# Patient Record
Sex: FEMALE | Race: BLACK OR AFRICAN AMERICAN | Employment: OTHER | URBAN - METROPOLITAN AREA
[De-identification: names, ages, dates, MRNs, and addresses within clinical notes are randomized per-mention and may not be internally consistent; named-entity substitution may affect disease eponyms.]

---

## 2017-03-01 ENCOUNTER — HOSPITAL ENCOUNTER (OUTPATIENT)
Dept: PHYSICAL THERAPY | Age: 62
Discharge: HOME | End: 2017-03-01
Payer: MEDICARE

## 2017-03-01 PROCEDURE — 97162 PT EVAL MOD COMPLEX 30 MIN: CPT

## 2017-03-01 PROCEDURE — 97110 THERAPEUTIC EXERCISES: CPT

## 2017-03-01 PROCEDURE — G8981 BODY POS CURRENT STATUS: HCPCS

## 2017-03-01 PROCEDURE — G8982 BODY POS GOAL STATUS: HCPCS
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
## 2017-05-22 NOTE — PROGRESS NOTES
[facility] - [facility] COMPANY OF [address]  [phone] fax    Physical Therapy Discharge Summary    Patient name: Chevy Sutton Start of Care:  3/1/17   Referral source: Adry Don MD : 1955   Medical/Treatment Diagnosis: Neck pain [M54.2]  Diabetic neuropathy ([address] [E11.40] Onset Date:      Prior Hospitalization: see medical history Provider#: [id number]   Medications: Verified on Patient Summary List    Comorbidities: Diabetes, neuropathy, HTN, arthritis    Prior Level of Function:1    Visits from Start of Care: 1   Missed Visits:  0    Reporting Period : 3/1/17 to 3/1/17    Summary of Care:  Goal: Patient will improve FOTO score by 18 points in order to demonstrate a significant improvement in function. Status at last note/certification: 18  Status at discharge: not met    Goal: Patient Will improve cervical rotation to 45 degrees bilaterally in order to increase ease of ADLs  Status at last note/certification:  R: 26 L: 38 degrees  Status at discharge: not met    Goal: Patient will improve L shoulder AROM elevation to 120 degrees in order to improve quality of life. Status at last note/certification: 99 degrees  Status at discharge: not met    Goal: Patient will report pain at 2/10 or less during ADLs in order to improve quality of life. Status at last note/certification:   Status at discharge: not met    Pt. Was seen for initial evaluation and then did not return to PT secondary to high co-pay. She was educated on a HEP at time of evaluation.        ASSESSMENT/RECOMMENDATIONS:  [x]Discontinue therapy: []Patient has reached or is progressing toward set goals      [x]Patient is non-compliant or has abdicated      []Due to lack of appreciable progress towards set goals    Ugo Grey, PT 2017 12:34 PM

## 2017-09-22 ENCOUNTER — APPOINTMENT (OUTPATIENT)
Dept: GENERAL RADIOLOGY | Age: 62
DRG: 304 | End: 2017-09-22
Attending: PHYSICIAN ASSISTANT
Payer: MEDICARE

## 2017-09-22 ENCOUNTER — HOSPITAL ENCOUNTER (INPATIENT)
Age: 62
LOS: 5 days | Discharge: HOME | DRG: 304 | End: 2017-09-27
Attending: EMERGENCY MEDICINE | Admitting: INTERNAL MEDICINE
Payer: MEDICARE

## 2017-09-22 ENCOUNTER — APPOINTMENT (OUTPATIENT)
Dept: CT IMAGING | Age: 62
DRG: 304 | End: 2017-09-22
Attending: EMERGENCY MEDICINE
Payer: MEDICARE

## 2017-09-22 DIAGNOSIS — I16.0 HYPERTENSIVE URGENCY: ICD-10-CM

## 2017-09-22 DIAGNOSIS — R07.9 CHEST PAIN, UNSPECIFIED TYPE: Primary | ICD-10-CM

## 2017-09-22 LAB
ANION GAP SERPL CALC-SCNC: 4 MMOL/L (ref 3–18)
BASOPHILS # BLD: 0 K/UL (ref 0–0.06)
BASOPHILS NFR BLD: 0 % (ref 0–2)
BNP SERPL-MCNC: ABNORMAL PG/ML (ref 0–900)
BUN SERPL-MCNC: 25 MG/DL (ref 7–18)
BUN/CREAT SERPL: 22 (ref 12–20)
CALCIUM SERPL-MCNC: 8.7 MG/DL (ref 8.5–10.1)
CHLORIDE SERPL-SCNC: 104 MMOL/L (ref 100–108)
CO2 SERPL-SCNC: 33 MMOL/L (ref 21–32)
CREAT SERPL-MCNC: 1.16 MG/DL (ref 0.6–1.3)
DIFFERENTIAL METHOD BLD: ABNORMAL
EOSINOPHIL # BLD: 0.1 K/UL (ref 0–0.4)
EOSINOPHIL NFR BLD: 2 % (ref 0–5)
ERYTHROCYTE [DISTWIDTH] IN BLOOD BY AUTOMATED COUNT: 14.5 % (ref 11.6–14.5)
GLUCOSE SERPL-MCNC: 172 MG/DL (ref 74–99)
HCT VFR BLD AUTO: 34.2 % (ref 35–45)
HGB BLD-MCNC: 10.8 G/DL (ref 12–16)
LYMPHOCYTES # BLD: 0.9 K/UL (ref 0.9–3.6)
LYMPHOCYTES NFR BLD: 13 % (ref 21–52)
MAGNESIUM SERPL-MCNC: 2 MG/DL (ref 1.6–2.6)
MCH RBC QN AUTO: 27 PG (ref 24–34)
MCHC RBC AUTO-ENTMCNC: 31.6 G/DL (ref 31–37)
MCV RBC AUTO: 85.5 FL (ref 74–97)
MONOCYTES # BLD: 0.3 K/UL (ref 0.05–1.2)
MONOCYTES NFR BLD: 4 % (ref 3–10)
NEUTS SEG # BLD: 5.5 K/UL (ref 1.8–8)
NEUTS SEG NFR BLD: 81 % (ref 40–73)
PLATELET # BLD AUTO: 139 K/UL (ref 135–420)
PMV BLD AUTO: 11.5 FL (ref 9.2–11.8)
POTASSIUM SERPL-SCNC: 2.9 MMOL/L (ref 3.5–5.5)
RBC # BLD AUTO: 4 M/UL (ref 4.2–5.3)
SODIUM SERPL-SCNC: 141 MMOL/L (ref 136–145)
TROPONIN I SERPL-MCNC: 0.06 NG/ML (ref 0–0.06)
TROPONIN I SERPL-MCNC: 0.06 NG/ML (ref 0–0.06)
WBC # BLD AUTO: 6.8 K/UL (ref 4.6–13.2)

## 2017-09-22 PROCEDURE — 93005 ELECTROCARDIOGRAM TRACING: CPT

## 2017-09-22 PROCEDURE — 83880 ASSAY OF NATRIURETIC PEPTIDE: CPT | Performed by: EMERGENCY MEDICINE

## 2017-09-22 PROCEDURE — 71275 CT ANGIOGRAPHY CHEST: CPT

## 2017-09-22 PROCEDURE — 74011250636 HC RX REV CODE- 250/636: Performed by: EMERGENCY MEDICINE

## 2017-09-22 PROCEDURE — 74011250637 HC RX REV CODE- 250/637: Performed by: EMERGENCY MEDICINE

## 2017-09-22 PROCEDURE — 84484 ASSAY OF TROPONIN QUANT: CPT | Performed by: PHYSICIAN ASSISTANT

## 2017-09-22 PROCEDURE — 96361 HYDRATE IV INFUSION ADD-ON: CPT

## 2017-09-22 PROCEDURE — 83735 ASSAY OF MAGNESIUM: CPT | Performed by: PHYSICIAN ASSISTANT

## 2017-09-22 PROCEDURE — 65660000000 HC RM CCU STEPDOWN

## 2017-09-22 PROCEDURE — 71020 XR CHEST PA LAT: CPT

## 2017-09-22 PROCEDURE — 96374 THER/PROPH/DIAG INJ IV PUSH: CPT

## 2017-09-22 PROCEDURE — 85025 COMPLETE CBC W/AUTO DIFF WBC: CPT | Performed by: PHYSICIAN ASSISTANT

## 2017-09-22 PROCEDURE — 80048 BASIC METABOLIC PNL TOTAL CA: CPT | Performed by: PHYSICIAN ASSISTANT

## 2017-09-22 PROCEDURE — 74011636320 HC RX REV CODE- 636/320: Performed by: EMERGENCY MEDICINE

## 2017-09-22 PROCEDURE — 99285 EMERGENCY DEPT VISIT HI MDM: CPT

## 2017-09-22 RX ORDER — ASPIRIN 81 MG/1
81 TABLET ORAL DAILY
COMMUNITY
End: 2020-01-01

## 2017-09-22 RX ORDER — NITROGLYCERIN 0.4 MG/1
0.4 TABLET SUBLINGUAL AS NEEDED
Status: DISCONTINUED | OUTPATIENT
Start: 2017-09-22 | End: 2017-09-27 | Stop reason: HOSPADM

## 2017-09-22 RX ORDER — CARVEDILOL 25 MG/1
25 TABLET ORAL 2 TIMES DAILY WITH MEALS
COMMUNITY
End: 2017-11-03 | Stop reason: SDUPTHER

## 2017-09-22 RX ORDER — CLONIDINE HYDROCHLORIDE 0.2 MG/1
0.2 TABLET ORAL 2 TIMES DAILY
COMMUNITY
End: 2017-09-27

## 2017-09-22 RX ORDER — LABETALOL HCL 20 MG/4 ML
20 SYRINGE (ML) INTRAVENOUS
Status: COMPLETED | OUTPATIENT
Start: 2017-09-22 | End: 2017-09-22

## 2017-09-22 RX ORDER — PRAVASTATIN SODIUM 20 MG/1
20 TABLET ORAL
COMMUNITY
End: 2020-01-01

## 2017-09-22 RX ORDER — OFLOXACIN 3 MG/ML
3 SOLUTION/ DROPS OPHTHALMIC DAILY
COMMUNITY
End: 2017-09-27

## 2017-09-22 RX ORDER — VALSARTAN AND HYDROCHLOROTHIAZIDE 160; 25 MG/1; MG/1
1 TABLET ORAL DAILY
COMMUNITY
End: 2017-09-27

## 2017-09-22 RX ORDER — CARVEDILOL 6.25 MG/1
25 TABLET ORAL
Status: COMPLETED | OUTPATIENT
Start: 2017-09-22 | End: 2017-09-22

## 2017-09-22 RX ORDER — SPIRONOLACTONE 25 MG/1
12.5 TABLET ORAL DAILY
COMMUNITY
End: 2018-01-26 | Stop reason: ALTCHOICE

## 2017-09-22 RX ORDER — GUAIFENESIN 100 MG/5ML
162 LIQUID (ML) ORAL
Status: COMPLETED | OUTPATIENT
Start: 2017-09-22 | End: 2017-09-22

## 2017-09-22 RX ORDER — GABAPENTIN 300 MG/1
300 CAPSULE ORAL 4 TIMES DAILY
Status: ON HOLD | COMMUNITY
End: 2017-09-27

## 2017-09-22 RX ORDER — POTASSIUM CHLORIDE 20 MEQ/1
40 TABLET, EXTENDED RELEASE ORAL
Status: COMPLETED | OUTPATIENT
Start: 2017-09-22 | End: 2017-09-22

## 2017-09-22 RX ORDER — CLONIDINE HYDROCHLORIDE 0.1 MG/1
0.2 TABLET ORAL
Status: COMPLETED | OUTPATIENT
Start: 2017-09-22 | End: 2017-09-22

## 2017-09-22 RX ADMIN — ASPIRIN 81 MG 162 MG: 81 TABLET ORAL at 14:53

## 2017-09-22 RX ADMIN — SODIUM CHLORIDE 1000 ML: 900 INJECTION, SOLUTION INTRAVENOUS at 15:29

## 2017-09-22 RX ADMIN — IOPAMIDOL 100 ML: 755 INJECTION, SOLUTION INTRAVENOUS at 15:52

## 2017-09-22 RX ADMIN — CARVEDILOL 25 MG: 6.25 TABLET, FILM COATED ORAL at 14:53

## 2017-09-22 RX ADMIN — CLONIDINE HYDROCHLORIDE 0.2 MG: 0.1 TABLET ORAL at 14:53

## 2017-09-22 RX ADMIN — LABETALOL HYDROCHLORIDE 20 MG: 5 INJECTION, SOLUTION INTRAVENOUS at 15:46

## 2017-09-22 RX ADMIN — POTASSIUM CHLORIDE 40 MEQ: 20 TABLET, EXTENDED RELEASE ORAL at 15:17

## 2017-09-22 NOTE — ED NOTES
I performed a brief evaluation, including history and physical, of the patient here in triage and I have determined that pt will need further treatment and evaluation from the main side ER physician. I have placed initial orders to help in expediting patients care. September 22, 2017 at 2:06 PM - ROSEANNE Obrien      Pt having chest pain and SOB x 4 days with cough productive of yellow sputum. Hx of DM, HTN, Hypercholesterolemia, and CHF.      Visit Vitals    BP (!) 198/127 (BP 1 Location: Left arm, BP Patient Position: At rest)    Pulse 89    Temp 98.3 °F (36.8 °C)    Resp 20    Ht 5' 9\" (1.753 m)    Wt 83 kg (183 lb)    SpO2 94%    BMI 27.02 kg/m2

## 2017-09-22 NOTE — ED NOTES
Hourly rounding:  Siderails up, call bell within reach. Daughter at bedside. Patient states she is not currently having any chest pain.

## 2017-09-22 NOTE — IP AVS SNAPSHOT
[address] Patient: Tatum Johnson MRN: [id number] CVK:[birth year] You are allergic to the following Allergen Reactions Codeine Nausea Only Sulfa (Sulfonamide Antibiotics) Rash Immunizations Administered for This Admission Name Date Influenza Vaccine (Quad) PF  Deferred () Recent Documentation Height Weight BMI OB Status Smoking Status 1.753 m 95.1 kg 30.95 kg/m2 Postmenopausal Never Smoker Emergency Contacts Name Discharge Info Relation Home Work Mobile Katina Maurer  Mother [14] [phone] Sarah Buckely  Child [2] [phone] About your hospitalization You were admitted on:  September 22, 2017 You last received care in the: [facility] [address] You were discharged on:  September 27, 2017 Unit phone number:  [phone] Why you were hospitalized Your primary diagnosis was:  Not on File Your diagnoses also included:  Chest Pain Providers Seen During Your Hospitalizations Provider Role Specialty Primary office phone Krysta Russell MD Attending Provider Emergency Medicine [phone] Vaibhav Davis MD Attending Provider Internal Medicine [phone] Keith Foss MD Attending Provider Hospitalist [phone] Your Primary Care Physician (PCP) Primary Care Physician Office Phone Office Fax Justice Hunt, [address] [place] [address] [phone] Follow-up Information Follow up With Details Comments Contact Info Joon Smith MD On 11/3/2017 @ 11:00 am [address] 200 [facility] 
[phone] [facility] On 10/3/2017 @ 2:15 (Pt will see Dr. Estella Goode at this location). [address] [place] [id number] [phone] Ramirez Coleman MD On 10/20/2017 @ 10:30 am [address] 200 [facility] 
[phone] Your Appointments Friday November 03, 2017 11:00 AM EDT  
[facility] with Dale Upton MD  
[facility] ([facility] [place] [address] [address] [address] [phone] Current Discharge Medication List  
  
START taking these medications Dose & Instructions Dispensing Information Comments Morning Noon Evening Bedtime  
 fluticasone 50 mcg/actuation nasal spray Commonly known as:  Soheila Fetch Your last dose was: Your next dose is:    
   
   
 Dose:  2 Spray 2 Sprays by Both Nostrils route daily. Quantity:  1 Bottle Refills:  1  
     
   
   
   
  
 hydrALAZINE 25 mg tablet Commonly known as:  APRESOLINE Your last dose was: Your next dose is:    
   
   
 Dose:  25 mg Take 1 Tab by mouth three (3) times daily. Quantity:  90 Tab Refills:  2  
     
   
   
   
  
 isosorbide dinitrate 10 mg tablet Commonly known as:  ISORDIL Your last dose was: Your next dose is:    
   
   
 Dose:  10 mg Take 1 Tab by mouth three (3) times daily. Quantity:  90 Tab Refills:  2  
     
   
   
   
  
 sodium chloride 0.65 % nasal spray Commonly known as:  OCEAN Your last dose was: Your next dose is:    
   
   
 Dose:  2 Spray 2 Sprays by Both Nostrils route every two (2) hours as needed. Quantity:  30 mL Refills:  0  
     
   
   
   
  
 valsartan 160 mg tablet Commonly known as:  DIOVAN Start taking on:  9/28/2017 Your last dose was: Your next dose is:    
   
   
 Dose:  160 mg Take 1 Tab by mouth daily. Quantity:  30 Tab Refills:  2 CONTINUE these medications which have CHANGED Dose & Instructions Dispensing Information Comments Morning Noon Evening Bedtime  
 gabapentin 100 mg capsule Commonly known as:  NEURONTIN What changed: - medication strength 
- how much to take 
- how to take this - when to take this 
- additional instructions Your last dose was: Your next dose is:    
   
   
 1 cap po twice daily with breakfast and dinner, and 3 tabs po qHS Quantity:  150 Cap Refills:  2 CONTINUE these medications which have NOT CHANGED Dose & Instructions Dispensing Information Comments Morning Noon Evening Bedtime  
 aspirin delayed-release 81 mg tablet Your last dose was: Your next dose is:    
   
   
 Dose:  81 mg Take 81 mg by mouth daily. Refills:  0  
     
   
   
   
  
 carvedilol 25 mg tablet Commonly known as:  Srini Been Your last dose was: Your next dose is:    
   
   
 Dose:  25 mg Take 25 mg by mouth two (2) times daily (with meals). Refills:  0 DULERA 200-5 mcg/actuation HFA inhaler Generic drug:  mometasone-formoterol Your last dose was: Your next dose is:    
   
   
 Dose:  2 Puff Take 2 Puffs by inhalation two (2) times a day. Refills:  0  
     
   
   
   
  
 LANTUS 100 unit/mL injection Generic drug:  insulin glargine Your last dose was: Your next dose is:    
   
   
 Dose:  45 Units 45 Units by SubCUTAneous route daily. 15 units in the evening. Refills:  0  
     
   
   
   
  
 pravastatin 20 mg tablet Commonly known as:  PRAVACHOL Your last dose was: Your next dose is:    
   
   
 Dose:  20 mg Take 20 mg by mouth nightly. Refills:  0  
     
   
   
   
  
 spironolactone 25 mg tablet Commonly known as:  ALDACTONE Your last dose was: Your next dose is:    
   
   
 Dose:  12.5 mg Take 12.5 mg by mouth daily. Refills:  0 STOP taking these medications   
 cloNIDine HCl 0.2 mg tablet Commonly known as:  CATAPRES  
   
  
 ofloxacin 0.3 % ophthalmic solution Commonly known as:  FLOXIN  
   
  
 valsartan-hydroCHLOROthiazide 160-25 mg per tablet Commonly known as:  DIOVAN-HCT Where to Get Your Medications Information on where to get these meds will be given to you by the nurse or doctor. ! Ask your nurse or doctor about these medications  
  fluticasone 50 mcg/actuation nasal spray  
 gabapentin 100 mg capsule  
 hydrALAZINE 25 mg tablet  
 isosorbide dinitrate 10 mg tablet  
 sodium chloride 0.65 % nasal spray  
 valsartan 160 mg tablet Discharge Instructions Chest Pain: Care Instructions Your Care Instructions There are many things that can cause chest pain. Some are not serious and will get better on their own in a few days. But some kinds of chest pain need more testing and treatment. Your doctor may have recommended a follow-up visit in the next 8 to 12 hours. If you are not getting better, you may need more tests or treatment. Even though your doctor has released you, you still need to watch for any problems. The doctor carefully checked you, but sometimes problems can develop later. If you have new symptoms or if your symptoms do not get better, get medical care right away. If you have worse or different chest pain or pressure that lasts more than 5 minutes or you passed out (lost consciousness), call 911 or seek other emergency help right away. A medical visit is only one step in your treatment. Even if you feel better, you still need to do what your doctor recommends, such as going to all suggested follow-up appointments and taking medicines exactly as directed. This will help you recover and help prevent future problems. How can you care for yourself at home? · Rest until you feel better. · Take your medicine exactly as prescribed. Call your doctor if you think you are having a problem with your medicine. · Do not drive after taking a prescription pain medicine. When should you call for help? Call 911 if: · You passed out (lost consciousness). · You have severe difficulty breathing. · You have symptoms of a heart attack. These may include: ¨ Chest pain or pressure, or a strange feeling in your chest. 
¨ Sweating. ¨ Shortness of breath. ¨ Nausea or vomiting. ¨ Pain, pressure, or a strange feeling in your back, neck, jaw, or upper belly or in one or both shoulders or arms. ¨ Lightheadedness or sudden weakness. ¨ A fast or irregular heartbeat. After you call 911, the  may tell you to chew 1 adult-strength or 2 to 4 low-dose aspirin. Wait for an ambulance. Do not try to drive yourself. Call your doctor today if: 
· You have any trouble breathing. · Your chest pain gets worse. · You are dizzy or lightheaded, or you feel like you may faint. · You are not getting better as expected. · You are having new or different chest pain. Where can you learn more? Go to http://chanel-connor.info/. Enter A120 in the search box to learn more about \"Chest Pain: Care Instructions. \" Current as of: March 20, 2017 Content Version: 11.3 © 1813-2760 [employer]. Care instructions adapted under license by [employer] (which disclaims liability or warranty for this information). If you have questions about a medical condition or this instruction, always ask your healthcare professional. [place] [address] any warranty or liability for your use of this information. Acute High Blood Pressure: Care Instructions Your Care Instructions Acute high blood pressure is very high blood pressure. It's a serious problem. Very high blood pressure can damage your brain, heart, eyes, and kidneys. You may have been given medicines to lower your blood pressure. You may have gotten them as pills or through a needle in one of your veins. This is called an IV. And maybe you were given other medicines too.  These can be needed when high blood pressure causes other problems. To keep your blood pressure at a lower level, you may need to make healthy lifestyle changes. And you will probably need to take medicines. Be sure to follow up with your doctor about your blood pressure and what you can do about it. Follow-up care is a key part of your treatment and safety. Be sure to make and go to all appointments, and call your doctor if you are having problems. It's also a good idea to know your test results and keep a list of the medicines you take. How can you care for yourself at home? · See your doctor as often as he or she recommends. This is to make sure your blood pressure is under control. You will probably go at least 2 times a year. But it may be more often at first. 
· Take your blood pressure medicine exactly as prescribed. You may take one or more types. They include diuretics, beta-blockers, ACE inhibitors, calcium channel blockers, and angiotensin II receptor blockers. Call your doctor if you think you are having a problem with your medicine. · If you take blood pressure medicine, talk to your doctor before you take decongestants or anti-inflammatory medicine, such as ibuprofen. These can raise blood pressure. · Learn how to check your blood pressure at home. Check it often. · Ask your doctor if it's okay to drink alcohol. · Talk to your doctor about lifestyle changes that can help blood pressure. These include being active and not smoking. When should you call for help? Call 911 anytime you think you may need emergency care. This may mean having symptoms that suggest that your blood pressure is causing a serious heart or blood vessel problem. Your blood pressure may be over 180/110. For example, call 911 if: 
· You have symptoms of a heart attack. These may include: ¨ Chest pain or pressure, or a strange feeling in the chest. 
¨ Sweating. ¨ Shortness of breath. ¨ Nausea or vomiting. ¨ Pain, pressure, or a strange feeling in the back, neck, jaw, or upper belly or in one or both shoulders or arms. ¨ Lightheadedness or sudden weakness. ¨ A fast or irregular heartbeat. · You have symptoms of a stroke. These may include: 
¨ Sudden numbness, tingling, weakness, or loss of movement in your face, arm, or leg, especially on only one side of your body. ¨ Sudden vision changes. ¨ Sudden trouble speaking. ¨ Sudden confusion or trouble understanding simple statements. ¨ Sudden problems with walking or balance. ¨ A sudden, severe headache that is different from past headaches. · You have severe back or belly pain. Do not wait until your blood pressure comes down on its own. Get help right away. Call your doctor now or seek immediate care if: 
· Your blood pressure is much higher than normal (such as 180/110 or higher), but you don't have symptoms. · You think high blood pressure is causing symptoms, such as: ¨ Severe headache. ¨ Blurry vision. Watch closely for changes in your health, and be sure to contact your doctor if: 
· Your blood pressure measures 140/90 or higher at least 2 times. That means the top number is 140 or higher or the bottom number is 90 or higher, or both. · You think you may be having side effects from your blood pressure medicine. · Your blood pressure is usually normal, but it goes above normal at least 2 times. Where can you learn more? Go to http://chanel-connor.info/. Enter H397 in the search box to learn more about \"Acute High Blood Pressure: Care Instructions. \" Current as of: August 8, 2016 Content Version: 11.3 © 6515-7538 [employer]. Care instructions adapted under license by [employer] (which disclaims liability or warranty for this information).  If you have questions about a medical condition or this instruction, always ask your healthcare professional. [employer] disclaims any warranty or liability for your use of this information. Learning About Pleural Effusion What is pleural effusion? Pleural effusion (say \"PLER-flora hw-ELKK-lpeo\") is the buildup of fluid in the space between tissues lining the lungs and chest wall. Because of the fluid buildup, the lungs may not be able to expand completely, which can make it hard to breathe. The lung, or part of it, may collapse. Pleural effusion has many causes, such as pneumonia, cancer, inflammation of the tissues around the lungs, and heart failure. Pleural effusion is usually diagnosed with an X-ray and a physical exam. The doctor listens to the air flow in your lungs. What are the symptoms? Symptoms of pleural effusion may include: · Trouble breathing. · Shortness of breath. · Chest pain. · Fever. · A cough. Minor pleural effusion may not cause any symptoms. How is pleural effusion treated? Doctors may need to treat the condition that is causing pleural effusion. For example, you may get medicines to treat pneumonia or congestive heart failure. Minor pleural effusion often goes away on its own without treatment. Removing fluid Pleural effusion can be treated by removing fluid from the space between the tissues around the lungs. This is done with a needle that's put into the chest (thoracentesis). A small amount of the fluid may be sent to a lab to find out what is causing the buildup of fluid. Removing the fluid may help to relieve symptoms, such as shortness of breath and chest pain. It can help the lungs to expand more fully. In some cases, if pleural effusion doesn't get better, a catheter may be placed in the chest. This is a flexible tube that allows fluid to drain from the lungs. The catheter stays in the chest until the doctor removes it. Some people may get a treatment that removes the fluid and then puts a medicine into the chest cavity.  This helps to prevent too much fluid from building up again. Follow-up care is a key part of your treatment and safety. Be sure to make and go to all appointments, and call your doctor if you are having problems. It's also a good idea to know your test results and keep a list of the medicines you take. Where can you learn more? Go to http://chanel-connor.info/. Enter A920 in the search box to learn more about \"Learning About Pleural Effusion. \" Current as of: March 25, 2017 Content Version: 11.3 © 4269-8647 [employer]. Care instructions adapted under license by [employer] (which disclaims liability or warranty for this information). If you have questions about a medical condition or this instruction, always ask your healthcare professional. [address] any warranty or liability for your use of this information. Shortness of Breath: Care Instructions Your Care Instructions Shortness of breath has many causes. Sometimes conditions such as anxiety can lead to shortness of breath. Some people get mild shortness of breath when they exercise. Trouble breathing also can be a symptom of a serious problem, such as asthma, lung disease, emphysema, heart problems, and pneumonia. If your shortness of breath continues, you may need tests and treatment. Watch for any changes in your breathing and other symptoms. Follow-up care is a key part of your treatment and safety. Be sure to make and go to all appointments, and call your doctor if you are having problems. Its also a good idea to know your test results and keep a list of the medicines you take. How can you care for yourself at home? · Do not smoke or allow others to smoke around you. If you need help quitting, talk to your doctor about stop-smoking programs and medicines. These can increase your chances of quitting for good. · Get plenty of rest and sleep. · Take your medicines exactly as prescribed.  Call your doctor if you think you are having a problem with your medicine. · Find healthy ways to deal with stress. ¨ Exercise daily. ¨ Get plenty of sleep. ¨ Eat regularly and well. When should you call for help? Call 911 anytime you think you may need emergency care. For example, call if: 
· You have severe shortness of breath. · You have symptoms of a heart attack. These may include: ¨ Chest pain or pressure, or a strange feeling in the chest. 
¨ Sweating. ¨ Shortness of breath. ¨ Nausea or vomiting. ¨ Pain, pressure, or a strange feeling in the back, neck, jaw, or upper belly or in one or both shoulders or arms. ¨ Lightheadedness or sudden weakness. ¨ A fast or irregular heartbeat. After you call 911, the  may tell you to chew 1 adult-strength or 2 to 4 low-dose aspirin. Wait for an ambulance. Do not try to drive yourself. Call your doctor now or seek immediate medical care if: 
· Your shortness of breath gets worse or you start to wheeze. Wheezing is a high-pitched sound when you breathe. · You wake up at night out of breath or have to prop your head up on several pillows to breathe. · You are short of breath after only light activity or while at rest. 
Watch closely for changes in your health, and be sure to contact your doctor if: 
· You do not get better over the next 1 to 2 days. Where can you learn more? Go to http://chanel-connor.info/. Enter S780 in the search box to learn more about \"Shortness of Breath: Care Instructions. \" Current as of: March 25, 2017 Content Version: 11.3 © 4462-4259 [employer]. Care instructions adapted under license by [employer] (which disclaims liability or warranty for this information). If you have questions about a medical condition or this instruction, always ask your healthcare professional. [place] [address] any warranty or liability for your use of this information. Patient armband removed and shredded Papirus Activation Thank you for requesting access to Papirus. Please follow the instructions below to securely access and download your online medical record. Papirus allows you to send messages to your doctor, view your test results, renew your prescriptions, schedule appointments, and more. How Do I Sign Up? 1. In your internet browser, go to [email or web address].[email or web address] 
2. Click on the First Time User? Click Here link in the Sign In box. You will be redirect to the New Member Sign Up page. 3. Enter your Papirus Access Code exactly as it appears below. You will not need to use this code after youve completed the sign-up process. If you do not sign up before the expiration date, you must request a new code. Papirus Access Code: [email or web address] Expires: 2017  1:49 PM (This is the date your Papirus access code will ) 4. Enter the last four digits of your Social Security Number (xxxx) and Date of Birth (mm/dd/yyyy) as indicated and click Submit. You will be taken to the next sign-up page. 5. Create a Papirus ID. This will be your Papirus login ID and cannot be changed, so think of one that is secure and easy to remember. 6. Create a Papirus password. You can change your password at any time. 7. Enter your Password Reset Question and Answer. This can be used at a later time if you forget your password. 8. Enter your e-mail address. You will receive e-mail notification when new information is available in [address]. 9. Click Sign Up. You can now view and download portions of your medical record. 10. Click the Download Summary menu link to download a portable copy of your medical information. Additional Information If you have questions, please visit the Frequently Asked Questions section of the Papirus website at [email or web address] [employer]. PreciouStatus/[email or web address] [email or web address] Remember, Papirus is NOT to be used for urgent needs. For medical emergencies, dial 911. DISCHARGE SUMMARY from Nurse The following personal items are in your possession at time of discharge: 
 
Dental Appliances: None Visual Aid: None Home Medications: None Jewelry: None Clothing: Pants, Shirt, With patient PATIENT INSTRUCTIONS: 
 
 
F-face looks uneven A-arms unable to move or move unevenly S-speech slurred or non-existent T-time-call 911 as soon as signs and symptoms begin-DO NOT go Back to bed or wait to see if you get better-TIME IS BRAIN. Warning Signs of HEART ATTACK Call 911 if you have these symptoms: 
? Chest discomfort. Most heart attacks involve discomfort in the center of the chest that lasts more than a few minutes, or that goes away and comes back. It can feel like uncomfortable pressure, squeezing, fullness, or pain. ? Discomfort in other areas of the upper body. Symptoms can include pain or discomfort in one or both arms, the back, neck, jaw, or stomach. ? Shortness of breath with or without chest discomfort. ? Other signs may include breaking out in a cold sweat, nausea, or lightheadedness. Don't wait more than five minutes to call [address]! Fast action can save your life. Calling 911 is almost always the fastest way to get lifesaving treatment. Emergency Medical Services staff can begin treatment when they arrive  up to an hour sooner than if someone gets to the hospital by car. The discharge information has been reviewed with the patient. The patient verbalized understanding. Discharge medications reviewed with the patient and appropriate educational materials and side effects teaching were provided. Discharge Instructions Attachments/References FLUTICASONE (INTO THE NOSE) (ENGLISH) HYDRALAZINE (BY MOUTH) (ENGLISH) ISOSORBIDE DINITRATE (BY MOUTH) (ENGLISH) VALSARTAN (BY MOUTH) (ENGLISH) SODIUM CHLORIDE (INTO THE NOSE) (ENGLISH) Discharge Orders None [employer] Announcement We are excited to announce that we are making your provider's discharge notes available to you in Alianza. You will see these notes when they are completed and signed by the physician that discharged you from your recent hospital stay. If you have any questions or concerns about any information you see in Alianza, please call the Health Information Department where you were seen or reach out to your Primary Care Provider for more information about your plan of care. Introducing [facility]! Lenin Rosales introduces Alianza patient portal. Now you can access parts of your medical record, email your doctor's office, and request medication refills online. 1. In your internet browser, go to [email or web address] Moore 2. Click on the First Time User? Click Here link in the Sign In box. You will see the New Member Sign Up page. 3. Enter your Alianza Access Code exactly as it appears below. You will not need to use this code after youve completed the sign-up process. If you do not sign up before the expiration date, you must request a new code. · Alianza Access Code: [email or web address] Expires: 12/26/2017  1:49 PM 
 
4. Enter the last four digits of your Social Security Number (xxxx) and Date of Birth (mm/dd/yyyy) as indicated and click Submit. You will be taken to the next sign-up page. 5. Create a Alianza ID. This will be your Alianza login ID and cannot be changed, so think of one that is secure and easy to remember. 6. Create a Alianza password. You can change your password at any time. 7. Enter your Password Reset Question and Answer.  This can be used at a later time if you forget your password. 8. Enter your e-mail address. You will receive e-mail notification when new information is available in [address] 9. Click Sign Up. You can now view and download portions of your medical record. 10. Click the Download Summary menu link to download a portable copy of your medical information. If you have questions, please visit the Frequently Asked Questions section of the [employer] website. Remember, [employer] is NOT to be used for urgent needs. For medical emergencies, dial 911. Now available from your iPhone and Android! General Information Please provide this summary of care documentation to your next provider. Patient Signature:  ____________________________________________________________ Date:  ____________________________________________________________  
  
Chele Sport Provider Signature:  ____________________________________________________________ Date:  ____________________________________________________________ More Information Fluticasone (Into the nose) Fluticasone (pslv-LSV-h-sone) Treats allergy symptoms, such as runny or stuffy nose. This medicine is a corticosteroid. Brand Name(s): Children's Flonase, DermacinRx Captricity, DermacinRx [place], [facility], Flonase Sensimist, Fluticasone Propionate Novaplus, Ticaspray, Veramyst  
There may be other brand names for this medicine. When This Medicine Should Not Be Used: This medicine is not right for everyone. Do not use if you had an allergic reaction to fluticasone. How to Use This Medicine:  
Spray · Your doctor will tell you how much medicine to use. Do not use more than directed. · This medicine is for use only in the nose. Do not get any of it in your eyes or on your skin. If it does get on these areas, rinse it off right away.  
· Prime the spray: Release 6 test sprays into the air away from the face, or pump the bottle until some of the medicine sprays out. Now it is ready to use. Prime the spray if it has not been used for more than 7 days (or 30 days for Veramyst®) or if the cap has been left off the bottle for 5 days or longer. · Shake the medicine well just before each use. · Before using the medicine, gently blow your nose to clear the nostrils. · After using the nasal spray, wipe the tip of the bottle with a clean tissue and put the cap back on. · You may need to use this medicine for a few days before you start to feel better. · Read and follow the patient instructions that come with this medicine. Talk to your doctor or pharmacist if you have any questions. · Follow the instructions on the medicine label if you are using this medicine without a prescription. · Missed dose: Take a dose as soon as you remember. If it is almost time for your next dose, wait until then and take a regular dose. Do not take extra medicine to make up for a missed dose. · Keep the bottle tightly closed when not using it. Store at room temperature, away from heat and direct light. Do not freeze or refrigerate. Throw this medicine away after you use 120 sprays. Drugs and Foods to Avoid: Ask your doctor or pharmacist before using any other medicine, including over-the-counter medicines, vitamins, and herbal products. · Do not use this medicine together with ritonavir. · Some foods and medicines can affect how fluticasone works. Tell your doctor if you are using ketoconazole. Warnings While Using This Medicine: · Tell your doctor if you are pregnant or breastfeeding, or if you have liver disease, asthma, an infection, or a history of cataracts or glaucoma. Make sure your doctor knows if you have had nose surgery, a nose injury, or a recent infection in your nose. · This medicine may cause the following problems: 
¨ Holes or ulcers inside the nose ¨ Slow wound healing ¨ Cataracts or glaucoma ¨ Problems with the adrenal glands ¨ Slow growth in children · Avoid people who are sick or have infections. Tell your doctor right away if you think you have been exposed to measles or chickenpox. · Your doctor will check your progress and the effects of this medicine at regular visits. Keep all appointments. · Call your doctor if your symptoms do not improve or if they get worse. · Keep all medicine out of the reach of children. Never share your medicine with anyone. Possible Side Effects While Using This Medicine:  
Call your doctor right away if you notice any of these side effects: · Allergic reaction: Itching or hives, swelling in your face or hands, swelling or tingling in your mouth or throat, chest tightness, trouble breathing · Burning, redness, swelling, or irritation around or inside your nose · Eye pain or vision changes · Fever, chills, cough, sore throat, and body aches · Heavy nosebleeds · Sores or white patches inside the nose or mouth · Tiredness, weakness, dizziness If you notice other side effects that you think are caused by this medicine, tell your doctor. Call your doctor for medical advice about side effects. You may report side effects to FDA at 3-818-FDA-6434 © 2017 [address] Information is for End User's use only and may not be sold, redistributed or otherwise used for commercial purposes. The above information is an  only. It is not intended as medical advice for individual conditions or treatments. Talk to your doctor, nurse or pharmacist before following any medical regimen to see if it is safe and effective for you. Hydralazine (By mouth) Hydralazine Hydrochloride (wid-NNBJ-a-sandy antoinette-droe-KLOR-brielle) Treats high blood pressure. Brand Name(s):  
There may be other brand names for this medicine. When This Medicine Should Not Be Used: This medicine is not right for everyone.  Do not use it if you had an allergic reaction to hydralazine, or if you have coronary artery disease or rheumatic heart disease. How to Use This Medicine:  
Tablet · Take your medicine as directed. Your dose may need to be changed several times to find what works best for you. · Missed dose: Take a dose as soon as you remember. If it is almost time for your next dose, wait until then and take a regular dose. Do not take extra medicine to make up for a missed dose. · Store the medicine in a closed container at room temperature, away from heat, moisture, and direct light. Drugs and Foods to Avoid: Ask your doctor or pharmacist before using any other medicine, including over-the-counter medicines, vitamins, and herbal products. · Some foods and medicines can affect how hydralazine works. Tell your doctor if you are using diazoxide or an MAO inhibitor. Warnings While Using This Medicine: · Tell your doctor if you are pregnant or breastfeeding, or if you have kidney disease, heart or blood vessel disease, heart rhythm problems, lupus, or if you had a heart attack or stroke. · This medicine may cause the following problems: 
¨ Lupus-like syndrome ¨ Changes in heart rhythm ¨ Nerve problems · This medicine may lower your blood pressure too much and cause you to feel dizzy. Do not drive or do anything else that could be dangerous until you know how this medicine affects you. · Your doctor will do lab tests at regular visits to check on the effects of this medicine. Keep all appointments. · Keep all medicine out of the reach of children. Never share your medicine with anyone. Possible Side Effects While Using This Medicine:  
Call your doctor right away if you notice any of these side effects: · Allergic reaction: Itching or hives, swelling in your face or hands, swelling or tingling in your mouth or throat, chest tightness, trouble breathing · Change in how much or how often you urinate · Chest pain that may spread to your arms, jaw, back, or neck, trouble breathing, unusual sweating, faintness · Fast, pounding, or uneven heartbeat · Fever, chills, cough, sore throat, and body aches · Lightheadedness, dizziness, or fainting · Numbness, tingling, or burning pain in your hands, arms, legs, or feet · Unusual bleeding, bruising, or weakness If you notice these less serious side effects, talk with your doctor: · Diarrhea, nausea, vomiting, loss of appetite · Headache · Stuffy nose or watery eyes If you notice other side effects that you think are caused by this medicine, tell your doctor. Call your doctor for medical advice about side effects. You may report side effects to FDA at 9-219-FDA-2580 © 2017 [address]  Information is for End User's use only and may not be sold, redistributed or otherwise used for commercial purposes. The above information is an  only. It is not intended as medical advice for individual conditions or treatments. Talk to your doctor, nurse or pharmacist before following any medical regimen to see if it is safe and effective for you. Isosorbide Dinitrate (By mouth) Isosorbide Dinitrate (eye-jeanne-SOR-bide dye-QAMAR-trate) Treats and prevents angina. This medicine is a nitrate. Brand Name(s): Dilatrate-SR, Isordil Titradose There may be other brand names for this medicine. When This Medicine Should Not Be Used: This medicine is not right for everyone. Do not use it if you had an allergic reaction to isosorbide. How to Use This Medicine:  
Long Acting Capsule, Tablet, Chewable Tablet, Long Acting Tablet · Your doctor will tell you how much medicine to use. Do not use more than directed. · Extended-release tablet or capsule: Swallow the tablet or capsule whole. Do not break, crush, or chew it. · Sublingual tablet: Sit down while you take this medicine.  Wet the tablet with water. Place it under your tongue and let it dissolve. Do not chew, crush, or swallow the tablet whole. Do not eat, drink, or smoke while the tablet is dissolving. If your chest pain lasts longer than 5 minutes, put a second tablet under your tongue. If the pain lasts another 5 minutes, use a third tablet. If your pain does not go away after the third dose, call your doctor and have someone take you to a hospital. 
· Missed dose: If you miss a dose or forget to use your medicine, use it as soon as you can. If your next regular dose is less than 2 hours away, wait until then to use the medicine and skip the missed dose. Do not use extra medicine to make up for a missed dose. · Store the medicine in a closed container at room temperature, away from heat, moisture, and direct light. Drugs and Foods to Avoid: Ask your doctor or pharmacist before using any other medicine, including over-the-counter medicines, vitamins, and herbal products. · Do not use this medicine together with riociguat or with medicine for erectile dysfunction, such as avanafil, sildenafil, tadalafil, or vardenafil. · Some foods and medicines can affect how isosorbide dinitrate works. Tell your doctor if you take blood pressure medicine or if you drink alcohol. Warnings While Using This Medicine: · Tell your doctor if you are pregnant or breastfeeding, or if you had a heart attack or you have heart failure, an enlarged heart, low blood pressure, or other heart problems. · Medicines that treat angina sometimes cause headaches. These headaches are a sign that the medicine is working. Do not stop taking the medicine or change the time you take it to avoid the headaches. Ask your doctor if you can take aspirin or acetaminophen to treat the headache. · This medicine could cause low blood pressure, which may make you feel dizzy or lightheaded.  Do not drive or do anything that could be dangerous until you know how this medicine affects you. You may feel lightheaded when standing, so stand up slowly. Drinking alcohol may make these symptoms worse. · Do not stop using this medicine suddenly. Your doctor will need to slowly decrease your dose before you stop it completely. · Keep all medicine out of the reach of children. Never share your medicine with anyone. Possible Side Effects While Using This Medicine:  
Call your doctor right away if you notice any of these side effects: · Allergic reaction: Itching or hives, swelling in your face or hands, swelling or tingling in your mouth or throat, chest tightness, trouble breathing · Blue lips or fingernails, trouble breathing · Increased chest pain, slow heartbeat · Severe or ongoing dizziness, lightheadedness, or fainting · Throbbing or severe headache, confusion, fever, trouble seeing If you notice these less serious side effects, talk with your doctor: · Mild headache If you notice other side effects that you think are caused by this medicine, tell your doctor. Call your doctor for medical advice about side effects. You may report side effects to FDA at 5-107-FDA-7993 © 2017 [address]  Information is for End User's use only and may not be sold, redistributed or otherwise used for commercial purposes. The above information is an  only. It is not intended as medical advice for individual conditions or treatments. Talk to your doctor, nurse or pharmacist before following any medical regimen to see if it is safe and effective for you. Valsartan (By mouth) Valsartan (michael-CANDIDA-tan) Treats high blood pressure and heart failure. May lower the risk of death after a heart attack. This medicine is an angiotensin receptor blocker (ARB). Brand Name(s): Diovan There may be other brand names for this medicine. When This Medicine Should Not Be Used: This medicine is not right for everyone. Do not use it if you had an allergic reaction to valsartan, or if you are pregnant. How to Use This Medicine:  
Capsule, Tablet · Take your medicine as directed. Your dose may need to be changed several times to find what works best for you. · Oral liquid: Shake the bottle well for at least 10 seconds before you measure the dose. Measure the oral liquid medicine with a marked measuring spoon, oral syringe, or medicine cup. · Read and follow the patient instructions that come with this medicine. Talk to your doctor or pharmacist if you have any questions. · Missed dose: Take a dose as soon as you remember. If it is almost time for your next dose, wait until then and take a regular dose. Do not take extra medicine to make up for a missed dose. · Store the medicine in a closed container at room temperature, away from heat, moisture, and direct light. Store the oral liquid at room temperature for up to 30 days or in the refrigerator for up to 75 days. Drugs and Foods to Avoid: Ask your doctor or pharmacist before using any other medicine, including over-the-counter medicines, vitamins, and herbal products. · Do not use this medicine together with aliskiren, especially if you have diabetes or kidney disease. · Some medicines can affect how valsartan works. Tell your doctor if you also use any of the following: ¨ Cyclosporine, lithium, rifampin, ritonavir ¨ ACE inhibitor blood pressure medicine ¨ Diuretic (water pill) ¨ Heparin ¨ NSAID pain or arthritis medicine, including aspirin, diclofenac, ibuprofen, naproxen · Ask your doctor before you use any medicine, supplement, or salt substitute that contains potassium. Warnings While Using This Medicine: · It is not safe to take this medicine during pregnancy. It could harm an unborn baby. Tell your doctor right away if you become pregnant.  
· Tell your doctor if you are breastfeeding, or if you have kidney problems, liver disease, or heart or blood vessel problems. · This medicine could lower your blood pressure too much, especially when you first use it or if you are dehydrated. Stand or sit up slowly if you feel lightheaded or dizzy. · Your doctor will do lab tests at regular visits to check on the effects of this medicine. Keep all appointments. · Keep all medicine out of the reach of children. Never share your medicine with anyone. Possible Side Effects While Using This Medicine:  
Call your doctor right away if you notice any of these side effects: · Allergic reaction: Itching or hives, swelling in your face or hands, swelling or tingling in your mouth or throat, chest tightness, trouble breathing · Change in how much or how often you urinate, bloody or cloudy urine · Confusion, weakness, uneven heartbeat, trouble breathing, numbness in your hands, feet, or lips · Lightheadedness, dizziness, fainting · Rapid weight gain, swelling in your hands, ankles, or feet If you notice other side effects that you think are caused by this medicine, tell your doctor. Call your doctor for medical advice about side effects. You may report side effects to FDA at 0-251-FDA-3194 © 2017 [address] Information is for End User's use only and may not be sold, redistributed or otherwise used for commercial purposes. The above information is an  only. It is not intended as medical advice for individual conditions or treatments. Talk to your doctor, nurse or pharmacist before following any medical regimen to see if it is safe and effective for you. Sodium Chloride (Into the nose) Sodium Chloride (CANDI-karena-um KLOR-brielle) Treats dryness and congestion in the nose.   
Brand Name(s): 4-Way Saline Moisturizing Mist, Altamist, Zearing Allergy and Sinus, Zearing Baby Saline, [place] Saline, [employer], [facility], [place], [place] Single Use, [facility] Nasal Moisturizing [place], [facility] Saline Nasal [place], Good Sense Nasal Moisturizing, Leader Saline Nasal Spray, Mycinaire, Na-Zone There may be other brand names for this medicine. When This Medicine Should Not Be Used: This medicine is generally considered safe for most people. Talk to your doctor if you have concerns. How to Use This Medicine:  
Spray · Use this medicine as directed. · Disposable single units: Open 1 unit, use as directed, and throw away the unit when finished. Do not keep the unit to use again later. · Hold the spray bottle upright and spray 1 or 2 times into each nostril. It might help if you press your finger on the other side of your nose to keep the other nostril closed while spraying. · Some brands of this medicine can also be used as a nose drop. To do this, lie down on your back and tilt your head up a little. Hold the bottle upside down and squeeze 1 or 2 drops into each nostril. · This medicine is for use only in the nose. Do not get any of it in your eyes or on your skin. If it does get on these areas, rinse it off right away. · Do not let the tip of the spray bottle touch any surface. · After using the nasal spray, wipe the tip of the bottle with a clean tissue and put the cap back on. · Keep the bottle tightly closed when not using it. Store at room temperature, away from heat and direct light. Drugs and Foods to Avoid: Ask your doctor or pharmacist before using any other medicine, including over-the-counter medicines, vitamins, and herbal products. Warnings While Using This Medicine: · Do not share this medicine with other people, because you might spread an infection. · Keep all medicine out of the reach of children. Never share your medicine with anyone. Possible Side Effects While Using This Medicine:  
Call your doctor right away if you notice any of these side effects: · Allergic reaction: Itching or hives, swelling in your face or hands, swelling or tingling in your mouth or throat, chest tightness, trouble breathing If you notice other side effects that you think are caused by this medicine, tell your doctor. Call your doctor for medical advice about side effects. You may report side effects to FDA at 0-587-FDA-7471 © 2017 [address] Information is for End User's use only and may not be sold, redistributed or otherwise used for commercial purposes. The above information is an  only. It is not intended as medical advice for individual conditions or treatments. Talk to your doctor, nurse or pharmacist before following any medical regimen to see if it is safe and effective for you.

## 2017-09-22 NOTE — IP AVS SNAPSHOT
Jeff Milan 
 
 
 [address] [address] [address] [place] Patient: Walker Kong MRN: [id number] Current Discharge Medication List  
  
START taking these medications Dose & Instructions Dispensing Information Comments Morning Noon Evening Bedtime  
 fluticasone 50 mcg/actuation nasal spray Commonly known as:  Alejandrina Artis Your last dose was: Your next dose is:    
   
   
 Dose:  2 Spray 2 Sprays by Both Nostrils route daily. Quantity:  1 Bottle Refills:  1  
     
   
   
   
  
 hydrALAZINE 25 mg tablet Commonly known as:  APRESOLINE Your last dose was: Your next dose is:    
   
   
 Dose:  25 mg Take 1 Tab by mouth three (3) times daily. Quantity:  90 Tab Refills:  2  
     
   
   
   
  
 isosorbide dinitrate 10 mg tablet Commonly known as:  ISORDIL Your last dose was: Your next dose is:    
   
   
 Dose:  10 mg Take 1 Tab by mouth three (3) times daily. Quantity:  90 Tab Refills:  2  
     
   
   
   
  
 sodium chloride 0.65 % nasal spray Commonly known as:  OCEAN Your last dose was: Your next dose is:    
   
   
 Dose:  2 Spray 2 Sprays by Both Nostrils route every two (2) hours as needed. Quantity:  30 mL Refills:  0  
     
   
   
   
  
 valsartan 160 mg tablet Commonly known as:  DIOVAN Start taking on:  9/28/2017 Your last dose was: Your next dose is:    
   
   
 Dose:  160 mg Take 1 Tab by mouth daily. Quantity:  30 Tab Refills:  2 CONTINUE these medications which have CHANGED Dose & Instructions Dispensing Information Comments Morning Noon Evening Bedtime  
 gabapentin 100 mg capsule Commonly known as:  NEURONTIN What changed:   
- medication strength 
- how much to take 
- how to take this - when to take this 
- additional instructions Your last dose was: Your next dose is:    
   
   
 1 cap po twice daily with breakfast and dinner, and 3 tabs po qHS Quantity:  150 Cap Refills:  2 CONTINUE these medications which have NOT CHANGED Dose & Instructions Dispensing Information Comments Morning Noon Evening Bedtime  
 aspirin delayed-release 81 mg tablet Your last dose was: Your next dose is:    
   
   
 Dose:  81 mg Take 81 mg by mouth daily. Refills:  0  
     
   
   
   
  
 carvedilol 25 mg tablet Commonly known as:  Kadi Chew Your last dose was: Your next dose is:    
   
   
 Dose:  25 mg Take 25 mg by mouth two (2) times daily (with meals). Refills:  0 DULERA 200-5 mcg/actuation HFA inhaler Generic drug:  mometasone-formoterol Your last dose was: Your next dose is:    
   
   
 Dose:  2 Puff Take 2 Puffs by inhalation two (2) times a day. Refills:  0  
     
   
   
   
  
 LANTUS 100 unit/mL injection Generic drug:  insulin glargine Your last dose was: Your next dose is:    
   
   
 Dose:  45 Units 45 Units by SubCUTAneous route daily. 15 units in the evening. Refills:  0  
     
   
   
   
  
 pravastatin 20 mg tablet Commonly known as:  PRAVACHOL Your last dose was: Your next dose is:    
   
   
 Dose:  20 mg Take 20 mg by mouth nightly. Refills:  0  
     
   
   
   
  
 spironolactone 25 mg tablet Commonly known as:  ALDACTONE Your last dose was: Your next dose is:    
   
   
 Dose:  12.5 mg Take 12.5 mg by mouth daily. Refills:  0 STOP taking these medications   
 cloNIDine HCl 0.2 mg tablet Commonly known as:  CATAPRES  
   
  
 ofloxacin 0.3 % ophthalmic solution Commonly known as:  FLOXIN  
   
  
 valsartan-hydroCHLOROthiazide 160-25 mg per tablet Commonly known as:  DIOVAN-HCT Where to Get Your Medications Information on where to get these meds will be given to you by the nurse or doctor. ! Ask your nurse or doctor about these medications  
  fluticasone 50 mcg/actuation nasal spray  
 gabapentin 100 mg capsule  
 hydrALAZINE 25 mg tablet  
 isosorbide dinitrate 10 mg tablet  
 sodium chloride 0.65 % nasal spray  
 valsartan 160 mg tablet

## 2017-09-23 LAB
ALBUMIN SERPL-MCNC: 2.4 G/DL (ref 3.4–5)
ALBUMIN/GLOB SERPL: 0.6 {RATIO} (ref 0.8–1.7)
ALP SERPL-CCNC: 119 U/L (ref 45–117)
ALT SERPL-CCNC: 17 U/L (ref 13–56)
ANION GAP SERPL CALC-SCNC: 7 MMOL/L (ref 3–18)
AST SERPL-CCNC: 8 U/L (ref 15–37)
ATRIAL RATE: 70 BPM
ATRIAL RATE: 90 BPM
BILIRUB SERPL-MCNC: 0.7 MG/DL (ref 0.2–1)
BUN SERPL-MCNC: 28 MG/DL (ref 7–18)
BUN/CREAT SERPL: 27 (ref 12–20)
CALCIUM SERPL-MCNC: 8.1 MG/DL (ref 8.5–10.1)
CALCULATED P AXIS, ECG09: 29 DEGREES
CALCULATED P AXIS, ECG09: 40 DEGREES
CALCULATED R AXIS, ECG10: -30 DEGREES
CALCULATED R AXIS, ECG10: -36 DEGREES
CALCULATED T AXIS, ECG11: 114 DEGREES
CALCULATED T AXIS, ECG11: 119 DEGREES
CHLORIDE SERPL-SCNC: 107 MMOL/L (ref 100–108)
CO2 SERPL-SCNC: 27 MMOL/L (ref 21–32)
CREAT SERPL-MCNC: 1.04 MG/DL (ref 0.6–1.3)
DIAGNOSIS, 93000: NORMAL
DIAGNOSIS, 93000: NORMAL
ERYTHROCYTE [DISTWIDTH] IN BLOOD BY AUTOMATED COUNT: 14.8 % (ref 11.6–14.5)
GLOBULIN SER CALC-MCNC: 4.2 G/DL (ref 2–4)
GLUCOSE BLD STRIP.AUTO-MCNC: 108 MG/DL (ref 70–110)
GLUCOSE BLD STRIP.AUTO-MCNC: 111 MG/DL (ref 70–110)
GLUCOSE BLD STRIP.AUTO-MCNC: 196 MG/DL (ref 70–110)
GLUCOSE SERPL-MCNC: 115 MG/DL (ref 74–99)
HCT VFR BLD AUTO: 30.8 % (ref 35–45)
HGB BLD-MCNC: 9.9 G/DL (ref 12–16)
MCH RBC QN AUTO: 27.4 PG (ref 24–34)
MCHC RBC AUTO-ENTMCNC: 32.1 G/DL (ref 31–37)
MCV RBC AUTO: 85.3 FL (ref 74–97)
P-R INTERVAL, ECG05: 164 MS
P-R INTERVAL, ECG05: 180 MS
PLATELET # BLD AUTO: 128 K/UL (ref 135–420)
PMV BLD AUTO: 12.5 FL (ref 9.2–11.8)
POTASSIUM SERPL-SCNC: 3.3 MMOL/L (ref 3.5–5.5)
PROT SERPL-MCNC: 6.6 G/DL (ref 6.4–8.2)
Q-T INTERVAL, ECG07: 386 MS
Q-T INTERVAL, ECG07: 434 MS
QRS DURATION, ECG06: 104 MS
QRS DURATION, ECG06: 96 MS
QTC CALCULATION (BEZET), ECG08: 468 MS
QTC CALCULATION (BEZET), ECG08: 472 MS
RBC # BLD AUTO: 3.61 M/UL (ref 4.2–5.3)
SODIUM SERPL-SCNC: 141 MMOL/L (ref 136–145)
VENTRICULAR RATE, ECG03: 70 BPM
VENTRICULAR RATE, ECG03: 90 BPM
WBC # BLD AUTO: 6.9 K/UL (ref 4.6–13.2)

## 2017-09-23 PROCEDURE — 65660000000 HC RM CCU STEPDOWN

## 2017-09-23 PROCEDURE — 74011636637 HC RX REV CODE- 636/637: Performed by: INTERNAL MEDICINE

## 2017-09-23 PROCEDURE — 74011250636 HC RX REV CODE- 250/636: Performed by: INTERNAL MEDICINE

## 2017-09-23 PROCEDURE — 77010033678 HC OXYGEN DAILY

## 2017-09-23 PROCEDURE — 74011250637 HC RX REV CODE- 250/637: Performed by: INTERNAL MEDICINE

## 2017-09-23 PROCEDURE — 36415 COLL VENOUS BLD VENIPUNCTURE: CPT | Performed by: INTERNAL MEDICINE

## 2017-09-23 PROCEDURE — 82962 GLUCOSE BLOOD TEST: CPT

## 2017-09-23 PROCEDURE — 85027 COMPLETE CBC AUTOMATED: CPT | Performed by: INTERNAL MEDICINE

## 2017-09-23 PROCEDURE — 80053 COMPREHEN METABOLIC PANEL: CPT | Performed by: INTERNAL MEDICINE

## 2017-09-23 RX ORDER — SODIUM CHLORIDE 0.9 % (FLUSH) 0.9 %
5-10 SYRINGE (ML) INJECTION EVERY 8 HOURS
Status: DISCONTINUED | OUTPATIENT
Start: 2017-09-23 | End: 2017-09-27 | Stop reason: HOSPADM

## 2017-09-23 RX ORDER — HYDRALAZINE HYDROCHLORIDE 20 MG/ML
INJECTION INTRAMUSCULAR; INTRAVENOUS
Status: DISPENSED
Start: 2017-09-23 | End: 2017-09-23

## 2017-09-23 RX ORDER — INSULIN GLARGINE 100 [IU]/ML
45 INJECTION, SOLUTION SUBCUTANEOUS DAILY
Status: DISCONTINUED | OUTPATIENT
Start: 2017-09-23 | End: 2017-09-27 | Stop reason: HOSPADM

## 2017-09-23 RX ORDER — GABAPENTIN 300 MG/1
300 CAPSULE ORAL 4 TIMES DAILY
Status: DISCONTINUED | OUTPATIENT
Start: 2017-09-23 | End: 2017-09-26

## 2017-09-23 RX ORDER — HYDROCHLOROTHIAZIDE 25 MG/1
25 TABLET ORAL DAILY
Status: DISCONTINUED | OUTPATIENT
Start: 2017-09-23 | End: 2017-09-24

## 2017-09-23 RX ORDER — ONDANSETRON 2 MG/ML
4 INJECTION INTRAMUSCULAR; INTRAVENOUS
Status: DISCONTINUED | OUTPATIENT
Start: 2017-09-23 | End: 2017-09-27 | Stop reason: HOSPADM

## 2017-09-23 RX ORDER — INSULIN LISPRO 100 [IU]/ML
INJECTION, SOLUTION INTRAVENOUS; SUBCUTANEOUS
Status: DISCONTINUED | OUTPATIENT
Start: 2017-09-23 | End: 2017-09-27 | Stop reason: HOSPADM

## 2017-09-23 RX ORDER — ENOXAPARIN SODIUM 100 MG/ML
40 INJECTION SUBCUTANEOUS EVERY 24 HOURS
Status: DISCONTINUED | OUTPATIENT
Start: 2017-09-23 | End: 2017-09-27 | Stop reason: HOSPADM

## 2017-09-23 RX ORDER — CLONIDINE HYDROCHLORIDE 0.1 MG/1
0.2 TABLET ORAL 2 TIMES DAILY
Status: DISCONTINUED | OUTPATIENT
Start: 2017-09-23 | End: 2017-09-25

## 2017-09-23 RX ORDER — INSULIN LISPRO 100 [IU]/ML
5 INJECTION, SOLUTION INTRAVENOUS; SUBCUTANEOUS
Status: DISCONTINUED | OUTPATIENT
Start: 2017-09-23 | End: 2017-09-27

## 2017-09-23 RX ORDER — VALSARTAN 160 MG/1
160 TABLET ORAL DAILY
Status: DISCONTINUED | OUTPATIENT
Start: 2017-09-23 | End: 2017-09-24

## 2017-09-23 RX ORDER — POTASSIUM CHLORIDE 20 MEQ/1
40 TABLET, EXTENDED RELEASE ORAL
Status: COMPLETED | OUTPATIENT
Start: 2017-09-23 | End: 2017-09-23

## 2017-09-23 RX ORDER — ASPIRIN 81 MG/1
81 TABLET ORAL DAILY
Status: DISCONTINUED | OUTPATIENT
Start: 2017-09-23 | End: 2017-09-27 | Stop reason: HOSPADM

## 2017-09-23 RX ORDER — PRAVASTATIN SODIUM 20 MG/1
20 TABLET ORAL
Status: DISCONTINUED | OUTPATIENT
Start: 2017-09-23 | End: 2017-09-27 | Stop reason: HOSPADM

## 2017-09-23 RX ORDER — FUROSEMIDE 10 MG/ML
40 INJECTION INTRAMUSCULAR; INTRAVENOUS 2 TIMES DAILY
Status: DISCONTINUED | OUTPATIENT
Start: 2017-09-23 | End: 2017-09-26

## 2017-09-23 RX ORDER — SPIRONOLACTONE 25 MG/1
12.5 TABLET ORAL DAILY
Status: DISCONTINUED | OUTPATIENT
Start: 2017-09-23 | End: 2017-09-27 | Stop reason: HOSPADM

## 2017-09-23 RX ORDER — INSULIN GLARGINE 100 [IU]/ML
20 INJECTION, SOLUTION SUBCUTANEOUS
Status: DISCONTINUED | OUTPATIENT
Start: 2017-09-23 | End: 2017-09-27 | Stop reason: HOSPADM

## 2017-09-23 RX ORDER — CARVEDILOL 25 MG/1
25 TABLET ORAL 2 TIMES DAILY WITH MEALS
Status: DISCONTINUED | OUTPATIENT
Start: 2017-09-23 | End: 2017-09-27 | Stop reason: HOSPADM

## 2017-09-23 RX ORDER — DEXTROSE MONOHYDRATE 25 G/50ML
25-50 INJECTION, SOLUTION INTRAVENOUS AS NEEDED
Status: DISCONTINUED | OUTPATIENT
Start: 2017-09-23 | End: 2017-09-27 | Stop reason: HOSPADM

## 2017-09-23 RX ORDER — SODIUM CHLORIDE 0.9 % (FLUSH) 0.9 %
5-10 SYRINGE (ML) INJECTION AS NEEDED
Status: DISCONTINUED | OUTPATIENT
Start: 2017-09-23 | End: 2017-09-27 | Stop reason: HOSPADM

## 2017-09-23 RX ORDER — HYDRALAZINE HYDROCHLORIDE 20 MG/ML
10 INJECTION INTRAMUSCULAR; INTRAVENOUS
Status: DISCONTINUED | OUTPATIENT
Start: 2017-09-23 | End: 2017-09-25

## 2017-09-23 RX ORDER — MAGNESIUM SULFATE 100 %
4 CRYSTALS MISCELLANEOUS AS NEEDED
Status: DISCONTINUED | OUTPATIENT
Start: 2017-09-23 | End: 2017-09-27 | Stop reason: HOSPADM

## 2017-09-23 RX ADMIN — VALSARTAN 160 MG: 160 TABLET, FILM COATED ORAL at 08:31

## 2017-09-23 RX ADMIN — FUROSEMIDE 40 MG: 10 INJECTION, SOLUTION INTRAMUSCULAR; INTRAVENOUS at 18:08

## 2017-09-23 RX ADMIN — SPIRONOLACTONE 12.5 MG: 25 TABLET, FILM COATED ORAL at 08:31

## 2017-09-23 RX ADMIN — FUROSEMIDE 40 MG: 10 INJECTION, SOLUTION INTRAMUSCULAR; INTRAVENOUS at 08:31

## 2017-09-23 RX ADMIN — CLONIDINE HYDROCHLORIDE 0.2 MG: 0.1 TABLET ORAL at 08:31

## 2017-09-23 RX ADMIN — HYDRALAZINE HYDROCHLORIDE 10 MG: 20 INJECTION INTRAMUSCULAR; INTRAVENOUS at 01:42

## 2017-09-23 RX ADMIN — HYDROCHLOROTHIAZIDE 25 MG: 25 TABLET ORAL at 08:30

## 2017-09-23 RX ADMIN — Medication 10 ML: at 12:07

## 2017-09-23 RX ADMIN — GABAPENTIN 300 MG: 300 CAPSULE ORAL at 17:02

## 2017-09-23 RX ADMIN — ASPIRIN 81 MG: 81 TABLET, COATED ORAL at 08:31

## 2017-09-23 RX ADMIN — ENOXAPARIN SODIUM 40 MG: 40 INJECTION SUBCUTANEOUS at 08:36

## 2017-09-23 RX ADMIN — Medication 10 ML: at 01:51

## 2017-09-23 RX ADMIN — CARVEDILOL 25 MG: 25 TABLET, FILM COATED ORAL at 16:53

## 2017-09-23 RX ADMIN — POTASSIUM CHLORIDE 40 MEQ: 20 TABLET, EXTENDED RELEASE ORAL at 12:00

## 2017-09-23 RX ADMIN — GABAPENTIN 300 MG: 300 CAPSULE ORAL at 08:31

## 2017-09-23 RX ADMIN — INSULIN LISPRO 5 UNITS: 100 INJECTION, SOLUTION INTRAVENOUS; SUBCUTANEOUS at 11:54

## 2017-09-23 RX ADMIN — PRAVASTATIN SODIUM 20 MG: 20 TABLET ORAL at 01:50

## 2017-09-23 RX ADMIN — CARVEDILOL 25 MG: 25 TABLET, FILM COATED ORAL at 08:31

## 2017-09-23 RX ADMIN — CLONIDINE HYDROCHLORIDE 0.2 MG: 0.1 TABLET ORAL at 17:01

## 2017-09-23 RX ADMIN — INSULIN LISPRO 5 UNITS: 100 INJECTION, SOLUTION INTRAVENOUS; SUBCUTANEOUS at 16:53

## 2017-09-23 RX ADMIN — INSULIN GLARGINE 20 UNITS: 100 INJECTION, SOLUTION SUBCUTANEOUS at 01:55

## 2017-09-23 RX ADMIN — GABAPENTIN 300 MG: 300 CAPSULE ORAL at 12:00

## 2017-09-23 RX ADMIN — INSULIN GLARGINE 45 UNITS: 100 INJECTION, SOLUTION SUBCUTANEOUS at 09:18

## 2017-09-23 NOTE — ROUTINE PROCESS
Kay Zelaya TRANSFER - OUT REPORT:    Verbal report given to Dallin Leiva(name) on Jesse Key  being transferred to [address] [place] [address] (unit) for ordered procedure       Report consisted of patients Situation, Background, Assessment and   Recommendations(SBAR). Information from the following report(s) ED Summary was reviewed with the receiving nurse. Lines:   Peripheral IV 11/20/13 Left Forearm (Active)       Peripheral IV 09/22/17 Right Antecubital (Active)   Site Assessment Clean, dry, & intact 9/22/2017  2:40 PM   Phlebitis Assessment 0 9/22/2017  2:40 PM   Infiltration Assessment 0 9/22/2017  2:40 PM   Dressing Status Clean, dry, & intact 9/22/2017  2:40 PM   Dressing Type Transparent 9/22/2017  2:40 PM   Hub Color/Line Status Patent; Flushed 9/22/2017  2:40 PM        Opportunity for questions and clarification was provided.       Patient transported with:   Monitor

## 2017-09-23 NOTE — H&P
Hospitalist Admission History and Physical    NAME:  Bart Ngo   :   1955   MRN:   [id number]     PCP:  Jeremias Morrow MD  Date/Time:  2017 5:37 AM    Subjective:       CHIEF COMPLAINT:  Chest pain     HISTORY OF PRESENT ILLNESS:       Ms. Mercedes Pedro is a 57 y.o.  female  with PMH significant for HTN, DM, HLP ,  CHF  and pleural effusion   who presented to [facility] ER c/o central chest pain associated with  Cough productive of yellowish sputum , SOB and nausea for the last 4 days . She admits that she didn't take her BP pills on the day of  presentation and her BP was found to be elevated at 200/117  and she was given labetalol and NG with improved BP and improved chest pain . When  I saw her in her room she was chest pain free. Her troponin was mildly elevated and cardiology was called from [facility] ER and she was admitted to telemetry floor. CXR showed bilateral pleural effusion and  CTA was -ve for PE. Past Medical History:   Diagnosis Date    Arthritis     Asthma     Cataract     Diabetes mellitus ([facility] [address]     History of echocardiogram 2009    EF 50%. Mild-mod conc LVH. Mod DDfx. LAE. Mild MR.      Hypercholesterolemia     Hypertension     Hypertensive heart disease     Normal nuclear stress test 2000    No ischemia or prior infarction. Neg EKG on submax EST. Ex time 15:02.         Past Surgical History:   Procedure Laterality Date    HX CHOLECYSTECTOMY      HX TUBAL LIGATION  1975       Social History   Substance Use Topics    Smoking status: Never Smoker    Smokeless tobacco: Never Used    Alcohol use No        Family History   Problem Relation Age of Onset    Hypertension Mother     Ovarian Cancer Mother     Heart Attack Father     Breast Cancer Maternal Aunt         Allergies   Allergen Reactions    Codeine Nausea Only    Sulfa (Sulfonamide Antibiotics) Rash        Prior to Admission Medications   Prescriptions Last Dose Informant Patient Reported? Taking?   aspirin delayed-release 81 mg tablet   Yes Yes   Sig: Take 81 mg by mouth daily. carvedilol (COREG) 25 mg tablet 2017 at Unknown time  Yes Yes   Sig: Take 25 mg by mouth two (2) times daily (with meals). cloNIDine HCl (CATAPRES) 0.2 mg tablet 2017 at Unknown time  Yes Yes   Sig: Take 0.2 mg by mouth two (2) times a day.   gabapentin (NEURONTIN) 300 mg capsule   Yes Yes   Sig: Take 300 mg by mouth four (4) times daily. insulin glargine (LANTUS) 100 unit/mL injection 2017 at Unknown time  Yes Yes   Si Units by SubCUTAneous route daily. 15 units in the evening. mometasone-formoterol (DULERA) 200-5 mcg/actuation HFA inhaler   Yes Yes   Sig: Take 2 Puffs by inhalation two (2) times a day. ofloxacin (FLOXIN) 0.3 % ophthalmic solution   Yes Yes   Sig: Administer 3 Drops to left eye daily. pravastatin (PRAVACHOL) 20 mg tablet 2017 at Unknown time  Yes Yes   Sig: Take 20 mg by mouth nightly. spironolactone (ALDACTONE) 25 mg tablet   Yes Yes   Sig: Take 12.5 mg by mouth daily. valsartan-hydroCHLOROthiazide (DIOVAN-HCT) 160-25 mg per tablet   Yes Yes   Sig: Take 1 Tab by mouth daily.       Facility-Administered Medications: None       REVIEW OF SYSTEMS:     [] Unable to obtain  ROS due to  []mental status change  []sedated   []intubated   [x]Total of 12 systems reviewed as follows:    Constitutional:  negative fever, negative chills, negative weight loss  Eyes:   negative visual changes, positive for eye lids swelling   ENT:   negative sore throat, tongue or lip swelling  Respiratory:  Positive for  Cough and SOB  CVS:   Positive  for chest pain and lower extremity edema  GI:   negative for nausea, vomiting, diarrhea, and abdominal pain  Genitourinary: negative for frequency, dysuria  Integument:  negative for rash and pruritus  Hematologic:  negative for easy bruising and gum/nose bleeding  Musculoskel: negative for myalgias,  back pain and muscle weakness  Neurological:  negative for headaches, dizziness, vertigo  Behavl/Psych:  negative for  Mood changes , feelings of anxiety, depression       Objective:     VITALS:    Visit Vitals    /83 (BP 1 Location: Left arm, BP Patient Position: At rest)    Pulse 74    Temp 98.1 °F (36.7 °C)    Resp 18    Ht 5' 9\" (1.753 m)    Wt 89.6 kg (197 lb 8 oz)    SpO2 92%    BMI 29.17 kg/m2     Temp (24hrs), Av.3 °F (36.8 °C), Min:98.1 °F (36.7 °C), Max:98.6 °F (37 °C)      PHYSICAL EXAM:     General:    Alert, cooperative, not in  distress, appears stated age. Head:   Normocephalic, without obvious abnormality, atraumatic. Eyes:   Bilateral eye lid puffiness   Nose:  Nares normal. No drainage or sinus tenderness. Throat:    Lips, mucosa, and tongue normal.  No Thrush  Neck:  Supple, symmetrical,  no adenopathy, thyroid: non tender    no carotid bruit and no JVD. Back:    Symmetric,  No CVA tenderness. Lungs:   Absent air entry bilaterally at the lung bases posteriorly   Chest wall:  No tenderness or deformity. No Accessory muscle use. Heart:   Regular rate and rhythm,  no murmur, rub or gallop. Abdomen:   Soft, non-tender. Not distended. No masses,  Bowel sounds normal.   Extremities: Extremities normal, atraumatic, No cyanosis,  No edema. No clubbing  Skin:     Texture, turgor normal. No rashes or skin lesions. Not Jaundiced  Lymph nodes: Cervical, supraclavicular normal.  Psych:  Appropriate affect Not anxious or agitated. Neurologic: EOMs intact. No facial asymmetry. No aphasia or slurred speech. Normal strength, Alert and oriented X 3.        LAB DATA REVIEWED:    No components found for: [place]  Recent Labs      17   1435   NA  141   K  2.9*   CL  104   CO2  33*   BUN  25*   CREA  1.16   GLU  172*   CA  8.7   WBC  6.8   HGB  10.8*   HCT  34.2*   PLT  139         IMAGING RESULTS:    EKG- NSR in the 70s      []  I have personally reviewed the actual   [x]CXR  [x]CT scan    Procedures/imaging: see electronic medical records for all procedures/Xrays and details which were not copied into this note but were reviewed prior to creation of Plan      Assessment:      Active Problems:      Chest pain (9/22/2017)        CHF        Mildly levated troponin likely from  Increased demand       Hypertensive urgency          ___________________________________________________    Plan:    Admit to Telemetry   Trend troponin  ECHO   BP control   IV lasix   Repeat CXR in am to see if she has developing pneumonia   Cardiology consulted  Glycemic control with basal bolus       Risk of deterioration:  []Low    [x]Moderate  []High      Prophylaxis:  [x]Lovenox  []Coumadin  []Hep SQ  []SCDs  []H2B/PPI    Disposition:  [x]Home w/ Family   [] PT,OT,RN   []SNF/LTC   []SAH/Rehab    Discussed Code Status:    [x]Full Code      []DNR     ___________________________________________________    Care Plan discussed with:    [x]Patient   [x]Family    []ED Care Manager  []ED Doc   []Specialist :    Total Time Coordinating Admission:   50   minutes    []Total Critical Care Time:     ___________________________________________________  Admitting Physician: Stiven Hyman MD   09/23/17

## 2017-09-23 NOTE — CONSULTS
CARDIOLOGY CONSULTATION    REQUESTING PHYSICIAN:Ani    CONSULTING PHYSICIAN:Josr    Reason for consultation: Chest pain. Congestive heart failure    Jaymie Valverde     1955      DATE 2017    HISTORY: Hypertensive diabetic with a history of hyperlipidemia. COPD. Chronic cough. The cough markedly increased in intensity about 4-5 days prior to admission and she developed vomiting to the point where she felt she was unable to take her antihypertensives. She had been experiencing chest discomfort over the 4-5 day period prior to admission. This is a substernal discomfort that was worse on coughing. No relation to exertion. It was not pleuritic. The cough was nonproductive. She presented to the emergency room where she was found to be markedly hypertensive. A chest x-ray showed bilateral pleural effusions and cardiomegaly. There were ground glass densities consistent with possible pneumonia. .  Her troponin I was normal at 0.06. EKG showed a possible anteroseptal myocardial infarction age-indeterminate. This has been questioned on EKGs as far back as . .  The patient denies a recent history of chest discomfort suggestive of myocardial ischemia.:    Patient had an echocardiogram done in . Ejection fraction was low normal at 50%. There was moderate concentric left ventricular hypertrophy and moderate diastolic dysfunction. There were no segmental wall motion abnormalities. The patient had coronary arteriography in . This revealed normal coronary arteries. PMH:  Past Medical History:   Diagnosis Date    Arthritis     Asthma     Cataract     Diabetes mellitus ([address]     History of echocardiogram 2009    EF 50%. Mild-mod conc LVH. Mod DDfx. LAE. Mild MR.      Hypercholesterolemia     Hypertension     Hypertensive heart disease     Normal nuclear stress test 2000    No ischemia or prior infarction. Neg EKG on submax EST. Ex time 15:02. PAST SURGICAL HX[de-identified]  Past Surgical History:   Procedure Laterality Date    HX CHOLECYSTECTOMY  2001    HX TUBAL LIGATION  1975       ALLERGIES:  Allergies   Allergen Reactions    Codeine Nausea Only    Sulfa (Sulfonamide Antibiotics) Rash       HOME MEDICATION:  Prior to Admission medications    Medication Sig Start Date End Date Taking? Authorizing Provider   carvedilol (COREG) 25 mg tablet Take 25 mg by mouth two (2) times daily (with meals). Yes Santos Centeno MD   cloNIDine HCl (CATAPRES) 0.2 mg tablet Take 0.2 mg by mouth two (2) times a day. Yes Santos Centeno MD   aspirin delayed-release 81 mg tablet Take 81 mg by mouth daily. Yes Santos Centeno MD   pravastatin (PRAVACHOL) 20 mg tablet Take 20 mg by mouth nightly. Yes Santos Centeno MD   ofloxacin (FLOXIN) 0.3 % ophthalmic solution Administer 3 Drops to left eye daily. Yes Santos Centeno MD   valsartan-hydroCHLOROthiazide (DIOVAN-HCT) 160-25 mg per tablet Take 1 Tab by mouth daily. Yes Santos Centeno MD   spironolactone (ALDACTONE) 25 mg tablet Take 12.5 mg by mouth daily. Yes Santos Centeno MD   gabapentin (NEURONTIN) 300 mg capsule Take 300 mg by mouth four (4) times daily. Yes Santos Centeno MD   mometasone-formoterol (DULERA) 200-5 mcg/actuation HFA inhaler Take 2 Puffs by inhalation two (2) times a day. Yes Santos Centeno MD   insulin glargine (LANTUS) 100 unit/mL injection 45 Units by SubCUTAneous route daily. 15 units in the evening.    Yes Historical Provider       PRESENT MEDICATION:  Current Facility-Administered Medications   Medication Dose Route Frequency Provider Last Rate Last Dose    aspirin delayed-release tablet 81 mg  81 mg Oral DAILY Bita Pandey MD   81 mg at 09/23/17 0831    carvedilol (COREG) tablet 25 mg  25 mg Oral BID WITH MEALS Bita Pandey MD   25 mg at 09/23/17 0831    cloNIDine HCl (CATAPRES) tablet 0.2 mg  0.2 mg Oral BID Bita Pandey MD   0.2 mg at 09/23/17 0831    gabapentin (NEURONTIN) capsule 300 mg  300 mg Oral QID India Vera MD   300 mg at 09/23/17 0831    insulin glargine (LANTUS) injection 45 Units  45 Units SubCUTAneous DAILY India Vera MD   45 Units at 09/23/17 0918    pravastatin (PRAVACHOL) tablet 20 mg  20 mg Oral QHS India Vera MD   20 mg at 09/23/17 0150    spironolactone (ALDACTONE) tablet 12.5 mg  12.5 mg Oral DAILY India Vera MD   12.5 mg at 09/23/17 0831    ondansetron (ZOFRAN) injection 4 mg  4 mg IntraVENous Q6H PRN India Vera MD        furosemide (LASIX) injection 40 mg  40 mg IntraVENous BID India Vera MD   40 mg at 09/23/17 0831    sodium chloride (NS) flush 5-10 mL  5-10 mL IntraVENous Q8H India Vera MD   10 mL at 09/23/17 0151    sodium chloride (NS) flush 5-10 mL  5-10 mL IntraVENous PRN India Vera MD        enoxaparin (LOVENOX) injection 40 mg  40 mg SubCUTAneous Q24H India Vera MD   40 mg at 09/23/17 0836    insulin glargine (LANTUS) injection 20 Units  20 Units SubCUTAneous QHS India Vera MD   20 Units at 09/23/17 0155    insulin lispro (HUMALOG) injection 5 Units  5 Units SubCUTAneous Nickie Carreon MD   Stopped at 09/23/17 0730    hydrALAZINE (APRESOLINE) 20 mg/mL injection 10 mg  10 mg IntraVENous Q6H PRN India Vera MD   10 mg at 09/23/17 0142    valsartan (DIOVAN) tablet 160 mg  160 mg Oral DAILY India Vera MD   160 mg at 09/23/17 0831    And    hydroCHLOROthiazide (HYDRODIURIL) tablet 25 mg  25 mg Oral DAILY India Vera MD   25 mg at 09/23/17 0830    hydrALAZINE (APRESOLINE) 20 mg/mL injection             potassium chloride (K-DUR, KLOR-CON) SR tablet 40 mEq  40 mEq Oral NOW India Vera MD        nitroglycerin (NITROSTAT) tablet 0.4 mg  0.4 mg SubLINGual PRN Manohar Butler MD           A complete 10 point review of systems was completed. The following represents the pertinent positives and negatives.   History obtained from chart review and the patient  General ROS: negative for - fever, weight gain or weight loss  Hematological and Lymphatic ROS: negative for - bleeding problems  Respiratory ROS: See history of present illness  Cardiovascular ROS: See history of present illness    EXAMINATION:Vital signs:  Visit Vitals    BP (!) 174/95 (BP 1 Location: Left arm, BP Patient Position: At rest)    Pulse 67    Temp 98.2 °F (36.8 °C)    Resp 22    Ht 5' 9\" (1.753 m)    Wt 89.6 kg (197 lb 8 oz)    SpO2 98%    BMI 29.17 kg/m2   : General: Moderately obese -American female in no apparent distress. Neck: Unable to evaluate neck veins. Carotid upstrokes 2+. No bruits audible. Chest: Decreased breath sounds at both bases. Occasional wheezes. No rales audible. Heart: Regular rhythm. No murmur or gallop audible. Extremities: No edema. Skin: Stasis changes lower extremities bilaterally. Neurologic: Alert, oriented. No facial asymmetry. Speech within normal limits. Motor deferred. IMPRESSION:  Chest pain, probably noncardiac  Hypertensive crisis. Hypertension gradually improving  Congestive heart failure as evidenced by a markedly elevated BNP. The patient has a history of hypertensive cardiovascular disease disease with left ventricular hypertrophy and diastolic dysfunction. Congestive heart failure may be on the basis of the uncontrolled blood pressure and diastolic dysfunction. Her EKG,is consistent with a possible prior anteroseptal myocardial infarction. This has been questioned in the past on EKGs done prior to coronary arteriography demonstrating normal coronary arteries. Chronic obstructive pulmonary disease    RECOMMENDATION:  Patient's blood pressure is being aggressively managed by the hospitalist and seems to be improving. I have ordered an echocardiogram to evaluate left ventricular wall motion in the setting of markedly elevated BNP and EKG changes consistent with a possible prior anteroseptal myocardial infarction.   I will review this and make recommendations accordingly.

## 2017-09-23 NOTE — ROUTINE PROCESS
Bedside and Verbal shift change report given to Koko Shen (oncoming nurse) by Marianne Hurd RN (offgoing nurse). Report included the following information SBAR, Kardex, MAR and Recent Results.

## 2017-09-24 LAB
GLUCOSE BLD STRIP.AUTO-MCNC: 115 MG/DL (ref 70–110)
GLUCOSE BLD STRIP.AUTO-MCNC: 142 MG/DL (ref 70–110)
GLUCOSE BLD STRIP.AUTO-MCNC: 148 MG/DL (ref 70–110)
GLUCOSE BLD STRIP.AUTO-MCNC: 164 MG/DL (ref 70–110)
GLUCOSE BLD STRIP.AUTO-MCNC: 189 MG/DL (ref 70–110)

## 2017-09-24 PROCEDURE — 74011636637 HC RX REV CODE- 636/637: Performed by: EMERGENCY MEDICINE

## 2017-09-24 PROCEDURE — 93306 TTE W/DOPPLER COMPLETE: CPT

## 2017-09-24 PROCEDURE — 74011250636 HC RX REV CODE- 250/636: Performed by: INTERNAL MEDICINE

## 2017-09-24 PROCEDURE — 74011250637 HC RX REV CODE- 250/637: Performed by: INTERNAL MEDICINE

## 2017-09-24 PROCEDURE — 65660000000 HC RM CCU STEPDOWN

## 2017-09-24 PROCEDURE — 82962 GLUCOSE BLOOD TEST: CPT

## 2017-09-24 PROCEDURE — 74011636637 HC RX REV CODE- 636/637: Performed by: INTERNAL MEDICINE

## 2017-09-24 RX ORDER — HYDROCHLOROTHIAZIDE 25 MG/1
25 TABLET ORAL DAILY
Status: DISCONTINUED | OUTPATIENT
Start: 2017-09-25 | End: 2017-09-27

## 2017-09-24 RX ORDER — VALSARTAN 160 MG/1
320 TABLET ORAL DAILY
Status: DISCONTINUED | OUTPATIENT
Start: 2017-09-25 | End: 2017-09-27

## 2017-09-24 RX ADMIN — CLONIDINE HYDROCHLORIDE 0.2 MG: 0.1 TABLET ORAL at 17:00

## 2017-09-24 RX ADMIN — GABAPENTIN 300 MG: 300 CAPSULE ORAL at 17:01

## 2017-09-24 RX ADMIN — INSULIN LISPRO 2 UNITS: 100 INJECTION, SOLUTION INTRAVENOUS; SUBCUTANEOUS at 12:11

## 2017-09-24 RX ADMIN — INSULIN GLARGINE 45 UNITS: 100 INJECTION, SOLUTION SUBCUTANEOUS at 08:09

## 2017-09-24 RX ADMIN — INSULIN LISPRO 2 UNITS: 100 INJECTION, SOLUTION INTRAVENOUS; SUBCUTANEOUS at 22:35

## 2017-09-24 RX ADMIN — ASPIRIN 81 MG: 81 TABLET, COATED ORAL at 08:04

## 2017-09-24 RX ADMIN — INSULIN LISPRO 5 UNITS: 100 INJECTION, SOLUTION INTRAVENOUS; SUBCUTANEOUS at 17:04

## 2017-09-24 RX ADMIN — PRAVASTATIN SODIUM 20 MG: 20 TABLET ORAL at 22:35

## 2017-09-24 RX ADMIN — HYDRALAZINE HYDROCHLORIDE 10 MG: 20 INJECTION INTRAMUSCULAR; INTRAVENOUS at 12:08

## 2017-09-24 RX ADMIN — FUROSEMIDE 40 MG: 10 INJECTION, SOLUTION INTRAMUSCULAR; INTRAVENOUS at 17:01

## 2017-09-24 RX ADMIN — Medication 10 ML: at 06:27

## 2017-09-24 RX ADMIN — SPIRONOLACTONE 12.5 MG: 25 TABLET, FILM COATED ORAL at 08:04

## 2017-09-24 RX ADMIN — HYDROCHLOROTHIAZIDE 25 MG: 25 TABLET ORAL at 08:04

## 2017-09-24 RX ADMIN — INSULIN LISPRO 5 UNITS: 100 INJECTION, SOLUTION INTRAVENOUS; SUBCUTANEOUS at 08:13

## 2017-09-24 RX ADMIN — GABAPENTIN 300 MG: 300 CAPSULE ORAL at 08:04

## 2017-09-24 RX ADMIN — Medication 10 ML: at 17:05

## 2017-09-24 RX ADMIN — CLONIDINE HYDROCHLORIDE 0.2 MG: 0.1 TABLET ORAL at 08:04

## 2017-09-24 RX ADMIN — VALSARTAN 160 MG: 160 TABLET, FILM COATED ORAL at 08:04

## 2017-09-24 RX ADMIN — Medication 10 ML: at 02:23

## 2017-09-24 RX ADMIN — GABAPENTIN 300 MG: 300 CAPSULE ORAL at 22:31

## 2017-09-24 RX ADMIN — Medication 10 ML: at 22:39

## 2017-09-24 RX ADMIN — FUROSEMIDE 40 MG: 10 INJECTION, SOLUTION INTRAMUSCULAR; INTRAVENOUS at 08:03

## 2017-09-24 RX ADMIN — CARVEDILOL 25 MG: 25 TABLET, FILM COATED ORAL at 17:00

## 2017-09-24 RX ADMIN — INSULIN LISPRO 5 UNITS: 100 INJECTION, SOLUTION INTRAVENOUS; SUBCUTANEOUS at 12:11

## 2017-09-24 RX ADMIN — CARVEDILOL 25 MG: 25 TABLET, FILM COATED ORAL at 08:03

## 2017-09-24 RX ADMIN — INSULIN GLARGINE 20 UNITS: 100 INJECTION, SOLUTION SUBCUTANEOUS at 22:31

## 2017-09-24 NOTE — PROGRESS NOTES
conducted an initial consultation and Spiritual Assessment for Tatum Johnson, who is a 57 y.o.,female. Patients Primary Language is: [place]. According to the patients EMR Pent[employer]ism Affiliation is: [facility].     The reason the Patient came to the hospital is:   Patient Active Problem List    Diagnosis Date Noted    Chest pain 09/22/2017    Pleural effusion 04/30/2014    Cervical myelopathy ([facility] 02/03/2014    Eczema 02/03/2014    Asthma 02/03/2014    CHF (congestive heart failure) ([facility] 02/03/2014    Bladder prolapse, female, acquired 02/03/2014    IBS (irritable bowel syndrome) 02/03/2014    Osteoporosis 02/03/2014    Migraine 02/03/2014    Anxiety and depression 02/03/2014    Essential hypertension, benign 01/27/2012    Diabetes mellitus ([facility] 01/27/2012    Other and unspecified hyperlipidemia 01/27/2012        The  provided the following Interventions:  Initiated a relationship of care and support. Explored issues of tamika, belief, spirituality and Mormonism/ritual needs while hospitalized. Listened empathically. Provided chaplaincy education. Provided information about Spiritual Care Services. Offered prayer and assurance of continued prayers on patient's behalf. Chart reviewed. The following outcomes were achieved:  Patient shared limited information about both their medical narrative and spiritual journey/beliefs. Patient processed feeling about current hospitalization. Patient expressed gratitude for the 's visit. Assessment:  Patient does not have any Mormonism/cultural needs that will affect patients preferences in health care. Plan:  Chaplains will continue to follow and will provide pastoral care on an as needed/requested basis.  recommends bedside caregivers page  on duty if patient shows signs of acute spiritual or emotional distress.     Chaplain Palomo De Souza

## 2017-09-24 NOTE — PROGRESS NOTES
Hospital Progress Note     Sebastián Bruner        9/24/2017    Remains short of breath on minimal exertion. Nonproductive cough. Denies chest pain. Echocardiogram showed drop in left ventricular ejection fraction to 40-45%. There was diffuse left ventricular hypokinesis. There were no segmental wall motion abnormalities to support the diagnosis of prior anteroseptal myocardial infarction. The septal Q waves were seen prior to a coronary arteriography that demonstrated normal coronary arteries. Renal function has been acceptable. Blood pressure remains unacceptably high    EXAM:  Visit Vitals    BP (!) 176/91 (BP 1 Location: Left arm, BP Patient Position: At rest)    Pulse 69    Temp 97.5 °F (36.4 °C)    Resp 20    Ht 5' 9\" (1.753 m)    Wt 91.1 kg (200 lb 12.8 oz)    SpO2 97%    BMI 29.65 kg/m2   : Neck: Unable to evaluate neck veins. Chest: Few scattered wheezes. Decreased breath sounds both bases. Heart: Regular rhythm. No gallop audible    IMPRESSION:  Hypertensive cardiovascular disease, concentric left ventricular hypertrophy  Mild depression left ventricular ejection fraction. Global left ventricular hypokinesis with ejection fraction 40-45%. Congestive heart failure systolic and diastolic. Abnormal EKG consistent with possible prior anteroseptal myocardial infarction. No wall motion abnormalities to support this diagnosis. PLAN:  Increase valsartan and hydrochlorothiazide to 320 mg/25 mg daily.   She will probably require an ischemic workup at some point in the near future

## 2017-09-24 NOTE — ROUTINE PROCESS
Bedside and Verbal shift change report given to Evelia carranza (oncoming nurse) by Jimena Sears (offgoing nurse). Report included the following information Kardex, Intake/Output and MAR.

## 2017-09-24 NOTE — PROGRESS NOTES
[facility] Hospitalist Group  Progress Note    Patient: Tu Conception Age: 57 y.o. : 1955 MR#: [id number] SSN: [id number]  Date: 2017     Subjective:     Patient lying in bed in NAD, awake, follows commands, denies CP, has bates.  Son at bedside    Assessment/Plan:     1- Chest Pain  2- Acute systolic CHF exac, SP-96%  3- Uncontrolled HTN  4- COPD without exacerbation  5- DM2    PLAN  Cardio following  On lasix, I/O  On coreg, clonidine, increase ARB, PRN Hydralazine  On dulera  Lantus, ssi  D/w patient and son  Advanced care planning: full code      Case discussed with:  [x]Patient  []Family  []Nursing  []Case Management  DVT Prophylaxis:  []Lovenox  []Hep SQ  []SCDs  []Coumadin   []On Heparin gtt    Objective:   VS:   Visit Vitals    BP (!) 176/91 (BP 1 Location: Left arm, BP Patient Position: At rest)    Pulse 69    Temp 97.5 °F (36.4 °C)    Resp 20    Ht 5' 9\" (1.753 m)    Wt 91.1 kg (200 lb 12.8 oz)    SpO2 97%    BMI 29.65 kg/m2      Tmax/24hrs: Temp (24hrs), Av.7 °F (37.1 °C), Min:97.5 °F (36.4 °C), Max:99.3 °F (37.4 °C)    Intake/Output Summary (Last 24 hours) at 17 1916  Last data filed at 17 0757   Gross per 24 hour   Intake                0 ml   Output              825 ml   Net             -825 ml       General:  Awake, alert, follows commands  Cardiovascular:  S1S2+, RRR  Pulmonary:  Decreased BS b/l Bases  GI:  Soft, BS+, NT, ND  Extremities:  trace edema      Labs:    Recent Results (from the past 24 hour(s))   GLUCOSE, POC    Collection Time: 17  2:19 AM   Result Value Ref Range    Glucose (POC) 115 (H) 70 - 110 mg/dL   GLUCOSE, POC    Collection Time: 17  8:03 AM   Result Value Ref Range    Glucose (POC) 148 (H) 70 - 110 mg/dL   GLUCOSE, POC    Collection Time: 17 11:24 AM   Result Value Ref Range    Glucose (POC) 189 (H) 70 - 110 mg/dL   GLUCOSE, POC    Collection Time: 17  3:53 PM   Result Value Ref Range Glucose (POC) 142 (H) 70 - 110 mg/dL       Signed By: Jen Kumar MD     September 24, 2017 7:16 PM

## 2017-09-24 NOTE — ROUTINE PROCESS
Patient refused lovenox injection this am.  I explained what is was for and she felt she did not need it. Pt blood pressure elevated, she refused prn hydralazine stating my pressure is always high. I stressed the importance of the medication to reduce her pressure. She stated they can check my pressure later and I will be getting B/P meds this am.  Patient does have clonidine ordered for this morning. Continue to monitor.

## 2017-09-25 LAB
ANION GAP SERPL CALC-SCNC: 3 MMOL/L (ref 3–18)
BASOPHILS # BLD: 0 K/UL (ref 0–0.06)
BASOPHILS NFR BLD: 1 % (ref 0–2)
BUN SERPL-MCNC: 27 MG/DL (ref 7–18)
BUN/CREAT SERPL: 21 (ref 12–20)
CALCIUM SERPL-MCNC: 8.5 MG/DL (ref 8.5–10.1)
CHLORIDE SERPL-SCNC: 102 MMOL/L (ref 100–108)
CO2 SERPL-SCNC: 34 MMOL/L (ref 21–32)
CREAT SERPL-MCNC: 1.26 MG/DL (ref 0.6–1.3)
DIFFERENTIAL METHOD BLD: ABNORMAL
EOSINOPHIL # BLD: 0.5 K/UL (ref 0–0.4)
EOSINOPHIL NFR BLD: 14 % (ref 0–5)
ERYTHROCYTE [DISTWIDTH] IN BLOOD BY AUTOMATED COUNT: 15 % (ref 11.6–14.5)
GLUCOSE BLD STRIP.AUTO-MCNC: 136 MG/DL (ref 70–110)
GLUCOSE BLD STRIP.AUTO-MCNC: 160 MG/DL (ref 70–110)
GLUCOSE BLD STRIP.AUTO-MCNC: 164 MG/DL (ref 70–110)
GLUCOSE BLD STRIP.AUTO-MCNC: 297 MG/DL (ref 70–110)
GLUCOSE SERPL-MCNC: 146 MG/DL (ref 74–99)
HBA1C MFR BLD: 8.1 % (ref 4.2–5.6)
HCT VFR BLD AUTO: 33 % (ref 35–45)
HGB BLD-MCNC: 10.3 G/DL (ref 12–16)
INR PPP: 1 (ref 0.8–1.2)
LYMPHOCYTES # BLD: 0.8 K/UL (ref 0.9–3.6)
LYMPHOCYTES NFR BLD: 21 % (ref 21–52)
MCH RBC QN AUTO: 27.1 PG (ref 24–34)
MCHC RBC AUTO-ENTMCNC: 31.2 G/DL (ref 31–37)
MCV RBC AUTO: 86.8 FL (ref 74–97)
MONOCYTES # BLD: 0.3 K/UL (ref 0.05–1.2)
MONOCYTES NFR BLD: 7 % (ref 3–10)
NEUTS SEG # BLD: 2.2 K/UL (ref 1.8–8)
NEUTS SEG NFR BLD: 57 % (ref 40–73)
PLATELET # BLD AUTO: 133 K/UL (ref 135–420)
PMV BLD AUTO: 12.4 FL (ref 9.2–11.8)
POTASSIUM SERPL-SCNC: 3.2 MMOL/L (ref 3.5–5.5)
PROTHROMBIN TIME: 12.9 SEC (ref 11.5–15.2)
RBC # BLD AUTO: 3.8 M/UL (ref 4.2–5.3)
SODIUM SERPL-SCNC: 139 MMOL/L (ref 136–145)
WBC # BLD AUTO: 3.8 K/UL (ref 4.6–13.2)

## 2017-09-25 PROCEDURE — 85610 PROTHROMBIN TIME: CPT | Performed by: EMERGENCY MEDICINE

## 2017-09-25 PROCEDURE — 85025 COMPLETE CBC W/AUTO DIFF WBC: CPT | Performed by: EMERGENCY MEDICINE

## 2017-09-25 PROCEDURE — 65660000000 HC RM CCU STEPDOWN

## 2017-09-25 PROCEDURE — 74011250637 HC RX REV CODE- 250/637: Performed by: EMERGENCY MEDICINE

## 2017-09-25 PROCEDURE — 82962 GLUCOSE BLOOD TEST: CPT

## 2017-09-25 PROCEDURE — 74011250637 HC RX REV CODE- 250/637: Performed by: PHYSICIAN ASSISTANT

## 2017-09-25 PROCEDURE — 80048 BASIC METABOLIC PNL TOTAL CA: CPT | Performed by: EMERGENCY MEDICINE

## 2017-09-25 PROCEDURE — 74011250636 HC RX REV CODE- 250/636: Performed by: INTERNAL MEDICINE

## 2017-09-25 PROCEDURE — 36415 COLL VENOUS BLD VENIPUNCTURE: CPT | Performed by: EMERGENCY MEDICINE

## 2017-09-25 PROCEDURE — 74011250637 HC RX REV CODE- 250/637: Performed by: INTERNAL MEDICINE

## 2017-09-25 PROCEDURE — 83036 HEMOGLOBIN GLYCOSYLATED A1C: CPT | Performed by: INTERNAL MEDICINE

## 2017-09-25 PROCEDURE — 74011636637 HC RX REV CODE- 636/637: Performed by: INTERNAL MEDICINE

## 2017-09-25 PROCEDURE — 74011636637 HC RX REV CODE- 636/637: Performed by: EMERGENCY MEDICINE

## 2017-09-25 PROCEDURE — 74011250637 HC RX REV CODE- 250/637

## 2017-09-25 RX ORDER — ISOSORBIDE DINITRATE 10 MG/1
10 TABLET ORAL 3 TIMES DAILY
Status: DISCONTINUED | OUTPATIENT
Start: 2017-09-25 | End: 2017-09-27 | Stop reason: HOSPADM

## 2017-09-25 RX ORDER — CLONIDINE HYDROCHLORIDE 0.1 MG/1
0.1 TABLET ORAL 2 TIMES DAILY
Status: DISCONTINUED | OUTPATIENT
Start: 2017-09-25 | End: 2017-09-27

## 2017-09-25 RX ORDER — POTASSIUM CHLORIDE 20 MEQ/1
40 TABLET, EXTENDED RELEASE ORAL
Status: COMPLETED | OUTPATIENT
Start: 2017-09-25 | End: 2017-09-25

## 2017-09-25 RX ORDER — HYDRALAZINE HYDROCHLORIDE 25 MG/1
25 TABLET, FILM COATED ORAL 3 TIMES DAILY
Status: DISCONTINUED | OUTPATIENT
Start: 2017-09-25 | End: 2017-09-27 | Stop reason: HOSPADM

## 2017-09-25 RX ADMIN — HYDRALAZINE HYDROCHLORIDE 25 MG: 25 TABLET, FILM COATED ORAL at 17:54

## 2017-09-25 RX ADMIN — INSULIN GLARGINE 20 UNITS: 100 INJECTION, SOLUTION SUBCUTANEOUS at 22:43

## 2017-09-25 RX ADMIN — POTASSIUM CHLORIDE 40 MEQ: 20 TABLET, EXTENDED RELEASE ORAL at 17:53

## 2017-09-25 RX ADMIN — FUROSEMIDE 40 MG: 10 INJECTION, SOLUTION INTRAMUSCULAR; INTRAVENOUS at 09:22

## 2017-09-25 RX ADMIN — ISOSORBIDE DINITRATE 10 MG: 10 TABLET ORAL at 22:30

## 2017-09-25 RX ADMIN — VALSARTAN 320 MG: 160 TABLET, FILM COATED ORAL at 09:21

## 2017-09-25 RX ADMIN — FUROSEMIDE 40 MG: 10 INJECTION, SOLUTION INTRAMUSCULAR; INTRAVENOUS at 17:57

## 2017-09-25 RX ADMIN — HYDROCHLOROTHIAZIDE 25 MG: 25 TABLET ORAL at 09:22

## 2017-09-25 RX ADMIN — Medication 10 ML: at 07:18

## 2017-09-25 RX ADMIN — HYDRALAZINE HYDROCHLORIDE 25 MG: 25 TABLET, FILM COATED ORAL at 22:30

## 2017-09-25 RX ADMIN — CLONIDINE HYDROCHLORIDE 0.2 MG: 0.1 TABLET ORAL at 09:22

## 2017-09-25 RX ADMIN — INSULIN LISPRO 2 UNITS: 100 INJECTION, SOLUTION INTRAVENOUS; SUBCUTANEOUS at 17:57

## 2017-09-25 RX ADMIN — Medication 10 ML: at 22:51

## 2017-09-25 RX ADMIN — GABAPENTIN 300 MG: 300 CAPSULE ORAL at 09:22

## 2017-09-25 RX ADMIN — INSULIN LISPRO 5 UNITS: 100 INJECTION, SOLUTION INTRAVENOUS; SUBCUTANEOUS at 17:57

## 2017-09-25 RX ADMIN — ENOXAPARIN SODIUM 40 MG: 40 INJECTION SUBCUTANEOUS at 07:16

## 2017-09-25 RX ADMIN — INSULIN LISPRO 6 UNITS: 100 INJECTION, SOLUTION INTRAVENOUS; SUBCUTANEOUS at 12:15

## 2017-09-25 RX ADMIN — ISOSORBIDE DINITRATE 10 MG: 10 TABLET ORAL at 17:53

## 2017-09-25 RX ADMIN — INSULIN LISPRO 2 UNITS: 100 INJECTION, SOLUTION INTRAVENOUS; SUBCUTANEOUS at 22:36

## 2017-09-25 RX ADMIN — CARVEDILOL 25 MG: 25 TABLET, FILM COATED ORAL at 09:21

## 2017-09-25 RX ADMIN — Medication 10 ML: at 14:00

## 2017-09-25 RX ADMIN — SPIRONOLACTONE 12.5 MG: 25 TABLET, FILM COATED ORAL at 09:22

## 2017-09-25 RX ADMIN — INSULIN LISPRO 5 UNITS: 100 INJECTION, SOLUTION INTRAVENOUS; SUBCUTANEOUS at 12:15

## 2017-09-25 RX ADMIN — CARVEDILOL 25 MG: 25 TABLET, FILM COATED ORAL at 17:54

## 2017-09-25 RX ADMIN — PRAVASTATIN SODIUM 20 MG: 20 TABLET ORAL at 22:30

## 2017-09-25 RX ADMIN — GABAPENTIN 300 MG: 300 CAPSULE ORAL at 22:30

## 2017-09-25 RX ADMIN — GABAPENTIN 300 MG: 300 CAPSULE ORAL at 12:13

## 2017-09-25 RX ADMIN — CLONIDINE HYDROCHLORIDE 0.1 MG: 0.1 TABLET ORAL at 17:53

## 2017-09-25 RX ADMIN — INSULIN GLARGINE 45 UNITS: 100 INJECTION, SOLUTION SUBCUTANEOUS at 12:15

## 2017-09-25 RX ADMIN — ASPIRIN 81 MG: 81 TABLET, COATED ORAL at 09:22

## 2017-09-25 RX ADMIN — GABAPENTIN 300 MG: 300 CAPSULE ORAL at 17:53

## 2017-09-25 NOTE — ROUTINE PROCESS
Bedside shift change report given to Trinity Jolly RN (oncoming nurse) by Evelia Wright (offgoing nurse). Report included the following information SBAR, Kardex, ED Summary, Intake/Output, MAR, Accordion, Recent Results and Med Rec Status.

## 2017-09-25 NOTE — CDMP QUERY
Please clarify if this patient is being treated/managed for:    =>   hypokalemia    =>Other Explanation of clinical findings  =>Unable to Determine (no explanation of clinical findings)    The medical record reflects the following:    Risk:  admitted with hypertensive crisis;  acute CHF    Clinical Indicators: K+  level 2.9 on admit. next day  up to 3.3       Treatment:   K dur 40 meq   given in ER      If you DECLINE this query or would like to communicate with SecureAlert, please utilize the \"SecureAlert message box\" at the TOP of the Progress Note on the right.       Thank you,   Cecil Camilo RN   CCDS

## 2017-09-25 NOTE — DIABETES MGMT
GLYCEMIC CONTROL PLAN OF CARE    Assessment:  Pt is 57 yr old female admitted on 9/22/17 for treatment and evaluation of chest pain. Pt with past medical history significant for T2DM, HTN, HLD, CHF. PE.    Recommendations:  Continue current diabetes medications. Recommend add diabetic diet to current cardiac diet to aid with BG control. Diabetic education. Will continue to monitor inpatient for intervention. Most recent blood glucose values:     Ref.  Range 9/24/2017 15:53 9/24/2017 22:34 9/25/2017 07:08 9/25/2017 10:50   GLUCOSE,FAST - POC Latest Ref Range: 70 - 110 mg/dL 142 (H) 164 (H) 136 (H) 297 (H)     Current A1C:  8.1% 9/25/17 estimated average blood glucose of 324mg/dl over the past 2-3 months    Current hospital diabetes medications:  20 units lantus before bed, 45 units lantus daily  5 units lispro with meals  Correctional scale lispro ACHS- normal insulin sensitivity scale    Previous day Insulin TDD:  9/24: 65 units lantus  19 units lispro    Diet:   Cardiac   Current Meal Intake:  Patient Vitals for the past 100 hrs:   % Diet Eaten   09/25/17 1325 100 %   09/25/17 0856 100 %   09/23/17 1541 100 %       Home diabetes medications:  Pt reports taking 45 units lantus before dinner  15 units novolog when she wakes up and before bed at night    Goals:  Pt BG will be within target range by _9/28/17____    Education:  _x__  Refer to Diabetes Education Record             ___  Education not indicated at this time    Sully Milian [address], [address] [place] [address], [facility]  Pager: [phone]

## 2017-09-25 NOTE — CARDIO/PULMONARY
Attempted to see patient and she was not in room at present time. Will attempt to see at a later time.

## 2017-09-25 NOTE — PROGRESS NOTES
Care Management Interventions  PCP Verified by CM: Yes  Last Visit to PCP: 08/31/17  Current Support Network: Other, Family Lives Nearby  Confirm Follow Up Transport: Family  Plan discussed with Pt/Family/Caregiver: Yes     Pt is a 57year old female admitted for chest pain. Pt is alert and oriented in room. Pt states that her son and daughter resides with her and plan is to return home at discharge. Pt's family will be assisting her with transportation. Pt indicates being semi independent with a ADLs and reports using a cane to assist with ambulation. Pt mentions that she uses home O2 supplied by [employer]. Pt uses 2-3 ltrs of O2 when at home. Pt's POC is her mother Janelle Ruvalcaba ([phone]).

## 2017-09-25 NOTE — PROGRESS NOTES
[facility] Hospitalist Group  Progress Note    Patient: Nadira Carcamo Age: 57 y.o. : 1955 MR#: [id number] SSN: [id number]  Date: 2017     Subjective:     Patient in NAD, awake, follows commands, denies CP, has bates.  Son at bedside    Assessment/Plan:     1- Chest Pain  2- Acute systolic CHF exac, KL-79%  3- Uncontrolled HTN  4- COPD without exacerbation  5- DM2  6- Hypokalemia in the setting of diuretic use- replete    PLAN  Lasix, I/O  Plans per cradio  Plans for ST in am noted  On coreg, clonidine,  ARB, PRN Hydralazine  On dulera  SSI, Lantus  D/w patient and son at bedside  Advanced care planning: full code      Case discussed with:  [x]Patient  []Family  []Nursing  []Case Management  DVT Prophylaxis:  []Lovenox  []Hep SQ  []SCDs  []Coumadin   []On Heparin gtt    Objective:   VS:   Visit Vitals    /74 (BP 1 Location: Left arm, BP Patient Position: At rest)    Pulse 67    Temp 97.4 °F (36.3 °C)    Resp 16    Ht 5' 9\" (1.753 m)    Wt 89.1 kg (196 lb 8 oz)    SpO2 94%    BMI 29.02 kg/m2      Tmax/24hrs: Temp (24hrs), Av.9 °F (36.6 °C), Min:97.4 °F (36.3 °C), Max:99 °F (37.2 °C)      Intake/Output Summary (Last 24 hours) at 17 1624  Last data filed at 17 1325   Gross per 24 hour   Intake              720 ml   Output                0 ml   Net              720 ml       General:  Awake, alert  Cardiovascular:  S1S2+, RRR  Pulmonary:  Decreased BS b/l  GI:  Soft, BS+, NT, ND  Extremities:  trace edema        Labs:    Recent Results (from the past 24 hour(s))   GLUCOSE, POC    Collection Time: 17 10:34 PM   Result Value Ref Range    Glucose (POC) 164 (H) 70 - 110 mg/dL   CBC WITH AUTOMATED DIFF    Collection Time: 17 12:20 AM   Result Value Ref Range    WBC 3.8 (L) 4.6 - 13.2 K/uL    RBC 3.80 (L) 4.20 - 5.30 M/uL    HGB 10.3 (L) 12.0 - 16.0 g/dL    HCT 33.0 (L) 35.0 - 45.0 %    MCV 86.8 74.0 - 97.0 FL    MCH 27.1 24.0 - 34.0 PG    MCHC 31.2 31.0 - 37.0 g/dL    RDW 15.0 (H) 11.6 - 14.5 %    PLATELET 844 (L) 805 - 420 K/uL    MPV 12.4 (H) 9.2 - 11.8 FL    NEUTROPHILS 57 40 - 73 %    LYMPHOCYTES 21 21 - 52 %    MONOCYTES 7 3 - 10 %    EOSINOPHILS 14 (H) 0 - 5 %    BASOPHILS 1 0 - 2 %    ABS. NEUTROPHILS 2.2 1.8 - 8.0 K/UL    ABS. LYMPHOCYTES 0.8 (L) 0.9 - 3.6 K/UL    ABS. MONOCYTES 0.3 0.05 - 1.2 K/UL    ABS. EOSINOPHILS 0.5 (H) 0.0 - 0.4 K/UL    ABS.  BASOPHILS 0.0 0.0 - 0.06 K/UL    DF AUTOMATED     METABOLIC PANEL, BASIC    Collection Time: 09/25/17 12:20 AM   Result Value Ref Range    Sodium 139 136 - 145 mmol/L    Potassium 3.2 (L) 3.5 - 5.5 mmol/L    Chloride 102 100 - 108 mmol/L    CO2 34 (H) 21 - 32 mmol/L    Anion gap 3 3.0 - 18 mmol/L    Glucose 146 (H) 74 - 99 mg/dL    BUN 27 (H) 7.0 - 18 MG/DL    Creatinine 1.26 0.6 - 1.3 MG/DL    BUN/Creatinine ratio 21 (H) 12 - 20      GFR est AA 52 (L) >60 ml/min/1.73m2    GFR est non-AA 43 (L) >60 ml/min/1.73m2    Calcium 8.5 8.5 - 10.1 MG/DL   PROTHROMBIN TIME + INR    Collection Time: 09/25/17 12:20 AM   Result Value Ref Range    Prothrombin time 12.9 11.5 - 15.2 sec    INR 1.0 0.8 - 1.2     HEMOGLOBIN A1C W/O EAG    Collection Time: 09/25/17  4:20 AM   Result Value Ref Range    Hemoglobin A1c 8.1 (H) 4.2 - 5.6 %   GLUCOSE, POC    Collection Time: 09/25/17  7:08 AM   Result Value Ref Range    Glucose (POC) 136 (H) 70 - 110 mg/dL   GLUCOSE, POC    Collection Time: 09/25/17 10:50 AM   Result Value Ref Range    Glucose (POC) 297 (H) 70 - 110 mg/dL   GLUCOSE, POC    Collection Time: 09/25/17  3:05 PM   Result Value Ref Range    Glucose (POC) 164 (H) 70 - 110 mg/dL       Signed By: Israel Aschoff, MD     September 25, 2017

## 2017-09-25 NOTE — PROGRESS NOTES
[facility]  -  Progress Note      Patient: Sheri Bruno MRN: [id number]  SSN: [id number]    YOB: 1955  Age: 57 y.o. Sex: female      Admit Date: 9/22/2017    Assessment:     Hospital Problems  Date Reviewed: 2/3/2014          Codes Class Noted POA    Chest pain ICD-10-CM: R07.9  ICD-9-CM: 786.50  9/22/2017 Unknown            -Hypertensive cardiovascular disease, concentric left ventricular hypertrophy  -Mild depression left ventricular ejection fraction. Global left ventricular hypokinesis with ejection fraction 40-45%. -Mixed Congestive heart failure.  -Abnormal EKG consistent with possible prior anteroseptal myocardial infarction. No wall motion abnormalities to support this diagnosis. -Hypertension, uncontrolled  -Bilateral pleural effusions.  -DM, A1c 8.1 9/25/17  -HLP    Plan:     -BP remains uncontrolled, last BP was 193/97.  -Continue scheduled IV lasix and PO Spironolactone. Cr noted to be increasing vs yesterday, continue to monitor.  -Strict I/O's; there has been no recorded output today, no intake yesterday.  -Continue ASA, Coreg, pravastatin.  -Continue Clonidine, HCTZ, Valsartan.  -Will switch IV Hydralazine PRN to scheduled PO Hydralazine.  -Will start Isordil.  -Consider obtaining renal duplex. -Replace potassium.  -Nuclear stress test ordered for tomorrow, pt placed NPO after midnight except meds for test.    Subjective:     No new complaints. Pt states that her chest pain has resolved. She has had left frontal headache today, as well as some neck discomfort. She also reports shortness of breath and non-productive cough that are worst at night, along with wheezing.     Objective:      Patient Vitals for the past 8 hrs:   Temp Pulse Resp BP SpO2   09/25/17 1048 97.6 °F (36.4 °C) 66 20 (!) 193/97 99 %   09/25/17 0657 97.8 °F (36.6 °C) 72 20 (!) 174/99 93 %         Patient Vitals for the past 96 hrs:   Weight   09/25/17 0511 89.1 kg (196 lb 8 oz) 09/24/17 0334 91.1 kg (200 lb 12.8 oz)   09/23/17 0145 89.6 kg (197 lb 8 oz)   09/22/17 1402 83 kg (183 lb)         Intake/Output Summary (Last 24 hours) at 09/25/17 1425  Last data filed at 09/25/17 1325   Gross per 24 hour   Intake              720 ml   Output                0 ml   Net              720 ml       Physical Exam:  General:  alert, cooperative, no distress, appears stated age  Lungs:  Rhonchi with few scattered wheezes.   Heart:  regular rate and rhythm  Extremities:  no edema    Data Review:     Labs: Results:       Chemistry Recent Labs      09/25/17   0020  09/23/17   0450  09/22/17   1435   GLU  146*  115*  172*   NA  139  141  141   K  3.2*  3.3*  2.9*   CL  102  107  104   CO2  34*  27  33*   BUN  27*  28*  25*   CREA  1.26  1.04  1.16   CA  8.5  8.1*  8.7   MG   --    --   2.0   AGAP  3  7  4   BUCR  21*  27*  22*   AP   --   119*   --    TP   --   6.6   --    ALB   --   2.4*   --    GLOB   --   4.2*   --    AGRAT   --   0.6*   --       CBC w/Diff Recent Labs      09/25/17   0020  09/23/17   0450  09/22/17   1435   WBC  3.8*  6.9  6.8   RBC  3.80*  3.61*  4.00*   HGB  10.3*  9.9*  10.8*   HCT  33.0*  30.8*  34.2*   PLT  133*  128*  139   GRANS  57   --   81*   LYMPH  21   --   13*   EOS  14*   --   2      Coagulation Recent Labs      09/25/17   0020   PTP  12.9   INR  1.0       Lipid Panel Lab Results   Component Value Date/Time    Cholesterol, total 238 03/28/2011 09:00 AM    HDL Cholesterol 56 03/28/2011 09:00 AM    LDL, calculated 162.4 03/28/2011 09:00 AM    VLDL, calculated 19.6 03/28/2011 09:00 AM    Triglyceride 98 03/28/2011 09:00 AM    CHOL/HDL Ratio 4.3 03/28/2011 09:00 AM      BNP Lab Results   Component Value Date/Time    B-type Natriuretic Peptide 223.7 04/12/2014 12:00 AM      Liver Enzymes Recent Labs      09/23/17   0450   TP  6.6   ALB  2.4*   AP  119*   SGOT  8*      Digoxin    Thyroid Studies Lab Results   Component Value Date/Time    T4, Total 9.1 12/23/2009 11:33 AM    TSH 0.85 03/28/2011 09:00 AM

## 2017-09-25 NOTE — DIABETES MGMT
Diabetes Patient/Family Education Record    Factors That  May Influence Patients Ability  to Learn or  Comply With  Recommendations:    []   Language barrier    []   Cultural needs   []   Motivation    [x]   Cognitive limitation-confusion, poor memory    []   Physical   []   Education    []   Physiological factors   []   Hearing/vision/speaking impairment   []   Advent beliefs    []   Financial factors   []  Other:   []  No factors identified at this time.      Person Instructed:   [x]   Patient   []   Family   []  Other     Preference for Learning:   [x]   Verbal   [x]   Written   []  Demonstration     Level of Comprehension & Competence:    []  Good                                      [x] Fair                                     []  Poor                             []  Needs Reinforcement   [x]  Teachback completed    Education Component:   [x]  Medication management,     [x]  Nutritional management including the role of carbohydrate intake   []  Exercise   []  Signs, symptoms, and treatment of hyperglycemia and hypoglycemia   [] Treatment of hyperglycemia and hypoglycemia   []  Importance of blood glucose monitoring and how to obtain a blood glucose meter    []  Instruction on use of blood glucose meter   []  Discuss the importance of HbA1C monitoring and provide patient with  results   []  Sick day guidelines   []  Proper use and disposal of lancets, needles, syringes or insulin pens (if appropriate)   []  Potential long-term complications (retinopathy, kidney disease, neuropathy, heart disease, stroke, vascular disease, foot care)   [] Provide emergency contact number and contact number for more information    [x]  Goal:  Patient/family will demonstrate understanding of Diabetes Self Management Skills by: (date) _10/2/17______  Plan for post-discharge education or self-management support:    [] Outpatient class schedule provided            [x] Patient Declined    [] Scheduled for outpatient classes (date) _______       Carolina Lara, MS, [address], CDE  Pager: [phone]

## 2017-09-25 NOTE — PROGRESS NOTES
Bedside and Verbal shift change report given to Evelia BENAVIDEZ RN (oncoming nurse) by Hannah Morrison RN (offgoing nurse). Report included the following information SBAR, Kardex, ED Summary and MAR.

## 2017-09-25 NOTE — NURSE NAVIGATOR
Cardiac Nurse Navigator Initial Note       Date of  Admission: 9/22/2017  2:10 PM   Hospital Day: 3    Admission type:Emergency      Patient Active Problem List    Diagnosis Date Noted    Chest pain 09/22/2017    Pleural effusion 04/30/2014    Cervical myelopathy ([facility] 02/03/2014    Eczema 02/03/2014    Asthma 02/03/2014    CHF (congestive heart failure) ([facility] 02/03/2014    Bladder prolapse, female, acquired 02/03/2014    IBS (irritable bowel syndrome) 02/03/2014    Osteoporosis 02/03/2014    Migraine 02/03/2014    Anxiety and depression 02/03/2014    Essential hypertension, benign 01/27/2012    Diabetes mellitus ([facility] 01/27/2012    Other and unspecified hyperlipidemia 01/27/2012      Everette Faustin MD    Cardiologist:     Past Medical History:   Diagnosis Date    Arthritis     Asthma     Cataract     [facility]     History of echocardiogram 12/29/2009    EF 50%. Mild-mod conc LVH. Mod DDfx. LAE. Mild MR.      Hypercholesterolemia     Hypertension     Hypertensive heart disease     Normal nuclear stress test 09/08/2000    No ischemia or prior infarction. Neg EKG on submax EST. Ex time 15:02.       Past Surgical History:   Procedure Laterality Date    HX CHOLECYSTECTOMY  2001    HX TUBAL LIGATION  1975     Current Facility-Administered Medications   Medication Dose Route Frequency    valsartan (DIOVAN) tablet 320 mg  320 mg Oral DAILY    And    hydroCHLOROthiazide (HYDRODIURIL) tablet 25 mg  25 mg Oral DAILY    influenza vaccine 2017-18 (3 yrs+)(PF) (FLUZONE QUAD/FLUARIX QUAD) injection 0.5 mL  0.5 mL IntraMUSCular PRIOR TO DISCHARGE    aspirin delayed-release tablet 81 mg  81 mg Oral DAILY    carvedilol (COREG) tablet 25 mg  25 mg Oral BID WITH MEALS    cloNIDine HCl (CATAPRES) tablet 0.2 mg  0.2 mg Oral BID    gabapentin (NEURONTIN) capsule 300 mg  300 mg Oral QID    insulin glargine (LANTUS) injection 45 Units  45 Units SubCUTAneous DAILY    pravastatin (PRAVACHOL) tablet 20 mg  20 mg Oral QHS    spironolactone (ALDACTONE) tablet 12.5 mg  12.5 mg Oral DAILY    ondansetron (ZOFRAN) injection 4 mg  4 mg IntraVENous Q6H PRN    furosemide (LASIX) injection 40 mg  40 mg IntraVENous BID    sodium chloride (NS) flush 5-10 mL  5-10 mL IntraVENous Q8H    sodium chloride (NS) flush 5-10 mL  5-10 mL IntraVENous PRN    enoxaparin (LOVENOX) injection 40 mg  40 mg SubCUTAneous Q24H    insulin glargine (LANTUS) injection 20 Units  20 Units SubCUTAneous QHS    insulin lispro (HUMALOG) injection 5 Units  5 Units SubCUTAneous TIDAC    hydrALAZINE (APRESOLINE) 20 mg/mL injection 10 mg  10 mg IntraVENous Q6H PRN    insulin lispro (HUMALOG) injection   SubCUTAneous AC&HS    glucose chewable tablet 16 g  4 Tab Oral PRN    glucagon (GLUCAGEN) injection 1 mg  1 mg IntraMUSCular PRN    dextrose (D50) infusion 12.5-25 g  25-50 mL IntraVENous PRN    nitroglycerin (NITROSTAT) tablet 0.4 mg  0.4 mg SubLINGual PRN        Prior to admission patient presented with SOB, cough, chest pain    Patient observed OOB sitting in chair with son at bedside.     Cardiographics  Patient on Telemetry: Cardiac/Telemetry Monitor On: Yes   Cardiac Rhythm (only for patients on telemetry):      Echocardiogram:  []  None ordered    [] Results Pending       Results:     EF:  40-45%          Labs:   Recent Results (from the past 24 hour(s))   GLUCOSE, POC    Collection Time: 09/24/17  3:53 PM   Result Value Ref Range    Glucose (POC) 142 (H) 70 - 110 mg/dL   GLUCOSE, POC    Collection Time: 09/24/17 10:34 PM   Result Value Ref Range    Glucose (POC) 164 (H) 70 - 110 mg/dL   CBC WITH AUTOMATED DIFF    Collection Time: 09/25/17 12:20 AM   Result Value Ref Range    WBC 3.8 (L) 4.6 - 13.2 K/uL    RBC 3.80 (L) 4.20 - 5.30 M/uL    HGB 10.3 (L) 12.0 - 16.0 g/dL    HCT 33.0 (L) 35.0 - 45.0 %    MCV 86.8 74.0 - 97.0 FL    MCH 27.1 24.0 - 34.0 PG    MCHC 31.2 31.0 - 37.0 g/dL    RDW 15.0 (H) 11.6 - 14.5 % PLATELET 949 (L) 803 - 420 K/uL    MPV 12.4 (H) 9.2 - 11.8 FL    NEUTROPHILS 57 40 - 73 %    LYMPHOCYTES 21 21 - 52 %    MONOCYTES 7 3 - 10 %    EOSINOPHILS 14 (H) 0 - 5 %    BASOPHILS 1 0 - 2 %    ABS. NEUTROPHILS 2.2 1.8 - 8.0 K/UL    ABS. LYMPHOCYTES 0.8 (L) 0.9 - 3.6 K/UL    ABS. MONOCYTES 0.3 0.05 - 1.2 K/UL    ABS. EOSINOPHILS 0.5 (H) 0.0 - 0.4 K/UL    ABS.  BASOPHILS 0.0 0.0 - 0.06 K/UL    DF AUTOMATED     METABOLIC PANEL, BASIC    Collection Time: 09/25/17 12:20 AM   Result Value Ref Range    Sodium 139 136 - 145 mmol/L    Potassium 3.2 (L) 3.5 - 5.5 mmol/L    Chloride 102 100 - 108 mmol/L    CO2 34 (H) 21 - 32 mmol/L    Anion gap 3 3.0 - 18 mmol/L    Glucose 146 (H) 74 - 99 mg/dL    BUN 27 (H) 7.0 - 18 MG/DL    Creatinine 1.26 0.6 - 1.3 MG/DL    BUN/Creatinine ratio 21 (H) 12 - 20      GFR est AA 52 (L) >60 ml/min/1.73m2    GFR est non-AA 43 (L) >60 ml/min/1.73m2    Calcium 8.5 8.5 - 10.1 MG/DL   PROTHROMBIN TIME + INR    Collection Time: 09/25/17 12:20 AM   Result Value Ref Range    Prothrombin time 12.9 11.5 - 15.2 sec    INR 1.0 0.8 - 1.2     HEMOGLOBIN A1C W/O EAG    Collection Time: 09/25/17  4:20 AM   Result Value Ref Range    Hemoglobin A1c 8.1 (H) 4.2 - 5.6 %   GLUCOSE, POC    Collection Time: 09/25/17  7:08 AM   Result Value Ref Range    Glucose (POC) 136 (H) 70 - 110 mg/dL   GLUCOSE, POC    Collection Time: 09/25/17 10:50 AM   Result Value Ref Range    Glucose (POC) 297 (H) 70 - 110 mg/dL     Lab Results   Component Value Date/Time    Hemoglobin A1c 8.1 09/25/2017 04:20 AM       Code Status: Full Code    Patient would benefit from:  [x] Cardiac Rehab             [x]Home Health   [] PT  [x] Case Management             [x] H2H                           [] OT                                    [] Nutrition                              []  Apnea Link                [] 6 minute walk                            []  Outpatient sleep study  [] Other:         [] Palliative Care     Educational folder provided and contents reviewed with patient and son, reviewed in depth sodium/fluid restriction, and diet, patient and/or family given opportunity to ask questions.       Ahmet Reece RN

## 2017-09-26 ENCOUNTER — APPOINTMENT (OUTPATIENT)
Dept: CT IMAGING | Age: 62
DRG: 304 | End: 2017-09-26
Attending: FAMILY MEDICINE
Payer: MEDICARE

## 2017-09-26 LAB
ALBUMIN SERPL-MCNC: 2.5 G/DL (ref 3.4–5)
ALBUMIN/GLOB SERPL: 0.6 {RATIO} (ref 0.8–1.7)
ALP SERPL-CCNC: 124 U/L (ref 45–117)
ALT SERPL-CCNC: 14 U/L (ref 13–56)
AMMONIA PLAS-SCNC: 31 UMOL/L (ref 11–32)
ANION GAP SERPL CALC-SCNC: 7 MMOL/L (ref 3–18)
ANION GAP SERPL CALC-SCNC: 8 MMOL/L (ref 3–18)
AST SERPL-CCNC: 8 U/L (ref 15–37)
ATTENDING PHYSICIAN, CST07: NORMAL
BASOPHILS # BLD: 0 K/UL (ref 0–0.06)
BASOPHILS NFR BLD: 0 % (ref 0–2)
BILIRUB SERPL-MCNC: 0.2 MG/DL (ref 0.2–1)
BUN SERPL-MCNC: 28 MG/DL (ref 7–18)
BUN SERPL-MCNC: 28 MG/DL (ref 7–18)
BUN/CREAT SERPL: 21 (ref 12–20)
BUN/CREAT SERPL: 21 (ref 12–20)
CALCIUM SERPL-MCNC: 8.2 MG/DL (ref 8.5–10.1)
CALCIUM SERPL-MCNC: 8.3 MG/DL (ref 8.5–10.1)
CHLORIDE SERPL-SCNC: 100 MMOL/L (ref 100–108)
CHLORIDE SERPL-SCNC: 100 MMOL/L (ref 100–108)
CO2 SERPL-SCNC: 31 MMOL/L (ref 21–32)
CO2 SERPL-SCNC: 32 MMOL/L (ref 21–32)
CREAT SERPL-MCNC: 1.32 MG/DL (ref 0.6–1.3)
CREAT SERPL-MCNC: 1.33 MG/DL (ref 0.6–1.3)
DIAGNOSIS, 93000: NORMAL
DIFFERENTIAL METHOD BLD: ABNORMAL
DUKE TM SCORE RESULT, CST14: NORMAL
DUKE TREADMILL SCORE, CST13: NORMAL
ECG INTERP BEFORE EX, CST11: NORMAL
ECG INTERP DURING EX, CST12: NORMAL
EOSINOPHIL # BLD: 0.3 K/UL (ref 0–0.4)
EOSINOPHIL NFR BLD: 6 % (ref 0–5)
ERYTHROCYTE [DISTWIDTH] IN BLOOD BY AUTOMATED COUNT: 14.8 % (ref 11.6–14.5)
FUNCTIONAL CAPACITY, CST17: NORMAL
GLOBULIN SER CALC-MCNC: 4 G/DL (ref 2–4)
GLUCOSE BLD STRIP.AUTO-MCNC: 164 MG/DL (ref 70–110)
GLUCOSE BLD STRIP.AUTO-MCNC: 166 MG/DL (ref 70–110)
GLUCOSE BLD STRIP.AUTO-MCNC: 189 MG/DL (ref 70–110)
GLUCOSE BLD STRIP.AUTO-MCNC: 193 MG/DL (ref 70–110)
GLUCOSE BLD STRIP.AUTO-MCNC: 92 MG/DL (ref 70–110)
GLUCOSE SERPL-MCNC: 170 MG/DL (ref 74–99)
GLUCOSE SERPL-MCNC: 174 MG/DL (ref 74–99)
HCT VFR BLD AUTO: 32.5 % (ref 35–45)
HGB BLD-MCNC: 10.2 G/DL (ref 12–16)
KNOWN CARDIAC CONDITION, CST08: NORMAL
LYMPHOCYTES # BLD: 0.8 K/UL (ref 0.9–3.6)
LYMPHOCYTES NFR BLD: 17 % (ref 21–52)
MAGNESIUM SERPL-MCNC: 2.2 MG/DL (ref 1.6–2.6)
MAX. DIASTOLIC BP, CST04: 83 MMHG
MAX. HEART RATE, CST05: 82 BPM
MAX. SYSTOLIC BP, CST03: 155 MMHG
MCH RBC QN AUTO: 27.3 PG (ref 24–34)
MCHC RBC AUTO-ENTMCNC: 31.4 G/DL (ref 31–37)
MCV RBC AUTO: 87.1 FL (ref 74–97)
MONOCYTES # BLD: 0.3 K/UL (ref 0.05–1.2)
MONOCYTES NFR BLD: 7 % (ref 3–10)
NEUTS SEG # BLD: 3.3 K/UL (ref 1.8–8)
NEUTS SEG NFR BLD: 70 % (ref 40–73)
OVERALL BP RESPONSE TO EXERCISE, CST16: NORMAL
OVERALL HR RESPONSE TO EXERCISE, CST15: NORMAL
PEAK EX METS, CST10: 1 METS
PLATELET # BLD AUTO: 130 K/UL (ref 135–420)
PMV BLD AUTO: 12 FL (ref 9.2–11.8)
POTASSIUM SERPL-SCNC: 3.7 MMOL/L (ref 3.5–5.5)
POTASSIUM SERPL-SCNC: 3.7 MMOL/L (ref 3.5–5.5)
PROT SERPL-MCNC: 6.5 G/DL (ref 6.4–8.2)
PROTOCOL NAME, CST01: NORMAL
RBC # BLD AUTO: 3.73 M/UL (ref 4.2–5.3)
SODIUM SERPL-SCNC: 139 MMOL/L (ref 136–145)
SODIUM SERPL-SCNC: 139 MMOL/L (ref 136–145)
TEST INDICATION, CST09: NORMAL
WBC # BLD AUTO: 4.7 K/UL (ref 4.6–13.2)

## 2017-09-26 PROCEDURE — A9500 TC99M SESTAMIBI: HCPCS

## 2017-09-26 PROCEDURE — 80048 BASIC METABOLIC PNL TOTAL CA: CPT | Performed by: PHYSICIAN ASSISTANT

## 2017-09-26 PROCEDURE — 74011250637 HC RX REV CODE- 250/637

## 2017-09-26 PROCEDURE — 65660000000 HC RM CCU STEPDOWN

## 2017-09-26 PROCEDURE — 74011636637 HC RX REV CODE- 636/637: Performed by: EMERGENCY MEDICINE

## 2017-09-26 PROCEDURE — 36415 COLL VENOUS BLD VENIPUNCTURE: CPT

## 2017-09-26 PROCEDURE — 74011250637 HC RX REV CODE- 250/637: Performed by: PHYSICIAN ASSISTANT

## 2017-09-26 PROCEDURE — 83735 ASSAY OF MAGNESIUM: CPT

## 2017-09-26 PROCEDURE — 74011636637 HC RX REV CODE- 636/637: Performed by: INTERNAL MEDICINE

## 2017-09-26 PROCEDURE — 82140 ASSAY OF AMMONIA: CPT

## 2017-09-26 PROCEDURE — 93017 CV STRESS TEST TRACING ONLY: CPT | Performed by: INTERNAL MEDICINE

## 2017-09-26 PROCEDURE — 92610 EVALUATE SWALLOWING FUNCTION: CPT

## 2017-09-26 PROCEDURE — 74011250637 HC RX REV CODE- 250/637: Performed by: INTERNAL MEDICINE

## 2017-09-26 PROCEDURE — 74011250637 HC RX REV CODE- 250/637: Performed by: HOSPITALIST

## 2017-09-26 PROCEDURE — 85025 COMPLETE CBC W/AUTO DIFF WBC: CPT

## 2017-09-26 PROCEDURE — 93005 ELECTROCARDIOGRAM TRACING: CPT

## 2017-09-26 PROCEDURE — 70450 CT HEAD/BRAIN W/O DYE: CPT

## 2017-09-26 PROCEDURE — 80053 COMPREHEN METABOLIC PANEL: CPT

## 2017-09-26 PROCEDURE — 74011250636 HC RX REV CODE- 250/636: Performed by: INTERNAL MEDICINE

## 2017-09-26 PROCEDURE — 97161 PT EVAL LOW COMPLEX 20 MIN: CPT

## 2017-09-26 PROCEDURE — 78452 HT MUSCLE IMAGE SPECT MULT: CPT | Performed by: INTERNAL MEDICINE

## 2017-09-26 PROCEDURE — 82962 GLUCOSE BLOOD TEST: CPT

## 2017-09-26 PROCEDURE — 74011250636 HC RX REV CODE- 250/636: Performed by: PHYSICIAN ASSISTANT

## 2017-09-26 RX ORDER — SODIUM CHLORIDE 0.9 % (FLUSH) 0.9 %
5-10 SYRINGE (ML) INJECTION AS NEEDED
Status: DISCONTINUED | OUTPATIENT
Start: 2017-09-26 | End: 2017-09-27 | Stop reason: HOSPADM

## 2017-09-26 RX ORDER — GABAPENTIN 100 MG/1
100 CAPSULE ORAL 2 TIMES DAILY WITH MEALS
Status: DISCONTINUED | OUTPATIENT
Start: 2017-09-27 | End: 2017-09-27 | Stop reason: HOSPADM

## 2017-09-26 RX ORDER — GABAPENTIN 300 MG/1
300 CAPSULE ORAL
Status: DISCONTINUED | OUTPATIENT
Start: 2017-09-27 | End: 2017-09-27 | Stop reason: HOSPADM

## 2017-09-26 RX ORDER — SODIUM CHLORIDE 0.9 % (FLUSH) 0.9 %
5-10 SYRINGE (ML) INJECTION EVERY 8 HOURS
Status: DISCONTINUED | OUTPATIENT
Start: 2017-09-26 | End: 2017-09-27 | Stop reason: HOSPADM

## 2017-09-26 RX ADMIN — INSULIN LISPRO 2 UNITS: 100 INJECTION, SOLUTION INTRAVENOUS; SUBCUTANEOUS at 14:24

## 2017-09-26 RX ADMIN — INSULIN LISPRO 2 UNITS: 100 INJECTION, SOLUTION INTRAVENOUS; SUBCUTANEOUS at 22:51

## 2017-09-26 RX ADMIN — HYDRALAZINE HYDROCHLORIDE 25 MG: 25 TABLET, FILM COATED ORAL at 17:43

## 2017-09-26 RX ADMIN — ISOSORBIDE DINITRATE 10 MG: 10 TABLET ORAL at 22:51

## 2017-09-26 RX ADMIN — INSULIN LISPRO 2 UNITS: 100 INJECTION, SOLUTION INTRAVENOUS; SUBCUTANEOUS at 17:45

## 2017-09-26 RX ADMIN — ASPIRIN 81 MG: 81 TABLET, COATED ORAL at 09:46

## 2017-09-26 RX ADMIN — CLONIDINE HYDROCHLORIDE 0.1 MG: 0.1 TABLET ORAL at 09:46

## 2017-09-26 RX ADMIN — CLONIDINE HYDROCHLORIDE 0.1 MG: 0.1 TABLET ORAL at 17:42

## 2017-09-26 RX ADMIN — GABAPENTIN 300 MG: 300 CAPSULE ORAL at 14:22

## 2017-09-26 RX ADMIN — ISOSORBIDE DINITRATE 10 MG: 10 TABLET ORAL at 09:46

## 2017-09-26 RX ADMIN — CARVEDILOL 25 MG: 25 TABLET, FILM COATED ORAL at 09:45

## 2017-09-26 RX ADMIN — CARVEDILOL 25 MG: 25 TABLET, FILM COATED ORAL at 17:43

## 2017-09-26 RX ADMIN — HYDRALAZINE HYDROCHLORIDE 25 MG: 25 TABLET, FILM COATED ORAL at 22:51

## 2017-09-26 RX ADMIN — HYDROCHLOROTHIAZIDE 25 MG: 25 TABLET ORAL at 09:46

## 2017-09-26 RX ADMIN — PRAVASTATIN SODIUM 20 MG: 20 TABLET ORAL at 22:51

## 2017-09-26 RX ADMIN — VALSARTAN 320 MG: 160 TABLET, FILM COATED ORAL at 09:46

## 2017-09-26 RX ADMIN — INSULIN GLARGINE 45 UNITS: 100 INJECTION, SOLUTION SUBCUTANEOUS at 09:45

## 2017-09-26 RX ADMIN — INSULIN GLARGINE 20 UNITS: 100 INJECTION, SOLUTION SUBCUTANEOUS at 22:52

## 2017-09-26 RX ADMIN — ENOXAPARIN SODIUM 40 MG: 40 INJECTION SUBCUTANEOUS at 06:25

## 2017-09-26 RX ADMIN — INSULIN LISPRO 5 UNITS: 100 INJECTION, SOLUTION INTRAVENOUS; SUBCUTANEOUS at 14:23

## 2017-09-26 RX ADMIN — GABAPENTIN 300 MG: 300 CAPSULE ORAL at 09:46

## 2017-09-26 RX ADMIN — Medication 10 ML: at 22:52

## 2017-09-26 RX ADMIN — HYDRALAZINE HYDROCHLORIDE 25 MG: 25 TABLET, FILM COATED ORAL at 09:45

## 2017-09-26 RX ADMIN — Medication 10 ML: at 17:46

## 2017-09-26 RX ADMIN — SPIRONOLACTONE 12.5 MG: 25 TABLET, FILM COATED ORAL at 09:45

## 2017-09-26 RX ADMIN — Medication 10 ML: at 06:38

## 2017-09-26 RX ADMIN — REGADENOSON 0.4 MG: 0.08 INJECTION, SOLUTION INTRAVENOUS at 08:10

## 2017-09-26 RX ADMIN — INSULIN LISPRO 5 UNITS: 100 INJECTION, SOLUTION INTRAVENOUS; SUBCUTANEOUS at 17:46

## 2017-09-26 RX ADMIN — ISOSORBIDE DINITRATE 10 MG: 10 TABLET ORAL at 17:43

## 2017-09-26 RX ADMIN — FUROSEMIDE 40 MG: 10 INJECTION, SOLUTION INTRAMUSCULAR; INTRAVENOUS at 09:45

## 2017-09-26 NOTE — PROGRESS NOTES
Patient injected with 22.59 millicuries of [id number] for Nuclear resting images. Patient injected with 27.5 millicuries of [id number]-[id number] for Nuclear stress images. Injected with 0.4mg Lexiscan. Patient's armband was left on.

## 2017-09-26 NOTE — PROGRESS NOTES
Rapid Response Note  [facility]    Patient: Valarie Manzano 57 y.o. female  [id number]  1955      Admit Date: 9/22/2017   Admission Diagnosis: Chest pain    RAPID RESPONSE     Rapid response called for minimal responsiveness. Per nursing, prior to rapid being called, patient would only respond to painful stimuli. Per nursing staff, patient was talking earlier this morning and eating breakfast and was lasted noted to be normal around 10:15/10:20 this AM. Upon arrival to rapid, patient was sitting in chair with eyes closed and would only open eyes to painful stimuli. Patient's glucose at arrival was 164. Patient was not following commands during initial neuro assessment. Per nursing staff, these were new changes to patient's mental status. Nursing denies tonic clonic or incontinence. Code Stroke was called and patient was taking to CT Scan. Tele-neurology was consulted after return to floor from CT Scan. Medications Reviewed: Received dose of 300mg gabapentin this morning. Procedures Reviewed: recently returned from cardiac stress test    OBJECTIVE     Visit Vitals    /64    Pulse 67    Temp 97.8 °F (36.6 °C)    Resp 18    Ht 5' 9\" (1.753 m)    Wt 91.7 kg (202 lb 1.6 oz)    SpO2 98%    BMI 29.84 kg/m2     Patient Vitals for the past 12 hrs:   Temp Pulse Resp BP SpO2   09/26/17 1358 97.4 °F (36.3 °C) 65 18 145/79 98 %   09/26/17 1140 - 65 18 118/68 96 %   09/26/17 1116 - 67 - 101/64 98 %   09/26/17 0925 97.8 °F (36.6 °C) 74 18 164/84 94 %   09/26/17 0356 99.4 °F (37.4 °C) 77 18 142/73 98 %       PHYSICAL:  General:  Responds to pain with mildly slurred speech that improved to AOx3 over 30 minutes gradually with drowsiness. HEENT: Nl pulpils, PERRL  CV:  RRR, no murmurs, rubs, or gallops. PMI not displaced. No visible pulsations or thrills. No carotid bruits. RESP:  Unlabored breathing. Lungs clear to auscultation. no wheeze, rales, or rhonchi.   Equal expansion bilaterally. ABD:  Soft, nontender, nondistended. Normoactive bowel sounds. No hepatosplenomegaly. No suprapubic tenderness. Neuro:  PERRL. AOx0 to  A+Ox6 over course of evaluation. Patient unable to follow commands initially, but subsequent exam noted full rom of all extremities CN2-12 grossly intact. Noted decreased sensation to left forehead, but otherwise normal sensory exam. Good equal  strength and plantar flexion. : No evidence of incontinence    Medications administered: Supportive Oxygen during transport    EKG: No acute or ischemic changes compared to prior EKG on 9/22/2017    CT Head: No intracranial hemorrhage; lacunar infarct right thalamus, unable to tell if old or new. Neuro teleradiology does not suspect stroke and suggests CT scan shows chronic lacunar stroke that explains decreased sensation on right side. Labs: CBC, CMP, Mag, Ammonia    9/24 Echo: Left ventricle: Systolic function was moderately reduced by visual   assessment. Ejection fraction was estimated to be 40  % in the range of 40 % to 45 %. There was mild diffuse hypokinesis. Wall   thickness was moderately to markedly  increased. Doppler parameters were consistent with abnormal left ventricular   relaxation (grade 1 diastolic dysfunction). ASSESSMENT, PLAN & DISPOSITION   Keisha Ochoa is a 57y.o. year old female admitted for Chest pain. Rapid response called for Unresponsiveness with pulses. Patient condition currently: Needs escalation of care to Neuro Floor, but VSS. Pt has resolving altered mental status likely secondary to metabolic causes. Has hx of b/l pleural effusions, but normal resp mechanics, afebrile, lungs cta, and pulse oximetry. No recent narcotics given, pupils normal size, no hx of substance abuse.     Accuceck: BG at 150s    -Recommend Frequent neuro checks  -F/U labs per primary team  -Hypotension improved to SBP 118s with increased arorusal  -Neuro C/s: spoke to Dr. Cadet(neurology) @[id number] who agreed to see patient  -will Defer fluid resuscitation rec by teleneurology to primary team given patient's echo with hypokinesis and suspected hx of heart failure with prior     Disposition: Transfer to [address] [place] [address]    Attending Dr. Angel Huff notified of rapid response. In agreement with plan. Primary team resuming care. Tele-neurology recommended neurology consult.

## 2017-09-26 NOTE — PROGRESS NOTES
responded to Rapid Response for  Luisa Clifton, who is a 57 y.o.,female,     The  provided the following Interventions:  Provided crisis spiritual care and support for RRT team.  Offered prayers on behalf for the patient. Chart reviewed. The following outcomes were achieved:      Assessment:  There are no further spiritual or Taoist issues which require intervention at this time. Team assessed and called a Code S and took patient for a CT Scan. Plan:  Chaplains will continue to follow and will provide spiritual care as needed.       Neha Mckeon, [address] [place] [address]  Spiritual Care  [phone]

## 2017-09-26 NOTE — CARDIO/PULMONARY
Cardiac rehab attempted to see patient. She was experiencing other medical issues and education was not appropriate at the present time. Will follow.

## 2017-09-26 NOTE — PROGRESS NOTES
Problem: Mobility Impaired (Adult and Pediatric)  Goal: *Acute Goals and Plan of Care (Insert Text)  Physical Therapy Goals  Initiated 9/26/2017 and to be accomplished within 7 day(s)  1. Patient will move from supine to sit and sit to supine in bed with modified independence. 2. Patient will transfer from bed to chair and chair to bed with modified independence using the least restrictive device. 3. Patient will perform sit to stand with modified independence. 4. Patient will ambulate with modified independence for 100 feet with the least restrictive device. 5. Patient will ascend/descend 3 stairs with 1 handrail(s) with minimal assistance/contact guard assist.  PHYSICAL THERAPY EVALUATION     Patient: Alis Rodriguez (7 y.o. female)  Date: 9/26/2017  Primary Diagnosis: Chest pain        Precautions: fall, O2         ASSESSMENT :  Based on the objective data described below, the patient presents with decreased strength, balance and activity tolerance resulting in decreased independence with functional mobility. Pt reported feeling more stable during ambulation when using RW vs straight cane. Pt had no loss of balance during ambulation however CGA was provided due to complaints of legs feeling weak. Patient was on 2L of O2 throughout evaluation. Patient will benefit from skilled intervention to address the above impairments.   Patients rehabilitation potential is considered to be Good  Factors which may influence rehabilitation potential include:   [ ]         None noted  [ ]         Mental ability/status  [X]         Medical condition  [ ]         Home/family situation and support systems  [ ]         Safety awareness  [ ]         Pain tolerance/management  [ ]         Other:        PLAN :  Recommendations and Planned Interventions:  [X]           Bed Mobility Training             [X]    Neuromuscular Re-Education  [X]           Transfer Training                   [ ]    Orthotic/Prosthetic Training  [X]           Gait Training                          [ ]    Modalities  [X]           Therapeutic Exercises          [X]    Edema Management/Control  [X]           Therapeutic Activities            [X]    Patient and Family Training/Education  [ ]           Other (comment):     Frequency/Duration: Patient will be followed by physical therapy 1-2 times per day/4-7 days per week to address goals. Discharge Recommendations: Home Health  Further Equipment Recommendations for Discharge: rolling walker       G-CODES       Mobility  Current  CJ= 20-39%   Goal  CI= 1-19%. The severity rating is based on the Level of Assistance required for Functional Mobility and ADLs. Eval Complexity: History: MEDIUM  Complexity : 1-2 comorbidities / personal factors will impact the outcome/ POC Exam:LOW Complexity : 1-2 Standardized tests and measures addressing body structure, function, activity limitation and / or participation in recreation  Presentation: LOW Complexity : Stable, uncomplicated  Clinical Decision Making:Low Complexity , Overall Complexity:LOW       SUBJECTIVE:   Patient stated I feel weak.       OBJECTIVE DATA SUMMARY:       Past Medical History:   Diagnosis Date    Arthritis      Asthma      Cataract      Diabetes mellitus ([facility] [address]      History of echocardiogram 12/29/2009     EF 50%. Mild-mod conc LVH. Mod DDfx. LAE. Mild MR.      Hypercholesterolemia      Hypertension      Hypertensive heart disease      Normal nuclear stress test 09/08/2000     No ischemia or prior infarction. Neg EKG on submax EST. Ex time 15:02.      Past Surgical History:   Procedure Laterality Date    HX CHOLECYSTECTOMY   2001    HX TUBAL LIGATION   1975     Prior Level of Function/Home Situation: ambulated minimal distances with cane per patient  Home Situation  Home Environment: Private residence  # Steps to Enter: 4  One/Two Story Residence: One story  Living Alone: No  Support Systems: Family member(s), Friends \ neighbors  Patient Expects to be Discharged to[de-identified] Private residence  Current DME Used/Available at Home: Oxygen, portable, Cane, straight  Critical Behavior:   calm and cooperative      Strength:    Strength: Generally decreased, functional (B LEs)   Tone & Sensation:   Tone: Normal (B LEs)   Range Of Motion:  AROM: Within functional limits (B LEs)   Functional Mobility:     Transfers:  Sit to Stand: Supervision  Stand to Sit: Supervision  Balance:   Sitting: Intact  Standing: With support  Standing - Static: Good  Standing - Dynamic : Fair  Ambulation/Gait Training:  Distance (ft): 50 Feet (ft)  Assistive Device: Walker, rolling  Ambulation - Level of Assistance: Contact guard assistance;Stand-by asssistance  Gait Abnormalities: Decreased step clearance  Base of Support: Widened  Pain:  Pt reports 0/10 pain or discomfort prior to treatment. Pt reports 0/10 pain or discomfort post treatment. Activity Tolerance:   poor  Please refer to the flowsheet for vital signs taken during this treatment. After treatment:   [X]         Patient left in no apparent distress sitting up in chair  [ ]         Patient left in no apparent distress in bed  [X]         Call bell left within reach  [X]         Nursing notified  [ ]         Caregiver present  [ ]         Bed alarm activated      COMMUNICATION/EDUCATION:   [X]         Fall prevention education was provided and the patient/caregiver indicated understanding. [X]         Patient/family have participated as able in goal setting and plan of care. [X]         Patient/family agree to work toward stated goals and plan of care. [ ]         Patient understands intent and goals of therapy, but is neutral about his/her participation. [ ]         Patient is unable to participate in goal setting and plan of care.   Educated patient on use of rolling walker for ambulation and increasing activity throughout the day     Thank you for this referral.  Colette Caal, PT Time Calculation: 11 mins

## 2017-09-26 NOTE — CONSULTS
NEUROLOGY CONSULT NOTE    Patient ID:  Lyn Vargas  [id number]  8 y.o.  1955    Date of Consultation:  September 26, 2017    Referring Physician: Dr Elsi Garza    Reason for Consultation:  Episode of altered mental status        Subjective:       History of Present Illness: This is a 58 y/o AAF with a known history of poorly controlled DM and HTN who was admitted for cardiac evaluation of chest pain 4 days ago who was found minimally responsive this morning. The patient had completed a nuclear stress test this morning and was later found to be minimally responsive in her chair. She gradually returned to consciousness over next 20-30 minutes. She has no memory of the actual event but remembers a whole bunch of people in her room. She denies any history of seizures. She denies tongue biting, or loss of bladder continence. Her BP was relatively low at the time (approx 571 systolic) and her blood sugar was >100. Presently she just reports feeling fatigued. Patient Active Problem List    Diagnosis Date Noted    Chest pain 09/22/2017    Pleural effusion 04/30/2014    Cervical myelopathy ([facility] [address] 02/03/2014    Eczema 02/03/2014    Asthma 02/03/2014    CHF (congestive heart failure) (HCC) 02/03/2014    Bladder prolapse, female, acquired 02/03/2014    IBS (irritable bowel syndrome) 02/03/2014    Osteoporosis 02/03/2014    Migraine 02/03/2014    Anxiety and depression 02/03/2014    Essential hypertension, benign 01/27/2012    Diabetes mellitus ([facility] [address] 01/27/2012    Other and unspecified hyperlipidemia 01/27/2012     Past Medical History:   Diagnosis Date    Arthritis     Asthma     Cataract     Diabetes mellitus ([facility] [address]     History of echocardiogram 12/29/2009    EF 50%. Mild-mod conc LVH. Mod DDfx. LAE. Mild MR.      Hypercholesterolemia     Hypertension     Hypertensive heart disease     Normal nuclear stress test 09/08/2000    No ischemia or prior infarction. Neg EKG on submax EST. Ex time 15:02. Past Surgical History:   Procedure Laterality Date    HX CHOLECYSTECTOMY  2001    HX TUBAL LIGATION  1975      Prior to Admission medications    Medication Sig Start Date End Date Taking? Authorizing Provider   carvedilol (COREG) 25 mg tablet Take 25 mg by mouth two (2) times daily (with meals). Yes Santos Centeno MD   cloNIDine HCl (CATAPRES) 0.2 mg tablet Take 0.2 mg by mouth two (2) times a day. Yes Santos Centeno MD   aspirin delayed-release 81 mg tablet Take 81 mg by mouth daily. Yes Santos Centeno MD   pravastatin (PRAVACHOL) 20 mg tablet Take 20 mg by mouth nightly. Yes Santos Centeno MD   ofloxacin (FLOXIN) 0.3 % ophthalmic solution Administer 3 Drops to left eye daily. Yes Santos Centeno MD   valsartan-hydroCHLOROthiazide (DIOVAN-HCT) 160-25 mg per tablet Take 1 Tab by mouth daily. Yes Santos Centeno MD   spironolactone (ALDACTONE) 25 mg tablet Take 12.5 mg by mouth daily. Yes Santos Centeno MD   gabapentin (NEURONTIN) 300 mg capsule Take 300 mg by mouth four (4) times daily. Yes Santos Centeno MD   mometasone-formoterol (DULERA) 200-5 mcg/actuation HFA inhaler Take 2 Puffs by inhalation two (2) times a day. Yes Santos Centeno MD   insulin glargine (LANTUS) 100 unit/mL injection 45 Units by SubCUTAneous route daily. 15 units in the evening. Yes Historical Provider     Allergies   Allergen Reactions    Codeine Nausea Only    Sulfa (Sulfonamide Antibiotics) Rash      Social History   Substance Use Topics    Smoking status: Never Smoker    Smokeless tobacco: Never Used    Alcohol use No      Family History   Problem Relation Age of Onset    Hypertension Mother     Ovarian Cancer Mother     Heart Attack Father     Breast Cancer Maternal Aunt               Review of Systems    Pertinent items are noted in HPI.     Objective:     Patient Vitals for the past 8 hrs:   BP Temp Pulse Resp SpO2   09/26/17 1358 145/79 97.4 °F (36.3 °C) 65 18 98 %   09/26/17 1116 101/64 - 67 - 98 %   09/26/17 0925 164/84 97.8 °F (36.6 °C) 74 18 94 %       General Exam  No acute distress, overweight body habitus, she is sitting up eating lunch    HEENT: Normocephalic, atraumatic, Sclera anicteric, normal conjunctiva  Mucous membranes: normal color and hydration, no evidence of tongue biting     Neurologic Exam    Mental status:  Mildly somnolent but oriented to person, place, time and circumstance  No visual spatial neglect or overt apraxia  Language: normal fluency and comprehension    Cranial nerves: PERRL, Extraocular movements intact and full, face symmetric to movement, Tongue midline with normal strength, palat symmetric    Motor: strength 5/5 throughout  No abnormal movements    Coordination: Normal finger-nose-finger, Normal rapid alternating movements    DTRs (R/L) 0 throughout    Sensation: symmetric but decreased distally to temperature and absent vibratory sense in toes    No pronator drift    Gait: not tested      Data Review:    Recent Results (from the past 24 hour(s))   GLUCOSE, POC    Collection Time: 09/25/17  3:05 PM   Result Value Ref Range    Glucose (POC) 164 (H) 70 - 110 mg/dL   GLUCOSE, POC    Collection Time: 09/25/17 10:36 PM   Result Value Ref Range    Glucose (POC) 160 (H) 70 - 110 mg/dL   NUCLEAR STRESS TEST    Collection Time: 09/26/17  7:55 AM   Result Value Ref Range    Diagnosis nondiagnostic due to baseline ecg abnormalities     Test indication Chest Discomfort     Functional capacity Could Not Be Adequately Assessed     ECG Interp. Before Exercise abnormal     ECG Interp. During Exercise none     Overall HR response to exercise appropriate     Overall BP response to exercise normal resting BP - appropriate response     Max. Systolic  mmHg    Max. Diastolic BP 83 mmHg    Max.  Heart rate 82 BPM    [facility] treadmill score      Dangelo TM score result      Peak Ex METs 1.0 METS    Protocol name LEXISCAN N/E         Known cardiac condition      Attending physician     GLUCOSE, POC    Collection Time: 09/26/17  9:23 AM   Result Value Ref Range    Glucose (POC) 92 70 - 110 mg/dL   GLUCOSE, POC    Collection Time: 09/26/17 11:16 AM   Result Value Ref Range    Glucose (POC) 164 (H) 70 - 110 mg/dL   GLUCOSE, POC    Collection Time: 09/26/17  1:33 PM   Result Value Ref Range    Glucose (POC) 189 (H) 70 - 110 mg/dL         Radiology studies: Head CT reviewed, no acute changes      Assessment: This is a 56 y/o AAF with known HTN and DM who was recently admitted for chest pain and who experienced an episode of a depressed level of consciousness this morning. She is presently back to her neurologic baseline. Differential dx would include transient hypotension/syncope or an unwitnessed seizure with post-ictal phase. Based on the available data, I think syncope/presyncope is more likely though admittedly it is impossible to completely exclude a seizure. For now, no further work-up is indicated. An EEG is likely to be of very low yield. Active Problems:    Chest pain (9/22/2017)        Plan:     1. No further neurologic testing for now  2. Consider obtaining orthostatics before starting ambulation. Bakari Belle M.D.   Clinical Neurophysiology  Neuromuscular specialist

## 2017-09-26 NOTE — PROCEDURES
[address] STRESS    Name:  Niki Cevallos  MR#:  [id number]  :  1955  Account #:  [de-identified]  Date of Adm:  2017  Date of Service:  2017      PROCEDURE: Pharmacological cardiac nuclear stress test.    INDICATIONS: Chest pain. BASELINE ELECTROCARDIOGRAM: Sinus rhythm. Possible old  septal MI. Inferolateral ST segment and T-wave abnormality. PROTOCOL: The patient was given a Lexiscan infusion through right  antecubital IV; 10 mCi of sestamibi was injected before rest and 32  mCi of sestamibi was injected before stress. Gated processing was  performed. Resting blood pressure 155/83 mmHg, decreased to  113/59 mmHg. No symptoms during the test.    ELECTROCARDIOGRAM FINDINGS: No arrhythmias. No changes  beyond baseline. NUCLEAR FINDINGS: Left ventricular systolic function is low normal,  calculated at 53%. No evidence of transient ischemic dilatation or  regional wall motion abnormalities. There is patchy radiotracer uptake  during stress without obvious ischemia or previous infarct. IMPRESSION  1. Low risk pharmacological cardiac nuclear stress test.  2. Low-normal left ventricular systolic function calculated at 53%. No  regional wall motion abnormalities. 3. No obvious ischemia or previous infarction based on perfusion  imaging.         Jc Conteh M.D.    Elizabeth Og / Trinidad Koroma  D:  2017   13:23  T:  2017   13:41  Job #:  [id number]

## 2017-09-26 NOTE — PROGRESS NOTES
Problem: Dysphagia (Adult)  Goal: *Acute Goals and Plan of Care (Insert Text)  Patient will:  1. Tolerate PO trials with 0 s/s overt distress in 4/5 trials  2. Utilize compensatory swallow strategies/maneuvers (decrease bite/sip, size/rate, alt. liq/sol) with min cues in 4/5 trials    Rec:   Soft solid diet with thin liquids  Aspiration precautions  HOB >45 during po intake, remain >30 for 30-45 minutes after po   Small bites/sips; alternate liquid/solid with slow feeding rate   Oral care TID  Meds per pt preference  [address] EVALUATION     Patient: Sergio Amador (56 y.o. female)  Date: 9/26/2017  Primary Diagnosis: Chest pain        Precautions: aspiration         ASSESSMENT :  Based on the objective data described below, the patient presents with mild oropharyngeal dysphagia. Pt lethargic upon SLP arrival. Strength, ROM, and coordination of orofacial musculature slightly weakened during oral mech exam. Pt edentulous with left orofacial droop/weakness noted. Pt with dysarthric slowed speech production and hoarse vocal quality. She tolerated reg solid, puree, and thin liquids +/- straw without any overt s/sx of aspiration. Laryngeal elevation appeared slightly weakened to palpation. Mildly increased mastication with delayed a-p transit noted. Rec soft solid diet with thin liquids, aspiration precautions, oral care TID, and meds as tolerated. Pt may benefit from speech/language evaluation at a later date/time. ST will continue to follow to ensure safety of PO. Patient will benefit from skilled intervention to address the above impairments.   Patients rehabilitation potential is considered to be Good  Factors which may influence rehabilitation potential include:   [X]            Medical condition       PLAN :  Recommendations and Planned Interventions: See above  Frequency/Duration: Patient will be followed by speech-language pathology 1-2 times per day/4-7 days per week to address goals. Discharge Recommendations: Home Health, Outpatient and To Be Determined       SUBJECTIVE:   Patient stated I feel weird. OBJECTIVE:       Past Medical History:   Diagnosis Date    Arthritis      Asthma      Cataract      Diabetes mellitus ([address]      History of echocardiogram 12/29/2009     EF 50%. Mild-mod conc LVH. Mod DDfx. LAE. Mild MR.      Hypercholesterolemia      Hypertension      Hypertensive heart disease      Normal nuclear stress test 09/08/2000     No ischemia or prior infarction. Neg EKG on submax EST. Ex time 15:02. Past Surgical History:   Procedure Laterality Date    HX CHOLECYSTECTOMY   2001    HX TUBAL LIGATION   1975     Prior Level of Function/Home Situation: private residence  Home Situation  Home Environment: Private residence  # Steps to Enter: 4  One/Two Story Residence: One story  Living Alone: No  Support Systems: Family member(s), Friends \ neighbors  Patient Expects to be Discharged to[de-identified] Private residence  Current DME Used/Available at Home: Oxygen, portable, Cane, straight  Diet prior to admission: reg with thin  Current Diet:  Soft solid with thin    Cognitive and Communication Status:  Neurologic State: Alert  Orientation Level: Oriented X4  Cognition: Follows commands   Oral Assessment:  Oral Assessment  Labial: Left droop  Dentition: Edentulous  Oral Hygiene: Adequate  Lingual: Decreased rate;Decreased strength;Left deviation  Velum: No impairment  Mandible: No impairment  P.O. Trials:  Patient Position: [facility] at [facility]  Vocal quality prior to P.O.: Back tone focus; Hoarse  Consistency Presented: Thin liquid; Solid;Puree  How Presented: Self-fed/presented;Cup/sip;Spoon;Straw  Bolus Acceptance: No impairment  Bolus Formation/Control: Impaired  Type of Impairment: Delayed;Mastication  Propulsion: Delayed (# of seconds)  Oral Residue: None  Initiation of Swallow: No impairment  Laryngeal Elevation: Weak  Aspiration Signs/Symptoms: None  Pharyngeal Phase Characteristics: No impairment, issues, or problems   Effective Modifications: Small sips and bites  Cues for Modifications: Minimal     Oral Phase Severity: Mild  Pharyngeal Phase Severity : Mild     GCODESwallowing:  Swallow Current Status CI= 1-19%   Swallow Goal Status CH= 0%     The severity rating is based on the following outcomes:             Clinical Judgment     Pain:  Pt c/o 0/10 pain prior to evaluation. Pt c/o 0/10 pain post evaluation. After treatment:   [ ]            Patient left in no apparent distress sitting up in chair  [X]            Patient left in no apparent distress in bed  [X]            Call bell left within reach  [X]            Nursing notified  [ ]            Caregiver present  [ ]            Bed alarm activated      COMMUNICATION/EDUCATION:   [X]            SLP educated pt with regard to compensatory swallow strategies and                  aspiration/reflux precautions including: small bites/sips,                  alternate liquids/solids, decrease feeding rate, HOB > 45 with all po, and                             upright in bed at 30 degrees after po for at least 45 minutes. [X]            Patient/family have participated as able in goal setting and plan of care.      Thank you for this referral.     Juan Carlos Acuña M.S. CCC-SLP/L  Speech-Language Pathologist

## 2017-09-26 NOTE — ROUTINE PROCESS
Bedside shift change report given to Grabiel Sena RN (oncoming nurse) by Evelia Wright (offgoing nurse). Report included the following information SBAR, Kardex, ED Summary, Intake/Output, MAR, Accordion, Recent Results and Med Rec Status.

## 2017-09-26 NOTE — PROGRESS NOTES
[facility]  Progress Note    Patient: Sami Gaviria Age: 57 y.o. : 1955 MR#: [id number] SSN: [id number]  Date: 2017     Subjective:     RR called this am as she was found  minimally responsive this morning after stress test. No tele events, she was not hypoglycemic. Mental status gradually improved over the next 30 minutes or so, but not back to baseline. PFM resident reported there was no witness seizure activity, no evidence of tongue biting. Stress test interpreted as low risk pharmacological cardiac nuclear stress test.    Has been on gabapentin 300 mg qid. She denies chest pain, bleeding from any site, dysuria. States she's had numbness in her hands and feet for quite some time. Agrees to decrease gabapentin dose. Family at bedside states she is at baseline mental status. She ate most of her dinner. Has been on iv lasix, I/o not strictly recorded. Denies personal use of tobacco or 2nd hand smoke exposure. States as a child she had asthma and 2nd hand smoke exposure from her father. Assessment/Plan:     1- Chest Pain - stress test reassuring. 2- Acute systolic CHF exac, RB-99%  3- Uncontrolled HTN - holding parameters added to meds. Will hold iv lasix for now and monitor. Check orthostatics in am.   4- COPD without exacerbation - on dulera. 5- DM2 - lantus / ssi / ADA diet. 6- Hypokalemia in the setting of diuretic use- replete  7. Apparent episode of syncope - no further workup recommended per neurology. Hold lasix. Consider stopping hctz. Holding parameters added to bp meds. Reduce neurontin. Check orthostatics in am.   8. Mild to moderate pleural effusion, greater on the right than the left. Associated atelectatic changes in both lungs. Ics. Pt and ot  9. mild oropharyngeal dysphagia - ST recs noted. 10. Anemia normocytic normochromic  11. Peripheral neuropathy - reduce gapapentin dose. 12. dvt prophylaxis  13. full code.  Pt and ot. Case discussed with:  [x]Patient  [x]Family  [x]Nursing  []Case Management  DVT Prophylaxis:  [x]Lovenox  []Hep SQ  []SCDs  []Coumadin   []On Heparin gtt    Objective:   VS:   Visit Vitals    /79 (BP 1 Location: Right arm, BP Patient Position: At rest)    Pulse 65    Temp 97.4 °F (36.3 °C)    Resp 18    Ht 5' 9\" (1.753 m)    Wt 91.7 kg (202 lb 1.6 oz)    SpO2 98%    BMI 29.84 kg/m2      Tmax/24hrs: Temp (24hrs), Av.2 °F (36.8 °C), Min:97.4 °F (36.3 °C), Max:99.4 °F (37.4 °C)      Intake/Output Summary (Last 24 hours) at 17 1646  Last data filed at 17 0905   Gross per 24 hour   Intake              360 ml   Output              800 ml   Net             -440 ml       General:  Awake, alert and oriented x 4  [place]cat. Perrl. Cardiovascular:  S1S2+, RRR  Pulmonary: cta b.l  GI:  Soft, NT, ND nabs. Extremities:  trace edema. dp 2+ b.l  Neuro no focal deficit  Skin: no rash, no lesion. Labs:    Recent Results (from the past 24 hour(s))   GLUCOSE, POC    Collection Time: 17 10:36 PM   Result Value Ref Range    Glucose (POC) 160 (H) 70 - 110 mg/dL   NUCLEAR STRESS TEST    Collection Time: 17  7:55 AM   Result Value Ref Range    Diagnosis nondiagnostic due to baseline ecg abnormalities     Test indication Chest Discomfort     Functional capacity Could Not Be Adequately Assessed     ECG Interp. Before Exercise abnormal     ECG Interp. During Exercise none     Overall HR response to exercise appropriate     Overall BP response to exercise normal resting BP - appropriate response     Max. Systolic  mmHg    Max. Diastolic BP 83 mmHg    Max.  Heart rate 82 BPM    [facility] treadmill score      Dangelo TM score result      Peak Ex METs 1.0 METS    Protocol name LEXISCAN N/E         Known cardiac condition      Attending physician     GLUCOSE, POC    Collection Time: 17  9:23 AM   Result Value Ref Range    Glucose (POC) 92 70 - 110 mg/dL   GLUCOSE, POC    Collection Time: 09/26/17 11:16 AM   Result Value Ref Range    Glucose (POC) 164 (H) 70 - 110 mg/dL   GLUCOSE, POC    Collection Time: 09/26/17  1:33 PM   Result Value Ref Range    Glucose (POC) 189 (H) 70 - 471 mg/dL   METABOLIC PANEL, BASIC    Collection Time: 09/26/17  2:30 PM   Result Value Ref Range    Sodium 139 136 - 145 mmol/L    Potassium 3.7 3.5 - 5.5 mmol/L    Chloride 100 100 - 108 mmol/L    CO2 31 21 - 32 mmol/L    Anion gap 8 3.0 - 18 mmol/L    Glucose 174 (H) 74 - 99 mg/dL    BUN 28 (H) 7.0 - 18 MG/DL    Creatinine 1.32 (H) 0.6 - 1.3 MG/DL    BUN/Creatinine ratio 21 (H) 12 - 20      GFR est AA 49 (L) >60 ml/min/1.73m2    GFR est non-AA 41 (L) >60 ml/min/1.73m2    Calcium 8.3 (L) 8.5 - 10.1 MG/DL   CBC WITH AUTOMATED DIFF    Collection Time: 09/26/17  2:30 PM   Result Value Ref Range    WBC 4.7 4.6 - 13.2 K/uL    RBC 3.73 (L) 4.20 - 5.30 M/uL    HGB 10.2 (L) 12.0 - 16.0 g/dL    HCT 32.5 (L) 35.0 - 45.0 %    MCV 87.1 74.0 - 97.0 FL    MCH 27.3 24.0 - 34.0 PG    MCHC 31.4 31.0 - 37.0 g/dL    RDW 14.8 (H) 11.6 - 14.5 %    PLATELET 257 (L) 464 - 420 K/uL    MPV 12.0 (H) 9.2 - 11.8 FL    NEUTROPHILS 70 40 - 73 %    LYMPHOCYTES 17 (L) 21 - 52 %    MONOCYTES 7 3 - 10 %    EOSINOPHILS 6 (H) 0 - 5 %    BASOPHILS 0 0 - 2 %    ABS. NEUTROPHILS 3.3 1.8 - 8.0 K/UL    ABS. LYMPHOCYTES 0.8 (L) 0.9 - 3.6 K/UL    ABS. MONOCYTES 0.3 0.05 - 1.2 K/UL    ABS. EOSINOPHILS 0.3 0.0 - 0.4 K/UL    ABS.  BASOPHILS 0.0 0.0 - 0.06 K/UL    DF AUTOMATED     METABOLIC PANEL, COMPREHENSIVE    Collection Time: 09/26/17  2:30 PM   Result Value Ref Range    Sodium 139 136 - 145 mmol/L    Potassium 3.7 3.5 - 5.5 mmol/L    Chloride 100 100 - 108 mmol/L    CO2 32 21 - 32 mmol/L    Anion gap 7 3.0 - 18 mmol/L    Glucose 170 (H) 74 - 99 mg/dL    BUN 28 (H) 7.0 - 18 MG/DL    Creatinine 1.33 (H) 0.6 - 1.3 MG/DL    BUN/Creatinine ratio 21 (H) 12 - 20      GFR est AA 49 (L) >60 ml/min/1.73m2    GFR est non-AA 40 (L) >60 ml/min/1.73m2    Calcium 8.2 (L) 8.5 - 10.1 MG/DL    Bilirubin, total 0.2 0.2 - 1.0 MG/DL    ALT (SGPT) 14 13 - 56 U/L    AST (SGOT) 8 (L) 15 - 37 U/L    Alk.  phosphatase 124 (H) 45 - 117 U/L    Protein, total 6.5 6.4 - 8.2 g/dL    Albumin 2.5 (L) 3.4 - 5.0 g/dL    Globulin 4.0 2.0 - 4.0 g/dL    A-G Ratio 0.6 (L) 0.8 - 1.7     MAGNESIUM    Collection Time: 09/26/17  2:30 PM   Result Value Ref Range    Magnesium 2.2 1.6 - 2.6 mg/dL   AMMONIA    Collection Time: 09/26/17  2:30 PM   Result Value Ref Range    Ammonia 31 11 - 32 UMOL/L   GLUCOSE, POC    Collection Time: 09/26/17  3:56 PM   Result Value Ref Range    Glucose (POC) 166 (H) 70 - 110 mg/dL       Signed By: Juliann Frederick MD     September 26, 2017

## 2017-09-26 NOTE — PROGRESS NOTES
Pt expressed concern on am stress test. Educated pt on the benefits, risks, and her right to refuse. She stated she still wanted to think about. Will continue to educate as appropriate. [id number] pt stated understanding of stress test and agreed to continue with POC.

## 2017-09-27 ENCOUNTER — HOME HEALTH ADMISSION (OUTPATIENT)
Dept: HOME HEALTH SERVICES | Facility: HOME HEALTH | Age: 62
End: 2017-09-27

## 2017-09-27 ENCOUNTER — APPOINTMENT (OUTPATIENT)
Dept: CT IMAGING | Age: 62
DRG: 304 | End: 2017-09-27
Attending: HOSPITALIST
Payer: MEDICARE

## 2017-09-27 VITALS
SYSTOLIC BLOOD PRESSURE: 155 MMHG | BODY MASS INDEX: 31.04 KG/M2 | DIASTOLIC BLOOD PRESSURE: 71 MMHG | RESPIRATION RATE: 18 BRPM | TEMPERATURE: 97.8 F | HEART RATE: 76 BPM | OXYGEN SATURATION: 100 % | WEIGHT: 209.6 LBS | HEIGHT: 69 IN

## 2017-09-27 PROBLEM — R07.9 CHEST PAIN: Status: RESOLVED | Noted: 2017-09-22 | Resolved: 2017-09-27

## 2017-09-27 LAB
ANION GAP SERPL CALC-SCNC: 6 MMOL/L (ref 3–18)
ATRIAL RATE: 67 BPM
BASOPHILS # BLD: 0 K/UL (ref 0–0.06)
BASOPHILS NFR BLD: 0 % (ref 0–2)
BUN SERPL-MCNC: 30 MG/DL (ref 7–18)
BUN/CREAT SERPL: 21 (ref 12–20)
CALCIUM SERPL-MCNC: 8.3 MG/DL (ref 8.5–10.1)
CALCULATED P AXIS, ECG09: 19 DEGREES
CALCULATED R AXIS, ECG10: -18 DEGREES
CALCULATED T AXIS, ECG11: 110 DEGREES
CHLORIDE SERPL-SCNC: 102 MMOL/L (ref 100–108)
CHOLEST SERPL-MCNC: 104 MG/DL
CO2 SERPL-SCNC: 31 MMOL/L (ref 21–32)
CREAT SERPL-MCNC: 1.42 MG/DL (ref 0.6–1.3)
DIAGNOSIS, 93000: NORMAL
DIFFERENTIAL METHOD BLD: ABNORMAL
EOSINOPHIL # BLD: 0.4 K/UL (ref 0–0.4)
EOSINOPHIL NFR BLD: 9 % (ref 0–5)
ERYTHROCYTE [DISTWIDTH] IN BLOOD BY AUTOMATED COUNT: 14.9 % (ref 11.6–14.5)
EST. AVERAGE GLUCOSE BLD GHB EST-MCNC: 183 MG/DL
GLUCOSE BLD STRIP.AUTO-MCNC: 126 MG/DL (ref 70–110)
GLUCOSE BLD STRIP.AUTO-MCNC: 70 MG/DL (ref 70–110)
GLUCOSE BLD STRIP.AUTO-MCNC: 98 MG/DL (ref 70–110)
GLUCOSE SERPL-MCNC: 112 MG/DL (ref 74–99)
HBA1C MFR BLD: 8 % (ref 4.2–5.6)
HCT VFR BLD AUTO: 29.6 % (ref 35–45)
HDLC SERPL-MCNC: 35 MG/DL (ref 40–60)
HDLC SERPL: 3 {RATIO} (ref 0–5)
HGB BLD-MCNC: 9.4 G/DL (ref 12–16)
LDLC SERPL CALC-MCNC: 44.6 MG/DL (ref 0–100)
LIPID PROFILE,FLP: ABNORMAL
LYMPHOCYTES # BLD: 1 K/UL (ref 0.9–3.6)
LYMPHOCYTES NFR BLD: 22 % (ref 21–52)
MAGNESIUM SERPL-MCNC: 2 MG/DL (ref 1.6–2.6)
MCH RBC QN AUTO: 27.5 PG (ref 24–34)
MCHC RBC AUTO-ENTMCNC: 31.8 G/DL (ref 31–37)
MCV RBC AUTO: 86.5 FL (ref 74–97)
MONOCYTES # BLD: 0.4 K/UL (ref 0.05–1.2)
MONOCYTES NFR BLD: 8 % (ref 3–10)
NEUTS SEG # BLD: 2.9 K/UL (ref 1.8–8)
NEUTS SEG NFR BLD: 61 % (ref 40–73)
P-R INTERVAL, ECG05: 188 MS
PLATELET # BLD AUTO: 133 K/UL (ref 135–420)
PMV BLD AUTO: 11.8 FL (ref 9.2–11.8)
POTASSIUM SERPL-SCNC: 3.3 MMOL/L (ref 3.5–5.5)
Q-T INTERVAL, ECG07: 452 MS
QRS DURATION, ECG06: 104 MS
QTC CALCULATION (BEZET), ECG08: 477 MS
RBC # BLD AUTO: 3.42 M/UL (ref 4.2–5.3)
SODIUM SERPL-SCNC: 139 MMOL/L (ref 136–145)
TRIGL SERPL-MCNC: 122 MG/DL (ref ?–150)
VENTRICULAR RATE, ECG03: 67 BPM
VLDLC SERPL CALC-MCNC: 24.4 MG/DL
WBC # BLD AUTO: 4.8 K/UL (ref 4.6–13.2)

## 2017-09-27 PROCEDURE — 97116 GAIT TRAINING THERAPY: CPT

## 2017-09-27 PROCEDURE — 83036 HEMOGLOBIN GLYCOSYLATED A1C: CPT | Performed by: FAMILY MEDICINE

## 2017-09-27 PROCEDURE — 85025 COMPLETE CBC W/AUTO DIFF WBC: CPT | Performed by: FAMILY MEDICINE

## 2017-09-27 PROCEDURE — 74011636637 HC RX REV CODE- 636/637: Performed by: INTERNAL MEDICINE

## 2017-09-27 PROCEDURE — 74011250636 HC RX REV CODE- 250/636: Performed by: INTERNAL MEDICINE

## 2017-09-27 PROCEDURE — 74011250637 HC RX REV CODE- 250/637: Performed by: HOSPITALIST

## 2017-09-27 PROCEDURE — 36415 COLL VENOUS BLD VENIPUNCTURE: CPT | Performed by: FAMILY MEDICINE

## 2017-09-27 PROCEDURE — 74011250637 HC RX REV CODE- 250/637: Performed by: INTERNAL MEDICINE

## 2017-09-27 PROCEDURE — 74011250637 HC RX REV CODE- 250/637: Performed by: PHYSICIAN ASSISTANT

## 2017-09-27 PROCEDURE — 70486 CT MAXILLOFACIAL W/O DYE: CPT

## 2017-09-27 PROCEDURE — 80048 BASIC METABOLIC PNL TOTAL CA: CPT | Performed by: FAMILY MEDICINE

## 2017-09-27 PROCEDURE — 80061 LIPID PANEL: CPT | Performed by: FAMILY MEDICINE

## 2017-09-27 PROCEDURE — 82962 GLUCOSE BLOOD TEST: CPT

## 2017-09-27 PROCEDURE — 92526 ORAL FUNCTION THERAPY: CPT

## 2017-09-27 PROCEDURE — 83735 ASSAY OF MAGNESIUM: CPT | Performed by: FAMILY MEDICINE

## 2017-09-27 RX ORDER — HYDROCHLOROTHIAZIDE 25 MG/1
25 TABLET ORAL DAILY
Status: DISCONTINUED | OUTPATIENT
Start: 2017-09-28 | End: 2017-09-27 | Stop reason: HOSPADM

## 2017-09-27 RX ORDER — VALSARTAN 160 MG/1
160 TABLET ORAL DAILY
Qty: 30 TAB | Refills: 2 | Status: SHIPPED | OUTPATIENT
Start: 2017-09-28 | End: 2018-01-26 | Stop reason: ALTCHOICE

## 2017-09-27 RX ORDER — GABAPENTIN 100 MG/1
CAPSULE ORAL
Qty: 150 CAP | Refills: 2 | Status: SHIPPED | OUTPATIENT
Start: 2017-09-27 | End: 2018-06-21 | Stop reason: SDUPTHER

## 2017-09-27 RX ORDER — FLUTICASONE PROPIONATE 50 MCG
2 SPRAY, SUSPENSION (ML) NASAL DAILY
Qty: 1 BOTTLE | Refills: 1 | Status: SHIPPED | OUTPATIENT
Start: 2017-09-27 | End: 2019-02-01

## 2017-09-27 RX ORDER — ISOSORBIDE DINITRATE 10 MG/1
10 TABLET ORAL 3 TIMES DAILY
Qty: 90 TAB | Refills: 2 | Status: ON HOLD | OUTPATIENT
Start: 2017-09-27 | End: 2018-05-12

## 2017-09-27 RX ORDER — ACETAMINOPHEN 500 MG
500 TABLET ORAL
Status: DISCONTINUED | OUTPATIENT
Start: 2017-09-27 | End: 2017-09-27 | Stop reason: HOSPADM

## 2017-09-27 RX ORDER — POTASSIUM CHLORIDE 20 MEQ/1
20 TABLET, EXTENDED RELEASE ORAL
Status: COMPLETED | OUTPATIENT
Start: 2017-09-27 | End: 2017-09-27

## 2017-09-27 RX ORDER — VALSARTAN 160 MG/1
160 TABLET ORAL DAILY
Status: DISCONTINUED | OUTPATIENT
Start: 2017-09-28 | End: 2017-09-27 | Stop reason: HOSPADM

## 2017-09-27 RX ORDER — FLUTICASONE PROPIONATE 50 MCG
2 SPRAY, SUSPENSION (ML) NASAL DAILY
Status: DISCONTINUED | OUTPATIENT
Start: 2017-09-27 | End: 2017-09-27 | Stop reason: HOSPADM

## 2017-09-27 RX ORDER — HYDRALAZINE HYDROCHLORIDE 25 MG/1
25 TABLET, FILM COATED ORAL 3 TIMES DAILY
Qty: 90 TAB | Refills: 2 | Status: SHIPPED | OUTPATIENT
Start: 2017-09-27 | End: 2017-11-03 | Stop reason: CLARIF

## 2017-09-27 RX ADMIN — ISOSORBIDE DINITRATE 10 MG: 10 TABLET ORAL at 17:05

## 2017-09-27 RX ADMIN — GABAPENTIN 100 MG: 100 CAPSULE ORAL at 16:18

## 2017-09-27 RX ADMIN — ENOXAPARIN SODIUM 40 MG: 40 INJECTION SUBCUTANEOUS at 06:18

## 2017-09-27 RX ADMIN — INSULIN GLARGINE 45 UNITS: 100 INJECTION, SOLUTION SUBCUTANEOUS at 09:52

## 2017-09-27 RX ADMIN — INSULIN LISPRO 5 UNITS: 100 INJECTION, SOLUTION INTRAVENOUS; SUBCUTANEOUS at 09:53

## 2017-09-27 RX ADMIN — ACETAMINOPHEN 500 MG: 500 TABLET ORAL at 11:30

## 2017-09-27 RX ADMIN — ISOSORBIDE DINITRATE 10 MG: 10 TABLET ORAL at 09:42

## 2017-09-27 RX ADMIN — CARVEDILOL 25 MG: 25 TABLET, FILM COATED ORAL at 09:37

## 2017-09-27 RX ADMIN — GABAPENTIN 100 MG: 100 CAPSULE ORAL at 09:43

## 2017-09-27 RX ADMIN — CLONIDINE HYDROCHLORIDE 0.1 MG: 0.1 TABLET ORAL at 09:43

## 2017-09-27 RX ADMIN — SPIRONOLACTONE 12.5 MG: 25 TABLET, FILM COATED ORAL at 09:41

## 2017-09-27 RX ADMIN — POTASSIUM CHLORIDE 20 MEQ: 20 TABLET, EXTENDED RELEASE ORAL at 13:05

## 2017-09-27 RX ADMIN — ASPIRIN 81 MG: 81 TABLET, COATED ORAL at 09:42

## 2017-09-27 RX ADMIN — FLUTICASONE PROPIONATE 2 SPRAY: 50 SPRAY, METERED NASAL at 16:14

## 2017-09-27 RX ADMIN — HYDRALAZINE HYDROCHLORIDE 25 MG: 25 TABLET, FILM COATED ORAL at 17:03

## 2017-09-27 RX ADMIN — CARVEDILOL 25 MG: 25 TABLET, FILM COATED ORAL at 16:20

## 2017-09-27 RX ADMIN — Medication 10 ML: at 06:18

## 2017-09-27 RX ADMIN — Medication 2 SPRAY: at 16:16

## 2017-09-27 RX ADMIN — Medication 10 ML: at 06:19

## 2017-09-27 RX ADMIN — HYDRALAZINE HYDROCHLORIDE 25 MG: 25 TABLET, FILM COATED ORAL at 09:42

## 2017-09-27 RX ADMIN — Medication 10 ML: at 14:00

## 2017-09-27 NOTE — PROGRESS NOTES
New PT orders seen and acknowledged. Pt evaluated 9/26/17, and currently on PT caseload.  Thank you for your referral.    Stephen Shea, PTA

## 2017-09-27 NOTE — DISCHARGE SUMMARY
Discharge Summary    Patient: Yefri Jama MRN: [id number]  CSN: [id number]    YOB: 1955  Age: 57 y.o. Sex: female    DOA: 9/22/2017 LOS:  LOS: 5 days   Discharge Date:      Admission Diagnoses: Chest pain    Discharge Diagnoses:    Problem List as of 9/27/2017  Date Reviewed: 2/3/2014          Codes Class Noted - Resolved    Pleural effusion ICD-10-CM: J90  ICD-9-CM: 511.9  4/30/2014 - Present        Cervical myelopathy ([facility] ICD-10-CM: G95.9  ICD-9-CM: 721.1  2/3/2014 - Present        Eczema (Chronic) ICD-10-CM: L30.9  ICD-9-CM: 692.9  2/3/2014 - Present        Asthma (Chronic) ICD-10-CM: J45.909  ICD-9-CM: 493.90  2/3/2014 - Present        CHF (congestive heart failure) (HCC) (Chronic) ICD-10-CM: I50.9  ICD-9-CM: 428.0  2/3/2014 - Present        Bladder prolapse, female, acquired (Chronic) ICD-10-CM: N81.10  ICD-9-CM: 618.01  2/3/2014 - Present        IBS (irritable bowel syndrome) (Chronic) ICD-10-CM: K58.9  ICD-9-CM: 564.1  2/3/2014 - Present        Osteoporosis (Chronic) ICD-10-CM: M81.0  ICD-9-CM: 733.00  2/3/2014 - Present        Migraine (Chronic) ICD-10-CM: V64.485  ICD-9-CM: 346.90  2/3/2014 - Present        Anxiety and depression (Chronic) ICD-10-CM: F41.9, F32.9  ICD-9-CM: 300.00, 311  2/3/2014 - Present        Essential hypertension, benign ICD-10-CM: I10  ICD-9-CM: 401.1  1/27/2012 - Present        Diabetes mellitus ([facility] ICD-10-CM: E11.9  ICD-9-CM: 250.00  1/27/2012 - Present        Other and unspecified hyperlipidemia ICD-10-CM: E78.5  ICD-9-CM: 272.4  1/27/2012 - Present        RESOLVED: Chest pain ICD-10-CM: R07.9  ICD-9-CM: 786.50  9/22/2017 - 9/27/2017            Reason for Admission  57 y.o [facility] American female with PMHx significant for HTN, DM2, dyslipidemia, CHF and pleural effusion   who presented to [facility] c/o central chest pain associated with cough productive of yellowish sputum , SOB and nausea for 4 days.  She stated that she didn't take her BP pills on the day of presentation and her BP was found to be elevated at 200/117 and she was given labetalol and NG with improved BP and improved chest pain. At our interview, she was chest pain free. Her troponin was mildly elevated and cardiology was called from [facility] ER and she was transferred to [facility] telemetry floor for admission. CXR showed bilateral pleural effusion and  CTA was -ve for PE. Discharge Condition: Good    PHYSICAL EXAM at discharge. Visit Vitals    /76 (BP 1 Location: Right arm, BP Patient Position: Sitting)    Pulse 74    Temp 97.9 °F (36.6 °C)    Resp 19    Ht 5' 9\" (1.753 m)    Wt 95.1 kg (209 lb 9.6 oz)    SpO2 96%    BMI 30.95 kg/m2     General:  Awake, alert and oriented x 4  [place]. Perrl. Cardiovascular:  S1S2+, RRR  Pulmonary: cta b.l  GI:  Soft, NT, ND nabs. Extremities:  trace edema. dp 2+ b.l  Neuro no focal deficit  Skin: no rash, no lesion. Hospital Course:   1. Chest Pain - stress test reassuring. No further cardiac workup recommended per cardiology. 2. Acute on chronic systolic CHF - NE-83%  3. Hypertensive urgency at admission improved - diovan increased per cards. imdur and hydralazine added. Clonidine and hctz stopped. 4. COPD without exacerbation - on dulera. 5- DM2 -A1c 8.0. lantus / ssi / ADA diet. 6- Hypokalemia in the setting of diuretic use- repleted. 7. Apparent episode of syncope in the setting of acute drop in bp - no further workup recommended per neurology or cardiology. Lasix held and hctz stoped. Holding parameters added to bp meds. neurontin dose reduced. She was not orthostatic. 8. Mild to moderate pleural effusion, greater on the right than the left. Associated atelectatic changes in both lungs. Ics.   9. mild oropharyngeal dysphagia - ST recs Soft solid diet with thin liquids. Aspiration precautions. HOB >45 during po intake, remain >30 for 30-45 minutes after po. Small bites/sips; alternate liquid/solid with slow feeding rate. Oral care TID. Meds per pt preference. 10. Anemia normocytic normochromic  11. Peripheral neuropathy - gapapentin dose reduced. See #7. 12. Chronic sinusitis - CT sinuses result was obtained after patient left the hospital, and 3-week abx course was called into pharmacy, I spoke with her over the phone. outpatient f/u with Dr Nicolas Rosenberg. 13. dvt prophylaxis was given in the form of lovenox subcut daily  14. full code. Discharge to home with hh. Patient and family agree; all questions answered to the best of my ability     Consults: Cardiology Dr. Skylar Logan  Neurology Dr. Wang Dus  teleneurology Dr. Bishop Pruitt    Significant Diagnostic Studies: labs:   Recent Results (from the past 24 hour(s))   GLUCOSE, POC    Collection Time: 09/26/17  1:33 PM   Result Value Ref Range    Glucose (POC) 189 (H) 70 - 048 mg/dL   METABOLIC PANEL, BASIC    Collection Time: 09/26/17  2:30 PM   Result Value Ref Range    Sodium 139 136 - 145 mmol/L    Potassium 3.7 3.5 - 5.5 mmol/L    Chloride 100 100 - 108 mmol/L    CO2 31 21 - 32 mmol/L    Anion gap 8 3.0 - 18 mmol/L    Glucose 174 (H) 74 - 99 mg/dL    BUN 28 (H) 7.0 - 18 MG/DL    Creatinine 1.32 (H) 0.6 - 1.3 MG/DL    BUN/Creatinine ratio 21 (H) 12 - 20      GFR est AA 49 (L) >60 ml/min/1.73m2    GFR est non-AA 41 (L) >60 ml/min/1.73m2    Calcium 8.3 (L) 8.5 - 10.1 MG/DL   CBC WITH AUTOMATED DIFF    Collection Time: 09/26/17  2:30 PM   Result Value Ref Range    WBC 4.7 4.6 - 13.2 K/uL    RBC 3.73 (L) 4.20 - 5.30 M/uL    HGB 10.2 (L) 12.0 - 16.0 g/dL    HCT 32.5 (L) 35.0 - 45.0 %    MCV 87.1 74.0 - 97.0 FL    MCH 27.3 24.0 - 34.0 PG    MCHC 31.4 31.0 - 37.0 g/dL    RDW 14.8 (H) 11.6 - 14.5 %    PLATELET 009 (L) 129 - 420 K/uL    MPV 12.0 (H) 9.2 - 11.8 FL    NEUTROPHILS 70 40 - 73 %    LYMPHOCYTES 17 (L) 21 - 52 %    MONOCYTES 7 3 - 10 %    EOSINOPHILS 6 (H) 0 - 5 %    BASOPHILS 0 0 - 2 %    ABS. NEUTROPHILS 3.3 1.8 - 8.0 K/UL    ABS. LYMPHOCYTES 0.8 (L) 0.9 - 3.6 K/UL    ABS.  MONOCYTES 0.3 0.05 - 1.2 K/UL    ABS. EOSINOPHILS 0.3 0.0 - 0.4 K/UL    ABS. BASOPHILS 0.0 0.0 - 0.06 K/UL    DF AUTOMATED     METABOLIC PANEL, COMPREHENSIVE    Collection Time: 09/26/17  2:30 PM   Result Value Ref Range    Sodium 139 136 - 145 mmol/L    Potassium 3.7 3.5 - 5.5 mmol/L    Chloride 100 100 - 108 mmol/L    CO2 32 21 - 32 mmol/L    Anion gap 7 3.0 - 18 mmol/L    Glucose 170 (H) 74 - 99 mg/dL    BUN 28 (H) 7.0 - 18 MG/DL    Creatinine 1.33 (H) 0.6 - 1.3 MG/DL    BUN/Creatinine ratio 21 (H) 12 - 20      GFR est AA 49 (L) >60 ml/min/1.73m2    GFR est non-AA 40 (L) >60 ml/min/1.73m2    Calcium 8.2 (L) 8.5 - 10.1 MG/DL    Bilirubin, total 0.2 0.2 - 1.0 MG/DL    ALT (SGPT) 14 13 - 56 U/L    AST (SGOT) 8 (L) 15 - 37 U/L    Alk.  phosphatase 124 (H) 45 - 117 U/L    Protein, total 6.5 6.4 - 8.2 g/dL    Albumin 2.5 (L) 3.4 - 5.0 g/dL    Globulin 4.0 2.0 - 4.0 g/dL    A-G Ratio 0.6 (L) 0.8 - 1.7     MAGNESIUM    Collection Time: 09/26/17  2:30 PM   Result Value Ref Range    Magnesium 2.2 1.6 - 2.6 mg/dL   AMMONIA    Collection Time: 09/26/17  2:30 PM   Result Value Ref Range    Ammonia 31 11 - 32 UMOL/L   GLUCOSE, POC    Collection Time: 09/26/17  3:56 PM   Result Value Ref Range    Glucose (POC) 166 (H) 70 - 110 mg/dL   GLUCOSE, POC    Collection Time: 09/26/17  9:59 PM   Result Value Ref Range    Glucose (POC) 193 (H) 70 - 110 mg/dL   LIPID PANEL    Collection Time: 09/27/17  4:15 AM   Result Value Ref Range    LIPID PROFILE          Cholesterol, total 104 <200 MG/DL    Triglyceride 122 <150 MG/DL    HDL Cholesterol 35 (L) 40 - 60 MG/DL    LDL, calculated 44.6 0 - 100 MG/DL    VLDL, calculated 24.4 MG/DL    CHOL/HDL Ratio 3.0 0 - 5.0     HEMOGLOBIN A1C WITH EAG    Collection Time: 09/27/17  4:15 AM   Result Value Ref Range    Hemoglobin A1c 8.0 (H) 4.2 - 5.6 %    Est. average glucose 183 mg/dL   CBC WITH AUTOMATED DIFF    Collection Time: 09/27/17  4:15 AM   Result Value Ref Range    WBC 4.8 4.6 - 13.2 K/uL    RBC 3.42 (L) 4.20 - 5.30 M/uL    HGB 9.4 (L) 12.0 - 16.0 g/dL    HCT 29.6 (L) 35.0 - 45.0 %    MCV 86.5 74.0 - 97.0 FL    MCH 27.5 24.0 - 34.0 PG    MCHC 31.8 31.0 - 37.0 g/dL    RDW 14.9 (H) 11.6 - 14.5 %    PLATELET 604 (L) 338 - 420 K/uL    MPV 11.8 9.2 - 11.8 FL    NEUTROPHILS 61 40 - 73 %    LYMPHOCYTES 22 21 - 52 %    MONOCYTES 8 3 - 10 %    EOSINOPHILS 9 (H) 0 - 5 %    BASOPHILS 0 0 - 2 %    ABS. NEUTROPHILS 2.9 1.8 - 8.0 K/UL    ABS. LYMPHOCYTES 1.0 0.9 - 3.6 K/UL    ABS. MONOCYTES 0.4 0.05 - 1.2 K/UL    ABS. EOSINOPHILS 0.4 0.0 - 0.4 K/UL    ABS. BASOPHILS 0.0 0.0 - 0.06 K/UL    DF AUTOMATED     MAGNESIUM    Collection Time: 09/27/17  4:15 AM   Result Value Ref Range    Magnesium 2.0 1.6 - 2.6 mg/dL   METABOLIC PANEL, BASIC    Collection Time: 09/27/17  4:15 AM   Result Value Ref Range    Sodium 139 136 - 145 mmol/L    Potassium 3.3 (L) 3.5 - 5.5 mmol/L    Chloride 102 100 - 108 mmol/L    CO2 31 21 - 32 mmol/L    Anion gap 6 3.0 - 18 mmol/L    Glucose 112 (H) 74 - 99 mg/dL    BUN 30 (H) 7.0 - 18 MG/DL    Creatinine 1.42 (H) 0.6 - 1.3 MG/DL    BUN/Creatinine ratio 21 (H) 12 - 20      GFR est AA 45 (L) >60 ml/min/1.73m2    GFR est non-AA 37 (L) >60 ml/min/1.73m2    Calcium 8.3 (L) 8.5 - 10.1 MG/DL   GLUCOSE, POC    Collection Time: 09/27/17  6:40 AM   Result Value Ref Range    Glucose (POC) 70 70 - 110 mg/dL   GLUCOSE, POC    Collection Time: 09/27/17 11:59 AM   Result Value Ref Range    Glucose (POC) 98 70 - 110 mg/dL     IMAGING  XR Results (most recent):    Results from Hospital Encounter encounter on 09/22/17   XR CHEST PA LAT   Narrative Procedure:  Chest PA and Lateral.    Indication:  Chest pain, shortness of breath, productive cough. Comparison:  7/22/15. Findings: The cardiac silhouette is moderate to prominently enlarged. New left  costophrenic angle blunting is observed with left lower lung zone opacities  obscuring the left hemidiaphragmatic outline.   New streaky densities are also  identified in the right lung base.  No pneumothorax. Impression Impression:    1. Increased cardiac silhouette size. 2.  Bilateral pleural effusion with associated atelectatic changes. Concurrent/superimposed infiltrate cannot excluded on either side. CT Results (most recent):    Results from Hospital Encounter encounter on 09/22/17   CT HEAD WO CONT   Narrative  CT scan of head, without contrast:        HISTORY:[The patient has been unresponsive. Slurred speech. CODE S. History of diabetes, hypertension, hypercholesterolemia, hypertensive heart  disease. . TECHNIQUE:    Contiguous 5 mm thick axial sections of brain have been obtained without  intravenous contrast.      [2-D coronal and sagittal images reformatted.]     The study has been performed utilizing appropriate radiation dose reduction  technique according to specification of the scanner, with modification of MAA/KV  and appropriate collimation adjusted to patient's body habitus. .[  COMPARISON STUDY: [None]      -------------------------------------      FINDINGS:        Intracranial hemorrhage :[None.]    Intracranial mass lesion:[ None.]    Midline shift: [None.]    Cerebral cortical atrophy:[ None]. Cerebral central atrophy:[ None.]    Chronic microvascular ischemic changes/aging changes in white matter:[ None.]    Cerebral infarction:    In the upper portion of right thalamus there is a lacunar infarction, which  measures 9 mm. This lacunar infarction is considerably hypodense and therefore  not certain whether this might be recent or old infarction. No obvious hypodense  edema around this lacunar infarction. In rest of both cerebral hemispheres, basal ganglia structures, brainstem and  cerebellum, there is no additional focal infarction identified    basal ganglia structures of both sides:[ No diagnostic finding.]    Brainstem:[ No diagnostic  finding]    Cerebellum: No diagnostic finding.     Calvarium and base of skull:[ No fracture or focal lesion]. In visualized portions of both orbits:[ No diagnostic finding.]    In visualized portions of paranasal sinuses:[ No diagnostic finding.]    In visualized base of l skull:[ No diagnostic finding.]    -------------------------------------------       Impression IMPRESSION:    No evidence of intracranial hemorrhage. At the upper aspect of right thalamus there is a lacunar infarction, which is  considerably hypodense. It is not certain whether this lacunar infarction is  recent or old. No other focal abnormality in brain. The findings have been reported by me to Dr. Joseline Coker, at 1141 hours on 2017.       EKG Results     Procedure 720 Value Units Date/Time    SCANNED CARDIAC RHYTHM STRIP [[id number]] Collected:  17 0858    Order Status:  Completed Updated:  17 1031    EKG, 12 LEAD, SUBSEQUENT [[id number]] Collected:  17 1120    Order Status:  Completed Updated:  17 0752     Ventricular Rate 67 BPM      Atrial Rate 67 BPM      P-R Interval 188 ms      QRS Duration 104 ms      Q-T Interval 452 ms      QTC Calculation (Bezet) 477 ms      Calculated P Axis 19 degrees      Calculated R Axis -18 degrees      Calculated T Axis 110 degrees      Diagnosis --     Normal sinus rhythm  Possible Left atrial enlargement  Left ventricular hypertrophy with repolarization abnormality  Cannot rule out Septal infarct (cited on or before 2014)  Abnormal ECG  When compared with ECG of 26-SEP-2017 07:55,  Nonspecific T wave abnormality no longer evident in Inferior leads  Confirmed by Rc Mojica MD, Nancy Bradford ([id number]) on 2017 7:52:23 AM      CARDIAC SPECT REST AND STRESS [[id number]] Collected:  17 0000    Order Status:  Completed Updated:  17 1515    Narrative:       [address] MEDICINE CARDIAC STRESS    Name:  Loren Lozada  MR#:  [id number]  :  1955  Account #:  [de-identified]  Date of Adm:  2017  Date of Service: 09/26/2017      PROCEDURE: Pharmacological cardiac nuclear stress test.    INDICATIONS: Chest pain. BASELINE ELECTROCARDIOGRAM: Sinus rhythm. Possible old  septal MI. Inferolateral ST segment and T-wave abnormality. PROTOCOL: The patient was given a Lexiscan infusion through right  antecubital IV; 10 mCi of sestamibi was injected before rest and 32  mCi of sestamibi was injected before stress. Gated processing was  performed. Resting blood pressure 155/83 mmHg, decreased to  113/59 mmHg. No symptoms during the test.    ELECTROCARDIOGRAM FINDINGS: No arrhythmias. No changes  beyond baseline. NUCLEAR FINDINGS: Left ventricular systolic function is low normal,  calculated at 53%. No evidence of transient ischemic dilatation or  regional wall motion abnormalities. There is patchy radiotracer uptake  during stress without obvious ischemia or previous infarct. Impression:       1. Low risk pharmacological cardiac nuclear stress test.  2. Low-normal left ventricular systolic function calculated at 53%. No  regional wall motion abnormalities. 3. No obvious ischemia or previous infarction based on perfusion  imaging. LUNA Felton Junior / Elena Decker  D:  09/26/2017   13:23  T:  09/26/2017   13:41  Job #:  [id number]      NUCLEAR STRESS TEST [[id number]] Collected:  09/26/17 0755    Order Status:  Completed Updated:  09/26/17 1349     Diagnosis nondiagnostic due to baseline ecg abnormalities     Test indication Chest Discomfort     Functional capacity Could Not Be Adequately Assessed     ECG Interp. Before Exercise abnormal     ECG Interp. During Exercise none     Overall HR response to exercise appropriate     Overall BP response to exercise normal resting BP - appropriate response     Max. Systolic  mmHg      Max. Diastolic BP 83 mmHg      Max.  Heart rate 82 BPM      Dangelo treadmill score --     [facility] TM score result --     Peak Ex METs 1.0 METS      Protocol name LEXISCAN N/E         Known cardiac condition --     Attending physician --    NUCLEAR STRESS TEST [[id number]]     Order Status:  Canceled     CARDIAC SPECT REST AND STRESS [[id number]]     Order Status:  Canceled     SCANNED CARDIAC RHYTHM STRIP [[id number]] Collected:  09/25/17 0843    Order Status:  Completed Updated:  09/25/17 0940    EKG, 12 LEAD, SUBSEQUENT [[id number]] Collected:  09/22/17 1636    Order Status:  Completed Updated:  09/23/17 1331     Ventricular Rate 70 BPM      Atrial Rate 70 BPM      P-R Interval 180 ms      QRS Duration 96 ms      Q-T Interval 434 ms      QTC Calculation (Bezet) 468 ms      Calculated P Axis 29 degrees      Calculated R Axis -30 degrees      Calculated T Axis 114 degrees      Diagnosis --     Normal sinus rhythm  Possible Left atrial enlargement  Left axis deviation  Septal infarct (cited on or before 01-FEB-2014)  Abnormal ECG  When compared with ECG of 22-SEP-2017 14:22,  No significant change was found  Confirmed by Swati Mercado MD ([id number]) on 9/23/2017 1:31:34 PM      EKG, 12 LEAD, INITIAL [[id number]] Collected:  09/22/17 1421    Order Status:  Completed Updated:  09/23/17 1325     Ventricular Rate 90 BPM      Atrial Rate 90 BPM      P-R Interval 164 ms      QRS Duration 104 ms      Q-T Interval 386 ms      QTC Calculation (Bezet) 472 ms      Calculated P Axis 40 degrees      Calculated R Axis -36 degrees      Calculated T Axis 119 degrees      Diagnosis --     Normal sinus rhythm  Possible Left atrial enlargement  Left anterior fascicular block  Non-specific ST/T wave changes  Cannot rule out Septal infarct (cited on or before 01-FEB-2014)  Abnormal ECG  When compared with ECG of 01-FEB-2014 10:11,  No significant change was found    Confirmed by Swati Mercado MD ([id number]) on 9/23/2017 1:24:55 PM          Discharge Medications:     Current Discharge Medication List      START taking these medications    Details   fluticasone (FLONASE) 50 mcg/actuation nasal spray 2 Sprays by Both Nostrils route daily.  Qty: 1 Bottle, Refills: 1      hydrALAZINE (APRESOLINE) 25 mg tablet Take 1 Tab by mouth three (3) times daily. Qty: 90 Tab, Refills: 2      isosorbide dinitrate (ISORDIL) 10 mg tablet Take 1 Tab by mouth three (3) times daily. Qty: 90 Tab, Refills: 2      sodium chloride (OCEAN) 0.65 % nasal spray 2 Sprays by Both Nostrils route every two (2) hours as needed. Qty: 30 mL, Refills: 0      valsartan (DIOVAN) 160 mg tablet Take 1 Tab by mouth daily. Qty: 30 Tab, Refills: 2         CONTINUE these medications which have CHANGED    Details   gabapentin (NEURONTIN) 100 mg capsule 1 cap po twice daily with breakfast and dinner, and 3 tabs po qHS  Qty: 150 Cap, Refills: 2         CONTINUE these medications which have NOT CHANGED    Details   carvedilol (COREG) 25 mg tablet Take 25 mg by mouth two (2) times daily (with meals). aspirin delayed-release 81 mg tablet Take 81 mg by mouth daily. pravastatin (PRAVACHOL) 20 mg tablet Take 20 mg by mouth nightly. spironolactone (ALDACTONE) 25 mg tablet Take 12.5 mg by mouth daily. mometasone-formoterol (DULERA) 200-5 mcg/actuation HFA inhaler Take 2 Puffs by inhalation two (2) times a day. insulin glargine (LANTUS) 100 unit/mL injection 45 Units by SubCUTAneous route daily. 15 units in the evening. STOP taking these medications       cloNIDine HCl (CATAPRES) 0.2 mg tablet Comments:   Reason for Stopping:         ofloxacin (FLOXIN) 0.3 % ophthalmic solution Comments:   Reason for Stopping:         valsartan-hydroCHLOROthiazide (DIOVAN-HCT) 160-25 mg per tablet Comments:   Reason for Stopping:               Activity: PT/OT per Home Health    Diet: Diabetic Diet, soft solids with thin liquids. Wound Care: None needed    Follow-up:   1. Dr. Giovanny Ba 7 - 10 days  2. Dr. Marla Hoover 2 weeks. 3. Dr. Lona Flores 3 weeks dx: sinus polyps.     Minutes spent on discharge: greater than 30

## 2017-09-27 NOTE — NURSE NAVIGATOR
Nurse Navigator Note       NAME: Shannon Noble AGE: 57 y.o.   GENDER: female  DATE: 9/27/2017    Date of  Admission: 9/22/2017  2:10 PM     Admission type:Emergency      [address] MD    Current Facility-Administered Medications   Medication Dose Route Frequency    acetaminophen (TYLENOL) tablet 500 mg  500 mg Oral Q6H PRN    [START ON 9/28/2017] valsartan (DIOVAN) tablet 160 mg  160 mg Oral DAILY    And    [START ON 9/28/2017] hydroCHLOROthiazide (HYDRODIURIL) tablet 25 mg  25 mg Oral DAILY    sodium chloride (OCEAN) 0.65 % nasal spray 2 Spray  2 Spray Both Nostrils Q2H PRN    fluticasone (FLONASE) 50 mcg/actuation nasal spray 2 Spray  2 Spray Both Nostrils DAILY    sodium chloride (NS) flush 5-10 mL  5-10 mL IntraVENous Q8H    sodium chloride (NS) flush 5-10 mL  5-10 mL IntraVENous PRN    gabapentin (NEURONTIN) capsule 300 mg  300 mg Oral QHS    gabapentin (NEURONTIN) capsule 100 mg  100 mg Oral BID WITH MEALS    hydrALAZINE (APRESOLINE) tablet 25 mg  25 mg Oral TID    isosorbide dinitrate (ISORDIL) tablet 10 mg  10 mg Oral TID    influenza vaccine 2017-18 (3 yrs+)(PF) (FLUZONE QUAD/FLUARIX QUAD) injection 0.5 mL  0.5 mL IntraMUSCular PRIOR TO DISCHARGE    aspirin delayed-release tablet 81 mg  81 mg Oral DAILY    carvedilol (COREG) tablet 25 mg  25 mg Oral BID WITH MEALS    insulin glargine (LANTUS) injection 45 Units  45 Units SubCUTAneous DAILY    pravastatin (PRAVACHOL) tablet 20 mg  20 mg Oral QHS    spironolactone (ALDACTONE) tablet 12.5 mg  12.5 mg Oral DAILY    ondansetron (ZOFRAN) injection 4 mg  4 mg IntraVENous Q6H PRN    sodium chloride (NS) flush 5-10 mL  5-10 mL IntraVENous Q8H    sodium chloride (NS) flush 5-10 mL  5-10 mL IntraVENous PRN    enoxaparin (LOVENOX) injection 40 mg  40 mg SubCUTAneous Q24H    insulin glargine (LANTUS) injection 20 Units  20 Units SubCUTAneous QHS    insulin lispro (HUMALOG) injection   SubCUTAneous AC&HS    glucose chewable tablet 16 g  4 Tab Oral PRN    glucagon (GLUCAGEN) injection 1 mg  1 mg IntraMUSCular PRN    dextrose (D50) infusion 12.5-25 g  25-50 mL IntraVENous PRN    nitroglycerin (NITROSTAT) tablet 0.4 mg  0.4 mg SubLINGual PRN        Patients Smoking status: @DABS(5959      []  Home O2     Current Oxygen therapy O2 Device: Nasal cannula   O2 Sat (%): 96 % Resp Rate: 19        Received Flu Vaccine for Current Season (usually Sept-March): No                       Discharge Dispostion: home  [x] Cardiac Rehab              [] Home Health                                                         [x] [facility]                          [] Outpatient sleep study  [] Other:       Education reinforced, patient gave verbal consent for [facility], [address]  notified, patient given opportunity to ask questions and reminded Navigator will call post discharge. Patient and/or family informed to follow up with Primary Care Provider within 7 days post discharge.      Delma Khanna RN

## 2017-09-27 NOTE — PROGRESS NOTES
Problem: Mobility Impaired (Adult and Pediatric)  Goal: *Acute Goals and Plan of Care (Insert Text)  Physical Therapy Goals  Initiated 9/26/2017 and to be accomplished within 7 day(s)  1. Patient will move from supine to sit and sit to supine in bed with modified independence. 2. Patient will transfer from bed to chair and chair to bed with modified independence using the least restrictive device. 3. Patient will perform sit to stand with modified independence. 4. Patient will ambulate with modified independence for 100 feet with the least restrictive device. 5. Patient will ascend/descend 3 stairs with 1 handrail(s) with minimal assistance/contact guard assist.   PHYSICAL THERAPY TREATMENT     Patient: Nolvia Reed (23 y.o. female)  Date: 9/27/2017  Diagnosis: Chest pain <principal problem not specified>       Precautions: Fall  Chart, physical therapy assessment, plan of care and goals were reviewed. ASSESSMENT:  Pt demo's increased stability with use of RW as indicated by decreased frequency of path deviation noted,however, distance is decreased 2/2 RW being 2nd trial of gait training. Pt is improving well and progressing toward goals as indicated by decreased assist req'd for all tested aspects. HH PT indicated for follow up to promote increased strength,increased activity tolerance, and to ensure maximal safety with functional mobility in the home. Progression toward goals:  [X]      Improving appropriately and progressing toward goals  [ ]      Improving slowly and progressing toward goals  [ ]      Not making progress toward goals and plan of care will be adjusted       PLAN:  Patient continues to benefit from skilled intervention to address the above impairments. Continue treatment per established plan of care. Discharge Recommendations:  Home Health  Further Equipment Recommendations for Discharge:  rolling walker and N/A       G-CODES:      Mobility  D/C  CI= 1-19%.   The severity rating is based on the Level of Assistance required for Functional Mobility and ADLs. Mobility   Goal  CI= 1-19%. The severity rating is based on the Level of Assistance required for Functional Mobility and ADLs. SUBJECTIVE:   Patient stated My legs feel like 100 lbs.       OBJECTIVE DATA SUMMARY:   Critical Behavior:  Neurologic State: Alert, Appropriate for age, Eyes open spontaneously  Orientation Level: Appropriate for age, Oriented X4  Cognition: Appropriate decision making, Appropriate for age attention/concentration, Appropriate safety awareness, Follows commands  Functional Mobility Training:  Transfers:  Sit to Stand: Supervision (x's 3 trials with cues for sequence,safety, and technique`)  Stand to Sit: Supervision  Bed to Chair: Supervision  Other: stand step with SPC/RW  Balance:  Sitting: Intact  Standing: Impaired; With support  Standing - Static: Fair  Standing - Dynamic : Fair  Ambulation/Gait Training:  Distance (ft): 150 Feet (ft) (with SPC.  subsequent RW trial x's 100 ft supervision/SBA)  Assistive Device: Cane, straight (and RW)  Ambulation - Level of Assistance: Stand-by asssistance;Supervision  Gait Abnormalities: Decreased step clearance; Path deviations  Base of Support: Narrowed              Fair balance noted with both AD's as occasional need for CG to correct path deviation/LOB. Stability does increase with use of RW,however, fatigue is pronounced 2/2 2nd trial of ambulation hence decreased distance. Pain:  Pt reports 0/10 pain or discomfort prior to treatment. Pt reports 0/10 pain or discomfort post treatment. Activity Tolerance:   Fair/fair+  Pt fatigues quickly with reports of LE's feeling heavy. Please refer to the flowsheet for vital signs taken during this treatment.   After treatment:   [X] Patient left in no apparent distress sitting up in chair  [ ] Patient left in no apparent distress in bed  [X] Call bell left within reach  [ ] Nursing notified  [X] Caregiver present  [ ] Bed alarm activated      Javy Akers PTA   Time Calculation: 23 mins

## 2017-09-27 NOTE — ROUTINE PROCESS
Bedside shift change report given to RYLEY Subramanian (oncoming nurse) by Jennifer Webb RN (offgoing nurse). Report included the following information SBAR, ED Summary, MAR and Recent Results.

## 2017-09-27 NOTE — PROGRESS NOTES
[facility]  -  Progress Note      Patient: Nadira Carcamo MRN: [id number]  SSN: [id number]    YOB: 1955  Age: 57 y.o. Sex: female      Admit Date: 9/22/2017    Assessment:     Hospital Problems  Date Reviewed: 2/3/2014          Codes Class Noted POA    Chest pain ICD-10-CM: R07.9  ICD-9-CM: 786.50  9/22/2017 Unknown            -Chest pain, resolved, nuclear stress test 09/26: low-risk, low-normal LV systolic function calculated at 53%, no regional wall motion abnormalities, no obvious ischemia or previous infarction  -Hypertensive cardiovascular disease, concentric left ventricular hypertrophy  -Mild depression left ventricular ejection fraction.  Global left ventricular hypokinesis with ejection fraction 40-45%. -Mixed Congestive heart failure.  -Abnormal EKG consistent with possible prior anteroseptal myocardial infarction.  No wall motion abnormalities to support this diagnosis. -Hypertension, uncontrolled  -Bilateral pleural effusions.  -DM, A1c 8.1 9/25/17  -HLP    Plan:     -Continue ASA, Coreg, Hydralazine, Isordil, statin, Aldactone  -Diovan and HCTZ held this AM due to BP. Consider d/c Clonidine.  -Nuclear stress test reassuring, no further cardiac testing needed at this time.  -Will be available as needed. Subjective:     No new complaints. She states SOB is slightly improved, still has productive cough (swallows sputum). No chest pain or palpitations.     Objective:      Patient Vitals for the past 8 hrs:   Temp Pulse Resp BP SpO2   09/27/17 0812 99.2 °F (37.3 °C) 77 18 128/70 97 %   09/27/17 0400 98.9 °F (37.2 °C) 83 16 135/69 96 %         Patient Vitals for the past 96 hrs:   Weight   09/27/17 0427 95.1 kg (209 lb 9.6 oz)   09/26/17 0356 91.7 kg (202 lb 1.6 oz)   09/25/17 0511 89.1 kg (196 lb 8 oz)   09/24/17 0334 91.1 kg (200 lb 12.8 oz)         Intake/Output Summary (Last 24 hours) at 09/27/17 1020  Last data filed at 09/27/17 0837   Gross per 24 hour Intake              420 ml   Output             1000 ml   Net             -580 ml       Physical Exam:  General:  alert, cooperative, no distress, appears stated age, lying flat on stretcher without difficulty.   Lungs:  clear to auscultation bilaterally  Heart:  regular rate and rhythm  Extremities:  no edema    Data Review:     Labs: Results:       Chemistry Recent Labs      09/27/17 0415 09/26/17   1430  09/25/17   0020   GLU  112*  174*  170*  146*   NA  139  139  139  139   K  3.3*  3.7  3.7  3.2*   CL  102  100  100  102   CO2  31  31  32  34*   BUN  30*  28*  28*  27*   CREA  1.42*  1.32*  1.33*  1.26   CA  8.3*  8.3*  8.2*  8.5   MG  2.0  2.2   --    AGAP  6  8  7  3   BUCR  21*  21*  21*  21*   AP   --   124*   --    TP   --   6.5   --    ALB   --   2.5*   --    GLOB   --   4.0   --    AGRAT   --   0.6*   --       CBC w/Diff Recent Labs      09/27/17 0415 09/26/17   1430  09/25/17   0020   WBC  4.8  4.7  3.8*   RBC  3.42*  3.73*  3.80*   HGB  9.4*  10.2*  10.3*   HCT  29.6*  32.5*  33.0*   PLT  133*  130*  133*   GRANS  61  70  57   LYMPH  22  17*  21   EOS  9*  6*  14*      Coagulation Recent Labs      09/25/17   0020   PTP  12.9   INR  1.0       Lipid Panel Lab Results   Component Value Date/Time    Cholesterol, total 104 09/27/2017 04:15 AM    HDL Cholesterol 35 09/27/2017 04:15 AM    LDL, calculated 44.6 09/27/2017 04:15 AM    VLDL, calculated 24.4 09/27/2017 04:15 AM    Triglyceride 122 09/27/2017 04:15 AM    CHOL/HDL Ratio 3.0 09/27/2017 04:15 AM      BNP Lab Results   Component Value Date/Time    B-type Natriuretic Peptide 223.7 04/12/2014 12:00 AM      Liver Enzymes Recent Labs      09/26/17   1430   TP  6.5   ALB  2.5*   AP  124*   SGOT  8*      Digoxin    Thyroid Studies Lab Results   Component Value Date/Time    T4, Total 9.1 12/23/2009 11:33 AM    TSH 0.85 03/28/2011 09:00 AM

## 2017-09-27 NOTE — PROGRESS NOTES
Care Management Interventions  PCP Verified by CM: Yes  Last Visit to PCP: 08/31/17  Mode of Transport at Discharge:  (family)  Transition of Care Consult (CM Consult): Discharge Planning, Other (Hospital to Home)  [address]: Yes  Physical Therapy Consult: Yes  Occupational Therapy Consult: Yes  Speech Therapy Consult: Yes  Current Support Network: Relative's Home  Confirm Follow Up Transport: Family  Plan discussed with Pt/Family/Caregiver: Yes  Freedom of Choice Offered: Yes (Hospital to home)  Discharge Location  Discharge Placement: Home with family assistance (and Hospital to Home)    [phone] - Pt is a 57year old admitted for chest pain. Pt is alert and oriented and alone in room. Pt reports that she lives with her son Maria Del Rosario Aguayo and daughter and that her mother Marion Cordoba [phone] is her emergency contact. Pt reports that prior to admission she needed help putting on her shoes and pants, and fixing her hair. Pt reports that she has oxygen at home through [address] [place] [address] Road and that she has a cane at home. Pt requesting RW. Called Berta with [facility]. Pt declined ordered home health. Pt offered [facility] and also declined. Pt plans to discharge home with family and has transportation home with family. 1500 - Nurse navigator in room. Pt now agreeable to [facility]. FOC verbal consent given. Referral sent. Spoke to Yordan Tong with [facility]. Pt instructed that RW is ordered and that it should be delivered to her room prior to discharge. Pt verbalized understanding. Nursing notified that pt needs walker prior to discharge.

## 2017-09-27 NOTE — HOME CARE
Received [facility] referral (  states pt declined HH,but will accept [facility] visit); Discharge noted for today, DME : RW ordered and  provided to pt from [facility] ; pt states she already has home O2 with [employer] and has a cane, pt's daughter states that she will go home to get pt's portable O2 tank for discharge today, [facility] will follow for [facility] for CHF/ COPD. JOHN MURRY.

## 2017-09-27 NOTE — PROGRESS NOTES
Problem: Mobility Impaired (Adult and Pediatric)  Goal: *Acute Goals and Plan of Care (Insert Text)  Physical Therapy Goals  Initiated 9/26/2017 and to be accomplished within 7 day(s)  1. Patient will move from supine to sit and sit to supine in bed with modified independence. 2. Patient will transfer from bed to chair and chair to bed with modified independence using the least restrictive device. 3. Patient will perform sit to stand with modified independence. 4. Patient will ambulate with modified independence for 100 feet with the least restrictive device. 5. Patient will ascend/descend 3 stairs with 1 handrail(s) with minimal assistance/contact guard assist.      Time: 1356                    Pt is unavailable for PT intervention at this time 2/2 off unit to CT. Will follow up as pt availability permits.      [facility] PTA  9/27/2017

## 2017-09-27 NOTE — DISCHARGE INSTRUCTIONS
Chest Pain: Care Instructions  Your Care Instructions  There are many things that can cause chest pain. Some are not serious and will get better on their own in a few days. But some kinds of chest pain need more testing and treatment. Your doctor may have recommended a follow-up visit in the next 8 to 12 hours. If you are not getting better, you may need more tests or treatment. Even though your doctor has released you, you still need to watch for any problems. The doctor carefully checked you, but sometimes problems can develop later. If you have new symptoms or if your symptoms do not get better, get medical care right away. If you have worse or different chest pain or pressure that lasts more than 5 minutes or you passed out (lost consciousness), call 911 or seek other emergency help right away. A medical visit is only one step in your treatment. Even if you feel better, you still need to do what your doctor recommends, such as going to all suggested follow-up appointments and taking medicines exactly as directed. This will help you recover and help prevent future problems. How can you care for yourself at home? · Rest until you feel better. · Take your medicine exactly as prescribed. Call your doctor if you think you are having a problem with your medicine. · Do not drive after taking a prescription pain medicine. When should you call for help? Call 911 if:  · You passed out (lost consciousness). · You have severe difficulty breathing. · You have symptoms of a heart attack. These may include:  ¨ Chest pain or pressure, or a strange feeling in your chest.  ¨ Sweating. ¨ Shortness of breath. ¨ Nausea or vomiting. ¨ Pain, pressure, or a strange feeling in your back, neck, jaw, or upper belly or in one or both shoulders or arms. ¨ Lightheadedness or sudden weakness. ¨ A fast or irregular heartbeat.   After you call 911, the  may tell you to chew 1 adult-strength or 2 to 4 low-dose aspirin. Wait for an ambulance. Do not try to drive yourself. Call your doctor today if:  · You have any trouble breathing. · Your chest pain gets worse. · You are dizzy or lightheaded, or you feel like you may faint. · You are not getting better as expected. · You are having new or different chest pain. Where can you learn more? Go to http://chanel-connor.info/. Enter A120 in the search box to learn more about \"Chest Pain: Care Instructions. \"  Current as of: March 20, 2017  Content Version: 11.3  © 8574-0039 [employer]. Care instructions adapted under license by [employer] (which disclaims liability or warranty for this information). If you have questions about a medical condition or this instruction, always ask your healthcare professional. [address] any warranty or liability for your use of this information. Acute High Blood Pressure: Care Instructions  Your Care Instructions  Acute high blood pressure is very high blood pressure. It's a serious problem. Very high blood pressure can damage your brain, heart, eyes, and kidneys. You may have been given medicines to lower your blood pressure. You may have gotten them as pills or through a needle in one of your veins. This is called an IV. And maybe you were given other medicines too. These can be needed when high blood pressure causes other problems. To keep your blood pressure at a lower level, you may need to make healthy lifestyle changes. And you will probably need to take medicines. Be sure to follow up with your doctor about your blood pressure and what you can do about it. Follow-up care is a key part of your treatment and safety. Be sure to make and go to all appointments, and call your doctor if you are having problems. It's also a good idea to know your test results and keep a list of the medicines you take. How can you care for yourself at home?   · See your doctor as often as he or she recommends. This is to make sure your blood pressure is under control. You will probably go at least 2 times a year. But it may be more often at first.  · Take your blood pressure medicine exactly as prescribed. You may take one or more types. They include diuretics, beta-blockers, ACE inhibitors, calcium channel blockers, and angiotensin II receptor blockers. Call your doctor if you think you are having a problem with your medicine. · If you take blood pressure medicine, talk to your doctor before you take decongestants or anti-inflammatory medicine, such as ibuprofen. These can raise blood pressure. · Learn how to check your blood pressure at home. Check it often. · Ask your doctor if it's okay to drink alcohol. · Talk to your doctor about lifestyle changes that can help blood pressure. These include being active and not smoking. When should you call for help? Call 911 anytime you think you may need emergency care. This may mean having symptoms that suggest that your blood pressure is causing a serious heart or blood vessel problem. Your blood pressure may be over 180/110. For example, call 911 if:  · You have symptoms of a heart attack. These may include:  ¨ Chest pain or pressure, or a strange feeling in the chest.  ¨ Sweating. ¨ Shortness of breath. ¨ Nausea or vomiting. ¨ Pain, pressure, or a strange feeling in the back, neck, jaw, or upper belly or in one or both shoulders or arms. ¨ Lightheadedness or sudden weakness. ¨ A fast or irregular heartbeat. · You have symptoms of a stroke. These may include:  ¨ Sudden numbness, tingling, weakness, or loss of movement in your face, arm, or leg, especially on only one side of your body. ¨ Sudden vision changes. ¨ Sudden trouble speaking. ¨ Sudden confusion or trouble understanding simple statements. ¨ Sudden problems with walking or balance. ¨ A sudden, severe headache that is different from past headaches.   · You have severe back or belly pain. Do not wait until your blood pressure comes down on its own. Get help right away. Call your doctor now or seek immediate care if:  · Your blood pressure is much higher than normal (such as 180/110 or higher), but you don't have symptoms. · You think high blood pressure is causing symptoms, such as:  ¨ Severe headache. ¨ Blurry vision. Watch closely for changes in your health, and be sure to contact your doctor if:  · Your blood pressure measures 140/90 or higher at least 2 times. That means the top number is 140 or higher or the bottom number is 90 or higher, or both. · You think you may be having side effects from your blood pressure medicine. · Your blood pressure is usually normal, but it goes above normal at least 2 times. Where can you learn more? Go to http://chanel-connor.info/. Enter P330 in the search box to learn more about \"Acute High Blood Pressure: Care Instructions. \"  Current as of: August 8, 2016  Content Version: 11.3  © 9470-6093 [employer]. Care instructions adapted under license by [employer] (which disclaims liability or warranty for this information). If you have questions about a medical condition or this instruction, always ask your healthcare professional. [place] [address] any warranty or liability for your use of this information. Learning About Pleural Effusion  What is pleural effusion? Pleural effusion (say \"PLER-flora [id number]\") is the buildup of fluid in the space between tissues lining the lungs and chest wall. Because of the fluid buildup, the lungs may not be able to expand completely, which can make it hard to breathe. The lung, or part of it, may collapse. Pleural effusion has many causes, such as pneumonia, cancer, inflammation of the tissues around the lungs, and heart failure.   Pleural effusion is usually diagnosed with an X-ray and a physical exam. The doctor listens to the air flow in your lungs. What are the symptoms? Symptoms of pleural effusion may include:  · Trouble breathing. · Shortness of breath. · Chest pain. · Fever. · A cough. Minor pleural effusion may not cause any symptoms. How is pleural effusion treated? Doctors may need to treat the condition that is causing pleural effusion. For example, you may get medicines to treat pneumonia or congestive heart failure. Minor pleural effusion often goes away on its own without treatment. Removing fluid  Pleural effusion can be treated by removing fluid from the space between the tissues around the lungs. This is done with a needle that's put into the chest (thoracentesis). A small amount of the fluid may be sent to a lab to find out what is causing the buildup of fluid. Removing the fluid may help to relieve symptoms, such as shortness of breath and chest pain. It can help the lungs to expand more fully. In some cases, if pleural effusion doesn't get better, a catheter may be placed in the chest. This is a flexible tube that allows fluid to drain from the lungs. The catheter stays in the chest until the doctor removes it. Some people may get a treatment that removes the fluid and then puts a medicine into the chest cavity. This helps to prevent too much fluid from building up again. Follow-up care is a key part of your treatment and safety. Be sure to make and go to all appointments, and call your doctor if you are having problems. It's also a good idea to know your test results and keep a list of the medicines you take. Where can you learn more? Go to http://chanel-connor.info/. Enter A920 in the search box to learn more about \"Learning About Pleural Effusion. \"  Current as of: March 25, 2017  Content Version: 11.3  © 3294-7534 [employer]. Care instructions adapted under license by [employer] (which disclaims liability or warranty for this information).  If you have questions about a medical condition or this instruction, always ask your healthcare professional. [place] [address] any warranty or liability for your use of this information. Shortness of Breath: Care Instructions  Your Care Instructions  Shortness of breath has many causes. Sometimes conditions such as anxiety can lead to shortness of breath. Some people get mild shortness of breath when they exercise. Trouble breathing also can be a symptom of a serious problem, such as asthma, lung disease, emphysema, heart problems, and pneumonia. If your shortness of breath continues, you may need tests and treatment. Watch for any changes in your breathing and other symptoms. Follow-up care is a key part of your treatment and safety. Be sure to make and go to all appointments, and call your doctor if you are having problems. Its also a good idea to know your test results and keep a list of the medicines you take. How can you care for yourself at home? · Do not smoke or allow others to smoke around you. If you need help quitting, talk to your doctor about stop-smoking programs and medicines. These can increase your chances of quitting for good. · Get plenty of rest and sleep. · Take your medicines exactly as prescribed. Call your doctor if you think you are having a problem with your medicine. · Find healthy ways to deal with stress. ¨ Exercise daily. ¨ Get plenty of sleep. ¨ Eat regularly and well. When should you call for help? Call 911 anytime you think you may need emergency care. For example, call if:  · You have severe shortness of breath. · You have symptoms of a heart attack. These may include:  ¨ Chest pain or pressure, or a strange feeling in the chest.  ¨ Sweating. ¨ Shortness of breath. ¨ Nausea or vomiting. ¨ Pain, pressure, or a strange feeling in the back, neck, jaw, or upper belly or in one or both shoulders or arms. ¨ Lightheadedness or sudden weakness.   ¨ A fast or irregular heartbeat. After you call 911, the  may tell you to chew 1 adult-strength or 2 to 4 low-dose aspirin. Wait for an ambulance. Do not try to drive yourself. Call your doctor now or seek immediate medical care if:  · Your shortness of breath gets worse or you start to wheeze. Wheezing is a high-pitched sound when you breathe. · You wake up at night out of breath or have to prop your head up on several pillows to breathe. · You are short of breath after only light activity or while at rest.  Watch closely for changes in your health, and be sure to contact your doctor if:  · You do not get better over the next 1 to 2 days. Where can you learn more? Go to http://chanel-connor.info/. Enter S780 in the search box to learn more about \"Shortness of Breath: Care Instructions. \"  Current as of: March 25, 2017  Content Version: 11.3  © 0475-7823 [employer]. Care instructions adapted under license by [employer] (which disclaims liability or warranty for this information). If you have questions about a medical condition or this instruction, always ask your healthcare professional. [place] [address] any warranty or liability for your use of this information. Patient armband removed and shredded    MyChart Activation    Thank you for requesting access to [employer]. Please follow the instructions below to securely access and download your online medical record. [employer] allows you to send messages to your doctor, view your test results, renew your prescriptions, schedule appointments, and more. How Do I Sign Up? 1. In your internet browser, go to [email or web address]  2. Click on the First Time User? Click Here link in the Sign In box. You will be redirect to the New Member Sign Up page. 3. Enter your [employer] Access Code exactly as it appears below. You will not need to use this code after youve completed the sign-up process.  If you do not sign up before the expiration date, you must request a new code. [employer] Access Code: [email or web address]  Expires: 2017  1:49 PM (This is the date your [employer] access code will )    4. Enter the last four digits of your Social Security Number (xxxx) and Date of Birth (mm/dd/yyyy) as indicated and click Submit. You will be taken to the next sign-up page. 5. Create a [employer] ID. This will be your [employer] login ID and cannot be changed, so think of one that is secure and easy to remember. 6. Create a [employer] password. You can change your password at any time. 7. Enter your Password Reset Question and Answer. This can be used at a later time if you forget your password. 8. Enter your e-mail address. You will receive e-mail notification when new information is available in [employer]. 9. Click Sign Up. You can now view and download portions of your medical record. 10. Click the Download Summary menu link to download a portable copy of your medical information. Additional Information    If you have questions, please visit the Frequently Asked Questions section of the [employer] website at [email or web address] Remember, [employer] is NOT to be used for urgent needs. For medical emergencies, dial 911. DISCHARGE SUMMARY from Nurse    The following personal items are in your possession at time of discharge:    Dental Appliances: None  Visual Aid: None     Home Medications: None  Jewelry: None  Clothing: Pants, Shirt, With patient                PATIENT INSTRUCTIONS:    After general anesthesia or intravenous sedation, for 24 hours or while taking prescription Narcotics:  · Limit your activities  · Do not drive and operate hazardous machinery  · Do not make important personal or business decisions  · Do  not drink alcoholic beverages  · If you have not urinated within 8 hours after discharge, please contact your surgeon on call.     Report the following to your surgeon:  · Excessive pain, swelling, redness or odor of or around the surgical area  · Temperature over 100.5  · Nausea and vomiting lasting longer than 4 hours or if unable to take medications  · Any signs of decreased circulation or nerve impairment to extremity: change in color, persistent  numbness, tingling, coldness or increase pain  · Any questions        What to do at Home:  Recommended activity: Activity as tolerated    If you experience any of the following symptoms Nausea, vomiting, diarrhea, fever greater than 100.5, dizziness, severe headache, shortness of breath, chest pain, increased pain, please follow up with PCP. *  Please give a list of your current medications to your Primary Care Provider. *  Please update this list whenever your medications are discontinued, doses are      changed, or new medications (including over-the-counter products) are added. *  Please carry medication information at all times in case of emergency situations. These are general instructions for a healthy lifestyle:    No smoking/ No tobacco products/ Avoid exposure to second hand smoke    Surgeon General's Warning:  Quitting smoking now greatly reduces serious risk to your health. Obesity, smoking, and sedentary lifestyle greatly increases your risk for illness    A healthy diet, regular physical exercise & weight monitoring are important for maintaining a healthy lifestyle    You may be retaining fluid if you have a history of heart failure or if you experience any of the following symptoms:  Weight gain of 3 pounds or more overnight or 5 pounds in a week, increased swelling in our hands or feet or shortness of breath while lying flat in bed. Please call your doctor as soon as you notice any of these symptoms; do not wait until your next office visit.     Recognize signs and symptoms of STROKE:    F-face looks uneven    A-arms unable to move or move unevenly    S-speech slurred or non-existent    T-time-call 911 as soon as signs and symptoms begin-DO NOT go       Back to bed or wait to see if you get better-TIME IS BRAIN. Warning Signs of HEART ATTACK     Call 911 if you have these symptoms:   Chest discomfort. Most heart attacks involve discomfort in the center of the chest that lasts more than a few minutes, or that goes away and comes back. It can feel like uncomfortable pressure, squeezing, fullness, or pain.  Discomfort in other areas of the upper body. Symptoms can include pain or discomfort in one or both arms, the back, neck, jaw, or stomach.  Shortness of breath with or without chest discomfort.  Other signs may include breaking out in a cold sweat, nausea, or lightheadedness. Don't wait more than five minutes to call 911 - MINUTES MATTER! Fast action can save your life. Calling 911 is almost always the fastest way to get lifesaving treatment. Emergency Medical Services staff can begin treatment when they arrive -- up to an hour sooner than if someone gets to the hospital by car. The discharge information has been reviewed with the patient. The patient verbalized understanding. Discharge medications reviewed with the patient and appropriate educational materials and side effects teaching were provided.

## 2017-09-27 NOTE — PROGRESS NOTES
Problem: Dysphagia (Adult)  Goal: *Acute Goals and Plan of Care (Insert Text)  Patient will:  1. Tolerate PO trials with 0 s/s overt distress in 4/5 trials  2. Utilize compensatory swallow strategies/maneuvers (decrease bite/sip, size/rate, alt. liq/sol) with min cues in 4/5 trials    Rec:   Soft solid diet with thin liquids  Aspiration precautions  HOB >45 during po intake, remain >30 for 30-45 minutes after po   Small bites/sips; alternate liquid/solid with slow feeding rate   Oral care TID  Meds per pt preference   Outcome: Progressing Towards Goal  SPEECH LANGUAGE PATHOLOGY DYSPHAGIA TREATMENT     Patient: Tatum Johnson (59 y.o. female)  Date: 9/27/2017  Diagnosis: Chest pain      ASSESSMENT:  Pt seen for follow up dysphagia tx seated upright in chair with breakfast tray. Pt tolerating breakfast tray with slowed but thorough mastication related to limited natural dentition. Pt tolerating thin liquids with timely swallow initiation and no overt s/sx aspiration; however, reporting \"my swallow didn't feel right this morning but it's better now\". Laryngeal elevation adequate upon observation. No overt s/sx aspiration noted during meal.  Pt educated with regard to aspiration risk, aspiration precautions, comp swallowing strategies, positioning with pt able to verbalize understanding. Pt with slowed speech which she reports \"is not quite back to normal\". She reports long hx of difficulty \"since the 80's\" and reports that she thinks she had a stroke \"years ago\". Per neurology, \"syncope/presyncope is more likely though admittedly it is impossible to completely exclude a seizure\"; therefore, will hold potential speech-language evaluation at this time and determine if appropriate next date if pt feels speech has not improved. Will continue to follow per POC. D/w pt who verbalized understanding.        Progression toward goals:  [ ]       Improving appropriately and progressing toward goals  [X] Improving slowly and progressing toward goals  [ ]       Not making progress toward goals and plan of care will be adjusted       PLAN:  Patient continues to benefit from skilled intervention to address the above impairments. Continue treatment per established plan of care. Discharge Recommendations: To Be Determined       SUBJECTIVE:   Patient stated I've had problems since the 80's. OBJECTIVE:   Mental Status:  Neurologic State: Alert  Orientation Level: Oriented X4  Cognition: Follows commands  Oral Assessment:  Oral Assessment  Labial: Left droop  Dentition: Edentulous  Oral Hygiene: Adequate  Lingual: Decreased rate;Decreased strength;Left deviation  Velum: No impairment  Mandible: No impairment  P.O. Trials:  Patient Position: 55 at [facility]  Vocal quality prior to P.O.: Hoarse  Consistency Presented: Thin liquid; Solid;Puree  How Presented: Self-fed/presented;Cup/sip;Spoon;Straw  Bolus Acceptance: No impairment  Bolus Formation/Control: Impaired  Type of Impairment: Delayed;Mastication  Propulsion: Delayed (# of seconds)  Oral Residue: None  Initiation of Swallow: No impairment  Laryngeal Elevation: Weak  Aspiration Signs/Symptoms: None  Pharyngeal Phase Characteristics: No impairment, issues, or problems   Effective Modifications: Small sips and bites  Cues for Modifications: Minimal  Oral Phase Severity: Mild  Pharyngeal Phase Severity : Mild    PAIN:  Pt reports 0/10 pain or discomfort prior to tx. Pt reports 0/10 pain or discomfort post tx.      After treatment:   [x]       Patient left in no apparent distress sitting up in chair  [ ]       Patient left in no apparent distress in bed  [x]       Call bell left within reach  [x]       Nursing notified  [ ]       Caregiver present  [ ]       Bed alarm activated         COMMUNICATION/EDUCATION:   [X]             Compensatory strategies, diet recs, aspiration risk, aspiration precautions, oral care and positioning      Tonio Mae M.S., [id number] [facility]  Speech-Language Pathologist

## 2017-09-27 NOTE — ROUTINE PROCESS
Jonathan moreno/ [employer] in [facility], [phone], delivered Margaretmelissa Guillaume prior to patient's discharge.

## 2017-09-28 ENCOUNTER — TELEPHONE (OUTPATIENT)
Dept: CARDIAC REHAB | Age: 62
End: 2017-09-28

## 2017-09-28 ENCOUNTER — HOME CARE VISIT (OUTPATIENT)
Dept: SCHEDULING | Facility: HOME HEALTH | Age: 62
End: 2017-09-28

## 2017-09-28 ENCOUNTER — NURSE NAVIGATOR (OUTPATIENT)
Dept: OTHER | Age: 62
End: 2017-09-28

## 2017-09-28 PROCEDURE — G0299 HHS/HOSPICE OF RN EA 15 MIN: HCPCS

## 2017-09-28 NOTE — TELEPHONE ENCOUNTER
Cardiac Rehab screening complete. Patient is not a candidate for the program due to her EF being 40-45%. In order to be a candidate, Medicare guidelines state that she must have stable CHF with an EF of 35% or lower.     Thank you,  Neelima Gore

## 2017-11-03 ENCOUNTER — OFFICE VISIT (OUTPATIENT)
Dept: CARDIOLOGY CLINIC | Age: 62
End: 2017-11-03

## 2017-11-03 VITALS
HEART RATE: 82 BPM | HEIGHT: 69 IN | OXYGEN SATURATION: 91 % | DIASTOLIC BLOOD PRESSURE: 110 MMHG | BODY MASS INDEX: 32.29 KG/M2 | WEIGHT: 218 LBS | SYSTOLIC BLOOD PRESSURE: 170 MMHG

## 2017-11-03 DIAGNOSIS — I10 ESSENTIAL HYPERTENSION, BENIGN: Primary | ICD-10-CM

## 2017-11-03 DIAGNOSIS — N28.9 RENAL INSUFFICIENCY: ICD-10-CM

## 2017-11-03 DIAGNOSIS — I50.42 CHRONIC COMBINED SYSTOLIC AND DIASTOLIC CONGESTIVE HEART FAILURE (HCC): Chronic | ICD-10-CM

## 2017-11-03 RX ORDER — CARVEDILOL 25 MG/1
25 TABLET ORAL 2 TIMES DAILY WITH MEALS
Qty: 180 TAB | Refills: 3 | Status: SHIPPED | OUTPATIENT
Start: 2017-11-03 | End: 2019-02-01

## 2017-11-03 RX ORDER — FUROSEMIDE 40 MG/1
40 TABLET ORAL DAILY
Qty: 90 TAB | Refills: 3 | Status: SHIPPED | OUTPATIENT
Start: 2017-11-03 | End: 2017-11-20 | Stop reason: SDUPTHER

## 2017-11-03 RX ORDER — HYDRALAZINE HYDROCHLORIDE 50 MG/1
50 TABLET, FILM COATED ORAL 3 TIMES DAILY
Qty: 270 TAB | Refills: 3 | Status: SHIPPED | OUTPATIENT
Start: 2017-11-03 | End: 2017-11-20 | Stop reason: SDUPTHER

## 2017-11-03 NOTE — MR AVS SNAPSHOT
Visit Information Date & Time Provider Department Dept. Phone Encounter #  
 11/3/2017 11:00 AM Kiah Chaudhary MD [facility] [id number] Your Appointments 11/17/2017 11:00 AM  
Follow Up with Kiah Chaudhary MD  
[facility] \[facility]\"" ([address]) Appt Note: 2 wk f/u  
 [address] [facility] [address]  
[phone] [address] [place] [address] [address] Upcoming Health Maintenance Date Due Hepatitis C Screening 1955 FOOT EXAM Q1 1/21/1963 MICROALBUMIN Q1 1/21/1963 EYE EXAM RETINAL OR DILATED Q1 1/21/1963 Pneumococcal 19-64 Medium Risk (1 of 1 - PPSV23) 1/21/1972 DTaP/Tdap/Td series (1 - Tdap) 1/21/1974 PAP AKA CERVICAL CYTOLOGY 1/21/1974 FOBT Q 1 YEAR AGE 50-75 1/21/2003 ZOSTER VACCINE AGE 60> 11/21/2012 INFLUENZA AGE 9 TO ADULT 8/1/2017 HEMOGLOBIN A1C Q6M 3/27/2018 LIPID PANEL Q1 9/27/2018 BREAST CANCER SCRN MAMMOGRAM 10/5/2018 Allergies as of 11/3/2017  Review Complete On: 11/3/2017 By: Kiah Chaudhary MD  
  
 Severity Noted Reaction Type Reactions Codeine  03/01/2017    Nausea Only Sulfa (Sulfonamide Antibiotics)  01/27/2012   Side Effect Rash Current Immunizations  Never Reviewed No immunizations on file. Not reviewed this visit You Were Diagnosed With   
  
 Codes Comments Essential hypertension, benign    -  Primary ICD-10-CM: I10 
ICD-9-CM: 931. 1 Chronic combined systolic and diastolic congestive heart failure (HCC)     ICD-10-CM: I50.42 
ICD-9-CM: 428.42, 428.0 Renal insufficiency     ICD-10-CM: N28.9 ICD-9-CM: 593.9 Vitals BP Pulse Height(growth percentile) Weight(growth percentile) SpO2 BMI  
 (!) 170/110 (BP 1 Location: Right arm, BP Patient Position: Sitting) 82 5' 9.02\" (1.753 m) 218 lb (98.9 kg) 91% 32.18 kg/m2 OB Status Smoking Status Postmenopausal Never Smoker BMI and BSA Data Body Mass Index Body Surface Area  
 32.18 kg/m 2 2.19 m 2 Your Updated Medication List  
  
   
This list is accurate as of: 11/3/17 12:15 PM.  Always use your most recent med list.  
  
  
  
  
 [address] Take 1 Tab by mouth daily. aspirin delayed-release 81 mg tablet Take 81 mg by mouth daily. carvedilol 25 mg tablet Commonly known as:  Macel Harish Take 1 Tab by mouth two (2) times daily (with meals). cefdinir 300 mg capsule Commonly known as:  OMNICEF Take 300 mg by mouth two (2) times a day. DULERA 200-5 mcg/actuation HFA inhaler Generic drug:  mometasone-formoterol Take 2 Puffs by inhalation two (2) times a day. fluticasone 50 mcg/actuation nasal spray Commonly known as:  Cathlyn Kyung 2 Sprays by Both Nostrils route daily. furosemide 40 mg tablet Commonly known as:  LASIX Take 1 Tab by mouth daily. gabapentin 100 mg capsule Commonly known as:  NEURONTIN  
1 cap po twice daily with breakfast and dinner, and 3 tabs po qHS  
  
 hydrALAZINE 50 mg tablet Commonly known as:  APRESOLINE Take 1 Tab by mouth three (3) times daily. insulin lispro 100 unit/mL kwikpen Commonly known as:  HUMALOG  
15 Units by SubCUTAneous route daily. isosorbide dinitrate 10 mg tablet Commonly known as:  ISORDIL Take 1 Tab by mouth three (3) times daily. LANTUS 100 unit/mL injection Generic drug:  insulin glargine 45 Units by SubCUTAneous route daily. 15 units in the evening. pravastatin 20 mg tablet Commonly known as:  PRAVACHOL Take 20 mg by mouth nightly. [address] [facility] OP Administer 1 Drop to both eyes six (6) times daily. sodium chloride 0.65 % nasal spray Commonly known as:  OCEAN  
2 Sprays by Both Nostrils route every two (2) hours as needed. spironolactone 25 mg tablet Commonly known as:  ALDACTONE  
 Take 12.5 mg by mouth daily. valsartan 160 mg tablet Commonly known as:  DIOVAN Take 1 Tab by mouth daily. VENTOLIN HFA 90 mcg/actuation inhaler Generic drug:  albuterol Take 2 Puffs by inhalation every four (4) hours as needed for Wheezing or Shortness of Breath. Prescriptions Printed Refills  
 hydrALAZINE (APRESOLINE) 50 mg tablet 3 Sig: Take 1 Tab by mouth three (3) times daily. Class: Print Route: Oral  
 carvedilol (COREG) 25 mg tablet 3 Sig: Take 1 Tab by mouth two (2) times daily (with meals). Class: Print Route: Oral  
 furosemide (LASIX) 40 mg tablet 3 Sig: Take 1 Tab by mouth daily. Class: Print Route: Oral  
  
We Performed the Following AMB POC EKG ROUTINE W/ 12 LEADS, INTER & REP [24959 CPT(R)] To-Do List   
 11/03/2017 Lab:  METABOLIC PANEL, BASIC Patient Instructions Continue current medications. If you have any further questions or concerns, please contact our office. [phone] Introducing [facility]! Rodriguez Kirk introduces [employer] patient portal. Now you can access parts of your medical record, email your doctor's office, and request medication refills online. 1. In your internet browser, go to [email or web address] 2. Click on the First Time User? Click Here link in the Sign In box. You will see the New Member Sign Up page. 3. Enter your [employer] Access Code exactly as it appears below. You will not need to use this code after youve completed the sign-up process. If you do not sign up before the expiration date, you must request a new code. · [employer] Access Code: [email or web address] Expires: 12/26/2017  1:49 PM 
 
4. Enter the last four digits of your Social Security Number (xxxx) and Date of Birth (mm/dd/yyyy) as indicated and click Submit. You will be taken to the next sign-up page. 5. Create a [employer] ID.  This will be your [employer] login ID and cannot be changed, so think of one that is secure and easy to remember. 6. Create a [employer] password. You can change your password at any time. 7. Enter your Password Reset Question and Answer. This can be used at a later time if you forget your password. 8. Enter your e-mail address. You will receive e-mail notification when new information is available in [address] 9. Click Sign Up. You can now view and download portions of your medical record. 10. Click the Download Summary menu link to download a portable copy of your medical information. If you have questions, please visit the Frequently Asked Questions section of the [employer] website. Remember, [employer] is NOT to be used for urgent needs. For medical emergencies, dial 911. Now available from your iPhone and Android! Please provide this summary of care documentation to your next provider. Your primary care clinician is listed as Chanell Gamez. If you have any questions after today's visit, please call [phone].

## 2017-11-03 NOTE — PATIENT INSTRUCTIONS
Continue current medications.   If you have any further questions or concerns, please contact our office. [phone]

## 2017-11-03 NOTE — PROGRESS NOTES
PATIENT NAME: Nadira Carcamo         57 y.o.      1955              DATE:11/3/2017    REASON FOR VISIT: Hypertension. Congestive heart failure    HISTORY OF PRESENT ILLNESS: Complaining of shortness of breath on exertion. Edema both lower extremities. Denies chest pain. Denies palpitation, syncope, presyncope. She was supposed to be taking carvedilol. She was not given a prescription for this at the time of discharge from the hospital.    PAST MEDICAL HISTORY:   Past Medical History:  No date: Arthritis  No date: Asthma  No date: Cataract  No date: Diabetes mellitus ([facility] [address]  12/29/2009: History of echocardiogram      Comment: EF 50%. Mild-mod conc LVH. Mod DDfx. LAE. Mild MR. No date: Hypercholesterolemia  No date: Hypertension  No date: Hypertensive heart disease  09/08/2000: Normal nuclear stress test      Comment: No ischemia or prior infarction. Neg EKG on                submax EST. Ex time 15:02. PAST SURGICAL HISTORY:   Past Surgical History:  2001: HX CHOLECYSTECTOMY  1975: HX TUBAL LIGATION      SOCIAL HISTORY:  Social History    Marital status:             Spouse name:                       Years of education:                 Number of children:               Social History Main Topics    Smoking status: Never Smoker                                                                Smokeless status: Never Used                        Alcohol use: No              Drug use: No                ALLERGIES:    -- Codeine -- Nausea Only   -- Sulfa (Sulfonamide Antibiotics) -- Rash     CURRENT MEDICATIONS:   Current Outpatient Prescriptions:  MULTIVIT/IRON/FA/K/HERB NO.244 (ALIVE WOMEN'S ENERGY PO), Take 1 Tab by mouth daily. insulin lispro (HUMALOG) 100 unit/mL kwikpen, 15 Units by SubCUTAneous route daily. CARBOXYMETHYLCELLULOS/GLYCERIN (REFRESH OPTIVE OP), Administer 1 Drop to both eyes six (6) times daily.   cefdinir (OMNICEF) 300 mg capsule, Take 300 mg by mouth two (2) times a day. albuterol (VENTOLIN HFA) 90 mcg/actuation inhaler, Take 2 Puffs by inhalation every four (4) hours as needed for Wheezing or Shortness of Breath.  gabapentin (NEURONTIN) 100 mg capsule, 1 cap po twice daily with breakfast and dinner, and 3 tabs po qHS  fluticasone (FLONASE) 50 mcg/actuation nasal spray, 2 Sprays by Both Nostrils route daily. hydrALAZINE (APRESOLINE) 25 mg tablet, Take 1 Tab by mouth three (3) times daily. isosorbide dinitrate (ISORDIL) 10 mg tablet, Take 1 Tab by mouth three (3) times daily. sodium chloride (OCEAN) 0.65 % nasal spray, 2 Sprays by Both Nostrils route every two (2) hours as needed. valsartan (DIOVAN) 160 mg tablet, Take 1 Tab by mouth daily. aspirin delayed-release 81 mg tablet, Take 81 mg by mouth daily. pravastatin (PRAVACHOL) 20 mg tablet, Take 20 mg by mouth nightly. spironolactone (ALDACTONE) 25 mg tablet, Take 12.5 mg by mouth daily. mometasone-formoterol (DULERA) 200-5 mcg/actuation HFA inhaler, Take 2 Puffs by inhalation two (2) times a day. insulin glargine (LANTUS) 100 unit/mL injection, 45 Units by SubCUTAneous route daily. 15 units in the evening. carvedilol (COREG) 25 mg tablet, Take 25 mg by mouth two (2) times daily (with meals). No current facility-administered medications for this visit. REVIEW of SYSTEMS:History obtained from chart review and the patient  General ROS: 9 pound weight gain since September  Hematological and Lymphatic ROS: negative for - bleeding problems  Respiratory ROS: Shortness of breath.   Denies cough and wheezing  Cardiovascular ROS: See history of present illness     PHYSICAL EXAMINATION:   BP (!) 170/110 (BP 1 Location: Right arm, BP Patient Position: Sitting)  Pulse 82  Ht 5' 9.02\" (1.753 m)  Wt 98.9 kg (218 lb)  SpO2 91%  BMI 32.18 kg/m2  BP Readings from Last 3 Encounters:  11/03/17 : (!) 170/110  09/27/17 : 155/71  04/30/14 : 130/80    Pulse Readings from Last 3 Encounters:  11/03/17 : 82  09/27/17 : 76  04/30/14 : 79    Wt Readings from Last 3 Encounters:  11/03/17 : 98.9 kg (218 lb)  09/27/17 : 95.1 kg (209 lb 9.6 oz)  04/30/14 : 82.1 kg (181 lb)    General: Obese -American female in no apparent distress. Neck: No jugular venous distention. Carotid upstrokes 2+ without bruits. Chest: Bibasilar rales. Heart: PMI not palpable. Regular rhythm. S4 gallop. No murmur. Extremities: 2+ edema bilaterally    EKG: Poor precordial R-wave progression. Nonspecific ST-T changes    IMPRESSION:   Hypertension poorly controlled  Congestive heart failure, combined systolic and diastolic,  History of renal insufficiency    PLAN:  Increase hydralazine to 50 mg p.o. 3 times daily. Restart carvedilol 25 mg twice daily  Lasix 40 mg p.o. daily  Basic metabolic panel  Return to office in 1-2 weeks    The diagnoses and plan were discussed with patient and caregiver. All questions answered. Plan of care agreed to by all concerned. Kellen Marte MD       ,

## 2017-11-17 ENCOUNTER — OFFICE VISIT (OUTPATIENT)
Dept: CARDIOLOGY CLINIC | Age: 62
End: 2017-11-17

## 2017-11-17 VITALS
HEIGHT: 69 IN | DIASTOLIC BLOOD PRESSURE: 102 MMHG | BODY MASS INDEX: 31.55 KG/M2 | HEART RATE: 67 BPM | WEIGHT: 213 LBS | SYSTOLIC BLOOD PRESSURE: 190 MMHG | OXYGEN SATURATION: 97 %

## 2017-11-17 DIAGNOSIS — I10 ESSENTIAL HYPERTENSION, BENIGN: ICD-10-CM

## 2017-11-17 DIAGNOSIS — I50.22 CHRONIC SYSTOLIC CONGESTIVE HEART FAILURE (HCC): Primary | Chronic | ICD-10-CM

## 2017-11-17 NOTE — MR AVS SNAPSHOT
Visit Information Date & Time Provider Department Dept. Phone Encounter #  
 11/17/2017  1:20 PM Gregg Hong MD [facility] [phone] Upcoming Health Maintenance Date Due Hepatitis C Screening 1955 FOOT EXAM Q1 1/21/1963 MICROALBUMIN Q1 1/21/1963 EYE EXAM RETINAL OR DILATED Q1 1/21/1963 Pneumococcal 19-64 Medium Risk (1 of 1 - PPSV23) 1/21/1972 DTaP/Tdap/Td series (1 - Tdap) 1/21/1974 PAP AKA CERVICAL CYTOLOGY 1/21/1974 FOBT Q 1 YEAR AGE 50-75 1/21/2003 ZOSTER VACCINE AGE 60> 11/21/2012 Influenza Age 5 to Adult 8/1/2017 HEMOGLOBIN A1C Q6M 3/27/2018 LIPID PANEL Q1 9/27/2018 BREAST CANCER SCRN MAMMOGRAM 10/5/2018 Allergies as of 11/17/2017  Review Complete On: 11/3/2017 By: Gregg Hong MD  
  
 Severity Noted Reaction Type Reactions Codeine  03/01/2017    Nausea Only Sulfa (Sulfonamide Antibiotics)  01/27/2012   Side Effect Rash Current Immunizations  Never Reviewed No immunizations on file. Not reviewed this visit Vitals BP Pulse Height(growth percentile) Weight(growth percentile) SpO2 BMI  
 (!) 190/102 (BP 1 Location: Left arm, BP Patient Position: Sitting) 67 5' 9\" (1.753 m) 213 lb (96.6 kg) 97% 31.45 kg/m2 OB Status Smoking Status Postmenopausal Never Smoker BMI and BSA Data Body Mass Index Body Surface Area  
 31.45 kg/m 2 2.17 m 2 Your Updated Medication List  
  
   
This list is accurate as of: 11/17/17  2:26 PM.  Always use your most recent med list.  
  
  
  
  
 [address] Take 1 Tab by mouth daily. aspirin delayed-release 81 mg tablet Take 81 mg by mouth daily. carvedilol 25 mg tablet Commonly known as:  Juline Balzarine Take 1 Tab by mouth two (2) times daily (with meals). cefdinir 300 mg capsule Commonly known as:  OMNICEF Take 300 mg by mouth two (2) times a day. DULERA 200-5 mcg/actuation HFA inhaler Generic drug:  mometasone-formoterol Take 2 Puffs by inhalation two (2) times a day. fluticasone 50 mcg/actuation nasal spray Commonly known as:  Elihue Caller 2 Sprays by Both Nostrils route daily. furosemide 40 mg tablet Commonly known as:  LASIX Take 1 Tab by mouth daily. gabapentin 100 mg capsule Commonly known as:  NEURONTIN  
1 cap po twice daily with breakfast and dinner, and 3 tabs po qHS  
  
 hydrALAZINE 50 mg tablet Commonly known as:  APRESOLINE Take 1 Tab by mouth three (3) times daily. insulin lispro 100 unit/mL kwikpen Commonly known as:  HUMALOG  
15 Units by SubCUTAneous route daily. isosorbide dinitrate 10 mg tablet Commonly known as:  ISORDIL Take 1 Tab by mouth three (3) times daily. LANTUS 100 unit/mL injection Generic drug:  insulin glargine 45 Units by SubCUTAneous route daily. 15 units in the evening. pravastatin 20 mg tablet Commonly known as:  PRAVACHOL Take 20 mg by mouth nightly. [address] [facility] OP Administer 1 Drop to both eyes six (6) times daily. sodium chloride 0.65 % nasal spray Commonly known as:  OCEAN  
2 Sprays by Both Nostrils route every two (2) hours as needed. spironolactone 25 mg tablet Commonly known as:  ALDACTONE Take 12.5 mg by mouth daily. valsartan 160 mg tablet Commonly known as:  DIOVAN Take 1 Tab by mouth daily. VENTOLIN HFA 90 mcg/actuation inhaler Generic drug:  albuterol Take 2 Puffs by inhalation every four (4) hours as needed for Wheezing or Shortness of Breath. Introducing [facility]! Blas Garcia introduces [employer] patient portal. Now you can access parts of your medical record, email your doctor's office, and request medication refills online. 1. In your internet browser, go to [email or web address] 2. Click on the First Time User? Click Here link in the Sign In box. You will see the New Member Sign Up page. 3. Enter your [employer] Access Code exactly as it appears below. You will not need to use this code after youve completed the sign-up process. If you do not sign up before the expiration date, you must request a new code. · [employer] Access Code: [email or web address] Expires: 12/26/2017 12:49 PM 
 
4. Enter the last four digits of your Social Security Number (xxxx) and Date of Birth (mm/dd/yyyy) as indicated and click Submit. You will be taken to the next sign-up page. 5. Create a [employer] ID. This will be your [employer] login ID and cannot be changed, so think of one that is secure and easy to remember. 6. Create a [employer] password. You can change your password at any time. 7. Enter your Password Reset Question and Answer. This can be used at a later time if you forget your password. 8. Enter your e-mail address. You will receive e-mail notification when new information is available in [address] 9. Click Sign Up. You can now view and download portions of your medical record. 10. Click the Download Summary menu link to download a portable copy of your medical information. If you have questions, please visit the Frequently Asked Questions section of the [employer] website. Remember, [employer] is NOT to be used for urgent needs. For medical emergencies, dial 911. Now available from your iPhone and Android! Please provide this summary of care documentation to your next provider. Your primary care clinician is listed as Chanell Gamez. If you have any questions after today's visit, please call [phone].

## 2017-11-17 NOTE — TELEPHONE ENCOUNTER
Copy of patient's lab results received from PCP office. Loni Sandy RN aware and Dr. Diogo Betancourt has been paged to call patient.   Lin Eisenberg

## 2017-11-17 NOTE — PROGRESS NOTES
1. Have you been to the ER, urgent care clinic since your last visit? Hospitalized since your last visit? no  2. Have you seen or consulted any other health care providers outside of the [address] [place] [address] since your last visit? Include any pap smears or colon screening.   no

## 2017-11-20 RX ORDER — HYDRALAZINE HYDROCHLORIDE 50 MG/1
100 TABLET, FILM COATED ORAL 3 TIMES DAILY
Qty: 1 TAB | Refills: 0
Start: 2017-11-20 | End: 2017-11-21 | Stop reason: SDUPTHER

## 2017-11-20 RX ORDER — FUROSEMIDE 40 MG/1
40 TABLET ORAL DAILY
Qty: 1 TAB | Refills: 0
Start: 2017-11-20 | End: 2017-11-21 | Stop reason: SDUPTHER

## 2017-11-20 NOTE — TELEPHONE ENCOUNTER
Verbal order and read back per Ania Mullins MD   Increase Lasix to 80 mg daily (take two 40 mg tablet daily)  Increase Hydralazine to 100 mg three times a day (take two 50 mg tablets three times a day)  1 month follow up    Patient aware of the above verbalized understanding.   Kapil Davis

## 2017-11-20 NOTE — PROGRESS NOTES
PATIENT NAME: Ganesh Rao         57 y.o.      1955              DATE:11/17/2017    REASON FOR VISIT: Congestive heart failure    HISTORY OF PRESENT ILLNESS: The patient has not improved. Shortness of breath on minimal exertion. Edema in the lower extremities. Denies chest pain. Denies palpitation, syncope, presyncope. I have obtained the labs that were done recently. Renal function is stable. Electrolytes within normal limits    Blood pressure is poorly controlled. PAST MEDICAL HISTORY:   Past Medical History:  No date: Arthritis  No date: Asthma  No date: Cataract  No date: Diabetes mellitus ([facility] [address]  12/29/2009: History of echocardiogram      Comment: EF 50%. Mild-mod conc LVH. Mod DDfx. LAE. Mild MR. No date: Hypercholesterolemia  No date: Hypertension  No date: Hypertensive heart disease  09/08/2000: Normal nuclear stress test      Comment: No ischemia or prior infarction. Neg EKG on                submax EST. Ex time 15:02. PAST SURGICAL HISTORY:   Past Surgical History:  2001: HX CHOLECYSTECTOMY  1975: HX TUBAL LIGATION      SOCIAL HISTORY:  Social History    Marital status:             Spouse name:                       Years of education:                 Number of children:               Social History Main Topics    Smoking status: Never Smoker                                                                Smokeless status: Never Used                        Alcohol use: No              Drug use: No                ALLERGIES:    -- Codeine -- Nausea Only   -- Sulfa (Sulfonamide Antibiotics) -- Rash     CURRENT MEDICATIONS:   Current Outpatient Prescriptions:  hydrALAZINE (APRESOLINE) 50 mg tablet, Take 1 Tab by mouth three (3) times daily. carvedilol (COREG) 25 mg tablet, Take 1 Tab by mouth two (2) times daily (with meals). furosemide (LASIX) 40 mg tablet, Take 1 Tab by mouth daily.   MULTIVIT/IRON/FA/K/HERB NO.244 (ALIVE WOMEN'S ENERGY PO), Take 1 Tab by mouth daily. insulin lispro (HUMALOG) 100 unit/mL kwikpen, 15 Units by SubCUTAneous route daily. CARBOXYMETHYLCELLULOS/GLYCERIN (REFRESH OPTIVE OP), Administer 1 Drop to both eyes six (6) times daily. cefdinir (OMNICEF) 300 mg capsule, Take 300 mg by mouth two (2) times a day. albuterol (VENTOLIN HFA) 90 mcg/actuation inhaler, Take 2 Puffs by inhalation every four (4) hours as needed for Wheezing or Shortness of Breath.  gabapentin (NEURONTIN) 100 mg capsule, 1 cap po twice daily with breakfast and dinner, and 3 tabs po qHS  fluticasone (FLONASE) 50 mcg/actuation nasal spray, 2 Sprays by Both Nostrils route daily. isosorbide dinitrate (ISORDIL) 10 mg tablet, Take 1 Tab by mouth three (3) times daily. sodium chloride (OCEAN) 0.65 % nasal spray, 2 Sprays by Both Nostrils route every two (2) hours as needed. valsartan (DIOVAN) 160 mg tablet, Take 1 Tab by mouth daily. aspirin delayed-release 81 mg tablet, Take 81 mg by mouth daily. pravastatin (PRAVACHOL) 20 mg tablet, Take 20 mg by mouth nightly. spironolactone (ALDACTONE) 25 mg tablet, Take 12.5 mg by mouth daily. mometasone-formoterol (DULERA) 200-5 mcg/actuation HFA inhaler, Take 2 Puffs by inhalation two (2) times a day. insulin glargine (LANTUS) 100 unit/mL injection, 45 Units by SubCUTAneous route daily. 15 units in the evening. No current facility-administered medications for this visit.        REVIEW of SYSTEMS:History obtained from chart review and the patient  General ROS: 5 pound weight loss since last visit  Respiratory ROS: See history of present illness  Cardiovascular ROS: See history of present illness     PHYSICAL EXAMINATION:   BP (!) 190/102 (BP 1 Location: Left arm, BP Patient Position: Sitting)  Pulse 67  Ht 5' 9\" (1.753 m)  Wt 96.6 kg (213 lb)  SpO2 97%  BMI 31.45 kg/m2  BP Readings from Last 3 Encounters:  11/17/17 : (!) 190/102  11/03/17 : (!) 170/110  09/27/17 : 155/71    Pulse Readings from Last 3 Encounters:  11/17/17 : 67  11/03/17 : 82  09/27/17 : 76    Wt Readings from Last 3 Encounters:  11/17/17 : 96.6 kg (213 lb)  11/03/17 : 98.9 kg (218 lb)  09/27/17 : 95.1 kg (209 lb 9.6 oz)    General: Obese -American female no apparent distress. Neck: No jugular venous distention. Chest: Few rales at the left base. Heart: Regular rhythm. No gallop audible. Extremities: 2+ edema. Skin: Warm and dry. No stasis changes      IMPRESSION:   Chronic systolic congestive heart failure, decompensated  Hypertension, poorly controlled  Renal insufficiency, stable    PLAN:  Increase Lasix to 80 mg p.o. daily  Increase hydralazine to 100 mg p.o. 3 times daily  Return to office in 2 weeks    The diagnoses and plan were discussed with patient. All questions answered. Plan of care agreed to by all concerned. Rickey Dacosta.  MD Josr       ,

## 2017-11-21 RX ORDER — HYDRALAZINE HYDROCHLORIDE 100 MG/1
100 TABLET, FILM COATED ORAL 3 TIMES DAILY
Qty: 270 TAB | Refills: 3 | Status: SHIPPED | OUTPATIENT
Start: 2017-11-21 | End: 2017-12-21 | Stop reason: SDUPTHER

## 2017-11-21 RX ORDER — FUROSEMIDE 80 MG/1
80 TABLET ORAL DAILY
Qty: 90 TAB | Refills: 3 | Status: SHIPPED | OUTPATIENT
Start: 2017-11-21 | End: 2018-02-08 | Stop reason: CLARIF

## 2017-12-14 PROBLEM — E11.21 TYPE 2 DIABETES MELLITUS WITH NEPHROPATHY (HCC): Status: ACTIVE | Noted: 2017-12-14

## 2017-12-21 ENCOUNTER — OFFICE VISIT (OUTPATIENT)
Dept: CARDIOLOGY CLINIC | Age: 62
End: 2017-12-21

## 2017-12-21 ENCOUNTER — HOSPITAL ENCOUNTER (OUTPATIENT)
Dept: LAB | Age: 62
Discharge: HOME | End: 2017-12-21

## 2017-12-21 VITALS
WEIGHT: 206 LBS | DIASTOLIC BLOOD PRESSURE: 100 MMHG | HEART RATE: 70 BPM | OXYGEN SATURATION: 98 % | BODY MASS INDEX: 30.51 KG/M2 | SYSTOLIC BLOOD PRESSURE: 150 MMHG | HEIGHT: 69 IN

## 2017-12-21 DIAGNOSIS — I10 ESSENTIAL HYPERTENSION, BENIGN: ICD-10-CM

## 2017-12-21 DIAGNOSIS — I50.22 CHRONIC SYSTOLIC CONGESTIVE HEART FAILURE (HCC): Primary | Chronic | ICD-10-CM

## 2017-12-21 PROCEDURE — 99001 SPECIMEN HANDLING PT-LAB: CPT

## 2017-12-21 RX ORDER — HYDRALAZINE HYDROCHLORIDE 100 MG/1
100 TABLET, FILM COATED ORAL 4 TIMES DAILY
Qty: 4 TAB | Refills: 0
Start: 2017-12-21 | End: 2018-01-26 | Stop reason: SDUPTHER

## 2017-12-21 NOTE — PROGRESS NOTES
1. Have you been to the ER, urgent care clinic since your last visit? Hospitalized since your last visit? No     2. Have you seen or consulted any other health care providers outside of the [address] [place] [address] since your last visit? Include any pap smears or colon screening.  No

## 2017-12-21 NOTE — MR AVS SNAPSHOT
Visit Information Date & Time Provider Department Dept. Phone Encounter #  
 12/21/2017 10:00 AM Dale Upton MD [facility] [address] [id number] Upcoming Health Maintenance Date Due Hepatitis C Screening 1955 FOOT EXAM Q1 1/21/1963 MICROALBUMIN Q1 1/21/1963 EYE EXAM RETINAL OR DILATED Q1 1/21/1963 Pneumococcal 19-64 Medium Risk (1 of 1 - PPSV23) 1/21/1972 DTaP/Tdap/Td series (1 - Tdap) 1/21/1974 PAP AKA CERVICAL CYTOLOGY 1/21/1974 FOBT Q 1 YEAR AGE 50-75 1/21/2003 ZOSTER VACCINE AGE 60> 11/21/2012 Influenza Age 5 to Adult 8/1/2017 HEMOGLOBIN A1C Q6M 3/27/2018 LIPID PANEL Q1 9/27/2018 Allergies as of 12/21/2017  Review Complete On: 11/3/2017 By: Dale Upton MD  
  
 Severity Noted Reaction Type Reactions Codeine  03/01/2017    Nausea Only Sulfa (Sulfonamide Antibiotics)  01/27/2012   Side Effect Rash Current Immunizations  Never Reviewed No immunizations on file. Not reviewed this visit You Were Diagnosed With   
  
 Codes Comments Chronic systolic congestive heart failure (HCC)    -  Primary ICD-10-CM: R96.81 ICD-9-CM: 428.22, 428.0 Vitals BP Pulse Height(growth percentile) Weight(growth percentile) SpO2 BMI  
 (!) 150/100 70 5' 9\" (1.753 m) 206 lb (93.4 kg) 98% 30.42 kg/m2 OB Status Smoking Status Postmenopausal Never Smoker Vitals History BMI and BSA Data Body Mass Index Body Surface Area  
 30.42 kg/m 2 2.13 m 2 Preferred Pharmacy Pharmacy Name Phone [facility] - [address] [phone] Your Updated Medication List  
  
   
This list is accurate as of: 12/21/17 11:19 AM.  Always use your most recent med list.  
  
  
  
  
 [address] Take 1 Tab by mouth daily. aspirin delayed-release 81 mg tablet Take 81 mg by mouth daily. carvedilol 25 mg tablet Commonly known as:  Gonzalez Servant Take 1 Tab by mouth two (2) times daily (with meals). cefdinir 300 mg capsule Commonly known as:  OMNICEF Take 300 mg by mouth two (2) times a day. DULERA 200-5 mcg/actuation HFA inhaler Generic drug:  mometasone-formoterol Take 2 Puffs by inhalation two (2) times a day. fluticasone 50 mcg/actuation nasal spray Commonly known as:  Sher Riser 2 Sprays by Both Nostrils route daily. furosemide 80 mg tablet Commonly known as:  LASIX Take 1 Tab by mouth daily. gabapentin 100 mg capsule Commonly known as:  NEURONTIN  
1 cap po twice daily with breakfast and dinner, and 3 tabs po qHS  
  
 hydrALAZINE 100 mg tablet Commonly known as:  APRESOLINE Take 1 Tab by mouth three (3) times daily. insulin lispro 100 unit/mL kwikpen Commonly known as:  HUMALOG  
15 Units by SubCUTAneous route daily. isosorbide dinitrate 10 mg tablet Commonly known as:  ISORDIL Take 1 Tab by mouth three (3) times daily. LANTUS 100 unit/mL injection Generic drug:  insulin glargine 45 Units by SubCUTAneous route daily. 15 units in the evening. pravastatin 20 mg tablet Commonly known as:  PRAVACHOL Take 20 mg by mouth nightly. [address] [facility] OP Administer 1 Drop to both eyes six (6) times daily. sodium chloride 0.65 % nasal spray Commonly known as:  OCEAN  
2 Sprays by Both Nostrils route every two (2) hours as needed. spironolactone 25 mg tablet Commonly known as:  ALDACTONE Take 12.5 mg by mouth daily. valsartan 160 mg tablet Commonly known as:  DIOVAN Take 1 Tab by mouth daily. VENTOLIN HFA 90 mcg/actuation inhaler Generic drug:  albuterol Take 2 Puffs by inhalation every four (4) hours as needed for Wheezing or Shortness of Breath. To-Do List   
 12/21/2017 Lab:  CBC WITH AUTOMATED DIFF   
  
 12/21/2017   Lab:  METABOLIC PANEL, BASIC   
  
  
 Patient Instructions Stop by over at the lab for CBC and BMP Please start taking Hydralazine 100 mg 4 times a day Follow up in 1 month Introducing [facility]! Mario Lechuga introduces [employer] patient portal. Now you can access parts of your medical record, email your doctor's office, and request medication refills online. 1. In your internet browser, go to [email or web address] 2. Click on the First Time User? Click Here link in the Sign In box. You will see the New Member Sign Up page. 3. Enter your [employer] Access Code exactly as it appears below. You will not need to use this code after youve completed the sign-up process. If you do not sign up before the expiration date, you must request a new code. · [employer] Access Code: [email or web address] Expires: 12/26/2017 12:49 PM 
 
4. Enter the last four digits of your Social Security Number (xxxx) and Date of Birth (mm/dd/yyyy) as indicated and click Submit. You will be taken to the next sign-up page. 5. Create a [employer] ID. This will be your [employer] login ID and cannot be changed, so think of one that is secure and easy to remember. 6. Create a [employer] password. You can change your password at any time. 7. Enter your Password Reset Question and Answer. This can be used at a later time if you forget your password. 8. Enter your e-mail address. You will receive e-mail notification when new information is available in [address] 9. Click Sign Up. You can now view and download portions of your medical record. 10. Click the Download Summary menu link to download a portable copy of your medical information. If you have questions, please visit the Frequently Asked Questions section of the [employer] website. Remember, [employer] is NOT to be used for urgent needs. For medical emergencies, dial 911. Now available from your iPhone and Android! Please provide this summary of care documentation to your next provider. Your primary care clinician is listed as Chanell Gamez. If you have any questions after today's visit, please call [phone].

## 2017-12-22 LAB
BASOPHILS # BLD AUTO: 0 X10E3/UL (ref 0–0.2)
BASOPHILS NFR BLD AUTO: 1 %
BUN SERPL-MCNC: 23 MG/DL (ref 8–27)
BUN/CREAT SERPL: 17 (ref 12–28)
CALCIUM SERPL-MCNC: 8.8 MG/DL (ref 8.7–10.3)
CHLORIDE SERPL-SCNC: 102 MMOL/L (ref 96–106)
CO2 SERPL-SCNC: 26 MMOL/L (ref 18–29)
CREAT SERPL-MCNC: 1.34 MG/DL (ref 0.57–1)
EOSINOPHIL # BLD AUTO: 0.8 X10E3/UL (ref 0–0.4)
EOSINOPHIL NFR BLD AUTO: 15 %
ERYTHROCYTE [DISTWIDTH] IN BLOOD BY AUTOMATED COUNT: 16.4 % (ref 12.3–15.4)
GLUCOSE SERPL-MCNC: 166 MG/DL (ref 65–99)
HCT VFR BLD AUTO: 34 % (ref 34–46.6)
HGB BLD-MCNC: 10.5 G/DL (ref 11.1–15.9)
IMM GRANULOCYTES # BLD: 0 X10E3/UL (ref 0–0.1)
IMM GRANULOCYTES NFR BLD: 0 %
LYMPHOCYTES # BLD AUTO: 1 X10E3/UL (ref 0.7–3.1)
LYMPHOCYTES NFR BLD AUTO: 19 %
MCH RBC QN AUTO: 26.5 PG (ref 26.6–33)
MCHC RBC AUTO-ENTMCNC: 30.9 G/DL (ref 31.5–35.7)
MCV RBC AUTO: 86 FL (ref 79–97)
MONOCYTES # BLD AUTO: 0.4 X10E3/UL (ref 0.1–0.9)
MONOCYTES NFR BLD AUTO: 8 %
NEUTROPHILS # BLD AUTO: 2.9 X10E3/UL (ref 1.4–7)
NEUTROPHILS NFR BLD AUTO: 57 %
PLATELET # BLD AUTO: 134 X10E3/UL (ref 150–379)
POTASSIUM SERPL-SCNC: 3.4 MMOL/L (ref 3.5–5.2)
RBC # BLD AUTO: 3.96 X10E6/UL (ref 3.77–5.28)
SODIUM SERPL-SCNC: 143 MMOL/L (ref 134–144)
WBC # BLD AUTO: 5.2 X10E3/UL (ref 3.4–10.8)

## 2018-01-26 ENCOUNTER — OFFICE VISIT (OUTPATIENT)
Dept: CARDIOLOGY CLINIC | Age: 63
End: 2018-01-26

## 2018-01-26 ENCOUNTER — HOSPITAL ENCOUNTER (OUTPATIENT)
Dept: LAB | Age: 63
Discharge: HOME | End: 2018-01-26

## 2018-01-26 VITALS
HEIGHT: 69 IN | WEIGHT: 182 LBS | BODY MASS INDEX: 26.96 KG/M2 | OXYGEN SATURATION: 97 % | RESPIRATION RATE: 16 BRPM | HEART RATE: 82 BPM | DIASTOLIC BLOOD PRESSURE: 98 MMHG | SYSTOLIC BLOOD PRESSURE: 160 MMHG

## 2018-01-26 DIAGNOSIS — I10 ESSENTIAL HYPERTENSION, BENIGN: ICD-10-CM

## 2018-01-26 DIAGNOSIS — E11.21 TYPE 2 DIABETES MELLITUS WITH NEPHROPATHY (HCC): ICD-10-CM

## 2018-01-26 DIAGNOSIS — I50.22 CHRONIC SYSTOLIC CONGESTIVE HEART FAILURE (HCC): Primary | Chronic | ICD-10-CM

## 2018-01-26 PROCEDURE — 99001 SPECIMEN HANDLING PT-LAB: CPT

## 2018-01-26 RX ORDER — HYDRALAZINE HYDROCHLORIDE 100 MG/1
100 TABLET, FILM COATED ORAL 3 TIMES DAILY
Qty: 4 TAB | Refills: 0 | Status: SHIPPED | OUTPATIENT
Start: 2018-01-26 | End: 2018-07-05 | Stop reason: SDUPTHER

## 2018-01-26 NOTE — PROGRESS NOTES
1. Have you been to the ER, urgent care clinic since your last visit? Hospitalized since your last visit? No    2. Have you seen or consulted any other health care providers outside of the [address] [place] [address] since your last visit? Include any pap smears or colon screening.  No

## 2018-01-26 NOTE — MR AVS SNAPSHOT
[address] [place] [address] Kamari Sunil [id number] 
[phone] Patient: Shayla Piedra MRN: [id number] FPT:2/26/[birth year]324 Visit Information Date & Time Provider Department Dept. Phone Encounter #  
 1/26/2018  1:00 PM Kezia Martin MD [facility] [address] [id number] Follow-up Instructions Return in about 2 weeks (around 2/9/2018). Upcoming Health Maintenance Date Due Hepatitis C Screening 1955 FOOT EXAM Q1 1/21/1963 MICROALBUMIN Q1 1/21/1963 EYE EXAM RETINAL OR DILATED Q1 1/21/1963 DTaP/Tdap/Td series (1 - Tdap) 1/21/1974 PAP AKA CERVICAL CYTOLOGY 1/21/1974 FOBT Q 1 YEAR AGE 50-75 1/21/2003 ZOSTER VACCINE AGE 60> 11/21/2012 Influenza Age 5 to Adult 8/1/2017 GLAUCOMA SCREENING Q2Y 1/21/2018 Pneumococcal 65+ Low/Medium Risk (1 of 2 - PCV13) 1/21/2018 MEDICARE YEARLY EXAM 1/21/2018 HEMOGLOBIN A1C Q6M 3/27/2018 LIPID PANEL Q1 9/27/2018 BREAST CANCER SCRN MAMMOGRAM 10/5/2018 Allergies as of 1/26/2018  Review Complete On: 1/26/2018 By: Gwen Nelson Severity Noted Reaction Type Reactions Codeine  03/01/2017    Nausea Only Sulfa (Sulfonamide Antibiotics)  01/27/2012   Side Effect Rash Current Immunizations  Never Reviewed No immunizations on file. Not reviewed this visit You Were Diagnosed With   
  
 Codes Comments Chronic systolic congestive heart failure (HCC)    -  Primary ICD-10-CM: O39.45 ICD-9-CM: 428.22, 428.0 Essential hypertension, benign     ICD-10-CM: I10 
ICD-9-CM: 401.1 Type 2 diabetes mellitus with nephropathy (HCC)     ICD-10-CM: E11.21 
ICD-9-CM: 250.40, 583.81 Vitals BP Pulse Resp Height(growth percentile) Weight(growth percentile) SpO2  
 (!) 160/98 (BP 1 Location: Left arm, BP Patient Position: Sitting) 82 16 5' 9.02\" (1.753 m) 182 lb (82.6 kg) 97% BMI OB Status Smoking Status 26.86 kg/m2 Postmenopausal Never Smoker BMI and BSA Data Body Mass Index Body Surface Area  
 26.86 kg/m 2 2.01 m 2 Preferred Pharmacy Pharmacy Name Phone [address] [place] [address]. [phone] Your Updated Medication List  
  
   
This list is accurate as of: 1/26/18  1:34 PM.  Always use your most recent med list.  
  
  
  
  
 [address] Take 1 Tab by mouth daily. aspirin delayed-release 81 mg tablet Take 81 mg by mouth daily. carvedilol 25 mg tablet Commonly known as:  Christopher Poole Take 1 Tab by mouth two (2) times daily (with meals). cefdinir 300 mg capsule Commonly known as:  OMNICEF Take 300 mg by mouth two (2) times a day. DULERA 200-5 mcg/actuation HFA inhaler Generic drug:  mometasone-formoterol Take 2 Puffs by inhalation two (2) times a day. fluticasone 50 mcg/actuation nasal spray Commonly known as:  Chelsey Paget 2 Sprays by Both Nostrils route daily. furosemide 80 mg tablet Commonly known as:  LASIX Take 1 Tab by mouth daily. gabapentin 100 mg capsule Commonly known as:  NEURONTIN  
1 cap po twice daily with breakfast and dinner, and 3 tabs po qHS  
  
 hydrALAZINE 100 mg tablet Commonly known as:  APRESOLINE Take 1 Tab by mouth four (4) times daily. insulin lispro 100 unit/mL kwikpen Commonly known as:  HUMALOG  
15 Units by SubCUTAneous route daily. isosorbide dinitrate 10 mg tablet Commonly known as:  ISORDIL Take 1 Tab by mouth three (3) times daily. LANTUS 100 unit/mL injection Generic drug:  insulin glargine 45 Units by SubCUTAneous route daily. 15 units in the evening. pravastatin 20 mg tablet Commonly known as:  PRAVACHOL Take 20 mg by mouth nightly. [address] [facility] OP Administer 1 Drop to both eyes six (6) times daily. sodium chloride 0.65 % nasal spray Commonly known as:  OCEAN  
2 Sprays by Both Nostrils route every two (2) hours as needed. spironolactone 25 mg tablet Commonly known as:  ALDACTONE Take 12.5 mg by mouth daily. valsartan 160 mg tablet Commonly known as:  DIOVAN Take 1 Tab by mouth daily. VENTOLIN HFA 90 mcg/actuation inhaler Generic drug:  albuterol Take 2 Puffs by inhalation every four (4) hours as needed for Wheezing or Shortness of Breath. We Performed the Following AMB POC EKG ROUTINE W/ 12 LEADS, INTER & REP [57608 CPT(R)] Follow-up Instructions Return in about 2 weeks (around 2/9/2018). To-Do List   
 01/26/2018 Lab:  CBC WITH AUTOMATED DIFF   
  
 01/26/2018 Lab:  METABOLIC PANEL, BASIC Introducing [facility]! Julia Levy introduces [employer] patient portal. Now you can access parts of your medical record, email your doctor's office, and request medication refills online. 1. In your internet browser, go to [email or web address] 2. Click on the First Time User? Click Here link in the Sign In box. You will see the New Member Sign Up page. 3. Enter your [employer] Access Code exactly as it appears below. You will not need to use this code after youve completed the sign-up process. If you do not sign up before the expiration date, you must request a new code. · [employer] Access Code: [email or web address] Expires: 4/26/2018  1:34 PM 
 
4. Enter the last four digits of your Social Security Number (xxxx) and Date of Birth (mm/dd/yyyy) as indicated and click Submit. You will be taken to the next sign-up page. 5. Create a [employer] ID. This will be your [employer] login ID and cannot be changed, so think of one that is secure and easy to remember. 6. Create a [employer] password. You can change your password at any time. 7. Enter your Password Reset Question and Answer. This can be used at a later time if you forget your password. 8. Enter your e-mail address. You will receive e-mail notification when new information is available in [address] 9. Click Sign Up. You can now view and download portions of your medical record. 10. Click the Download Summary menu link to download a portable copy of your medical information. If you have questions, please visit the Frequently Asked Questions section of the [employer] website. Remember, [employer] is NOT to be used for urgent needs. For medical emergencies, dial 911. Now available from your iPhone and Android! Please provide this summary of care documentation to your next provider. Your primary care clinician is listed as Chanell Gamez. If you have any questions after today's visit, please call [phone].

## 2018-01-26 NOTE — PROGRESS NOTES
PATIENT NAME: Verner Corona         70 y.o.      1955              DATE:1/26/2018    REASON FOR VISIT: Follow-up on congestive heart failure    HISTORY OF PRESENT ILLNESS: Remain short of breath on minimal exertion. Experiencing lightheadedness and falls. Denies syncope. Denies chest pain and presyncope. Has a cough productive of thick white sputum. PAST MEDICAL HISTORY:   Past Medical History:  No date: Arthritis  No date: Asthma  No date: Cataract  No date: Diabetes mellitus ([facility] [address]  12/29/2009: History of echocardiogram      Comment: EF 50%. Mild-mod conc LVH. Mod DDfx. LAE. Mild MR. No date: Hypercholesterolemia  No date: Hypertension  No date: Hypertensive heart disease  09/08/2000: Normal nuclear stress test      Comment: No ischemia or prior infarction. Neg EKG on                submax EST. Ex time 15:02. PAST SURGICAL HISTORY:   Past Surgical History:  2001: HX CHOLECYSTECTOMY  1975: HX TUBAL LIGATION      SOCIAL HISTORY:  Social History    Marital status:             Spouse name:                       Years of education:                 Number of children:               Social History Main Topics    Smoking status: Never Smoker                                                                Smokeless status: Never Used                        Alcohol use: No              Drug use: No                ALLERGIES:    -- Codeine -- Nausea Only   -- Sulfa (Sulfonamide Antibiotics) -- Rash     CURRENT MEDICATIONS:   Current Outpatient Prescriptions:  hydrALAZINE (APRESOLINE) 100 mg tablet, Take 1 Tab by mouth four (4) times daily. furosemide (LASIX) 80 mg tablet, Take 1 Tab by mouth daily. carvedilol (COREG) 25 mg tablet, Take 1 Tab by mouth two (2) times daily (with meals). MULTIVIT/IRON/FA/K/HERB NO.244 (ALIVE WOMEN'S ENERGY PO), Take 1 Tab by mouth daily.   insulin lispro (HUMALOG) 100 unit/mL kwikpen, 15 Units by SubCUTAneous route daily.  CARBOXYMETHYLCELLULOS/GLYCERIN (REFRESH OPTIVE OP), Administer 1 Drop to both eyes six (6) times daily. cefdinir (OMNICEF) 300 mg capsule, Take 300 mg by mouth two (2) times a day. albuterol (VENTOLIN HFA) 90 mcg/actuation inhaler, Take 2 Puffs by inhalation every four (4) hours as needed for Wheezing or Shortness of Breath.  gabapentin (NEURONTIN) 100 mg capsule, 1 cap po twice daily with breakfast and dinner, and 3 tabs po qHS  fluticasone (FLONASE) 50 mcg/actuation nasal spray, 2 Sprays by Both Nostrils route daily. isosorbide dinitrate (ISORDIL) 10 mg tablet, Take 1 Tab by mouth three (3) times daily. sodium chloride (OCEAN) 0.65 % nasal spray, 2 Sprays by Both Nostrils route every two (2) hours as needed. valsartan (DIOVAN) 160 mg tablet, Take 1 Tab by mouth daily. aspirin delayed-release 81 mg tablet, Take 81 mg by mouth daily. pravastatin (PRAVACHOL) 20 mg tablet, Take 20 mg by mouth nightly. spironolactone (ALDACTONE) 25 mg tablet, Take 12.5 mg by mouth daily. mometasone-formoterol (DULERA) 200-5 mcg/actuation HFA inhaler, Take 2 Puffs by inhalation two (2) times a day. insulin glargine (LANTUS) 100 unit/mL injection, 45 Units by SubCUTAneous route daily. 15 units in the evening. No current facility-administered medications for this visit.        REVIEW of SYSTEMS:History obtained from chart review and the patient  General ROS: negative for - weight gain or weight loss  Hematological and Lymphatic ROS: negative for - bleeding problems  Respiratory ROS: See history of present illness  Cardiovascular ROS: History of present illness  Musculoskeletal ROS: negative     PHYSICAL EXAMINATION:   BP (!) 160/98 (BP 1 Location: Left arm, BP Patient Position: Sitting)  Pulse 82  Resp 16  Ht 5' 9.02\" (1.753 m)  Wt 82.6 kg (182 lb)  SpO2 97%  BMI 26.86 kg/m2  BP Readings from Last 3 Encounters:  01/26/18 : (!) 160/98  12/21/17 : (!) 150/100  11/17/17 : (!) 190/102    Pulse Readings from Last 3 Encounters:  01/26/18 : 82  12/21/17 : 70  11/17/17 : 67    Wt Readings from Last 3 Encounters:  01/26/18 : 82.6 kg (182 lb)  12/21/17 : 93.4 kg (206 lb)  11/17/17 : 96.6 kg (213 lb)    General: Well-developed -American female in no apparent distress. HEENT: Sclera clear. Mucous membranes pink and moist.  Neck: No jugular venous distention. Carotid upstrokes 2+ without bruits. Chest: Clear to  auscultation. Heart: PMI not palpable. Regular rhythm. No murmur or gallop. .  Extremities: No edema. Skin: Warm and dry. No stasis changes. Neuro: Alert, oriented, speech WNL, no facial asymmetry. .    EKG: Possible anterior myocardial infarction age-indeterminate. Nonspecific ST-T changes    IMPRESSION:   Chronic obstructive pulmonary disease  Congestive heart failure, chronic diastolic  Hypertension  Weakness fatigue unknown etiology    PLAN:  Discontinue valsartan  Decrease Lasix to 80 mg daily  Discontinue Spironolactone  Decrease hydralazine to 100 mg 3 times daily  Basic metabolic panel  Return in 2 weeks    The diagnoses and plan were discussed with patient and caregiver. All questions answered. Plan of care agreed to by all concerned. Jose Ovalles MD       ,

## 2018-01-27 LAB
BASOPHILS # BLD AUTO: 0.1 X10E3/UL (ref 0–0.2)
BASOPHILS NFR BLD AUTO: 1 %
BUN SERPL-MCNC: 51 MG/DL (ref 8–27)
BUN/CREAT SERPL: 24 (ref 12–28)
CALCIUM SERPL-MCNC: 9.5 MG/DL (ref 8.7–10.3)
CHLORIDE SERPL-SCNC: 96 MMOL/L (ref 96–106)
CO2 SERPL-SCNC: 28 MMOL/L (ref 18–29)
CREAT SERPL-MCNC: 2.12 MG/DL (ref 0.57–1)
EOSINOPHIL # BLD AUTO: 1 X10E3/UL (ref 0–0.4)
EOSINOPHIL NFR BLD AUTO: 18 %
ERYTHROCYTE [DISTWIDTH] IN BLOOD BY AUTOMATED COUNT: 15.6 % (ref 12.3–15.4)
GFR SERPLBLD CREATININE-BSD FMLA CKD-EPI: 24 ML/MIN/1.73
GFR SERPLBLD CREATININE-BSD FMLA CKD-EPI: 28 ML/MIN/1.73
GLUCOSE SERPL-MCNC: 190 MG/DL (ref 65–99)
HCT VFR BLD AUTO: 38.3 % (ref 34–46.6)
HGB BLD-MCNC: 12 G/DL (ref 11.1–15.9)
IMM GRANULOCYTES # BLD: 0 X10E3/UL (ref 0–0.1)
IMM GRANULOCYTES NFR BLD: 0 %
LYMPHOCYTES # BLD AUTO: 1 X10E3/UL (ref 0.7–3.1)
LYMPHOCYTES NFR BLD AUTO: 18 %
MCH RBC QN AUTO: 26.3 PG (ref 26.6–33)
MCHC RBC AUTO-ENTMCNC: 31.3 G/DL (ref 31.5–35.7)
MCV RBC AUTO: 84 FL (ref 79–97)
MONOCYTES # BLD AUTO: 0.4 X10E3/UL (ref 0.1–0.9)
MONOCYTES NFR BLD AUTO: 7 %
NEUTROPHILS # BLD AUTO: 3.1 X10E3/UL (ref 1.4–7)
NEUTROPHILS NFR BLD AUTO: 56 %
PLATELET # BLD AUTO: 137 X10E3/UL (ref 150–379)
POTASSIUM SERPL-SCNC: 3.8 MMOL/L (ref 3.5–5.2)
RBC # BLD AUTO: 4.57 X10E6/UL (ref 3.77–5.28)
SODIUM SERPL-SCNC: 142 MMOL/L (ref 134–144)
WBC # BLD AUTO: 5.5 X10E3/UL (ref 3.4–10.8)

## 2018-01-29 NOTE — PROGRESS NOTES
Done per your note dated 1/26 \"IMPRESSION:   Chronic obstructive pulmonary disease  Congestive heart failure, chronic diastolic  Hypertension  Weakness fatigue unknown etiology     PLAN:  Discontinue valsartan  Decrease Lasix to 80 mg daily  Discontinue Spironolactone  Decrease hydralazine to 100 mg 3 times daily  Basic metabolic panel  Return in 2 weeks\"

## 2018-02-08 ENCOUNTER — APPOINTMENT (OUTPATIENT)
Dept: GENERAL RADIOLOGY | Age: 63
End: 2018-02-08
Attending: EMERGENCY MEDICINE
Payer: MEDICARE

## 2018-02-08 ENCOUNTER — HOSPITAL ENCOUNTER (EMERGENCY)
Age: 63
Discharge: HOME | End: 2018-02-08
Attending: EMERGENCY MEDICINE
Payer: MEDICARE

## 2018-02-08 ENCOUNTER — OFFICE VISIT (OUTPATIENT)
Dept: CARDIOLOGY CLINIC | Age: 63
End: 2018-02-08

## 2018-02-08 VITALS
SYSTOLIC BLOOD PRESSURE: 164 MMHG | RESPIRATION RATE: 17 BRPM | OXYGEN SATURATION: 100 % | HEART RATE: 83 BPM | DIASTOLIC BLOOD PRESSURE: 90 MMHG

## 2018-02-08 DIAGNOSIS — E86.0 DEHYDRATION: Primary | ICD-10-CM

## 2018-02-08 DIAGNOSIS — I95.9 HYPOTENSION, UNSPECIFIED HYPOTENSION TYPE: Primary | ICD-10-CM

## 2018-02-08 LAB
ALBUMIN SERPL-MCNC: 2.5 G/DL (ref 3.4–5)
ALBUMIN/GLOB SERPL: 0.5 {RATIO} (ref 0.8–1.7)
ALP SERPL-CCNC: 114 U/L (ref 45–117)
ALT SERPL-CCNC: 11 U/L (ref 13–56)
ANION GAP SERPL CALC-SCNC: 7 MMOL/L (ref 3–18)
AST SERPL-CCNC: 12 U/L (ref 15–37)
BASOPHILS # BLD: 0 K/UL (ref 0–0.06)
BASOPHILS NFR BLD: 1 % (ref 0–2)
BILIRUB SERPL-MCNC: 0.3 MG/DL (ref 0.2–1)
BUN SERPL-MCNC: 24 MG/DL (ref 7–18)
BUN/CREAT SERPL: 13 (ref 12–20)
CALCIUM SERPL-MCNC: 8.9 MG/DL (ref 8.5–10.1)
CHLORIDE SERPL-SCNC: 106 MMOL/L (ref 100–108)
CK MB CFR SERPL CALC: 2 % (ref 0–4)
CK MB CFR SERPL CALC: 2.4 % (ref 0–4)
CK MB SERPL-MCNC: 1 NG/ML (ref 5–25)
CK MB SERPL-MCNC: 1.3 NG/ML (ref 5–25)
CK SERPL-CCNC: 51 U/L (ref 26–192)
CK SERPL-CCNC: 54 U/L (ref 26–192)
CO2 SERPL-SCNC: 31 MMOL/L (ref 21–32)
CREAT SERPL-MCNC: 1.81 MG/DL (ref 0.6–1.3)
DIFFERENTIAL METHOD BLD: ABNORMAL
EOSINOPHIL # BLD: 0.5 K/UL (ref 0–0.4)
EOSINOPHIL NFR BLD: 6 % (ref 0–5)
ERYTHROCYTE [DISTWIDTH] IN BLOOD BY AUTOMATED COUNT: 14.9 % (ref 11.6–14.5)
GLOBULIN SER CALC-MCNC: 5.3 G/DL (ref 2–4)
GLUCOSE SERPL-MCNC: 203 MG/DL (ref 74–99)
HCT VFR BLD AUTO: 34.8 % (ref 35–45)
HGB BLD-MCNC: 10.8 G/DL (ref 12–16)
LYMPHOCYTES # BLD: 1.4 K/UL (ref 0.9–3.6)
LYMPHOCYTES NFR BLD: 16 % (ref 21–52)
MCH RBC QN AUTO: 25.7 PG (ref 24–34)
MCHC RBC AUTO-ENTMCNC: 31 G/DL (ref 31–37)
MCV RBC AUTO: 82.9 FL (ref 74–97)
MONOCYTES # BLD: 0.6 K/UL (ref 0.05–1.2)
MONOCYTES NFR BLD: 7 % (ref 3–10)
NEUTS SEG # BLD: 5.9 K/UL (ref 1.8–8)
NEUTS SEG NFR BLD: 70 % (ref 40–73)
PLATELET # BLD AUTO: 163 K/UL (ref 135–420)
PMV BLD AUTO: 11.6 FL (ref 9.2–11.8)
POTASSIUM SERPL-SCNC: 3.7 MMOL/L (ref 3.5–5.5)
PROT SERPL-MCNC: 7.8 G/DL (ref 6.4–8.2)
RBC # BLD AUTO: 4.2 M/UL (ref 4.2–5.3)
SODIUM SERPL-SCNC: 144 MMOL/L (ref 136–145)
TROPONIN I SERPL-MCNC: 0.05 NG/ML (ref 0–0.06)
TROPONIN I SERPL-MCNC: 0.08 NG/ML (ref 0–0.06)
WBC # BLD AUTO: 8.3 K/UL (ref 4.6–13.2)

## 2018-02-08 PROCEDURE — 96360 HYDRATION IV INFUSION INIT: CPT

## 2018-02-08 PROCEDURE — 74011250636 HC RX REV CODE- 250/636: Performed by: EMERGENCY MEDICINE

## 2018-02-08 PROCEDURE — 80053 COMPREHEN METABOLIC PANEL: CPT | Performed by: EMERGENCY MEDICINE

## 2018-02-08 PROCEDURE — 71046 X-RAY EXAM CHEST 2 VIEWS: CPT

## 2018-02-08 PROCEDURE — 93005 ELECTROCARDIOGRAM TRACING: CPT

## 2018-02-08 PROCEDURE — 99285 EMERGENCY DEPT VISIT HI MDM: CPT

## 2018-02-08 PROCEDURE — 82550 ASSAY OF CK (CPK): CPT | Performed by: EMERGENCY MEDICINE

## 2018-02-08 PROCEDURE — 96361 HYDRATE IV INFUSION ADD-ON: CPT

## 2018-02-08 PROCEDURE — 85025 COMPLETE CBC W/AUTO DIFF WBC: CPT | Performed by: EMERGENCY MEDICINE

## 2018-02-08 RX ORDER — ONDANSETRON 4 MG/1
4 TABLET, FILM COATED ORAL
Qty: 12 TAB | Refills: 0 | Status: SHIPPED | OUTPATIENT
Start: 2018-02-08 | End: 2018-05-12

## 2018-02-08 RX ADMIN — SODIUM CHLORIDE 1000 ML: 9 INJECTION, SOLUTION INTRAVENOUS at 15:42

## 2018-02-08 RX ADMIN — SODIUM CHLORIDE 1000 ML: 900 INJECTION, SOLUTION INTRAVENOUS at 17:38

## 2018-02-08 NOTE — PROGRESS NOTES
Patient normally follows with Dr. Angulo Leader for CHF. Asked to see in the office for SBP 70's, multiple checks. Patient denies chest pain. Patient feeling lethargic with head down on table, accompanied by family. No chest pain or syncope. Recent BMP with elevated Cr. Given symptomatic bradycardia, I asked EMS to be called to be taken to ER for additional testing/workup, possible IVF.

## 2018-02-08 NOTE — ED PROVIDER NOTES
EMERGENCY DEPARTMENT HISTORY AND PHYSICAL EXAM    3:00 PM      Date: 2/8/2018  Patient Name: Martita Rossi    History of Presenting Illness     Chief Complaint   Patient presents with    Fatigue    Dizziness         History Provided By: Patient    Chief Complaint: Hypotension   Duration: (this afternoon) Hours  Timing:  Acute  Quality: low blood pressure (88/60)  Severity: Moderate  Modifying Factors: Pt is unsure how the hypotension started   Associated Symptoms: SOB, vomiting, fatigue, chills, cough, generalized body aches      Additional History (Context): Martita Rossi is a 70 y.o. female with PMHx of diabetes, asthma, HTN, and arthritis who presents via EMS for the evaluation of an acute onset of moderate hypotension onset this afternoon. Pt was at her Cardiology office for an appointment and her vitals were checked. A blood pressure of 88/60 was reported. Pt is unsure what caused the hypotension. Associated Sx include SOB, vomiting, fatigue, chills, cough (ongoing), generalized body aches in (feet, legs, back). Pt states she becomes for fatigue after taking her daily medications. Her cough is ongoing secondary to hx of COPD. No recent illness. She has been vomiting for x 4 days. Reports recent medication change (unsure which medication). Denies diarrhea. Denies any further complaints or symptoms at the moment. PCP: Dakotah Bishop MD    Current Outpatient Prescriptions   Medication Sig Dispense Refill    ondansetron hcl (ZOFRAN, AS HYDROCHLORIDE,) 4 mg tablet Take 1 Tab by mouth every eight (8) hours as needed for Nausea. 12 Tab 0    hydrALAZINE (APRESOLINE) 100 mg tablet Take 1 Tab by mouth three (3) times daily. 4 Tab 0    carvedilol (COREG) 25 mg tablet Take 1 Tab by mouth two (2) times daily (with meals). 180 Tab 3    MULTIVIT/IRON/FA/K/HERB NO.244 (ALIVE WOMEN'S ENERGY PO) Take 1 Tab by mouth daily.       insulin lispro (HUMALOG) 100 unit/mL kwikpen 15 Units by SubCUTAneous route daily.  albuterol (VENTOLIN HFA) 90 mcg/actuation inhaler Take 2 Puffs by inhalation every four (4) hours as needed for Wheezing or Shortness of Breath.  gabapentin (NEURONTIN) 100 mg capsule 1 cap po twice daily with breakfast and dinner, and 3 tabs po qHS 150 Cap 2    fluticasone (FLONASE) 50 mcg/actuation nasal spray 2 Sprays by Both Nostrils route daily. 1 Bottle 1    isosorbide dinitrate (ISORDIL) 10 mg tablet Take 1 Tab by mouth three (3) times daily. 90 Tab 2    sodium chloride (OCEAN) 0.65 % nasal spray 2 Sprays by Both Nostrils route every two (2) hours as needed. 30 mL 0    aspirin delayed-release 81 mg tablet Take 81 mg by mouth daily.  pravastatin (PRAVACHOL) 20 mg tablet Take 20 mg by mouth nightly.  mometasone-formoterol (DULERA) 200-5 mcg/actuation HFA inhaler Take 2 Puffs by inhalation two (2) times a day.  insulin glargine (LANTUS) 100 unit/mL injection 45 Units by SubCUTAneous route daily. 15 units in the evening.  CARBOXYMETHYLCELLULOS/GLYCERIN (REFRESH OPTIVE OP) Administer 1 Drop to both eyes six (6) times daily. Past History     Past Medical History:  Past Medical History:   Diagnosis Date    Arthritis     Asthma     Cataract     Diabetes mellitus ([facility] [address]     History of echocardiogram 12/29/2009    EF 50%. Mild-mod conc LVH. Mod DDfx. LAE. Mild MR.      Hypercholesterolemia     Hypertension     Hypertensive heart disease     Normal nuclear stress test 09/08/2000    No ischemia or prior infarction. Neg EKG on submax EST. Ex time 15:02.        Past Surgical History:  Past Surgical History:   Procedure Laterality Date    HX CHOLECYSTECTOMY  2001    HX TUBAL LIGATION  1975       Family History:  Family History   Problem Relation Age of Onset    Hypertension Mother     Ovarian Cancer Mother     Heart Attack Father     Breast Cancer Maternal Aunt        Social History:  Social History   Substance Use Topics    Smoking status: Never Smoker    Smokeless tobacco: Never Used    Alcohol use No       Allergies: Allergies   Allergen Reactions    Codeine Nausea Only    Sulfa (Sulfonamide Antibiotics) Rash         Review of Systems       Review of Systems   Constitutional: Positive for chills and fatigue. Negative for fever. Respiratory: Positive for cough and shortness of breath. Cardiovascular: Negative for chest pain. Gastrointestinal: Positive for vomiting. Negative for diarrhea and nausea. Musculoskeletal: Positive for myalgias. All other systems reviewed and are negative. Physical Exam     Visit Vitals    /90    Pulse 83    Resp 17    SpO2 100%         Physical Exam   Constitutional: She is oriented to person, place, and time. She appears well-developed and well-nourished. No distress. HENT:   Head: Normocephalic and atraumatic. Eyes: Conjunctivae and EOM are normal. Right eye exhibits no discharge. Left eye exhibits no discharge. No scleral icterus. Neck: Normal range of motion. Neck supple. No tracheal deviation present. Cardiovascular: Normal rate, regular rhythm and normal heart sounds. No murmur heard. Pulmonary/Chest: Effort normal. No respiratory distress. She has wheezes (inspiratory and expiratory in all lung fields ). She has no rales. Abdominal: Soft. She exhibits no distension. There is no tenderness. There is no rebound and no guarding. Musculoskeletal: Normal range of motion. She exhibits no edema or deformity. Neurological: She is alert and oriented to person, place, and time. No cranial nerve deficit. Pt is slow to responding    Skin: Skin is warm and dry. She is not diaphoretic. Psychiatric: She has a normal mood and affect.  Her behavior is normal. Judgment and thought content normal.         Diagnostic Study Results     Labs -  Recent Results (from the past 12 hour(s))   EKG, 12 LEAD, INITIAL    Collection Time: 02/08/18  2:54 PM   Result Value Ref Range Ventricular Rate 75 BPM    Atrial Rate 75 BPM    P-R Interval 186 ms    QRS Duration 104 ms    Q-T Interval 448 ms    QTC Calculation (Bezet) 500 ms    Calculated P Axis 41 degrees    Calculated R Axis -26 degrees    Calculated T Axis 118 degrees    Diagnosis       Normal sinus rhythm  Minimal voltage criteria for LVH, may be normal variant  Septal infarct (cited on or before 01-FEB-2014)  T wave abnormality, consider lateral ischemia  Abnormal ECG  When compared with ECG of 26-SEP-2017 11:20,  Serial changes of Septal infarct present     CBC WITH AUTOMATED DIFF    Collection Time: 02/08/18  3:00 PM   Result Value Ref Range    WBC 8.3 4.6 - 13.2 K/uL    RBC 4.20 4. 20 - 5.30 M/uL    HGB 10.8 (L) 12.0 - 16.0 g/dL    HCT 34.8 (L) 35.0 - 45.0 %    MCV 82.9 74.0 - 97.0 FL    MCH 25.7 24.0 - 34.0 PG    MCHC 31.0 31.0 - 37.0 g/dL    RDW 14.9 (H) 11.6 - 14.5 %    PLATELET 934 273 - 466 K/uL    MPV 11.6 9.2 - 11.8 FL    NEUTROPHILS 70 40 - 73 %    LYMPHOCYTES 16 (L) 21 - 52 %    MONOCYTES 7 3 - 10 %    EOSINOPHILS 6 (H) 0 - 5 %    BASOPHILS 1 0 - 2 %    ABS. NEUTROPHILS 5.9 1.8 - 8.0 K/UL    ABS. LYMPHOCYTES 1.4 0.9 - 3.6 K/UL    ABS. MONOCYTES 0.6 0.05 - 1.2 K/UL    ABS. EOSINOPHILS 0.5 (H) 0.0 - 0.4 K/UL    ABS. BASOPHILS 0.0 0.0 - 0.06 K/UL    DF AUTOMATED     METABOLIC PANEL, COMPREHENSIVE    Collection Time: 02/08/18  3:00 PM   Result Value Ref Range    Sodium 144 136 - 145 mmol/L    Potassium 3.7 3.5 - 5.5 mmol/L    Chloride 106 100 - 108 mmol/L    CO2 31 21 - 32 mmol/L    Anion gap 7 3.0 - 18 mmol/L    Glucose 203 (H) 74 - 99 mg/dL    BUN 24 (H) 7.0 - 18 MG/DL    Creatinine 1.81 (H) 0.6 - 1.3 MG/DL    BUN/Creatinine ratio 13 12 - 20      GFR est AA 34 (L) >60 ml/min/1.73m2    GFR est non-AA 28 (L) >60 ml/min/1.73m2    Calcium 8.9 8.5 - 10.1 MG/DL    Bilirubin, total 0.3 0.2 - 1.0 MG/DL    ALT (SGPT) 11 (L) 13 - 56 U/L    AST (SGOT) 12 (L) 15 - 37 U/L    Alk.  phosphatase 114 45 - 117 U/L    Protein, total 7.8 6.4 - 8.2 g/dL    Albumin 2.5 (L) 3.4 - 5.0 g/dL    Globulin 5.3 (H) 2.0 - 4.0 g/dL    A-G Ratio 0.5 (L) 0.8 - 1.7     CARDIAC PANEL,(CK, CKMB & TROPONIN)    Collection Time: 02/08/18  3:00 PM   Result Value Ref Range    CK 51 26 - 192 U/L    CK - MB 1.0 <3.6 ng/ml    CK-MB Index 2.0 0.0 - 4.0 %    Troponin-I, Qt. 0.08 (H) 0.00 - 0.06 NG/ML   CARDIAC PANEL,(CK, CKMB & TROPONIN)    Collection Time: 02/08/18  6:08 PM   Result Value Ref Range    CK 54 26 - 192 U/L    CK - MB 1.3 <3.6 ng/ml    CK-MB Index 2.4 0.0 - 4.0 %    Troponin-I, Qt. 0.05 0.00 - 0.06 NG/ML       Radiologic Studies -   XR CHEST PA LAT   Final Result      CXR  IMPRESSION:  Stable cardiomegaly. Atherosclerosis. Medical Decision Making   I am the first provider for this patient. I reviewed the vital signs, available nursing notes, past medical history, past surgical history, family history and social history. Vital Signs-Reviewed the patient's vital signs. Pulse Oximetry Analysis - 96 % on RA, normal     EKG: Interpreted by the EP. Time Interpreted: 14:54    Rate: 75 bpm    Rhythm: NSR    Interpretation: T-wave inversion at leads 1 AVL, biphasic t-wave inversion at leads V5 and V6. No STEMI and unchanged from previous EKG. Records Reviewed: Nursing Notes (Time of Review: 3:00 PM)    ED Course: Progress Notes, Reevaluation, and Consults:    5:15 PM- Pt is feeling better after 1st liter of fluid. 2nd set of Troponin. Will give another liter of fluid. Provider Notes (Medical Decision Making): Pt with dehydration due to copious diarrhea. Improved after fluids. CE's negative. Diagnosis     Clinical Impression:   1.  Dehydration        Disposition: discharged     Follow-up Information     Follow up With Details Comments [id number] [address], [id number] [address] [id number]  [facility] [place] EM[facility]ENCY DEPT   [facility] [place] [id number] [phone]           Patient's Medications   Start Taking    ONDANSETRON HCL (ZOFRAN, AS HYDROCHLORIDE,) 4 MG TABLET    Take 1 Tab by mouth every eight (8) hours as needed for Nausea. Continue Taking    ALBUTEROL (VENTOLIN HFA) 90 MCG/ACTUATION INHALER    Take 2 Puffs by inhalation every four (4) hours as needed for Wheezing or Shortness of Breath. ASPIRIN DELAYED-RELEASE 81 MG TABLET    Take 81 mg by mouth daily. CARBOXYMETHYLCELLULOS/GLYCERIN (REFRESH OPTIVE OP)    Administer 1 Drop to both eyes six (6) times daily. CARVEDILOL (COREG) 25 MG TABLET    Take 1 Tab by mouth two (2) times daily (with meals). FLUTICASONE (FLONASE) 50 MCG/ACTUATION NASAL SPRAY    2 Sprays by Both Nostrils route daily. GABAPENTIN (NEURONTIN) 100 MG CAPSULE    1 cap po twice daily with breakfast and dinner, and 3 tabs po qHS    HYDRALAZINE (APRESOLINE) 100 MG TABLET    Take 1 Tab by mouth three (3) times daily. INSULIN GLARGINE (LANTUS) 100 UNIT/ML INJECTION    45 Units by SubCUTAneous route daily. 15 units in the evening. INSULIN LISPRO (HUMALOG) 100 UNIT/ML KWIKPEN    15 Units by SubCUTAneous route daily. ISOSORBIDE DINITRATE (ISORDIL) 10 MG TABLET    Take 1 Tab by mouth three (3) times daily. MOMETASONE-FORMOTEROL (DULERA) 200-5 MCG/ACTUATION HFA INHALER    Take 2 Puffs by inhalation two (2) times a day. MULTIVIT/IRON/FA/K/HERB NO.244 (ALIVE WOMEN'S ENERGY PO)    Take 1 Tab by mouth daily. PRAVASTATIN (PRAVACHOL) 20 MG TABLET    Take 20 mg by mouth nightly. SODIUM CHLORIDE (OCEAN) 0.65 % NASAL SPRAY    2 Sprays by Both Nostrils route every two (2) hours as needed.    These Medications have changed    No medications on file   Stop Taking    CEFDINIR (OMNICEF) 300 MG CAPSULE    Take 300 mg by mouth two (2) times a day.     _______________________________    Attestations:  Scribe Attestdemario Esposito (Aj) acting as a scribe for and in the presence of Danielle Cordova MD      February 08, 2018 at 3:00 PM       Provider Attestation:      I personally performed the services described in the documentation, reviewed the documentation, as recorded by the scribe in my presence, and it accurately and completely records my words and actions.  February 08, 2018 at 3:00 PM - Mich Herman MD    _______________________________

## 2018-02-08 NOTE — DISCHARGE INSTRUCTIONS
Dehydration: Care Instructions  Your Care Instructions  Dehydration happens when your body loses too much fluid. This might happen when you do not drink enough water or you lose large amounts of fluids from your body because of diarrhea, vomiting, or sweating. Severe dehydration can be life-threatening. Water and minerals called electrolytes help put your body fluids back in balance. Learn the early signs of fluid loss, and drink more fluids to prevent dehydration. Follow-up care is a key part of your treatment and safety. Be sure to make and go to all appointments, and call your doctor if you are having problems. It's also a good idea to know your test results and keep a list of the medicines you take. How can you care for yourself at home? · To prevent dehydration, drink plenty of fluids, enough so that your urine is light yellow or clear like water. Choose water and other caffeine-free clear liquids until you feel better. If you have kidney, heart, or liver disease and have to limit fluids, talk with your doctor before you increase the amount of fluids you drink. · If you do not feel like eating or drinking, try taking small sips of water, sports drinks, or other rehydration drinks. · Get plenty of rest.  To prevent dehydration  · Add more fluids to your diet and daily routine, unless your doctor has told you not to. · During hot weather, drink more fluids. Drink even more fluids if you exercise a lot. Stay away from drinks with alcohol or caffeine. · Watch for the symptoms of dehydration. These include:  ¨ A dry, sticky mouth. ¨ Dark yellow urine, and not much of it. ¨ Dry and sunken eyes. ¨ Feeling very tired. · Learn what problems can lead to dehydration. These include:  ¨ Diarrhea, fever, and vomiting. ¨ Any illness with a fever, such as pneumonia or the flu. ¨ Activities that cause heavy sweating, such as endurance races and heavy outdoor work in hot or humid weather.   ¨ Alcohol or drug abuse or withdrawal.  ¨ Certain medicines, such as cold and allergy pills (antihistamines), diet pills (diuretics), and laxatives. ¨ Certain diseases, such as diabetes, cancer, and heart or kidney disease. When should you call for help? Call 911 anytime you think you may need emergency care. For example, call if:  ? · You passed out (lost consciousness). ?Call your doctor now or seek immediate medical care if:  ? · You are confused and cannot think clearly. ? · You are dizzy or lightheaded, or you feel like you may faint. ? · You have signs of needing more fluids. You have sunken eyes and a dry mouth, and you pass only a little dark urine. ? · You cannot keep fluids down. ? Watch closely for changes in your health, and be sure to contact your doctor if:  ? · You are not making tears. ? · Your skin is very dry and sags slowly back into place after you pinch it. ? · Your mouth and eyes are very dry. Where can you learn more? Go to http://chanel-connor.info/. Enter V848 in the search box to learn more about \"Dehydration: Care Instructions. \"  Current as of: March 20, 2017  Content Version: 11.4  © 0128-3954 [email or web address]. Care instructions adapted under license by [employer] (which disclaims liability or warranty for this information). If you have questions about a medical condition or this instruction, always ask your healthcare professional. [place] [address] any warranty or liability for your use of this information.

## 2018-02-08 NOTE — Clinical Note
Please be sure you are drinking plenty of fluids and follow up with your primary care doctor. Return to the ER if you have chest pain or shortness of breath.

## 2018-02-08 NOTE — ED TRIAGE NOTES
Pt states feeling weak and  A little  Tired, pt sent from [facility] for eveal of hypotension and lethargy,  Per office BP 88/60 with a recheck of 74/58.  On arrival by EMS pt  Co feeling weak and tired, /69,

## 2018-02-09 LAB
ATRIAL RATE: 75 BPM
CALCULATED P AXIS, ECG09: 41 DEGREES
CALCULATED R AXIS, ECG10: -26 DEGREES
CALCULATED T AXIS, ECG11: 118 DEGREES
DIAGNOSIS, 93000: NORMAL
P-R INTERVAL, ECG05: 186 MS
Q-T INTERVAL, ECG07: 448 MS
QRS DURATION, ECG06: 104 MS
QTC CALCULATION (BEZET), ECG08: 500 MS
VENTRICULAR RATE, ECG03: 75 BPM

## 2018-05-05 ENCOUNTER — APPOINTMENT (OUTPATIENT)
Dept: CT IMAGING | Age: 63
DRG: 286 | End: 2018-05-05
Attending: EMERGENCY MEDICINE
Payer: MEDICARE

## 2018-05-05 ENCOUNTER — HOSPITAL ENCOUNTER (INPATIENT)
Age: 63
LOS: 7 days | Discharge: HOME HEALTH | DRG: 286 | End: 2018-05-12
Attending: EMERGENCY MEDICINE | Admitting: INTERNAL MEDICINE
Payer: MEDICARE

## 2018-05-05 ENCOUNTER — APPOINTMENT (OUTPATIENT)
Dept: GENERAL RADIOLOGY | Age: 63
DRG: 286 | End: 2018-05-05
Attending: EMERGENCY MEDICINE
Payer: MEDICARE

## 2018-05-05 DIAGNOSIS — J81.0 ACUTE PULMONARY EDEMA (HCC): ICD-10-CM

## 2018-05-05 DIAGNOSIS — R51.9 NONINTRACTABLE EPISODIC HEADACHE, UNSPECIFIED HEADACHE TYPE: ICD-10-CM

## 2018-05-05 DIAGNOSIS — I50.23 ACUTE ON CHRONIC SYSTOLIC (CONGESTIVE) HEART FAILURE (HCC): Primary | ICD-10-CM

## 2018-05-05 DIAGNOSIS — R06.00 DYSPNEA, UNSPECIFIED TYPE: ICD-10-CM

## 2018-05-05 DIAGNOSIS — I16.1 HYPERTENSIVE EMERGENCY: ICD-10-CM

## 2018-05-05 PROBLEM — E87.6 HYPOKALEMIA: Status: ACTIVE | Noted: 2018-05-05

## 2018-05-05 LAB
ANION GAP SERPL CALC-SCNC: 6 MMOL/L (ref 3–18)
ATRIAL RATE: 90 BPM
BASOPHILS # BLD: 0 K/UL (ref 0–0.06)
BASOPHILS NFR BLD: 0 % (ref 0–2)
BNP SERPL-MCNC: ABNORMAL PG/ML (ref 0–900)
BUN SERPL-MCNC: 30 MG/DL (ref 7–18)
BUN/CREAT SERPL: 24 (ref 12–20)
CALCIUM SERPL-MCNC: 8.5 MG/DL (ref 8.5–10.1)
CALCULATED P AXIS, ECG09: 44 DEGREES
CALCULATED R AXIS, ECG10: -29 DEGREES
CALCULATED T AXIS, ECG11: 122 DEGREES
CHLORIDE SERPL-SCNC: 107 MMOL/L (ref 100–108)
CK MB CFR SERPL CALC: 1.7 % (ref 0–4)
CK MB CFR SERPL CALC: 1.8 % (ref 0–4)
CK MB SERPL-MCNC: 1.7 NG/ML (ref 5–25)
CK MB SERPL-MCNC: 1.7 NG/ML (ref 5–25)
CK SERPL-CCNC: 101 U/L (ref 26–192)
CK SERPL-CCNC: 97 U/L (ref 26–192)
CO2 SERPL-SCNC: 28 MMOL/L (ref 21–32)
CREAT SERPL-MCNC: 1.24 MG/DL (ref 0.6–1.3)
DIAGNOSIS, 93000: NORMAL
DIFFERENTIAL METHOD BLD: ABNORMAL
EOSINOPHIL # BLD: 0.1 K/UL (ref 0–0.4)
EOSINOPHIL NFR BLD: 1 % (ref 0–5)
ERYTHROCYTE [DISTWIDTH] IN BLOOD BY AUTOMATED COUNT: 17.7 % (ref 11.6–14.5)
GLUCOSE BLD STRIP.AUTO-MCNC: 149 MG/DL (ref 70–110)
GLUCOSE SERPL-MCNC: 141 MG/DL (ref 74–99)
HCT VFR BLD AUTO: 35.6 % (ref 35–45)
HGB BLD-MCNC: 11 G/DL (ref 12–16)
LYMPHOCYTES # BLD: 1 K/UL (ref 0.9–3.6)
LYMPHOCYTES NFR BLD: 16 % (ref 21–52)
MCH RBC QN AUTO: 28.6 PG (ref 24–34)
MCHC RBC AUTO-ENTMCNC: 30.9 G/DL (ref 31–37)
MCV RBC AUTO: 92.5 FL (ref 74–97)
MONOCYTES # BLD: 0.4 K/UL (ref 0.05–1.2)
MONOCYTES NFR BLD: 7 % (ref 3–10)
NEUTS SEG # BLD: 4.6 K/UL (ref 1.8–8)
NEUTS SEG NFR BLD: 76 % (ref 40–73)
P-R INTERVAL, ECG05: 160 MS
PLATELET # BLD AUTO: 150 K/UL (ref 135–420)
PMV BLD AUTO: 11.6 FL (ref 9.2–11.8)
POTASSIUM SERPL-SCNC: 3.4 MMOL/L (ref 3.5–5.5)
Q-T INTERVAL, ECG07: 390 MS
QRS DURATION, ECG06: 106 MS
QTC CALCULATION (BEZET), ECG08: 477 MS
RBC # BLD AUTO: 3.85 M/UL (ref 4.2–5.3)
SODIUM SERPL-SCNC: 141 MMOL/L (ref 136–145)
TROPONIN I SERPL-MCNC: 0.05 NG/ML (ref 0–0.06)
TROPONIN I SERPL-MCNC: 0.06 NG/ML (ref 0–0.04)
VENTRICULAR RATE, ECG03: 90 BPM
WBC # BLD AUTO: 6 K/UL (ref 4.6–13.2)

## 2018-05-05 PROCEDURE — 96375 TX/PRO/DX INJ NEW DRUG ADDON: CPT

## 2018-05-05 PROCEDURE — 82962 GLUCOSE BLOOD TEST: CPT

## 2018-05-05 PROCEDURE — 94640 AIRWAY INHALATION TREATMENT: CPT

## 2018-05-05 PROCEDURE — 96365 THER/PROPH/DIAG IV INF INIT: CPT

## 2018-05-05 PROCEDURE — 80048 BASIC METABOLIC PNL TOTAL CA: CPT | Performed by: EMERGENCY MEDICINE

## 2018-05-05 PROCEDURE — 36415 COLL VENOUS BLD VENIPUNCTURE: CPT | Performed by: PHYSICIAN ASSISTANT

## 2018-05-05 PROCEDURE — 70450 CT HEAD/BRAIN W/O DYE: CPT

## 2018-05-05 PROCEDURE — 74011250636 HC RX REV CODE- 250/636: Performed by: PHYSICIAN ASSISTANT

## 2018-05-05 PROCEDURE — 99285 EMERGENCY DEPT VISIT HI MDM: CPT

## 2018-05-05 PROCEDURE — 77030029684 HC NEB SM VOL KT MONA -A

## 2018-05-05 PROCEDURE — 65610000006 HC RM INTENSIVE CARE

## 2018-05-05 PROCEDURE — 85025 COMPLETE CBC W/AUTO DIFF WBC: CPT | Performed by: EMERGENCY MEDICINE

## 2018-05-05 PROCEDURE — 82550 ASSAY OF CK (CPK): CPT | Performed by: EMERGENCY MEDICINE

## 2018-05-05 PROCEDURE — 74011250637 HC RX REV CODE- 250/637: Performed by: PHYSICIAN ASSISTANT

## 2018-05-05 PROCEDURE — 74011000250 HC RX REV CODE- 250: Performed by: EMERGENCY MEDICINE

## 2018-05-05 PROCEDURE — 74011000258 HC RX REV CODE- 258: Performed by: EMERGENCY MEDICINE

## 2018-05-05 PROCEDURE — 71045 X-RAY EXAM CHEST 1 VIEW: CPT

## 2018-05-05 PROCEDURE — 83880 ASSAY OF NATRIURETIC PEPTIDE: CPT | Performed by: EMERGENCY MEDICINE

## 2018-05-05 PROCEDURE — 93005 ELECTROCARDIOGRAM TRACING: CPT

## 2018-05-05 PROCEDURE — 74011250637 HC RX REV CODE- 250/637: Performed by: EMERGENCY MEDICINE

## 2018-05-05 PROCEDURE — 74011250636 HC RX REV CODE- 250/636: Performed by: EMERGENCY MEDICINE

## 2018-05-05 RX ORDER — CARVEDILOL 25 MG/1
25 TABLET ORAL 2 TIMES DAILY WITH MEALS
Status: DISCONTINUED | OUTPATIENT
Start: 2018-05-06 | End: 2018-05-12 | Stop reason: HOSPADM

## 2018-05-05 RX ORDER — ALBUTEROL SULFATE 0.83 MG/ML
2.5 SOLUTION RESPIRATORY (INHALATION)
Status: DISCONTINUED | OUTPATIENT
Start: 2018-05-05 | End: 2018-05-12 | Stop reason: HOSPADM

## 2018-05-05 RX ORDER — HYDRALAZINE HYDROCHLORIDE 20 MG/ML
5 INJECTION INTRAMUSCULAR; INTRAVENOUS ONCE
Status: DISCONTINUED | OUTPATIENT
Start: 2018-05-05 | End: 2018-05-05

## 2018-05-05 RX ORDER — INSULIN LISPRO 100 [IU]/ML
INJECTION, SOLUTION INTRAVENOUS; SUBCUTANEOUS
Status: DISCONTINUED | OUTPATIENT
Start: 2018-05-05 | End: 2018-05-12 | Stop reason: HOSPADM

## 2018-05-05 RX ORDER — ALBUTEROL SULFATE 0.83 MG/ML
2.5 SOLUTION RESPIRATORY (INHALATION)
Status: COMPLETED | OUTPATIENT
Start: 2018-05-05 | End: 2018-05-05

## 2018-05-05 RX ORDER — POTASSIUM CHLORIDE 20 MEQ/1
40 TABLET, EXTENDED RELEASE ORAL
Status: COMPLETED | OUTPATIENT
Start: 2018-05-05 | End: 2018-05-05

## 2018-05-05 RX ORDER — DEXTROSE 50 % IN WATER (D50W) INTRAVENOUS SYRINGE
25-50 AS NEEDED
Status: DISCONTINUED | OUTPATIENT
Start: 2018-05-05 | End: 2018-05-12 | Stop reason: HOSPADM

## 2018-05-05 RX ORDER — ASPIRIN 325 MG
325 TABLET ORAL
Status: COMPLETED | OUTPATIENT
Start: 2018-05-05 | End: 2018-05-05

## 2018-05-05 RX ORDER — CLONIDINE 0.1 MG/24H
1 PATCH, EXTENDED RELEASE TRANSDERMAL
Status: DISCONTINUED | OUTPATIENT
Start: 2018-05-05 | End: 2018-05-06

## 2018-05-05 RX ORDER — HYDRALAZINE HYDROCHLORIDE 20 MG/ML
10 INJECTION INTRAMUSCULAR; INTRAVENOUS
Status: DISCONTINUED | OUTPATIENT
Start: 2018-05-05 | End: 2018-05-12 | Stop reason: HOSPADM

## 2018-05-05 RX ORDER — FUROSEMIDE 10 MG/ML
60 INJECTION INTRAMUSCULAR; INTRAVENOUS ONCE
Status: COMPLETED | OUTPATIENT
Start: 2018-05-05 | End: 2018-05-05

## 2018-05-05 RX ORDER — MAGNESIUM SULFATE 100 %
4 CRYSTALS MISCELLANEOUS AS NEEDED
Status: DISCONTINUED | OUTPATIENT
Start: 2018-05-05 | End: 2018-05-12 | Stop reason: HOSPADM

## 2018-05-05 RX ORDER — HYDRALAZINE HYDROCHLORIDE 50 MG/1
50 TABLET, FILM COATED ORAL 3 TIMES DAILY
Status: DISCONTINUED | OUTPATIENT
Start: 2018-05-06 | End: 2018-05-06

## 2018-05-05 RX ORDER — HYDRALAZINE HYDROCHLORIDE 20 MG/ML
20 INJECTION INTRAMUSCULAR; INTRAVENOUS ONCE
Status: COMPLETED | OUTPATIENT
Start: 2018-05-05 | End: 2018-05-05

## 2018-05-05 RX ORDER — HEPARIN SODIUM 5000 [USP'U]/ML
5000 INJECTION, SOLUTION INTRAVENOUS; SUBCUTANEOUS EVERY 8 HOURS
Status: DISCONTINUED | OUTPATIENT
Start: 2018-05-05 | End: 2018-05-12 | Stop reason: HOSPADM

## 2018-05-05 RX ORDER — NITROGLYCERIN 40 MG/100ML
0-20 INJECTION INTRAVENOUS
Status: DISCONTINUED | OUTPATIENT
Start: 2018-05-05 | End: 2018-05-06

## 2018-05-05 RX ADMIN — HYDRALAZINE HYDROCHLORIDE 20 MG: 20 INJECTION INTRAMUSCULAR; INTRAVENOUS at 11:20

## 2018-05-05 RX ADMIN — POTASSIUM CHLORIDE 40 MEQ: 1500 TABLET, FILM COATED, EXTENDED RELEASE ORAL at 12:40

## 2018-05-05 RX ADMIN — NITROGLYCERIN 5 MCG/MIN: 40 INJECTION INTRAVENOUS at 12:57

## 2018-05-05 RX ADMIN — ASPIRIN 325 MG ORAL TABLET 325 MG: 325 PILL ORAL at 12:55

## 2018-05-05 RX ADMIN — ALBUTEROL SULFATE 2.5 MG: 2.5 SOLUTION RESPIRATORY (INHALATION) at 11:12

## 2018-05-05 RX ADMIN — FUROSEMIDE 60 MG: 10 INJECTION, SOLUTION INTRAMUSCULAR; INTRAVENOUS at 12:49

## 2018-05-05 RX ADMIN — HEPARIN SODIUM 5000 UNITS: 5000 INJECTION, SOLUTION INTRAVENOUS; SUBCUTANEOUS at 22:22

## 2018-05-05 RX ADMIN — SODIUM CHLORIDE 5 MG/HR: 900 INJECTION, SOLUTION INTRAVENOUS at 16:45

## 2018-05-05 NOTE — IP AVS SNAPSHOT
[address] [place] [address] 
[phone] Patient: Stanley Vargas MRN: [id number] JULIETTE:[id number] About your hospitalization You were admitted on: May 5, 2018 You last received care in the:  [facility] 2 CV STEPDOWN You were discharged on:  May 12, 2018 Why you were hospitalized Your primary diagnosis was:  Acute Pulmonary Edema (Hcc) Your diagnoses also included:  Hypokalemia, Hypertensive Emergency, Headache, Acute On Chronic Systolic (Congestive) Heart Failure (Hcc), Type 2 Diabetes Mellitus With Nephropathy (Hcc) Follow-up Information Follow up With Details Comments Contact Info Pretty Montana MD On 5/18/2018 @ 2:30p.m. w/ Karlie HOLLINS appt scheduled by [facility]. [place] [place] [address] 
[phone] Lida Matos MD On 5/11/2018 Left vm message for Chaya Jo @ 2:45 p.m. on 5/11/18. [facility] [address] [facility] [address] 200 [facility] 
[phone] Lida Matos MD In 1 week  [address] [place] [address] [address] [facility] 
[phone] Pretty Montana MD In 1 week  [address] [place] [address] [place] [id number] [phone] Your Scheduled Appointments Tuesday May 15, 2018 11:30 AM EDT New Patient with Pretty Montana MD  
[address] (--)  
 [address] [place] [address]  
[phone] Friday May 18, 2018  2:30 PM EDT TRANSITIONAL CARE MANAGEMENT with Magnus Sheridan DO [address] (--)  
 [address] [place] [id number] [phone] Discharge Orders None A check bernabe indicates which time of day the medication should be taken. My Medications START taking these medications Instructions Each Dose to Equal  
 Morning Noon Evening Bedtime  
 furosemide 20 mg tablet Commonly known as:  LASIX Your last dose was: Your next dose is: Take 2 Tabs by mouth daily. 40 mg  
    
   
   
   
  
 spironolactone 50 mg tablet Commonly known as:  ALDACTONE Your last dose was: Your next dose is: Take 1 Tab by mouth two (2) times a day. 50 mg CHANGE how you take these medications Instructions Each Dose to Equal  
 Morning Noon Evening Bedtime  
 insulin glargine 100 unit/mL injection Commonly known as:  LANTUS U-100 INSULIN What changed:  how much to take Your last dose was: Your next dose is:    
   
   
 7 Units by SubCUTAneous route daily. 15 units in the evening. 7 Units  
    
   
   
   
  
 isosorbide dinitrate 10 mg tablet Commonly known as:  ISORDIL What changed:  how much to take Your last dose was: Your next dose is: Take 2 Tabs by mouth three (3) times daily. 20 mg CONTINUE taking these medications Instructions Each Dose to Equal  
 Morning Noon Evening Bedtime  
 aspirin delayed-release 81 mg tablet Your last dose was: Your next dose is: Take 81 mg by mouth daily. 81 mg  
    
   
   
   
  
 carvedilol 25 mg tablet Commonly known as:  Nain Shane Your last dose was: Your next dose is: Take 1 Tab by mouth two (2) times daily (with meals). 25 mg  
    
   
   
   
  
 fluticasone 50 mcg/actuation nasal spray Commonly known as:  [employer] Your last dose was: Your next dose is: 2 Sprays by Both Nostrils route daily. 2 Spray  
    
   
   
   
  
 gabapentin 100 mg capsule Commonly known as:  NEURONTIN Your last dose was: Your next dose is:    
   
   
 1 cap po twice daily with breakfast and dinner, and 3 tabs po qHS  
     
   
   
   
  
 hydrALAZINE 100 mg tablet Commonly known as:  APRESOLINE Your last dose was: Your next dose is: Take 1 Tab by mouth three (3) times daily. 100 mg  
    
   
   
   
  
 insulin lispro 100 unit/mL kwikpen Commonly known as:  HUMALOG Your last dose was: Your next dose is:    
   
   
 15 Units by SubCUTAneous route daily. 15 Units  
    
   
   
   
  
 mometasone-formoterol 200-5 mcg/actuation HFA inhaler Commonly known as:  Espiridion Natalyter Your last dose was: Your next dose is: Take 2 Puffs by inhalation two (2) times a day. 2 Puff  
    
   
   
   
  
 pravastatin 20 mg tablet Commonly known as:  PRAVACHOL Your last dose was: Your next dose is: Take 20 mg by mouth nightly. 20 mg [address] Your last dose was: Your next dose is:    
   
   
 Administer 1 Drop to both eyes six (6) times daily. 1 Drop VENTOLIN HFA 90 mcg/actuation inhaler Generic drug:  albuterol Your last dose was: Your next dose is: Take 2 Puffs by inhalation every four (4) hours as needed for Wheezing or Shortness of Breath. 2 Puff STOP taking these medications [facility] [employer] PO  
   
  
 ondansetron hcl 4 mg tablet Commonly known as:  ZOFRAN  
   
  
 sodium chloride 0.65 % nasal squeeze bottle Commonly known as:  OCEAN Where to Get Your Medications Information on where to get these meds will be given to you by the nurse or doctor. ! Ask your nurse or doctor about these medications  
  furosemide 20 mg tablet  
 insulin glargine 100 unit/mL injection  
 isosorbide dinitrate 10 mg tablet  
 mometasone-formoterol 200-5 mcg/actuation HFA inhaler  
 spironolactone 50 mg tablet Discharge Instructions None [facility] Announcement We are excited to announce that we are making your provider's discharge notes available to you in [place].   You will see these notes when they are completed and signed by the physician that discharged you from your recent hospital stay. If you have any questions or concerns about any information you see in Soundstache, please call the Health Information Department where you were seen or reach out to your Primary Care Provider for more information about your plan of care. Introducing [facility]! [facility] introduces Soundstache patient portal. Now you can access parts of your medical record, email your doctor's office, and request medication refills online. 1. In your internet browser, go to [email or web address] 2. Click on the First Time User? Click Here link in the Sign In box. You will see the New Member Sign Up page. 3. Enter your Soundstache Access Code exactly as it appears below. You will not need to use this code after youve completed the sign-up process. If you do not sign up before the expiration date, you must request a new code. · Soundstache Access Code: [email or web address] Expires: 8/3/2018  9:25 AM 
 
4. Enter the last four digits of your Social Security Number (xxxx) and Date of Birth (mm/dd/yyyy) as indicated and click Submit. You will be taken to the next sign-up page. 5. Create a Soundstache ID. This will be your Soundstache login ID and cannot be changed, so think of one that is secure and easy to remember. 6. Create a Soundstache password. You can change your password at any time. 7. Enter your Password Reset Question and Answer. This can be used at a later time if you forget your password. 8. Enter your e-mail address. You will receive e-mail notification when new information is available in [address] 9. Click Sign Up. You can now view and download portions of your medical record. 10. Click the Download Summary menu link to download a portable copy of your medical information.  
 
If you have questions, please visit the Frequently Asked Questions section of the [facility]. Remember, MyChart is NOT to be used for urgent needs. For medical emergencies, dial 911. Now available from your iPhone and Android! Introducing Sherif Stauffer As a [facility] patient, I wanted to make you aware of our electronic visit tool called Sherif Stauffer. PureSense 24/7 allows you to connect within minutes with a medical provider 24 hours a day, seven days a week via a mobile device or tablet or logging into a secure website from your computer. You can access Sherif Stauffer from anywhere in the [place]. A virtual visit might be right for you when you have a simple condition and feel like you just dont want to get out of bed, or cant get away from work for an appointment, when [facility] is not available (evenings, weekends or holidays), or when youre out of town and need minor care. Electronic visits cost only $49 and if the PureSense 24/7 provider determines a prescription is needed to treat your condition, one can be electronically transmitted to a nearby pharmacy*. Please take a moment to enroll today if you have not already done so. The enrollment process is free and takes just a few minutes. To enroll, please download the PureSense 24/Novadiol lala to your tablet or phone, or visit [email or web address] to enroll on your computer. And, as an [address] [place] [address] patient with a Confide account, the results of your visits will be scanned into your electronic medical record and your primary care provider will be able to view the scanned results. We urge you to continue to see your regular Kaittl Alberts provider for your ongoing medical care. And while your primary care provider may not be the one available when you seek a Sherif Stauffer virtual visit, the peace of mind you get from getting a real diagnosis real time can be priceless. For more information on Sherif Chaudharieleno, view our Frequently Asked Questions (FAQs) at [email or web address]. Sincerely, 
 
hBavna Foote MD 
Chief Medical Officer [address] *:  certain medications cannot be prescribed via Sherif Stauffer Providers Seen During Your Hospitalization Provider Specialty Primary office phone Jonhdante DO [facility] [phone] Cami Berry MD Internal Medicine [phone] Danilo Flores MD Internal Medicine [phone] Immunizations Administered for This Admission Name Date Influenza Vaccine (Quad) PF  Deferred () Your Primary Care Physician (PCP) Primary Care Physician Office Phone Office Fax [address], [address] [facility],[place] [address] [phone] You are allergic to the following Allergen Reactions Codeine Nausea Only Sulfa (Sulfonamide Antibiotics) Rash Recent Documentation Height Weight BMI OB Status Smoking Status 1.753 m 88.6 kg 28.85 kg/m2 Postmenopausal Never Smoker Emergency Contacts Name Discharge Info Relation Home Work Mobile ErastoKatina DISCHARGE CAREGIVER [3] Daughter [21] [phone] [address] [facility] CAREGIVER [3] Child [2] [phone] Patient Belongings The following personal items are in your possession at time of discharge: 
  Dental Appliances: None  Visual Aid: Glasses, At bedside      Home Medications: Sent home   Jewelry: None  Clothing: Pants, Slippers, Socks    Other Valuables: Eyeglasses Discharge Instructions Attachments/References PULMONARY EMBOLISM (ENGLISH) HYPERTENSION: GENERAL INFO (ENGLISH) Patient Handouts Pulmonary Embolism: Care Instructions Your Care Instructions Pulmonary embolism is the sudden blockage of an artery in the lung.  Blood clots in the deep veins of the leg or pelvis (deep vein thrombosis, or DVT) are the most common cause. These blood clots can travel to the lungs. Pulmonary embolism can be very serious. Because you have had one pulmonary embolism, you are at greater risk for having another one. But you can take steps to prevent another pulmonary embolism by following your doctor's instructions. You will probably take a prescription blood-thinning medicine to prevent blood clots. A blood thinner can stop a blood clot from growing larger and prevent new clots from forming. Follow-up care is a key part of your treatment and safety. Be sure to make and go to all appointments, and call your doctor if you are having problems. It's also a good idea to know your test results and keep a list of the medicines you take. How can you care for yourself at home? · Take your medicines exactly as prescribed. Call your doctor if you think you are having a problem with your medicine. You will get more details on the specific medicines your doctor prescribes. · If you are taking a blood thinner, be sure you get instructions about how to take your medicine safely. Blood thinners can cause serious bleeding problems. Preventing future pulmonary embolisms · Exercise. Keep blood moving in your legs to keep clots from forming. If you are traveling by car, stop every hour or so. Get out and walk around for a few minutes. If you are traveling by bus, train, or plane, get out of your seat and walk up and down the aisles every hour or so. You also can do leg exercises while you are seated. Pump your feet up and down by pulling your toes up toward your knees then pointing them down. · Get up out of bed as soon as possible after an illness or surgery. · Do not smoke. If you need help quitting, talk to your doctor about stop-smoking programs and medicines. These can increase your chances of quitting for good. · Check with your doctor before taking hormone or birth control pills. These may increase your risk of blot clots. · Ask your doctor about wearing compression stockings to help prevent blood clots in your legs. There are different types of stockings, and they need to fit right. So your doctor will recommend what you need. When should you call for help? Call 911 anytime you think you may need emergency care. For example, call if: 
? · You have shortness of breath. ? · You have chest pain. ? · You passed out (lost consciousness). ? · You cough up blood. ?Call your doctor now or seek immediate medical care if: 
? · You have new or worsening pain or swelling in your leg. ? Watch closely for changes in your health, and be sure to contact your doctor if: 
? · You do not get better as expected. Where can you learn more? Go to http://chanel-connor.info/. Enter K778 in the search box to learn more about \"Pulmonary Embolism: Care Instructions. \" Current as of: March 20, 2017 Content Version: 11.4 © 5593-0828 Medikly. Care instructions adapted under license by [employer] (which disclaims liability or warranty for this information). If you have questions about a medical condition or this instruction, always ask your healthcare professional. [place] [address] any warranty or liability for your use of this information. Learning About High Blood Pressure What is high blood pressure? Blood pressure is a measure of how hard the blood pushes against the walls of your arteries. It's normal for blood pressure to go up and down throughout the day, but if it stays up, you have high blood pressure. Another name for high blood pressure is hypertension. Two numbers tell you your blood pressure. The first number is the systolic pressure. It shows how hard the blood pushes when your heart is pumping. The second number is the diastolic pressure. It shows how hard the blood pushes between heartbeats, when your heart is relaxed and filling with blood. A blood pressure of less than 120/80 (say \"120 over 80\") is ideal for an adult. High blood pressure is 140/90 or higher. You have high blood pressure if your top number is 140 or higher or your bottom number is 90 or higher, or both. Many people fall into the category in between, called prehypertension. People with prehypertension need to make lifestyle changes to bring their blood pressure down and help prevent or delay high blood pressure. What happens when you have high blood pressure? · Blood flows through your arteries with too much force. Over time, this damages the walls of your arteries. But you can't feel it. High blood pressure usually doesn't cause symptoms. · Fat and calcium start to build up in your arteries. This buildup is called plaque. Plaque makes your arteries narrower and stiffer. Blood can't flow through them as easily. · This lack of good blood flow starts to damage some of the organs in your body. This can lead to problems such as coronary artery disease and heart attack, heart failure, stroke, kidney failure, and eye damage. How can you prevent high blood pressure? · Stay at a healthy weight. · Try to limit how much sodium you eat to less than 2,300 milligrams (mg) a day. If you limit your sodium to 1,500 mg a day, you can lower your blood pressure even more. ¨ Buy foods that are labeled \"unsalted,\" \"sodium-free,\" or \"low-sodium. \" Foods labeled \"reduced-sodium\" and \"light sodium\" may still have too much sodium. ¨ Flavor your food with garlic, lemon juice, onion, vinegar, herbs, and spices instead of salt. Do not use soy sauce, steak sauce, onion salt, garlic salt, mustard, or ketchup on your food. ¨ Use less salt (or none) when recipes call for it. You can often use half the salt a recipe calls for without losing flavor. · Be physically active. Get at least 30 minutes of exercise on most days of the week. Walking is a good choice.  You also may want to do other activities, such as running, swimming, cycling, or playing tennis or team sports. · Limit alcohol to 2 drinks a day for men and 1 drink a day for women. · Eat plenty of fruits, vegetables, and low-fat dairy products. Eat less saturated and total fats. How is high blood pressure treated? · Your doctor will suggest making lifestyle changes. For example, your doctor may ask you to eat healthy foods, quit smoking, lose extra weight, and be more active. · If lifestyle changes don't help enough or your blood pressure is very high, you will have to take medicine every day. Follow-up care is a key part of your treatment and safety. Be sure to make and go to all appointments, and call your doctor if you are having problems. It's also a good idea to know your test results and keep a list of the medicines you take. Where can you learn more? Go to http://chanel-connor.info/. Enter P501 in the search box to learn more about \"Learning About High Blood Pressure. \" Current as of: September 21, 2016 Content Version: 11.4 © 4092-0020 Ph.Creative. Care instructions adapted under license by [employer] (which disclaims liability or warranty for this information). If you have questions about a medical condition or this instruction, always ask your healthcare professional. [address] any warranty or liability for your use of this information. Please provide this summary of care documentation to your next provider. Signatures-by signing, you are acknowledging that this After Visit Summary has been reviewed with you and you have received a copy. Patient Signature:  ____________________________________________________________ Date:  ____________________________________________________________  
  
Holly Brunner Provider Signature:  ____________________________________________________________ Date:  ____________________________________________________________

## 2018-05-05 NOTE — ED TRIAGE NOTES
Pt presents with multiple complaints of shortness of breath, headache, elevated blood pressure, leg swelling, fatigue, decreased appetite, abd pain. Pt states has hx of shortness of breath uses home oxygen \"when needed\" pt did not come to ed with home oxygen, states shortness of breath worse over the last week. Pt reports headache x 3 days, states blood pressure is always high in 200's, states leg swelling x 2 weeks. States fatigue and decreased appetite x 1 week. States abd pain x 2-3 days, states vomited 2 days ago denies n/v at present time. States pcp had passed away awaiting appointment on 5/15/18 with new md. Pt states not taking medications because not eating. See home medication list for last dose date.

## 2018-05-05 NOTE — ED PROVIDER NOTES
EMERGENCY DEPARTMENT HISTORY AND PHYSICAL EXAM    9:28 AM      Date: 5/5/2018  Patient Name: Antoinette Maddox    History of Presenting Illness     Chief Complaint   Patient presents with    Shortness of Breath    Headache    Fatigue    Hypertension         History Provided By: Patient    Chief Complaint: SOB  Duration:  Weeks  Timing:  Constant  Location: Chest  Quality: N/A  Severity: Moderate  Modifying Factors:   Associated Symptoms: Cough      Additional History (Context): Antoinette Maddox is a 70 y.o. female with history of DM, HTN, Asthma and HLD who presents to the ED c/o SOB. Pt reports that she has been having SOB for the past few weeks. Pt also c/o intermittent productive cough (clear) for a while s/t COPD/CHF and left-sided headache for past 3 days (Hx of Migraines). Per daughter, pt is on home oxygen and Inhalers at home. Pt states she had chest pain on the way to ED but is not having any currently. Pt takes ASA 81 mg daily (did not take today). Pt previously seen by cardiology (Dr. Guy Rachel). Pt denies any other acute sx at this time. PCP: Garrick Camarillo MD      Past History     Past Medical History:  Past Medical History:   Diagnosis Date    Arthritis     Cataract     [facility] obstructive [facility] disease [facility] [address]     Diabetes mellitus ([facility] [address]     History of echocardiogram 12/29/2009    EF 50%. Mild-mod conc LVH. Mod DDfx. LAE. Mild MR.      Hypercholesterolemia     Hypertension     Hypertensive heart disease     Normal nuclear stress test 09/08/2000    No ischemia or prior infarction. Neg EKG on submax EST. Ex time 15:02.        Past Surgical History:  Past Surgical History:   Procedure Laterality Date    HX CHOLECYSTECTOMY  2001    HX TUBAL LIGATION  1975       Family History:  Family History   Problem Relation Age of Onset    Hypertension Mother     Ovarian Cancer Mother     Heart Attack Father     Breast Cancer Maternal Aunt        Social History:  Social History Substance Use Topics    Smoking status: Never Smoker    Smokeless tobacco: Never Used    Alcohol use No       Allergies: Allergies   Allergen Reactions    Codeine Nausea Only    Sulfa (Sulfonamide Antibiotics) Rash         Review of Systems     Review of Systems   Constitutional: Negative for fever. HENT: Negative for sore throat. Eyes: Negative for redness. Respiratory: Positive for cough and shortness of breath. Negative for wheezing. Gastrointestinal: Negative for abdominal pain and nausea. Genitourinary: Negative for dysuria. Musculoskeletal: Negative for neck stiffness. Skin: Negative for pallor. Neurological: Negative for headaches. Hematological: Does not bruise/bleed easily. All other systems reviewed and are negative. Physical Exam     Visit Vitals    /83    Pulse 90    Temp 97.5 °F (36.4 °C)    Resp 20    Ht 5' 9\" (1.753 m)    Wt 78.9 kg (174 lb)    SpO2 94%    BMI 25.7 kg/m2       Physical Exam   Constitutional: She is oriented to person, place, and time. She appears well-developed and well-nourished. No distress. HENT:   Head: Normocephalic and atraumatic. Mouth/Throat: Oropharynx is clear and moist.   Eyes: Conjunctivae are normal. Pupils are equal, round, and reactive to light. No scleral icterus. Neck: Normal range of motion. Neck supple. Cardiovascular: Intact distal pulses. Capillary refill < 3 seconds   Pulmonary/Chest: Effort normal. No respiratory distress. She has no wheezes. She has rales (at bases). Abdominal: Soft. Bowel sounds are normal. She exhibits no distension. There is no tenderness. Musculoskeletal: Normal range of motion. She exhibits edema (pitting in bilateral LE). Lymphadenopathy:     She has no cervical adenopathy. Neurological: She is alert and oriented to person, place, and time. No cranial nerve deficit. Skin: Skin is warm and dry. She is not diaphoretic.    Nursing note and vitals reviewed. Diagnostic Study Results     Labs -  Recent Results (from the past 12 hour(s))   EKG, 12 LEAD, INITIAL    Collection Time: 05/05/18  9:42 AM   Result Value Ref Range    Ventricular Rate 90 BPM    Atrial Rate 90 BPM    P-R Interval 160 ms    QRS Duration 106 ms    Q-T Interval 390 ms    QTC Calculation (Bezet) 477 ms    Calculated P Axis 44 degrees    Calculated R Axis -29 degrees    Calculated T Axis 122 degrees    Diagnosis       Normal sinus rhythm  Minimal voltage criteria for LVH, may be normal variant  Septal infarct (cited on or before 01-FEB-2014)  T wave abnormality, consider lateral ischemia  Abnormal ECG  When compared with ECG of 08-FEB-2018 14:54,  No significant change was found  Confirmed by Curt Lehman MD, Jamei ([id number]) on 5/5/2018 10:31:23 AM     CBC WITH AUTOMATED DIFF    Collection Time: 05/05/18 11:20 AM   Result Value Ref Range    WBC 6.0 4.6 - 13.2 K/uL    RBC 3.85 (L) 4.20 - 5.30 M/uL    HGB 11.0 (L) 12.0 - 16.0 g/dL    HCT 35.6 35.0 - 45.0 %    MCV 92.5 74.0 - 97.0 FL    MCH 28.6 24.0 - 34.0 PG    MCHC 30.9 (L) 31.0 - 37.0 g/dL    RDW 17.7 (H) 11.6 - 14.5 %    PLATELET 381 956 - 775 K/uL    MPV 11.6 9.2 - 11.8 FL    NEUTROPHILS 76 (H) 40 - 73 %    LYMPHOCYTES 16 (L) 21 - 52 %    MONOCYTES 7 3 - 10 %    EOSINOPHILS 1 0 - 5 %    BASOPHILS 0 0 - 2 %    ABS. NEUTROPHILS 4.6 1.8 - 8.0 K/UL    ABS. LYMPHOCYTES 1.0 0.9 - 3.6 K/UL    ABS. MONOCYTES 0.4 0.05 - 1.2 K/UL    ABS. EOSINOPHILS 0.1 0.0 - 0.4 K/UL    ABS.  BASOPHILS 0.0 0.0 - 0.06 K/UL    DF AUTOMATED     METABOLIC PANEL, BASIC    Collection Time: 05/05/18 11:20 AM   Result Value Ref Range    Sodium 141 136 - 145 mmol/L    Potassium 3.4 (L) 3.5 - 5.5 mmol/L    Chloride 107 100 - 108 mmol/L    CO2 28 21 - 32 mmol/L    Anion gap 6 3.0 - 18 mmol/L    Glucose 141 (H) 74 - 99 mg/dL    BUN 30 (H) 7.0 - 18 MG/DL    Creatinine 1.24 0.6 - 1.3 MG/DL    BUN/Creatinine ratio 24 (H) 12 - 20      GFR est AA 53 (L) >60 ml/min/1.73m2    GFR est non-AA 43 (L) >60 ml/min/1.73m2    Calcium 8.5 8.5 - 10.1 MG/DL   NT-PRO BNP    Collection Time: 05/05/18 11:20 AM   Result Value Ref Range    NT pro-BNP 27070 (H) 0 - 900 PG/ML   CARDIAC PANEL,(CK, CKMB & TROPONIN)    Collection Time: 05/05/18 11:20 AM   Result Value Ref Range     26 - 192 U/L    CK - MB 1.7 <3.6 ng/ml    CK-MB Index 1.7 0.0 - 4.0 %    Troponin-I, Qt. 0.05 0.00 - 0.06 NG/ML       Radiologic Studies -   XR CHEST PORT   Final Result   IMPRESSION:     Interval development of moderate bilateral pleural effusions. Interval  development of bilateral lower lung infiltrates or atelectasis. Cardiomegaly. Minimal cephalization of pulmonary vascular flow   CT HEAD WO CONT   Final Result   IMPRESSION:     No acute infarct, mass, nor hemorrhage. Atrophy. Small vessel disease. Chronic lacunar infarct right thalamus. Medical Decision Making   I am the first provider for this patient. I reviewed the vital signs, available nursing notes, past medical history, past surgical history, family history and social history. Vital Signs-Reviewed the patient's vital signs. Pulse Oximetry Analysis -  94% on room air (Hypoxic)    EKG: Interpreted by the EP.    Time Interpreted: 10:38 AM   Rate:    Rhythm: Normal Sinus Rhythm    Interpretation:   Comparison:     Records Reviewed: Nursing Notes and Old Medical Records (Time of Review: 9:28 AM)    Provider Notes (Medical Decision Making): ddx chf, other cardiac, htn emergency, PNA, COPD, metabolic    Labs, neb, CxR, ekg, on monitor  Hydralazine    MDM    Medications   nitroglycerin (TRIDIL) 400 mcg/ml infusion (15 mcg/min IntraVENous Rate Change 5/5/18 1752)   niCARdipine (CARDENE) 25 mg in 0.9% sodium chloride 250 mL infusion (2.5 mg/hr IntraVENous Rate Change 5/5/18 1753)   hydrALAZINE (APRESOLINE) 20 mg/mL injection 20 mg (20 mg IntraVENous Given 5/5/18 1120)   albuterol (PROVENTIL VENTOLIN) nebulizer solution 2.5 mg (2.5 mg Nebulization Given 5/5/18 1112)   furosemide (LASIX) injection 60 mg (60 mg IntraVENous Given 5/5/18 1249)   potassium chloride (K-DUR, KLOR-CON) SR tablet 40 mEq (40 mEq Oral Given 5/5/18 1240)   aspirin (ASPIRIN) tablet 325 mg (325 mg Oral Given 5/5/18 1255)       ED Course: Progress Notes, Reevaluation, and Consults:  WBC wnl  Cr wnl  proBnp 26,000    Pleural effusions and pulmonary edema on CxR    Gave 60 lasix IV  Pt with good diuresis    Despite hydralazine BP improved slightly then back to low 200's/110  Start ntg gtt. Reassessed and pt HA much better, breathing better. But despite ntg gtt, /112, refractory to the gtt. Started cardene gtt. 12:34 PM Consult: I discussed care with Dr. Jean Kendrick (Hospitalist). It was a standard discussion including patient history, chief complaint, available diagnostic results, and predicted treatment course. Accepts admission. Agrees with NTG drip. Keep blood pressure above 170 for now. Consult cardiology. Admit to ICU.    1:08 PM Consult: I discussed care with Dr. Danita Golden (Cardiology). It was a standard discussion including patient history, chief complaint, available diagnostic results, and predicted treatment course. Accepts consult. Agrees with current therapy. 2:05 PM Consult: I discussed care with Dr. Adelia Vidal (ICU). It was a standard discussion including patient history, chief complaint, available diagnostic results, and predicted treatment course. Accepts pt to ICU. Critical care time:   I have spent 55 minutes of critical care time involved in lab review, consultations with specialist, family decision making, and documentation. During this entire length of time I was immediately available to the patient. Critical care:  The reason for providing this level of medical care for this critically ill patient was due to a critical illness that impaired one or more vital organ systems such that there was a high probability of imminent or life threatening deterioration in the patients condition. This care involved high complexity decision making to assess, manipulate and support vital system functions, to treat this degree vital organ system failure and to prevent further life threatening deterioration of the patient's condition. For Hospitalized Patients:    1. Hospitalization Decision Time:  The decision to hospitalize the patient was made by Dr. Felix Wynne on 5/5/2018    2. Aspirin: Aspirin was given    Diagnosis     Clinical Impression:   1. Acute on chronic systolic (congestive) heart failure ([address]    2. Hypertensive emergency    3. Acute pulmonary edema (HCC)    4. Dyspnea, unspecified type    5. Nonintractable episodic headache, unspecified headache type        Disposition: ADMIT    Follow-up Information     None             Attestations:  Scribe Attestation     Rhonda Jc acting as a scribe for and in the presence of Lisa Valdivia DO      May 05, 2018 at 10:05 AM       Provider Attestation:      I personally performed the services described in the documentation, reviewed the documentation, as recorded by the scribe in my presence, and it accurately and completely records my words and actions.  May 05, 2018 at 10:05 AM - Lisa Valdivia DO    _______________________________

## 2018-05-05 NOTE — IP AVS SNAPSHOT
[facility] 
 
 
 [address] [id number] [phone] Patient: Aruna Jordan MRN: [id number] LSE:[id number]/20/[id number] A check bernabe indicates which time of day the medication should be taken. My Medications START taking these medications Instructions Each Dose to Equal  
 Morning Noon Evening Bedtime  
 furosemide 20 mg tablet Commonly known as:  LASIX Your last dose was: Your next dose is: Take 2 Tabs by mouth daily. 40 mg  
    
   
   
   
  
 spironolactone 50 mg tablet Commonly known as:  ALDACTONE Your last dose was: Your next dose is: Take 1 Tab by mouth two (2) times a day. 50 mg CHANGE how you take these medications Instructions Each Dose to Equal  
 Morning Noon Evening Bedtime  
 insulin glargine 100 unit/mL injection Commonly known as:  LANTUS U-100 INSULIN What changed:  how much to take Your last dose was: Your next dose is:    
   
   
 7 Units by SubCUTAneous route daily. 15 units in the evening. 7 Units  
    
   
   
   
  
 isosorbide dinitrate 10 mg tablet Commonly known as:  ISORDIL What changed:  how much to take Your last dose was: Your next dose is: Take 2 Tabs by mouth three (3) times daily. 20 mg CONTINUE taking these medications Instructions Each Dose to Equal  
 Morning Noon Evening Bedtime  
 aspirin delayed-release 81 mg tablet Your last dose was: Your next dose is: Take 81 mg by mouth daily. 81 mg  
    
   
   
   
  
 carvedilol 25 mg tablet Commonly known as:  Lux Johnson Your last dose was: Your next dose is: Take 1 Tab by mouth two (2) times daily (with meals). 25 mg  
    
   
   
   
  
 fluticasone 50 mcg/actuation nasal spray Commonly known as:  Gabriela Wright Your last dose was: Your next dose is: 2 Sprays by Both Nostrils route daily. 2 Spray  
    
   
   
   
  
 gabapentin 100 mg capsule Commonly known as:  NEURONTIN Your last dose was: Your next dose is:    
   
   
 1 cap po twice daily with breakfast and dinner, and 3 tabs po qHS  
     
   
   
   
  
 hydrALAZINE 100 mg tablet Commonly known as:  APRESOLINE Your last dose was: Your next dose is: Take 1 Tab by mouth three (3) times daily. 100 mg  
    
   
   
   
  
 insulin lispro 100 unit/mL kwikpen Commonly known as:  HUMALOG Your last dose was: Your next dose is:    
   
   
 15 Units by SubCUTAneous route daily. 15 Units  
    
   
   
   
  
 mometasone-formoterol 200-5 mcg/actuation HFA inhaler Commonly known as:  Yolanda Cheema Your last dose was: Your next dose is: Take 2 Puffs by inhalation two (2) times a day. 2 Puff  
    
   
   
   
  
 pravastatin 20 mg tablet Commonly known as:  PRAVACHOL Your last dose was: Your next dose is: Take 20 mg by mouth nightly. 20 mg [address] Your last dose was: Your next dose is:    
   
   
 Administer 1 Drop to both eyes six (6) times daily. 1 Drop VENTOLIN HFA 90 mcg/actuation inhaler Generic drug:  albuterol Your last dose was: Your next dose is: Take 2 Puffs by inhalation every four (4) hours as needed for Wheezing or Shortness of Breath. 2 Puff STOP taking these medications ALIVE [employer] PO  
   
  
 ondansetron hcl 4 mg tablet Commonly known as:  ZOFRAN  
   
  
 sodium chloride 0.65 % nasal squeeze bottle Commonly known as:  OCEAN Where to Get Your Medications Information on where to get these meds will be given to you by the nurse or doctor. ! Ask your nurse or doctor about these medications  
  furosemide 20 mg tablet  
 insulin glargine 100 unit/mL injection  
 isosorbide dinitrate 10 mg tablet  
 mometasone-formoterol 200-5 mcg/actuation HFA inhaler  
 spironolactone 50 mg tablet

## 2018-05-06 ENCOUNTER — APPOINTMENT (OUTPATIENT)
Dept: GENERAL RADIOLOGY | Age: 63
DRG: 286 | End: 2018-05-06
Attending: PHYSICIAN ASSISTANT
Payer: MEDICARE

## 2018-05-06 LAB
ALBUMIN SERPL-MCNC: 2.4 G/DL (ref 3.4–5)
ALBUMIN/GLOB SERPL: 0.5 {RATIO} (ref 0.8–1.7)
ALP SERPL-CCNC: 137 U/L (ref 45–117)
ALT SERPL-CCNC: 78 U/L (ref 13–56)
AMPHET UR QL SCN: NEGATIVE
ANION GAP SERPL CALC-SCNC: 5 MMOL/L (ref 3–18)
ANION GAP SERPL CALC-SCNC: 5 MMOL/L (ref 3–18)
AST SERPL-CCNC: 36 U/L (ref 15–37)
ATRIAL RATE: 87 BPM
BACTERIA SPEC CULT: NORMAL
BARBITURATES UR QL SCN: NEGATIVE
BASOPHILS # BLD: 0 K/UL (ref 0–0.06)
BASOPHILS NFR BLD: 0 % (ref 0–3)
BENZODIAZ UR QL: NEGATIVE
BILIRUB SERPL-MCNC: 0.9 MG/DL (ref 0.2–1)
BUN SERPL-MCNC: 25 MG/DL (ref 7–18)
BUN SERPL-MCNC: 27 MG/DL (ref 7–18)
BUN/CREAT SERPL: 20 (ref 12–20)
BUN/CREAT SERPL: 25 (ref 12–20)
CALCIUM SERPL-MCNC: 7.6 MG/DL (ref 8.5–10.1)
CALCIUM SERPL-MCNC: 7.7 MG/DL (ref 8.5–10.1)
CALCULATED P AXIS, ECG09: 48 DEGREES
CALCULATED R AXIS, ECG10: -21 DEGREES
CALCULATED T AXIS, ECG11: 134 DEGREES
CANNABINOIDS UR QL SCN: NEGATIVE
CHLORIDE SERPL-SCNC: 109 MMOL/L (ref 100–108)
CHLORIDE SERPL-SCNC: 109 MMOL/L (ref 100–108)
CK MB CFR SERPL CALC: 1.6 % (ref 0–4)
CK MB CFR SERPL CALC: 2.3 % (ref 0–4)
CK MB SERPL-MCNC: 1.4 NG/ML (ref 5–25)
CK MB SERPL-MCNC: 1.4 NG/ML (ref 5–25)
CK SERPL-CCNC: 62 U/L (ref 26–192)
CK SERPL-CCNC: 86 U/L (ref 26–192)
CO2 SERPL-SCNC: 29 MMOL/L (ref 21–32)
CO2 SERPL-SCNC: 29 MMOL/L (ref 21–32)
COCAINE UR QL SCN: NEGATIVE
CREAT SERPL-MCNC: 1.08 MG/DL (ref 0.6–1.3)
CREAT SERPL-MCNC: 1.22 MG/DL (ref 0.6–1.3)
DIAGNOSIS, 93000: NORMAL
DIFFERENTIAL METHOD BLD: ABNORMAL
EOSINOPHIL # BLD: 0 K/UL (ref 0–0.4)
EOSINOPHIL NFR BLD: 1 % (ref 0–5)
ERYTHROCYTE [DISTWIDTH] IN BLOOD BY AUTOMATED COUNT: 17.2 % (ref 11.6–14.5)
EST. AVERAGE GLUCOSE BLD GHB EST-MCNC: NORMAL MG/DL
GLOBULIN SER CALC-MCNC: 4.4 G/DL (ref 2–4)
GLUCOSE BLD STRIP.AUTO-MCNC: 119 MG/DL (ref 70–110)
GLUCOSE BLD STRIP.AUTO-MCNC: 135 MG/DL (ref 70–110)
GLUCOSE BLD STRIP.AUTO-MCNC: 157 MG/DL (ref 70–110)
GLUCOSE BLD STRIP.AUTO-MCNC: 166 MG/DL (ref 70–110)
GLUCOSE SERPL-MCNC: 126 MG/DL (ref 74–99)
GLUCOSE SERPL-MCNC: 168 MG/DL (ref 74–99)
HBA1C MFR BLD: 4.8 % (ref 4.2–5.6)
HCT VFR BLD AUTO: 30.8 % (ref 35–45)
HDSCOM,HDSCOM: NORMAL
HGB BLD-MCNC: 9.7 G/DL (ref 12–16)
LYMPHOCYTES # BLD: 0.7 K/UL (ref 0.8–3.5)
LYMPHOCYTES NFR BLD: 16 % (ref 20–51)
MAGNESIUM SERPL-MCNC: 2 MG/DL (ref 1.6–2.6)
MAGNESIUM SERPL-MCNC: 2.1 MG/DL (ref 1.6–2.6)
MCH RBC QN AUTO: 27.4 PG (ref 24–34)
MCHC RBC AUTO-ENTMCNC: 31.5 G/DL (ref 31–37)
MCV RBC AUTO: 87 FL (ref 74–97)
METHADONE UR QL: NEGATIVE
MONOCYTES # BLD: 0.3 K/UL (ref 0–1)
MONOCYTES NFR BLD: 7 % (ref 2–9)
NEUTS BAND NFR BLD MANUAL: 2 % (ref 0–5)
NEUTS SEG # BLD: 3.4 K/UL (ref 1.8–8)
NEUTS SEG NFR BLD: 74 % (ref 42–75)
NRBC BLD-RTO: 9 PER 100 WBC
OPIATES UR QL: NEGATIVE
P-R INTERVAL, ECG05: 144 MS
PCP UR QL: NEGATIVE
PHOSPHATE SERPL-MCNC: 2.6 MG/DL (ref 2.5–4.9)
PHOSPHATE SERPL-MCNC: 2.9 MG/DL (ref 2.5–4.9)
PLATELET # BLD AUTO: 130 K/UL (ref 135–420)
PLATELET COMMENTS,PCOM: ABNORMAL
PMV BLD AUTO: 11.5 FL (ref 9.2–11.8)
POTASSIUM SERPL-SCNC: 2.9 MMOL/L (ref 3.5–5.5)
POTASSIUM SERPL-SCNC: 4 MMOL/L (ref 3.5–5.5)
PROT SERPL-MCNC: 6.8 G/DL (ref 6.4–8.2)
Q-T INTERVAL, ECG07: 400 MS
QRS DURATION, ECG06: 98 MS
QTC CALCULATION (BEZET), ECG08: 481 MS
RBC # BLD AUTO: 3.54 M/UL (ref 4.2–5.3)
RBC MORPH BLD: ABNORMAL
RBC MORPH BLD: ABNORMAL
SERVICE CMNT-IMP: NORMAL
SODIUM SERPL-SCNC: 143 MMOL/L (ref 136–145)
SODIUM SERPL-SCNC: 143 MMOL/L (ref 136–145)
TROPONIN I SERPL-MCNC: 0.07 NG/ML (ref 0–0.04)
TROPONIN I SERPL-MCNC: 0.08 NG/ML (ref 0–0.04)
TSH SERPL DL<=0.05 MIU/L-ACNC: 1.61 UIU/ML (ref 0.36–3.74)
VENTRICULAR RATE, ECG03: 87 BPM
WBC # BLD AUTO: 4.4 K/UL (ref 4.6–13.2)

## 2018-05-06 PROCEDURE — 74011250636 HC RX REV CODE- 250/636: Performed by: INTERNAL MEDICINE

## 2018-05-06 PROCEDURE — 74011250637 HC RX REV CODE- 250/637: Performed by: PHYSICIAN ASSISTANT

## 2018-05-06 PROCEDURE — 74011000250 HC RX REV CODE- 250: Performed by: PHYSICIAN ASSISTANT

## 2018-05-06 PROCEDURE — 83735 ASSAY OF MAGNESIUM: CPT | Performed by: PHYSICIAN ASSISTANT

## 2018-05-06 PROCEDURE — 36415 COLL VENOUS BLD VENIPUNCTURE: CPT | Performed by: PHYSICIAN ASSISTANT

## 2018-05-06 PROCEDURE — 74011000258 HC RX REV CODE- 258: Performed by: PHYSICIAN ASSISTANT

## 2018-05-06 PROCEDURE — 80048 BASIC METABOLIC PNL TOTAL CA: CPT | Performed by: PHYSICIAN ASSISTANT

## 2018-05-06 PROCEDURE — 93005 ELECTROCARDIOGRAM TRACING: CPT

## 2018-05-06 PROCEDURE — 65660000000 HC RM CCU STEPDOWN

## 2018-05-06 PROCEDURE — 74011250636 HC RX REV CODE- 250/636: Performed by: PHYSICIAN ASSISTANT

## 2018-05-06 PROCEDURE — 82550 ASSAY OF CK (CPK): CPT | Performed by: PHYSICIAN ASSISTANT

## 2018-05-06 PROCEDURE — 77030034850

## 2018-05-06 PROCEDURE — 65660000004 HC RM CVT STEPDOWN

## 2018-05-06 PROCEDURE — 87493 C DIFF AMPLIFIED PROBE: CPT | Performed by: INTERNAL MEDICINE

## 2018-05-06 PROCEDURE — 74011250637 HC RX REV CODE- 250/637: Performed by: INTERNAL MEDICINE

## 2018-05-06 PROCEDURE — 84100 ASSAY OF PHOSPHORUS: CPT | Performed by: PHYSICIAN ASSISTANT

## 2018-05-06 PROCEDURE — 84443 ASSAY THYROID STIM HORMONE: CPT | Performed by: PHYSICIAN ASSISTANT

## 2018-05-06 PROCEDURE — 85025 COMPLETE CBC W/AUTO DIFF WBC: CPT | Performed by: PHYSICIAN ASSISTANT

## 2018-05-06 PROCEDURE — 80307 DRUG TEST PRSMV CHEM ANLYZR: CPT | Performed by: PHYSICIAN ASSISTANT

## 2018-05-06 PROCEDURE — 83036 HEMOGLOBIN GLYCOSYLATED A1C: CPT | Performed by: PHYSICIAN ASSISTANT

## 2018-05-06 PROCEDURE — 71045 X-RAY EXAM CHEST 1 VIEW: CPT

## 2018-05-06 PROCEDURE — 82962 GLUCOSE BLOOD TEST: CPT

## 2018-05-06 PROCEDURE — 80053 COMPREHEN METABOLIC PANEL: CPT | Performed by: PHYSICIAN ASSISTANT

## 2018-05-06 PROCEDURE — 74011636637 HC RX REV CODE- 636/637: Performed by: INTERNAL MEDICINE

## 2018-05-06 PROCEDURE — 74011636637 HC RX REV CODE- 636/637: Performed by: PHYSICIAN ASSISTANT

## 2018-05-06 RX ORDER — NITROGLYCERIN 0.4 MG/1
0.4 TABLET SUBLINGUAL AS NEEDED
Status: DISCONTINUED | OUTPATIENT
Start: 2018-05-06 | End: 2018-05-12 | Stop reason: HOSPADM

## 2018-05-06 RX ORDER — INSULIN GLARGINE 100 [IU]/ML
20 INJECTION, SOLUTION SUBCUTANEOUS DAILY
Status: DISCONTINUED | OUTPATIENT
Start: 2018-05-07 | End: 2018-05-11

## 2018-05-06 RX ORDER — SODIUM CHLORIDE 0.9 % (FLUSH) 0.9 %
5-10 SYRINGE (ML) INJECTION AS NEEDED
Status: DISCONTINUED | OUTPATIENT
Start: 2018-05-06 | End: 2018-05-11 | Stop reason: SDUPTHER

## 2018-05-06 RX ORDER — GABAPENTIN 100 MG/1
200 CAPSULE ORAL 3 TIMES DAILY
Status: DISCONTINUED | OUTPATIENT
Start: 2018-05-06 | End: 2018-05-12 | Stop reason: HOSPADM

## 2018-05-06 RX ORDER — ACETAMINOPHEN 325 MG/1
650 TABLET ORAL
Status: DISCONTINUED | OUTPATIENT
Start: 2018-05-06 | End: 2018-05-12 | Stop reason: HOSPADM

## 2018-05-06 RX ORDER — PRAVASTATIN SODIUM 20 MG/1
20 TABLET ORAL
Status: DISCONTINUED | OUTPATIENT
Start: 2018-05-06 | End: 2018-05-12 | Stop reason: HOSPADM

## 2018-05-06 RX ORDER — FUROSEMIDE 10 MG/ML
20 INJECTION INTRAMUSCULAR; INTRAVENOUS ONCE
Status: DISCONTINUED | OUTPATIENT
Start: 2018-05-06 | End: 2018-05-06

## 2018-05-06 RX ORDER — FUROSEMIDE 10 MG/ML
40 INJECTION INTRAMUSCULAR; INTRAVENOUS EVERY 12 HOURS
Status: DISCONTINUED | OUTPATIENT
Start: 2018-05-06 | End: 2018-05-06

## 2018-05-06 RX ORDER — ACETAMINOPHEN 325 MG/1
650 TABLET ORAL ONCE
Status: COMPLETED | OUTPATIENT
Start: 2018-05-06 | End: 2018-05-06

## 2018-05-06 RX ORDER — POTASSIUM CHLORIDE 20 MEQ/1
40 TABLET, EXTENDED RELEASE ORAL 2 TIMES DAILY
Status: COMPLETED | OUTPATIENT
Start: 2018-05-06 | End: 2018-05-06

## 2018-05-06 RX ORDER — INSULIN GLARGINE 100 [IU]/ML
45 INJECTION, SOLUTION SUBCUTANEOUS DAILY
Status: DISCONTINUED | OUTPATIENT
Start: 2018-05-06 | End: 2018-05-06

## 2018-05-06 RX ORDER — HYDRALAZINE HYDROCHLORIDE 50 MG/1
100 TABLET, FILM COATED ORAL 3 TIMES DAILY
Status: DISCONTINUED | OUTPATIENT
Start: 2018-05-06 | End: 2018-05-06

## 2018-05-06 RX ORDER — SODIUM CHLORIDE 0.9 % (FLUSH) 0.9 %
5-10 SYRINGE (ML) INJECTION EVERY 8 HOURS
Status: DISCONTINUED | OUTPATIENT
Start: 2018-05-06 | End: 2018-05-11 | Stop reason: SDUPTHER

## 2018-05-06 RX ORDER — ISOSORBIDE DINITRATE 10 MG/1
10 TABLET ORAL 3 TIMES DAILY
Status: DISCONTINUED | OUTPATIENT
Start: 2018-05-06 | End: 2018-05-08

## 2018-05-06 RX ORDER — ASPIRIN 81 MG/1
81 TABLET ORAL DAILY
Status: DISCONTINUED | OUTPATIENT
Start: 2018-05-06 | End: 2018-05-12 | Stop reason: HOSPADM

## 2018-05-06 RX ORDER — HYDRALAZINE HYDROCHLORIDE 50 MG/1
50 TABLET, FILM COATED ORAL 3 TIMES DAILY
Status: DISCONTINUED | OUTPATIENT
Start: 2018-05-06 | End: 2018-05-09

## 2018-05-06 RX ADMIN — ISOSORBIDE DINITRATE 30 MG: 10 TABLET ORAL at 08:45

## 2018-05-06 RX ADMIN — NITROGLYCERIN 0.4 MG: 0.4 TABLET SUBLINGUAL at 07:26

## 2018-05-06 RX ADMIN — ISOSORBIDE DINITRATE 10 MG: 10 TABLET ORAL at 16:49

## 2018-05-06 RX ADMIN — GABAPENTIN 200 MG: 100 CAPSULE ORAL at 21:09

## 2018-05-06 RX ADMIN — CARVEDILOL 25 MG: 25 TABLET, FILM COATED ORAL at 16:49

## 2018-05-06 RX ADMIN — ACETAMINOPHEN 650 MG: 325 TABLET ORAL at 15:00

## 2018-05-06 RX ADMIN — POTASSIUM CHLORIDE 40 MEQ: 20 TABLET, EXTENDED RELEASE ORAL at 03:53

## 2018-05-06 RX ADMIN — GABAPENTIN 200 MG: 100 CAPSULE ORAL at 08:36

## 2018-05-06 RX ADMIN — HYDRALAZINE HYDROCHLORIDE 10 MG: 20 INJECTION INTRAMUSCULAR; INTRAVENOUS at 06:20

## 2018-05-06 RX ADMIN — ACETAMINOPHEN 650 MG: 325 TABLET ORAL at 07:28

## 2018-05-06 RX ADMIN — Medication 10 ML: at 21:10

## 2018-05-06 RX ADMIN — HEPARIN SODIUM 5000 UNITS: 5000 INJECTION, SOLUTION INTRAVENOUS; SUBCUTANEOUS at 21:09

## 2018-05-06 RX ADMIN — HYDRALAZINE HYDROCHLORIDE 10 MG: 20 INJECTION INTRAMUSCULAR; INTRAVENOUS at 00:06

## 2018-05-06 RX ADMIN — PRAVASTATIN SODIUM 20 MG: 20 TABLET ORAL at 03:53

## 2018-05-06 RX ADMIN — GABAPENTIN 200 MG: 100 CAPSULE ORAL at 16:49

## 2018-05-06 RX ADMIN — CARVEDILOL 25 MG: 25 TABLET, FILM COATED ORAL at 08:36

## 2018-05-06 RX ADMIN — HEPARIN SODIUM 5000 UNITS: 5000 INJECTION, SOLUTION INTRAVENOUS; SUBCUTANEOUS at 06:04

## 2018-05-06 RX ADMIN — ISOSORBIDE DINITRATE 10 MG: 10 TABLET ORAL at 21:09

## 2018-05-06 RX ADMIN — INSULIN LISPRO 2 UNITS: 100 INJECTION, SOLUTION INTRAVENOUS; SUBCUTANEOUS at 16:49

## 2018-05-06 RX ADMIN — Medication 10 ML: at 06:05

## 2018-05-06 RX ADMIN — LIDOCAINE HYDROCHLORIDE: 10 INJECTION, SOLUTION EPIDURAL; INFILTRATION; INTRACAUDAL; PERINEURAL at 06:04

## 2018-05-06 RX ADMIN — INSULIN GLARGINE 45 UNITS: 100 INJECTION, SOLUTION SUBCUTANEOUS at 08:35

## 2018-05-06 RX ADMIN — PRAVASTATIN SODIUM 20 MG: 20 TABLET ORAL at 21:09

## 2018-05-06 RX ADMIN — ASPIRIN 81 MG: 81 TABLET, COATED ORAL at 08:36

## 2018-05-06 RX ADMIN — Medication 5 ML: at 14:00

## 2018-05-06 RX ADMIN — FUROSEMIDE 40 MG: 10 INJECTION, SOLUTION INTRAMUSCULAR; INTRAVENOUS at 03:53

## 2018-05-06 RX ADMIN — HYDRALAZINE HYDROCHLORIDE 50 MG: 50 TABLET, FILM COATED ORAL at 16:49

## 2018-05-06 RX ADMIN — HYDRALAZINE HYDROCHLORIDE 100 MG: 50 TABLET, FILM COATED ORAL at 09:43

## 2018-05-06 RX ADMIN — LIDOCAINE HYDROCHLORIDE: 10 INJECTION, SOLUTION EPIDURAL; INFILTRATION; INTRACAUDAL; PERINEURAL at 06:06

## 2018-05-06 RX ADMIN — HEPARIN SODIUM 5000 UNITS: 5000 INJECTION, SOLUTION INTRAVENOUS; SUBCUTANEOUS at 15:01

## 2018-05-06 RX ADMIN — POTASSIUM CHLORIDE 40 MEQ: 20 TABLET, EXTENDED RELEASE ORAL at 08:36

## 2018-05-06 NOTE — PROGRESS NOTES
[facility] - Consult Note    Date of  Admission: 5/5/2018  9:26 AM   Primary Care Physician:  Cherelle Rothman MD     Assessment:     Patient Active Problem List   Diagnosis Code    Essential hypertension, benign I10    Diabetes mellitus ([facility] [address] E11.9    Other and unspecified hyperlipidemia E78.5    Cervical myelopathy ([facility] G95.9    Eczema L30.9    Asthma J45.909    CHF (congestive heart failure) ([facility] [address] I50.9    Bladder prolapse, female, acquired N81.10    IBS (irritable bowel syndrome) K58.9    Osteoporosis M81.0    Migraine G43.909    Anxiety and depression F41.9, F32.9    Pleural effusion J90    Type 2 diabetes mellitus with nephropathy (HCC) E11.21    Hypokalemia E87.6    Acute pulmonary edema (HCC) J81.0    Hypertensive emergency I16.1    Headache R51    Acute on chronic systolic (congestive) heart failure (HCC) I50.23     -HTN urgency, initially on nitro and cardene gtts, now off with normotensive reads. Suspect non-compliance with medications. She also reports n/v x 4 days prior to admission.  -Hypokalemia  -URI, endorses cough/congestion over past week   -Viral illness, nausea/vomiting x 4 days  -Acute on chronic CHF exacerbation with hx of diastolic dysfunction, EF 57-59% with G1DD by echo Sept 2017  -mod bilat pleural effusions, diuresing with IV lasix  -mod pulm HTN  -COPD, uses home O2  -CHF  -HLD  -DM  -Hx of migraines, reports HA for past 3 days. CT head no acute process. Previously followed by Dr Mo Victor:     -Will check echocardiogram to assess LV function  -Will also check renal doppler. NPO after midnight.   -Recheck K level this afternoon. Continue to replete if needed. -OK for stepdown from our standpoint this afternoon if pt remains stable.   -Discontinue clonidine patch.   -Decreasing hydralazine to 50mg TID and Isordil to 10mg TID as patient became hypotensive this morning after morning meds.  Can slowly increase if needed.  -Continue aspirin and statin.   -Continue with gentle diuresis today. Will reassess volume tomorrow. History of Present Illness: This is a 70 y.o. female admitted for Hypertensive emergency  Acute on chronic systolic (congestive) heart failure (HCC)  Headache  Acute pulmonary edema (HCC)  Hypokalemia. Pt is a 70yo AA non-smoking female who presented with c/o SOB, worsening over past 4 days. Pt reports associated symptoms of cough and congestion along with nausea and vomiting and head aches. Pt was found to be significantly hypertensive. Likely 2/2 to inability to take meds d/t nausea/vomiting. Pt is not the most reliable historian and there is no family at bedside. She has a PMHx of HTN, HLD, diastolic, CHF, and COPD. Pt was placed on nitro and cardene gtts for hypertensive urgency. She was found to have moderate bilat pleural effusions and was started on IV lasix. CT head was negative for acute process. She was admitted to the ICU d/t infusions requiring titration. Her BP is much improved this morning and drips are off. Pt reports she has been throwing up her medications at home over past 4 days. She noticed worsening thigh swelling. She denies chest pain, dizziness, or syncope.        Cardiac risk factors:   HTN  HLD  DM  CHF      Review of Symptoms:  Except as stated above include:  Constitutional:  Positive for chills, neg for fevers  Respiratory:  Positive for cough, congestion, and sob  Cardiovascular:  Negative for chest pain, positive for leg swelling  Gastrointestinal: positive for nausea and vomiting  Genitourinary:  Negative for hematuria  Musculoskeletal:  Negative for weakness or falls  Neurological:  Negative for syncope  Dermatological:  Negative for rashes  Endocrinological: Negative for heat or cold intolerance  Psychological:  Negative for depression     Past Medical History:     Past Medical History:   Diagnosis Date    Arthritis     Cataract     [facility] obstructive pulmonary disease [facility]     Diabetes mellitus ([facility]     History of echocardiogram 12/29/2009    EF 50%. Mild-mod conc LVH. Mod DDfx. LAE. Mild MR.      Hypercholesterolemia     Hypertension     Hypertensive heart disease     Normal nuclear stress test 09/08/2000    No ischemia or prior infarction. Neg EKG on submax EST. Ex time 15:02. Social History:     Social History     Social History    Marital status:      Spouse name: N/A    Number of children: N/A    Years of education: N/A     Social History Main Topics    Smoking status: Never Smoker    Smokeless tobacco: Never Used    Alcohol use No    Drug use: No    Sexual activity: Not Asked     Other Topics Concern    None     Social History Narrative        Family History:     Family History   Problem Relation Age of Onset    Hypertension Mother     Ovarian Cancer Mother     Heart Attack Father     Breast Cancer Maternal Aunt         Medications:      Allergies   Allergen Reactions    Codeine Nausea Only    Sulfa (Sulfonamide Antibiotics) Rash        Current Facility-Administered Medications   Medication Dose Route Frequency    furosemide (LASIX) injection 40 mg  40 mg IntraVENous Q12H    aspirin delayed-release tablet 81 mg  81 mg Oral DAILY    gabapentin (NEURONTIN) capsule 200 mg  200 mg Oral TID    insulin glargine (LANTUS) injection 45 Units  45 Units SubCUTAneous DAILY    isosorbide dinitrate (ISORDIL) tablet 30 mg  30 mg Oral TID    pravastatin (PRAVACHOL) tablet 20 mg  20 mg Oral QHS    sodium chloride (OCEAN) 0.65 % nasal squeeze bottle 2 Spray  2 Spray Both Nostrils Q2H PRN    hydrALAZINE (APRESOLINE) tablet 100 mg  100 mg Oral TID    sodium chloride (NS) flush 5-10 mL  5-10 mL IntraVENous Q8H    sodium chloride (NS) flush 5-10 mL  5-10 mL IntraVENous PRN    nitroglycerin (NITROSTAT) tablet 0.4 mg  0.4 mg SubLINGual PRN    nitroglycerin (TRIDIL) 400 mcg/ml infusion  0-20 mcg/min IntraVENous TITRATE    niCARdipine (CARDENE) 25 mg in 0.9% sodium chloride 250 mL infusion  0-15 mg/hr IntraVENous TITRATE    heparin (porcine) injection 5,000 Units  5,000 Units SubCUTAneous Q8H    hydrALAZINE (APRESOLINE) 20 mg/mL injection 10 mg  10 mg IntraVENous Q6H PRN    insulin lispro (HUMALOG) injection   SubCUTAneous AC&HS    glucose chewable tablet 16 g  4 Tab Oral PRN    glucagon (GLUCAGEN) injection 1 mg  1 mg IntraMUSCular PRN    dextrose (D50W) injection syrg 12.5-25 g  25-50 mL IntraVENous PRN    cloNIDine (CATAPRES) 0.1 mg/24 hr patch 1 Patch  1 Patch TransDERmal Q7D    carvedilol (COREG) tablet 25 mg  25 mg Oral BID WITH MEALS    albuterol (PROVENTIL VENTOLIN) nebulizer solution 2.5 mg  2.5 mg Nebulization Q4H PRN         Physical Exam:     Visit Vitals    BP (!) 188/99    Pulse 86    Temp 98 °F (36.7 °C)    Resp 22    Ht 5' 9\" (1.753 m)    Wt 84.4 kg (186 lb 1.1 oz)    SpO2 97%    BMI 27.48 kg/m2     BP Readings from Last 3 Encounters:   05/06/18 (!) 188/99   02/08/18 164/90   02/08/18 (!) 88/60     Pulse Readings from Last 3 Encounters:   05/06/18 86   02/08/18 83   02/08/18 74     Wt Readings from Last 3 Encounters:   05/06/18 84.4 kg (186 lb 1.1 oz)   02/08/18 81.2 kg (179 lb)   01/26/18 82.6 kg (182 lb)       General:  Awake, alert, oriented x 3  Neck:  supple  Lungs:  Diminished bases, no wheezing or crackles appreciated  Heart:  Reg rate and rhythm  Abdomen:  Soft, non-tender  Extremities: trace dependent posterior thigh edema, no cyanosis  Skin: warm and dry without rashes or lesions  Neuro: no focal deficits appreciated  Psych: normal mood and affect     Data Review:     Recent Labs      05/06/18   0127  05/05/18   1120   WBC  4.4*  6.0   HGB  9.7*  11.0*   HCT  30.8*  35.6   PLT  130*  150     Recent Labs      05/06/18   0127  05/05/18   1120   NA  143  141   K  2.9*  3.4*   CL  109*  107   CO2  29  28   GLU  126*  141*   BUN  27*  30*   CREA  1.08  1.24   CA  7.7*  8.5   MG  2.1   --    PHOS  2.6   --    ALB  2.4*   --    SGOT 36   --    ALT  78*   --        Results for orders placed or performed during the hospital encounter of 05/05/18   EKG, 12 LEAD, INITIAL   Result Value Ref Range    Ventricular Rate 90 BPM    Atrial Rate 90 BPM    P-R Interval 160 ms    QRS Duration 106 ms    Q-T Interval 390 ms    QTC Calculation (Bezet) 477 ms    Calculated P Axis 44 degrees    Calculated R Axis -29 degrees    Calculated T Axis 122 degrees    Diagnosis       Normal sinus rhythm  Minimal voltage criteria for LVH, may be normal variant  Septal infarct (cited on or before 01-FEB-2014)  T wave abnormality, consider lateral ischemia  Abnormal ECG  When compared with ECG of 08-FEB-2018 14:54,  No significant change was found  Confirmed by Pierre Santiago MD, Donnita Frankel ([id number]) on 5/5/2018 10:31:23 AM     Results for orders placed or performed in visit on 01/26/18   AMB POC EKG ROUTINE W/ 12 LEADS, INTER & REP    Narrative    Nonspecific ST-T changes.   Cannot rule out anterior myocardial infarction age-indeterminate       All Cardiac Markers in the last 24 hours:    Lab Results   Component Value Date/Time    CPK 86 05/06/2018 01:27 AM    CPK 97 05/05/2018 10:15 PM     05/05/2018 11:20 AM    CKMB 1.4 05/06/2018 01:27 AM    CKMB 1.7 05/05/2018 10:15 PM    CKMB 1.7 05/05/2018 11:20 AM    CKND1 1.6 05/06/2018 01:27 AM    CKND1 1.8 05/05/2018 10:15 PM    CKND1 1.7 05/05/2018 11:20 AM    TROIQ 0.07 (H) 05/06/2018 01:27 AM    TROIQ 0.06 (H) 05/05/2018 10:15 PM    TROIQ 0.05 05/05/2018 11:20 AM       Last Lipid:    Lab Results   Component Value Date/Time    Cholesterol, total 104 09/27/2017 04:15 AM    HDL Cholesterol 35 (L) 09/27/2017 04:15 AM    LDL, calculated 44.6 09/27/2017 04:15 AM    Triglyceride 122 09/27/2017 04:15 AM    CHOL/HDL Ratio 3.0 09/27/2017 04:15 AM       Signed By: Merline Pam, PA     May 6, 2018

## 2018-05-06 NOTE — H&P
History & Physical    Patient: Paris Barnett MRN: [id number]  CSN: [id number]    YOB: 1955  Age: 70 y.o. Sex: female      DOA: 5/5/2018    Chief Complaint:   Chief Complaint   Patient presents with    Shortness of Breath    Headache    Fatigue    Hypertension          HPI:      Paris Barnett is a 70 y.o.  female who has  PMH significant for HTN, DM, HLP,  CHF and pleural effusion and hx of noncompliance with meds and similar admission in the past 09/2017  with Same CC. Pt presents with worsening SOB, productive cough with white phlegm, chest tightness and increasingly worsening LE edema . On admission, pt was started on NG drip and her BP continued to be elevated and will be admitted to ICU for BP control . CXR showed Pleural effusion B/L   Started on Nicardipine drip by ICU team and home meds will be strted in AM   Currently Denies CP/SOB on NC /fever/abdominal pain and urinary Sx but continues to c/o headache and  Has loose stools       Past Medical History:   Diagnosis Date    Arthritis     Cataract     [facility] pulmonary disease [facility] [address]     Diabetes mellitus ([facility] [address]     History of echocardiogram 12/29/2009    EF 50%. Mild-mod conc LVH. Mod DDfx. LAE. Mild MR.      Hypercholesterolemia     Hypertension     Hypertensive heart disease     Normal nuclear stress test 09/08/2000    No ischemia or prior infarction. Neg EKG on submax EST. Ex time 15:02.        Past Surgical History:   Procedure Laterality Date    HX CHOLECYSTECTOMY  2001    HX TUBAL LIGATION  1975       Family History   Problem Relation Age of Onset    Hypertension Mother     Ovarian Cancer Mother     Heart Attack Father     Breast Cancer Maternal Aunt        Social History     Social History    Marital status:      Spouse name: N/A    Number of children: N/A    Years of education: N/A     Social History Main Topics    Smoking status: Never Smoker    Smokeless tobacco: Never Used    Alcohol use No    Drug use: No    Sexual activity: Not Asked     Other Topics Concern    None     Social History Narrative       Prior to Admission medications    Medication Sig Start Date End Date Taking? Authorizing Provider   hydrALAZINE (APRESOLINE) 100 mg tablet Take 1 Tab by mouth three (3) times daily. 1/26/18  Yes Kevin Roblero MD   carvedilol (COREG) 25 mg tablet Take 1 Tab by mouth two (2) times daily (with meals). 11/3/17  Yes Kevin Roblero MD   insulin lispro (HUMALOG) 100 unit/mL kwikpen 15 Units by SubCUTAneous route daily. Yes Laya Coleman MD   albuterol (VENTOLIN HFA) 90 mcg/actuation inhaler Take 2 Puffs by inhalation every four (4) hours as needed for Wheezing or Shortness of Breath. Yes Laya Coleman MD   gabapentin (NEURONTIN) 100 mg capsule 1 cap po twice daily with breakfast and dinner, and 3 tabs po qHS 9/27/17  Yes Rachana Max MD   fluticasone (FLONASE) 50 mcg/actuation nasal spray 2 Sprays by Both Nostrils route daily. 9/27/17  Yes Rachana Max MD   isosorbide dinitrate (ISORDIL) 10 mg tablet Take 1 Tab by mouth three (3) times daily. 9/27/17  Yes Rachana Max MD   sodium chloride (OCEAN) 0.65 % nasal spray 2 Sprays by Both Nostrils route every two (2) hours as needed. 9/27/17  Yes Rachana Max MD   aspirin delayed-release 81 mg tablet Take 81 mg by mouth daily. Yes Santos Centeno MD   pravastatin (PRAVACHOL) 20 mg tablet Take 20 mg by mouth nightly. Yes Santos Centeno MD   mometasone-formoterol (DULERA) 200-5 mcg/actuation HFA inhaler Take 2 Puffs by inhalation two (2) times a day. Yes Santos Centeno MD   insulin glargine (LANTUS) 100 unit/mL injection 45 Units by SubCUTAneous route daily. 15 units in the evening. Yes Historical Provider   ondansetron hcl (ZOFRAN, AS HYDROCHLORIDE,) 4 mg tablet Take 1 Tab by mouth every eight (8) hours as needed for Nausea.  2/8/18   Otto Proctor MD   MULTIVIT/IRON/FA/K/HERB NO.244 (ALIVE WOMEN'S ENERGY PO) Take 1 Tab by mouth daily. Xi Estrada MD   CARBOXYMETHYLCELLULOS/GLYCERIN (REFRESH OPTIVE OP) Administer 1 Drop to both eyes six (6) times daily. Xi Estrada MD       Allergies   Allergen Reactions    Codeine Nausea Only    Sulfa (Sulfonamide Antibiotics) Rash         Review of Systems  GENERAL: Patient alert, awake and oriented times 3, able to communicate full sentences and in mild distress. HEENT: No change in vision, no earache, tinnitus, sore throat or sinus congestion. NECK: No pain or stiffness. PULMONARY: +ve shortness of breath, cough no wheeze. Cardiovascular: +ve orthopnea,and chest tightness   GASTROINTESTINAL: No abdominal pain, nausea, vomiting. +ve diarrhea, no melena or bright red blood per rectum. GENITOURINARY: No urinary frequency, urgency, hesitancy or dysuria. MUSCULOSKELETAL: No joint or muscle pain, no back pain, no recent trauma. DERMATOLOGIC: No rash, no itching, no lesions. ENDOCRINE: No polyuria, polydipsia, no heat or cold intolerance. No recent change in weight. HEMATOLOGICAL: No anemia or easy bruising or bleeding. NEUROLOGIC: +ve  headache, seizures, numbness, tingling or weakness. Physical Exam:     Physical Exam:  Visit Vitals    BP (!) 192/117    Pulse 86    Temp 97.6 °F (36.4 °C)    Resp 22    Ht 5' 9\" (1.753 m)    Wt 78.9 kg (174 lb)    SpO2 100%    BMI 25.7 kg/m2    O2 Flow Rate (L/min): 3 l/min O2 Device: Nasal cannula    Temp (24hrs), Av.7 °F (36.5 °C), Min:97.5 °F (36.4 °C), Max:98.1 °F (36.7 °C)             General:  Alert, cooperative, in distress, appears older than stated age. Head: Normocephalic, without obvious abnormality, atraumatic. Eyes:  Conjunctivae/corneas clear. PERRL, EOMs intact. Nose: Nares normal. No drainage or sinus tenderness.    Neck: Supple, symmetrical, trachea midline, no adenopathy, thyroid: no enlargement, no carotid bruit and no JVD.   Lungs:   Decrease BS B/L with +ve rales    Heart:  Regular rate and rhythm, S1, S2 normal.     Abdomen: Soft, non-tender. Bowel sounds normal.    Extremities: Extremities normal, atraumatic, no cyanosis or edema. Pulses: 2+ and symmetric all extremities. Skin:  No rashes or lesions   Neurologic: AAOx3, No focal motor or sensory deficit. Labs Reviewed: All lab results for the last 24 hours reviewed. CT, CXR and EKG    Procedures/imaging: see electronic medical records for all procedures/Xrays and details which were not copied into this note but were reviewed prior to creation of Plan      Assessment/Plan     Principal Problem:    Acute pulmonary edema ([facility] [address] (5/5/2018)    Active Problems:    Type 2 diabetes mellitus with nephropathy ([facility] [address] (12/14/2017)      Hypokalemia (5/5/2018)      Hypertensive emergency (5/5/2018)      Headache (5/5/2018)      Acute on chronic systolic (congestive) heart failure ([facility] [address] (5/5/2018)       Pt is admitted for Acute on chronic combined CHF exacerbation with Acute Pulmonary edema and lE edema and SOB 2 ry to Malignant Hypertension     Started on Nicardipine and NG drips for persistent HTN  Non-compliance ?     Will restart Home meds gradually in AM  > 422 systolic on in ER >> currently 180-200    Pleural effusion and LE edema >> respiratory distress >> lasix 40 mg BID   Will replace K+ as needed     Loose stools reported>> will send for C-diff    DM with Hyperglycemia >> continue lantus and SSI    DVT/GI Prophylaxis: Hep SQ    Plan of care is discussed in details with Patient/Family at bedside and agreed upon    Bethany Osborn MD  5/6/2018 2:33 AM

## 2018-05-06 NOTE — CONSULTS
[facility]  Pulmonary, Critical Care, and Sleep Medicine      Name: Stanley Vargas MRN: [id number]   : 1955 Hospital: [address] [place] [address] Dr   Date: 2018          Critical Care Initial Patient Consult    Requesting MD: Dr. Keyshawn Guzman                                                   Reason for CC Consult: hypertensive emergency    IMPRESSION:   · Hypertensive emergency in setting of combined systolic/diastolic heart failure with current CXR showing b/l pleural effusion and elevated pro-BNP (31978)  · Hx DM  · Hx HTN on multiple outpatient medications for BP control  · Hx congestive heart failure, systolic and diastolic, EF 31-45% with Gr 1 diastolic dysfunction (7769)  · Hx COPD, home oxygen -stable, no exacerbation      RECOMMENDATIONS:   · Resp - wean oxygen supplementation, on NC, titrate flow to keep SpO2 > 90%. Pulmonary hygiene. Repeat CXR julius to follow pleural effusion. · ID - no suspicion for infectious process -afebrile, no leukocytosis/ bandemia. Monitor closely for s/sx infection and consider panculture  · CVS - actively wean nicardipine gtt first followed by nitroglycerin gtt, target -180. Restart  outpatient BP meds in am - carvedilol, hydralazine. Start clonidine patch; prn hydralazine; trial of lasix. Echo to follow EF, diastolic function, valvular wall motion abnormalities. Serial cardiac enzymes, UDS   · Heme/Onc- stable hgb/hct, plt, coags. Monitor for bleeding  · Metabolic - trend electrolytes and replace accordingly per hospital protocol   · Renal - no kiran; follow UO  · Endocrine - glycemic control with sliding scale insulin; obtain TSH, Hgb A1c  · Neuro/ Pain/ Sedation -avoid sedating medications  · GI - cardiac/diabetic diet. Strict aspiration precautions  · Prophylaxis - DVT, GI     Subjective/History: This patient has been seen and evaluated at the request of Dr. Keyshawn Guzman for hypertensive emergency.   Patient is a 70 y.o. female with medical hx significant for DM, HTN, chronic systolic/diastolic heart failure, COPD on home oxygen, presented to the ED for shortness of breath. Pt also c/o intermittent productive cough with clear phlegm for a while and left-sided headache for past 3 days (Hx of Migraines). In ED, initial VS showed very high BP with systolic in 205F; initial w/u significant for elevated pro-BNP, mild hypokalemia and new bilateral pleural effusion on CXR. Pt was started on nicardipine and nitroglycerin gtt; given aspirin, lasix, albuterol inh, hydralazine and potassium PO replacement. Pt was transferred to ICU from [facility] ED on low dose nicardipine gtt and nitroglycerin gtt. Pt denies sob, dyspnea, chest pain, lightheadedness, dizziness, headache, nausea, vomiting, abdominal pain. Pt does not have good recollection of events prior to ED visit and is a poor historian. Addendum 0710  Pt has come off nitroglycerin and nicardipine gtt, with SBP in 160-180 range. Pt however started to complain of chest pain, left parasternal border on the 4th-5th ICS, reproducible by palpation; non-radiating; sharp. States this is a recurring chest pain which she has been experiencing in the past however does not recall the medicine she takes. · Obtain EKG now  · Nitroglycerin SL x 1; tylenol x 1      Past Medical History:   Diagnosis Date    Arthritis     Cataract     [facility] obstructive pulmonary disease [facility] [address]     Diabetes mellitus ([facility] [address] [facility]     History of echocardiogram 12/29/2009    EF 50%. Mild-mod conc LVH. Mod DDfx. LAE. Mild MR.      Hypercholesterolemia     Hypertension     Hypertensive heart disease     Normal nuclear stress test 09/08/2000    No ischemia or prior infarction. Neg EKG on submax EST. Ex time 15:02. Past Surgical History:   Procedure Laterality Date    HX CHOLECYSTECTOMY  2001    HX TUBAL LIGATION  1975      Prior to Admission medications    Medication Sig Start Date End Date Taking?  Authorizing Provider   hydrALAZINE (APRESOLINE) 100 mg tablet Take 1 Tab by mouth three (3) times daily. 1/26/18  Yes Lacie Huber MD   carvedilol (COREG) 25 mg tablet Take 1 Tab by mouth two (2) times daily (with meals). 11/3/17  Yes Lacie Huber MD   insulin lispro (HUMALOG) 100 unit/mL kwikpen 15 Units by SubCUTAneous route daily. Yes Coleman Burnette MD   albuterol (VENTOLIN HFA) 90 mcg/actuation inhaler Take 2 Puffs by inhalation every four (4) hours as needed for Wheezing or Shortness of Breath. Yes Coleman Burnette MD   gabapentin (NEURONTIN) 100 mg capsule 1 cap po twice daily with breakfast and dinner, and 3 tabs po qHS 9/27/17  Yes Marifer Aguayo MD   fluticasone (FLONASE) 50 mcg/actuation nasal spray 2 Sprays by Both Nostrils route daily. 9/27/17  Yes Marifer Aguayo MD   isosorbide dinitrate (ISORDIL) 10 mg tablet Take 1 Tab by mouth three (3) times daily. 9/27/17  Yes Marifer Aguayo MD   sodium chloride (OCEAN) 0.65 % nasal spray 2 Sprays by Both Nostrils route every two (2) hours as needed. 9/27/17  Yes Marifer Aguayo MD   aspirin delayed-release 81 mg tablet Take 81 mg by mouth daily. Yes Santos Centeno MD   pravastatin (PRAVACHOL) 20 mg tablet Take 20 mg by mouth nightly. Yes Santos Centeno MD   mometasone-formoterol (DULERA) 200-5 mcg/actuation HFA inhaler Take 2 Puffs by inhalation two (2) times a day. Yes Santos Centeno MD   insulin glargine (LANTUS) 100 unit/mL injection 45 Units by SubCUTAneous route daily. 15 units in the evening. Yes Historical Provider   ondansetron hcl (ZOFRAN, AS HYDROCHLORIDE,) 4 mg tablet Take 1 Tab by mouth every eight (8) hours as needed for Nausea. 2/8/18   Sandoval Almazan MD   MULTIVIT/IRON/FA/K/HERB NO.244 (ALIVE WOMEN'S ENERGY PO) Take 1 Tab by mouth daily. Coleman Burnette MD   CARBOXYMETHYLCELLULOS/GLYCERIN (REFRESH OPTIVE OP) Administer 1 Drop to both eyes six (6) times daily.     Coleman Burnette MD     Current Facility-Administered Medications   Medication Dose Route Frequency    nitroglycerin (TRIDIL) 400 mcg/ml infusion  0-20 mcg/min IntraVENous TITRATE    niCARdipine (CARDENE) 25 mg in 0.9% sodium chloride 250 mL infusion  0-15 mg/hr IntraVENous TITRATE    heparin (porcine) injection 5,000 Units  5,000 Units SubCUTAneous Q8H    insulin lispro (HUMALOG) injection   SubCUTAneous AC&HS    cloNIDine (CATAPRES) 0.1 mg/24 hr patch 1 Patch  1 Patch TransDERmal Q7D    carvedilol (COREG) tablet 25 mg  25 mg Oral BID WITH MEALS    hydrALAZINE (APRESOLINE) tablet 50 mg  50 mg Oral TID     Allergies   Allergen Reactions    Codeine Nausea Only    Sulfa (Sulfonamide Antibiotics) Rash      Social History   Substance Use Topics    Smoking status: Never Smoker    Smokeless tobacco: Never Used    Alcohol use No      Family History   Problem Relation Age of Onset    Hypertension Mother     Ovarian Cancer Mother     Heart Attack Father     Breast Cancer Maternal Aunt         Review of Systems:  A comprehensive review of systems was negative except for that written in the HPI. Objective:   Vital Signs:    Visit Vitals    BP (!) 192/117    Pulse 86    Temp 97.6 °F (36.4 °C)    Resp 22    Ht 5' 9\" (1.753 m)    Wt 78.9 kg (174 lb)    SpO2 100%    BMI 25.7 kg/m2       O2 Device: Nasal cannula   O2 Flow Rate (L/min): 3 l/min   Temp (24hrs), Av.7 °F (36.5 °C), Min:97.5 °F (36.4 °C), Max:98.1 °F (36.7 °C)       Intake/Output:   Last shift:         Last 3 shifts:    No intake or output data in the 24 hours ending 18 0205      Physical Exam:    General:  Alert, cooperative, no distress, appears stated age. Head:  Normocephalic, without obvious abnormality, atraumatic. Eyes:  Conjunctivae/corneas clear. PERRL, EOMs intact.    Nose: Nares normal.  No drainage    Throat: Lips, mucosa, and tongue normal.    Neck: Supple, symmetrical, trachea midline, no adenopathy, thyroid: no enlargment/tenderness/nodules, no carotid bruit and no JVD.   Lungs:   Symmetrical chest expansion, no accessory muscle use  Decreased bilateral breath sounds mid-basal area; no wheezing, crackles   Chest wall:  No tenderness or deformity. Heart:  Regular rate and rhythm, S1, S2 present   Abdomen:   Soft, non-tender. Bowel sounds normal.    Extremities: Extremities atraumatic, no cyanosis; b/l LE pitting edema. Pulses: 2+ and symmetric all extremities. Skin: Skin color, texture, turgor normal.    Lymph nodes: Cervical, supraclavicular nodes normal.   Neurologic: Grossly nonfocal       Data:     Recent Results (from the past 24 hour(s))   EKG, 12 LEAD, INITIAL    Collection Time: 05/05/18  9:42 AM   Result Value Ref Range    Ventricular Rate 90 BPM    Atrial Rate 90 BPM    P-R Interval 160 ms    QRS Duration 106 ms    Q-T Interval 390 ms    QTC Calculation (Bezet) 477 ms    Calculated P Axis 44 degrees    Calculated R Axis -29 degrees    Calculated T Axis 122 degrees    Diagnosis       Normal sinus rhythm  Minimal voltage criteria for LVH, may be normal variant  Septal infarct (cited on or before 01-FEB-2014)  T wave abnormality, consider lateral ischemia  Abnormal ECG  When compared with ECG of 08-FEB-2018 14:54,  No significant change was found  Confirmed by Clover Matta MD, Jamie ([id number]) on 5/5/2018 10:31:23 AM     CBC WITH AUTOMATED DIFF    Collection Time: 05/05/18 11:20 AM   Result Value Ref Range    WBC 6.0 4.6 - 13.2 K/uL    RBC 3.85 (L) 4.20 - 5.30 M/uL    HGB 11.0 (L) 12.0 - 16.0 g/dL    HCT 35.6 35.0 - 45.0 %    MCV 92.5 74.0 - 97.0 FL    MCH 28.6 24.0 - 34.0 PG    MCHC 30.9 (L) 31.0 - 37.0 g/dL    RDW 17.7 (H) 11.6 - 14.5 %    PLATELET 222 150 - 658 K/uL    MPV 11.6 9.2 - 11.8 FL    NEUTROPHILS 76 (H) 40 - 73 %    LYMPHOCYTES 16 (L) 21 - 52 %    MONOCYTES 7 3 - 10 %    EOSINOPHILS 1 0 - 5 %    BASOPHILS 0 0 - 2 %    ABS. NEUTROPHILS 4.6 1.8 - 8.0 K/UL    ABS. LYMPHOCYTES 1.0 0.9 - 3.6 K/UL    ABS. MONOCYTES 0.4 0.05 - 1.2 K/UL    ABS.  EOSINOPHILS 0.1 0.0 - 0.4 K/UL    ABS. BASOPHILS 0.0 0.0 - 0.06 K/UL    DF AUTOMATED     METABOLIC PANEL, BASIC    Collection Time: 05/05/18 11:20 AM   Result Value Ref Range    Sodium 141 136 - 145 mmol/L    Potassium 3.4 (L) 3.5 - 5.5 mmol/L    Chloride 107 100 - 108 mmol/L    CO2 28 21 - 32 mmol/L    Anion gap 6 3.0 - 18 mmol/L    Glucose 141 (H) 74 - 99 mg/dL    BUN 30 (H) 7.0 - 18 MG/DL    Creatinine 1.24 0.6 - 1.3 MG/DL    BUN/Creatinine ratio 24 (H) 12 - 20      GFR est AA 53 (L) >60 ml/min/1.73m2    GFR est non-AA 43 (L) >60 ml/min/1.73m2    Calcium 8.5 8.5 - 10.1 MG/DL   NT-PRO BNP    Collection Time: 05/05/18 11:20 AM   Result Value Ref Range    NT pro-BNP 72081 (H) 0 - 900 PG/ML   CARDIAC PANEL,(CK, CKMB & TROPONIN)    Collection Time: 05/05/18 11:20 AM   Result Value Ref Range     26 - 192 U/L    CK - MB 1.7 <3.6 ng/ml    CK-MB Index 1.7 0.0 - 4.0 %    Troponin-I, Qt. 0.05 0.00 - 0.06 NG/ML   GLUCOSE, POC    Collection Time: 05/05/18  8:20 PM   Result Value Ref Range    Glucose (POC) 149 (H) 70 - 110 mg/dL   CARDIAC PANEL,(CK, CKMB & TROPONIN)    Collection Time: 05/05/18 10:15 PM   Result Value Ref Range    CK 97 26 - 192 U/L    CK - MB 1.7 <3.6 ng/ml    CK-MB Index 1.8 0.0 - 4.0 %    Troponin-I, Qt. 0.06 (H) 0.0 - 0.045 NG/ML             Telemetry:normal sinus rhythm    Imaging:  CXR Results  (Last 48 hours)               05/05/18 1054  XR CHEST PORT Final result    Impression:  IMPRESSION:       Interval development of moderate bilateral pleural effusions. Interval   development of bilateral lower lung infiltrates or atelectasis. Cardiomegaly.    Minimal cephalization of pulmonary vascular flow       Narrative:  Portable Chest 1136 hours       CPT CODE:39745       HISTORY:  Patient complains of headache x3 days, markedly elevated blood   pressure, shortness of breath, leg edema, fatigue, increasing shortness of   breath x1 week, vomited 2 days ago, intermittent productive cough, on home   oxygen and inhalers, headache. sob; edema. COMPARISON: Chest x-ray February 8, 2018, September 22, 2017. FINDINGS:        Interval opacification of the lower one third of the lungs bilaterally with   widening of the pleural spaces. Obscuration of the lower cardiac borders   bilaterally. Heart moderately enlarged. Pulmonary vessels normal to minimally   cephalized. .                 CT Results  (Last 48 hours)               05/05/18 1059  CT HEAD WO CONT Final result    Impression:  IMPRESSION:       No acute infarct, mass, nor hemorrhage. Atrophy. Small vessel disease. Chronic lacunar infarct right thalamus. Report provided to the emergency department at 1106 hrs. Narrative:  CT Head Without Contrast.       CPT CODE: 59037       HISTORY: Patient complains of headache x3 days, markedly elevated blood   pressure, shortness of breath, leg edema, fatigue, increasing shortness of   breath x1 week, vomited 2 days ago, intermittent productive cough, on home   oxygen and inhalers, headache. COMPARISON: CT head 9/26/2017. FINDINGS:       No IV contrast administered. 5 mm thick slices were obtained from skull base to   vertex. All CT scans at this facility are performed using dose optimization   technique as appropriate to a performed exam, to include automated exposure   control, adjustment of the mA and/or kV according to patient's size (including   appropriate matching for site-specific examinations), or use of iterative   reconstruction technique. There are no intra or extra axial fluid collections. There is no hemorrhage. There is mild stable enlargement of the ventricular system, cortical sulci, and   basilar cisterns. Mild Periventricular low density changes are seen   bilaterally. Tiny focal chronic lacunar infarction is demonstrated within the   right thalamus. The paranasal sinuses are clear.                       Mary Briceño PA-C

## 2018-05-06 NOTE — PROGRESS NOTES
PCCM Follow-up    Pt evaluated. Chart reviewed including labs and imaging. 69 y/o female admitted for HTN emergency -transferred to ICU from [facility] ED on low dose nicardipine gtt and nitroglycerin gtt. Pt denies sob, dyspnea, chest pain, lightheadedness, dizziness, headache, nausea, vomiting, abdominal pain. Pt does not have good recollection of events prior to ED visit and is a poor historian. Pt awake, alert, oriented  Symmetrical chest expansion, no accessory muscle use  Decreased bilateral breath sounds mid-basal area; no wheezing, crackles  Abdomen soft, nontender, +active bowel sounds  +b/l LE pitting edema    Hypertensive emergency in setting of combined systolic/diastolic heart failure with b/l pleural effusion  Hx DM  Hx HTN on multiple outpatient medications for BP control  · Actively wean nicardipine gtt first followed by nitroglycerin gtt, target -180  · Restart outpatient BP meds - carvedilol, hydralazine  · Start clonidine patch; prn hydralazine  · Repeat CXR julius to follow pleural effusion  · Labs: CBC, CMP, Mg, Phos, TSH, cardiac enzymes, hgb A1c, UDS  · Echo to follow EF, diastolic function, valvular wall motion abnormalities  · RN to inform hospitalist of pt's admission to ICU  · Best supportive care  Full note to follow.     Ivania Alfonso PA-C  9:50 PM

## 2018-05-06 NOTE — PROGRESS NOTES
[facility]  Progress Note    Patient: Stacie Renteria Age: 70 y.o. : 1955 MR#: [id number] SSN: [id number]  Date: 2018     Subjective:   Pt with minimal verbal response, although somnolent wakes up easily. Denies pain currently. Per daughter she has been having swelling in her legs, was hallucinating prior to coming to the ED. Per nursing, pt w/ poor appetite, did eat some of her b/fast.      Assessment/Plan:   - Hypertensive emergency, however bp now on low side and bp meds have been adjusted by cardiology team, assistance appreciated. Pt w/ low K, ? Hyper brett state? -Hypokalemia, mag nl, not sure if related to above, ie. Due to hyper brett state? Has had low, low nl K in the past.  -History of combined systolic/diastolic HF, last echo 59/46 EF 40-45%, has some degree of decompensation cardiology following. On bb, asa. Lasix stopped by cards due to hypotension.  -Moderate bilateral pleural effusions  -Acute metabolic encephalopathy w/ reports of hallucinations per daughter and c/o h/a upon initial presentation to ED, likely due to elevated bp.  -Type 2 DM A1c 4.8 this admission. Blood sugars within acceptable range. lantus 45 units is what she takes at home, confirmed with daughter.  -History of asthma  -dyslipedemia :on statin. -per nursing reports of loose stools and n/v cause unclear  -COPD on home 02 on PRN basis    PLAN:  -cont to closely watch bp and adjust meds.   -echo ordered by cards  -MARITA w/u  -will check for hyperaldo state  -replace and closely follow K  -will decrease lantus dose given poor po intake     Additional Notes:      Case discussed with:  [x]Patient  [x]Family  [x]Nursing  []Case Management  DVT Prophylaxis:  []Lovenox  []Hep SQ  []SCDs  []Coumadin   []On Heparin gtt    Objective:   VS:   Visit Vitals    /86    Pulse 66    Temp 97.7 °F (36.5 °C)    Resp 20    Ht 5' 9\" (1.753 m)    Wt 84.4 kg (186 lb 1.1 oz)    SpO2 100%  BMI 27.48 kg/m2      Tmax/24hrs: Temp (24hrs), Av.8 °F (36.6 °C), Min:97.6 °F (36.4 °C), Max:98.1 °F (36.7 °C)    Intake/Output Summary (Last 24 hours) at 18 1253  Last data filed at 18 0854   Gross per 24 hour   Intake           242.53 ml   Output             2515 ml   Net         -2272.47 ml       General:  Somnolent, wakes up with verbal and tactile stimuli  Cardiovascular:  Rrr, no murmurs  Pulmonary:  ctab  GI:  Soft, nt, nd  Extremities:  1+ edema lower ext bilaterally  Additional:      Labs:    Recent Results (from the past 24 hour(s))   GLUCOSE, POC    Collection Time: 18  8:20 PM   Result Value Ref Range    Glucose (POC) 149 (H) 70 - 110 mg/dL   CARDIAC PANEL,(CK, CKMB & TROPONIN)    Collection Time: 18 10:15 PM   Result Value Ref Range    CK 97 26 - 192 U/L    CK - MB 1.7 <3.6 ng/ml    CK-MB Index 1.8 0.0 - 4.0 %    Troponin-I, Qt. 0.06 (H) 0.0 - 0.045 NG/ML   CBC WITH AUTOMATED DIFF    Collection Time: 18  1:27 AM   Result Value Ref Range    WBC 4.4 (L) 4.6 - 13.2 K/uL    RBC 3.54 (L) 4.20 - 5.30 M/uL    HGB 9.7 (L) 12.0 - 16.0 g/dL    HCT 30.8 (L) 35.0 - 45.0 %    MCV 87.0 74.0 - 97.0 FL    MCH 27.4 24.0 - 34.0 PG    MCHC 31.5 31.0 - 37.0 g/dL    RDW 17.2 (H) 11.6 - 14.5 %    PLATELET 755 (L) 876 - 420 K/uL    MPV 11.5 9.2 - 11.8 FL    NEUTROPHILS 74 42 - 75 %    BAND NEUTROPHILS 2 0 - 5 %    LYMPHOCYTES 16 (L) 20 - 51 %    MONOCYTES 7 2 - 9 %    EOSINOPHILS 1 0 - 5 %    BASOPHILS 0 0 - 3 %    NRBC 9.0 (H) 0  WBC    ABS. NEUTROPHILS 3.4 1.8 - 8.0 K/UL    ABS. LYMPHOCYTES 0.7 (L) 0.8 - 3.5 K/UL    ABS. MONOCYTES 0.3 0 - 1.0 K/UL    ABS. EOSINOPHILS 0.0 0.0 - 0.4 K/UL    ABS.  BASOPHILS 0.0 0.0 - 0.06 K/UL    DF MANUAL      PLATELET COMMENTS DECREASED PLATELETS      RBC COMMENTS ANISOCYTOSIS  1+        RBC COMMENTS POLYCHROMASIA  1+       METABOLIC PANEL, COMPREHENSIVE    Collection Time: 18  1:27 AM   Result Value Ref Range    Sodium 143 136 - 145 mmol/L Potassium 2.9 (LL) 3.5 - 5.5 mmol/L    Chloride 109 (H) 100 - 108 mmol/L    CO2 29 21 - 32 mmol/L    Anion gap 5 3.0 - 18 mmol/L    Glucose 126 (H) 74 - 99 mg/dL    BUN 27 (H) 7.0 - 18 MG/DL    Creatinine 1.08 0.6 - 1.3 MG/DL    BUN/Creatinine ratio 25 (H) 12 - 20      GFR est AA >60 >60 ml/min/1.73m2    GFR est non-AA 51 (L) >60 ml/min/1.73m2    Calcium 7.7 (L) 8.5 - 10.1 MG/DL    Bilirubin, total 0.9 0.2 - 1.0 MG/DL    ALT (SGPT) 78 (H) 13 - 56 U/L    AST (SGOT) 36 15 - 37 U/L    Alk.  phosphatase 137 (H) 45 - 117 U/L    Protein, total 6.8 6.4 - 8.2 g/dL    Albumin 2.4 (L) 3.4 - 5.0 g/dL    Globulin 4.4 (H) 2.0 - 4.0 g/dL    A-G Ratio 0.5 (L) 0.8 - 1.7     MAGNESIUM    Collection Time: 05/06/18  1:27 AM   Result Value Ref Range    Magnesium 2.1 1.6 - 2.6 mg/dL   PHOSPHORUS    Collection Time: 05/06/18  1:27 AM   Result Value Ref Range    Phosphorus 2.6 2.5 - 4.9 MG/DL   CARDIAC PANEL,(CK, CKMB & TROPONIN)    Collection Time: 05/06/18  1:27 AM   Result Value Ref Range    CK 86 26 - 192 U/L    CK - MB 1.4 <3.6 ng/ml    CK-MB Index 1.6 0.0 - 4.0 %    Troponin-I, Qt. 0.07 (H) 0.0 - 0.045 NG/ML   TSH 3RD GENERATION    Collection Time: 05/06/18  1:27 AM   Result Value Ref Range    TSH 1.61 0.36 - 3.74 uIU/mL   HEMOGLOBIN A1C WITH EAG    Collection Time: 05/06/18  1:27 AM   Result Value Ref Range    Hemoglobin A1c 4.8 4.2 - 5.6 %    Est. average glucose Cannot be calculated mg/dL   DRUG SCREEN, URINE    Collection Time: 05/06/18  4:00 AM   Result Value Ref Range    BENZODIAZEPINES NEGATIVE  NEG      BARBITURATES NEGATIVE  NEG      THC (TH-CANNABINOL) NEGATIVE  NEG      OPIATES NEGATIVE  NEG      PCP(PHENCYCLIDINE) NEGATIVE  NEG      COCAINE NEGATIVE  NEG      AMPHETAMINES NEGATIVE  NEG      METHADONE NEGATIVE  NEG      HDSCOM (NOTE)    EKG, 12 LEAD, SUBSEQUENT    Collection Time: 05/06/18  7:22 AM   Result Value Ref Range    Ventricular Rate 87 BPM    Atrial Rate 87 BPM    P-R Interval 144 ms    QRS Duration 98 ms Q-T Interval 400 ms    QTC Calculation (Bezet) 481 ms    Calculated P Axis 48 degrees    Calculated R Axis -21 degrees    Calculated T Axis 134 degrees    Diagnosis       Normal sinus rhythm  Possible Left atrial enlargement  Anterior infarct (cited on or before 01-FEB-2014)  T wave abnormality, consider lateral ischemia  Abnormal ECG  When compared with ECG of 05-MAY-2018 09:42,  No significant change was found  Confirmed by Trav Blanco MD, [facility] ([id number]) on 5/6/2018 10:21:44 AM     GLUCOSE, POC    Collection Time: 05/06/18  8:28 AM   Result Value Ref Range    Glucose (POC) 119 (H) 70 - 110 mg/dL   CARDIAC PANEL,(CK, CKMB & TROPONIN)    Collection Time: 05/06/18 10:15 AM   Result Value Ref Range    CK 62 26 - 192 U/L    CK - MB 1.4 <3.6 ng/ml    CK-MB Index 2.3 0.0 - 4.0 %    Troponin-I, Qt. 0.08 (H) 0.0 - 0.045 NG/ML   C. DIFFICILE/EPI PCR    Collection Time: 05/06/18 10:18 AM   Result Value Ref Range    Special Requests: NO SPECIAL REQUESTS      Culture result:        Toxigenic C. difficile NEGATIVE                         C. difficile target DNA sequences are not detected.        Signed By: Laura Nichols MD     May 6, 2018 12:53 PM

## 2018-05-07 LAB
ANION GAP SERPL CALC-SCNC: 4 MMOL/L (ref 3–18)
BNP SERPL-MCNC: ABNORMAL PG/ML (ref 0–900)
BUN SERPL-MCNC: 27 MG/DL (ref 7–18)
BUN/CREAT SERPL: 17 (ref 12–20)
CALCIUM SERPL-MCNC: 7.3 MG/DL (ref 8.5–10.1)
CHLORIDE SERPL-SCNC: 108 MMOL/L (ref 100–108)
CO2 SERPL-SCNC: 29 MMOL/L (ref 21–32)
CORTIS SERPL-MCNC: 15.2 UG/DL (ref 3.09–22.4)
CREAT SERPL-MCNC: 1.55 MG/DL (ref 0.6–1.3)
GLUCOSE BLD STRIP.AUTO-MCNC: 118 MG/DL (ref 70–110)
GLUCOSE BLD STRIP.AUTO-MCNC: 120 MG/DL (ref 70–110)
GLUCOSE BLD STRIP.AUTO-MCNC: 136 MG/DL (ref 70–110)
GLUCOSE BLD STRIP.AUTO-MCNC: 83 MG/DL (ref 70–110)
GLUCOSE SERPL-MCNC: 85 MG/DL (ref 74–99)
POTASSIUM SERPL-SCNC: 3.8 MMOL/L (ref 3.5–5.5)
SODIUM SERPL-SCNC: 141 MMOL/L (ref 136–145)

## 2018-05-07 PROCEDURE — 82024 ASSAY OF ACTH: CPT | Performed by: INTERNAL MEDICINE

## 2018-05-07 PROCEDURE — 82962 GLUCOSE BLOOD TEST: CPT

## 2018-05-07 PROCEDURE — 83880 ASSAY OF NATRIURETIC PEPTIDE: CPT | Performed by: INTERNAL MEDICINE

## 2018-05-07 PROCEDURE — 74011250636 HC RX REV CODE- 250/636: Performed by: PHYSICIAN ASSISTANT

## 2018-05-07 PROCEDURE — 82626 DEHYDROEPIANDROSTERONE: CPT | Performed by: INTERNAL MEDICINE

## 2018-05-07 PROCEDURE — 36415 COLL VENOUS BLD VENIPUNCTURE: CPT | Performed by: INTERNAL MEDICINE

## 2018-05-07 PROCEDURE — 93306 TTE W/DOPPLER COMPLETE: CPT

## 2018-05-07 PROCEDURE — 74011250637 HC RX REV CODE- 250/637: Performed by: PHYSICIAN ASSISTANT

## 2018-05-07 PROCEDURE — 74011250637 HC RX REV CODE- 250/637: Performed by: INTERNAL MEDICINE

## 2018-05-07 PROCEDURE — 74011636637 HC RX REV CODE- 636/637: Performed by: INTERNAL MEDICINE

## 2018-05-07 PROCEDURE — 80048 BASIC METABOLIC PNL TOTAL CA: CPT | Performed by: INTERNAL MEDICINE

## 2018-05-07 PROCEDURE — 93975 VASCULAR STUDY: CPT

## 2018-05-07 PROCEDURE — 83835 ASSAY OF METANEPHRINES: CPT | Performed by: INTERNAL MEDICINE

## 2018-05-07 PROCEDURE — 97530 THERAPEUTIC ACTIVITIES: CPT

## 2018-05-07 PROCEDURE — 82627 DEHYDROEPIANDROSTERONE: CPT | Performed by: INTERNAL MEDICINE

## 2018-05-07 PROCEDURE — 82533 TOTAL CORTISOL: CPT | Performed by: INTERNAL MEDICINE

## 2018-05-07 PROCEDURE — 97162 PT EVAL MOD COMPLEX 30 MIN: CPT

## 2018-05-07 PROCEDURE — 65660000004 HC RM CVT STEPDOWN

## 2018-05-07 PROCEDURE — 82088 ASSAY OF ALDOSTERONE: CPT | Performed by: INTERNAL MEDICINE

## 2018-05-07 PROCEDURE — 77010033678 HC OXYGEN DAILY: Performed by: INTERNAL MEDICINE

## 2018-05-07 RX ADMIN — HYDRALAZINE HYDROCHLORIDE 50 MG: 50 TABLET, FILM COATED ORAL at 21:42

## 2018-05-07 RX ADMIN — HYDRALAZINE HYDROCHLORIDE 50 MG: 50 TABLET, FILM COATED ORAL at 00:08

## 2018-05-07 RX ADMIN — Medication 10 ML: at 21:45

## 2018-05-07 RX ADMIN — Medication 10 ML: at 06:05

## 2018-05-07 RX ADMIN — INSULIN GLARGINE 20 UNITS: 100 INJECTION, SOLUTION SUBCUTANEOUS at 13:25

## 2018-05-07 RX ADMIN — ISOSORBIDE DINITRATE 10 MG: 10 TABLET ORAL at 08:00

## 2018-05-07 RX ADMIN — ACETAMINOPHEN 650 MG: 325 TABLET ORAL at 20:40

## 2018-05-07 RX ADMIN — CARVEDILOL 25 MG: 25 TABLET, FILM COATED ORAL at 08:00

## 2018-05-07 RX ADMIN — HEPARIN SODIUM 5000 UNITS: 5000 INJECTION, SOLUTION INTRAVENOUS; SUBCUTANEOUS at 06:05

## 2018-05-07 RX ADMIN — PRAVASTATIN SODIUM 20 MG: 20 TABLET ORAL at 21:42

## 2018-05-07 RX ADMIN — GABAPENTIN 200 MG: 100 CAPSULE ORAL at 16:05

## 2018-05-07 RX ADMIN — GABAPENTIN 200 MG: 100 CAPSULE ORAL at 21:42

## 2018-05-07 RX ADMIN — Medication 10 ML: at 14:00

## 2018-05-07 RX ADMIN — HEPARIN SODIUM 5000 UNITS: 5000 INJECTION, SOLUTION INTRAVENOUS; SUBCUTANEOUS at 13:30

## 2018-05-07 RX ADMIN — ACETAMINOPHEN 650 MG: 325 TABLET ORAL at 16:05

## 2018-05-07 RX ADMIN — ISOSORBIDE DINITRATE 10 MG: 10 TABLET ORAL at 21:43

## 2018-05-07 RX ADMIN — ASPIRIN 81 MG: 81 TABLET, COATED ORAL at 08:00

## 2018-05-07 RX ADMIN — ISOSORBIDE DINITRATE 10 MG: 10 TABLET ORAL at 16:05

## 2018-05-07 RX ADMIN — ACETAMINOPHEN 650 MG: 325 TABLET ORAL at 00:12

## 2018-05-07 RX ADMIN — HEPARIN SODIUM 5000 UNITS: 5000 INJECTION, SOLUTION INTRAVENOUS; SUBCUTANEOUS at 21:42

## 2018-05-07 RX ADMIN — HYDRALAZINE HYDROCHLORIDE 50 MG: 50 TABLET, FILM COATED ORAL at 16:05

## 2018-05-07 RX ADMIN — GABAPENTIN 200 MG: 100 CAPSULE ORAL at 08:00

## 2018-05-07 RX ADMIN — CARVEDILOL 25 MG: 25 TABLET, FILM COATED ORAL at 16:05

## 2018-05-07 RX ADMIN — HYDRALAZINE HYDROCHLORIDE 50 MG: 50 TABLET, FILM COATED ORAL at 08:00

## 2018-05-07 NOTE — PROCEDURES
[facility]  *** FINAL RE[facility] ***    Name: Akhil Dorsey  MRN: [id number]    Inpatient  : 1955  HIS Order #: [id number]  [id number] Visit #: [id number]  Date: 07 May 2018    TYPE OF TEST: Visceral Arterial Duplex    REASON FOR TEST  Hypertension    Aortic PSV:  63.0 cm/s  Diameter AP: 1.9 cm   TV: 1.9 cm                   Right          Left  Renal Artery:- -------------  -------------  Proximal  PSV:  52.0           38.0  Mid       PSV:  58.0           68.0  Distal    PSV:  37.0           47.0  Aortic ratio :   0.9            1.1    Medullary PSV:  35.0           14.0            EDV:   5.0            4.3            EDR:   0.1            0.3            SDR:   7.0            3.3    Cortical  PSV:  32.0           19.0            EDV:   4.0            5.0            EDR:   0.1            0.3            SDR:   8.0            3.8  Stenosis:      Normal         Normal  Kidney size:   11.0 cm        11.6 cm               x  5.7 cm      x  4.9 cm    Hilar:-        Right          Left  Acc. Time  AT:     secs           secs  Acc. Index AI:             RI: 0.85           0.75    INTERPRETATION/FINDINGS  Duplex images were obtained using 2-D gray scale, color flow, and  spectral Doppler analysis. RENAL:  1. No significant renal artery stenosis identified, both renal  arteries visualized. 2. Patent bilateral renal veins without evidence of renal vein  thrombosis. Note: Increased venous pulsatility noted. 3. The right kidney measures 11.0 cm.  4. The left kidney measures 11.6 cm.  5. Bilateral intrinsic/medical renal disease identified. ADDITIONAL COMMENTS  Aorta visualized and measures within normal limits, without evidence  of significant plaquing and/or a hemodynamically significant stenosis. Technically difficult exam due to venous doppler signals obscuring  arterial flow. I have personally reviewed the data relevant to the interpretation of  this  study. TECHNOLOGIST: Amaya Cochran.  Jesusa Prader RVT  Signed: 05/07/2018 10:27 AM    PHYSICIAN: Rosenda Gunn MD  Signed: 05/07/2018 01:06 PM

## 2018-05-07 NOTE — ROUTINE PROCESS
Bedside shift change report given to Liza Dey (oncoming nurse) by Emerald Strickland RN (offgoing nurse). Report included the following information SBAR, Kardex, Procedure Summary, Intake/Output, MAR, Accordion, Recent Results, Med Rec Status, Cardiac Rhythm NSR and Alarm Parameters .

## 2018-05-07 NOTE — PROGRESS NOTES
[facility] Hospitalist Group  Progress Note    Patient: Randi Solis Age: 70 y.o. : 1955 MR#: [id number] SSN: [id number]  Date: 2018     Subjective:   Pt more alert and awake today. Reports her breathing is not back at baseline. Son at bedside. Assessment/Plan:   - Hypertensive emergency,bp was low initially and has improved w/ adjustments of meds by cards. . Pt w/ low K, ? Hyper brett state? W/u in progress. Eval for MARITA negative.  -Hypokalemia, mag nl, not sure if related to above, ie. Due to hyper brett state? Has had low, low nl K in the past. W/u in progress.  -History of combined systolic/diastolic HF, last echo  EF 40-45%, repeat echo this admission shows EF of 30%, mod-severe TR, has some degree of decompensation cardiology following. On bb, asa. Lasix stopped by cards due to renal impairment. -Moderate bilateral pleural effusions  -Acute metabolic encephalopathy w/ reports of hallucinations per daughter and c/o h/a upon initial presentation to ED, likely due to elevated bp. Resolved. Pt appropriate today. -Type 2 DM A1c 4.8 this admission. Blood sugars within acceptable range. lantus 45 units is what she takes at home, confirmed with daughter.  -History of asthma  -dyslipedemia :on statin. -per nursing reports of loose stools and n/v cause unclear  -COPD on home 02 on PRN basis    PLAN:  -cont to closely watch bp and adjust meds.   -echo ordered by cards  -will await w/u results for hyperaldo state  -replace and closely follow K  -will cont decreased lantus dose given poor po intake     Additional Notes:      Case discussed with:  [x]Patient  [x]Family  [x]Nursing  []Case Management  DVT Prophylaxis:  []Lovenox  []Hep SQ  []SCDs  []Coumadin   []On Heparin gtt    Objective:   VS:   Visit Vitals    /78 (BP 1 Location: Left arm, BP Patient Position: At rest)    Pulse 66    Temp 97 °F (36.1 °C)    Resp 18    Ht 5' 9\" (1.753 m)    Wt 92.4 kg (203 lb 9.6 oz)    SpO2 97%    BMI 30.07 kg/m2      Tmax/24hrs: Temp (24hrs), Av.6 °F (36.4 °C), Min:96.8 °F (36 °C), Max:98.4 °F (36.9 °C)      Intake/Output Summary (Last 24 hours) at 18 1613  Last data filed at 18 1600   Gross per 24 hour   Intake              640 ml   Output              430 ml   Net              210 ml       General:  Somnolent, wakes up with verbal and tactile stimuli  Cardiovascular:  Rrr, no murmurs  Pulmonary:  ctab  GI:  Soft, nt, nd  Extremities:  1+ edema lower ext bilaterally  Additional:      Labs:    Recent Results (from the past 24 hour(s))   GLUCOSE, POC    Collection Time: 18  4:45 PM   Result Value Ref Range    Glucose (POC) 166 (H) 70 - 110 mg/dL   GLUCOSE, POC    Collection Time: 18  9:10 PM   Result Value Ref Range    Glucose (POC) 157 (H) 70 - 337 mg/dL   METABOLIC PANEL, BASIC    Collection Time: 18  5:24 AM   Result Value Ref Range    Sodium 141 136 - 145 mmol/L    Potassium 3.8 3.5 - 5.5 mmol/L    Chloride 108 100 - 108 mmol/L    CO2 29 21 - 32 mmol/L    Anion gap 4 3.0 - 18 mmol/L    Glucose 85 74 - 99 mg/dL    BUN 27 (H) 7.0 - 18 MG/DL    Creatinine 1.55 (H) 0.6 - 1.3 MG/DL    BUN/Creatinine ratio 17 12 - 20      GFR est AA 41 (L) >60 ml/min/1.73m2    GFR est non-AA 34 (L) >60 ml/min/1.73m2    Calcium 7.3 (L) 8.5 - 10.1 MG/DL   NT-PRO BNP    Collection Time: 18  5:24 AM   Result Value Ref Range    NT pro-BNP 61855 (H) 0 - 900 PG/ML   GLUCOSE, POC    Collection Time: 18  8:08 AM   Result Value Ref Range    Glucose (POC) 118 (H) 70 - 110 mg/dL   GLUCOSE, POC    Collection Time: 18 11:06 AM   Result Value Ref Range    Glucose (POC) 83 70 - 110 mg/dL   GLUCOSE, POC    Collection Time: 18  3:58 PM   Result Value Ref Range    Glucose (POC) 120 (H) 70 - 110 mg/dL       Signed By: Nir Patton MD     May 7, 2018 12:53 PM

## 2018-05-07 NOTE — ROUTINE PROCESS
TRANSFER - OUT REPORT:    Verbal report given to sarah(name) on Natan Rehman  being transferred to cvt stepdown(unit) for routine progression of care       Report consisted of patients Situation, Background, Assessment and   Recommendations(SBAR). Information from the following report(s) Kardex, ED Summary, OR Summary, Procedure Summary, Intake/Output, MAR, Accordion, Recent Results and Med Rec Status was reviewed with the receiving nurse. Lines:   Peripheral IV 05/05/18 Right Antecubital (Active)   Site Assessment Clean, dry, & intact 5/6/2018  8:00 PM   Phlebitis Assessment 0 5/6/2018  8:00 PM   Infiltration Assessment 0 5/6/2018  8:00 PM   Dressing Status Clean, dry, & intact 5/6/2018  8:00 PM   Dressing Type Transparent 5/6/2018  8:00 PM   Hub Color/Line Status Pink 5/6/2018  8:00 PM   Alcohol Cap Used Yes 5/6/2018  8:00 PM       Peripheral IV 05/05/18 Left Antecubital (Active)   Site Assessment Clean, dry, & intact 5/6/2018  8:00 PM   Phlebitis Assessment 0 5/6/2018  8:00 PM   Infiltration Assessment 0 5/6/2018  8:00 PM   Dressing Status Clean, dry, & intact 5/6/2018  8:00 PM   Dressing Type Transparent 5/6/2018  8:00 PM   Hub Color/Line Status Blue 5/6/2018  8:00 PM   Alcohol Cap Used Yes 5/6/2018  8:00 PM        Opportunity for questions and clarification was provided.       Patient transported with:   Monitor  O2 @ 3 liters  Patients medications from home  Patient-specific medications from Pharmacy  Registered Nurse  Tech

## 2018-05-07 NOTE — PROGRESS NOTES
[facility]. Pulmonary, Critical Care, and Sleep Medicine    Name: Liz Acuna MRN: [id number]   : 1955 Hospital: [address] [place] [address] Dr   Date: 2018  Admission Date: 2018     Chart and notes reviewed. Data reviewed. I have evaluated all findings. [x]I have reviewed the flowsheet and previous days notes. IMPRESSION:   · Hypertensive emergency   · CHF systolic and diastolic, due to hypertensive emergency  · BIlateral pleural effusions due to CHF  · Mildly positive troponins  · History of COPD, stable      PLAN:    - titrate O2 to keep SpO2 90-94%   - continue gentle diuresis, after load reduction, b blockers, lipid treatment per CHF mgmt standards   - awaiting 24h urine collection and renal ultrasound for hypertension evaluation   - avoid sedation   - PT, OT   - usual hospital prophylaxis for GI, DVT prevention   - Patient is stable from a respiratory standpoint, we will sign off, please re-consult if further pulmonary input is needed. S:  Patient stable and encountered on CVICU after transfer out of ICU yesterday. No complaint of dyspnea, cough, sputum. Her headache persists, has chronic diffuse chest pain since admission. 24h urine collection ongoing and HBP controlled. Renal ultrasound planned for today. ROS:no n,v,diarrhea. Is bedbound pending starting with PT today, no c/o leg pain. Events and notes from last 24 hours reviewed. Discussed with bedside RN.   Patient Active Problem List   Diagnosis Code    Essential hypertension, benign I10    Diabetes mellitus ([address] E11.9    Other and unspecified hyperlipidemia E78.5    Cervical myelopathy ([facility] [address] G95.9    Eczema L30.9    Asthma J45.909    CHF (congestive heart failure) (HCC) I50.9    Bladder prolapse, female, acquired N81.10    IBS (irritable bowel syndrome) K58.9    Osteoporosis M81.0    Migraine G43.909    Anxiety and depression F41.9, F32.9    Pleural effusion J90    Type 2 diabetes mellitus with nephropathy (HCC) E11.21    Hypokalemia E87.6    Acute pulmonary edema (HCC) J81.0    Hypertensive emergency I16.1    Headache R51    Acute on chronic systolic (congestive) heart failure (HCC) I50.23       Vital Signs:  Visit Vitals    /84 (BP 1 Location: Left arm, BP Patient Position: At rest)    Pulse 66    Temp 97.4 °F (36.3 °C)    Resp 18    Ht 5' 9\" (1.753 m)    Wt 92.4 kg (203 lb 9.6 oz)    SpO2 100%    BMI 30.07 kg/m2       O2 Device: Nasal cannula   O2 Flow Rate (L/min): 3 l/min   Temp (24hrs), Av.9 °F (36.6 °C), Min:97.4 °F (36.3 °C), Max:98.4 °F (36.9 °C)       Intake/Output Summary (Last 24 hours) at 18 0916  Last data filed at 18 0911   Gross per 24 hour   Intake              500 ml   Output               95 ml   Net              405 ml       Current Facility-Administered Medications   Medication Dose Route Frequency    aspirin delayed-release tablet 81 mg  81 mg Oral DAILY    gabapentin (NEURONTIN) capsule 200 mg  200 mg Oral TID    pravastatin (PRAVACHOL) tablet 20 mg  20 mg Oral QHS    sodium chloride (NS) flush 5-10 mL  5-10 mL IntraVENous Q8H    isosorbide dinitrate (ISORDIL) tablet 10 mg  10 mg Oral TID    hydrALAZINE (APRESOLINE) tablet 50 mg  50 mg Oral TID    insulin glargine (LANTUS) injection 20 Units  20 Units SubCUTAneous DAILY    heparin (porcine) injection 5,000 Units  5,000 Units SubCUTAneous Q8H    insulin lispro (HUMALOG) injection   SubCUTAneous AC&HS    carvedilol (COREG) tablet 25 mg  25 mg Oral BID WITH MEALS     Physical Exam:   General: chronically ill appearing woman, lying in bed, not moving spontaneously, in no respiratory distress. HEENT: dry MM, no thrush.    Neck: no neck adenopathy or mass   Chest: symmetrical, unlabored respirations   Lungs: Diminished bilaterally, patient had to roll to side with assistance for exam due to weakness, crackles without consolidation at left hemothorax, R clear, both without wheezes or ronchi. Heart: normal distant s1s2   Abdomen: non distended, non-tender, +BS   Extremity: scds in place, no edema   Neuro: alert, oriented x 3, weak diffusely, non-focal.   Skin: normal  DATA:  MAR reviewed and pertinent medications noted or modified as needed    Labs:  Recent Labs      05/06/18   0127  05/05/18   1120   WBC  4.4*  6.0   HGB  9.7*  11.0*   HCT  30.8*  35.6   PLT  130*  150     Recent Labs      05/07/18   0524  05/06/18   1010  05/06/18   0127   NA  141  143  143   K  3.8  4.0  2.9*   CL  108  109*  109*   CO2  29  29  29   GLU  85  168*  126*   BUN  27*  25*  27*   CREA  1.55*  1.22  1.08   CA  7.3*  7.6*  7.7*   MG   --   2.0  2.1   PHOS   --   2.9  2.6   ALB   --    --   2.4*   SGOT   --    --   36   ALT   --    --   78*     Imaging:  [x]                           I have personally reviewed the patients radiographs and reports  CXR portable 5/6/18: Findings: Single view. Instrumentation stable. No pneumothorax. Similar moderate  left and small right pleural effusion. Bilateral lower lung zone atelectasis  with bilateral middle and lower lung zone infiltrates similar to yesterday and  mild pulmonary vascular congestion. Cardiomediastinal silhouette and osseous  structures grossly unchanged. IMPRESSION  Impression: No significant change as detailed    High complexity decision making was performed during this consultation and evaluation. [x]       Pt is at high risk for further organ failure and dysfunction. 35 time spent  minutes with patient, review of data and documentation, discussion with care team.     - Patient is stable from a respiratory standpoint, we will sign off, please re-consult if further pulmonary input is needed.     Zulema Dorsey MD   Pulmonary and critical care medicine  5/7/2018 9:28 AM

## 2018-05-07 NOTE — PROGRESS NOTES
[facility] - Progress Note  Admit Date: 5/5/2018    Assessment:     Hospital Problems  Date Reviewed: 2/8/2018          Codes Class Noted POA    Hypokalemia ICD-10-CM: E87.6  ICD-9-CM: 276.8  5/5/2018 Unknown        * (Principal)Acute pulmonary edema ([facility] ICD-10-CM: J81.0  ICD-9-CM: 518.4  5/5/2018 Unknown        Hypertensive emergency ICD-10-CM: I16.1  ICD-9-CM: 401.9  5/5/2018 Unknown        Headache ICD-10-CM: R51  ICD-9-CM: 784.0  5/5/2018 Unknown        Acute on chronic systolic (congestive) heart failure (HCC) ICD-10-CM: I50.23  ICD-9-CM: 428.23, 428.0  5/5/2018 Unknown        Type 2 diabetes mellitus with nephropathy ([facility] ICD-10-CM: E11.21  ICD-9-CM: 250.40, 583.81  12/14/2017 Yes                 -HTN urgency, initially on nitro and cardene gtts, now off with normotensive reads. Suspect non-compliance with medications. She also reports n/v x 4 days prior to admission.  -Hypokalemia  -URI, endorses cough/congestion over past week   -Viral illness, nausea/vomiting x 4 days  -Acute on chronic CHF exacerbation with hx of diastolic dysfunction, EF 14-59% with G1DD by echo Sept 2017  -mod bilat pleural effusions, diuresing with IV lasix  -mod pulm HTN  -COPD, uses home O2  -CHF  -HLD  -DM  -Hx of migraines, reports HA for past 3 days. CT head no acute process.     Previously followed by Dr Mary Ellen Chambers:     No significant renal artery stenosis. BP stable on current regimen, will continue Coreg, hydralazine, nitrates. No ace/arb given renal function. Renal function and breathing worse with diuresis? Diuretics now on hold. CXR unchanged ? Appreciate pulmonary involvement. Will review echocardiogram once available. Subjective:     Feels breathing is worse today.     Objective:      Patient Vitals for the past 8 hrs:   Temp Pulse Resp BP SpO2   05/07/18 0804 97.4 °F (36.3 °C) - 18 149/84 100 %   05/07/18 0800 - - - - 100 %   05/07/18 0420 98.3 °F (36.8 °C) 66 16 135/78 99 %         Patient Vitals for the past 96 hrs:   Weight   05/07/18 0420 92.4 kg (203 lb 9.6 oz)   05/06/18 0400 84.4 kg (186 lb 1.1 oz)   05/05/18 0933 78.9 kg (174 lb)                    Intake/Output Summary (Last 24 hours) at 05/07/18 1053  Last data filed at 05/07/18 0958   Gross per 24 hour   Intake              500 ml   Output               95 ml   Net              405 ml       Physical Exam:  General:  alert, cooperative, no distress, appears stated age  Neck:  no JVD  Lungs:  diminished breath sounds scattered wheeze  Heart:  regular rate and rhythm  Abdomen:  abdomen is soft   Extremities:  extremities normal, atraumatic, no cyanosis or edema    Data Review:     Labs: Results:       Chemistry Recent Labs      05/07/18   0524  05/06/18   1010  05/06/18   0127   GLU  85  168*  126*   NA  141  143  143   K  3.8  4.0  2.9*   CL  108  109*  109*   CO2  29  29  29   BUN  27*  25*  27*   CREA  1.55*  1.22  1.08   CA  7.3*  7.6*  7.7*   MG   --   2.0  2.1   PHOS   --   2.9  2.6   AGAP  4  5  5   BUCR  17  20  25*   AP   --    --   137*   TP   --    --   6.8   ALB   --    --   2.4*   GLOB   --    --   4.4*   AGRAT   --    --   0.5*      CBC w/Diff Recent Labs      05/06/18   0127  05/05/18   1120   WBC  4.4*  6.0   RBC  3.54*  3.85*   HGB  9.7*  11.0*   HCT  30.8*  35.6   PLT  130*  150   GRANS  74  76*   LYMPH  16*  16*   EOS  1  1      Cardiac Enzymes No results found for: CPK, CK, CKMMB, CKMB, RCK3, CKMBT, CKNDX, CKND1, DAYNA, TROPT, TROIQ, EUN, TROPT, TNIPOC, BNP, BNPP   Coagulation No results for input(s): PTP, INR, APTT in the last 72 hours.     No lab exists for component: INREXT    Lipid Panel Lab Results   Component Value Date/Time    Cholesterol, total 104 09/27/2017 04:15 AM    HDL Cholesterol 35 (L) 09/27/2017 04:15 AM    LDL, calculated 44.6 09/27/2017 04:15 AM    VLDL, calculated 24.4 09/27/2017 04:15 AM    Triglyceride 122 09/27/2017 04:15 AM    CHOL/HDL Ratio 3.0 09/27/2017 04:15 AM      BNP Lab Results   Component Value Date/Time    B-type Natriuretic Peptide 223.7 (H) 04/12/2014 12:00 AM      Liver Enzymes Recent Labs      05/06/18   0127   TP  6.8   ALB  2.4*   AP  137*   SGOT  36      Digoxin    Thyroid Studies Lab Results   Component Value Date/Time    T4, Total 9.1 12/23/2009 11:33 AM    TSH 1.61 05/06/2018 01:27 AM          Signed By: ROSEANNE Toscano     May 7, 2018

## 2018-05-07 NOTE — PROGRESS NOTES
Problem: Mobility Impaired (Adult and Pediatric)  Goal: *Acute Goals and Plan of Care (Insert Text)  Physical Therapy Goals  Initiated 5/7/2018 and to be accomplished within 7 day(s)  1. Patient will move from supine to sit and sit to supine , scoot up and down and roll side to side in bed with supervision/set-up. 2.  Patient will transfer from bed to chair and chair to bed with supervision/set-up using the least restrictive device. 3.  Patient will perform sit to stand with supervision/set-up. 4.  Patient will ambulate with contact guard assist for 50 feet with the least restrictive device. 5.  Patient will ascend/descend 3 stairs with 1-2 handrail(s) with minimal assistance/contact guard assist.    Outcome: Progressing Towards Goal  physical Therapy EVALUATION    Patient: Danny Sarkar (38 y.o. female)  Date: 5/7/2018  Primary Diagnosis: Hypertensive emergency  Acute on chronic systolic (congestive) heart failure (HCC)  Headache  Acute pulmonary edema (HCC)  Hypokalemia        Precautions:   Fall    ASSESSMENT :  PT orders received and patient cleared by nursing to participate with therapy. Patient is a 70 y.o. female admitted to the hospital due to SOB, headaches, fatigue, and HTN. PMH shows noncompliance with medications. Patient consents to PT evaluation and treatment. Pt performed supine to sit mod A. She performed sit to stands min/mod A. Standing balance activities with cleaning up. Gait side stepping 4 steps with mod A with RW and poor balance. Pt ended therapy supine in bed with all needs met. Educated pt on OOB 3-5 times a day and nursing assistance. Patient will benefit from skilled intervention to address the above impairments and increase functional independence.   Patients rehabilitation potential is considered to be Fair  Factors which may influence rehabilitation potential include:   []         None noted  []         Mental ability/status  [x]         Medical condition  [] Home/family situation and support systems  []         Safety awareness  []         Pain tolerance/management  []         Other:      PLAN :  Recommendations and Planned Interventions:  [x]           Bed Mobility Training             [x]    Neuromuscular Re-Education  [x]           Transfer Training                   []    Orthotic/Prosthetic Training  [x]           Gait Training                          []    Modalities  [x]           Therapeutic Exercises          []    Edema Management/Control  [x]           Therapeutic Activities            [x]    Patient and Family Training/Education  []           Other (comment):    Frequency/Duration: Patient will be followed by physical therapy 1-2 times per day to address goals. Discharge Recommendations: Skilled Nursing Facility  Further Equipment Recommendations for Discharge: N/A     SUBJECTIVE:   Patient stated I think I need to be cleaned up.     OBJECTIVE DATA SUMMARY:     Past Medical History:   Diagnosis Date    Arthritis     Cataract     [facility]     Diabetes mellitus ([facility]     History of echocardiogram 12/29/2009    EF 50%. Mild-mod conc LVH. Mod DDfx. LAE. Mild MR.      Hypercholesterolemia     Hypertension     Hypertensive heart disease     Normal nuclear stress test 09/08/2000    No ischemia or prior infarction. Neg EKG on submax EST. Ex time 15:02. Past Surgical History:   Procedure Laterality Date    HX CHOLECYSTECTOMY  2001    HX TUBAL LIGATION  1975     Barriers to Learning/Limitations: yes;  altered mental status (i.e.Sedation, Confusion)  Compensate with: Visual Cues, Verbal Cues and Tactile Cues  Prior Level of Function/Home Situation: Independent with mobility including gait using SPC outside and RW inside home.    Home Situation  Home Environment: Private residence  # Steps to Enter: 3  Rails to Enter: Yes  Hand Rails : Bilateral  Wheelchair Ramp: Yes  One/Two Story Residence: One story  Living Alone: No  Support Systems: Child(masha)  Patient Expects to be Discharged to[de-identified] Private residence  Current DME Used/Available at Home: Jacqulyne Skeeters, straight, Grab bars, Walker, rolling, Oxygen, portable  Critical Behavior:  Neurologic State: Drowsy; Alert  Psychosocial  Patient Behaviors: Calm; Cooperative  Purposeful Interaction: Yes  Pt Identified Daily Priority: Communication issues (comment); Clinical issues (comment)  Caritas Process: Nurture loving kindness;Enable tamika/hope;Establish trust;Nurture spiritual self;Teaching/learning; Attend basic human needs;Create healing environment;Open life/death unknowns; Supportive expression;Creative use of self  Caring Interventions: Reassure; Therapeutic modalities  Reassure: Therapeutic listening; Informing; Instilling tamika and hope;Support family;Quiet presence;Caring rounds  Therapeutic Modalities: Deep breathing;Guided Imagery channel ([facility] only); Humor; Intentional therapeutic touch;Massage; Meditation;Music;Music/Imagery channel ([facility] only)  Strength:    Strength: Generally decreased, functional (B LE)  Tone & Sensation:   Tone: Normal (B LE)  Sensation: Intact (B LE)   Range Of Motion:  AROM: Within functional limits (B LE)  Functional Mobility:  Bed Mobility:  Supine to Sit: Moderate assistance  Sit to Supine: Moderate assistance  Transfers:  Sit to Stand: Moderate assistance;Minimum assistance  Stand to Sit: Moderate assistance (cues for eccentric control)  Balance:   Sitting: Intact; With support  Standing: Impaired; With support  Standing - Static: Fair  Standing - Dynamic : Poor  Ambulation/Gait Training:  Distance (ft):  (4 side steps)  Assistive Device: Walker, rolling  Ambulation - Level of Assistance: Moderate assistance  Base of Support: Widened  Speed/Laila: Shuffled; Slow  Pain:  Pre: 0/10    Post: 0/10    Activity Tolerance:   fair  Please refer to the flowsheet for vital signs taken during this treatment.   After treatment:   [] Patient left in no apparent distress sitting up in chair  [] Patient left sitting on EOB  [x] Patient left in no apparent distress in bed  [] Patient declined to be OOB at this time due to    [x] Call bell left within reach  [x] Nursing notified(Jelena)  [] Caregiver present  [x] Bed alarm activated  [x] Personal items in reach     COMMUNICATION/EDUCATION:   [x]         Fall prevention education was provided and the patient/caregiver indicated understanding. [x]         Patient/family have participated as able in goal setting and plan of care. [x]         Patient/family agree to work toward stated goals and plan of care. []         Patient understands intent and goals of therapy, but is neutral about his/her participation. []         Patient is unable to participate in goal setting and plan of care. Thank you for this referral.  [facility], PT, DPT   Time Calculation: 30 mins      Mobility  Current  CL= 60-79%   Goal  CJ= 20-39%. The severity rating is based on the Level of Assistance required for Functional Mobility and ADLs.     Eval Complexity: History: MEDIUM  Complexity : 1-2 comorbidities / personal factors will impact the outcome/ POC Exam:HIGH Complexity : 4+ Standardized tests and measures addressing body structure, function, activity limitation and / or participation in recreation  Presentation: MEDIUM Complexity : Evolving with changing characteristics  Clinical Decision Making:Medium Complexity   Overall Complexity:MEDIUM

## 2018-05-07 NOTE — ROUTINE PROCESS
Bedside and Verbal shift change report given to Ashley (oncoming nurse) by Whitley Bacon RN   (offgoing nurse). Report included the following information SBAR, Kardex, Procedure Summary, Intake/Output, MAR, Recent Results and Med Rec Status.

## 2018-05-07 NOTE — PROGRESS NOTES
PT orders received, chart reviewed. Pt unable to participate with PT due to:  []  Nausea/vomiting  []  Eating  []  Pain  []  Pt lethargic  []  Off Unit  []  Pt refused  [x]  Other, order received for active ROM-PT. Pt requires PT eval and tx or consult order for pt to be seen by PT. Please place order. Also pt has active continuous bedrest and allow BSC. Please remove for further ambulation with therapy. Will f/u later as patient's schedule allows.  Thank you for this referral.  Jordan Brooks, PT, DPT

## 2018-05-07 NOTE — PROGRESS NOTES
conducted an initial consultation and Spiritual Assessment for Celio Hill, who is a 70 y.o.,female. Patients Primary Language is: [place]. According to the patients EMR Yarsanism Affiliation is: [facility].     The reason the Patient came to the hospital is:   Patient Active Problem List    Diagnosis Date Noted    Hypokalemia 05/05/2018    Acute pulmonary edema ([address] 05/05/2018    Hypertensive emergency 05/05/2018    Headache 05/05/2018    Acute on chronic systolic (congestive) heart failure ([address] 05/05/2018    Type 2 diabetes mellitus with nephropathy ([address] 12/14/2017    Pleural effusion 04/30/2014    Cervical myelopathy ([facility] [address] [facility] 02/03/2014    Eczema 02/03/2014    Asthma 02/03/2014    CHF (congestive heart failure) ([address] 02/03/2014    Bladder prolapse, female, acquired 02/03/2014    IBS (irritable bowel syndrome) 02/03/2014    Osteoporosis 02/03/2014    Migraine 02/03/2014    Anxiety and depression 02/03/2014    Essential hypertension, benign 01/27/2012    Diabetes mellitus ([address] 01/27/2012    Other and unspecified hyperlipidemia 01/27/2012        The  provided the following Interventions:  Initiated a relationship of care and support. Explored issues of tamika, spirituality and/or Cheondoism needs while hospitalized. Listened empathically. Provided chaplaincy education. Provided information about Spiritual Care Services. Offered prayer and assurance of continued prayers on patient's behalf. Chart reviewed. The following outcomes were achieved:  Patient shared some information about their medical narrative and spiritual journey/beliefs. Patient processed feeling about current hospitalization. Patient expressed gratitude for the 's visit. Assessment:  Patient did not indicate any spiritual or Cheondoism issues which require Spiritual Care Services interventions at this time.    Patient does not have any Cheondoism/cultural needs that will affect patients preferences in health care. Plan:  Chaplains will continue to follow and will provide pastoral care on an as needed or requested basis.  recommends bedside caregivers page  on duty if patient shows signs of acute spiritual or emotional distress.   Maya Agee, [address] [place] [address]  Spiritual Care  [phone]

## 2018-05-08 LAB
ACTH PLAS-MCNC: 43.7 PG/ML (ref 7.2–63.3)
ANION GAP SERPL CALC-SCNC: 5 MMOL/L (ref 3–18)
BASOPHILS # BLD: 0 K/UL (ref 0–0.1)
BASOPHILS NFR BLD: 0 % (ref 0–2)
BUN SERPL-MCNC: 30 MG/DL (ref 7–18)
BUN/CREAT SERPL: 23 (ref 12–20)
CALCIUM SERPL-MCNC: 7.4 MG/DL (ref 8.5–10.1)
CHLORIDE SERPL-SCNC: 106 MMOL/L (ref 100–108)
CO2 SERPL-SCNC: 28 MMOL/L (ref 21–32)
CREAT SERPL-MCNC: 1.32 MG/DL (ref 0.6–1.3)
DHEA-S SERPL-MCNC: 42.7 UG/DL (ref 20.4–186.6)
DIFFERENTIAL METHOD BLD: ABNORMAL
EOSINOPHIL # BLD: 0.2 K/UL (ref 0–0.4)
EOSINOPHIL NFR BLD: 6 % (ref 0–5)
ERYTHROCYTE [DISTWIDTH] IN BLOOD BY AUTOMATED COUNT: 17.2 % (ref 11.6–14.5)
GLUCOSE BLD STRIP.AUTO-MCNC: 102 MG/DL (ref 70–110)
GLUCOSE BLD STRIP.AUTO-MCNC: 141 MG/DL (ref 70–110)
GLUCOSE BLD STRIP.AUTO-MCNC: 75 MG/DL (ref 70–110)
GLUCOSE BLD STRIP.AUTO-MCNC: 89 MG/DL (ref 70–110)
GLUCOSE SERPL-MCNC: 71 MG/DL (ref 74–99)
HCT VFR BLD AUTO: 26.9 % (ref 35–45)
HGB BLD-MCNC: 8.5 G/DL (ref 12–16)
LYMPHOCYTES # BLD: 0.8 K/UL (ref 0.9–3.6)
LYMPHOCYTES NFR BLD: 20 % (ref 21–52)
MCH RBC QN AUTO: 27.9 PG (ref 24–34)
MCHC RBC AUTO-ENTMCNC: 31.6 G/DL (ref 31–37)
MCV RBC AUTO: 88.2 FL (ref 74–97)
MONOCYTES # BLD: 0.2 K/UL (ref 0.05–1.2)
MONOCYTES NFR BLD: 5 % (ref 3–10)
NEUTS SEG # BLD: 2.7 K/UL (ref 1.8–8)
NEUTS SEG NFR BLD: 69 % (ref 40–73)
PLATELET # BLD AUTO: 103 K/UL (ref 135–420)
PMV BLD AUTO: 11.3 FL (ref 9.2–11.8)
POTASSIUM SERPL-SCNC: 3.6 MMOL/L (ref 3.5–5.5)
RBC # BLD AUTO: 3.05 M/UL (ref 4.2–5.3)
SODIUM SERPL-SCNC: 139 MMOL/L (ref 136–145)
WBC # BLD AUTO: 4 K/UL (ref 4.6–13.2)

## 2018-05-08 PROCEDURE — 74011250636 HC RX REV CODE- 250/636: Performed by: INTERNAL MEDICINE

## 2018-05-08 PROCEDURE — 77010033678 HC OXYGEN DAILY

## 2018-05-08 PROCEDURE — 74011250637 HC RX REV CODE- 250/637: Performed by: INTERNAL MEDICINE

## 2018-05-08 PROCEDURE — 97165 OT EVAL LOW COMPLEX 30 MIN: CPT

## 2018-05-08 PROCEDURE — 94660 CPAP INITIATION&MGMT: CPT

## 2018-05-08 PROCEDURE — 82962 GLUCOSE BLOOD TEST: CPT

## 2018-05-08 PROCEDURE — 97110 THERAPEUTIC EXERCISES: CPT

## 2018-05-08 PROCEDURE — 65660000004 HC RM CVT STEPDOWN

## 2018-05-08 PROCEDURE — 80048 BASIC METABOLIC PNL TOTAL CA: CPT | Performed by: INTERNAL MEDICINE

## 2018-05-08 PROCEDURE — 74011636637 HC RX REV CODE- 636/637: Performed by: INTERNAL MEDICINE

## 2018-05-08 PROCEDURE — 74011250637 HC RX REV CODE- 250/637: Performed by: PHYSICIAN ASSISTANT

## 2018-05-08 PROCEDURE — 97116 GAIT TRAINING THERAPY: CPT

## 2018-05-08 PROCEDURE — 74011250636 HC RX REV CODE- 250/636: Performed by: PHYSICIAN ASSISTANT

## 2018-05-08 PROCEDURE — 77030005538 HC CATH URETH FOL44 BARD -B

## 2018-05-08 PROCEDURE — 85025 COMPLETE CBC W/AUTO DIFF WBC: CPT | Performed by: INTERNAL MEDICINE

## 2018-05-08 PROCEDURE — 36415 COLL VENOUS BLD VENIPUNCTURE: CPT | Performed by: INTERNAL MEDICINE

## 2018-05-08 RX ORDER — SPIRONOLACTONE 25 MG/1
25 TABLET ORAL DAILY
Status: DISCONTINUED | OUTPATIENT
Start: 2018-05-09 | End: 2018-05-12

## 2018-05-08 RX ORDER — FUROSEMIDE 10 MG/ML
40 INJECTION INTRAMUSCULAR; INTRAVENOUS EVERY 12 HOURS
Status: DISCONTINUED | OUTPATIENT
Start: 2018-05-08 | End: 2018-05-12

## 2018-05-08 RX ORDER — ISOSORBIDE DINITRATE 20 MG/1
20 TABLET ORAL 3 TIMES DAILY
Status: DISCONTINUED | OUTPATIENT
Start: 2018-05-08 | End: 2018-05-12 | Stop reason: HOSPADM

## 2018-05-08 RX ADMIN — Medication 10 ML: at 05:49

## 2018-05-08 RX ADMIN — GABAPENTIN 200 MG: 100 CAPSULE ORAL at 10:41

## 2018-05-08 RX ADMIN — CARVEDILOL 25 MG: 25 TABLET, FILM COATED ORAL at 10:41

## 2018-05-08 RX ADMIN — FUROSEMIDE 40 MG: 10 INJECTION, SOLUTION INTRAMUSCULAR; INTRAVENOUS at 22:43

## 2018-05-08 RX ADMIN — HYDRALAZINE HYDROCHLORIDE 50 MG: 50 TABLET, FILM COATED ORAL at 16:31

## 2018-05-08 RX ADMIN — INSULIN GLARGINE 20 UNITS: 100 INJECTION, SOLUTION SUBCUTANEOUS at 10:39

## 2018-05-08 RX ADMIN — ISOSORBIDE DINITRATE 10 MG: 10 TABLET ORAL at 10:41

## 2018-05-08 RX ADMIN — GABAPENTIN 200 MG: 100 CAPSULE ORAL at 22:43

## 2018-05-08 RX ADMIN — CARVEDILOL 25 MG: 25 TABLET, FILM COATED ORAL at 16:31

## 2018-05-08 RX ADMIN — PRAVASTATIN SODIUM 20 MG: 20 TABLET ORAL at 22:43

## 2018-05-08 RX ADMIN — ISOSORBIDE DINITRATE 20 MG: 20 TABLET ORAL at 22:43

## 2018-05-08 RX ADMIN — HYDRALAZINE HYDROCHLORIDE 50 MG: 50 TABLET, FILM COATED ORAL at 22:43

## 2018-05-08 RX ADMIN — Medication 10 ML: at 14:00

## 2018-05-08 RX ADMIN — HEPARIN SODIUM 5000 UNITS: 5000 INJECTION, SOLUTION INTRAVENOUS; SUBCUTANEOUS at 22:43

## 2018-05-08 RX ADMIN — Medication 10 ML: at 22:44

## 2018-05-08 RX ADMIN — HEPARIN SODIUM 5000 UNITS: 5000 INJECTION, SOLUTION INTRAVENOUS; SUBCUTANEOUS at 06:17

## 2018-05-08 RX ADMIN — ASPIRIN 81 MG: 81 TABLET, COATED ORAL at 10:41

## 2018-05-08 RX ADMIN — HYDRALAZINE HYDROCHLORIDE 50 MG: 50 TABLET, FILM COATED ORAL at 10:41

## 2018-05-08 RX ADMIN — FUROSEMIDE 40 MG: 10 INJECTION, SOLUTION INTRAMUSCULAR; INTRAVENOUS at 12:03

## 2018-05-08 RX ADMIN — GABAPENTIN 200 MG: 100 CAPSULE ORAL at 16:31

## 2018-05-08 RX ADMIN — HEPARIN SODIUM 5000 UNITS: 5000 INJECTION, SOLUTION INTRAVENOUS; SUBCUTANEOUS at 16:31

## 2018-05-08 RX ADMIN — ISOSORBIDE DINITRATE 20 MG: 20 TABLET ORAL at 16:31

## 2018-05-08 NOTE — PROGRESS NOTES
Problem: Self Care Deficits Care Plan (Adult)  Goal: *Acute Goals and Plan of Care (Insert Text)  Outcome: Resolved/Met Date Met: 05/08/18  Occupational Therapy EVALUATION/discharge    Patient: Mason Perez (69 y.o. female)  Date: 5/8/2018  Primary Diagnosis: Hypertensive emergency  Acute on chronic systolic (congestive) heart failure (HCC)  Headache  Acute pulmonary edema (HCC)  Hypokalemia        Precautions:   Fall    ASSESSMENT AND RECOMMENDATIONS:  Based on the objective data described below, the patient is able to perform basic self care tasks without assistance while seated. CGA given for functional standing/transfers. Will defer to PT for mobility training. Patient has a supportive daughter to assist her at home prn. She was using a BSC for toileting PTA but it is now broken. New BSC needed for safety. Skilled occupational therapy is not indicated at this time. Discharge Recommendations: None  Further Equipment Recommendations for Discharge: N/A      Barriers to Learning/Limitations: None  Compensate with: visual, verbal, tactile, kinesthetic cues/model     COMPLEXITY     Eval Complexity: History: LOW Complexity : Brief history review ; Examination: LOW Complexity : 1-3 performance deficits relating to physical, cognitive , or psychosocial skils that result in activity limitations and / or participation restrictions ; Decision Making:LOW Complexity : No comorbidities that affect functional and no verbal or physical assistance needed to complete eval tasks  Assessment: Low Complexity        G-CODES:     Self Care  Current  CI= 1-19%   Goal  CI= 1-19%   D/C  CI= 1-19%. The severity rating is based on the Level of Assistance required for Functional Mobility and ADLs. SUBJECTIVE:   Patient stated I live with my daughter.     OBJECTIVE DATA SUMMARY:     Past Medical History:   Diagnosis Date    Arthritis     Cataract     [facility] disease ([facility] [address] [facility]     Diabetes mellitus ([facility] [address] [facility]     History of echocardiogram 12/29/2009    EF 50%. Mild-mod conc LVH. Mod DDfx. LAE. Mild MR.      Hypercholesterolemia     Hypertension     Hypertensive heart disease     Normal nuclear stress test 09/08/2000    No ischemia or prior infarction. Neg EKG on submax EST. Ex time 15:02. Past Surgical History:   Procedure Laterality Date    HX CHOLECYSTECTOMY  2001    HX TUBAL LIGATION  1975     Prior Level of Function/Home Situation: Pt required supervision with basic self care tasks and used a RW/SPC for functional mobility PTA. Home Situation  Home Environment: Private residence  # Steps to Enter: 3  Rails to Enter: Yes  Hand Rails : Bilateral  Wheelchair Ramp: Yes  One/Two Story Residence: One story  Living Alone: No  Support Systems: Child(masha)  Patient Expects to be Discharged to[de-identified] Private residence  Current DME Used/Available at Home: Cane, straight, Grab bars, Walker, rolling, Oxygen, portable  Tub or Shower Type: Tub/Shower combination (Pt sponge bathes at home.)  [x]     Right hand dominant   []     Left hand dominant  Cognitive/Behavioral Status:  Neurologic State: Alert  Orientation Level: Oriented X4  Cognition: Appropriate decision making; Follows commands  Safety/Judgement: Awareness of environment; Fall prevention    Skin: Intact on UEs    Edema: None noted in UEs    Vision/Perceptual:    Acuity: Within Defined Limits      Coordination:  Fine Motor Skills-Upper: Left Intact; Right Intact    Gross Motor Skills-Upper: Left Intact; Right Intact    Balance:  Sitting: Intact  Standing: Impaired; With support  Standing - Static: Good  Standing - Dynamic : Fair    Strength:  Strength:  Within functional limits (UEs)    Tone & Sensation:  Tone: Normal (UEs)  Sensation: Intact (UEs)    Range of Motion:  AROM: Within functional limits (UEs)  Functional Mobility and Transfers for ADLs:  Bed Mobility:  Supine to Sit: Minimum assistance (fair)  Sit to Supine: Stand-by assistance  Transfers:  Sit to Stand: Minimum assistance   Toilet Transfer : Contact guard assistance    ADL Assessment:  Feeding: Modified independent    Oral Facial Hygiene/Grooming: Setup    Bathing: Contact guard assistance (for standing balance)    Upper Body Dressing: Setup    Lower Body Dressing: Contact guard assistance (for standing balance)    Toileting: Supervision    Pain:  Pt reports 0/10 pain or discomfort prior to treatment.    Pt reports 0/10 pain or discomfort post treatment. Activity Tolerance:   Good    Please refer to the flowsheet for vital signs taken during this treatment. After treatment:   [x]  Patient left in no apparent distress sitting up in chair  []  Patient left in no apparent distress in bed  [x]  Call bell left within reach  []  Nursing notified  [x]  Caregiver present  []  Bed alarm activated    COMMUNICATION/EDUCATION:   Communication/Collaboration:  [x]      Home safety education was provided and the patient/caregiver indicated understanding. [x]      Patient/family have participated as able and agree with findings and recommendations. []      Patient is unable to participate in plan of care at this time.     Fede Arriaga MS OTR/L  Time Calculation: 15 mins

## 2018-05-08 NOTE — ROUTINE PROCESS
Bedside shift change report given to [address] [place] [address] (oncoming nurse) by Aimee Moreno RN (offgoing nurse). Report included the following information SBAR, Kardex, Procedure Summary, Intake/Output, MAR, Accordion, Recent Results, Med Rec Status, Cardiac Rhythm NSR and Alarm Parameters .

## 2018-05-08 NOTE — PROGRESS NOTES
Problem: Falls - Risk of  Goal: *Absence of Falls  Document Morales Fall Risk and appropriate interventions in the flowsheet. Outcome: Progressing Towards Goal  Fall Risk Interventions:  Mobility Interventions: Patient to call before getting OOB, Strengthening exercises (ROM-active/passive)    Mentation Interventions: Adequate sleep, hydration, pain control    Medication Interventions: Assess postural VS orthostatic hypotension, Patient to call before getting OOB    Elimination Interventions: Bed/chair exit alarm    History of Falls Interventions: Evaluate medications/consider consulting pharmacy        Problem: Pressure Injury - Risk of  Goal: *Prevention of pressure injury  Document Jhon Scale and appropriate interventions in the flowsheet.    Outcome: Progressing Towards Goal  Pressure Injury Interventions:  Sensory Interventions: Assess changes in LOC    Moisture Interventions: Absorbent underpads    Activity Interventions: Increase time out of bed    Mobility Interventions: Pressure redistribution bed/mattress (bed type)    Nutrition Interventions: Document food/fluid/supplement intake    Friction and Shear Interventions: Apply protective barrier, creams and emollients

## 2018-05-08 NOTE — PROGRESS NOTES
Will have to do tomorrow because Nando Mahoney RN told me that the patient had already ate breakfast. For this test patient can not eat or drink anything. Tried to talk to night nurse last night; however, she was too busy and forgot to call me back. She is going to talk to the hospitalist to put in an NPO order for as of midnight tonight.

## 2018-05-08 NOTE — PROGRESS NOTES
[facility] - Progress Note  Admit Date: 5/5/2018    Assessment:     Hospital Problems  Date Reviewed: 2/8/2018          Codes Class Noted POA    Hypokalemia ICD-10-CM: E87.6  ICD-9-CM: 276.8  5/5/2018 Unknown        * (Principal)Acute pulmonary edema ([facility] ICD-10-CM: J81.0  ICD-9-CM: 518.4  5/5/2018 Unknown        Hypertensive emergency ICD-10-CM: I16.1  ICD-9-CM: 401.9  5/5/2018 Unknown        Headache ICD-10-CM: R51  ICD-9-CM: 784.0  5/5/2018 Unknown        Acute on chronic systolic (congestive) heart failure (HCC) ICD-10-CM: I50.23  ICD-9-CM: 428.23, 428.0  5/5/2018 Unknown        Type 2 diabetes mellitus with nephropathy ([facility] ICD-10-CM: E11.21  ICD-9-CM: 250.40, 583.81  12/14/2017 Yes              -HTN urgency, initially on nitro and cardene gtts, now off with normotensive reads. Suspect non-compliance with medications. She also reports n/v x 4 days prior to admission.  -Hypokalemia, replaced. -URI, endorses cough/congestion over past week. -Viral illness, nausea/vomiting x 4 days.  -Acute on chronic CHF with reduced EF.  -CMY with decline on LV function on echo this admission with EF 30% with WMA. Low risk nuclear 2017 without ischemia.  -mod bilat pleural effusions.  -mod pulm HTN  -COPD, uses home O2  -CHF  -HLD  -DM  -Hx of migraines, reports HA for past 3 days. CT head no acute process.      Previously followed by Dr Miguel Hi:     Renal function improved today, no lasix given yesterday, yet patient feels breathing is better today. Patient remains orthopneic, suspect she will need further diuresis if renal function will allow. Noted drop in Hgb and platelets. Will check stool. Hemodynamics overall stable and much improved from admission. Continued on coreg, hydralazine (increase further if BP stable after morning medications), isordil. Continued on ASA and statin.   Noted decline in LV function on echo, will need ischemic evaluation once stable from heart failure and anemia standpoint. Subjective:     Breathing with some improvement today. Objective:      Patient Vitals for the past 8 hrs:   Temp Pulse Resp BP SpO2   05/08/18 0759 - - - - 100 %   05/08/18 0732 97.7 °F (36.5 °C) 66 18 155/79 100 %   05/08/18 0422 98.1 °F (36.7 °C) 69 18 138/83 96 %         Patient Vitals for the past 96 hrs:   Weight   05/08/18 0437 92.4 kg (203 lb 9.5 oz)   05/07/18 0420 92.4 kg (203 lb 9.6 oz)   05/06/18 0400 84.4 kg (186 lb 1.1 oz)   05/05/18 0933 78.9 kg (174 lb)                    Intake/Output Summary (Last 24 hours) at 05/08/18 0839  Last data filed at 05/08/18 0556   Gross per 24 hour   Intake              460 ml   Output              600 ml   Net             -140 ml       Physical Exam:  General:  alert, cooperative, no distress, appears stated age  Neck:  no JVD  Lungs:  diminished breath sounds with few basilar rales.   Heart:  regular rate and rhythm  Abdomen:  abdomen is soft without significant tenderness, masses, organomegaly or guarding  Extremities:  extremities normal, atraumatic, no cyanosis or edema    Data Review:     Labs: Results:       Chemistry Recent Labs      05/08/18 0528  05/07/18   0524  05/06/18   1010  05/06/18   0127   GLU  71*  85  168*  126*   NA  139  141  143  143   K  3.6  3.8  4.0  2.9*   CL  106  108  109*  109*   CO2  28  29  29  29   BUN  30*  27*  25*  27*   CREA  1.32*  1.55*  1.22  1.08   CA  7.4*  7.3*  7.6*  7.7*   MG   --    --   2.0  2.1   PHOS   --    --   2.9  2.6   AGAP  5  4  5  5   BUCR  23*  17  20  25*   AP   --    --    --   137*   TP   --    --    --   6.8   ALB   --    --    --   2.4*   GLOB   --    --    --   4.4*   AGRAT   --    --    --   0.5*      CBC w/Diff Recent Labs      05/08/18 0528  05/06/18   0127  05/05/18   1120   WBC  4.0*  4.4*  6.0   RBC  3.05*  3.54*  3.85*   HGB  8.5*  9.7*  11.0*   HCT  26.9*  30.8*  35.6   PLT  103*  130*  150   GRANS  69  74  76*   LYMPH  20*  16*  16*   EOS  6*  1  1      Cardiac Enzymes No results found for: CPK, CK, CKMMB, CKMB, RCK3, CKMBT, CKNDX, CKND1, DAYNA, TROPT, TROIQ, EUN, TROPT, TNIPOC, BNP, BNPP   Coagulation No results for input(s): PTP, INR, APTT in the last 72 hours.     No lab exists for component: INREXT    Lipid Panel Lab Results   Component Value Date/Time    Cholesterol, total 104 09/27/2017 04:15 AM    HDL Cholesterol 35 (L) 09/27/2017 04:15 AM    LDL, calculated 44.6 09/27/2017 04:15 AM    VLDL, calculated 24.4 09/27/2017 04:15 AM    Triglyceride 122 09/27/2017 04:15 AM    CHOL/HDL Ratio 3.0 09/27/2017 04:15 AM      BNP Lab Results   Component Value Date/Time    B-type Natriuretic Peptide 223.7 (H) 04/12/2014 12:00 AM      Liver Enzymes Recent Labs      05/06/18   0127   TP  6.8   ALB  2.4*   AP  137*   SGOT  36      Digoxin    Thyroid Studies Lab Results   Component Value Date/Time    T4, Total 9.1 12/23/2009 11:33 AM    TSH 1.61 05/06/2018 01:27 AM          Signed By: ROSEANNE Cool     May 8, 2018

## 2018-05-08 NOTE — PROGRESS NOTES
[facility]ist [facility]  Progress Note    Patient: Perfecto Headley Age: 70 y.o. : 1955 MR#: [id number] SSN: [id number]  Date: 2018     Subjective:   Pt states she had \"a rough night\" last night, stating she felt SOB and had to sit at bedside for several hours to feel better. Son at bedside. Assessment/Plan:   - Hypertensive emergency,bp was low initially and has improved w/ adjustments of meds by cards. . Pt w/ low K, ? Hyper brett state? W/u in progress. Eval for MARITA negative.  -Hypokalemia, mag nl, not sure if related to above, ie. Due to hyper brett state? Has had low, low nl K in the past. W/u in progress.  -History of combined systolic/diastolic HF, last echo  EF 40-45%, repeat echo this admission shows EF of 30%, mod-severe TR, has some degree of decompensation cardiology following. On bb, asa. Lasix stopped by cards due to renal impairment. Now restarted on lasix. -Moderate bilateral pleural effusions  -Acute metabolic encephalopathy w/ reports of hallucinations per daughter and c/o h/a upon initial presentation to ED, likely due to elevated bp. Resolved. Pt continues to be appropriate.  -Type 2 DM A1c 4.8 this admission. Blood sugars within acceptable range. lantus 45 units is what she takes at home, confirmed with daughter.  -Pancytopenia new today cause unclear.  -History of asthma  -dyslipedemia :on statin. -per nursing reports of loose stools and n/v cause unclear  -COPD on home 02 on PRN basis    PLAN:  -cont to closely watch bp and adjust meds.   -echo ordered by cards  -will await w/u results for hyperaldo state  -replace and closely follow K  -will cont decreased lantus   -BiPAP at night  -check b12 folate levels  -guiac stool      Additional Notes:      Case discussed with:  [x]Patient  [x]Family  [x]Nursing  []Case Management  DVT Prophylaxis:  []Lovenox  []Hep SQ  []SCDs  []Coumadin   []On Heparin gtt    Objective:   VS:   Visit Vitals    BP 165/90  Comment: Repeat    Pulse 68    Temp 98 °F (36.7 °C)    Resp 20    Ht 5' 9\" (1.753 m)    Wt 92.4 kg (203 lb 9.5 oz)    SpO2 100%    BMI 30.07 kg/m2      Tmax/24hrs: Temp (24hrs), Av.9 °F (36.6 °C), Min:97 °F (36.1 °C), Max:98.4 °F (36.9 °C)      Intake/Output Summary (Last 24 hours) at 18 1529  Last data filed at 18 1254   Gross per 24 hour   Intake              960 ml   Output              450 ml   Net              510 ml       General:  Alert awake  Cardiovascular:  Rrr, no murmurs  Pulmonary:  ctab  GI:  Soft, nt, nd  Extremities:  1+ edema lower ext bilaterally  Additional:      Labs:    Recent Results (from the past 24 hour(s))   GLUCOSE, POC    Collection Time: 18  3:58 PM   Result Value Ref Range    Glucose (POC) 120 (H) 70 - 110 mg/dL   GLUCOSE, POC    Collection Time: 18  9:38 PM   Result Value Ref Range    Glucose (POC) 136 (H) 70 - 908 mg/dL   METABOLIC PANEL, BASIC    Collection Time: 18  5:28 AM   Result Value Ref Range    Sodium 139 136 - 145 mmol/L    Potassium 3.6 3.5 - 5.5 mmol/L    Chloride 106 100 - 108 mmol/L    CO2 28 21 - 32 mmol/L    Anion gap 5 3.0 - 18 mmol/L    Glucose 71 (L) 74 - 99 mg/dL    BUN 30 (H) 7.0 - 18 MG/DL    Creatinine 1.32 (H) 0.6 - 1.3 MG/DL    BUN/Creatinine ratio 23 (H) 12 - 20      GFR est AA 49 (L) >60 ml/min/1.73m2    GFR est non-AA 40 (L) >60 ml/min/1.73m2    Calcium 7.4 (L) 8.5 - 10.1 MG/DL   CBC WITH AUTOMATED DIFF    Collection Time: 18  5:28 AM   Result Value Ref Range    WBC 4.0 (L) 4.6 - 13.2 K/uL    RBC 3.05 (L) 4.20 - 5.30 M/uL    HGB 8.5 (L) 12.0 - 16.0 g/dL    HCT 26.9 (L) 35.0 - 45.0 %    MCV 88.2 74.0 - 97.0 FL    MCH 27.9 24.0 - 34.0 PG    MCHC 31.6 31.0 - 37.0 g/dL    RDW 17.2 (H) 11.6 - 14.5 %    PLATELET 871 (L) 674 - 420 K/uL    MPV 11.3 9.2 - 11.8 FL    NEUTROPHILS 69 40 - 73 %    LYMPHOCYTES 20 (L) 21 - 52 %    MONOCYTES 5 3 - 10 %    EOSINOPHILS 6 (H) 0 - 5 %    BASOPHILS 0 0 - 2 %    ABS. NEUTROPHILS 2.7 1.8 - 8.0 K/UL    ABS. LYMPHOCYTES 0.8 (L) 0.9 - 3.6 K/UL    ABS. MONOCYTES 0.2 0.05 - 1.2 K/UL    ABS. EOSINOPHILS 0.2 0.0 - 0.4 K/UL    ABS.  BASOPHILS 0.0 0.0 - 0.1 K/UL    DF AUTOMATED     GLUCOSE, POC    Collection Time: 05/08/18  7:34 AM   Result Value Ref Range    Glucose (POC) 75 70 - 110 mg/dL   GLUCOSE, POC    Collection Time: 05/08/18 11:16 AM   Result Value Ref Range    Glucose (POC) 89 70 - 110 mg/dL       Signed By: Yolanda Cadet MD     May 8, 2018 12:53 PM

## 2018-05-08 NOTE — DIABETES MGMT
Glycemic Control Plan of Care    T2DM with current A1c of 4,8% (05/06/2018). See separate notes for assessment of home diabetes management and education, 05/08/2018. His supportive son (EMT) visiting at bedside stated that this educational visit is very important because it allowed for his mother to hear from a diabetes educator what he has been telling/encouraging his mother for a long time. Home diabetes meds: Humalog insulin 15 units daily every morning and lantus insulin 45 units daily at bedtime. POC BG range on 05/07/2018:  mg/dL. POC BG report on 05/08/2018 at time of review: 75, 89 mg/dL. Discussed with Dr. Rachel Baez that patient has been on 45 units of lantus daily at home plus humalog 15 units daily every morning. Patient's A1c is currently 4.8% (05/06/2018). Recommendation(s):  1.) Continue inpatient glycemic monitoring and current insulin orders/dose: basal and correctional.    Assessment:  Patient is 70year old with past medical history including type 2 diabetes mellitus, chronic systolic CHF, hypertension, hyperlipidemia, asthma, irritable bowel syndrome, migraine, and depression - was admitted on 05/05/2018 with report of shortness of breath, headache, fatigue, and elevated blood pressure. Noted:  Acute pulmonary edema. Acute on chronic CHF. T2DM with current A1c of 4.8% (05/06/2018). Most recent blood glucose values:    Results for Kuldeep Howard (MRN [id number]) as of 5/8/2018 11:53   Ref. Range 5/7/2018 08:08 5/7/2018 11:06 5/7/2018 15:58 5/7/2018 21:38   GLUCOSE,FAST - POC Latest Ref Range: 70 - 110 mg/dL 118 (H) 83 120 (H) 136 (H)     Results for Kuldeep Howard (MRN [id number]) as of 5/8/2018 11:53   Ref. Range 5/8/2018 07:34 5/8/2018 11:16   GLUCOSE,FAST - POC Latest Ref Range: 70 - 110 mg/dL 75 89     Current A1C: 4.8% (05/06/2018) is equivalent to estimated average blood glucose of 91 mg/dL during the past 2-3 months.     Current hospital diabetes medications:  Basal lantus insulin 20 units daily. Correctional lispro insulin ACHS. Normal sensitivity dose. Total daily dose insulin requirement previous day: 05/07/2018  Lantus: 20 units    Home diabetes medications: Patient reported on 0/08/2018:  Humalog insulin 15 units daily every morning. Lantus insulin 45 units daily at bedtime. Diet: Diabetic consistent carb regular.     Goals:  Blood glucose will be within target range of  mg/dL by 05/11/2018    Education:  _X__  Refer to Diabetes Education Record: 05/08/2018             ___  Education not indicated at this time    Maki Sorto RN [facility]  Pager: [phone]

## 2018-05-08 NOTE — PROGRESS NOTES
Problem: Falls - Risk of  Goal: *Absence of Falls  Document Morales Fall Risk and appropriate interventions in the flowsheet.    Outcome: Progressing Towards Goal  Fall Risk Interventions:  Mobility Interventions: Bed/chair exit alarm    Mentation Interventions: Adequate sleep, hydration, pain control    Medication Interventions: Assess postural VS orthostatic hypotension, Bed/chair exit alarm    Elimination Interventions: Bed/chair exit alarm    History of Falls Interventions: Bed/chair exit alarm

## 2018-05-08 NOTE — PROGRESS NOTES
Problem: Mobility Impaired (Adult and Pediatric)  Goal: *Acute Goals and Plan of Care (Insert Text)  Physical Therapy Goals  Initiated 5/7/2018 and to be accomplished within 7 day(s)  1. Patient will move from supine to sit and sit to supine , scoot up and down and roll side to side in bed with supervision/set-up. 2.  Patient will transfer from bed to chair and chair to bed with supervision/set-up using the least restrictive device. 3.  Patient will perform sit to stand with supervision/set-up. 4.  Patient will ambulate with contact guard assist for 50 feet with the least restrictive device. 5.  Patient will ascend/descend 3 stairs with 1-2 handrail(s) with minimal assistance/contact guard assist.     Outcome: Progressing Towards Goal  physical Therapy TREATMENT    Patient: Penni Oppenheim (14 y.o. female)  Date: 5/8/2018  Diagnosis: Hypertensive emergency  Acute on chronic systolic (congestive) heart failure (HCC)  Headache  Acute pulmonary edema (HCC)  Hypokalemia Acute pulmonary edema (HCC)  Precautions: Fall   Chart, physical therapy assessment, plan of care and goals were reviewed. OBJECTIVE/ ASSESSMENT:  Pt found supine in bed willing to work with PT. Pt sat at EOB with min A from her son. Asked son to allow pt to perform mobility on her own and pt is able to ambulate in room tand back to EOB. Pt performed therex at EOB before transferring to b/s chair. Pt was left with needs in reach in chair. Pt is asking for b/s commode at home as her has broken. Pt states she uses it nearly every day. Pt states her bathroom in too far away. Pt present with self limiting behavior and seems to have family that will enable her to do so. Encouraged self driven therex and activity at home to prevent deconditioning. Education: therex, gait, transfers.   Progression toward goals:  []      Improving appropriately and progressing toward goals  [x]      Improving slowly and progressing toward goals  []      Not making progress toward goals and plan of care will be adjusted     PLAN:  Patient continues to benefit from skilled intervention to address the above impairments. Continue treatment per established plan of care. Discharge Recommendations:  Home Health with supevision  Further Equipment Recommendations for Discharge:  b/s commode     SUBJECTIVE:   Patient stated I need a new commode, mine is broke.     OBJECTIVE DATA SUMMARY:   Critical Behavior:  Neurologic State: Alert  Orientation Level: Oriented X4  Cognition: Follows commands, Appropriate for age attention/concentration, Appropriate safety awareness  Safety/Judgement: Fall prevention  Functional Mobility Training:  Bed Mobility:  Supine to Sit: Minimum assistance (fair)  Sit to Supine: Stand-by assistance  Transfers:  Sit to Stand: Minimum assistance  Stand to Sit: Stand-by assistance  Balance:  Sitting: Intact  Standing: Impaired; With support  Standing - Static: Good  Standing - Dynamic : Fair  Ambulation/Gait Training:  Distance (ft): 12 Feet (ft)  Assistive Device: Walker, rolling  Ambulation - Level of Assistance: Stand-by assistance  Gait Abnormalities: Decreased step clearance  Base of Support: Widened  Speed/Laila: Slow  Step Length: Left shortened;Right shortened  Therapeutic Exercises:   LAQ x 10, seated marches through limited range x 10 bilaterally. Pain:  Pre tx pain: 0  Post tx pain: 0  Pain Scale 1: Numeric (0 - 10)  Pain Intensity 1: 0  Activity Tolerance:   Fair  Please refer to the flowsheet for vital signs taken during this treatment. After treatment:   [x] Patient left in no apparent distress sitting up in chair  [] Patient left in no apparent distress in bed  [x] Call bell left within reach  [] Nursing notified  [] Caregiver present  [] Bed alarm activated  [] SCDs applied  [] Ice applied      Dashawn Mclaughlin PTA   Time Calculation: 24 mins    Mobility  Current  CJ= 20-39%.   The severity rating is based on the Level of Assistance required for Functional Mobility and ADLs. Mobility   Goal  CI= 1-19%. The severity rating is based on the Level of Assistance required for Functional Mobility and ADLs.

## 2018-05-08 NOTE — DIABETES MGMT
Diabetes Patient/Family Education Record    Factors That  May Influence Patients Ability  to Learn or  Comply with Recommendations   []   Language barrier    []   Cultural needs   []   Motivation    []   Cognitive limitation    []   Physical   [x]   Education    []   Physiological factors   []   Hearing/vision/speaking impairment   []   Muslim beliefs    []   Financial factors   []  Other:   []  No factors identified at this time. Person Instructed:   [x]   Patient   [x]   Family: Supportive son (EMT) visiting at bedside actively participated and stated that he has been educating, encouraging his mother at home to follow her diabetes treatment plan. Son stated that this education visit is important so that his mother can hear from a diabetes educator what he has been telling his mom for a long time. []  Other     Preference for Learning:   [x]   Verbal   []   Written   []  Demonstration     Level of Comprehension & Competence:    [x]  Good                                      [] Fair                                     []  Poor                             []  Needs Reinforcement   [x]  Teachback completed    Education Component:   [x]  Medication management, including how to administer insulin (if appropriate) and potential medication interactions: Patient reported that she is on the following diabetes meds at home:  Humalog insulin 15 units daily every morning. Lantus insulin 45 units daily at bedtime. Discussed with patient that humalog insulin is fast acting therefore it is important to eat her meal within 15 minutes after taking the humalog injection. Patient stated that she did not know this before. [x]  Nutritional management: Received report that patient is drinking regular cans of pepsi everyday and consuming snacks such as cakes, cookies, candies, etc on a daily basis.  Educated patient to eat 3 meals daily and explained the plate method regarding serving size/portion control of carbs (starches, fruits, dairy) and limiting intake of concentrated sweets. Encouraged patient not to have regular beverages and snacks at home in order for her not to be tempted. She agreed. [x]  Exercise: Encouraged to follow her medical recommendations regarding the importance of regular exercise that she can tolerate. [x]  Signs, symptoms, and treatment of hyperglycemia and hypoglycemia: Patient verbalized understanding of high and low blood sugar: symptoms, causes, how to prevent and how to manage them. [x] Prevention, recognition and treatment of hyperglycemia and hypoglycemia   [x]  Importance of blood glucose monitoring and how to obtain a blood glucose meter: Patient reported that she has BG meter and testing supplies at home. Encouraged patient to continue to monitor her blood sugar at home because it is helping to determine if her diabetes is controlled or not.     []  Instruction on use of the blood glucose meter   [x]  Discuss the importance of HbA1C monitorin.8% (2018) is equivalent to estimated average blood glucose fo 91 mg/dL during the past 2-3 months.    []  Sick day guidelines   [x]  Proper use and disposal of lancets, needles, syringes or insulin pens (if appropriate)   [x]  Potential long-term complications (retinopathy, kidney disease, neuropathy, foot care)   [x] Information about whom to contact in case of emergency or for more information    [x]  Goal:  Patient/family will demonstrate understanding of Diabetes Self Management Skills by: 05/15/2018  Plan for post-discharge education or self-management support:    [x] Outpatient class schedule provided            [] Patient Declined    [] Scheduled for outpatient classes (date) _______     Romayne Potters, RN  pgr [phone]

## 2018-05-08 NOTE — ROUTINE PROCESS
Bedside and Verbal shift change report given to AUSTIN Osullivan (oncoming nurse) by Sury Bella RN   (offgoing nurse). Report included the following information SBAR, Kardex, ED Summary, Procedure Summary, MAR, Recent Results and Med Rec Status.

## 2018-05-09 ENCOUNTER — APPOINTMENT (OUTPATIENT)
Dept: NUCLEAR MEDICINE | Age: 63
DRG: 286 | End: 2018-05-09
Attending: INTERNAL MEDICINE
Payer: MEDICARE

## 2018-05-09 ENCOUNTER — APPOINTMENT (OUTPATIENT)
Dept: GENERAL RADIOLOGY | Age: 63
DRG: 286 | End: 2018-05-09
Attending: INTERNAL MEDICINE
Payer: MEDICARE

## 2018-05-09 LAB
ANION GAP SERPL CALC-SCNC: 4 MMOL/L (ref 3–18)
BASOPHILS # BLD: 0 K/UL (ref 0–0.1)
BASOPHILS NFR BLD: 0 % (ref 0–2)
BUN SERPL-MCNC: 28 MG/DL (ref 7–18)
BUN/CREAT SERPL: 21 (ref 12–20)
CALCIUM SERPL-MCNC: 7.6 MG/DL (ref 8.5–10.1)
CHLORIDE SERPL-SCNC: 105 MMOL/L (ref 100–108)
CO2 SERPL-SCNC: 31 MMOL/L (ref 21–32)
CREAT SERPL-MCNC: 1.32 MG/DL (ref 0.6–1.3)
DIFFERENTIAL METHOD BLD: ABNORMAL
EOSINOPHIL # BLD: 0.3 K/UL (ref 0–0.4)
EOSINOPHIL NFR BLD: 9 % (ref 0–5)
ERYTHROCYTE [DISTWIDTH] IN BLOOD BY AUTOMATED COUNT: 17 % (ref 11.6–14.5)
FOLATE SERPL-MCNC: 8.2 NG/ML (ref 3.1–17.5)
GLUCOSE BLD STRIP.AUTO-MCNC: 110 MG/DL (ref 70–110)
GLUCOSE BLD STRIP.AUTO-MCNC: 193 MG/DL (ref 70–110)
GLUCOSE BLD STRIP.AUTO-MCNC: 90 MG/DL (ref 70–110)
GLUCOSE BLD STRIP.AUTO-MCNC: 92 MG/DL (ref 70–110)
GLUCOSE SERPL-MCNC: 87 MG/DL (ref 74–99)
HCT VFR BLD AUTO: 27.7 % (ref 35–45)
HGB BLD-MCNC: 8.7 G/DL (ref 12–16)
LYMPHOCYTES # BLD: 0.8 K/UL (ref 0.9–3.6)
LYMPHOCYTES NFR BLD: 21 % (ref 21–52)
MCH RBC QN AUTO: 27.7 PG (ref 24–34)
MCHC RBC AUTO-ENTMCNC: 31.4 G/DL (ref 31–37)
MCV RBC AUTO: 88.2 FL (ref 74–97)
MONOCYTES # BLD: 0.3 K/UL (ref 0.05–1.2)
MONOCYTES NFR BLD: 7 % (ref 3–10)
NEUTS SEG # BLD: 2.5 K/UL (ref 1.8–8)
NEUTS SEG NFR BLD: 63 % (ref 40–73)
PLATELET # BLD AUTO: 113 K/UL (ref 135–420)
PMV BLD AUTO: 11.7 FL (ref 9.2–11.8)
POTASSIUM SERPL-SCNC: 3.4 MMOL/L (ref 3.5–5.5)
RBC # BLD AUTO: 3.14 M/UL (ref 4.2–5.3)
SODIUM SERPL-SCNC: 140 MMOL/L (ref 136–145)
VIT B12 SERPL-MCNC: 611 PG/ML (ref 211–911)
WBC # BLD AUTO: 3.9 K/UL (ref 4.6–13.2)

## 2018-05-09 PROCEDURE — 74011250637 HC RX REV CODE- 250/637: Performed by: INTERNAL MEDICINE

## 2018-05-09 PROCEDURE — 65660000004 HC RM CVT STEPDOWN

## 2018-05-09 PROCEDURE — 94660 CPAP INITIATION&MGMT: CPT

## 2018-05-09 PROCEDURE — 74011250637 HC RX REV CODE- 250/637: Performed by: PHYSICIAN ASSISTANT

## 2018-05-09 PROCEDURE — A9541 TC99M SULFUR COLLOID: HCPCS

## 2018-05-09 PROCEDURE — 36415 COLL VENOUS BLD VENIPUNCTURE: CPT | Performed by: INTERNAL MEDICINE

## 2018-05-09 PROCEDURE — 74011636637 HC RX REV CODE- 636/637: Performed by: PHYSICIAN ASSISTANT

## 2018-05-09 PROCEDURE — 82962 GLUCOSE BLOOD TEST: CPT

## 2018-05-09 PROCEDURE — 74011250636 HC RX REV CODE- 250/636: Performed by: INTERNAL MEDICINE

## 2018-05-09 PROCEDURE — 71045 X-RAY EXAM CHEST 1 VIEW: CPT

## 2018-05-09 PROCEDURE — 80048 BASIC METABOLIC PNL TOTAL CA: CPT | Performed by: INTERNAL MEDICINE

## 2018-05-09 PROCEDURE — 74011636637 HC RX REV CODE- 636/637: Performed by: INTERNAL MEDICINE

## 2018-05-09 PROCEDURE — 74011250636 HC RX REV CODE- 250/636: Performed by: PHYSICIAN ASSISTANT

## 2018-05-09 PROCEDURE — 82607 VITAMIN B-12: CPT | Performed by: INTERNAL MEDICINE

## 2018-05-09 PROCEDURE — 85025 COMPLETE CBC W/AUTO DIFF WBC: CPT | Performed by: INTERNAL MEDICINE

## 2018-05-09 RX ORDER — HYDRALAZINE HYDROCHLORIDE 50 MG/1
100 TABLET, FILM COATED ORAL 3 TIMES DAILY
Status: DISCONTINUED | OUTPATIENT
Start: 2018-05-09 | End: 2018-05-12 | Stop reason: HOSPADM

## 2018-05-09 RX ADMIN — HYDRALAZINE HYDROCHLORIDE 100 MG: 50 TABLET, FILM COATED ORAL at 17:26

## 2018-05-09 RX ADMIN — HEPARIN SODIUM 5000 UNITS: 5000 INJECTION, SOLUTION INTRAVENOUS; SUBCUTANEOUS at 22:24

## 2018-05-09 RX ADMIN — FUROSEMIDE 40 MG: 10 INJECTION, SOLUTION INTRAMUSCULAR; INTRAVENOUS at 11:33

## 2018-05-09 RX ADMIN — SPIRONOLACTONE 25 MG: 25 TABLET, FILM COATED ORAL at 11:32

## 2018-05-09 RX ADMIN — ISOSORBIDE DINITRATE 20 MG: 20 TABLET ORAL at 17:26

## 2018-05-09 RX ADMIN — Medication 10 ML: at 07:03

## 2018-05-09 RX ADMIN — HYDRALAZINE HYDROCHLORIDE 100 MG: 50 TABLET, FILM COATED ORAL at 20:58

## 2018-05-09 RX ADMIN — GABAPENTIN 200 MG: 100 CAPSULE ORAL at 17:26

## 2018-05-09 RX ADMIN — GABAPENTIN 200 MG: 100 CAPSULE ORAL at 22:22

## 2018-05-09 RX ADMIN — HEPARIN SODIUM 5000 UNITS: 5000 INJECTION, SOLUTION INTRAVENOUS; SUBCUTANEOUS at 17:26

## 2018-05-09 RX ADMIN — ISOSORBIDE DINITRATE 20 MG: 20 TABLET ORAL at 11:32

## 2018-05-09 RX ADMIN — ISOSORBIDE DINITRATE 20 MG: 20 TABLET ORAL at 20:57

## 2018-05-09 RX ADMIN — HEPARIN SODIUM 5000 UNITS: 5000 INJECTION, SOLUTION INTRAVENOUS; SUBCUTANEOUS at 06:47

## 2018-05-09 RX ADMIN — GABAPENTIN 200 MG: 100 CAPSULE ORAL at 11:32

## 2018-05-09 RX ADMIN — Medication 10 ML: at 22:27

## 2018-05-09 RX ADMIN — FUROSEMIDE 40 MG: 10 INJECTION, SOLUTION INTRAMUSCULAR; INTRAVENOUS at 21:00

## 2018-05-09 RX ADMIN — INSULIN LISPRO 2 UNITS: 100 INJECTION, SOLUTION INTRAVENOUS; SUBCUTANEOUS at 22:26

## 2018-05-09 RX ADMIN — ASPIRIN 81 MG: 81 TABLET, COATED ORAL at 11:23

## 2018-05-09 RX ADMIN — Medication 10 ML: at 14:00

## 2018-05-09 RX ADMIN — ACETAMINOPHEN 650 MG: 325 TABLET ORAL at 21:02

## 2018-05-09 RX ADMIN — CARVEDILOL 25 MG: 25 TABLET, FILM COATED ORAL at 17:26

## 2018-05-09 RX ADMIN — PRAVASTATIN SODIUM 20 MG: 20 TABLET ORAL at 20:59

## 2018-05-09 RX ADMIN — CARVEDILOL 25 MG: 25 TABLET, FILM COATED ORAL at 11:23

## 2018-05-09 RX ADMIN — INSULIN GLARGINE 20 UNITS: 100 INJECTION, SOLUTION SUBCUTANEOUS at 11:23

## 2018-05-09 NOTE — ROUTINE PROCESS
Bedside and Verbal shift change report given to Zach Tobin RN (oncoming nurse) by Asya Ohara RN   (offgoing nurse).  Report included the following information SBAR, Kardex, ED Summary, Procedure Summary, Intake/Output, MAR, Recent Results, Med Rec Status and Cardiac Rhythm SR.

## 2018-05-09 NOTE — ADT AUTH CERT NOTES
Per message from :    I have attached additional clinical, there are limited notes from today. I have also forwarded to Dr. Alejandra Ortega for further possible recommendations. Thanks!        Patient Demographics        Patient Name [address] Sex  Address Phone       Willaim Bamberger [phone] Female 1955 [address] [phone] (Home) *Preferred*  [phone] (Mobile)           CSN:       [id number]           Admit Date: Admit Time Room Bed       May 5, 2018  9:26 AM 2306 [30006] 01 [[id number]]           Attending Providers        Provider Pager From Hair Justice DO  18       Gloria Quezada MD  18       Kennedy Andrews MD  18            Emergency Contact(s)        Name Relation Home Work Mobile       Katina Maurer [phone]         Adriano Webb [phone]         Utilization Review           ADDITIONAL CLINICAL REQUEST by Dorys Cochran RN        Review Entered Review Status       2018 In Primary       Details         REVIEW 2018       ADDITIONAL CLINICAL REVIEW PER REQUEST     INTERNAL MEDICINE PROGRESS NOTES:  - Hypertensive emergency,bp was low initially and has improved w/ adjustments of meds by cards. . Pt w/ low K, ? Hyper brett state? W/u in progress. Eval for MARITA negative.  -Hypokalemia, mag nl, not sure if related to above, ie. Due to hyper brett state? Has had low, low nl K in the past. W/u in progress.  -History of combined systolic/diastolic HF, last echo  EF 40-45%, repeat echo this admission shows EF of 30%, mod-severe TR, has some degree of decompensation cardiology following. On bb, asa. Lasix stopped by cards due to renal impairment. Now restarted on lasix. -Moderate bilateral pleural effusions  -Acute metabolic encephalopathy w/ reports of hallucinations per daughter and c/o h/a upon initial presentation to ED, likely due to elevated bp. Resolved.  Pt continues to be appropriate.  -Type 2 DM A1c 4.8 this admission. Blood sugars within acceptable range. lantus 45 units is what she takes at home, confirmed with daughter.  -Pancytopenia new today cause unclear.  -History of asthma  -dyslipedemia :on statin. -per nursing reports of loose stools and n/v cause unclear  -COPD on home 02 on PRN basis     PLAN:  -cont to closely watch bp and adjust meds.   -echo ordered by cards  -will await w/u results for hyperaldo state  -replace and closely follow K  -will cont decreased lantus   -BiPAP at night  -check b12 folate levels  -guiac stool     LABS: C DIFF NEGATIVE;      CARDIAC CATH PENDING     VS: TEMP 97.6; HR 68; /101; 176/87; RR 20; 20; 02 SAT BIPAP 100% 3L/MIN     MEDS: ALDACTONE TAB 25MG PO QD; ISORDIL TAB 20MG PO TID; LANUS 20 UNITS SC QD; NEURONITN CAP 200MG PO TID; LASIX INJECTION 40MG IV Q12HRS; COREG TAB 25MG PO BID; ASPIRIN 81MG PO QD

## 2018-05-09 NOTE — PROGRESS NOTES
Off unit for gastric emptying. Scheduled meds held for procedure. 1100 Pt returned, but will go at 12:20 to complete second part of procedure. Pt to remain NPO until procedure complete per technician.

## 2018-05-09 NOTE — PROGRESS NOTES
Problem: Mobility Impaired (Adult and Pediatric)  Goal: *Acute Goals and Plan of Care (Insert Text)  Physical Therapy Goals  Initiated 5/7/2018 and to be accomplished within 7 day(s)  1. Patient will move from supine to sit and sit to supine , scoot up and down and roll side to side in bed with supervision/set-up. 2.  Patient will transfer from bed to chair and chair to bed with supervision/set-up using the least restrictive device. 3.  Patient will perform sit to stand with supervision/set-up. 4.  Patient will ambulate with contact guard assist for 50 feet with the least restrictive device. 5.  Patient will ascend/descend 3 stairs with 1-2 handrail(s) with minimal assistance/contact guard assist.       0914 - Pt is off floor at this time. Will f/u later in day. 1005 - Pt still off floor at this time. Will f/u.

## 2018-05-09 NOTE — ROUTINE PROCESS
Bedside and Verbal shift change report given to Silvano hernandes RN (oncoming nurse) by Lori De Souza RN (offgoing nurse). Report included the following information SBAR, Kardex and MAR. Patient had no acute events overnight. Currently resting in NAD.

## 2018-05-09 NOTE — PROGRESS NOTES
[facility] - Progress Note  Admit Date: 5/5/2018    Assessment:     Hospital Problems  Date Reviewed: 2/8/2018          Codes Class Noted POA    Hypokalemia ICD-10-CM: E87.6  ICD-9-CM: 276.8  5/5/2018 Unknown        * (Principal)Acute pulmonary edema ([facility].) ICD-10-CM: J81.0  ICD-9-CM: 518.4  5/5/2018 Unknown        Hypertensive emergency ICD-10-CM: I16.1  ICD-9-CM: 401.9  5/5/2018 Unknown        Headache ICD-10-CM: R51  ICD-9-CM: 784.0  5/5/2018 Unknown        Acute on chronic systolic (congestive) heart failure (HCC) ICD-10-CM: I50.23  ICD-9-CM: 428.23, 428.0  5/5/2018 Unknown        Type 2 diabetes mellitus with nephropathy ([facility].) ICD-10-CM: E11.21  ICD-9-CM: 250.40, 583.81  12/14/2017 Yes              -HTN urgency, initially on nitro and cardene gtts, now off with normotensive reads. Suspect non-compliance with medications. She also reports n/v x 4 days prior to admission.  -Hypokalemia, replaced. -URI, endorses cough/congestion over past week. -Viral illness, nausea/vomiting x 4 days.  -Acute on chronic CHF with reduced EF.  -CMY with decline on LV function on echo this admission with EF 30% with WMA. Low risk nuclear 2017 without ischemia.  -mod bilat pleural effusions.  -mod pulm HTN  -COPD, uses home O2  -CHF  -HLD  -DM  -Hx of migraines, reports HA for past 3 days. CT head no acute process.      Previously followed by Dr Mack Vaughn:     Good diuresis with IV lasix with stable renal function, will continue. Slept well with bipap overnight. Still orthopneic. BP remains elevated, will trend after morning medications with recent adjustments, on coreg, hydral, isordil, aldactone. CXR with improved vascular congestion with basilar atelectasis with possible PNA. Continue to follow Hgb, stool pending. Will need ischemic evaluation with cardiac catheterization prior to discharge. Plan for gastric emptying study today. N/V on admission. Subjective:     Slept well with bipap.  On nasal canula 3 liters. Objective:      Patient Vitals for the past 8 hrs:   Temp Pulse Resp BP SpO2   05/09/18 0712 97.5 °F (36.4 °C) 65 20 176/87 100 %   05/09/18 0447 - - - - 99 %   05/09/18 0400 97.2 °F (36.2 °C) 64 18 161/77 97 %         Patient Vitals for the past 96 hrs:   Weight   05/09/18 0647 89.4 kg (197 lb 1.6 oz)   05/08/18 0437 92.4 kg (203 lb 9.5 oz)   05/07/18 0420 92.4 kg (203 lb 9.6 oz)   05/06/18 0400 84.4 kg (186 lb 1.1 oz)   05/05/18 0933 78.9 kg (174 lb)                    Intake/Output Summary (Last 24 hours) at 05/09/18 0804  Last data filed at 05/09/18 0224   Gross per 24 hour   Intake              840 ml   Output             3500 ml   Net            -2660 ml       Physical Exam:  General:  alert, cooperative, no distress, appears stated age  Neck:   JVD  Lungs:  diminished breath sounds bases with basilar rales. Heart:  regular rate and rhythm  Abdomen:  abdomen is soft without significant tenderness, masses, organomegaly or guarding  Extremities:  extremities normal, atraumatic, no cyanosis or edema    Data Review:     Labs: Results:       Chemistry Recent Labs      05/09/18   0525  05/08/18   0528  05/07/18   0524  05/06/18   1010   GLU  87  71*  85  168*   NA  140  139  141  143   K  3.4*  3.6  3.8  4.0   CL  105  106  108  109*   CO2  31  28  29  29   BUN  28*  30*  27*  25*   CREA  1.32*  1.32*  1.55*  1.22   CA  7.6*  7.4*  7.3*  7.6*   MG   --    --    --   2.0   PHOS   --    --    --   2.9   AGAP  4  5  4  5   BUCR  21*  23*  17  20      CBC w/Diff Recent Labs      05/09/18   0525  05/08/18   0528   WBC  3.9*  4.0*   RBC  3.14*  3.05*   HGB  8.7*  8.5*   HCT  27.7*  26.9*   PLT  113*  103*   GRANS  63  69   LYMPH  21  20*   EOS  9*  6*      Cardiac Enzymes No results found for: CPK, CK, CKMMB, CKMB, RCK3, CKMBT, CKNDX, CKND1, DAYNA, TROPT, TROIQ, EUN, TROPT, TNIPOC, BNP, BNPP   Coagulation No results for input(s): PTP, INR, APTT in the last 72 hours.     No lab exists for component: INREXT    Lipid Panel Lab Results   Component Value Date/Time    Cholesterol, total 104 09/27/2017 04:15 AM    HDL Cholesterol 35 (L) 09/27/2017 04:15 AM    LDL, calculated 44.6 09/27/2017 04:15 AM    VLDL, calculated 24.4 09/27/2017 04:15 AM    Triglyceride 122 09/27/2017 04:15 AM    CHOL/HDL Ratio 3.0 09/27/2017 04:15 AM      BNP Lab Results   Component Value Date/Time    B-type Natriuretic Peptide 223.7 (H) 04/12/2014 12:00 AM      Liver Enzymes No results for input(s): TP, ALB, TBIL, AP, SGOT, GPT in the last 72 hours.     No lab exists for component: DBIL   Digoxin    Thyroid Studies Lab Results   Component Value Date/Time    T4, Total 9.1 12/23/2009 11:33 AM    TSH 1.61 05/06/2018 01:27 AM          Signed By: ROSEANNE Higuera     May 9, 2018

## 2018-05-09 NOTE — PROGRESS NOTES
[facility] Hospitalist Group  Progress Note    Patient: Paris Barnett Age: 70 y.o. : 1955 MR#: [id number] SSN: [id number]  Date: 2018     Subjective:   Pt reports she slept well with BiPAP overnight. Assessment/Plan:   - Hypertensive emergency,bp was low initially and had improved w/ adjustments of meds by cards but is now increasing. . Pt w/ low K, ? Hyper brett state? W/u in progress. Eval for MARITA negative.  -Hypokalemia, mag nl, not sure if related to above, ie. Due to hyper brett state? Has had low, low nl K in the past. W/u in progress.  -History of combined systolic/diastolic HF, last echo 17/10 EF 40-45%, repeat echo this admission shows EF of 30%, mod-severe TR, has some degree of decompensation cardiology following. On bb, asa. Lasix stopped by cards due to renal impairment. Has been restarted on lasix. -Moderate bilateral pleural effusions  -Acute metabolic encephalopathy w/ reports of hallucinations per daughter and c/o h/a upon initial presentation to ED, likely due to elevated bp. Resolved. Pt continues to be appropriate.  -Type 2 DM A1c 4.8 this admission. Blood sugars within acceptable range. lantus 45 units is what she takes at home, confirmed with daughter.  -Pancytopenia new today cause unclear.  -History of asthma  -dyslipedemia :on statin. -per nursing reports of loose stools and n/v cause unclear  -COPD on home 02 on PRN basis    PLAN:  -cont to closely watch bp and adjust meds.   -echo ordered by cards  -will await w/u results for hyperaldo state  -replace and closely follow K  -will cont decreased lantus   -BiPAP at night  -check b12 folate levels  -guiac stool      Additional Notes:      Case discussed with:  [x]Patient  [x]Family  [x]Nursing  []Case Management  DVT Prophylaxis:  []Lovenox  []Hep SQ  []SCDs  []Coumadin   []On Heparin gtt    Objective:   VS:   Visit Vitals    BP (!) 189/101    Pulse 68    Temp 97.6 °F (36.4 °C)    Resp 20    Ht 5' 9\" (1.753 m)    Wt 89.4 kg (197 lb 1.6 oz)    SpO2 100%    BMI 29.11 kg/m2      Tmax/24hrs: Temp (24hrs), Av.3 °F (36.3 °C), Min:96.9 °F (36.1 °C), Max:97.6 °F (36.4 °C)      Intake/Output Summary (Last 24 hours) at 18 1532  Last data filed at 18 1516   Gross per 24 hour   Intake              480 ml   Output             4750 ml   Net            -4270 ml       General:  Alert awake  Cardiovascular:  Rrr, no murmurs  Pulmonary:  ctab  GI:  Soft, nt, nd  Extremities:  1+ edema lower ext bilaterally  Additional:      Labs:    Recent Results (from the past 24 hour(s))   GLUCOSE, POC    Collection Time: 18  3:58 PM   Result Value Ref Range    Glucose (POC) 102 70 - 110 mg/dL   GLUCOSE, POC    Collection Time: 18 10:54 PM   Result Value Ref Range    Glucose (POC) 141 (H) 70 - 110 mg/dL   CBC WITH AUTOMATED DIFF    Collection Time: 18  5:25 AM   Result Value Ref Range    WBC 3.9 (L) 4.6 - 13.2 K/uL    RBC 3.14 (L) 4.20 - 5.30 M/uL    HGB 8.7 (L) 12.0 - 16.0 g/dL    HCT 27.7 (L) 35.0 - 45.0 %    MCV 88.2 74.0 - 97.0 FL    MCH 27.7 24.0 - 34.0 PG    MCHC 31.4 31.0 - 37.0 g/dL    RDW 17.0 (H) 11.6 - 14.5 %    PLATELET 871 (L) 182 - 420 K/uL    MPV 11.7 9.2 - 11.8 FL    NEUTROPHILS 63 40 - 73 %    LYMPHOCYTES 21 21 - 52 %    MONOCYTES 7 3 - 10 %    EOSINOPHILS 9 (H) 0 - 5 %    BASOPHILS 0 0 - 2 %    ABS. NEUTROPHILS 2.5 1.8 - 8.0 K/UL    ABS. LYMPHOCYTES 0.8 (L) 0.9 - 3.6 K/UL    ABS. MONOCYTES 0.3 0.05 - 1.2 K/UL    ABS. EOSINOPHILS 0.3 0.0 - 0.4 K/UL    ABS.  BASOPHILS 0.0 0.0 - 0.1 K/UL    DF AUTOMATED     METABOLIC PANEL, BASIC    Collection Time: 18  5:25 AM   Result Value Ref Range    Sodium 140 136 - 145 mmol/L    Potassium 3.4 (L) 3.5 - 5.5 mmol/L    Chloride 105 100 - 108 mmol/L    CO2 31 21 - 32 mmol/L    Anion gap 4 3.0 - 18 mmol/L    Glucose 87 74 - 99 mg/dL    BUN 28 (H) 7.0 - 18 MG/DL    Creatinine 1.32 (H) 0.6 - 1.3 MG/DL    BUN/Creatinine ratio 21 (H) 12 - 20      GFR est AA 49 (L) >60 ml/min/1.73m2    GFR est non-AA 40 (L) >60 ml/min/1.73m2    Calcium 7.6 (L) 8.5 - 10.1 MG/DL   VITAMIN B12 & FOLATE    Collection Time: 05/09/18  5:25 AM   Result Value Ref Range    Vitamin B12 611 211 - 911 pg/mL    Folate 8.2 3.10 - 17.50 ng/mL   GLUCOSE, POC    Collection Time: 05/09/18  7:11 AM   Result Value Ref Range    Glucose (POC) 92 70 - 110 mg/dL   GLUCOSE, POC    Collection Time: 05/09/18 11:11 AM   Result Value Ref Range    Glucose (POC) 110 70 - 110 mg/dL       Signed By: Mathew Nelson MD     May 9, 2018 12:53 PM

## 2018-05-10 LAB
ANION GAP SERPL CALC-SCNC: 3 MMOL/L (ref 3–18)
BASOPHILS # BLD: 0 K/UL (ref 0–0.1)
BASOPHILS NFR BLD: 0 % (ref 0–2)
BUN SERPL-MCNC: 26 MG/DL (ref 7–18)
BUN/CREAT SERPL: 23 (ref 12–20)
CALCIUM SERPL-MCNC: 7.8 MG/DL (ref 8.5–10.1)
CHLORIDE SERPL-SCNC: 104 MMOL/L (ref 100–108)
CO2 SERPL-SCNC: 32 MMOL/L (ref 21–32)
CREAT SERPL-MCNC: 1.14 MG/DL (ref 0.6–1.3)
DHEA SERPL-MCNC: 34 NG/DL (ref 31–701)
DIFFERENTIAL METHOD BLD: ABNORMAL
EOSINOPHIL # BLD: 0.4 K/UL (ref 0–0.4)
EOSINOPHIL NFR BLD: 9 % (ref 0–5)
ERYTHROCYTE [DISTWIDTH] IN BLOOD BY AUTOMATED COUNT: 16.6 % (ref 11.6–14.5)
GLUCOSE BLD STRIP.AUTO-MCNC: 114 MG/DL (ref 70–110)
GLUCOSE BLD STRIP.AUTO-MCNC: 193 MG/DL (ref 70–110)
GLUCOSE BLD STRIP.AUTO-MCNC: 63 MG/DL (ref 70–110)
GLUCOSE BLD STRIP.AUTO-MCNC: 67 MG/DL (ref 70–110)
GLUCOSE BLD STRIP.AUTO-MCNC: 89 MG/DL (ref 70–110)
GLUCOSE BLD STRIP.AUTO-MCNC: 93 MG/DL (ref 70–110)
GLUCOSE SERPL-MCNC: 122 MG/DL (ref 74–99)
HCT VFR BLD AUTO: 27 % (ref 35–45)
HGB BLD-MCNC: 8.4 G/DL (ref 12–16)
INR PPP: 1.1 (ref 0.8–1.2)
LYMPHOCYTES # BLD: 0.8 K/UL (ref 0.9–3.6)
LYMPHOCYTES NFR BLD: 19 % (ref 21–52)
MCH RBC QN AUTO: 27.4 PG (ref 24–34)
MCHC RBC AUTO-ENTMCNC: 31.1 G/DL (ref 31–37)
MCV RBC AUTO: 87.9 FL (ref 74–97)
MONOCYTES # BLD: 0.3 K/UL (ref 0.05–1.2)
MONOCYTES NFR BLD: 8 % (ref 3–10)
NEUTS SEG # BLD: 2.8 K/UL (ref 1.8–8)
NEUTS SEG NFR BLD: 64 % (ref 40–73)
PLATELET # BLD AUTO: 117 K/UL (ref 135–420)
PMV BLD AUTO: 11.4 FL (ref 9.2–11.8)
POTASSIUM SERPL-SCNC: 3.5 MMOL/L (ref 3.5–5.5)
PROTHROMBIN TIME: 13.5 SEC (ref 11.5–15.2)
RBC # BLD AUTO: 3.07 M/UL (ref 4.2–5.3)
SODIUM SERPL-SCNC: 139 MMOL/L (ref 136–145)
WBC # BLD AUTO: 4.3 K/UL (ref 4.6–13.2)

## 2018-05-10 PROCEDURE — 36415 COLL VENOUS BLD VENIPUNCTURE: CPT | Performed by: INTERNAL MEDICINE

## 2018-05-10 PROCEDURE — 74011000250 HC RX REV CODE- 250: Performed by: INTERNAL MEDICINE

## 2018-05-10 PROCEDURE — 74011636320 HC RX REV CODE- 636/320: Performed by: INTERNAL MEDICINE

## 2018-05-10 PROCEDURE — 4A023N7 MEASUREMENT OF CARDIAC SAMPLING AND PRESSURE, LEFT HEART, PERCUTANEOUS APPROACH: ICD-10-PCS | Performed by: INTERNAL MEDICINE

## 2018-05-10 PROCEDURE — 74011000250 HC RX REV CODE- 250: Performed by: PHYSICIAN ASSISTANT

## 2018-05-10 PROCEDURE — C1894 INTRO/SHEATH, NON-LASER: HCPCS

## 2018-05-10 PROCEDURE — 94640 AIRWAY INHALATION TREATMENT: CPT

## 2018-05-10 PROCEDURE — 80048 BASIC METABOLIC PNL TOTAL CA: CPT | Performed by: INTERNAL MEDICINE

## 2018-05-10 PROCEDURE — 74011250637 HC RX REV CODE- 250/637: Performed by: INTERNAL MEDICINE

## 2018-05-10 PROCEDURE — 74011250636 HC RX REV CODE- 250/636: Performed by: INTERNAL MEDICINE

## 2018-05-10 PROCEDURE — B2111ZZ FLUOROSCOPY OF MULTIPLE CORONARY ARTERIES USING LOW OSMOLAR CONTRAST: ICD-10-PCS | Performed by: INTERNAL MEDICINE

## 2018-05-10 PROCEDURE — 74011250637 HC RX REV CODE- 250/637: Performed by: PHYSICIAN ASSISTANT

## 2018-05-10 PROCEDURE — 74011636637 HC RX REV CODE- 636/637: Performed by: INTERNAL MEDICINE

## 2018-05-10 PROCEDURE — 74011636637 HC RX REV CODE- 636/637: Performed by: PHYSICIAN ASSISTANT

## 2018-05-10 PROCEDURE — B2151ZZ FLUOROSCOPY OF LEFT HEART USING LOW OSMOLAR CONTRAST: ICD-10-PCS | Performed by: INTERNAL MEDICINE

## 2018-05-10 PROCEDURE — 82962 GLUCOSE BLOOD TEST: CPT

## 2018-05-10 PROCEDURE — 74011250636 HC RX REV CODE- 250/636: Performed by: PHYSICIAN ASSISTANT

## 2018-05-10 PROCEDURE — 85610 PROTHROMBIN TIME: CPT | Performed by: INTERNAL MEDICINE

## 2018-05-10 PROCEDURE — 65660000004 HC RM CVT STEPDOWN

## 2018-05-10 PROCEDURE — 85025 COMPLETE CBC W/AUTO DIFF WBC: CPT | Performed by: INTERNAL MEDICINE

## 2018-05-10 RX ORDER — BIVALIRUDIN 250 MG/5ML
0.75 INJECTION, POWDER, LYOPHILIZED, FOR SOLUTION INTRAVENOUS ONCE
Status: DISCONTINUED | OUTPATIENT
Start: 2018-05-10 | End: 2018-05-10 | Stop reason: HOSPADM

## 2018-05-10 RX ORDER — MIDAZOLAM HYDROCHLORIDE 1 MG/ML
.5-2 INJECTION, SOLUTION INTRAMUSCULAR; INTRAVENOUS
Status: DISCONTINUED | OUTPATIENT
Start: 2018-05-10 | End: 2018-05-10 | Stop reason: HOSPADM

## 2018-05-10 RX ORDER — HEPARIN SODIUM 1000 [USP'U]/ML
10000 INJECTION, SOLUTION INTRAVENOUS; SUBCUTANEOUS AS NEEDED
Status: DISCONTINUED | OUTPATIENT
Start: 2018-05-10 | End: 2018-05-10 | Stop reason: HOSPADM

## 2018-05-10 RX ORDER — FENTANYL CITRATE 50 UG/ML
12.5-1 INJECTION, SOLUTION INTRAMUSCULAR; INTRAVENOUS
Status: DISCONTINUED | OUTPATIENT
Start: 2018-05-10 | End: 2018-05-10 | Stop reason: HOSPADM

## 2018-05-10 RX ORDER — HEPARIN SODIUM 200 [USP'U]/100ML
500 INJECTION, SOLUTION INTRAVENOUS ONCE
Status: COMPLETED | OUTPATIENT
Start: 2018-05-10 | End: 2018-05-10

## 2018-05-10 RX ORDER — POTASSIUM CHLORIDE 20 MEQ/1
40 TABLET, EXTENDED RELEASE ORAL
Status: COMPLETED | OUTPATIENT
Start: 2018-05-10 | End: 2018-05-10

## 2018-05-10 RX ORDER — SODIUM CHLORIDE 0.9 % (FLUSH) 0.9 %
5-10 SYRINGE (ML) INJECTION AS NEEDED
Status: DISCONTINUED | OUTPATIENT
Start: 2018-05-10 | End: 2018-05-12 | Stop reason: HOSPADM

## 2018-05-10 RX ORDER — SODIUM CHLORIDE 0.9 % (FLUSH) 0.9 %
5-10 SYRINGE (ML) INJECTION EVERY 8 HOURS
Status: DISCONTINUED | OUTPATIENT
Start: 2018-05-10 | End: 2018-05-12 | Stop reason: HOSPADM

## 2018-05-10 RX ORDER — LIDOCAINE HYDROCHLORIDE 10 MG/ML
1-30 INJECTION, SOLUTION EPIDURAL; INFILTRATION; INTRACAUDAL; PERINEURAL ONCE
Status: COMPLETED | OUTPATIENT
Start: 2018-05-10 | End: 2018-05-10

## 2018-05-10 RX ORDER — VERAPAMIL HYDROCHLORIDE 2.5 MG/ML
2.5-5 INJECTION, SOLUTION INTRAVENOUS ONCE
Status: COMPLETED | OUTPATIENT
Start: 2018-05-10 | End: 2018-05-10

## 2018-05-10 RX ADMIN — CARVEDILOL 25 MG: 25 TABLET, FILM COATED ORAL at 17:39

## 2018-05-10 RX ADMIN — HYDRALAZINE HYDROCHLORIDE 100 MG: 50 TABLET, FILM COATED ORAL at 21:47

## 2018-05-10 RX ADMIN — FENTANYL CITRATE 25 MCG: 50 INJECTION INTRAMUSCULAR; INTRAVENOUS at 13:38

## 2018-05-10 RX ADMIN — ISOSORBIDE DINITRATE 20 MG: 20 TABLET ORAL at 21:50

## 2018-05-10 RX ADMIN — VERAPAMIL HYDROCHLORIDE 5 MG: 2.5 INJECTION INTRAVENOUS at 13:42

## 2018-05-10 RX ADMIN — Medication 1000 UNITS: at 13:44

## 2018-05-10 RX ADMIN — HYDRALAZINE HYDROCHLORIDE 100 MG: 50 TABLET, FILM COATED ORAL at 17:39

## 2018-05-10 RX ADMIN — PRAVASTATIN SODIUM 20 MG: 20 TABLET ORAL at 21:50

## 2018-05-10 RX ADMIN — Medication 10 ML: at 21:51

## 2018-05-10 RX ADMIN — SPIRONOLACTONE 25 MG: 25 TABLET, FILM COATED ORAL at 08:42

## 2018-05-10 RX ADMIN — ACETAMINOPHEN 650 MG: 325 TABLET ORAL at 19:37

## 2018-05-10 RX ADMIN — GABAPENTIN 200 MG: 100 CAPSULE ORAL at 17:39

## 2018-05-10 RX ADMIN — HYDRALAZINE HYDROCHLORIDE 100 MG: 50 TABLET, FILM COATED ORAL at 08:43

## 2018-05-10 RX ADMIN — FUROSEMIDE 40 MG: 10 INJECTION, SOLUTION INTRAMUSCULAR; INTRAVENOUS at 08:43

## 2018-05-10 RX ADMIN — IOPAMIDOL 45 ML: 612 INJECTION, SOLUTION INTRAVENOUS at 13:49

## 2018-05-10 RX ADMIN — NITROGLYCERIN 200 MCG: 5 INJECTION, SOLUTION INTRAVENOUS at 13:42

## 2018-05-10 RX ADMIN — FUROSEMIDE 40 MG: 10 INJECTION, SOLUTION INTRAMUSCULAR; INTRAVENOUS at 20:37

## 2018-05-10 RX ADMIN — INSULIN GLARGINE 20 UNITS: 100 INJECTION, SOLUTION SUBCUTANEOUS at 09:00

## 2018-05-10 RX ADMIN — POTASSIUM CHLORIDE 40 MEQ: 1500 TABLET, FILM COATED, EXTENDED RELEASE ORAL at 11:20

## 2018-05-10 RX ADMIN — Medication 10 ML: at 14:00

## 2018-05-10 RX ADMIN — INSULIN LISPRO 2 UNITS: 100 INJECTION, SOLUTION INTRAVENOUS; SUBCUTANEOUS at 23:35

## 2018-05-10 RX ADMIN — HEPARIN SODIUM 5000 UNITS: 5000 INJECTION, SOLUTION INTRAVENOUS; SUBCUTANEOUS at 06:34

## 2018-05-10 RX ADMIN — LIDOCAINE HYDROCHLORIDE 2 ML: 10 INJECTION, SOLUTION EPIDURAL; INFILTRATION; INTRACAUDAL; PERINEURAL at 13:40

## 2018-05-10 RX ADMIN — ISOSORBIDE DINITRATE 20 MG: 20 TABLET ORAL at 17:39

## 2018-05-10 RX ADMIN — HEPARIN SODIUM 1000 UNITS: 1000 INJECTION, SOLUTION INTRAVENOUS; SUBCUTANEOUS at 13:41

## 2018-05-10 RX ADMIN — ISOSORBIDE DINITRATE 20 MG: 20 TABLET ORAL at 08:42

## 2018-05-10 RX ADMIN — ASPIRIN 81 MG: 81 TABLET, COATED ORAL at 12:55

## 2018-05-10 RX ADMIN — Medication 10 ML: at 06:35

## 2018-05-10 RX ADMIN — MIDAZOLAM HYDROCHLORIDE 1 MG: 1 INJECTION, SOLUTION INTRAMUSCULAR; INTRAVENOUS at 13:39

## 2018-05-10 RX ADMIN — GABAPENTIN 200 MG: 100 CAPSULE ORAL at 08:42

## 2018-05-10 RX ADMIN — ALBUTEROL SULFATE 2.5 MG: 2.5 SOLUTION RESPIRATORY (INHALATION) at 11:43

## 2018-05-10 RX ADMIN — HEPARIN SODIUM 5000 UNITS: 5000 INJECTION, SOLUTION INTRAVENOUS; SUBCUTANEOUS at 21:46

## 2018-05-10 RX ADMIN — CARVEDILOL 25 MG: 25 TABLET, FILM COATED ORAL at 08:43

## 2018-05-10 RX ADMIN — GABAPENTIN 200 MG: 100 CAPSULE ORAL at 21:50

## 2018-05-10 NOTE — PROGRESS NOTES
PT eval order received and chart reviewed. Unable to see patient at this time as patient is off unit to cath holding. Will follow up as patient schedule allows. Thank you for this referral. Craig Goodman, PT, DPT.

## 2018-05-10 NOTE — ROUTINE PROCESS
TRANSFER - IN REPORT:    Verbal report received from RYLEY JOHN. (name) on Reubendinesh Reece  being received from [facility] (unit) for ordered procedure      Report consisted of patients Situation, Background, Assessment and   Recommendations(SBAR). Information from the following report(s) SBAR, Intake/Output, MAR and Recent Results was reviewed with the receiving nurse. Opportunity for questions and clarification was provided. Assessment completed upon patients arrival to unit and care assumed.

## 2018-05-10 NOTE — PROCEDURES
CARDIAC CATHETERIZATION LABORATORY PROCEDURE REPORT    Stephania Yepez is a 70 y.o. female    Medical Record Number: [id number]    Primary Care Provider: Naern Larios MD      Referral Provider: No ref. provider found    PROCEDURE DATE: 5/10/2018    INDICATIONS:   Cardiomyopathy. MD PERFORMING PROCEDURE: Shanell Rodgers MD    PROCEDURE:  Left heart catheterization, coronary angiography and left ventriculography. ANESTHESIA: Moderate sedation and local anesthesia. EBL: 10-50 ml  CONTRAST USE: Approximately 45 ml  RADIATION: 626 mGy    Risks, benefits, and alternatives to the procedure were explained to the patient prior to the procedure. Questions were answered in detail. The patient appeared to understand and appropriate informed consent was obtained. The patient was brought to the cardiac catheterization  laboratory in a post-absorptive state and right wrist was prepped and draped in the usual sterile manner. Moderate sedation was achieved with a combination of Versed (midazolam) and Fentanyl. Lidocaine was used to secure local anesthesia. The right radial artery was cannulated using a Micro-puncture kit and a 6 [place] sheath was inserted. The patient received 3000 units of IV Heparin bolus as well as 5 mg Verapamil and 200 microgram NTG through the sheath to prevent arterial vasospasm. 6 [place] Saniya Crisp catheter was used to obtain selective bilateral coronary angiograms, under fluoroscopic guidance,  in multiple projections. LV angiography was not performed. The following were observed. FLUOROSCOPY: There was no significant calcification. HEMODYNAMIC / ANGIOGRAPHIC DATA    · Coronary Angiography:  · The coronary circulation was right dominant.    · Left main: Angiographically normal.   · Left anterior descending: Angiographically normal.  · Ramus Intermediate: Angiographically normal.  · Diagonal Branches: Angiographically normal.  · Circumflex: Angiographically normal.  · Obtuse Marginal Branches: Angiographically normal.  · Right coronary: Angiographically normal.    The patient was transferred to the observation area with stable vital signs and in an asymptomatic state. The sheath will be pulled and hemostasis will be secured with the application of pressure. There was no apparent immediate complication. SUMMARY: No CAD. Nonischemic cardiomyopathy    Moderate sedation was utilized for 14 minutes using Versed and fentanyl under my supervision. RECOMMENDATIONS:  Post-procedure care will focus on aggressive risk factor modification.      Electronically signed: Nelly Mcgee MD, [facility]

## 2018-05-10 NOTE — DIABETES MGMT
Glycemic Control Plan of Care    T2DM with current A1c of 4,8% (05/06/2018). See separate notes for assessment of home diabetes management and education, 05/08/2018. His supportive son (EMT) visiting at bedside stated that this educational visit is very important because it allowed for his mother to hear from a diabetes educator what he has been telling/encouraging his mother for a long time. Home diabetes meds: Humalog insulin 15 units daily every morning and lantus insulin 45 units daily at bedtime. POC BG range on 05/09/2018:  mg/dL. POC BG report on 05/10/2018 at time of review: 114 mg/dL. Seen patient this morning and knows to keep NPO status because of planned procedure later today. Recommendation(s):  1.) Continue inpatient glycemic monitoring and current insulin orders/dose: basal and correctional.    Assessment:  Patient is 70year old with past medical history including type 2 diabetes mellitus, chronic systolic CHF, hypertension, hyperlipidemia, asthma, irritable bowel syndrome, migraine, and depression - was admitted on 05/05/2018 with report of shortness of breath, headache, fatigue, and elevated blood pressure. Noted:  Acute pulmonary edema. Acute on chronic CHF. T2DM with current A1c of 4.8% (05/06/2018). Most recent blood glucose values:    Results for Eunice Jarvis (MRN [id number]) as of 5/10/2018 11:28   Ref. Range 5/9/2018 07:11 5/9/2018 11:11 5/9/2018 15:53 5/9/2018 22:20   GLUCOSE,FAST - POC Latest Ref Range: 70 - 110 mg/dL 92 110 90 193 (H)     Results for Eunice Jarvis (MRN [id number]) as of 5/10/2018 11:28   Ref. Range 5/10/2018 08:38   GLUCOSE,FAST - POC Latest Ref Range: 70 - 110 mg/dL 114 (H)     Current A1C: 4.8% (05/06/2018) is equivalent to estimated average blood glucose of 91 mg/dL during the past 2-3 months. Current hospital diabetes medications:  Basal lantus insulin 20 units daily. Correctional lispro insulin ACHS.  Normal sensitivity dose.    Total daily dose insulin requirement previous day: 05/09/2018  Lantus: 20 units  Lispro: 2 units  TDD: 22 units of insulin    Home diabetes medications: Patient reported on 0/08/2018:  Humalog insulin 15 units daily every morning. Lantus insulin 45 units daily at bedtime. Diet: Patient is currently NPO status for planned procedure later today.     Goals:  Blood glucose will be within target range of  mg/dL by 05/11/2018    Education:  _X__  Refer to Diabetes Education Record: 05/08/2018             ___  Education not indicated at this time    Shelly May RN [facility]  Pager: [phone]

## 2018-05-10 NOTE — PROGRESS NOTES
[facility] - Progress Note  Admit Date: 5/5/2018    Assessment:     Hospital Problems  Date Reviewed: 2/8/2018          Codes Class Noted POA    Hypokalemia ICD-10-CM: E87.6  ICD-9-CM: 276.8  5/5/2018 Unknown        * (Principal)Acute pulmonary edema ([facility].) ICD-10-CM: J81.0  ICD-9-CM: 518.4  5/5/2018 Unknown        Hypertensive emergency ICD-10-CM: I16.1  ICD-9-CM: 401.9  5/5/2018 Unknown        Headache ICD-10-CM: R51  ICD-9-CM: 784.0  5/5/2018 Unknown        Acute on chronic systolic (congestive) heart failure (HCC) ICD-10-CM: I50.23  ICD-9-CM: 428.23, 428.0  5/5/2018 Unknown        Type 2 diabetes mellitus with nephropathy ([facility] ICD-10-CM: E11.21  ICD-9-CM: 250.40, 583.81  12/14/2017 Yes               -HTN urgency, initially on nitro and cardene gtts, now off with normotensive reads. Suspect non-compliance with medications. She also reports n/v x 4 days prior to admission.  -Hypokalemia, replace as needed. -URI, endorses cough/congestion over past week. -Viral illness, nausea/vomiting x 4 days.  -Acute on chronic CHF with reduced EF.  -CMY with decline on LV function on echo this admission with EF 30% with WMA. Low risk nuclear 2017 without ischemia.  -mod bilat pleural effusions.  -mod pulm HTN  -COPD, uses home O2  -CHF  -HLD  -DM  -Hx of migraines, reports HA for past 3 days. CT head no acute process.      Previously followed by Dr Jaciel Ragsdale:     Plan for cardiac catheterization today. Not feeling \"well\" today but overall breathing stable. Continues to diuresis. Continued on Coreg, hydralazine, isordil, aldactone. Continued on ASA and statin. Subjective:     Not feeling \"well\" today but overall breathing stable.     Objective:      Patient Vitals for the past 8 hrs:   Temp Pulse Resp BP SpO2   05/10/18 0800 98.1 °F (36.7 °C) 78 18 182/90 98 %   05/10/18 0635 - - - 169/86 -   05/10/18 0440 - - - - 97 %         Patient Vitals for the past 96 hrs:   Weight   05/10/18 0752 89.4 kg (197 lb 1.3 oz)   05/09/18 0647 89.4 kg (197 lb 1.6 oz)   05/08/18 0437 92.4 kg (203 lb 9.5 oz)   05/07/18 0420 92.4 kg (203 lb 9.6 oz)                    Intake/Output Summary (Last 24 hours) at 05/10/18 0852  Last data filed at 05/10/18 0286   Gross per 24 hour   Intake              540 ml   Output             3250 ml   Net            -2710 ml       Physical Exam:  General:  alert, cooperative, no distress, appears stated age  Neck:  no JVD  Lungs:  rhonchi bases  Heart:  regular rate and rhythm  Abdomen:  abdomen is soft without significant tenderness, masses, organomegaly or guarding  Extremities:  extremities normal, atraumatic, no cyanosis or edema    Data Review:     Labs: Results:       Chemistry Recent Labs      05/10/18   0300 05/09/18   0525  05/08/18   0528   GLU  122*  87  71*   NA  139  140  139   K  3.5  3.4*  3.6   CL  104  105  106   CO2  32  31  28   BUN  26*  28*  30*   CREA  1.14  1.32*  1.32*   CA  7.8*  7.6*  7.4*   AGAP  3  4  5   BUCR  23*  21*  23*      CBC w/Diff Recent Labs      05/10/18   0300 05/09/18   0525  05/08/18   0528   WBC  4.3*  3.9*  4.0*   RBC  3.07*  3.14*  3.05*   HGB  8.4*  8.7*  8.5*   HCT  27.0*  27.7*  26.9*   PLT  117*  113*  103*   GRANS  64  63  69   LYMPH  19*  21  20*   EOS  9*  9*  6*      Cardiac Enzymes No results found for: CPK, CK, CKMMB, CKMB, RCK3, CKMBT, CKNDX, CKND1, DAYNA, TROPT, TROIQ, EUN, TROPT, TNIPOC, BNP, BNPP   Coagulation Recent Labs      05/10/18   0300   PTP  13.5   INR  1.1       Lipid Panel Lab Results   Component Value Date/Time    Cholesterol, total 104 09/27/2017 04:15 AM    HDL Cholesterol 35 (L) 09/27/2017 04:15 AM    LDL, calculated 44.6 09/27/2017 04:15 AM    VLDL, calculated 24.4 09/27/2017 04:15 AM    Triglyceride 122 09/27/2017 04:15 AM    CHOL/HDL Ratio 3.0 09/27/2017 04:15 AM      BNP Lab Results   Component Value Date/Time    B-type Natriuretic Peptide 223.7 (H) 04/12/2014 12:00 AM      Liver Enzymes No results for input(s): TP, ALB, TBIL, AP, SGOT, GPT in the last 72 hours.     No lab exists for component: DBIL   Digoxin    Thyroid Studies Lab Results   Component Value Date/Time    T4, Total 9.1 12/23/2009 11:33 AM    TSH 1.61 05/06/2018 01:27 AM          Signed By: ROSEANNE Pantoja     May 10, 2018

## 2018-05-10 NOTE — ROUTINE PROCESS
TRANSFER - OUT REPORT:    Verbal report given to Frannie Her RN (name) on Cheyanne Valverde  being transferred to [facility] ([facility] [place], [place]) for routine progression of care       Report consisted of patients Situation, Background, Assessment and   Recommendations(SBAR). Information from the following report(s) SBAR, Kardex, Procedure Summary and MAR was reviewed with the receiving nurse. Lines:   Peripheral IV 05/05/18 Right Antecubital (Active)   Site Assessment Clean, dry, & intact 5/10/2018 12:35 PM   Phlebitis Assessment 0 5/10/2018 12:35 PM   Infiltration Assessment 0 5/10/2018 12:35 PM   Dressing Status Intact; Old drainage; Wet 5/10/2018 12:35 PM   Dressing Type Transparent 5/10/2018 12:35 PM   Hub Color/Line Status Pink;Flushed 5/10/2018 12:35 PM   Action Taken Dressing changed 5/10/2018 12:35 PM   Alcohol Cap Used Yes 5/9/2018 10:26 PM       Peripheral IV 05/10/18 Left Hand (Active)   Site Assessment Clean, dry, & intact 5/10/2018 12:35 PM   Phlebitis Assessment 0 5/10/2018 12:35 PM   Infiltration Assessment 0 5/10/2018 12:35 PM   Dressing Status Clean, dry, & intact 5/10/2018 12:35 PM   Dressing Type Transparent 5/10/2018 12:35 PM   Hub Color/Line Status Pink;Flushed 5/10/2018 12:35 PM        Opportunity for questions and clarification was provided.       Patient transported with:   [employer]

## 2018-05-10 NOTE — ROUTINE PROCESS
Right wrist D-STAT band removed. Sterile dressing applied, no bleeding or swelling. Normal radial pulse , normal distal circulation and neuro check. Safety splint applied. Safety instructions reviewed with the patient.

## 2018-05-10 NOTE — PROGRESS NOTES
1940 Bedside turnover given to me  By RN Vanesa Woo. She is on the cardiac telemetry monitor in stable condition. She has family visiting in her room right now, she denies chest pain and denies shortness of breath. 2130 signed consent forms and had a diet ginger ale, physical assessment completed and pain assessed.

## 2018-05-10 NOTE — PROGRESS NOTES
Patient continues to be off unit. Will follow up as patient schedule allows. Thank you for this referral. Trinity Alexandre, PT, DPT.

## 2018-05-11 LAB
ALDOST SERPL-MCNC: 3 NG/DL (ref 0–30)
ANION GAP SERPL CALC-SCNC: 1 MMOL/L (ref 3–18)
BASOPHILS # BLD: 0 K/UL (ref 0–0.1)
BASOPHILS NFR BLD: 0 % (ref 0–2)
BUN SERPL-MCNC: 25 MG/DL (ref 7–18)
BUN/CREAT SERPL: 19 (ref 12–20)
CALCIUM SERPL-MCNC: 8.1 MG/DL (ref 8.5–10.1)
CHLORIDE SERPL-SCNC: 104 MMOL/L (ref 100–108)
CO2 SERPL-SCNC: 33 MMOL/L (ref 21–32)
CREAT SERPL-MCNC: 1.3 MG/DL (ref 0.6–1.3)
DIFFERENTIAL METHOD BLD: ABNORMAL
EOSINOPHIL # BLD: 0.2 K/UL (ref 0–0.4)
EOSINOPHIL NFR BLD: 5 % (ref 0–5)
ERYTHROCYTE [DISTWIDTH] IN BLOOD BY AUTOMATED COUNT: 16.7 % (ref 11.6–14.5)
GLUCOSE BLD STRIP.AUTO-MCNC: 121 MG/DL (ref 70–110)
GLUCOSE BLD STRIP.AUTO-MCNC: 150 MG/DL (ref 70–110)
GLUCOSE BLD STRIP.AUTO-MCNC: 158 MG/DL (ref 70–110)
GLUCOSE BLD STRIP.AUTO-MCNC: 70 MG/DL (ref 70–110)
GLUCOSE SERPL-MCNC: 92 MG/DL (ref 74–99)
HCT VFR BLD AUTO: 27.1 % (ref 35–45)
HGB BLD-MCNC: 8.5 G/DL (ref 12–16)
LYMPHOCYTES # BLD: 0.7 K/UL (ref 0.9–3.6)
LYMPHOCYTES NFR BLD: 18 % (ref 21–52)
MCH RBC QN AUTO: 27.3 PG (ref 24–34)
MCHC RBC AUTO-ENTMCNC: 31.4 G/DL (ref 31–37)
MCV RBC AUTO: 87.1 FL (ref 74–97)
METANEPH FREE SERPL-MCNC: 30 PG/ML (ref 0–62)
MONOCYTES # BLD: 0.3 K/UL (ref 0.05–1.2)
MONOCYTES NFR BLD: 8 % (ref 3–10)
NEUTS SEG # BLD: 2.8 K/UL (ref 1.8–8)
NEUTS SEG NFR BLD: 69 % (ref 40–73)
NORMETANEPHRINE SERPL-MCNC: 130 PG/ML (ref 0–145)
PLATELET # BLD AUTO: 120 K/UL (ref 135–420)
PMV BLD AUTO: 10.8 FL (ref 9.2–11.8)
POTASSIUM SERPL-SCNC: 4.1 MMOL/L (ref 3.5–5.5)
RBC # BLD AUTO: 3.11 M/UL (ref 4.2–5.3)
RENIN PLAS-CCNC: <0.167 NG/ML/HR (ref 0.17–5.38)
SODIUM SERPL-SCNC: 138 MMOL/L (ref 136–145)
SPECIMEN SOURCE: ABNORMAL
WBC # BLD AUTO: 4 K/UL (ref 4.6–13.2)

## 2018-05-11 PROCEDURE — 36415 COLL VENOUS BLD VENIPUNCTURE: CPT | Performed by: INTERNAL MEDICINE

## 2018-05-11 PROCEDURE — 74011250637 HC RX REV CODE- 250/637: Performed by: INTERNAL MEDICINE

## 2018-05-11 PROCEDURE — 77010033678 HC OXYGEN DAILY

## 2018-05-11 PROCEDURE — 85025 COMPLETE CBC W/AUTO DIFF WBC: CPT | Performed by: INTERNAL MEDICINE

## 2018-05-11 PROCEDURE — 65660000004 HC RM CVT STEPDOWN

## 2018-05-11 PROCEDURE — 74011636637 HC RX REV CODE- 636/637: Performed by: PHYSICIAN ASSISTANT

## 2018-05-11 PROCEDURE — 74011250637 HC RX REV CODE- 250/637: Performed by: HOSPITALIST

## 2018-05-11 PROCEDURE — 74011250637 HC RX REV CODE- 250/637: Performed by: PHYSICIAN ASSISTANT

## 2018-05-11 PROCEDURE — 94660 CPAP INITIATION&MGMT: CPT

## 2018-05-11 PROCEDURE — 77030011256 HC DRSG MEPILEX <16IN NO BORD MOLN -A

## 2018-05-11 PROCEDURE — 74011250636 HC RX REV CODE- 250/636: Performed by: PHYSICIAN ASSISTANT

## 2018-05-11 PROCEDURE — 82962 GLUCOSE BLOOD TEST: CPT

## 2018-05-11 PROCEDURE — 74011250636 HC RX REV CODE- 250/636: Performed by: INTERNAL MEDICINE

## 2018-05-11 PROCEDURE — 80048 BASIC METABOLIC PNL TOTAL CA: CPT | Performed by: INTERNAL MEDICINE

## 2018-05-11 RX ORDER — OXYCODONE AND ACETAMINOPHEN 5; 325 MG/1; MG/1
1 TABLET ORAL
Status: DISCONTINUED | OUTPATIENT
Start: 2018-05-11 | End: 2018-05-12 | Stop reason: HOSPADM

## 2018-05-11 RX ADMIN — INSULIN LISPRO 2 UNITS: 100 INJECTION, SOLUTION INTRAVENOUS; SUBCUTANEOUS at 21:49

## 2018-05-11 RX ADMIN — Medication 10 ML: at 05:52

## 2018-05-11 RX ADMIN — FUROSEMIDE 40 MG: 10 INJECTION, SOLUTION INTRAMUSCULAR; INTRAVENOUS at 08:16

## 2018-05-11 RX ADMIN — ISOSORBIDE DINITRATE 20 MG: 20 TABLET ORAL at 08:16

## 2018-05-11 RX ADMIN — HYDRALAZINE HYDROCHLORIDE 100 MG: 50 TABLET, FILM COATED ORAL at 21:08

## 2018-05-11 RX ADMIN — HYDRALAZINE HYDROCHLORIDE 100 MG: 50 TABLET, FILM COATED ORAL at 16:40

## 2018-05-11 RX ADMIN — CARVEDILOL 25 MG: 25 TABLET, FILM COATED ORAL at 16:40

## 2018-05-11 RX ADMIN — PRAVASTATIN SODIUM 20 MG: 20 TABLET ORAL at 21:08

## 2018-05-11 RX ADMIN — OXYCODONE HYDROCHLORIDE AND ACETAMINOPHEN 1 TABLET: 5; 325 TABLET ORAL at 04:56

## 2018-05-11 RX ADMIN — ASPIRIN 81 MG: 81 TABLET, COATED ORAL at 08:16

## 2018-05-11 RX ADMIN — ACETAMINOPHEN 650 MG: 325 TABLET ORAL at 23:44

## 2018-05-11 RX ADMIN — SPIRONOLACTONE 25 MG: 25 TABLET, FILM COATED ORAL at 08:16

## 2018-05-11 RX ADMIN — GABAPENTIN 200 MG: 100 CAPSULE ORAL at 08:15

## 2018-05-11 RX ADMIN — Medication 10 ML: at 21:09

## 2018-05-11 RX ADMIN — CARVEDILOL 25 MG: 25 TABLET, FILM COATED ORAL at 08:16

## 2018-05-11 RX ADMIN — FUROSEMIDE 40 MG: 10 INJECTION, SOLUTION INTRAMUSCULAR; INTRAVENOUS at 21:10

## 2018-05-11 RX ADMIN — INSULIN LISPRO 2 UNITS: 100 INJECTION, SOLUTION INTRAVENOUS; SUBCUTANEOUS at 12:40

## 2018-05-11 RX ADMIN — HEPARIN SODIUM 5000 UNITS: 5000 INJECTION, SOLUTION INTRAVENOUS; SUBCUTANEOUS at 05:49

## 2018-05-11 RX ADMIN — GABAPENTIN 200 MG: 100 CAPSULE ORAL at 16:40

## 2018-05-11 RX ADMIN — ISOSORBIDE DINITRATE 20 MG: 20 TABLET ORAL at 21:50

## 2018-05-11 RX ADMIN — HYDRALAZINE HYDROCHLORIDE 100 MG: 50 TABLET, FILM COATED ORAL at 08:16

## 2018-05-11 RX ADMIN — HEPARIN SODIUM 5000 UNITS: 5000 INJECTION, SOLUTION INTRAVENOUS; SUBCUTANEOUS at 21:13

## 2018-05-11 RX ADMIN — HEPARIN SODIUM 5000 UNITS: 5000 INJECTION, SOLUTION INTRAVENOUS; SUBCUTANEOUS at 14:28

## 2018-05-11 RX ADMIN — ISOSORBIDE DINITRATE 20 MG: 20 TABLET ORAL at 16:40

## 2018-05-11 RX ADMIN — GABAPENTIN 200 MG: 100 CAPSULE ORAL at 21:08

## 2018-05-11 RX ADMIN — ACETAMINOPHEN 650 MG: 325 TABLET ORAL at 10:32

## 2018-05-11 RX ADMIN — Medication 10 ML: at 16:39

## 2018-05-11 NOTE — PROGRESS NOTES
AMMON NOTE: Pt is open to [facility]. SW will request the attending to write a [facility] for CHF. Reason for Admission: hypertensive emergency; acute on chronic systolic congestive heart failure; headaches; acute pulmonary edema                 RRAT Score:  26                Resources/supports as identified by patient/family:  Pt receives 848 monthly in social security and 448.12 monthly halfway. Pt has [employer]. Top Challenges facing patient (as identified by patient/family and CM): Finances/Medication cost?    At time the co pays are expensive and the medication cost at times is hard. Transportation? Pt's daughter, Muna Sharma provides transportation; however, the pt reported they have a high bill from the tunnel and are not suppose to be driving. Support system or lack thereof? Pt reported having a large support system. She reported that people check on her daily. Living arrangements? Pt resides in a home owned by her mother with her daughter, Muna Sharma and her son, Emiliano Goel (who she reported is disable due to his back problems). Self-care/ADLs/Cognition? Pt reported being able to do a lot for herself. She uses a cane and a walker. Current Advanced Directive/Advance Care Plan:  Pt does not have an AD or a living will. Plan for utilizing home health:    None at this time. Pt reported a hx of HH once, but cannot recall the agency. Likelihood of readmission: yellow/mod                 Transition of Care Plan:    Pt will be d/c home where she resides with her adult son and daughter in a single level home with 3 steps to enter the front and a ramp to enter the back. PCP: Tita Ibrahim MD pt has an appointment on May 15th to establish as a new pt then a hospital follow up on May 18th. Pt's daughter Muna Sharma will provide d/c transportation.     AMMON contacted [facility] to see if the pt would be possible eligible for Medicaid. They will review; however, initially it appears the pt may be over income. Care Management Interventions  PCP Verified by CM: Yes  Mode of Transport at Discharge: BLS  Discharge Durable Medical Equipment: No  Physical Therapy Consult: Yes  Occupational Therapy Consult: Yes  Speech Therapy Consult: No  Current Support Network:  Other  Confirm Follow Up Transport: Family  Discharge Location  Discharge Placement: [address] [place] [address]  [phone] (pager)

## 2018-05-11 NOTE — PROGRESS NOTES
PT orders received, chart reviewed. Pt unable to participate with PT due to:  []  Nausea/vomiting  []  Eating  []  Pain  []  Pt lethargic  []  Off Unit  []  Pt refused  [x]  Other, nursing advised that pt is having a private family issue at this time and to hold PT. Will f/u later as patient's schedule allows.  Thank you for this referral.  Good Kolb, PT, DPT

## 2018-05-11 NOTE — PROGRESS NOTES
[facility]  -  Progress Note      Patient: Kala Delcid MRN: [id number]  SSN: [id number]    YOB: 1955  Age: 70 y.o. Sex: female      Admit Date: 5/5/2018    Assessment:     Hospital Problems  Date Reviewed: 2/8/2018          Codes Class Noted POA    Hypokalemia ICD-10-CM: E87.6  ICD-9-CM: 276.8  5/5/2018 Unknown        * (Principal)Acute pulmonary edema ([facility] ICD-10-CM: J81.0  ICD-9-CM: 518.4  5/5/2018 Unknown        Hypertensive emergency ICD-10-CM: I16.1  ICD-9-CM: 401.9  5/5/2018 Unknown        Headache ICD-10-CM: R51  ICD-9-CM: 784.0  5/5/2018 Unknown        Acute on chronic systolic (congestive) heart failure (HCC) ICD-10-CM: I50.23  ICD-9-CM: 428.23, 428.0  5/5/2018 Unknown        Type 2 diabetes mellitus with nephropathy ([facility].) ICD-10-CM: E11.21  ICD-9-CM: 250.40, 583.81  12/14/2017 Yes            -HTN urgency, initially on nitro and cardene gtts, now off with normotensive reads. Suspect non-compliance with medications. She also reports n/v x 4 days prior to admission.  -Hypokalemia, replace as needed. -URI, endorses cough/congestion over past week. -Viral illness, nausea/vomiting x 4 days.  -Acute on chronic CHF with reduced EF.  -CMY with decline on LV function on echo this admission with EF 30% with WMA. Low risk nuclear 2017 without ischemia.  -mod bilat pleural effusions.  -mod pulm HTN  -COPD, uses home O2  -CHF  -HLD  -DM  -Hx of migraines, reports HA for past 3 days. CT head no acute process.      Previously followed by Dr Edwin Galindo:     No CAD by heart catheterization yesterday. 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 Continue current cardiac medication regimen. Increase activity as tolerated. Subjective:     No new complaints.      Objective:      Patient Vitals for the past 8 hrs:   Temp Pulse Resp BP SpO2   05/11/18 0737 97.4 °F (36.3 °C) 77 18 (!) 178/92 100 %   05/11/18 0602 98.6 °F (37 °C) 72 - - - 05/11/18 0359 98.7 °F (37.1 °C) 75 20 158/78 100 %         Patient Vitals for the past 96 hrs:   Weight   05/11/18 0622 90.3 kg (199 lb 1.6 oz)   05/10/18 0752 89.4 kg (197 lb 1.3 oz)   05/09/18 0647 89.4 kg (197 lb 1.6 oz)   05/08/18 0437 92.4 kg (203 lb 9.5 oz)         Intake/Output Summary (Last 24 hours) at 05/11/18 0909  Last data filed at 05/11/18 8817   Gross per 24 hour   Intake               40 ml   Output             3675 ml   Net            -3635 ml       Physical Exam:  General:  fatigued, cooperative, no distress, appears stated age  Neck:  no JVD  Lungs:  clear to auscultation bilaterally, diminished breath sounds R base, L base  Heart:  regular rate and rhythm  Abdomen:  no guarding or rigidity  Extremities:  no edema    Data Review:     Labs: Results:       Chemistry Recent Labs      05/11/18   0526  05/10/18   0300  05/09/18   0525   GLU  92  122*  87   NA  138  139  140   K  4.1  3.5  3.4*   CL  104  104  105   CO2  33*  32  31   BUN  25*  26*  28*   CREA  1.30  1.14  1.32*   CA  8.1*  7.8*  7.6*   AGAP  1*  3  4   BUCR  19  23*  21*      CBC w/Diff Recent Labs      05/11/18   0526  05/10/18   0300  05/09/18   0525   WBC  4.0*  4.3*  3.9*   RBC  3.11*  3.07*  3.14*   HGB  8.5*  8.4*  8.7*   HCT  27.1*  27.0*  27.7*   PLT  120*  117*  113*   GRANS  69  64  63   LYMPH  18*  19*  21   EOS  5  9*  9*      Cardiac Enzymes No results found for: CPK, CK, CKMMB, CKMB, RCK3, CKMBT, CKNDX, CKND1, DAYNA, TROPT, TROIQ, EUN, TROPT, TNIPOC, BNP, BNPP   Coagulation Recent Labs      05/10/18   0300   PTP  13.5   INR  1.1       Lipid Panel Lab Results   Component Value Date/Time    Cholesterol, total 104 09/27/2017 04:15 AM    HDL Cholesterol 35 (L) 09/27/2017 04:15 AM    LDL, calculated 44.6 09/27/2017 04:15 AM    VLDL, calculated 24.4 09/27/2017 04:15 AM    Triglyceride 122 09/27/2017 04:15 AM    CHOL/HDL Ratio 3.0 09/27/2017 04:15 AM      BNP Lab Results   Component Value Date/Time    B-type Natriuretic Peptide 223.7 (H) 04/12/2014 12:00 AM      Liver Enzymes No results for input(s): TP, ALB, TBIL, AP, SGOT, GPT in the last 72 hours.     No lab exists for component: DBIL   Digoxin    Thyroid Studies Lab Results   Component Value Date/Time    T4, Total 9.1 12/23/2009 11:33 AM    TSH 1.61 05/06/2018 01:27 AM

## 2018-05-11 NOTE — PROGRESS NOTES
[facility]  Progress Note    Patient: Randi Solis Age: 70 y.o. : 1955 MR#: [id number] SSN: [id number]  Date: 5/10/2018     Subjective:   Pt reports feeling tired, states she slept well though. Assessment/Plan:   - Hypertensive emergency,bp was low initially and had improved w/ adjustments of meds by cards but is now increasing. . Pt w/ low K, ? Hyper brett state? W/u in progress. Eval for MARITA negative.  -Hypokalemia, mag nl, not sure if related to above, ie. Due to hyper brett state? Has had low, low nl K in the past. W/u in progress.  -History of combined systolic/diastolic HF, last echo  EF 40-45%, repeat echo this admission shows EF of 30%, mod-severe TR, has some degree of decompensation cardiology following. On bb, asa,lasix. S/p cardiac cath today w/ findings of clean coronaries. -Moderate bilateral pleural effusions  -Acute metabolic encephalopathy w/ reports of hallucinations per daughter and c/o h/a upon initial presentation to ED, likely due to elevated bp. Resolved. Pt continues to be appropriate.  -Type 2 DM A1c 4.8 this admission. Blood sugars within acceptable range. lantus 45 units is what she takes at home, confirmed with daughter.  -Pancytopenia new cause unclear, nl B12, folate levels.  -History of asthma  -dyslipedemia :on statin. -per nursing reports of loose stools and n/v cause unclear  -COPD on home 02 on PRN basis    PLAN:  -cont to closely watch bp and adjust meds. -will await w/u results for hyperaldo state  -replace and closely follow K  -will cont decreased lantus   -BiPAP at night  -will follow cell counts, hgb w/o day to day drastic drops, may have bone marrow suppression due to meds?  Unless drastic drop will need outpt f/u, will likely dc tomorrow       Additional Notes:      Case discussed with:  [x]Patient  [x]Family  [x]Nursing  []Case Management  DVT Prophylaxis:  []Lovenox  []Hep SQ  []SCDs  []Coumadin   []On Heparin gtt    Objective:   VS:   Visit Vitals    /51 (BP 1 Location: Left arm, BP Patient Position: At rest)    Pulse 84    Temp 97.4 °F (36.3 °C)    Resp 20    Ht 5' 9\" (1.753 m)    Wt 89.4 kg (197 lb 1.3 oz)    SpO2 99%    BMI 29.1 kg/m2      Tmax/24hrs: Temp (24hrs), Av.8 °F (36.6 °C), Min:97 °F (36.1 °C), Max:98.2 °F (36.8 °C)      Intake/Output Summary (Last 24 hours) at 05/10/18 2300  Last data filed at 05/10/18 2022   Gross per 24 hour   Intake               40 ml   Output             3300 ml   Net            -3260 ml       General:  Alert awake  Cardiovascular:  Rrr, no murmurs  Pulmonary:  ctab  GI:  Soft, nt, nd  Extremities:  1+ edema lower ext bilaterally  Additional:      Labs:    Recent Results (from the past 24 hour(s))   PROTHROMBIN TIME + INR    Collection Time: 05/10/18  3:00 AM   Result Value Ref Range    Prothrombin time 13.5 11.5 - 15.2 sec    INR 1.1 0.8 - 1.2     CBC WITH AUTOMATED DIFF    Collection Time: 05/10/18  3:00 AM   Result Value Ref Range    WBC 4.3 (L) 4.6 - 13.2 K/uL    RBC 3.07 (L) 4.20 - 5.30 M/uL    HGB 8.4 (L) 12.0 - 16.0 g/dL    HCT 27.0 (L) 35.0 - 45.0 %    MCV 87.9 74.0 - 97.0 FL    MCH 27.4 24.0 - 34.0 PG    MCHC 31.1 31.0 - 37.0 g/dL    RDW 16.6 (H) 11.6 - 14.5 %    PLATELET 663 (L) 575 - 420 K/uL    MPV 11.4 9.2 - 11.8 FL    NEUTROPHILS 64 40 - 73 %    LYMPHOCYTES 19 (L) 21 - 52 %    MONOCYTES 8 3 - 10 %    EOSINOPHILS 9 (H) 0 - 5 %    BASOPHILS 0 0 - 2 %    ABS. NEUTROPHILS 2.8 1.8 - 8.0 K/UL    ABS. LYMPHOCYTES 0.8 (L) 0.9 - 3.6 K/UL    ABS. MONOCYTES 0.3 0.05 - 1.2 K/UL    ABS. EOSINOPHILS 0.4 0.0 - 0.4 K/UL    ABS.  BASOPHILS 0.0 0.0 - 0.1 K/UL    DF AUTOMATED     METABOLIC PANEL, BASIC    Collection Time: 05/10/18  3:00 AM   Result Value Ref Range    Sodium 139 136 - 145 mmol/L    Potassium 3.5 3.5 - 5.5 mmol/L    Chloride 104 100 - 108 mmol/L    CO2 32 21 - 32 mmol/L    Anion gap 3 3.0 - 18 mmol/L    Glucose 122 (H) 74 - 99 mg/dL    BUN 26 (H) 7.0 - 18 MG/DL    Creatinine 1.14 0.6 - 1.3 MG/DL    BUN/Creatinine ratio 23 (H) 12 - 20      GFR est AA 58 (L) >60 ml/min/1.73m2    GFR est non-AA 48 (L) >60 ml/min/1.73m2    Calcium 7.8 (L) 8.5 - 10.1 MG/DL   GLUCOSE, POC    Collection Time: 05/10/18  8:38 AM   Result Value Ref Range    Glucose (POC) 114 (H) 70 - 110 mg/dL   GLUCOSE, POC    Collection Time: 05/10/18 12:17 PM   Result Value Ref Range    Glucose (POC) 67 (L) 70 - 110 mg/dL   GLUCOSE, POC    Collection Time: 05/10/18  2:01 PM   Result Value Ref Range    Glucose (POC) 63 (L) 70 - 110 mg/dL   GLUCOSE, POC    Collection Time: 05/10/18  3:09 PM   Result Value Ref Range    Glucose (POC) 89 70 - 110 mg/dL   GLUCOSE, POC    Collection Time: 05/10/18  5:38 PM   Result Value Ref Range    Glucose (POC) 93 70 - 110 mg/dL   GLUCOSE, POC    Collection Time: 05/10/18  8:40 PM   Result Value Ref Range    Glucose (POC) 193 (H) 70 - 110 mg/dL       Signed By: Isaac Lamar MD     May 10, 2018 12:53 PM

## 2018-05-11 NOTE — PROGRESS NOTES
1900  Bedside turnover given to me by RYLEY Vickers. Pt is on the cardiac telemetry monitor in bed. Pt denies chest pain and denies shortness of breath. Site of Catheterization checked and it is clean and dry, no bleeding no hematoma. asked for lights to be turned off 1935  c/o a headache, gave her tylenol, reassessed and she stated \"It feels much better\". 2000 meds given to patient, vitals assessed her BP is much better tonight  2101 given ice water and physical assessment, pain assessment and needs assessed  Bipap placed on patient for sleeping. 2334 bipap placed on patient  0020 sleeping in bed  0415 patient took off bipap, complained of her head feeling like it was going to explode. Pt stated \"tylenol earlier helped a little bit but please close the door, the light in the hallway is hurting my head more. Анна Beck requested oxycodone for pt  Order given  2765  Percocet given  [phone] patient cleaned up by CNA [facility] room straightened up  0602 pt bowel movement in the bathroom with assistance from the CNA.    0700 bedside turnover given to RYLEY Mccray Pt is on the cardiac telemetry monitor, stable condition, SBAR, MAR, ED summary and a chance to ask questions given.

## 2018-05-11 NOTE — PROGRESS NOTES
5/11/18, 1218, CMS went to speak with the pt in regards to the [employer] from Medicare About Your Rights. \" Pt was able to sign and review the documents provided. No family was present at bedside. Signed documents can be found in the chart for review; additional copies were left at bedside for pt review.

## 2018-05-11 NOTE — PROGRESS NOTES
PT orders received, chart reviewed. Pt unable to participate with PT due to:  []  Nausea/vomiting  [x]  Eating, pt just started to eat lunch. Pt reports she has been walking to and from the bathroom with nursing assistance. []  Pain  []  Pt lethargic  []  Off Unit  []  Pt refused  []  Other   Will f/u later as patient's schedule allows.  Thank you for this referral.  Cas Dewey, PT, DPT

## 2018-05-11 NOTE — DIABETES MGMT
Glycemic Control Plan of Care    T2DM with current A1c of 4,8% (05/06/2018). See separate notes for assessment of home diabetes management and education, 05/08/2018. His supportive son (EMT) visiting at bedside stated that this educational visit is very important because it allowed for his mother to hear from a diabetes educator what he has been telling/encouraging his mother for a long time. Home diabetes meds: Humalog insulin 15 units daily every morning and lantus insulin 45 units daily at bedtime. POC BG report on 05/10/2018: 114, 67 (12:17 PM), 63 (2:27 PM), 89 (3:09 PM), 93, 193 mg/dL. No nursing documentation from Coni Davidson RN, regarding treatment for low blood glucose of 67 at 12:17 PM. Noted nursing documentation that patient was given fruit juice at 2 PM for low blood glucose of 63 at 2 PM and f/u glucose of 89 at 3:09 PM.    Patient was NPO on 5/10/2018 and had cardiac cath procedure. POC BG report on 05/11/2018 at time of review: 70 mg/dL at 7:40 AM.    Seen patient this morning and stated that she felt symptoms of low blood sugar yesterday and feeling a little weak this morning with blood glucose of 70. Patient stated that she ate all breakfast with good appetite. Recommendation(s):  1.) Called Dr. Sherice Mesa and reported hypoglycemia episodes. Obtained order to discontinue daily 20 units of lantus insulin at this time to help prevent episodes of hypoglycemia. 2.) Continue monitoring and consider re starting low dose daily basal lantus insulin 10 units when POC BG above target range. Assessment:  Patient is 70year old with past medical history including type 2 diabetes mellitus, chronic systolic CHF, hypertension, hyperlipidemia, asthma, irritable bowel syndrome, migraine, and depression - was admitted on 05/05/2018 with report of shortness of breath, headache, fatigue, and elevated blood pressure. Noted:  Acute pulmonary edema. Acute on chronic CHF.   Status post cardiac cath on 05/10/2018 and noted report of no CAD. T2DM with current A1c of 4.8% (05/06/2018). Most recent blood glucose values:    Results for Alicia Monae (MRN [id number]) as of 5/11/2018 09:42   Ref. Range 5/10/2018 08:38 5/10/2018 12:17 5/10/2018 14:01 5/10/2018 15:09 5/10/2018 17:38 5/10/2018 20:40   GLUCOSE,FAST - POC Latest Ref Range: 70 - 110 mg/dL 114 (H) 67 (L) 63 (L) 89 93 193 (H)      Results for Alicia Monae (MRN [id number]) as of 5/11/2018 09:42   Ref. Range 5/11/2018 07:40   GLUCOSE,FAST - POC Latest Ref Range: 70 - 110 mg/dL 70     Current A1C: 4.8% (05/06/2018) is equivalent to estimated average blood glucose of 91 mg/dL during the past 2-3 months. Current hospital diabetes medications:  Correctional lispro insulin ACHS. Normal sensitivity dose. Total daily dose insulin requirement previous day: 05/10/2018  Lantus: 20 units  Lispro: 2 units  TDD: 22 units of insulin    Home diabetes medications: Patient reported on 0/08/2018:  Humalog insulin 15 units daily every morning. Lantus insulin 45 units daily at bedtime. Diet: Diabetic consistent carb regular.     Goals:  Blood glucose will be within target range of  mg/dL by 05/14/2018    Education:  _X__  Refer to Diabetes Education Record: 05/08/2018             ___  Education not indicated at this time    Kieth Davila RN [facility]  Pager: [phone]

## 2018-05-12 ENCOUNTER — HOME HEALTH ADMISSION (OUTPATIENT)
Dept: HOME HEALTH SERVICES | Facility: HOME HEALTH | Age: 63
End: 2018-05-12
Payer: MEDICARE

## 2018-05-12 VITALS
WEIGHT: 195.38 LBS | HEIGHT: 69 IN | OXYGEN SATURATION: 96 % | BODY MASS INDEX: 28.94 KG/M2 | RESPIRATION RATE: 18 BRPM | TEMPERATURE: 98.3 F | DIASTOLIC BLOOD PRESSURE: 63 MMHG | HEART RATE: 76 BPM | SYSTOLIC BLOOD PRESSURE: 132 MMHG

## 2018-05-12 LAB
GLUCOSE BLD STRIP.AUTO-MCNC: 132 MG/DL (ref 70–110)
GLUCOSE BLD STRIP.AUTO-MCNC: 237 MG/DL (ref 70–110)

## 2018-05-12 PROCEDURE — 74011250636 HC RX REV CODE- 250/636: Performed by: INTERNAL MEDICINE

## 2018-05-12 PROCEDURE — 74011250637 HC RX REV CODE- 250/637: Performed by: PHYSICIAN ASSISTANT

## 2018-05-12 PROCEDURE — 74011250636 HC RX REV CODE- 250/636: Performed by: PHYSICIAN ASSISTANT

## 2018-05-12 PROCEDURE — 74011250637 HC RX REV CODE- 250/637: Performed by: INTERNAL MEDICINE

## 2018-05-12 PROCEDURE — 74011636637 HC RX REV CODE- 636/637: Performed by: PHYSICIAN ASSISTANT

## 2018-05-12 PROCEDURE — 97116 GAIT TRAINING THERAPY: CPT

## 2018-05-12 PROCEDURE — 82962 GLUCOSE BLOOD TEST: CPT

## 2018-05-12 RX ORDER — SPIRONOLACTONE 50 MG/1
50 TABLET, FILM COATED ORAL 2 TIMES DAILY
Qty: 60 TAB | Refills: 1 | Status: SHIPPED | OUTPATIENT
Start: 2018-05-12 | End: 2019-02-19 | Stop reason: DRUGHIGH

## 2018-05-12 RX ORDER — FUROSEMIDE 20 MG/1
40 TABLET ORAL DAILY
Qty: 30 TAB | Refills: 1 | Status: SHIPPED | OUTPATIENT
Start: 2018-05-12 | End: 2018-06-04 | Stop reason: SDUPTHER

## 2018-05-12 RX ORDER — FUROSEMIDE 20 MG/1
20 TABLET ORAL 2 TIMES DAILY
Status: DISCONTINUED | OUTPATIENT
Start: 2018-05-12 | End: 2018-05-12 | Stop reason: HOSPADM

## 2018-05-12 RX ORDER — INSULIN GLARGINE 100 [IU]/ML
7 INJECTION, SOLUTION SUBCUTANEOUS DAILY
Qty: 1 VIAL | Refills: 1 | Status: SHIPPED | OUTPATIENT
Start: 2018-05-12 | End: 2018-06-21 | Stop reason: SDUPTHER

## 2018-05-12 RX ORDER — ISOSORBIDE DINITRATE 10 MG/1
20 TABLET ORAL 3 TIMES DAILY
Qty: 180 TAB | Refills: 2 | Status: SHIPPED | OUTPATIENT
Start: 2018-05-12 | End: 2018-07-05 | Stop reason: SDUPTHER

## 2018-05-12 RX ORDER — SPIRONOLACTONE 25 MG/1
50 TABLET ORAL 2 TIMES DAILY
Status: DISCONTINUED | OUTPATIENT
Start: 2018-05-12 | End: 2018-05-12 | Stop reason: HOSPADM

## 2018-05-12 RX ADMIN — FUROSEMIDE 20 MG: 20 TABLET ORAL at 12:35

## 2018-05-12 RX ADMIN — HYDRALAZINE HYDROCHLORIDE 100 MG: 50 TABLET, FILM COATED ORAL at 08:01

## 2018-05-12 RX ADMIN — ACETAMINOPHEN 650 MG: 325 TABLET ORAL at 06:40

## 2018-05-12 RX ADMIN — FUROSEMIDE 40 MG: 10 INJECTION, SOLUTION INTRAMUSCULAR; INTRAVENOUS at 08:04

## 2018-05-12 RX ADMIN — INSULIN LISPRO 4 UNITS: 100 INJECTION, SOLUTION INTRAVENOUS; SUBCUTANEOUS at 11:44

## 2018-05-12 RX ADMIN — SPIRONOLACTONE 25 MG: 25 TABLET, FILM COATED ORAL at 08:02

## 2018-05-12 RX ADMIN — SPIRONOLACTONE 50 MG: 25 TABLET, FILM COATED ORAL at 11:43

## 2018-05-12 RX ADMIN — Medication 5 ML: at 05:56

## 2018-05-12 RX ADMIN — ASPIRIN 81 MG: 81 TABLET, COATED ORAL at 08:01

## 2018-05-12 RX ADMIN — CARVEDILOL 25 MG: 25 TABLET, FILM COATED ORAL at 08:03

## 2018-05-12 RX ADMIN — GABAPENTIN 200 MG: 100 CAPSULE ORAL at 08:02

## 2018-05-12 RX ADMIN — HEPARIN SODIUM 5000 UNITS: 5000 INJECTION, SOLUTION INTRAVENOUS; SUBCUTANEOUS at 05:55

## 2018-05-12 RX ADMIN — ISOSORBIDE DINITRATE 20 MG: 20 TABLET ORAL at 08:01

## 2018-05-12 NOTE — ROUTINE PROCESS
0710  B/P elevated denies cardiac sxs and no distress noted. Scheduled b/p medications provided. 0930  B/p improved with medication provided. No complaints.

## 2018-05-12 NOTE — ROUTINE PROCESS
Assisted up to [facility] and assisted with bath by Julianna HEDRICK.       Jay Grumet Bedside shift change report given to Stephanie Brewer (oncoming nurse) by Yefri Carreon (offgoing nurse). Report given with SBAR, Kardex, Intake/Output, MAR and Recent Results.

## 2018-05-12 NOTE — PROGRESS NOTES
Problem: Mobility Impaired (Adult and Pediatric)  Goal: *Acute Goals and Plan of Care (Insert Text)  Physical Therapy Goals  Initiated 5/7/2018 and to be accomplished within 7 day(s)  1. Patient will move from supine to sit and sit to supine , scoot up and down and roll side to side in bed with supervision/set-up. 2.  Patient will transfer from bed to chair and chair to bed with supervision/set-up using the least restrictive device. 3.  Patient will perform sit to stand with supervision/set-up. 4.  Patient will ambulate with contact guard assist for 50 feet with the least restrictive device. 5.  Patient will ascend/descend 3 stairs with 1-2 handrail(s) with minimal assistance/contact guard assist.     physical Therapy TREATMENT    Patient: Neena Davila (12 y.o. female)  Date: 5/12/2018  Diagnosis: Hypertensive emergency  Acute on chronic systolic (congestive) heart failure (HCC)  Headache  Acute pulmonary edema (HCC)  Hypokalemia Acute pulmonary edema (HCC)       Precautions: Fall   Chart, physical therapy assessment, plan of care and goals were reviewed. ASSESSMENT:  Pt cleared with RN to participate in session and pt agreeable to ambulation inside of room and refused increased distance or second attempt for ambulation today. Pt received supine in bed. Pt completed supine to sit with SBA. Sitting on EOB with good balance x3 minutes denying dizziness or SOB. Pt then stood to RW with CGA, ambulating x25 feet in room. Completed seated TE in chair. Deferred second trial for ambulation and requested to stay in recliner. Pt left with all needs met and call bell in reach. Progression toward goals:  [x]      Improving appropriately and progressing toward goals  []      Improving slowly and progressing toward goals  []      Not making progress toward goals and plan of care will be adjusted     PLAN:  Patient continues to benefit from skilled intervention to address the above impairments.   Continue treatment per established plan of care. Discharge Recommendations:  Home Health  Further Equipment Recommendations for Discharge:  rolling walker and N/A     SUBJECTIVE:   Patient stated I usually have my daughter help me.     OBJECTIVE DATA SUMMARY:   Critical Behavior:  Neurologic State: Alert, Appropriate for age  Orientation Level: Oriented X4, Appropriate for age  Cognition: Appropriate decision making, Appropriate safety awareness, Follows commands  Safety/Judgement: Awareness of environment, Fall prevention  Functional Mobility Training:  Bed Mobility:   Supine to sit: CGA                  Transfers:  Sit to Stand: Contact guard assistance  Stand to Sit: Contact guard assistance                             Balance:  Sitting: Intact  Standing: Impaired; With support  Standing - Static: Fair  Standing - Dynamic : Fair  Ambulation/Gait Training:  Distance (ft): 25 Feet (ft)  Assistive Device: Walker, rolling  Ambulation - Level of Assistance: Contact guard assistance        Gait Abnormalities: Shuffling gait        Base of Support: Widened     Speed/Laila: Slow  Step Length: Right shortened;Left shortened                   Stairs: Therapeutic Exercises:   Instructed in and complete seated ankle pumps, LAQ and marching x15 reps each   Pain:  Pain Scale 1: Numeric (0 - 10)  Pain Intensity 1: 0  Pain Location 1: Head     Pain Description 1: Aching  Pain Intervention(s) 1: Medication (see MAR)  Activity Tolerance:   Fair activity tolerance with no complaints of fatigue but deferred further treatment today  Please refer to the flowsheet for vital signs taken during this treatment.   After treatment:   [x] Patient left in no apparent distress sitting up in chair  [] Patient left in no apparent distress in bed  [x] Call bell left within reach  [x] Nursing notified  [] Caregiver present  [] Bed alarm activated      Viridiana Nelson, PT   Time Calculation: 15 mins     Mobility  Current  CJ= 20-39%  Goal  CI= 1-19%. The severity rating is based on the Level of Assistance required for Functional Mobility and ADLs.

## 2018-05-12 NOTE — PROGRESS NOTES
DISCHARGE SUMMARY from Nurse    PATIENT INSTRUCTIONS:    After general anesthesia or intravenous sedation, for 24 hours or while taking prescription Narcotics:  · Limit your activities  · Do not drive and operate hazardous machinery  · Do not make important personal or business decisions  · Do  not drink alcoholic beverages  · If you have not urinated within 8 hours after discharge, please contact your surgeon on call. Report the following to your surgeon:  · Excessive pain, swelling, redness or odor of or around the surgical area  · Temperature over 100.5  · Nausea and vomiting lasting longer than 4 hours or if unable to take medications  · Any signs of decreased circulation or nerve impairment to extremity: change in color, persistent  numbness, tingling, coldness or increase pain  · Any questions    What to do at Home:  Recommended activity: Activity as tolerated, . If you experience any of the following symptoms chest pain and shortness of breath, please follow up with 911. *  Please give a list of your current medications to your Primary Care Provider. *  Please update this list whenever your medications are discontinued, doses are      changed, or new medications (including over-the-counter products) are added. *  Please carry medication information at all times in case of emergency situations. These are general instructions for a healthy lifestyle:    No smoking/ No tobacco products/ Avoid exposure to second hand smoke  Surgeon General's Warning:  Quitting smoking now greatly reduces serious risk to your health.     Obesity, smoking, and sedentary lifestyle greatly increases your risk for illness    A healthy diet, regular physical exercise & weight monitoring are important for maintaining a healthy lifestyle    You may be retaining fluid if you have a history of heart failure or if you experience any of the following symptoms:  Weight gain of 3 pounds or more overnight or 5 pounds in a week, increased swelling in our hands or feet or shortness of breath while lying flat in bed. Please call your doctor as soon as you notice any of these symptoms; do not wait until your next office visit. Recognize signs and symptoms of STROKE:    F-face looks uneven    A-arms unable to move or move unevenly    S-speech slurred or non-existent    T-time-call 911 as soon as signs and symptoms begin-DO NOT go       Back to bed or wait to see if you get better-TIME IS BRAIN. Warning Signs of HEART ATTACK     Call 911 if you have these symptoms:   Chest discomfort. Most heart attacks involve discomfort in the center of the chest that lasts more than a few minutes, or that goes away and comes back. It can feel like uncomfortable pressure, squeezing, fullness, or pain.  Discomfort in other areas of the upper body. Symptoms can include pain or discomfort in one or both arms, the back, neck, jaw, or stomach.  Shortness of breath with or without chest discomfort.  Other signs may include breaking out in a cold sweat, nausea, or lightheadedness. Don't wait more than five minutes to call [address]! Fast action can save your life. Calling 911 is almost always the fastest way to get lifesaving treatment. Emergency Medical Services staff can begin treatment when they arrive  up to an hour sooner than if someone gets to the hospital by car. The discharge information has been reviewed with the patient. The patient verbalized understanding. Discharge medications reviewed with the patient and appropriate educational materials and side effects teaching were provided.   ___________________________________________________________________________________________________________________________________

## 2018-05-12 NOTE — PROGRESS NOTES
[facility]  -  Progress Note      Patient: Stacie Renteria MRN: [id number]  SSN: [id number]    YOB: 1955  Age: 70 y.o. Sex: female      Admit Date: 5/5/2018    Assessment:     Hospital Problems  Date Reviewed: 2/8/2018          Codes Class Noted POA    Hypokalemia ICD-10-CM: E87.6  ICD-9-CM: 276.8  5/5/2018 Unknown        * (Principal)Acute pulmonary edema ([facility] ICD-10-CM: J81.0  ICD-9-CM: 518.4  5/5/2018 Unknown        Hypertensive emergency ICD-10-CM: I16.1  ICD-9-CM: 401.9  5/5/2018 Unknown        Headache ICD-10-CM: R51  ICD-9-CM: 784.0  5/5/2018 Unknown        Acute on chronic systolic (congestive) heart failure (HCC) ICD-10-CM: I50.23  ICD-9-CM: 428.23, 428.0  5/5/2018 Unknown        Type 2 diabetes mellitus with nephropathy ([facility] ICD-10-CM: E11.21  ICD-9-CM: 250.40, 583.81  12/14/2017 Yes          -HTN urgency, initially on nitro and cardene gtts, now off with normotensive reads. Suspect non-compliance with medications. She also reports n/v x 4 days prior to admission.  -Hypokalemia, replace as needed. -URI, endorses cough/congestion over past week. -Viral illness, nausea/vomiting x 4 days.  -Acute on chronic CHF with reduced EF.  -CMY with decline on LV function on echo this admission with EF 30% with WMA. Low risk nuclear 2017 without ischemia.  -mod bilat pleural effusions.  -mod pulm HTN  -COPD, uses home O2  -CHF  -HLD  -DM  -Hx of migraines, reports HA for past 3 days. CT head no acute process.      Previously followed by Dr Ольга Pavon:     1. Continue Coreg, Apresoline, Isordil, Aldactone, Lasix, and aspirin. 2. Continue daily Pravachol. 3. Discharge timing per primary service. 4. Will plan follow up by our NP in 2 weeks and will have patient follow up with Dr. Adilson Blake or Radha Aguilar in 4-6 weeks. 5. Will sign off for now. Please call with questions or new problems. Subjective:     No new complaints.  Breathing comfortably, but says not completely normal. No chest pain. No CAD on heart cath on 5/10/2018. EF of 30 % on echo of 5/7/2018. Doing well on current medical regimen, though blood pressure a little on the high side early this AM, but OK currently at 136/65    Objective:      Patient Vitals for the past 8 hrs:   Temp Pulse Resp BP SpO2   05/12/18 0715 - - - (!) 186/91 -   05/12/18 0710 98.4 °F (36.9 °C) 77 18 (!) 194/93 99 %   05/12/18 0400 98 °F (36.7 °C) 77 16 161/88 98 %         Patient Vitals for the past 96 hrs:   Weight   05/12/18 0623 88.6 kg (195 lb 6 oz)   05/11/18 0622 90.3 kg (199 lb 1.6 oz)   05/10/18 0752 89.4 kg (197 lb 1.3 oz)   05/09/18 0647 89.4 kg (197 lb 1.6 oz)         Intake/Output Summary (Last 24 hours) at 05/12/18 0846  Last data filed at 05/12/18 1331   Gross per 24 hour   Intake             1200 ml   Output             3150 ml   Net            -1950 ml       Physical Exam:  General:  alert, cooperative, no distress, appears stated age  Neck:  no JVD  Lungs:  clear to auscultation bilaterally, except few rales in right base.   Heart:  regular rate and rhythm, S1, S2 normal, no murmur, click, rub or gallop  Abdomen:  abdomen is soft without significant tenderness, masses, organomegaly or guarding  Extremities:  extremities normal, atraumatic, no cyanosis or edema    Data Review:     Labs: Results:       Chemistry Recent Labs      05/11/18   0526  05/10/18   0300   GLU  92  122*   NA  138  139   K  4.1  3.5   CL  104  104   CO2  33*  32   BUN  25*  26*   CREA  1.30  1.14   CA  8.1*  7.8*   AGAP  1*  3   BUCR  19  23*      CBC w/Diff Recent Labs      05/11/18   0526  05/10/18   0300   WBC  4.0*  4.3*   RBC  3.11*  3.07*   HGB  8.5*  8.4*   HCT  27.1*  27.0*   PLT  120*  117*   GRANS  69  64   LYMPH  18*  19*   EOS  5  9*      Cardiac Enzymes No results found for: CPK, CK, CKMMB, CKMB, RCK3, CKMBT, CKNDX, CKND1, DAYNA, TROPT, TROIQ, EUN, TROPT, TNIPOC, BNP, BNPP   Coagulation Recent Labs      05/10/18   0300   PTP  13.5   INR  1.1 Lipid Panel Lab Results   Component Value Date/Time    Cholesterol, total 104 09/27/2017 04:15 AM    HDL Cholesterol 35 (L) 09/27/2017 04:15 AM    LDL, calculated 44.6 09/27/2017 04:15 AM    VLDL, calculated 24.4 09/27/2017 04:15 AM    Triglyceride 122 09/27/2017 04:15 AM    CHOL/HDL Ratio 3.0 09/27/2017 04:15 AM      BNP Lab Results   Component Value Date/Time    B-type Natriuretic Peptide 223.7 (H) 04/12/2014 12:00 AM      Liver Enzymes No results for input(s): TP, ALB, TBIL, AP, SGOT, GPT in the last 72 hours.     No lab exists for component: DBIL   Digoxin    Thyroid Studies Lab Results   Component Value Date/Time    T4, Total 9.1 12/23/2009 11:33 AM    TSH 1.61 05/06/2018 01:27 AM           Kimball Cranker,    May 12, 2018

## 2018-05-12 NOTE — PROGRESS NOTES
foc provided for this pt who chose [facility], son wants [facility] who changed this pt's mind to [facility]. Placed in referral que for acceptance. Pt's son, Larry Garcia will transport this pt home when ready. This writer spoke with this pt's son regarding community resources and qualifiers for this pt.

## 2018-05-12 NOTE — DISCHARGE SUMMARY
[facility]  [facility]       Patient: Adilson Dawkins Age: 70 y.o. : 1955 MR#: [id number] [id number]: [id number]  PCP on record: Sadia Milan MD  Admit date: 2018  Discharge date: 2018    Consults:  Dr Kellee Todd, -[facility]  - ALYNE De La Rosa-cardiology  Procedures:  Left heart catheterization, coronary angiography and left ventriculography. Findings:  · The coronary circulation was right dominant. · Left main: Angiographically normal.   · Left anterior descending: Angiographically normal.  · Ramus Intermediate: Angiographically normal.  · Diagonal Branches: Angiographically normal.  · Circumflex: Angiographically normal.  · Obtuse Marginal Branches: Angiographically normal.  · Right coronary: Angiographically normal.     The patient was transferred to the observation area with stable vital signs and in an asymptomatic state. The sheath will be pulled and hemostasis will be secured with the application of pressure. There was no apparent immediate complication. SUMMARY: No CAD. Nonischemic cardiomyopathy   Moderate sedation was utilized for 14 minutes using Versed and fentanyl under my supervision.  -Significant Diagnostic Studies:   -   18 renal duplex :    RENAL:  1. No significant renal artery stenosis identified, both renal  arteries visualized. 2. Patent bilateral renal veins without evidence of renal vein  thrombosis. Note: Increased venous pulsatility noted. 3. The right kidney measures 11.0 cm.  4. The left kidney measures 11.6 cm.  5. Bilateral intrinsic/medical renal disease identified.    -cardiac echo 18:  SUMMARY:  Left ventricle: Systolic function was moderately to markedly reduced by EF   (biplane method of disks). Ejection fraction  was estimated to be 30 %. There was hypokinesis of the apical anterior, mid   anteroseptal, mid anterolateral, and apical  lateral wall(s). Wall thickness was markedly increased.  Features were consistent with a pseudonormal left ventricular  filling pattern, with concomitant abnormal relaxation and increased filling   pressure (grade 2 diastolic dysfunction). Right ventricle: The ventricle was dilated. Systolic pressure was mildly   increased. Estimated peak pressure was at  least 45 mmHg. Left atrium: The atrium was moderately dilated. Right atrium: The atrium was dilated. Mitral valve: There was mild regurgitation. Tricuspid valve: There was moderate to severe regurgitation.    -Head CT 05/05/18:  No acute infarct, mass, nor hemorrhage. Atrophy. Small vessel disease. Chronic lacunar infarct right thalamus.    -Gastric emptying study 05/09/18: IMPRESSIONS:  1.. No significant delay in gastric emptying.     2. In the first 2 hours, the gastric emptying is normal.        3. . At the end of 4 hours there is mild delay in gastric emptying, but probably  not significant.         Discharge Diagnoses: -Hypertensive emergency-improved  -Hypokalemia-resolved  -Acute metabolic encephalopathy-resolved  -Gastroenteritis-resolved, cause unknown  -acute on chronic combined systolic and Diastolic heart failure  -Mod to severe tricuspid regurgitation                                           Patient Active Problem List   Diagnosis Code    Essential hypertension, benign I10    Diabetes mellitus ([facility] E11.9    Other and unspecified hyperlipidemia E78.5    Cervical myelopathy ([facility] G95.9    Eczema L30.9    Asthma J45.909    CHF (congestive heart failure) ([facility]) I50.9    Bladder prolapse, female, acquired N81.10    IBS (irritable bowel syndrome) K58.9    Osteoporosis M81.0    Migraine G43.909    Anxiety and depression F41.9, F32.9    Pleural effusion J90    Type 2 diabetes mellitus with nephropathy ([facility]) E11.21    Hypokalemia E87.6    Acute pulmonary edema ([facility]) J81.0    Hypertensive emergency I16.1    Headache R51    Acute on chronic systolic (congestive) heart failure ([facility]) I50.23 Hospital Course by Problem      70year old female w/ multiple medical problems including HTN which is difficult to control presented with sob, h/a, fatigue. She was noted to have high bp and was started on NG drip and admitted to the ICU.    - Hypertensive emergency, bp was low initially and  improved w/ current regimen of meds . Pt w/ low K, ? Hyper brett state? W/u included  Brett/renin which was low, aldosterone level was wnl. Paulina Cullenre for MARITA negative. Discussed w/ endocrine consultant informally, recommends using spironolactone given low ratio. Her bp improved w/ adjustment of meds. -Hypokalemia improved, mag nl, not sure if related to above, ie. Due to hyper brett state? Has had low, low nl K in the past. W/uas above. Resolved at time of dc.  -History of combined systolic/diastolic HF, non ischemic, last echo 09/17 EF 40-45%, repeat echo this admission shows EF of 30%, mod-severe TR, has some degree of decompensation cardiology following. On bb, asa,lasix. S/p cardiac cath 05/10 w/ findings of clean coronaries. -Moderate bilateral pleural effusions  -Acute metabolic encephalopathy w/ reports of hallucinations per daughter and c/o h/a upon initial presentation to ED, likely due to elevated bp. Resolved. Pt continues to be appropriate although somewhat somnolent today on exam has good attention and is oriented to self, place and time. -Type 2 DM A1c 4.8 this admission. Blood sugars within acceptable range. lantus 45 units is what she takes at home, confirmed with daughter.  -Pancytopenia new cause unclear, nl B12, folate levels. WBC and platlet count had normalized at time of dc.  -History of asthma  -dyslipedemia :on statin.   -per nursing reports of loose stools and n/v cause unclear, per family has recurrent episodes of n/v in outpt setting, gastric emptying study wnl.  -COPD on home 02 on PRN basis    Dispo: Pt was seen by PT and recs were made for home w/ home health,  pt dc'd to home w/ home health. Today's examination of the patient revealed:     Subjective:     Objective:   VS:   Visit Vitals    /63    Pulse 76    Temp 98.3 °F (36.8 °C)    Resp 18    Ht 5' 9\" (1.753 m)    Wt 88.6 kg (195 lb 6 oz)    SpO2 96%    BMI 28.85 kg/m2      Tmax/24hrs: Temp (24hrs), Av.8 °F (37.1 °C), Min:98 °F (36.7 °C), Max:100.3 °F (37.9 °C)     Input/Output:   Intake/Output Summary (Last 24 hours) at 18 1217  Last data filed at 18 1120   Gross per 24 hour   Intake             1200 ml   Output             3900 ml   Net            -2700 ml       General: alert, awake, in nad   Cardiovascular:  Rrr, no murmurs  Pulmonary: ctab   GI:  Soft, nt, nd  Extremities: no edema  Additional:      Labs:    Recent Results (from the past 24 hour(s))   GLUCOSE, POC    Collection Time: 18  3:54 PM   Result Value Ref Range    Glucose (POC) 121 (H) 70 - 110 mg/dL   GLUCOSE, POC    Collection Time: 18  9:29 PM   Result Value Ref Range    Glucose (POC) 150 (H) 70 - 110 mg/dL   GLUCOSE, POC    Collection Time: 18  7:12 AM   Result Value Ref Range    Glucose (POC) 132 (H) 70 - 110 mg/dL   GLUCOSE, POC    Collection Time: 18 11:17 AM   Result Value Ref Range    Glucose (POC) 237 (H) 70 - 110 mg/dL     Additional Data Reviewed:     Condition:   Disposition:    []Home   [x]Home with Home Health   []SNF/NH   []Rehab   []Home with family   []Alternate Facility:____________________      Discharge Medications:     Current Discharge Medication List      START taking these medications    Details   furosemide (LASIX) 20 mg tablet Take 2 Tabs by mouth daily. Qty: 30 Tab, Refills: 1      spironolactone (ALDACTONE) 50 mg tablet Take 1 Tab by mouth two (2) times a day. Qty: 60 Tab, Refills: 1         CONTINUE these medications which have CHANGED    Details   insulin glargine (LANTUS U-100 INSULIN) 100 unit/mL injection 7 Units by SubCUTAneous route daily. 15 units in the evening.   Qty: 1 Vial, Refills: 1      isosorbide dinitrate (ISORDIL) 10 mg tablet Take 2 Tabs by mouth three (3) times daily. Qty: 180 Tab, Refills: 2      mometasone-formoterol (DULERA) 200-5 mcg/actuation HFA inhaler Take 2 Puffs by inhalation two (2) times a day. Qty: 1 Inhaler, Refills: 0         CONTINUE these medications which have NOT CHANGED    Details   hydrALAZINE (APRESOLINE) 100 mg tablet Take 1 Tab by mouth three (3) times daily. Qty: 4 Tab, Refills: 0      carvedilol (COREG) 25 mg tablet Take 1 Tab by mouth two (2) times daily (with meals). Qty: 180 Tab, Refills: 3      insulin lispro (HUMALOG) 100 unit/mL kwikpen 15 Units by SubCUTAneous route daily. albuterol (VENTOLIN HFA) 90 mcg/actuation inhaler Take 2 Puffs by inhalation every four (4) hours as needed for Wheezing or Shortness of Breath.      gabapentin (NEURONTIN) 100 mg capsule 1 cap po twice daily with breakfast and dinner, and 3 tabs po qHS  Qty: 150 Cap, Refills: 2      fluticasone (FLONASE) 50 mcg/actuation nasal spray 2 Sprays by Both Nostrils route daily. Qty: 1 Bottle, Refills: 1      aspirin delayed-release 81 mg tablet Take 81 mg by mouth daily. pravastatin (PRAVACHOL) 20 mg tablet Take 20 mg by mouth nightly. CARBOXYMETHYLCELLULOS/GLYCERIN (REFRESH OPTIVE OP) Administer 1 Drop to both eyes six (6) times daily. STOP taking these medications       sodium chloride (OCEAN) 0.65 % nasal spray Comments:   Reason for Stopping:         ondansetron hcl (ZOFRAN, AS HYDROCHLORIDE,) 4 mg tablet Comments:   Reason for Stopping:         MULTIVIT/IRON/FA/K/HERB NO.244 (ALIVE WOMEN'S ENERGY PO) Comments:   Reason for Stopping: Follow-up Appointments:   1.  Your PCP: Hillary Minor MD, within 7-10days  2.  cardiology            >30 minutes spent coordinating this discharge (review instructions/follow-up, prescriptions, preparing report for sign off)    Signed:  Kala Martin MD  5/12/2018  12:17 PM

## 2018-05-12 NOTE — PROGRESS NOTES
[facility]  Progress Note    Patient: Danny Sarkar Age: 70 y.o. : 1955 MR#: [id number] SSN: [id number]  Date: 2018     Subjective:   Pt reports feeling tired, states she slept well though. Assessment/Plan:   - Hypertensive emergency,bp was low initially and has improved w/ current regimen of meds . Pt w/ low K, ? Hyper brett state? W/u in progress. Eval for MARITA negative.  -Hypokalemia improved, mag nl, not sure if related to above, ie. Due to hyper brett state? Has had low, low nl K in the past. W/u in progress.  -History of combined systolic/diastolic HF, last echo  EF 40-45%, repeat echo this admission shows EF of 30%, mod-severe TR, has some degree of decompensation cardiology following. On bb, asa,lasix. S/p cardiac cath 05/10 w/ findings of clean coronaries. -Moderate bilateral pleural effusions  -Acute metabolic encephalopathy w/ reports of hallucinations per daughter and c/o h/a upon initial presentation to ED, likely due to elevated bp. Resolved. Pt continues to be appropriate although somewhat somnolent today on exam has good attention and is oriented to self, place and time. -Type 2 DM A1c 4.8 this admission. Blood sugars within acceptable range. lantus 45 units is what she takes at home, confirmed with daughter.  -Pancytopenia new cause unclear, nl B12, folate levels.  -History of asthma  -dyslipedemia :on statin. -per nursing reports of loose stools and n/v cause unclear, per family has recurrent episodes of n/v in outpt setting, gastric emptying study wnl.  -COPD on home 02 on PRN basis    PLAN:  -cont to closely watch bp and adjust meds. -will await w/u results for hyperaldo state  -replace and closely follow K  -will cont decreased lantus   -BiPAP at night  -will follow cell counts, hgb w/o day to day drastic drops, may have bone marrow suppression due to meds?  Unless drastic drop will need outpt f/u, will likely dc tomorrow Disposition: PT/OT unable to assess pt today. Have personally spoken w/ PT/OT today and requested they see pt to assist w/ disposition and discharge. Additional Notes:      Case discussed with:  [x]Patient  [x]Family  [x]Nursing  []Case Management  DVT Prophylaxis:  []Lovenox  []Hep SQ  []SCDs  []Coumadin   []On Heparin gtt    Objective:   VS:   Visit Vitals    /70    Pulse 82    Temp 99.3 °F (37.4 °C)    Resp 17    Ht 5' 9\" (1.753 m)    Wt 90.3 kg (199 lb 1.6 oz)    SpO2 94%    BMI 29.4 kg/m2      Tmax/24hrs: Temp (24hrs), Av.3 °F (36.8 °C), Min:97.4 °F (36.3 °C), Max:99.3 °F (37.4 °C)      Intake/Output Summary (Last 24 hours) at 18 2343  Last data filed at 18 2116   Gross per 24 hour   Intake             1080 ml   Output             1825 ml   Net             -745 ml       General:  Somnolent, but easily wakes up, is oriented to self, place, time, has intact attention. Cardiovascular:  Rrr, no murmurs  Pulmonary:  ctab  GI:  Soft, nt, nd  Extremities:  1+ edema lower ext bilaterally  Additional:      Labs:    Recent Results (from the past 24 hour(s))   CBC WITH AUTOMATED DIFF    Collection Time: 18  5:26 AM   Result Value Ref Range    WBC 4.0 (L) 4.6 - 13.2 K/uL    RBC 3.11 (L) 4.20 - 5.30 M/uL    HGB 8.5 (L) 12.0 - 16.0 g/dL    HCT 27.1 (L) 35.0 - 45.0 %    MCV 87.1 74.0 - 97.0 FL    MCH 27.3 24.0 - 34.0 PG    MCHC 31.4 31.0 - 37.0 g/dL    RDW 16.7 (H) 11.6 - 14.5 %    PLATELET 285 (L) 685 - 420 K/uL    MPV 10.8 9.2 - 11.8 FL    NEUTROPHILS 69 40 - 73 %    LYMPHOCYTES 18 (L) 21 - 52 %    MONOCYTES 8 3 - 10 %    EOSINOPHILS 5 0 - 5 %    BASOPHILS 0 0 - 2 %    ABS. NEUTROPHILS 2.8 1.8 - 8.0 K/UL    ABS. LYMPHOCYTES 0.7 (L) 0.9 - 3.6 K/UL    ABS. MONOCYTES 0.3 0.05 - 1.2 K/UL    ABS. EOSINOPHILS 0.2 0.0 - 0.4 K/UL    ABS.  BASOPHILS 0.0 0.0 - 0.1 K/UL    DF AUTOMATED     METABOLIC PANEL, BASIC    Collection Time: 18  5:26 AM   Result Value Ref Range    Sodium 138 136 - 145 mmol/L    Potassium 4.1 3.5 - 5.5 mmol/L    Chloride 104 100 - 108 mmol/L    CO2 33 (H) 21 - 32 mmol/L    Anion gap 1 (L) 3.0 - 18 mmol/L    Glucose 92 74 - 99 mg/dL    BUN 25 (H) 7.0 - 18 MG/DL    Creatinine 1.30 0.6 - 1.3 MG/DL    BUN/Creatinine ratio 19 12 - 20      GFR est AA 50 (L) >60 ml/min/1.73m2    GFR est non-AA 41 (L) >60 ml/min/1.73m2    Calcium 8.1 (L) 8.5 - 10.1 MG/DL   GLUCOSE, POC    Collection Time: 05/11/18  7:40 AM   Result Value Ref Range    Glucose (POC) 70 70 - 110 mg/dL   GLUCOSE, POC    Collection Time: 05/11/18 10:45 AM   Result Value Ref Range    Glucose (POC) 158 (H) 70 - 110 mg/dL   GLUCOSE, POC    Collection Time: 05/11/18  3:54 PM   Result Value Ref Range    Glucose (POC) 121 (H) 70 - 110 mg/dL   GLUCOSE, POC    Collection Time: 05/11/18  9:29 PM   Result Value Ref Range    Glucose (POC) 150 (H) 70 - 110 mg/dL       Signed By: Isaac Lamar MD     May 11, 2018 12:53 PM

## 2018-05-14 ENCOUNTER — PATIENT OUTREACH (OUTPATIENT)
Dept: CARDIOLOGY CLINIC | Age: 63
End: 2018-05-14

## 2018-05-14 ENCOUNTER — PATIENT OUTREACH (OUTPATIENT)
Dept: FAMILY MEDICINE CLINIC | Age: 63
End: 2018-05-14

## 2018-05-14 ENCOUNTER — HOME CARE VISIT (OUTPATIENT)
Dept: HOME HEALTH SERVICES | Facility: HOME HEALTH | Age: 63
End: 2018-05-14

## 2018-05-14 ENCOUNTER — HOME CARE VISIT (OUTPATIENT)
Dept: SCHEDULING | Facility: HOME HEALTH | Age: 63
End: 2018-05-14
Payer: MEDICARE

## 2018-05-14 VITALS
DIASTOLIC BLOOD PRESSURE: 100 MMHG | OXYGEN SATURATION: 96 % | HEART RATE: 74 BPM | RESPIRATION RATE: 14 BRPM | TEMPERATURE: 98.2 F | SYSTOLIC BLOOD PRESSURE: 210 MMHG

## 2018-05-14 PROCEDURE — 3331090001 HH PPS REVENUE CREDIT

## 2018-05-14 PROCEDURE — 400013 HH SOC

## 2018-05-14 PROCEDURE — 3331090002 HH PPS REVENUE DEBIT

## 2018-05-14 PROCEDURE — G0299 HHS/HOSPICE OF RN EA 15 MIN: HCPCS

## 2018-05-14 NOTE — PROGRESS NOTES
Called patient today for CED FRY, daughter Jong Simon stated her mother was resting. Offered to call back later or tomorrow if that would be more convenient. Daughter states her mother has follow up appointment tomorrow and if I could call back on Wednesday 5/16/18. NN will follow up on Wednesday.

## 2018-05-15 ENCOUNTER — HOSPITAL ENCOUNTER (EMERGENCY)
Age: 63
Discharge: HOME | End: 2018-05-15
Attending: EMERGENCY MEDICINE
Payer: MEDICARE

## 2018-05-15 ENCOUNTER — APPOINTMENT (OUTPATIENT)
Dept: GENERAL RADIOLOGY | Age: 63
End: 2018-05-15
Attending: EMERGENCY MEDICINE
Payer: MEDICARE

## 2018-05-15 VITALS
DIASTOLIC BLOOD PRESSURE: 96 MMHG | SYSTOLIC BLOOD PRESSURE: 186 MMHG | BODY MASS INDEX: 28.08 KG/M2 | OXYGEN SATURATION: 100 % | HEIGHT: 69 IN | RESPIRATION RATE: 13 BRPM | HEART RATE: 77 BPM | WEIGHT: 189.6 LBS | TEMPERATURE: 97.9 F

## 2018-05-15 DIAGNOSIS — R53.1 WEAKNESS: Primary | ICD-10-CM

## 2018-05-15 DIAGNOSIS — N30.00 ACUTE CYSTITIS WITHOUT HEMATURIA: ICD-10-CM

## 2018-05-15 LAB
ALBUMIN SERPL-MCNC: 2.3 G/DL (ref 3.4–5)
ALBUMIN/GLOB SERPL: 0.5 {RATIO} (ref 0.8–1.7)
ALP SERPL-CCNC: 135 U/L (ref 45–117)
ALT SERPL-CCNC: 31 U/L (ref 13–56)
ANION GAP SERPL CALC-SCNC: 5 MMOL/L (ref 3–18)
APPEARANCE UR: ABNORMAL
AST SERPL-CCNC: 17 U/L (ref 15–37)
ATRIAL RATE: 73 BPM
BACTERIA URNS QL MICRO: ABNORMAL /HPF
BASOPHILS # BLD: 0 K/UL (ref 0–0.06)
BASOPHILS NFR BLD: 0 % (ref 0–2)
BILIRUB SERPL-MCNC: 0.3 MG/DL (ref 0.2–1)
BILIRUB UR QL: NEGATIVE
BNP SERPL-MCNC: 6573 PG/ML (ref 0–900)
BUN SERPL-MCNC: 19 MG/DL (ref 7–18)
BUN/CREAT SERPL: 15 (ref 12–20)
CALCIUM SERPL-MCNC: 8.5 MG/DL (ref 8.5–10.1)
CALCULATED P AXIS, ECG09: 36 DEGREES
CALCULATED R AXIS, ECG10: -24 DEGREES
CALCULATED T AXIS, ECG11: 134 DEGREES
CHLORIDE SERPL-SCNC: 104 MMOL/L (ref 100–108)
CK MB CFR SERPL CALC: ABNORMAL % (ref 0–4)
CK MB CFR SERPL CALC: ABNORMAL % (ref 0–4)
CK MB SERPL-MCNC: <1 NG/ML (ref 5–25)
CK MB SERPL-MCNC: <1 NG/ML (ref 5–25)
CK SERPL-CCNC: 45 U/L (ref 26–192)
CK SERPL-CCNC: 49 U/L (ref 26–192)
CO2 SERPL-SCNC: 32 MMOL/L (ref 21–32)
COLOR UR: ABNORMAL
CREAT SERPL-MCNC: 1.27 MG/DL (ref 0.6–1.3)
DIAGNOSIS, 93000: NORMAL
DIFFERENTIAL METHOD BLD: ABNORMAL
EOSINOPHIL # BLD: 0.3 K/UL (ref 0–0.4)
EOSINOPHIL NFR BLD: 7 % (ref 0–5)
EPITH CASTS URNS QL MICRO: ABNORMAL /LPF (ref 0–5)
ERYTHROCYTE [DISTWIDTH] IN BLOOD BY AUTOMATED COUNT: 15.9 % (ref 11.6–14.5)
GLOBULIN SER CALC-MCNC: 4.9 G/DL (ref 2–4)
GLUCOSE SERPL-MCNC: 69 MG/DL (ref 74–99)
GLUCOSE UR STRIP.AUTO-MCNC: NEGATIVE MG/DL
HCT VFR BLD AUTO: 27.9 % (ref 35–45)
HGB BLD-MCNC: 9 G/DL (ref 12–16)
HGB UR QL STRIP: ABNORMAL
INR PPP: 1 (ref 0.8–1.2)
KETONES UR QL STRIP.AUTO: NEGATIVE MG/DL
LEUKOCYTE ESTERASE UR QL STRIP.AUTO: ABNORMAL
LYMPHOCYTES # BLD: 0.7 K/UL (ref 0.9–3.6)
LYMPHOCYTES NFR BLD: 14 % (ref 21–52)
MAGNESIUM SERPL-MCNC: 2.5 MG/DL (ref 1.6–2.6)
MCH RBC QN AUTO: 28 PG (ref 24–34)
MCHC RBC AUTO-ENTMCNC: 32.3 G/DL (ref 31–37)
MCV RBC AUTO: 86.6 FL (ref 74–97)
MONOCYTES # BLD: 0.5 K/UL (ref 0.05–1.2)
MONOCYTES NFR BLD: 10 % (ref 3–10)
NEUTS SEG # BLD: 3.4 K/UL (ref 1.8–8)
NEUTS SEG NFR BLD: 69 % (ref 40–73)
NITRITE UR QL STRIP.AUTO: POSITIVE
P-R INTERVAL, ECG05: 174 MS
PH UR STRIP: >8.5 [PH] (ref 5–8)
PLATELET # BLD AUTO: 207 K/UL (ref 135–420)
PMV BLD AUTO: 11.1 FL (ref 9.2–11.8)
POTASSIUM SERPL-SCNC: 4.1 MMOL/L (ref 3.5–5.5)
PROT SERPL-MCNC: 7.2 G/DL (ref 6.4–8.2)
PROT UR STRIP-MCNC: 100 MG/DL
PROTHROMBIN TIME: 12.6 SEC (ref 11.5–15.2)
Q-T INTERVAL, ECG07: 418 MS
QRS DURATION, ECG06: 100 MS
QTC CALCULATION (BEZET), ECG08: 460 MS
RBC # BLD AUTO: 3.22 M/UL (ref 4.2–5.3)
RBC #/AREA URNS HPF: ABNORMAL /HPF (ref 0–5)
SODIUM SERPL-SCNC: 141 MMOL/L (ref 136–145)
SP GR UR REFRACTOMETRY: 1.01 (ref 1–1.03)
TROPONIN I SERPL-MCNC: 0.05 NG/ML (ref 0–0.04)
TROPONIN I SERPL-MCNC: 0.06 NG/ML (ref 0–0.04)
UROBILINOGEN UR QL STRIP.AUTO: 1 EU/DL (ref 0.2–1)
VENTRICULAR RATE, ECG03: 73 BPM
WBC # BLD AUTO: 4.9 K/UL (ref 4.6–13.2)
WBC URNS QL MICRO: ABNORMAL /HPF (ref 0–4)

## 2018-05-15 PROCEDURE — 87086 URINE CULTURE/COLONY COUNT: CPT | Performed by: EMERGENCY MEDICINE

## 2018-05-15 PROCEDURE — 3331090002 HH PPS REVENUE DEBIT

## 2018-05-15 PROCEDURE — 85610 PROTHROMBIN TIME: CPT | Performed by: EMERGENCY MEDICINE

## 2018-05-15 PROCEDURE — 99285 EMERGENCY DEPT VISIT HI MDM: CPT

## 2018-05-15 PROCEDURE — 93005 ELECTROCARDIOGRAM TRACING: CPT

## 2018-05-15 PROCEDURE — 80053 COMPREHEN METABOLIC PANEL: CPT | Performed by: EMERGENCY MEDICINE

## 2018-05-15 PROCEDURE — 87077 CULTURE AEROBIC IDENTIFY: CPT | Performed by: EMERGENCY MEDICINE

## 2018-05-15 PROCEDURE — 83735 ASSAY OF MAGNESIUM: CPT | Performed by: EMERGENCY MEDICINE

## 2018-05-15 PROCEDURE — 3331090001 HH PPS REVENUE CREDIT

## 2018-05-15 PROCEDURE — 83880 ASSAY OF NATRIURETIC PEPTIDE: CPT | Performed by: EMERGENCY MEDICINE

## 2018-05-15 PROCEDURE — 71045 X-RAY EXAM CHEST 1 VIEW: CPT

## 2018-05-15 PROCEDURE — 82550 ASSAY OF CK (CPK): CPT | Performed by: EMERGENCY MEDICINE

## 2018-05-15 PROCEDURE — 81001 URINALYSIS AUTO W/SCOPE: CPT | Performed by: EMERGENCY MEDICINE

## 2018-05-15 PROCEDURE — 85025 COMPLETE CBC W/AUTO DIFF WBC: CPT | Performed by: EMERGENCY MEDICINE

## 2018-05-15 PROCEDURE — 87186 SC STD MICRODIL/AGAR DIL: CPT | Performed by: EMERGENCY MEDICINE

## 2018-05-15 RX ORDER — CEPHALEXIN 500 MG/1
500 CAPSULE ORAL 2 TIMES DAILY
Qty: 14 CAP | Refills: 0 | Status: SHIPPED | OUTPATIENT
Start: 2018-05-15 | End: 2018-05-22

## 2018-05-15 RX ORDER — CEPHALEXIN 500 MG/1
500 CAPSULE ORAL 2 TIMES DAILY
Qty: 14 CAP | Refills: 0 | Status: SHIPPED | OUTPATIENT
Start: 2018-05-15 | End: 2018-05-15

## 2018-05-15 NOTE — ED TRIAGE NOTES
To room 19 via [facility]. Responses to triages questions delayed. Sounds congested. No pain. On hoe O2 at 3L.

## 2018-05-15 NOTE — DISCHARGE INSTRUCTIONS
Weakness: Care Instructions  Your Care Instructions    Weakness is a lack of physical or muscle strength. You may feel that you need to make extra effort to move your arms, legs, or other muscles. Generalized weakness means that you feel weak in most areas of your body. Another type of weakness may affect just one muscle or group of muscles. You may feel weak and tired after you have done too much activity, such as taking an extra-long hike. This is not a serious problem. It often goes away on its own. Feeling weak can also be caused by medical conditions like thyroid problems, depression, or a virus. Sometimes the cause can be serious. Your doctor may want to do more tests to try to find the cause of the weakness. The doctor has checked you carefully, but problems can develop later. If you notice any problems or new symptoms, get medical treatment right away. Follow-up care is a key part of your treatment and safety. Be sure to make and go to all appointments, and call your doctor if you are having problems. It's also a good idea to know your test results and keep a list of the medicines you take. How can you care for yourself at home? · Rest when you feel tired. · Be safe with medicines. If your doctor prescribed medicine, take it exactly as prescribed. Call your doctor if you think you are having a problem with your medicine. You will get more details on the specific medicines your doctor prescribes. · Do not skip meals. Eating a balanced diet may increase your energy level. · Get some physical activity every day, but do not get too tired. When should you call for help? Call your doctor now or seek immediate medical care if:  ? · You have new or worse weakness. ? · You are dizzy or lightheaded, or you feel like you may faint. ? Watch closely for changes in your health, and be sure to contact your doctor if:  ? · You do not get better as expected. Where can you learn more?   Go to http://dacia.info/. Enter [id number] in the search box to learn more about \"Weakness: Care Instructions. \"  Current as of: March 20, 2017  Content Version: 11.4  © 9285-6350 [employer]. Care instructions adapted under license by [employer] (which disclaims liability or warranty for this information). If you have questions about a medical condition or this instruction, always ask your healthcare professional. [address] any warranty or liability for your use of this information.

## 2018-05-15 NOTE — ED PROVIDER NOTES
EMERGENCY DEPARTMENT HISTORY AND PHYSICAL EXAM    11:58 AM      Date: 5/15/2018  Patient Name: Mason Perez    History of Presenting Illness     Chief Complaint   Patient presents with    Fatigue         History Provided By: Patient    Additional History (Context): Mason Perez is a 70 y.o. female with PMHx of who presents via EMS with c/o moderate acute onset fatigue that occurred \"this morning\". The pt states that she feels tired. Associated Sx include weakness, chills, cough, and leg swelling. The pt states that she has chills all the time. Pt states that she is on 3L of oxygen at home. Pt denies fever and any pain. Denies any further complaints or symptoms at the moment. PCP: Annetta Cooper MD    Chief Complaint: Fatigue   Duration:  \"this morning\"  Timing:  Acute  Location: N/A  Quality: N/A  Severity: Moderate  Modifying Factors: N/A  Associated Symptoms: Weakness, chills, cough, leg swelling       Current Outpatient Prescriptions   Medication Sig Dispense Refill    acetaminophen (TYLENOL EXTRA STRENGTH) 500 mg tablet Take 1,000 mg by mouth every six (6) hours as needed for Pain.  insulin glargine (LANTUS U-100 INSULIN) 100 unit/mL injection 7 Units by SubCUTAneous route daily. 15 units in the evening. 1 Vial 1    isosorbide dinitrate (ISORDIL) 10 mg tablet Take 2 Tabs by mouth three (3) times daily. 180 Tab 2    mometasone-formoterol (DULERA) 200-5 mcg/actuation HFA inhaler Take 2 Puffs by inhalation two (2) times a day. 1 Inhaler 0    furosemide (LASIX) 20 mg tablet Take 2 Tabs by mouth daily. 30 Tab 1    spironolactone (ALDACTONE) 50 mg tablet Take 1 Tab by mouth two (2) times a day. 60 Tab 1    hydrALAZINE (APRESOLINE) 100 mg tablet Take 1 Tab by mouth three (3) times daily. 4 Tab 0    carvedilol (COREG) 25 mg tablet Take 1 Tab by mouth two (2) times daily (with meals). 180 Tab 3    insulin lispro (HUMALOG) 100 unit/mL kwikpen 15 Units by SubCUTAneous route daily.       CARBOXYMETHYLCELLULOS/GLYCERIN (REFRESH OPTIVE OP) Administer 1 Drop to both eyes six (6) times daily.  albuterol (VENTOLIN HFA) 90 mcg/actuation inhaler Take 2 Puffs by inhalation every four (4) hours as needed for Wheezing or Shortness of Breath.  gabapentin (NEURONTIN) 100 mg capsule 1 cap po twice daily with breakfast and dinner, and 3 tabs po qHS (Patient taking differently: Take 300 mg by mouth four (4) times daily. 1 cap po twice daily with breakfast and dinner, and 3 tabs po qHS) 150 Cap 2    fluticasone (FLONASE) 50 mcg/actuation nasal spray 2 Sprays by Both Nostrils route daily. 1 Bottle 1    aspirin delayed-release 81 mg tablet Take 81 mg by mouth daily.  pravastatin (PRAVACHOL) 20 mg tablet Take 20 mg by mouth nightly. Past History     Past Medical History:  Past Medical History:   Diagnosis Date    Arthritis     Cataract     [facility] obstructive pulmonary [facility] [address]     Diabetes mellitus ([facility] [address]     History of echocardiogram 12/29/2009    EF 50%. Mild-mod conc LVH. Mod DDfx. LAE. Mild MR.      Hypercholesterolemia     Hypertension     Hypertensive heart disease     Normal nuclear stress test 09/08/2000    No ischemia or prior infarction. Neg EKG on submax EST. Ex time 15:02. Past Surgical History:  Past Surgical History:   Procedure Laterality Date    CARDIAC CATHETERIZATION  5/10/2018         CORONARY ARTERY ANGIOGRAM  5/10/2018         HX CHOLECYSTECTOMY  2001    HX TUBAL LIGATION  1975    MODERATE SEDATION  5/10/2018            Family History:  Family History   Problem Relation Age of Onset    Hypertension Mother     Ovarian Cancer Mother     Heart Attack Father     Breast Cancer Maternal Aunt        Social History:  Social History   Substance Use Topics    Smoking status: Never Smoker    Smokeless tobacco: Never Used    Alcohol use No       Allergies:   Allergies   Allergen Reactions    Codeine Nausea Only    Sulfa (Sulfonamide Antibiotics) Rash         Review of Systems     Review of Systems   Constitutional: Positive for chills and fatigue. Negative for fever. Negative for pain    Respiratory: Positive for cough. Musculoskeletal:        Positive for leg swelling    Neurological: Positive for weakness. All other systems reviewed and are negative. Physical Exam     Visit Vitals    BP (!) 193/107    Pulse 72    Temp 97.9 °F (36.6 °C)    Resp 22    Ht 5' 9\" (1.753 m)    Wt 86 kg (189 lb 9.6 oz)    SpO2 100%    BMI 28 kg/m2       Physical Exam   Constitutional: She is oriented to person, place, and time. She appears well-developed. HENT:   Head: Normocephalic and atraumatic. Eyes: EOM are normal. Pupils are equal, round, and reactive to light. Neck: Normal range of motion. Neck supple. Cardiovascular: Normal rate, regular rhythm and normal heart sounds. Exam reveals no friction rub. No murmur heard. Pulmonary/Chest: Effort normal and breath sounds normal. No respiratory distress. She has no wheezes. Abdominal: Soft. She exhibits no distension. There is no tenderness. There is no rebound and no guarding. Musculoskeletal: Normal range of motion. Neurological: She is alert and oriented to person, place, and time. Skin: Skin is warm and dry. Psychiatric: She has a normal mood and affect. Her behavior is normal. Thought content normal.         Diagnostic Study Results     EKG shows sinus rhythm at 73 with normal axis and a borderline left bundle with a QRS of 100. ST elevation or depression or hypertrophy. This is unchanged from her last EKG. She did have a recent cardiac cath on 510. She had no coronary disease, nonischemic dilated cardiomyopathy with an EF of 30% secondary to most likely hypertension. Medical Decision Making     Patient with generalized fatigue and weakness. Does have a cough however also associated with leg swelling may reflect heart failure rather than pneumonia.   Doubt infection at this time. Diagnosis     No diagnosis found. _______________________________    Attestations:  Carl Kamara 89 acting as a scribe for and in the presence of Jocelyn Coker MD      May 15, 2018 at 11:58 AM       Provider Attestation:      I personally performed the services described in the documentation, reviewed the documentation, as recorded by the scribe in my presence, and it accurately and completely records my words and actions.  May 15, 2018 at 11:58 AM - Jocelyn Coker MD    _______________________________

## 2018-05-16 ENCOUNTER — HOME CARE VISIT (OUTPATIENT)
Dept: HOME HEALTH SERVICES | Facility: HOME HEALTH | Age: 63
End: 2018-05-16
Payer: MEDICARE

## 2018-05-16 ENCOUNTER — PATIENT OUTREACH (OUTPATIENT)
Dept: CARDIOLOGY CLINIC | Age: 63
End: 2018-05-16

## 2018-05-16 PROCEDURE — 3331090001 HH PPS REVENUE CREDIT

## 2018-05-16 PROCEDURE — 3331090002 HH PPS REVENUE DEBIT

## 2018-05-16 NOTE — PROGRESS NOTES
Heart Failure Follow Up Call    NN contacted the patient by telephone to perform CHF Follow Up. Verified  and address as identifiers. Antoinette Maddox reports feeling tired, after reviewing CHF and getting ready to do medication reconciliation patient stated \"my daughter Alex Orozco takes care of that, you can speak with her\". Reviewed medications, CHF, and upcoming doctor appointments. Daily Weight (document daily weights in flowsheets):   Patient will begin weighing daily on 18   Amount:  189 lbs at hospital discharge     Provider Notified:   No     Zone:(Pt Reported)  green     Goals      Maintains daily weight.            18 Patient to provide teach back on daily weights and what to do when gain is 3 lbs in a day and 5 lbs in a week by 18.  Patient verbalizes understanding of self-management goals of living with Congestive Heart Failure            18 Patient to verbalize heart failure zones and recognize red flags and report to physician/NN by 18       Understands and adheres to diet. 18 Patient will demonstrate understanding of low sodium diet and its effects on the heart by 18. Needs addressed from pathway:    24-48 Hours- or initial contact   Review/Verification:  ? Identify care team  ? Disposition (Home, SNF, IRF LTC, [facility], Hospice)  ? DC Instructions, Education, and HF Zones  ? Dante Espinal in place. [facility] Dialysis center)   ? Evaluate DME needs; arrange home equipment  ? Type of monitoring  ? Labs/Diagnostics to follow  ? Follow-up apts are arranged  ? Identify transportation    Med Rec*   ? Ace/Arb,   ? Diuretic,   ? Beta Blocker,   ? Potassium,   ? Anticoagulant   Baseline Labs*  ? BMP  ? BUN/Creat.  ? PT/INR  ? Hgb/Hct  ? WBC    DORA Documentation:  ? Goals  ? Challenges/Plan  ? Weight    ? Edema  ? Symptoms    Education Disease Management:  ? Cardiac Low-sodium Diet (No added sodium; 1500mg as indicated).   If Diabetic:   ?  include carb-controlled   ? Fluid restriction (if indicated)  ? Comorbidity Management  ? Daily Weights  ? Advance medical directive status          PCP: Dr. Tsai Other  Cardiologist: Dr. Cherrie Weir  Specialist:   (1-3 days): Follow up appointment with PCP (within 2-3 days): 5/18/18 @ 2:30  Follow up appointment with Cardiology (2-3 weeks) 6/4/18 @ 2:00    Cardiologist consult while IP: yes     Palliative consult while IP:    No     EF: 30% on 5/7/18  Type of HF:   HFrEF     Cardiac Device present: none      Heart Failure Medications: Betablocker, Diuretic     Disposition of patient:  Home, Home Health     [facility] Services/Tele Monitoring:  Home Health/[facility]     Social support: family    Do you have a Scale:    yes   How often do you weigh: Will begin weighing daily  Does patient have an Advance Directive:  not on file     Advance Directive scanned into patients chart:  No     Patient reminded that there are physicians on call 24 hours a day / 7 days a week (M-F 5pm to 8am and from Friday 5pm until Monday 8a for the weekend) should the patient have questions or concerns. Patient reminded to call 911 if situation is emergent or patient feels the situation is emergent. Pt verbalizes understanding.

## 2018-05-17 ENCOUNTER — HOME CARE VISIT (OUTPATIENT)
Dept: SCHEDULING | Facility: HOME HEALTH | Age: 63
End: 2018-05-17
Payer: MEDICARE

## 2018-05-17 LAB
BACTERIA SPEC CULT: ABNORMAL
SERVICE CMNT-IMP: ABNORMAL

## 2018-05-17 PROCEDURE — 3331090001 HH PPS REVENUE CREDIT

## 2018-05-17 PROCEDURE — 3331090002 HH PPS REVENUE DEBIT

## 2018-05-17 PROCEDURE — G0299 HHS/HOSPICE OF RN EA 15 MIN: HCPCS

## 2018-05-18 PROCEDURE — 3331090002 HH PPS REVENUE DEBIT

## 2018-05-18 PROCEDURE — 3331090001 HH PPS REVENUE CREDIT

## 2018-05-19 PROCEDURE — 3331090002 HH PPS REVENUE DEBIT

## 2018-05-19 PROCEDURE — 3331090001 HH PPS REVENUE CREDIT

## 2018-05-20 VITALS
RESPIRATION RATE: 20 BRPM | SYSTOLIC BLOOD PRESSURE: 164 MMHG | OXYGEN SATURATION: 96 % | TEMPERATURE: 97.8 F | HEART RATE: 69 BPM | DIASTOLIC BLOOD PRESSURE: 90 MMHG

## 2018-05-20 PROCEDURE — 3331090002 HH PPS REVENUE DEBIT

## 2018-05-20 PROCEDURE — 3331090001 HH PPS REVENUE CREDIT

## 2018-05-21 ENCOUNTER — OFFICE VISIT (OUTPATIENT)
Dept: FAMILY MEDICINE CLINIC | Age: 63
End: 2018-05-21

## 2018-05-21 ENCOUNTER — PATIENT OUTREACH (OUTPATIENT)
Dept: CARDIOLOGY CLINIC | Age: 63
End: 2018-05-21

## 2018-05-21 VITALS
HEIGHT: 69 IN | DIASTOLIC BLOOD PRESSURE: 100 MMHG | WEIGHT: 171 LBS | RESPIRATION RATE: 16 BRPM | SYSTOLIC BLOOD PRESSURE: 150 MMHG | BODY MASS INDEX: 25.33 KG/M2 | OXYGEN SATURATION: 93 % | TEMPERATURE: 98 F | HEART RATE: 73 BPM

## 2018-05-21 DIAGNOSIS — I50.9 CONGESTIVE HEART FAILURE, UNSPECIFIED HF CHRONICITY, UNSPECIFIED HEART FAILURE TYPE (HCC): Chronic | ICD-10-CM

## 2018-05-21 DIAGNOSIS — Z09 HOSPITAL DISCHARGE FOLLOW-UP: ICD-10-CM

## 2018-05-21 DIAGNOSIS — I10 HYPERTENSION, UNSPECIFIED TYPE: ICD-10-CM

## 2018-05-21 DIAGNOSIS — Z76.89 ENCOUNTER TO ESTABLISH CARE WITH NEW DOCTOR: Primary | ICD-10-CM

## 2018-05-21 DIAGNOSIS — E11.8 TYPE 2 DIABETES MELLITUS WITH COMPLICATION, WITH LONG-TERM CURRENT USE OF INSULIN (HCC): ICD-10-CM

## 2018-05-21 DIAGNOSIS — N30.90 CYSTITIS: ICD-10-CM

## 2018-05-21 DIAGNOSIS — Z79.4 TYPE 2 DIABETES MELLITUS WITH COMPLICATION, WITH LONG-TERM CURRENT USE OF INSULIN (HCC): ICD-10-CM

## 2018-05-21 DIAGNOSIS — D64.9 ANEMIA, UNSPECIFIED TYPE: ICD-10-CM

## 2018-05-21 PROCEDURE — 3331090001 HH PPS REVENUE CREDIT

## 2018-05-21 PROCEDURE — 3331090002 HH PPS REVENUE DEBIT

## 2018-05-21 RX ORDER — AMLODIPINE BESYLATE 5 MG/1
5 TABLET ORAL DAILY
Qty: 30 TAB | Refills: 0 | Status: SHIPPED | OUTPATIENT
Start: 2018-05-21 | End: 2018-07-05 | Stop reason: SDUPTHER

## 2018-05-21 NOTE — MR AVS SNAPSHOT
[address] [facility] Wen Simmons [address] [id number] [address] [address] [phone] Patient: Mason Perez MRN: [id number] Visit Information Date & Time Provider Department Dept. Phone Encounter #  
 5/21/2018 11:15 AM Chris Rogers NP Carry Tobi Pringle 77 [id number] Your Appointments 6/4/2018  2:00 PM  
Aniceto Swanson with Gadiel Medrano NP [facility] ([facility]) Appt Note: 2 week post [address]  Gely Chaney [phone] [address] [address] [phone] [place] [address] [address]  
  
    
 6/21/2018 11:15 AM  
Follow Up with Annetta Cooper MD  
[address] [place] [address] (--) Appt Note: Follow up Iris Catalan [facility] [address] [phone]  
  
   
 [address] [place] [address]  
  
    
 7/5/2018 10:40 AM  
POST HOSPITAL with Gasper Michael MD  
[facility] [employer] [facility] ([facility]) Appt Note: CVT  [place] [address] [address] [address] [phone] [address] [place] [address] [address] Upcoming Health Maintenance Date Due Hepatitis C Screening 1955 FOOT EXAM Q1 1/21/1963 MICROALBUMIN Q1 1/21/1963 EYE EXAM RETINAL OR DILATED Q1 1/21/1963 DTaP/Tdap/Td series (1 - Tdap) 1/21/1974 FOBT Q 1 YEAR AGE 50-75 1/21/2003 ZOSTER VACCINE AGE 60> 11/21/2012 GLAUCOMA SCREENING Q2Y 1/21/2018 Bone Densitometry (Dexa) Screening 1/21/2018 Pneumococcal 65+ Low/Medium Risk (1 of 2 - PCV13) 1/21/2018 MEDICARE YEARLY EXAM 3/14/2018 Influenza Age 5 to Adult 8/1/2018 LIPID PANEL Q1 9/27/2018 BREAST CANCER SCRN MAMMOGRAM 10/5/2018 HEMOGLOBIN A1C Q6M 11/6/2018 Allergies as of 5/21/2018  Review Complete On: 5/21/2018 By: Erin Castellon LPN  
  
 Severity Noted Reaction Type Reactions Codeine  03/01/2017    Nausea Only Sulfa (Sulfonamide Antibiotics)  01/27/2012   Side Effect Rash Current Immunizations  Never Reviewed No immunizations on file. Not reviewed this visit You Were Diagnosed With   
  
 Codes Comments Encounter to establish care with new doctor    -  Primary ICD-10-CM: Z76.89 
ICD-9-CM: V65.8 Hospital discharge follow-up     ICD-10-CM: [address] ICD-9-CM: V67.59 Cystitis     ICD-10-CM: N30.90 ICD-9-CM: 595.9 Hypertension, unspecified type     ICD-10-CM: I10 
ICD-9-CM: 401.9 Type 2 diabetes mellitus with complication, with long-term current use of insulin (HCC)     ICD-10-CM: E11.8, Z79.4 ICD-9-CM: 250.90, V58.67 Anemia, unspecified type     ICD-10-CM: D64.9 ICD-9-CM: 285.9 Congestive heart failure, unspecified HF chronicity, unspecified heart failure type ([facility]     ICD-10-CM: I50.9 ICD-9-CM: 428.0 Vitals BP Pulse Temp Resp Height(growth percentile) Weight(growth percentile) (!) 150/100 (BP 1 Location: Right arm, BP Patient Position: Sitting) 73 98 °F (36.7 °C) (Oral) 16 5' 9\" (1.753 m) 171 lb (77.6 kg) SpO2 BMI OB Status Smoking Status 93% 25.25 kg/m2 Postmenopausal Never Smoker Vitals History BMI and BSA Data Body Mass Index Body Surface Area  
 25.25 kg/m 2 1.94 m 2 Preferred Pharmacy Pharmacy Name Phone [address] [phone] Your Updated Medication List  
  
   
This list is accurate as of 5/21/18 12:40 PM.  Always use your most recent med list.  
  
  
  
  
 aspirin delayed-release 81 mg tablet Take 81 mg by mouth daily. carvedilol 25 mg tablet Commonly known as:  Estuardo Garcia Take 1 Tab by mouth two (2) times daily (with meals). cephALEXin 500 mg capsule Commonly known as:  Wash Skains Take 1 Cap by mouth two (2) times a day for 7 days. fluticasone 50 mcg/actuation nasal spray Commonly known as:  Madi Edwin 2 Sprays by Both Nostrils route daily. furosemide 20 mg tablet Commonly known as:  LASIX Take 2 Tabs by mouth daily. gabapentin 100 mg capsule Commonly known as:  NEURONTIN  
1 cap po twice daily with breakfast and dinner, and 3 tabs po qHS  
  
 hydrALAZINE 100 mg tablet Commonly known as:  APRESOLINE Take 1 Tab by mouth three (3) times daily. insulin glargine 100 unit/mL injection Commonly known as:  LANTUS U-100 INSULIN  
7 Units by SubCUTAneous route daily. 15 units in the evening. insulin lispro 100 unit/mL kwikpen Commonly known as:  HUMALOG  
15 Units by SubCUTAneous route daily. isosorbide dinitrate 10 mg tablet Commonly known as:  ISORDIL Take 2 Tabs by mouth three (3) times daily. mometasone-formoterol 200-5 mcg/actuation HFA inhaler Commonly known as:  Everrett Sequin Take 2 Puffs by inhalation two (2) times a day. pravastatin 20 mg tablet Commonly known as:  PRAVACHOL Take 20 mg by mouth nightly. [facility] OP Administer 1 Drop to both eyes six (6) times daily. spironolactone 50 mg tablet Commonly known as:  ALDACTONE Take 1 Tab by mouth two (2) times a day. TYLENOL EXTRA STRENGTH 500 mg tablet Generic drug:  acetaminophen Take 1,000 mg by mouth every six (6) hours as needed for Pain. VENTOLIN HFA 90 mcg/actuation inhaler Generic drug:  albuterol Take 2 Puffs by inhalation every four (4) hours as needed for Wheezing or Shortness of Breath. We Performed the Following AMB POC HEMOGLOBIN A1C [03007 CPT(R)] REFERRAL TO ENDOCRINOLOGY [VHJ23 Custom] Comments:  
 Please evaluate and treat patient for diabetes To-Do List   
 05/21/2018 Lab:  HEMOGLOBIN A1C WITH EAG   
  
 05/22/2018 To Be Determined   Appointment with Joel Boudreaux RN at [address] [place] [address]  
  
 05/24/2018 To Be Determined Appointment with Michelle Ruvalcaba RN at [address]  
  
 05/29/2018 To Be Determined Appointment with Michelle Ruvalcaba RN at [address] [facility] Referral Information Referral ID Referred By Referred To  
  
 [id number] [facility] [address] [place], [address] [address] Phone: [phone] Fax: [phone] Visits Status Start Date End Date 1 New Request 5/21/18 5/21/19 If your referral has a status of pending review or denied, additional information will be sent to support the outcome of this decision. Patient Instructions Please contact our office if you have any questions about your visit today. Learning About Heart Failure What is heart failure? Heart failure means that your heart muscle does not pump as much blood as your body needs. Failure does not mean that your heart has stopped. It means that your heart is not pumping as well as it should. Your body has an amazing ability to make up for heart failure. It may do such a good job that you don't know you have a disease. But at some point, your heart and body will no longer be able to keep up. Then fluid starts to build up in your lungs and other parts of your body. What can you expect when you have heart failure? Heart failure is a lifelong (chronic) disease. Treatment may be able to slow the disease and help you feel better. But heart failure tends to get worse over time. Despite this, there are many steps you can take to feel better and stay healthy longer. Early on, your symptoms may not be too bad. As heart failure gets worse, symptoms typically get worse, and you may need to limit your activities. Heart failure can also get worse suddenly.  If this happens, you need emergency care. Then, after treatment, your symptoms may go back to being stable (which means they stay the same) for a long time. Heart failure can lead to other health problems, such as heart rhythm problems. Over time, your treatment options may change, especially as your symptoms get worse. As heart failure gets worse, palliative care can help improve the quality of your life. You can do advance care planning todecide what kind of care you want at the end of your life. What are the symptoms? Symptoms of heart failure start to happen when your heart can't pump enough blood to the rest of your body. In the early stages of heart failure, you may: · Feel tired easily. · Be short of breath when you exert yourself. · Feel like your heart is pounding or racing (palpitations). · Feel weak or dizzy. As heart failure gets worse, fluid starts to build up in your lungs and other parts of your body. This may cause you to: · Feel short of breath even at rest. 
· Have swelling (edema), especially in your legs, ankles, and feet. · Gain weight. This may happen over just a day or two, or more slowly. · Cough or wheeze, especially when you lie down. How is heart failure treated? · You'll probably take several medicines. · You might attend cardiac rehabilitation (rehab) to get education and support that help you make lifestyle changes and stay as healthy as possible. · You may get a heart device. A pacemaker helps your heart pump blood. An ICD can stop abnormal heart rhythms. How can you care for yourself? There are many steps you can take to feel better and stay healthy longer. These steps are an important part of treatment. They can help you stay active and enjoy life. · Take your medicine the right way. Avoid medicines that can make your symptoms worse. · Check your weight and symptoms every day. Know what to do if your symptoms get worse. · Limit sodium. This helps keep fluid from building up. It may help you feel better. · Be active. Exercise regularly, but don't exercise too hard. · Be heart-healthy. Eat healthy foods, stay at a healthy weight, limit alcohol, and don't smoke. · Stay as healthy as possible. Avoid colds and flu, get help for depression and anxiety, and manage stress. Follow-up care is a key part of your treatment and safety. Be sure to make and go to all appointments, and call your doctor if you are having problems. It's also a good idea to know your test results and keep a list of the medicines you take. Where can you learn more? Go to http://chanel-connor.info/. Enter K431 in the search box to learn more about \"Learning About Heart Failure. \" Current as of: September 21, 2016 Content Version: 11.4 © 3422-7009 [employer]. Care instructions adapted under license by [employer] (which disclaims liability or warranty for this information). If you have questions about a medical condition or this instruction, always ask your healthcare professional. [address] any warranty or liability for your use of this information. Introducing [facility]! Lowell Luis introduces [employer] patient portal. Now you can access parts of your medical record, email your doctor's office, and request medication refills online. 1. In your internet browser, go to [email or web address] 2. Click on the First Time User? Click Here link in the Sign In box. You will see the New Member Sign Up page. 3. Enter your [employer] Access Code exactly as it appears below. You will not need to use this code after youve completed the sign-up process. If you do not sign up before the expiration date, you must request a new code. · [employer] Access Code: [email or web address] Expires: 8/3/2018  9:25 AM 
 
 4. Enter the last four digits of your Social Security Number (xxxx) and Date of Birth (mm/dd/yyyy) as indicated and click Submit. You will be taken to the next sign-up page. 5. Create a [employer] Factory ID. This will be your [employer] login ID and cannot be changed, so think of one that is secure and easy to remember. 6. Create a [employer] password. You can change your password at any time. 7. Enter your Password Reset Question and Answer. This can be used at a later time if you forget your password. 8. Enter your e-mail address. You will receive e-mail notification when new information is available in [address] 9. Click Sign Up. You can now view and download portions of your medical record. 10. Click the Download Summary menu link to download a portable copy of your medical information. If you have questions, please visit the Frequently Asked Questions section of the [employer] website. Remember, [employer] is NOT to be used for urgent needs. For medical emergencies, dial 911. Now available from your iPhone and Android! Please provide this summary of care documentation to your next provider. Your primary care clinician is listed as Mary Ann Le. If you have any questions after today's visit, please call [phone].

## 2018-05-21 NOTE — PROGRESS NOTES
Chief Complaint   Patient presents with    New Patient     . 1. Have you been to the ER, urgent care clinic since your last visit? Hospitalized since your last visit? Yes When: 5/15/2018 Where: [facility] Reason for visit: Acute cystitis without hematuria    2. Have you seen or consulted any other health care providers outside of the [address] [place] [address] since your last visit? Include any pap smears or colon screening. No     Health Maintenance Due   Topic Date Due    Hepatitis C Screening  1955    FOOT EXAM Q1  01/21/1963    MICROALBUMIN Q1  01/21/1963    EYE EXAM RETINAL OR DILATED Q1  01/21/1963    DTaP/Tdap/Td series (1 - Tdap) 01/21/1974    FOBT Q 1 YEAR AGE 50-75  01/21/2003    ZOSTER VACCINE AGE 60>  11/21/2012    GLAUCOMA SCREENING Q2Y  01/21/2018    Bone Densitometry (Dexa) Screening  01/21/2018    Pneumococcal 65+ Low/Medium Risk (1 of 2 - PCV13) 01/21/2018    MEDICARE YEARLY EXAM  03/14/2018         Patient being seen in office today to establish care. Patient was brought in by her son. Patient son would like patient to be seen post establish care visit by Provider Sanjay. Patient has been informed by Provider Lefty Maldonado that she will see patient Cindy Prasad today to establish care and has told patients son that she recommends that the patient be seen in a week to follow up. Patient son stated to provider Lefty Maldonado that he understood but wanted to have his mother see his PCP Provider Sanjay in the future. Patient to be scheduled an appointment in June 2018 with Dr Geo Mcdonnell.

## 2018-05-21 NOTE — PATIENT INSTRUCTIONS
Please contact our office if you have any questions about your visit today. Learning About Heart Failure  What is heart failure? Heart failure means that your heart muscle does not pump as much blood as your body needs. Failure does not mean that your heart has stopped. It means that your heart is not pumping as well as it should. Your body has an amazing ability to make up for heart failure. It may do such a good job that you don't know you have a disease. But at some point, your heart and body will no longer be able to keep up. Then fluid starts to build up in your lungs and other parts of your body. What can you expect when you have heart failure? Heart failure is a lifelong (chronic) disease. Treatment may be able to slow the disease and help you feel better. But heart failure tends to get worse over time. Despite this, there are many steps you can take to feel better and stay healthy longer. Early on, your symptoms may not be too bad. As heart failure gets worse, symptoms typically get worse, and you may need to limit your activities. Heart failure can also get worse suddenly. If this happens, you need emergency care. Then, after treatment, your symptoms may go back to being stable (which means they stay the same) for a long time. Heart failure can lead to other health problems, such as heart rhythm problems. Over time, your treatment options may change, especially as your symptoms get worse. As heart failure gets worse, palliative care can help improve the quality of your life. You can do advance care planning todecide what kind of care you want at the end of your life. What are the symptoms? Symptoms of heart failure start to happen when your heart can't pump enough blood to the rest of your body. In the early stages of heart failure, you may:  · Feel tired easily. · Be short of breath when you exert yourself. · Feel like your heart is pounding or racing (palpitations).   · Feel weak or dizzy. As heart failure gets worse, fluid starts to build up in your lungs and other parts of your body. This may cause you to:  · Feel short of breath even at rest.  · Have swelling (edema), especially in your legs, ankles, and feet. · Gain weight. This may happen over just a day or two, or more slowly. · Cough or wheeze, especially when you lie down. How is heart failure treated? · You'll probably take several medicines. · You might attend cardiac rehabilitation (rehab) to get education and support that help you make lifestyle changes and stay as healthy as possible. · You may get a heart device. A pacemaker helps your heart pump blood. An ICD can stop abnormal heart rhythms. How can you care for yourself? There are many steps you can take to feel better and stay healthy longer. These steps are an important part of treatment. They can help you stay active and enjoy life. · Take your medicine the right way. Avoid medicines that can make your symptoms worse. · Check your weight and symptoms every day. Know what to do if your symptoms get worse. · Limit sodium. This helps keep fluid from building up. It may help you feel better. · Be active. Exercise regularly, but don't exercise too hard. · Be heart-healthy. Eat healthy foods, stay at a healthy weight, limit alcohol, and don't smoke. · Stay as healthy as possible. Avoid colds and flu, get help for depression and anxiety, and manage stress. Follow-up care is a key part of your treatment and safety. Be sure to make and go to all appointments, and call your doctor if you are having problems. It's also a good idea to know your test results and keep a list of the medicines you take. Where can you learn more? Go to http://chanel-connor.info/. Enter R346 in the search box to learn more about \"Learning About Heart Failure. \"  Current as of: September 21, 2016  Content Version: 11.4  © 1653-7246 [employer]wise[employer].  Care instructions adapted under license by [employer] (which disclaims liability or warranty for this information). If you have questions about a medical condition or this instruction, always ask your healthcare professional. [address] any warranty or liability for your use of this information.

## 2018-05-22 ENCOUNTER — HOME CARE VISIT (OUTPATIENT)
Dept: SCHEDULING | Facility: HOME HEALTH | Age: 63
End: 2018-05-22
Payer: MEDICARE

## 2018-05-22 PROCEDURE — G0151 HHCP-SERV OF PT,EA 15 MIN: HCPCS

## 2018-05-22 PROCEDURE — 3331090001 HH PPS REVENUE CREDIT

## 2018-05-22 PROCEDURE — G0300 HHS/HOSPICE OF LPN EA 15 MIN: HCPCS

## 2018-05-22 PROCEDURE — 3331090002 HH PPS REVENUE DEBIT

## 2018-05-23 ENCOUNTER — HOME CARE VISIT (OUTPATIENT)
Dept: HOME HEALTH SERVICES | Facility: HOME HEALTH | Age: 63
End: 2018-05-23
Payer: MEDICARE

## 2018-05-23 VITALS
HEIGHT: 69 IN | BODY MASS INDEX: 25.18 KG/M2 | WEIGHT: 170 LBS | TEMPERATURE: 97.8 F | SYSTOLIC BLOOD PRESSURE: 170 MMHG | OXYGEN SATURATION: 98 % | RESPIRATION RATE: 18 BRPM | HEART RATE: 77 BPM | DIASTOLIC BLOOD PRESSURE: 110 MMHG

## 2018-05-23 PROCEDURE — 3331090001 HH PPS REVENUE CREDIT

## 2018-05-23 PROCEDURE — 3331090002 HH PPS REVENUE DEBIT

## 2018-05-24 ENCOUNTER — HOME CARE VISIT (OUTPATIENT)
Dept: SCHEDULING | Facility: HOME HEALTH | Age: 63
End: 2018-05-24
Payer: MEDICARE

## 2018-05-24 PROCEDURE — 3331090001 HH PPS REVENUE CREDIT

## 2018-05-24 PROCEDURE — G0299 HHS/HOSPICE OF RN EA 15 MIN: HCPCS

## 2018-05-24 PROCEDURE — 3331090002 HH PPS REVENUE DEBIT

## 2018-05-25 ENCOUNTER — PATIENT OUTREACH (OUTPATIENT)
Dept: CARDIOLOGY CLINIC | Age: 63
End: 2018-05-25

## 2018-05-25 ENCOUNTER — HOME CARE VISIT (OUTPATIENT)
Dept: SCHEDULING | Facility: HOME HEALTH | Age: 63
End: 2018-05-25
Payer: MEDICARE

## 2018-05-25 PROCEDURE — G0157 HHC PT ASSISTANT EA 15: HCPCS

## 2018-05-25 PROCEDURE — 3331090001 HH PPS REVENUE CREDIT

## 2018-05-25 PROCEDURE — 3331090002 HH PPS REVENUE DEBIT

## 2018-05-25 NOTE — PROGRESS NOTES
NN contacted the patient by telephone to perform CHF follow Up. Noted Priorities/Plan: To continue to weigh daily and watch sodium intake more closely over the holiday weekend. Daily Weight: 160 (decrease)  Zone: green   Signs/Symptoms: Edema none; SOB a little this morning but has resolved. Goals      Maintains daily weight.            5/16/18 Patient to provide teach back on daily weights and what to do when gain is 3 lbs in a day and 5 lbs in a week by 6/11/18.  Patient verbalizes understanding of self-management goals of living with Congestive Heart Failure            5/16/18 Patient to verbalize heart failure zones and recognize red flags and report to physician/NN by 6/25/18       Understands and adheres to diet. 5/16/18 Patient will demonstrate understanding of low sodium diet and its effects on the heart by 7/6/18. Needs addressed from pathway:   Week 1-4   Provide Daily Disease Management  (NN initiated)  ? Daily weight (Before Breakfast-  Daily Zone Identification (symptom management; weight, edema, SOB, activity/sleep changes)-notify provider immediately as indicated  ? Cardiac Low-sodium Diet (No added sodium; 1500mg as indicated). If  ? Confirm follow-up appointments/transportation. Reschedule if needed. Additional assessment   ? Activity tolerance assessment   (eg: Vital signs; level of consciousness; dyspnea on exertion; pillow usage; recliner vs bed)   ? Energy conservation management (balance activity with rest)  ? Labs/diagnostics *as indicated  ? Medication Therapy *as directed  Psychosocial: Reassurance/emotional support   Monitoring:  ? Home health  Education:  ? Advanced care planning status   ? Support system identification ( eg: Caregiver, meal planning, community resources, family, friends, Mosque, support group)   ? Health literacy for heart failure  ?  Caregiver education and preparation           Have you been to an ER/Hospital since discharge from [facility]? No      Have you followed up with PCP/Cardiologist/Specialist? Has attended follow up appt with PCP. Transportation: son/daughter  Diet: diabetic/cardiac/low sodium  Activity/ADLs: able to perform ADL's with help from daughter. Medications Reconciled at this time:  at f/u appt on 5/21/18  Home health:  Company/Completion: [address]: family    Advanced Care Plan: (if not addressed)    Patient reminded that there is a physician on call 24 hours a day / 7 days a week (M-F 5pm to 8am and from Friday 5pm until Monday 8a for the weekend) should the patient have questions or concerns. Patient reminded to call 911 if situation is emergent or patient feels the situation is emergent. Pt verbalizes understanding.

## 2018-05-26 PROCEDURE — 3331090001 HH PPS REVENUE CREDIT

## 2018-05-26 PROCEDURE — 3331090002 HH PPS REVENUE DEBIT

## 2018-05-27 VITALS
OXYGEN SATURATION: 99 % | DIASTOLIC BLOOD PRESSURE: 88 MMHG | TEMPERATURE: 97.7 F | RESPIRATION RATE: 20 BRPM | HEART RATE: 71 BPM | SYSTOLIC BLOOD PRESSURE: 190 MMHG

## 2018-05-27 PROCEDURE — 3331090002 HH PPS REVENUE DEBIT

## 2018-05-27 PROCEDURE — 3331090001 HH PPS REVENUE CREDIT

## 2018-05-28 ENCOUNTER — HOME CARE VISIT (OUTPATIENT)
Dept: SCHEDULING | Facility: HOME HEALTH | Age: 63
End: 2018-05-28
Payer: MEDICARE

## 2018-05-28 VITALS
OXYGEN SATURATION: 89 % | HEART RATE: 74 BPM | TEMPERATURE: 98.3 F | SYSTOLIC BLOOD PRESSURE: 90 MMHG | DIASTOLIC BLOOD PRESSURE: 110 MMHG | SYSTOLIC BLOOD PRESSURE: 140 MMHG | DIASTOLIC BLOOD PRESSURE: 54 MMHG | HEART RATE: 75 BPM | TEMPERATURE: 97.8 F | OXYGEN SATURATION: 98 %

## 2018-05-28 PROCEDURE — G0157 HHC PT ASSISTANT EA 15: HCPCS

## 2018-05-28 PROCEDURE — 3331090002 HH PPS REVENUE DEBIT

## 2018-05-28 PROCEDURE — 3331090001 HH PPS REVENUE CREDIT

## 2018-05-29 ENCOUNTER — HOME CARE VISIT (OUTPATIENT)
Dept: SCHEDULING | Facility: HOME HEALTH | Age: 63
End: 2018-05-29
Payer: MEDICARE

## 2018-05-29 ENCOUNTER — PATIENT OUTREACH (OUTPATIENT)
Dept: CARDIOLOGY CLINIC | Age: 63
End: 2018-05-29

## 2018-05-29 ENCOUNTER — HOME CARE VISIT (OUTPATIENT)
Dept: HOME HEALTH SERVICES | Facility: HOME HEALTH | Age: 63
End: 2018-05-29
Payer: MEDICARE

## 2018-05-29 VITALS
HEART RATE: 71 BPM | TEMPERATURE: 97.7 F | RESPIRATION RATE: 18 BRPM | DIASTOLIC BLOOD PRESSURE: 70 MMHG | OXYGEN SATURATION: 99 % | SYSTOLIC BLOOD PRESSURE: 132 MMHG

## 2018-05-29 PROCEDURE — 3331090001 HH PPS REVENUE CREDIT

## 2018-05-29 PROCEDURE — G0495 RN CARE TRAIN/EDU IN HH: HCPCS

## 2018-05-29 PROCEDURE — 3331090002 HH PPS REVENUE DEBIT

## 2018-05-29 PROCEDURE — G0299 HHS/HOSPICE OF RN EA 15 MIN: HCPCS

## 2018-05-30 ENCOUNTER — HOME CARE VISIT (OUTPATIENT)
Dept: SCHEDULING | Facility: HOME HEALTH | Age: 63
End: 2018-05-30
Payer: MEDICARE

## 2018-05-30 ENCOUNTER — TELEPHONE (OUTPATIENT)
Dept: FAMILY MEDICINE CLINIC | Age: 63
End: 2018-05-30

## 2018-05-30 VITALS
HEART RATE: 74 BPM | SYSTOLIC BLOOD PRESSURE: 82 MMHG | OXYGEN SATURATION: 98 % | DIASTOLIC BLOOD PRESSURE: 54 MMHG | TEMPERATURE: 98.4 F

## 2018-05-30 VITALS
DIASTOLIC BLOOD PRESSURE: 69 MMHG | BODY MASS INDEX: 23.63 KG/M2 | SYSTOLIC BLOOD PRESSURE: 114 MMHG | HEART RATE: 75 BPM | WEIGHT: 160 LBS | TEMPERATURE: 97.6 F | OXYGEN SATURATION: 97 %

## 2018-05-30 VITALS
WEIGHT: 161 LBS | SYSTOLIC BLOOD PRESSURE: 99 MMHG | DIASTOLIC BLOOD PRESSURE: 61 MMHG | OXYGEN SATURATION: 96 % | HEART RATE: 76 BPM | BODY MASS INDEX: 23.78 KG/M2

## 2018-05-30 PROCEDURE — 3331090002 HH PPS REVENUE DEBIT

## 2018-05-30 PROCEDURE — G0157 HHC PT ASSISTANT EA 15: HCPCS

## 2018-05-30 PROCEDURE — 3331090001 HH PPS REVENUE CREDIT

## 2018-05-30 NOTE — TELEPHONE ENCOUNTER
Chief Complaint   Patient presents with    Hypotension     WITH SOB     Call received from [facility] nurse. 2 identifiers given for the patient. [facility] nurse reports BP readings of 85/50, 82/54 , and 81/51. Patient also complaining of SOB while on oxygen. [facility] Nurse informed that the patient will need to go to the ER for further evaluation. [facility] nurse verbalizes understanding.

## 2018-05-31 PROCEDURE — 3331090002 HH PPS REVENUE DEBIT

## 2018-05-31 PROCEDURE — 3331090001 HH PPS REVENUE CREDIT

## 2018-06-01 ENCOUNTER — HOME CARE VISIT (OUTPATIENT)
Dept: SCHEDULING | Facility: HOME HEALTH | Age: 63
End: 2018-06-01
Payer: MEDICARE

## 2018-06-01 PROCEDURE — G0157 HHC PT ASSISTANT EA 15: HCPCS

## 2018-06-01 PROCEDURE — 3331090002 HH PPS REVENUE DEBIT

## 2018-06-01 PROCEDURE — 3331090001 HH PPS REVENUE CREDIT

## 2018-06-02 PROCEDURE — 3331090002 HH PPS REVENUE DEBIT

## 2018-06-02 PROCEDURE — 3331090001 HH PPS REVENUE CREDIT

## 2018-06-03 PROCEDURE — 3331090002 HH PPS REVENUE DEBIT

## 2018-06-03 PROCEDURE — 3331090001 HH PPS REVENUE CREDIT

## 2018-06-04 ENCOUNTER — HOME CARE VISIT (OUTPATIENT)
Dept: SCHEDULING | Facility: HOME HEALTH | Age: 63
End: 2018-06-04
Payer: MEDICARE

## 2018-06-04 ENCOUNTER — OFFICE VISIT (OUTPATIENT)
Dept: CARDIOLOGY CLINIC | Age: 63
End: 2018-06-04

## 2018-06-04 VITALS
OXYGEN SATURATION: 98 % | HEART RATE: 72 BPM | DIASTOLIC BLOOD PRESSURE: 80 MMHG | TEMPERATURE: 97.8 F | SYSTOLIC BLOOD PRESSURE: 140 MMHG

## 2018-06-04 VITALS
OXYGEN SATURATION: 97 % | HEART RATE: 76 BPM | HEIGHT: 69 IN | SYSTOLIC BLOOD PRESSURE: 146 MMHG | BODY MASS INDEX: 22.22 KG/M2 | WEIGHT: 150 LBS | DIASTOLIC BLOOD PRESSURE: 80 MMHG

## 2018-06-04 VITALS
TEMPERATURE: 98 F | OXYGEN SATURATION: 97 % | DIASTOLIC BLOOD PRESSURE: 60 MMHG | SYSTOLIC BLOOD PRESSURE: 90 MMHG | HEART RATE: 77 BPM

## 2018-06-04 DIAGNOSIS — F32.A ANXIETY AND DEPRESSION: Chronic | ICD-10-CM

## 2018-06-04 DIAGNOSIS — I50.23 ACUTE ON CHRONIC SYSTOLIC (CONGESTIVE) HEART FAILURE (HCC): ICD-10-CM

## 2018-06-04 DIAGNOSIS — F41.9 ANXIETY AND DEPRESSION: Chronic | ICD-10-CM

## 2018-06-04 DIAGNOSIS — I16.1 HYPERTENSIVE EMERGENCY: ICD-10-CM

## 2018-06-04 DIAGNOSIS — I10 ESSENTIAL HYPERTENSION, BENIGN: ICD-10-CM

## 2018-06-04 DIAGNOSIS — I50.9 CONGESTIVE HEART FAILURE, UNSPECIFIED HF CHRONICITY, UNSPECIFIED HEART FAILURE TYPE (HCC): Primary | Chronic | ICD-10-CM

## 2018-06-04 PROCEDURE — 3331090001 HH PPS REVENUE CREDIT

## 2018-06-04 PROCEDURE — G0157 HHC PT ASSISTANT EA 15: HCPCS

## 2018-06-04 PROCEDURE — 3331090002 HH PPS REVENUE DEBIT

## 2018-06-04 RX ORDER — FUROSEMIDE 20 MG/1
20 TABLET ORAL 2 TIMES DAILY
Qty: 60 TAB | Refills: 8 | Status: SHIPPED | OUTPATIENT
Start: 2018-06-04 | End: 2020-01-01

## 2018-06-04 NOTE — MR AVS SNAPSHOT
[facility] [address] [id number] [address] [id number] [phone] Patient: Marden Hatchet MRN: [id number] Visit Information Date & Time Provider Department Dept. Phone Encounter #  
 6/4/2018  2:00 PM Zoraida Sandy NP [facility] [address] [id number] Your Appointments 6/6/2018 12:00 PM  
Follow Up with Vladimir Marr MD  
[facility] (--) Appt Note: bp evaluation Iris Catalan [facility] [address] [address]  
  
    
 6/21/2018 11:15 AM  
Follow Up with Vladimir Marr MD  
[facility] (--) Appt Note: Follow up Iris 57 [id number] [address] [id number] [phone]  
  
    
 7/5/2018 10:40 AM  
POST HOSPITAL with Flaca Palomino MD  
[facility] [place] [address] ([facility]) Appt Note: CVT  Stepdown [facility] [address] [address] [address] [phone] [address] [place] [address] [address] Upcoming Health Maintenance Date Due Hepatitis C Screening 1955 FOOT EXAM Q1 1/21/1963 MICROALBUMIN Q1 1/21/1963 EYE EXAM RETINAL OR DILATED Q1 1/21/1963 DTaP/Tdap/Td series (1 - Tdap) 1/21/1974 FOBT Q 1 YEAR AGE 50-75 1/21/2003 ZOSTER VACCINE AGE 60> 11/21/2012 GLAUCOMA SCREENING Q2Y 1/21/2018 Bone Densitometry (Dexa) Screening 1/21/2018 Pneumococcal 65+ Low/Medium Risk (1 of 2 - PCV13) 1/21/2018 MEDICARE YEARLY EXAM 3/14/2018 Influenza Age 5 to Adult 8/1/2018 LIPID PANEL Q1 9/27/2018 BREAST CANCER SCRN MAMMOGRAM 10/5/2018 HEMOGLOBIN A1C Q6M 11/6/2018 Allergies as of 6/4/2018  Review Complete On: 6/4/2018 By: Zoraida Sandy NP Severity Noted Reaction Type Reactions Codeine  03/01/2017    Nausea Only Sulfa (Sulfonamide Antibiotics)  01/27/2012   Side Effect Rash Current Immunizations  Never Reviewed No immunizations on file. Not reviewed this visit You Were Diagnosed With   
  
 Codes Comments Congestive heart failure, unspecified HF chronicity, unspecified heart failure type ([facility] [address]    -  Primary ICD-10-CM: I50.9 ICD-9-CM: 428.0 Hypertensive emergency     ICD-10-CM: I16.1 ICD-9-CM: 401.9 Acute on chronic systolic (congestive) heart failure (HCC)     ICD-10-CM: Q45.86 ICD-9-CM: 428.23, 428.0 Essential hypertension, benign     ICD-10-CM: I10 
ICD-9-CM: 401.1 Anxiety and depression     ICD-10-CM: F41.9, F32.9 ICD-9-CM: 300.00, 311 Vitals BP Pulse Height(growth percentile) Weight(growth percentile) SpO2 BMI  
 146/80 76 5' 9\" (1.753 m) 150 lb (68 kg) 97% 22.15 kg/m2 OB Status Smoking Status Postmenopausal Never Smoker Vitals History BMI and BSA Data Body Mass Index Body Surface Area  
 22.15 kg/m 2 1.82 m 2 Preferred Pharmacy Pharmacy Name Phone [address] [phone] Your Updated Medication List  
  
   
This list is accurate as of 6/4/18  3:00 PM.  Always use your most recent med list. amLODIPine 5 mg tablet Commonly known as:  [place] Bars Take 1 Tab by mouth daily. aspirin delayed-release 81 mg tablet Take 81 mg by mouth daily. carvedilol 25 mg tablet Commonly known as:  Sobia Ni Take 1 Tab by mouth two (2) times daily (with meals). fluticasone 50 mcg/actuation nasal spray Commonly known as:  [place] 2 Sprays by Both Nostrils route daily. furosemide 20 mg tablet Commonly known as:  LASIX Take 2 Tabs by mouth daily. gabapentin 100 mg capsule Commonly known as:  NEURONTIN  
1 cap po twice daily with breakfast and dinner, and 3 tabs po qHS  
  
 hydrALAZINE 100 mg tablet Commonly known as:  APRESOLINE Take 1 Tab by mouth three (3) times daily. insulin glargine 100 unit/mL injection Commonly known as:  LANTUS U-100 INSULIN  
7 Units by SubCUTAneous route daily. 15 units in the evening. insulin lispro 100 unit/mL kwikpen Commonly known as:  HUMALOG  
15 Units by SubCUTAneous route daily. isosorbide dinitrate 10 mg tablet Commonly known as:  ISORDIL Take 2 Tabs by mouth three (3) times daily. mometasone-formoterol 200-5 mcg/actuation HFA inhaler Commonly known as:  [place] Take 2 Puffs by inhalation two (2) times a day. pravastatin 20 mg tablet Commonly known as:  PRAVACHOL Take 20 mg by mouth nightly. [address] OP Administer 1 Drop to both eyes six (6) times daily. spironolactone 50 mg tablet Commonly known as:  ALDACTONE Take 1 Tab by mouth two (2) times a day. TYLENOL EXTRA STRENGTH 500 mg tablet Generic drug:  acetaminophen Take 1,000 mg by mouth every six (6) hours as needed for Pain. VENTOLIN HFA 90 mcg/actuation inhaler Generic drug:  albuterol Take 2 Puffs by inhalation every four (4) hours as needed for Wheezing or Shortness of Breath. We Performed the Following AMB POC EKG ROUTINE W/ 12 LEADS, INTER & REP [33093 CPT(R)] To-Do List   
 06/05/2018 To Be Determined Appointment with Allie New RN at [address] [place] [address] MED CTR  
  
 06/06/2018 11:00 AM  
  Appointment with Delroy Zimmerman at [address] [place] [address] SCHEDULING/INTAKE  
  
 06/08/2018 11:00 AM  
  Appointment with Delroy Zimmerman at [address] [place] [address] SCHEDULING/INTAKE  
  
 06/11/2018 To Be Determined Appointment with Delroy Zimmerman at [address] [place] [address] MED CTR  
  
 06/13/2018 To Be Determined   Appointment with Nick Chowdhury PT at [address] [place] [address]  
  
  
 Patient Instructions Switch amlodipine (Norvasc) to evening dosing Three times a day medications should be given about 8 hours apart All other medications to remain the same Follow-up with Dr. Day Castañeda as scheduled on 6/21/18 Follow-up with Dr. Tico Maxwell as scheduled and as needed. Weigh daily and record Limit sodium intake to 2000mg per day Limit fluid intake to no more than  6, eight ounce glasses of any type of fluids per day (total of 48 ounces per day) Call if you notice sudden or progressive weight gain (3-5 pounds in 2-3 days), increasing shortness of breath, abdominal bloating, increasing lower extremity edema, inability to lie flat or on your normal number of pillows, having to sit up to catch your breath, fatigue, increased somnolence (sleeping more), poor appetite High Blood Pressure: Care Instructions Your Care Instructions If your blood pressure is usually above 140/90, you have high blood pressure, or hypertension. That means the top number is 140 or higher or the bottom number is 90 or higher, or both. Despite what a lot of people think, high blood pressure usually doesn't cause headaches or make you feel dizzy or lightheaded. It usually has no symptoms. But it does increase your risk for heart attack, stroke, and kidney or eye damage. The higher your blood pressure, the more your risk increases. Your doctor will give you a goal for your blood pressure. Your goal will be based on your health and your age. An example of a goal is to keep your blood pressure below 140/90. Lifestyle changes, such as eating healthy and being active, are always important to help lower blood pressure. You might also take medicine to reach your blood pressure goal. 
Follow-up care is a key part of your treatment and safety. Be sure to make and go to all appointments, and call your doctor if you are having problems. It's also a good idea to know your test results and keep a list of the medicines you take. How can you care for yourself at home? Medical treatment · If you stop taking your medicine, your blood pressure will go back up. You may take one or more types of medicine to lower your blood pressure. Be safe with medicines. Take your medicine exactly as prescribed. Call your doctor if you think you are having a problem with your medicine. · Talk to your doctor before you start taking aspirin every day. Aspirin can help certain people lower their risk of a heart attack or stroke. But taking aspirin isn't right for everyone, because it can cause serious bleeding. · See your doctor regularly. You may need to see the doctor more often at first or until your blood pressure comes down. · If you are taking blood pressure medicine, talk to your doctor before you take decongestants or anti-inflammatory medicine, such as ibuprofen. Some of these medicines can raise blood pressure. · Learn how to check your blood pressure at home. Lifestyle changes · Stay at a healthy weight. This is especially important if you put on weight around the waist. Losing even 10 pounds can help you lower your blood pressure. · If your doctor recommends it, get more exercise. Walking is a good choice. Bit by bit, increase the amount you walk every day. Try for at least 30 minutes on most days of the week. You also may want to swim, bike, or do other activities. · Avoid or limit alcohol. Talk to your doctor about whether you can drink any alcohol. · Try to limit how much sodium you eat to less than 2,300 milligrams (mg) a day. Your doctor may ask you to try to eat less than 1,500 mg a day. · Eat plenty of fruits (such as bananas and oranges), vegetables, legumes, whole grains, and low-fat dairy products. · Lower the amount of saturated fat in your diet. Saturated fat is found in animal products such as milk, cheese, and meat. Limiting these foods may help you lose weight and also lower your risk for heart disease. · Do not smoke. Smoking increases your risk for heart attack and stroke. If you need help quitting, talk to your doctor about stop-smoking programs and medicines. These can increase your chances of quitting for good. When should you call for help? Call 911 anytime you think you may need emergency care. This may mean having symptoms that suggest that your blood pressure is causing a serious heart or blood vessel problem. Your blood pressure may be over 180/110. ? For example, call 911 if: 
? · You have symptoms of a heart attack. These may include: ¨ Chest pain or pressure, or a strange feeling in the chest. 
¨ Sweating. ¨ Shortness of breath. ¨ Nausea or vomiting. ¨ Pain, pressure, or a strange feeling in the back, neck, jaw, or upper belly or in one or both shoulders or arms. ¨ Lightheadedness or sudden weakness. ¨ A fast or irregular heartbeat. ? · You have symptoms of a stroke. These may include: 
¨ Sudden numbness, tingling, weakness, or loss of movement in your face, arm, or leg, especially on only one side of your body. ¨ Sudden vision changes. ¨ Sudden trouble speaking. ¨ Sudden confusion or trouble understanding simple statements. ¨ Sudden problems with walking or balance. ¨ A sudden, severe headache that is different from past headaches. ? · You have severe back or belly pain. ?Do not wait until your blood pressure comes down on its own. Get help right away. ?Call your doctor now or seek immediate care if: 
? · Your blood pressure is much higher than normal (such as 180/110 or higher), but you don't have symptoms. ? · You think high blood pressure is causing symptoms, such as: ¨ Severe headache. ¨ Blurry vision. ? Watch closely for changes in your health, and be sure to contact your doctor if: 
? · Your blood pressure measures 140/90 or higher at least 2 times. That means the top number is 140 or higher or the bottom number is 90 or higher, or both. ? · You think you may be having side effects from your blood pressure medicine. ? · Your blood pressure is usually normal, but it goes above normal at least 2 times. Where can you learn more? Go to http://chanel-connor.info/. Enter Y188 in the search box to learn more about \"High Blood Pressure: Care Instructions. \" Current as of: September 21, 2016 Content Version: 11.4 © 6406-3024 [employer]. Care instructions adapted under license by [employer] (which disclaims liability or warranty for this information). If you have questions about a medical condition or this instruction, always ask your healthcare professional. [address] any warranty or liability for your use of this information. DASH Diet: Care Instructions Your Care Instructions The DASH diet is an eating plan that can help lower your blood pressure. DASH stands for Dietary Approaches to Stop Hypertension. Hypertension is high blood pressure. The DASH diet focuses on eating foods that are high in calcium, potassium, and magnesium. These nutrients can lower blood pressure. The foods that are highest in these nutrients are fruits, vegetables, low-fat dairy products, nuts, seeds, and legumes. But taking calcium, potassium, and magnesium supplements instead of eating foods that are high in those nutrients does not have the same effect. The DASH diet also includes whole grains, fish, and poultry. The DASH diet is one of several lifestyle changes your doctor may recommend to lower your high blood pressure. Your doctor may also want you to decrease the amount of sodium in your diet. Lowering sodium while following the DASH diet can lower blood pressure even further than just the DASH diet alone. Follow-up care is a key part of your treatment and safety.  Be sure to make and go to all appointments, and call your doctor if you are having problems. It's also a good idea to know your test results and keep a list of the medicines you take. How can you care for yourself at home? Following the DASH diet · Eat 4 to 5 servings of fruit each day. A serving is 1 medium-sized piece of fruit, ½ cup chopped or canned fruit, 1/4 cup dried fruit, or 4 ounces (½ cup) of fruit juice. Choose fruit more often than fruit juice. · Eat 4 to 5 servings of vegetables each day. A serving is 1 cup of lettuce or raw leafy vegetables, ½ cup of chopped or cooked vegetables, or 4 ounces (½ cup) of vegetable juice. Choose vegetables more often than vegetable juice. · Get 2 to 3 servings of low-fat and fat-free dairy each day. A serving is 8 ounces of milk, 1 cup of yogurt, or 1 ½ ounces of cheese. · Eat 6 to 8 servings of grains each day. A serving is 1 slice of bread, 1 ounce of dry cereal, or ½ cup of cooked rice, pasta, or cooked cereal. Try to choose whole-grain products as much as possible. · Limit lean meat, poultry, and fish to 2 servings each day. A serving is 3 ounces, about the size of a deck of cards. · Eat 4 to 5 servings of nuts, seeds, and legumes (cooked dried beans, lentils, and split peas) each week. A serving is 1/3 cup of nuts, 2 tablespoons of seeds, or ½ cup of cooked beans or peas. · Limit fats and oils to 2 to 3 servings each day. A serving is 1 teaspoon of vegetable oil or 2 tablespoons of salad dressing. · Limit sweets and added sugars to 5 servings or less a week. A serving is 1 tablespoon jelly or jam, ½ cup sorbet, or 1 cup of lemonade. · Eat less than 2,300 milligrams (mg) of sodium a day. If you limit your sodium to 1,500 mg a day, you can lower your blood pressure even more. Tips for success · Start small. Do not try to make dramatic changes to your diet all at once. You might feel that you are missing out on your favorite foods and then be more likely to not follow the plan.  Make small changes, and stick with them. Once those changes become habit, add a few more changes. · Try some of the following: ¨ Make it a goal to eat a fruit or vegetable at every meal and at snacks. This will make it easy to get the recommended amount of fruits and vegetables each day. ¨ Try yogurt topped with fruit and nuts for a snack or healthy dessert. ¨ Add lettuce, tomato, cucumber, and onion to sandwiches. ¨ Combine a ready-made pizza crust with low-fat mozzarella cheese and lots of vegetable toppings. Try using tomatoes, squash, spinach, broccoli, carrots, cauliflower, and onions. ¨ Have a variety of cut-up vegetables with a low-fat dip as an appetizer instead of chips and dip. ¨ Sprinkle sunflower seeds or chopped almonds over salads. Or try adding chopped walnuts or almonds to cooked vegetables. ¨ Try some vegetarian meals using beans and peas. Add garbanzo or kidney beans to salads. Make burritos and tacos with mashed durán beans or black beans. Where can you learn more? Go to http://chanelNursing Home Qualityconnor.info/. Enter I790 in the search box to learn more about \"DASH Diet: Care Instructions. \" Current as of: September 21, 2016 Content Version: 11.4 © 1752-5980 MDSave. Care instructions adapted under license by [employer] (which disclaims liability or warranty for this information). If you have questions about a medical condition or this instruction, always ask your healthcare professional. [place] [address] any warranty or liability for your use of this information. Low Sodium Diet (2,000 Milligram): Care Instructions Your Care Instructions Too much sodium causes your body to hold on to extra water. This can raise your blood pressure and force your heart and kidneys to work harder. In very serious cases, this could cause you to be put in the hospital. It might even be life-threatening.  By limiting sodium, you will feel better and lower your risk of serious problems. The most common source of sodium is salt. People get most of the salt in their diet from canned, prepared, and packaged foods. Fast food and restaurant meals also are very high in sodium. Your doctor will probably limit your sodium to less than 2,000 milligrams (mg) a day. This limit counts all the sodium in prepared and packaged foods and any salt you add to your food. Follow-up care is a key part of your treatment and safety. Be sure to make and go to all appointments, and call your doctor if you are having problems. It's also a good idea to know your test results and keep a list of the medicines you take. How can you care for yourself at home? Read food labels · Read labels on cans and food packages. The labels tell you how much sodium is in each serving. Make sure that you look at the serving size. If you eat more than the serving size, you have eaten more sodium. · Food labels also tell you the Percent Daily Value for sodium. Choose products with low Percent Daily Values for sodium. · Be aware that sodium can come in forms other than salt, including monosodium glutamate (MSG), sodium citrate, and sodium bicarbonate (baking soda). MSG is often added to Asian food. When you eat out, you can sometimes ask for food without MSG or added salt. Buy low-sodium foods · Buy foods that are labeled \"unsalted\" (no salt added), \"sodium-free\" (less than 5 mg of sodium per serving), or \"low-sodium\" (less than 140 mg of sodium per serving). Foods labeled \"reduced-sodium\" and \"light sodium\" may still have too much sodium. Be sure to read the label to see how much sodium you are getting. · Buy fresh vegetables, or frozen vegetables without added sauces. Buy low-sodium versions of canned vegetables, soups, and other canned goods. Prepare low-sodium meals · Cut back on the amount of salt you use in cooking.  This will help you adjust to the taste. Do not add salt after cooking. One teaspoon of salt has about 2,300 mg of sodium. · Take the salt shaker off the table. · Flavor your food with garlic, lemon juice, onion, vinegar, herbs, and spices. Do not use soy sauce, lite soy sauce, steak sauce, onion salt, garlic salt, celery salt, mustard, or ketchup on your food. · Use low-sodium salad dressings, sauces, and ketchup. Or make your own salad dressings and sauces without adding salt. · Use less salt (or none) when recipes call for it. You can often use half the salt a recipe calls for without losing flavor. Other foods such as rice, pasta, and grains do not need added salt. · Rinse canned vegetables, and cook them in fresh water. This removes some-but not all-of the salt. · Avoid water that is naturally high in sodium or that has been treated with water softeners, which add sodium. Call your local water company to find out the sodium content of your water supply. If you buy bottled water, read the label and choose a sodium-free brand. Avoid high-sodium foods · Avoid eating: ¨ Smoked, cured, salted, and canned meat, fish, and poultry. ¨ Ham, boykin, hot dogs, and luncheon meats. ¨ Regular, hard, and processed cheese and regular peanut butter. ¨ Crackers with salted tops, and other salted snack foods such as pretzels, chips, and salted popcorn. ¨ Frozen prepared meals, unless labeled low-sodium. ¨ Canned and dried soups, broths, and bouillon, unless labeled sodium-free or low-sodium. ¨ Canned vegetables, unless labeled sodium-free or low-sodium. ¨ [place] fries, pizza, tacos, and other fast foods. ¨ Pickles, olives, ketchup, and other condiments, especially soy sauce, unless labeled sodium-free or low-sodium. Where can you learn more? Go to http://chanel-connor.info/. Enter O870 in the search box to learn more about \"Low Sodium Diet (2,000 Milligram): Care Instructions. \" Current as of: May 12, 2017 Content Version: 11.4 © 2932-8756 Healthwise[employer]. Care instructions adapted under license by [employer] (which disclaims liability or warranty for this information). If you have questions about a medical condition or this instruction, always ask your healthcare professional. [address] any warranty or liability for your use of this information. Introducing [facility]! [employer] introduces [employer] patient portal. Now you can access parts of your medical record, email your doctor's office, and request medication refills online. 1. In your internet browser, go to [email or web address] 2. Click on the First Time User? Click Here link in the Sign In box. You will see the New Member Sign Up page. 3. Enter your [employer] Access Code exactly as it appears below. You will not need to use this code after youve completed the sign-up process. If you do not sign up before the expiration date, you must request a new code. · [employer] Access Code: [email or web address] Expires: 8/3/2018  9:25 AM 
 
4. Enter the last four digits of your Social Security Number (xxxx) and Date of Birth (mm/dd/yyyy) as indicated and click Submit. You will be taken to the next sign-up page. 5. Create a [employer]ed Agile ID. This will be your [employer] login ID and cannot be changed, so think of one that is secure and easy to remember. 6. Create a [employer] password. You can change your password at any time. 7. Enter your Password Reset Question and Answer. This can be used at a later time if you forget your password. 8. Enter your e-mail address. You will receive e-mail notification when new information is available in [address] 9. Click Sign Up. You can now view and download portions of your medical record. 10. Click the Download Summary menu link to download a portable copy of your medical information. If you have questions, please visit the Frequently Asked Questions section of the [employer] website. Remember, [employer] is NOT to be used for urgent needs. For medical emergencies, dial 911. Now available from your iPhone and Android! Please provide this summary of care documentation to your next provider. Your primary care clinician is listed as Jailyn Andersen. If you have any questions after today's visit, please call [phone].

## 2018-06-04 NOTE — PROGRESS NOTES
Danny Sarkar presents today for a post-hospital follow-up. She was hospitalized from May 5, 2018 through May 12, 2018 for hypertensive urgency secondary to noncompliance with her medication regimen as well as acute on chronic heart failure. Her initial NT proBNP was 26,811 and by discharge was down to 6573. Her troponins were mildly elevated and peaked at 0.08. She complained of shortness of breath, headache, and fatigue. Upon presentation to the hospital, she was noted to have an elevated blood pressure and was started on nitroglycerin drip and admitted to the ICU. Her blood pressure then became low and her medications were adjusted with stabilization of her blood pressure. She was evaluated for renal artery stenosis and it was negative. An echocardiogram done during her admission showed an ejection fraction of 30%, there was hypokinesis of the apical anterior, mid anteroseptal, and mid anterolateral and apical lateral walls, grade 2 diastolic dysfunction, and moderate to severe tricuspid regurgitation. Her RVSP was also noted to be 45 mmHg. On May 10, 2018, she underwent cardiac catheterization which showed angiographically normal coronary arteries and she was diagnosed with nonischemic cardiomyopathy. She was discharged home on Lasix 40 mg daily as well as Aldactone 50 mg twice a day. She was accompanied to the office today by her daughter who states that she has been giving her Lasix 20 mg twice a day. She is a 17-year-old -American female with history of hypertension, hyperlipidemia, diabetes, CHF, asthma/COPD (on oxygen therapy at home as needed), anxiety, and depression. She was previously followed by Dr. Pradip Mejía and her last visit with him was on January 26, 2018. Overall, since being discharged home, she has been feeling better. She does complain of chronic mild shortness of breath at rest and with exertion and she also complains of occasional palpitations.   Denies chest pain, tightness, heaviness. Denies orthopnea and PND. Denies abdominal bloating. Denies lightheadedness, dizziness, and syncope. Denies lower extremity edema and claudication. Denies nausea, vomiting, diarrhea, melena, hematochezia. Denies hematuria, urgency, frequency. Denies fever, chills. PMH:  Past Medical History:   Diagnosis Date    Anemia     Arthritis     Cataract     [facility] obstructive pulmonary disease ([facility] [address]     Diabetes mellitus ([facility] [address]     Difficulty swallowing     Both food and liquid    History of echocardiogram 12/29/2009    EF 50%. Mild-mod conc LVH. Mod DDfx. LAE. Mild MR.      Hypercholesterolemia     Hypertension     Hypertensive heart disease     Joint pain     Joint swelling     Normal nuclear stress test 09/08/2000    No ischemia or prior infarction. Neg EKG on submax EST. Ex time 15:02.  Recurrent boils     Rheumatism     Shortness of breath        PSH:  Past Surgical History:   Procedure Laterality Date    CARDIAC CATHETERIZATION  5/10/2018         CORONARY ARTERY ANGIOGRAM  5/10/2018         HX CHOLECYSTECTOMY  2001    HX TUBAL LIGATION  1975    MODERATE SEDATION  5/10/2018            MEDS:  Current Outpatient Prescriptions   Medication Sig    furosemide (LASIX) 20 mg tablet Take 1 Tab by mouth two (2) times a day.  amLODIPine (NORVASC) 5 mg tablet Take 1 Tab by mouth daily.  acetaminophen (TYLENOL EXTRA STRENGTH) 500 mg tablet Take 1,000 mg by mouth every six (6) hours as needed for Pain.  insulin glargine (LANTUS U-100 INSULIN) 100 unit/mL injection 7 Units by SubCUTAneous route daily. 15 units in the evening.  isosorbide dinitrate (ISORDIL) 10 mg tablet Take 2 Tabs by mouth three (3) times daily.  mometasone-formoterol (DULERA) 200-5 mcg/actuation HFA inhaler Take 2 Puffs by inhalation two (2) times a day.  spironolactone (ALDACTONE) 50 mg tablet Take 1 Tab by mouth two (2) times a day.     hydrALAZINE (APRESOLINE) 100 mg tablet Take 1 Tab by mouth three (3) times daily.  carvedilol (COREG) 25 mg tablet Take 1 Tab by mouth two (2) times daily (with meals).  insulin lispro (HUMALOG) 100 unit/mL kwikpen 15 Units by SubCUTAneous route daily.  CARBOXYMETHYLCELLULOS/GLYCERIN (REFRESH OPTIVE OP) Administer 1 Drop to both eyes six (6) times daily.  albuterol (VENTOLIN HFA) 90 mcg/actuation inhaler Take 2 Puffs by inhalation every four (4) hours as needed for Wheezing or Shortness of Breath.  gabapentin (NEURONTIN) 100 mg capsule 1 cap po twice daily with breakfast and dinner, and 3 tabs po qHS (Patient taking differently: Take 300 mg by mouth four (4) times daily. 1 cap po twice daily with breakfast and dinner, and 3 tabs po qHS)    fluticasone (FLONASE) 50 mcg/actuation nasal spray 2 Sprays by Both Nostrils route daily.  aspirin delayed-release 81 mg tablet Take 81 mg by mouth daily.  pravastatin (PRAVACHOL) 20 mg tablet Take 20 mg by mouth nightly. No current facility-administered medications for this visit. Allergies and Sensitivities:  Allergies   Allergen Reactions    Codeine Nausea Only    Sulfa (Sulfonamide Antibiotics) Rash       Family History:  Family History   Problem Relation Age of Onset    Hypertension Mother     Ovarian Cancer Mother     Stroke Mother     Heart Attack Father     Breast Cancer Maternal Aunt        Social History:  She  reports that she has never smoked. She has never used smokeless tobacco.  She  reports that she does not drink alcohol. Physical:  Visit Vitals    /80    Pulse 76    Ht 5' 9\" (1.753 m)    Wt 68 kg (150 lb)    SpO2 97%    BMI 22.15 kg/m2         Exam:  Neck:  Supple, no JVD, no carotid bruits  CV:  Normal S1 and  S2, no murmurs, rubs, or gallops noted  Lungs:  Clear to ausculation throughout but slightly diminished, no wheezes or rales  Abd:  Soft, non-tender, non-distended with good bowel sounds.   No hepatosplenomegaly  Extremities:  Trace non-pitting lower extremity edema      Data:  EKG:   Sinus rhythm.  Poor R-wave progression, nonspecific, consider old anterior infarct.  T-abnormality.  Lateral ischemia      LABS:  Lab Results   Component Value Date/Time    Sodium 141 05/15/2018 11:54 AM    Potassium 4.1 05/15/2018 11:54 AM    Chloride 104 05/15/2018 11:54 AM    CO2 32 05/15/2018 11:54 AM    Glucose 69 (L) 05/15/2018 11:54 AM    BUN 19 (H) 05/15/2018 11:54 AM    Creatinine 1.27 05/15/2018 11:54 AM     Lab Results   Component Value Date/Time    Cholesterol, total 104 09/27/2017 04:15 AM    HDL Cholesterol 35 (L) 09/27/2017 04:15 AM    LDL, calculated 44.6 09/27/2017 04:15 AM    Triglyceride 122 09/27/2017 04:15 AM    CHOL/HDL Ratio 3.0 09/27/2017 04:15 AM     Lab Results   Component Value Date/Time    ALT (SGPT) 31 05/15/2018 11:54 AM         Impression/Plan:  1. Essential hypertension, blood pressure controlled   2. Hyperlipidemia, on pravastatin 20mg   3. Diabetes mellitus, recommend Hgb A1c less than 7% from cardiac standpoint  4. Chronic systolic heart failure, appears compensated  5. Asthma/COPD, on oxygen as needed at home  6. Anxiety/depression    Mrs. Toan Hernandez was seen today for a post-hospital follow-up of hypertensive urgency and acute on chronic systolic heart failure. Upon discharge from the hospital, she was sent home on lasix 20mg BID and aldactone. She has been feeling much better compared to prior to admission. She admits to chronic mild dyspnea at rest and with exertion. She uses oxygen at home as needed for her asthma/COPD. Her blood pressure today was well controlled and her EKG showed sinus rhythm. Her breath sounds are clear and slightly diminished. She does not exhibit any signs of excessive volume overload. Her daughter, who is her caregiver, reports that Mrs. Toan Hernandez was recently started on amlodipine 5mg and her mother's blood pressure was low a couple of times.   While reviewing her medications, it was noted that she takes her first doses of medications later in the morning hours between 10 and 12 am and her second doses of certain medications are given between 12 noon and 2 pm.  The doses are too close together which may account for the drops in her blood pressure. I asked that her three time a day medications be at least 7 to 8 hours apart to allow for more even distribution of her medications and I also asked that she stagger the amlodipine dose to mid-day or evening. She will continue to monitor her blood pressure at home and report any problems. Congestive heart failure teaching reinforced today. Advised to limit sodium intake to no more than 2000mg per day and to also limit fluid intake to 48 ounces per day (unless otherwise specified). Advised to weigh daily every morning and record weights. Instructed to call our office if progressive weight gain is noted over a 2 to 3 day period of time, shortness of breath increases, or if abdominal bloating, nausea, fatigue, or increased lower extremity edema is noted. Patient education material regarding CHF, low-sodium diet, and DASH diet was attached to her after visit summary. Greater than 50% of a 40 minute visit was spent in discussion, counseling, and answering questions. She will follow-up with Dr. Jeromy Ardon as scheduled in early July and as needed. Jaciel Dougherty MSN, FNP-BC    Please note:  Portions of this chart were created with Dragon medical speech to text program.  Unrecognized errors may be present.

## 2018-06-04 NOTE — PATIENT INSTRUCTIONS
Switch amlodipine (Norvasc) to evening dosing  Three times a day medications should be given about 8 hours apart  All other medications to remain the same  Follow-up with Dr. Sharri Rogers as scheduled on 6/21/18  Follow-up with Dr. Obdulia Hein as scheduled and as needed. Weigh daily and record  Limit sodium intake to 2000mg per day  Limit fluid intake to no more than  6, eight ounce glasses of any type of fluids per day (total of 48 ounces per day)  Call if you notice sudden or progressive weight gain (3-5 pounds in 2-3 days), increasing shortness of breath, abdominal bloating, increasing lower extremity edema, inability to lie flat or on your normal number of pillows, having to sit up to catch your breath, fatigue, increased somnolence (sleeping more), poor appetite      High Blood Pressure: Care Instructions  Your Care Instructions    If your blood pressure is usually above 140/90, you have high blood pressure, or hypertension. That means the top number is 140 or higher or the bottom number is 90 or higher, or both. Despite what a lot of people think, high blood pressure usually doesn't cause headaches or make you feel dizzy or lightheaded. It usually has no symptoms. But it does increase your risk for heart attack, stroke, and kidney or eye damage. The higher your blood pressure, the more your risk increases. Your doctor will give you a goal for your blood pressure. Your goal will be based on your health and your age. An example of a goal is to keep your blood pressure below 140/90. Lifestyle changes, such as eating healthy and being active, are always important to help lower blood pressure. You might also take medicine to reach your blood pressure goal.  Follow-up care is a key part of your treatment and safety. Be sure to make and go to all appointments, and call your doctor if you are having problems. It's also a good idea to know your test results and keep a list of the medicines you take.   How can you care for yourself at home? Medical treatment  · If you stop taking your medicine, your blood pressure will go back up. You may take one or more types of medicine to lower your blood pressure. Be safe with medicines. Take your medicine exactly as prescribed. Call your doctor if you think you are having a problem with your medicine. · Talk to your doctor before you start taking aspirin every day. Aspirin can help certain people lower their risk of a heart attack or stroke. But taking aspirin isn't right for everyone, because it can cause serious bleeding. · See your doctor regularly. You may need to see the doctor more often at first or until your blood pressure comes down. · If you are taking blood pressure medicine, talk to your doctor before you take decongestants or anti-inflammatory medicine, such as ibuprofen. Some of these medicines can raise blood pressure. · Learn how to check your blood pressure at home. Lifestyle changes  · Stay at a healthy weight. This is especially important if you put on weight around the waist. Losing even 10 pounds can help you lower your blood pressure. · If your doctor recommends it, get more exercise. Walking is a good choice. Bit by bit, increase the amount you walk every day. Try for at least 30 minutes on most days of the week. You also may want to swim, bike, or do other activities. · Avoid or limit alcohol. Talk to your doctor about whether you can drink any alcohol. · Try to limit how much sodium you eat to less than 2,300 milligrams (mg) a day. Your doctor may ask you to try to eat less than 1,500 mg a day. · Eat plenty of fruits (such as bananas and oranges), vegetables, legumes, whole grains, and low-fat dairy products. · Lower the amount of saturated fat in your diet. Saturated fat is found in animal products such as milk, cheese, and meat. Limiting these foods may help you lose weight and also lower your risk for heart disease. · Do not smoke.  Smoking increases your risk for heart attack and stroke. If you need help quitting, talk to your doctor about stop-smoking programs and medicines. These can increase your chances of quitting for good. When should you call for help? Call 911 anytime you think you may need emergency care. This may mean having symptoms that suggest that your blood pressure is causing a serious heart or blood vessel problem. Your blood pressure may be over 180/110. ? For example, call 911 if:  ? · You have symptoms of a heart attack. These may include:  ¨ Chest pain or pressure, or a strange feeling in the chest.  ¨ Sweating. ¨ Shortness of breath. ¨ Nausea or vomiting. ¨ Pain, pressure, or a strange feeling in the back, neck, jaw, or upper belly or in one or both shoulders or arms. ¨ Lightheadedness or sudden weakness. ¨ A fast or irregular heartbeat. ? · You have symptoms of a stroke. These may include:  ¨ Sudden numbness, tingling, weakness, or loss of movement in your face, arm, or leg, especially on only one side of your body. ¨ Sudden vision changes. ¨ Sudden trouble speaking. ¨ Sudden confusion or trouble understanding simple statements. ¨ Sudden problems with walking or balance. ¨ A sudden, severe headache that is different from past headaches. ? · You have severe back or belly pain. ?Do not wait until your blood pressure comes down on its own. Get help right away. ?Call your doctor now or seek immediate care if:  ? · Your blood pressure is much higher than normal (such as 180/110 or higher), but you don't have symptoms. ? · You think high blood pressure is causing symptoms, such as:  ¨ Severe headache. ¨ Blurry vision. ? Watch closely for changes in your health, and be sure to contact your doctor if:  ? · Your blood pressure measures 140/90 or higher at least 2 times. That means the top number is 140 or higher or the bottom number is 90 or higher, or both.    ? · You think you may be having side effects from your blood pressure medicine. ? · Your blood pressure is usually normal, but it goes above normal at least 2 times. Where can you learn more? Go to http://chanel-connor.info/. Enter M897 in the search box to learn more about \"High Blood Pressure: Care Instructions. \"  Current as of: September 21, 2016  Content Version: 11.4  © 2488-6871 [employer]. Care instructions adapted under license by [employer] (which disclaims liability or warranty for this information). If you have questions about a medical condition or this instruction, always ask your healthcare professional. [place] [address] any warranty or liability for your use of this information. DASH Diet: Care Instructions  Your Care Instructions    The DASH diet is an eating plan that can help lower your blood pressure. DASH stands for Dietary Approaches to Stop Hypertension. Hypertension is high blood pressure. The DASH diet focuses on eating foods that are high in calcium, potassium, and magnesium. These nutrients can lower blood pressure. The foods that are highest in these nutrients are fruits, vegetables, low-fat dairy products, nuts, seeds, and legumes. But taking calcium, potassium, and magnesium supplements instead of eating foods that are high in those nutrients does not have the same effect. The DASH diet also includes whole grains, fish, and poultry. The DASH diet is one of several lifestyle changes your doctor may recommend to lower your high blood pressure. Your doctor may also want you to decrease the amount of sodium in your diet. Lowering sodium while following the DASH diet can lower blood pressure even further than just the DASH diet alone. Follow-up care is a key part of your treatment and safety. Be sure to make and go to all appointments, and call your doctor if you are having problems. It's also a good idea to know your test results and keep a list of the medicines you take.   How can you care for yourself at home? Following the DASH diet  · Eat 4 to 5 servings of fruit each day. A serving is 1 medium-sized piece of fruit, ½ cup chopped or canned fruit, 1/4 cup dried fruit, or 4 ounces (½ cup) of fruit juice. Choose fruit more often than fruit juice. · Eat 4 to 5 servings of vegetables each day. A serving is 1 cup of lettuce or raw leafy vegetables, ½ cup of chopped or cooked vegetables, or 4 ounces (½ cup) of vegetable juice. Choose vegetables more often than vegetable juice. · Get 2 to 3 servings of low-fat and fat-free dairy each day. A serving is 8 ounces of milk, 1 cup of yogurt, or 1 ½ ounces of cheese. · Eat 6 to 8 servings of grains each day. A serving is 1 slice of bread, 1 ounce of dry cereal, or ½ cup of cooked rice, pasta, or cooked cereal. Try to choose whole-grain products as much as possible. · Limit lean meat, poultry, and fish to 2 servings each day. A serving is 3 ounces, about the size of a deck of cards. · Eat 4 to 5 servings of nuts, seeds, and legumes (cooked dried beans, lentils, and split peas) each week. A serving is 1/3 cup of nuts, 2 tablespoons of seeds, or ½ cup of cooked beans or peas. · Limit fats and oils to 2 to 3 servings each day. A serving is 1 teaspoon of vegetable oil or 2 tablespoons of salad dressing. · Limit sweets and added sugars to 5 servings or less a week. A serving is 1 tablespoon jelly or jam, ½ cup sorbet, or 1 cup of lemonade. · Eat less than 2,300 milligrams (mg) of sodium a day. If you limit your sodium to 1,500 mg a day, you can lower your blood pressure even more. Tips for success  · Start small. Do not try to make dramatic changes to your diet all at once. You might feel that you are missing out on your favorite foods and then be more likely to not follow the plan. Make small changes, and stick with them. Once those changes become habit, add a few more changes.   · Try some of the following:  ¨ Make it a goal to eat a fruit or vegetable at every meal and at snacks. This will make it easy to get the recommended amount of fruits and vegetables each day. ¨ Try yogurt topped with fruit and nuts for a snack or healthy dessert. ¨ Add lettuce, tomato, cucumber, and onion to sandwiches. ¨ Combine a ready-made pizza crust with low-fat mozzarella cheese and lots of vegetable toppings. Try using tomatoes, squash, spinach, broccoli, carrots, cauliflower, and onions. ¨ Have a variety of cut-up vegetables with a low-fat dip as an appetizer instead of chips and dip. ¨ Sprinkle sunflower seeds or chopped almonds over salads. Or try adding chopped walnuts or almonds to cooked vegetables. ¨ Try some vegetarian meals using beans and peas. Add garbanzo or kidney beans to salads. Make burritos and tacos with mashed durán beans or black beans. Where can you learn more? Go to http://chanel-connor.info/. Enter N290 in the search box to learn more about \"DASH Diet: Care Instructions. \"  Current as of: September 21, 2016  Content Version: 11.4  © 8739-9097 [employer]. Care instructions adapted under license by [employer] (which disclaims liability or warranty for this information). If you have questions about a medical condition or this instruction, always ask your healthcare professional. [place] [address] any warranty or liability for your use of this information. Low Sodium Diet (2,000 Milligram): Care Instructions  Your Care Instructions    Too much sodium causes your body to hold on to extra water. This can raise your blood pressure and force your heart and kidneys to work harder. In very serious cases, this could cause you to be put in the hospital. It might even be life-threatening. By limiting sodium, you will feel better and lower your risk of serious problems. The most common source of sodium is salt. People get most of the salt in their diet from canned, prepared, and packaged foods.  Fast food and restaurant meals also are very high in sodium. Your doctor will probably limit your sodium to less than 2,000 milligrams (mg) a day. This limit counts all the sodium in prepared and packaged foods and any salt you add to your food. Follow-up care is a key part of your treatment and safety. Be sure to make and go to all appointments, and call your doctor if you are having problems. It's also a good idea to know your test results and keep a list of the medicines you take. How can you care for yourself at home? Read food labels  · Read labels on cans and food packages. The labels tell you how much sodium is in each serving. Make sure that you look at the serving size. If you eat more than the serving size, you have eaten more sodium. · Food labels also tell you the Percent Daily Value for sodium. Choose products with low Percent Daily Values for sodium. · Be aware that sodium can come in forms other than salt, including monosodium glutamate (MSG), sodium citrate, and sodium bicarbonate (baking soda). MSG is often added to Asian food. When you eat out, you can sometimes ask for food without MSG or added salt. Buy low-sodium foods  · Buy foods that are labeled \"unsalted\" (no salt added), \"sodium-free\" (less than 5 mg of sodium per serving), or \"low-sodium\" (less than 140 mg of sodium per serving). Foods labeled \"reduced-sodium\" and \"light sodium\" may still have too much sodium. Be sure to read the label to see how much sodium you are getting. · Buy fresh vegetables, or frozen vegetables without added sauces. Buy low-sodium versions of canned vegetables, soups, and other canned goods. Prepare low-sodium meals  · Cut back on the amount of salt you use in cooking. This will help you adjust to the taste. Do not add salt after cooking. One teaspoon of salt has about 2,300 mg of sodium. · Take the salt shaker off the table. · Flavor your food with garlic, lemon juice, onion, vinegar, herbs, and spices.  Do not use soy sauce, lite soy sauce, steak sauce, onion salt, garlic salt, celery salt, mustard, or ketchup on your food. · Use low-sodium salad dressings, sauces, and ketchup. Or make your own salad dressings and sauces without adding salt. · Use less salt (or none) when recipes call for it. You can often use half the salt a recipe calls for without losing flavor. Other foods such as rice, pasta, and grains do not need added salt. · Rinse canned vegetables, and cook them in fresh water. This removes some-but not all-of the salt. · Avoid water that is naturally high in sodium or that has been treated with water softeners, which add sodium. Call your local water company to find out the sodium content of your water supply. If you buy bottled water, read the label and choose a sodium-free brand. Avoid high-sodium foods  · Avoid eating:  ¨ Smoked, cured, salted, and canned meat, fish, and poultry. ¨ Ham, boykin, hot dogs, and luncheon meats. ¨ Regular, hard, and processed cheese and regular peanut butter. ¨ Crackers with salted tops, and other salted snack foods such as pretzels, chips, and salted popcorn. ¨ Frozen prepared meals, unless labeled low-sodium. ¨ Canned and dried soups, broths, and bouillon, unless labeled sodium-free or low-sodium. ¨ Canned vegetables, unless labeled sodium-free or low-sodium. ¨ [place] fries, pizza, tacos, and other fast foods. ¨ Pickles, olives, ketchup, and other condiments, especially soy sauce, unless labeled sodium-free or low-sodium. Where can you learn more? Go to http://chanel-connor.info/. Enter T638 in the search box to learn more about \"Low Sodium Diet (2,000 Milligram): Care Instructions. \"  Current as of: May 12, 2017  Content Version: 11.4  © 2307-6516 [employer]. Care instructions adapted under license by [employer] (which disclaims liability or warranty for this information).  If you have questions about a medical condition or this instruction, always ask your healthcare professional. [place] [address] any warranty or liability for your use of this information.

## 2018-06-04 NOTE — PROGRESS NOTES
1. Have you been to the ER, urgent care clinic since your last visit? Hospitalized since your last visit? yes, admitted 5/5/18-5/12/18 for left heart catherization   2. Have you seen or consulted any other health care providers outside of the [address] [place] [address] since your last visit? Include any pap smears or colon screening.  No

## 2018-06-05 ENCOUNTER — PATIENT OUTREACH (OUTPATIENT)
Dept: CARDIOLOGY CLINIC | Age: 63
End: 2018-06-05

## 2018-06-05 ENCOUNTER — HOME CARE VISIT (OUTPATIENT)
Dept: SCHEDULING | Facility: HOME HEALTH | Age: 63
End: 2018-06-05
Payer: MEDICARE

## 2018-06-05 VITALS
HEART RATE: 78 BPM | DIASTOLIC BLOOD PRESSURE: 75 MMHG | WEIGHT: 167 LBS | OXYGEN SATURATION: 97 % | RESPIRATION RATE: 18 BRPM | SYSTOLIC BLOOD PRESSURE: 140 MMHG | BODY MASS INDEX: 24.66 KG/M2 | TEMPERATURE: 97.7 F

## 2018-06-05 PROCEDURE — 3331090001 HH PPS REVENUE CREDIT

## 2018-06-05 PROCEDURE — 3331090002 HH PPS REVENUE DEBIT

## 2018-06-05 PROCEDURE — G0299 HHS/HOSPICE OF RN EA 15 MIN: HCPCS

## 2018-06-06 ENCOUNTER — HOME CARE VISIT (OUTPATIENT)
Dept: HOME HEALTH SERVICES | Facility: HOME HEALTH | Age: 63
End: 2018-06-06
Payer: MEDICARE

## 2018-06-06 ENCOUNTER — HOME CARE VISIT (OUTPATIENT)
Dept: SCHEDULING | Facility: HOME HEALTH | Age: 63
End: 2018-06-06
Payer: MEDICARE

## 2018-06-06 PROCEDURE — 3331090001 HH PPS REVENUE CREDIT

## 2018-06-06 PROCEDURE — G0157 HHC PT ASSISTANT EA 15: HCPCS

## 2018-06-06 PROCEDURE — 3331090002 HH PPS REVENUE DEBIT

## 2018-06-07 PROCEDURE — 3331090001 HH PPS REVENUE CREDIT

## 2018-06-07 PROCEDURE — 3331090002 HH PPS REVENUE DEBIT

## 2018-06-08 ENCOUNTER — PATIENT OUTREACH (OUTPATIENT)
Dept: CARDIOLOGY CLINIC | Age: 63
End: 2018-06-08

## 2018-06-08 ENCOUNTER — HOME CARE VISIT (OUTPATIENT)
Dept: SCHEDULING | Facility: HOME HEALTH | Age: 63
End: 2018-06-08
Payer: MEDICARE

## 2018-06-08 VITALS
DIASTOLIC BLOOD PRESSURE: 80 MMHG | HEART RATE: 80 BPM | SYSTOLIC BLOOD PRESSURE: 130 MMHG | OXYGEN SATURATION: 94 % | TEMPERATURE: 98.4 F

## 2018-06-08 PROCEDURE — G0151 HHCP-SERV OF PT,EA 15 MIN: HCPCS

## 2018-06-08 PROCEDURE — 3331090002 HH PPS REVENUE DEBIT

## 2018-06-08 PROCEDURE — 3331090001 HH PPS REVENUE CREDIT

## 2018-06-08 NOTE — PROGRESS NOTES
NN contacted the patient by telephone to perform CHF follow Up. Noted Priorities/Plan: To stay on track and weigh daily. Daily Weight: forgot to weigh herself. Zone: green   Signs/Symptoms: Edema none; SOB none. Goals      Maintains daily weight.            5/16/18 Patient to provide teach back on daily weights and what to do when gain is 3 lbs in a day and 5 lbs in a week by 6/11/18.  6/8/18 Patient has not weighed herself today, she forgot, will weigh again tomorrow, no edema or SOB.  Patient verbalizes understanding of self-management goals of living with Congestive Heart Failure            5/16/18 Patient to verbalize heart failure zones and recognize red flags and report to physician/NN by 6/25/18       Understands and adheres to diet. 5/16/18 Patient will demonstrate understanding of low sodium diet and its effects on the heart by 7/6/18. Needs addressed from pathway:   Week 1-4   Provide Daily Disease Management  (NN initiated)  ? Daily weight (Before Breakfast-  Daily Zone Identification (symptom management; weight, edema, SOB, activity/sleep changes)-notify provider immediately as indicated  ? Cardiac Low-sodium Diet (No added sodium; 1500mg as indicated). If  Diabetic: include carbohydrate controlled   ? Fluid restriction (if indicated)  ? Comorbidity Management  ? Confirm follow-up appointments/transportation. Reschedule if needed. ? Activity tolerance assessment   (eg: Vital signs; level of consciousness; dyspnea on exertion; pillow usage; recliner vs bed)   ? Diet/appetite assessment  ? Home health services  Psychosocial: Reassurance/emotional support   Monitoring:  ? Home health  Education:  ? Support system identification ( eg: Caregiver, meal planning, community resources, family, friends, Congregational, support group)   ? Health literacy for heart failure  ?  Caregiver education and preparation           Have you been to an ER/Hospital since discharge from [facility]?   yes      Have you followed up with PCP/Cardiologist/Specialist? Has attended follow up appointments    Transportation: daughter/son  Diet: diabetic/cardiac/low sodium  Activity/ADLs: able to perform ADL's. Medications Reconciled at this time: at cardiologist's office on 6/4/18  Home health:  Company/Completion: [address]: family      Patient reminded that there is a physician on call 24 hours a day / 7 days a week (M-F 5pm to 8am and from Friday 5pm until Monday 8a for the weekend) should the patient have questions or concerns. Patient reminded to call 911 if situation is emergent or patient feels the situation is emergent. Pt verbalizes understanding.

## 2018-06-09 PROCEDURE — 3331090002 HH PPS REVENUE DEBIT

## 2018-06-09 PROCEDURE — 3331090001 HH PPS REVENUE CREDIT

## 2018-06-10 VITALS
HEART RATE: 77 BPM | TEMPERATURE: 98.7 F | OXYGEN SATURATION: 96 % | DIASTOLIC BLOOD PRESSURE: 90 MMHG | SYSTOLIC BLOOD PRESSURE: 146 MMHG

## 2018-06-10 PROCEDURE — 3331090002 HH PPS REVENUE DEBIT

## 2018-06-10 PROCEDURE — 3331090001 HH PPS REVENUE CREDIT

## 2018-06-11 PROCEDURE — 3331090001 HH PPS REVENUE CREDIT

## 2018-06-11 PROCEDURE — 3331090002 HH PPS REVENUE DEBIT

## 2018-06-12 ENCOUNTER — HOME CARE VISIT (OUTPATIENT)
Dept: SCHEDULING | Facility: HOME HEALTH | Age: 63
End: 2018-06-12
Payer: MEDICARE

## 2018-06-12 ENCOUNTER — TELEPHONE (OUTPATIENT)
Dept: FAMILY MEDICINE CLINIC | Age: 63
End: 2018-06-12

## 2018-06-12 VITALS
TEMPERATURE: 98.1 F | HEART RATE: 88 BPM | SYSTOLIC BLOOD PRESSURE: 130 MMHG | DIASTOLIC BLOOD PRESSURE: 80 MMHG | OXYGEN SATURATION: 97 %

## 2018-06-12 PROCEDURE — 3331090001 HH PPS REVENUE CREDIT

## 2018-06-12 PROCEDURE — G0157 HHC PT ASSISTANT EA 15: HCPCS

## 2018-06-12 PROCEDURE — 3331090002 HH PPS REVENUE DEBIT

## 2018-06-12 NOTE — TELEPHONE ENCOUNTER
Patient last seen in office 5/21/2018 by Provider Harley Moise. [address] has sent over a request to extend patient for 2 weeks due to progress made toward goals. Message has been sent to patient PCP and Provider Harley Moise.

## 2018-06-13 ENCOUNTER — HOME CARE VISIT (OUTPATIENT)
Dept: HOME HEALTH SERVICES | Facility: HOME HEALTH | Age: 63
End: 2018-06-13
Payer: MEDICARE

## 2018-06-13 PROCEDURE — 3331090002 HH PPS REVENUE DEBIT

## 2018-06-13 PROCEDURE — 3331090001 HH PPS REVENUE CREDIT

## 2018-06-14 ENCOUNTER — PATIENT OUTREACH (OUTPATIENT)
Dept: CARDIOLOGY CLINIC | Age: 63
End: 2018-06-14

## 2018-06-14 ENCOUNTER — HOME CARE VISIT (OUTPATIENT)
Dept: HOME HEALTH SERVICES | Facility: HOME HEALTH | Age: 63
End: 2018-06-14
Payer: MEDICARE

## 2018-06-14 ENCOUNTER — HOME CARE VISIT (OUTPATIENT)
Dept: SCHEDULING | Facility: HOME HEALTH | Age: 63
End: 2018-06-14
Payer: MEDICARE

## 2018-06-14 VITALS
HEART RATE: 93 BPM | TEMPERATURE: 97.4 F | DIASTOLIC BLOOD PRESSURE: 70 MMHG | OXYGEN SATURATION: 99 % | SYSTOLIC BLOOD PRESSURE: 116 MMHG

## 2018-06-14 PROCEDURE — 3331090001 HH PPS REVENUE CREDIT

## 2018-06-14 PROCEDURE — G0157 HHC PT ASSISTANT EA 15: HCPCS

## 2018-06-14 PROCEDURE — 3331090002 HH PPS REVENUE DEBIT

## 2018-06-14 NOTE — PROGRESS NOTES
NN contacted the patient by telephone to perform CHF follow Up. Noted Priorities/Plan:  Not to forget weighing daily. Daily Weight: did not weigh herself  Zone: green   Signs/Symptoms: Edema none at this time; SOB none at this time; orthopnea none, sleeps with 1 pillow    Goals      Maintains daily weight.            5/16/18 Patient to provide teach back on daily weights and what to do when gain is 3 lbs in a day and 5 lbs in a week by 6/11/18.  6/8/18 Patient has not weighed herself today, she forgot, will weigh again tomorrow, no edema or SOB.  6/14/18 Patient did not weigh herself today, no edema or SOB. Reviewed importance of daily weighing, patient was able to verbalize if weight gain 3 lbs in day or 5 lbs in a week to call physician/NN.  Patient verbalizes understanding of self-management goals of living with Congestive Heart Failure            5/16/18 Patient to verbalize heart failure zones and recognize red flags and report to physician/NN by 6/25/18       Understands and adheres to diet. 5/16/18 Patient will demonstrate understanding of low sodium diet and its effects on the heart by 7/6/18. Needs addressed from pathway:   Week 5-8   Provide Daily Disease Management (patient/caregiver initiated)  ? Daily weight (Before Breakfast  ? Daily Zone Identification (symptom management; weight, edema, SOB, activity/sleep changes)-notify provider immediately as indicated  ? Cardiac Low-sodium Diet (No added sodium; 1500mg as indicated). If  Diabetic:  include carbohydrate controlled   Additional Assessments  ? Activity tolerance assessment   (eg: Vital signs; level of consciousness; dyspnea on exertion; pillow usage; recliner vs bed)  ? Energy conservation management (balance activity w/ rest)  Psychosocial: Reassurance and emotional support; depression screening. Monitoring:  ? Home health  Education:  ? Advanced Care Planning status (follow up)   ?  Patient/Caregiver verifies support systems (meal planning, medication and transportation needs, community resources)  ? Health literacy for heart failure                 Have you been to an ER/Hospital since discharge from [facility]? yes      Have you followed up with PCP/Cardiologist/Specialist? Has attended follow up appointments    Transportation: daughter/son  Diet: diabetic/cardiac/low sodium  Activity/ADLs: able to perform ADL's. Medications Reconciled at this time:  no  Home health:  Company/Completion: [employer] [facility] [employer]  Social Support: family    Advanced Care Plan: (addressed)    Patient reminded that there is a physician on call 24 hours a day / 7 days a week (M-F 5pm to 8am and from Friday 5pm until Monday 8a for the weekend) should the patient have questions or concerns. Patient reminded to call 911 if situation is emergent or patient feels the situation is emergent. Pt verbalizes understanding.

## 2018-06-14 NOTE — ACP (ADVANCE CARE PLANNING)
6/14/18 Discussion initiated on purpose and benefit of an ACP. Advised patient she can obtain ACP form at physicians office. Ms. Sabrina Chan thanked NN for information and she will discuss with family.

## 2018-06-15 PROCEDURE — 3331090001 HH PPS REVENUE CREDIT

## 2018-06-15 PROCEDURE — 3331090002 HH PPS REVENUE DEBIT

## 2018-06-16 PROCEDURE — 3331090001 HH PPS REVENUE CREDIT

## 2018-06-16 PROCEDURE — 3331090002 HH PPS REVENUE DEBIT

## 2018-06-17 PROCEDURE — 3331090001 HH PPS REVENUE CREDIT

## 2018-06-17 PROCEDURE — 3331090002 HH PPS REVENUE DEBIT

## 2018-06-18 PROCEDURE — 3331090001 HH PPS REVENUE CREDIT

## 2018-06-18 PROCEDURE — 3331090002 HH PPS REVENUE DEBIT

## 2018-06-19 ENCOUNTER — HOME CARE VISIT (OUTPATIENT)
Dept: SCHEDULING | Facility: HOME HEALTH | Age: 63
End: 2018-06-19
Payer: MEDICARE

## 2018-06-19 VITALS
TEMPERATURE: 96.8 F | HEART RATE: 87 BPM | DIASTOLIC BLOOD PRESSURE: 80 MMHG | SYSTOLIC BLOOD PRESSURE: 118 MMHG | OXYGEN SATURATION: 97 %

## 2018-06-19 PROCEDURE — 3331090002 HH PPS REVENUE DEBIT

## 2018-06-19 PROCEDURE — 3331090001 HH PPS REVENUE CREDIT

## 2018-06-19 PROCEDURE — G0157 HHC PT ASSISTANT EA 15: HCPCS

## 2018-06-20 PROCEDURE — 3331090002 HH PPS REVENUE DEBIT

## 2018-06-20 PROCEDURE — 3331090001 HH PPS REVENUE CREDIT

## 2018-06-21 ENCOUNTER — HOSPITAL ENCOUNTER (OUTPATIENT)
Dept: LAB | Age: 63
Discharge: HOME | End: 2018-06-21
Payer: MEDICARE

## 2018-06-21 ENCOUNTER — OFFICE VISIT (OUTPATIENT)
Dept: FAMILY MEDICINE CLINIC | Age: 63
End: 2018-06-21

## 2018-06-21 VITALS
RESPIRATION RATE: 20 BRPM | HEIGHT: 69 IN | SYSTOLIC BLOOD PRESSURE: 140 MMHG | BODY MASS INDEX: 24.29 KG/M2 | WEIGHT: 164 LBS | HEART RATE: 98 BPM | TEMPERATURE: 98.9 F | DIASTOLIC BLOOD PRESSURE: 78 MMHG

## 2018-06-21 DIAGNOSIS — E11.40 TYPE 2 DIABETES MELLITUS WITH DIABETIC NEUROPATHY, WITH LONG-TERM CURRENT USE OF INSULIN (HCC): ICD-10-CM

## 2018-06-21 DIAGNOSIS — E87.6 HYPOKALEMIA: ICD-10-CM

## 2018-06-21 DIAGNOSIS — Z79.4 TYPE 2 DIABETES MELLITUS WITH DIABETIC NEUROPATHY, WITH LONG-TERM CURRENT USE OF INSULIN (HCC): Primary | ICD-10-CM

## 2018-06-21 DIAGNOSIS — Z79.4 TYPE 2 DIABETES MELLITUS WITH DIABETIC NEUROPATHY, WITH LONG-TERM CURRENT USE OF INSULIN (HCC): ICD-10-CM

## 2018-06-21 DIAGNOSIS — I10 ESSENTIAL HYPERTENSION, BENIGN: ICD-10-CM

## 2018-06-21 DIAGNOSIS — R47.81 SLURRED SPEECH: ICD-10-CM

## 2018-06-21 DIAGNOSIS — R25.1 TREMOR: ICD-10-CM

## 2018-06-21 DIAGNOSIS — E11.40 TYPE 2 DIABETES MELLITUS WITH DIABETIC NEUROPATHY, WITH LONG-TERM CURRENT USE OF INSULIN (HCC): Primary | ICD-10-CM

## 2018-06-21 LAB
ALBUMIN SERPL-MCNC: 3.6 G/DL (ref 3.4–5)
ALBUMIN/GLOB SERPL: 0.8 {RATIO} (ref 0.8–1.7)
ALP SERPL-CCNC: 104 U/L (ref 45–117)
ALT SERPL-CCNC: 18 U/L (ref 13–56)
ANION GAP SERPL CALC-SCNC: 7 MMOL/L (ref 3–18)
AST SERPL-CCNC: 11 U/L (ref 15–37)
BILIRUB SERPL-MCNC: 0.3 MG/DL (ref 0.2–1)
BUN SERPL-MCNC: 45 MG/DL (ref 7–18)
BUN/CREAT SERPL: 18 (ref 12–20)
CALCIUM SERPL-MCNC: 9.3 MG/DL (ref 8.5–10.1)
CHLORIDE SERPL-SCNC: 103 MMOL/L (ref 100–108)
CO2 SERPL-SCNC: 30 MMOL/L (ref 21–32)
CREAT SERPL-MCNC: 2.45 MG/DL (ref 0.6–1.3)
GLOBULIN SER CALC-MCNC: 4.6 G/DL (ref 2–4)
GLUCOSE SERPL-MCNC: 100 MG/DL (ref 74–99)
POTASSIUM SERPL-SCNC: 4.6 MMOL/L (ref 3.5–5.5)
PROT SERPL-MCNC: 8.2 G/DL (ref 6.4–8.2)
SODIUM SERPL-SCNC: 140 MMOL/L (ref 136–145)

## 2018-06-21 PROCEDURE — 36415 COLL VENOUS BLD VENIPUNCTURE: CPT | Performed by: FAMILY MEDICINE

## 2018-06-21 PROCEDURE — 3331090002 HH PPS REVENUE DEBIT

## 2018-06-21 PROCEDURE — 80053 COMPREHEN METABOLIC PANEL: CPT | Performed by: FAMILY MEDICINE

## 2018-06-21 PROCEDURE — 3331090001 HH PPS REVENUE CREDIT

## 2018-06-21 RX ORDER — INSULIN GLARGINE 100 [IU]/ML
INJECTION, SOLUTION SUBCUTANEOUS
Qty: 5 PEN | Refills: 12 | Status: SHIPPED | OUTPATIENT
Start: 2018-06-21 | End: 2020-01-01 | Stop reason: ALTCHOICE

## 2018-06-21 RX ORDER — GABAPENTIN 100 MG/1
CAPSULE ORAL
Qty: 150 CAP | Refills: 5 | Status: SHIPPED | OUTPATIENT
Start: 2018-06-21 | End: 2019-03-08 | Stop reason: ALTCHOICE

## 2018-06-21 NOTE — PROGRESS NOTES
HISTORY OF PRESENT ILLNESS  Aruna Jordan is a 70 y.o. female. Chief Complaint   Patient presents with    Follow-up    COPD    Hypertension     Patient currently under care of Cardiology with last visit on 6-4-18    Diabetes    Cholesterol Problem    Medication Refill    Aphasia    Shaking     Patient reports shaking daily and the shaking is worse at night.  Other     Patients daughter states that she is concerned with the patient having a blank stare at times. HPI  Patient is here for a  follow up of hypertension, hyperlipidemia, diabetes, CHF, and asthma/COPD (on oxygen therapy at home as needed). Patient currently under the care of Cardiology and her last appointment was 6-4-18    Patients last Hgb A1c on 5-6-18 was 4.8. Patient does need medication refills today. Patient requests electronic refills. New concerns today:  Patient reports shaking daily and the shaking is worse at night. Pt has almost constant tic-like movements while awake. She has flailing movements in her sleep. This has been going on for about one week. Patients daughter states that she is concerned with the patient having a blank stare at times. She has had left side weakness that [place] PT feels is getting worse         Review of Systems   Constitutional: Negative. HENT: Negative. Respiratory: Negative. Cardiovascular: Negative. Neurological: Positive for tremors, speech change and focal weakness. All other systems reviewed and are negative. Physical Exam  Physical Exam   Nursing note and vitals reviewed. Constitutional: She is oriented to person, place, and time. She appears well-developed and well-nourished. HENT:   Head: Normocephalic and atraumatic. Right Ear: External ear normal.   Left Ear: External ear normal.   Nose: Nose normal.   Eyes: Conjunctivae and EOM are normal.   Neck: Normal range of motion. Neck supple. No JVD present. Carotid bruit is not present.  No thyromegaly present. Cardiovascular: Normal rate, regular rhythm, normal heart sounds and intact distal pulses. Exam reveals no gallop and no friction rub. No murmur heard. Pulmonary/Chest: Effort normal and breath sounds normal. She has no wheezes. She has no rhonchi. She has no rales. Abdominal: Soft. Bowel sounds are normal.   Musculoskeletal: Normal range of motion. Neurological: She is alert and oriented to person, place, and time. Coordination abnormal.  constant, tic-like movements and vocalizations throughout duration of visit. Skin: Skin is warm and dry. Psychiatric: She has a normal mood and affect. Her behavior is normal. Judgment and thought content normal.     ASSESSMENT and PLAN  Diagnoses and all orders for this visit:    1. Type 2 diabetes mellitus with diabetic neuropathy, with long-term current use of insulin (HCC)  -     gabapentin (NEURONTIN) 100 mg capsule; 1 cap po with breakfast and dinner, and 3 caps po qHS  -     insulin glargine (LANTUS,BASAGLAR) 100 unit/mL (3 mL) inpn; 7 Units by SubCUTAneous route in the morning. 15 units in the evening.  -     METABOLIC PANEL, COMPREHENSIVE; Future  Due to A1c of 4.8, will attempt to decrease dm meds to prevent possible hypoglycemic episodes. 2. Hypokalemia  -     METABOLIC PANEL, COMPREHENSIVE; Future    3. Essential hypertension, benign  Stable, cont pres tx plan. 4. Slurred speech  -     REFERRAL TO NEUROLOGY    5.  Tremor  -     REFERRAL TO NEUROLOGY      Follow-up Disposition: 1 month; sooner prn

## 2018-06-21 NOTE — PROGRESS NOTES
Paris Barnett presents today for   Chief Complaint   Patient presents with    Follow-up    COPD    Hypertension     Patient currently under care of Cardiology with last visit on 6-4-18    Diabetes    Cholesterol Problem    Medication Refill    Aphasia    Shaking     Patient reports shaking daily and the shaking is worse at night.  Other     Patients daughter states that she is concerned with the patient having a blank stare at times. Coordination of Care:  1. Have you been to the ER, urgent care clinic since your last visit? Hospitalized since your last visit? 2. Have you seen or consulted any other health care providers outside of the [address] [place] [address] since your last visit? Include any pap smears or colon screening.

## 2018-06-22 ENCOUNTER — HOME CARE VISIT (OUTPATIENT)
Dept: SCHEDULING | Facility: HOME HEALTH | Age: 63
End: 2018-06-22
Payer: MEDICARE

## 2018-06-22 ENCOUNTER — HOSPITAL ENCOUNTER (EMERGENCY)
Age: 63
Discharge: HOME | End: 2018-06-22
Attending: EMERGENCY MEDICINE
Payer: MEDICARE

## 2018-06-22 VITALS
DIASTOLIC BLOOD PRESSURE: 90 MMHG | TEMPERATURE: 98.6 F | HEART RATE: 92 BPM | SYSTOLIC BLOOD PRESSURE: 156 MMHG | OXYGEN SATURATION: 95 %

## 2018-06-22 VITALS
RESPIRATION RATE: 12 BRPM | SYSTOLIC BLOOD PRESSURE: 183 MMHG | TEMPERATURE: 97.8 F | DIASTOLIC BLOOD PRESSURE: 96 MMHG | OXYGEN SATURATION: 99 % | HEART RATE: 85 BPM

## 2018-06-22 DIAGNOSIS — N17.9 AKI (ACUTE KIDNEY INJURY) (HCC): Primary | ICD-10-CM

## 2018-06-22 DIAGNOSIS — E11.21 TYPE 2 DIABETES MELLITUS WITH NEPHROPATHY (HCC): Primary | ICD-10-CM

## 2018-06-22 DIAGNOSIS — N30.90 CYSTITIS: ICD-10-CM

## 2018-06-22 LAB
ALBUMIN SERPL-MCNC: 3.2 G/DL (ref 3.4–5)
ALBUMIN/GLOB SERPL: 0.7 {RATIO} (ref 0.8–1.7)
ALP SERPL-CCNC: 100 U/L (ref 45–117)
ALT SERPL-CCNC: 15 U/L (ref 13–56)
ANION GAP SERPL CALC-SCNC: 5 MMOL/L (ref 3–18)
APPEARANCE UR: CLEAR
AST SERPL-CCNC: 11 U/L (ref 15–37)
BACTERIA URNS QL MICRO: ABNORMAL /HPF
BASOPHILS # BLD: 0 K/UL (ref 0–0.1)
BASOPHILS NFR BLD: 1 % (ref 0–2)
BILIRUB SERPL-MCNC: 0.2 MG/DL (ref 0.2–1)
BILIRUB UR QL: NEGATIVE
BUN SERPL-MCNC: 38 MG/DL (ref 7–18)
BUN/CREAT SERPL: 18 (ref 12–20)
CALCIUM SERPL-MCNC: 8.8 MG/DL (ref 8.5–10.1)
CHLORIDE SERPL-SCNC: 105 MMOL/L (ref 100–108)
CO2 SERPL-SCNC: 31 MMOL/L (ref 21–32)
COLOR UR: YELLOW
CREAT SERPL-MCNC: 2.07 MG/DL (ref 0.6–1.3)
DIFFERENTIAL METHOD BLD: ABNORMAL
EOSINOPHIL # BLD: 0.7 K/UL (ref 0–0.4)
EOSINOPHIL NFR BLD: 14 % (ref 0–5)
EPITH CASTS URNS QL MICRO: ABNORMAL /LPF (ref 0–5)
ERYTHROCYTE [DISTWIDTH] IN BLOOD BY AUTOMATED COUNT: 14.4 % (ref 11.6–14.5)
GLOBULIN SER CALC-MCNC: 4.9 G/DL (ref 2–4)
GLUCOSE SERPL-MCNC: 83 MG/DL (ref 74–99)
GLUCOSE UR STRIP.AUTO-MCNC: NEGATIVE MG/DL
HCT VFR BLD AUTO: 35 % (ref 35–45)
HGB BLD-MCNC: 11.6 G/DL (ref 12–16)
HGB UR QL STRIP: NEGATIVE
KETONES UR QL STRIP.AUTO: NEGATIVE MG/DL
LEUKOCYTE ESTERASE UR QL STRIP.AUTO: ABNORMAL
LYMPHOCYTES # BLD: 1.1 K/UL (ref 0.9–3.6)
LYMPHOCYTES NFR BLD: 21 % (ref 21–52)
MCH RBC QN AUTO: 28.2 PG (ref 24–34)
MCHC RBC AUTO-ENTMCNC: 33.1 G/DL (ref 31–37)
MCV RBC AUTO: 85.2 FL (ref 74–97)
MONOCYTES # BLD: 0.4 K/UL (ref 0.05–1.2)
MONOCYTES NFR BLD: 7 % (ref 3–10)
NEUTS SEG # BLD: 2.9 K/UL (ref 1.8–8)
NEUTS SEG NFR BLD: 57 % (ref 40–73)
NITRITE UR QL STRIP.AUTO: NEGATIVE
PH UR STRIP: 5.5 [PH] (ref 5–8)
PLATELET # BLD AUTO: 135 K/UL (ref 135–420)
PMV BLD AUTO: 11.2 FL (ref 9.2–11.8)
POTASSIUM SERPL-SCNC: 4.2 MMOL/L (ref 3.5–5.5)
PROT SERPL-MCNC: 8.1 G/DL (ref 6.4–8.2)
PROT UR STRIP-MCNC: 30 MG/DL
RBC # BLD AUTO: 4.11 M/UL (ref 4.2–5.3)
RBC #/AREA URNS HPF: ABNORMAL /HPF (ref 0–5)
SODIUM SERPL-SCNC: 141 MMOL/L (ref 136–145)
SP GR UR REFRACTOMETRY: 1.01 (ref 1–1.03)
UROBILINOGEN UR QL STRIP.AUTO: 0.2 EU/DL (ref 0.2–1)
WBC # BLD AUTO: 5.2 K/UL (ref 4.6–13.2)
WBC URNS QL MICRO: ABNORMAL /HPF (ref 0–4)

## 2018-06-22 PROCEDURE — 99284 EMERGENCY DEPT VISIT MOD MDM: CPT

## 2018-06-22 PROCEDURE — 87077 CULTURE AEROBIC IDENTIFY: CPT | Performed by: EMERGENCY MEDICINE

## 2018-06-22 PROCEDURE — G0157 HHC PT ASSISTANT EA 15: HCPCS

## 2018-06-22 PROCEDURE — 3331090001 HH PPS REVENUE CREDIT

## 2018-06-22 PROCEDURE — 74011250637 HC RX REV CODE- 250/637: Performed by: EMERGENCY MEDICINE

## 2018-06-22 PROCEDURE — 3331090002 HH PPS REVENUE DEBIT

## 2018-06-22 PROCEDURE — 80053 COMPREHEN METABOLIC PANEL: CPT | Performed by: EMERGENCY MEDICINE

## 2018-06-22 PROCEDURE — 87086 URINE CULTURE/COLONY COUNT: CPT | Performed by: EMERGENCY MEDICINE

## 2018-06-22 PROCEDURE — 85025 COMPLETE CBC W/AUTO DIFF WBC: CPT | Performed by: EMERGENCY MEDICINE

## 2018-06-22 PROCEDURE — 81001 URINALYSIS AUTO W/SCOPE: CPT | Performed by: EMERGENCY MEDICINE

## 2018-06-22 RX ORDER — CEPHALEXIN 250 MG/1
500 CAPSULE ORAL
Status: COMPLETED | OUTPATIENT
Start: 2018-06-22 | End: 2018-06-22

## 2018-06-22 RX ORDER — CEPHALEXIN 500 MG/1
500 CAPSULE ORAL 2 TIMES DAILY
Qty: 14 CAP | Refills: 0 | Status: SHIPPED | OUTPATIENT
Start: 2018-06-22 | End: 2018-06-29

## 2018-06-22 RX ADMIN — CEPHALEXIN 500 MG: 250 CAPSULE ORAL at 15:30

## 2018-06-22 NOTE — PROGRESS NOTES
D/w LPN. Nurse to call and advise pt to go to ER for eval in light of new neurologic deficits over the last week and dramatic decrease in renal function since recent admission.

## 2018-06-22 NOTE — ED PROVIDER NOTES
EMERGENCY DEPARTMENT HISTORY AND PHYSICAL EXAM    12:13 PM      Date: 6/22/2018  Patient Name: Colleen Eddy    History of Presenting Illness     Chief Complaint   Patient presents with    Abnormal Lab Results         History Provided By: Patient and Patient's Daughter    Chief Complaint: Abnormal Lab results   Duration: Today   Timing:  Acute  Location: N/A  Quality: N/A  Severity: Patient denies having any pain. Modifying Factors: None   Associated Symptoms: denies any other associated signs or symptoms      Additional History (Context): Colleen Eddy is a 70 y.o. female with PMHx significant for Hypercholesterolemia, DM, Arthritis, HTN, Cataract, COPD, Rheumatism, SOB, and Anemia who presents via Car with CC of abnormal lab results onset Today. Patient states that she was went to her PCP's office 1 day ago to have labs done as a routine visit. Patient's daughter notes that the patient was experiencing tremors and twitching for 1 week, that worsened 1 day ago. Patient was called by her PCP's office today, and told to come to the ED for acute renal failure. Patient issues no other complaints. Patient notes hx of DM and HTN. Patient's daughter states that the patient's blood glucose was 150 2 days ago. The patient's daughter reports that her Blood pressure reading was 114/73 PTA. Patient reports compliance with her HTN medication. No other concerns were expressed at this time. As the patient is without physical symptoms or complaints of pain, there is no location of pain, severity of pain, quality of pain, modifying factors, or associated signs and symptoms regarding the pt's presenting complaint.       PCP: Esau Ruiz MD    Current Facility-Administered Medications   Medication Dose Route Frequency Provider Last Rate Last Dose    cephALEXin (KEFLEX) capsule 500 mg  500 mg Oral NOW Harriett Luna MD         Current Outpatient Prescriptions   Medication Sig Dispense Refill    cephALEXin (KEFLEX) 500 mg capsule Take 1 Cap by mouth two (2) times a day for 7 days. 14 Cap 0    gabapentin (NEURONTIN) 100 mg capsule 1 cap po with breakfast and dinner, and 3 caps po qHS 150 Cap 5    insulin glargine (LANTUS,BASAGLAR) 100 unit/mL (3 mL) inpn 7 Units by SubCUTAneous route in the morning. 15 units in the evening. 5 Pen 12    furosemide (LASIX) 20 mg tablet Take 1 Tab by mouth two (2) times a day. 60 Tab 8    amLODIPine (NORVASC) 5 mg tablet Take 1 Tab by mouth daily. 30 Tab 0    acetaminophen (TYLENOL EXTRA STRENGTH) 500 mg tablet Take 1,000 mg by mouth every six (6) hours as needed for Pain.  isosorbide dinitrate (ISORDIL) 10 mg tablet Take 2 Tabs by mouth three (3) times daily. 180 Tab 2    mometasone-formoterol (DULERA) 200-5 mcg/actuation HFA inhaler Take 2 Puffs by inhalation two (2) times a day. 1 Inhaler 0    spironolactone (ALDACTONE) 50 mg tablet Take 1 Tab by mouth two (2) times a day. 60 Tab 1    hydrALAZINE (APRESOLINE) 100 mg tablet Take 1 Tab by mouth three (3) times daily. 4 Tab 0    carvedilol (COREG) 25 mg tablet Take 1 Tab by mouth two (2) times daily (with meals). 180 Tab 3    insulin lispro (HUMALOG) 100 unit/mL kwikpen 15 Units by SubCUTAneous route daily.  CARBOXYMETHYLCELLULOS/GLYCERIN (REFRESH OPTIVE OP) Administer 1 Drop to both eyes six (6) times daily.  albuterol (VENTOLIN HFA) 90 mcg/actuation inhaler Take 2 Puffs by inhalation every four (4) hours as needed for Wheezing or Shortness of Breath.  fluticasone (FLONASE) 50 mcg/actuation nasal spray 2 Sprays by Both Nostrils route daily. 1 Bottle 1    aspirin delayed-release 81 mg tablet Take 81 mg by mouth daily.  pravastatin (PRAVACHOL) 20 mg tablet Take 20 mg by mouth nightly.          Past History     Past Medical History:  Past Medical History:   Diagnosis Date    Anemia     Arthritis     Cataract     [facility]     Diabetes mellitus ([facility]     Difficulty swallowing     Both food and liquid    History of echocardiogram 12/29/2009    EF 50%. Mild-mod conc LVH. Mod DDfx. LAE. Mild MR.      Hypercholesterolemia     Hypertension     Hypertensive heart disease     Joint pain     Joint swelling     Normal nuclear stress test 09/08/2000    No ischemia or prior infarction. Neg EKG on submax EST. Ex time 15:02.  Recurrent boils     Rheumatism     Shortness of breath        Past Surgical History:  Past Surgical History:   Procedure Laterality Date    CARDIAC CATHETERIZATION  5/10/2018         CORONARY ARTERY ANGIOGRAM  5/10/2018         HX CHOLECYSTECTOMY  2001    HX TUBAL LIGATION  1975    MODERATE SEDATION  5/10/2018            Family History:  Family History   Problem Relation Age of Onset    Hypertension Mother     Ovarian Cancer Mother     Stroke Mother     Heart Attack Father     Breast Cancer Maternal Aunt        Social History:  Social History   Substance Use Topics    Smoking status: Never Smoker    Smokeless tobacco: Never Used    Alcohol use No       Allergies: Allergies   Allergen Reactions    Codeine Nausea Only    Sulfa (Sulfonamide Antibiotics) Rash         Review of Systems       Review of Systems   Constitutional: Negative for chills, fatigue and fever. HENT: Negative for congestion, rhinorrhea and sore throat. Eyes: Negative for visual disturbance. Respiratory: Negative for cough, shortness of breath and wheezing. Cardiovascular: Negative for chest pain, palpitations and leg swelling. Gastrointestinal: Negative for abdominal pain, diarrhea, nausea and vomiting. Genitourinary: Negative for difficulty urinating and dysuria. Musculoskeletal: Negative for arthralgias. Skin: Negative for rash. Neurological: Negative for weakness and headaches. All other systems reviewed and are negative.         Physical Exam     Visit Vitals    BP (!) 168/91    Pulse 83    Temp 97.8 °F (36.6 °C)    Resp 15    SpO2 99% Physical Exam   Constitutional: She is oriented to person, place, and time. She appears well-developed and well-nourished. No distress. HENT:   Head: Normocephalic and atraumatic. Mouth/Throat: Oropharynx is clear and moist and mucous membranes are normal. No oropharyngeal exudate. Eyes: Conjunctivae and EOM are normal. No scleral icterus. Neck: Normal range of motion. Neck supple. No JVD present. No thyromegaly present. Cardiovascular: Normal rate, regular rhythm, S1 normal, S2 normal, normal heart sounds and intact distal pulses. Exam reveals no gallop and no friction rub. No murmur heard. Pulmonary/Chest: Effort normal and breath sounds normal. No accessory muscle usage. No respiratory distress. She has no wheezes. She has no rhonchi. She has no rales. She exhibits no tenderness. Abdominal: Soft. Normal appearance and bowel sounds are normal. She exhibits no distension and no pulsatile midline mass. There is no tenderness. There is no rebound and no guarding. Musculoskeletal: Normal range of motion. Lymphadenopathy:        Head (right side): No submandibular adenopathy present. She has no cervical adenopathy. Neurological: She is alert and oriented to person, place, and time. She displays no tremor. No asterixis    Skin: Skin is warm, dry and intact. No rash noted. No erythema. Psychiatric: She has a normal mood and affect. Her speech is normal and behavior is normal.   Nursing note and vitals reviewed.         Diagnostic Study Results     Labs -  Recent Results (from the past 12 hour(s))   CBC WITH AUTOMATED DIFF    Collection Time: 06/22/18 12:41 PM   Result Value Ref Range    WBC 5.2 4.6 - 13.2 K/uL    RBC 4.11 (L) 4.20 - 5.30 M/uL    HGB 11.6 (L) 12.0 - 16.0 g/dL    HCT 35.0 35.0 - 45.0 %    MCV 85.2 74.0 - 97.0 FL    MCH 28.2 24.0 - 34.0 PG    MCHC 33.1 31.0 - 37.0 g/dL    RDW 14.4 11.6 - 14.5 %    PLATELET 901 566 - 936 K/uL    MPV 11.2 9.2 - 11.8 FL    NEUTROPHILS 57 40 - 73 %    LYMPHOCYTES 21 21 - 52 %    MONOCYTES 7 3 - 10 %    EOSINOPHILS 14 (H) 0 - 5 %    BASOPHILS 1 0 - 2 %    ABS. NEUTROPHILS 2.9 1.8 - 8.0 K/UL    ABS. LYMPHOCYTES 1.1 0.9 - 3.6 K/UL    ABS. MONOCYTES 0.4 0.05 - 1.2 K/UL    ABS. EOSINOPHILS 0.7 (H) 0.0 - 0.4 K/UL    ABS. BASOPHILS 0.0 0.0 - 0.1 K/UL    DF AUTOMATED     METABOLIC PANEL, COMPREHENSIVE    Collection Time: 06/22/18 12:41 PM   Result Value Ref Range    Sodium 141 136 - 145 mmol/L    Potassium 4.2 3.5 - 5.5 mmol/L    Chloride 105 100 - 108 mmol/L    CO2 31 21 - 32 mmol/L    Anion gap 5 3.0 - 18 mmol/L    Glucose 83 74 - 99 mg/dL    BUN 38 (H) 7.0 - 18 MG/DL    Creatinine 2.07 (H) 0.6 - 1.3 MG/DL    BUN/Creatinine ratio 18 12 - 20      GFR est AA 29 (L) >60 ml/min/1.73m2    GFR est non-AA 24 (L) >60 ml/min/1.73m2    Calcium 8.8 8.5 - 10.1 MG/DL    Bilirubin, total 0.2 0.2 - 1.0 MG/DL    ALT (SGPT) 15 13 - 56 U/L    AST (SGOT) 11 (L) 15 - 37 U/L    Alk. phosphatase 100 45 - 117 U/L    Protein, total 8.1 6.4 - 8.2 g/dL    Albumin 3.2 (L) 3.4 - 5.0 g/dL    Globulin 4.9 (H) 2.0 - 4.0 g/dL    A-G Ratio 0.7 (L) 0.8 - 1.7     URINALYSIS W/ RFLX MICROSCOPIC    Collection Time: 06/22/18  2:30 PM   Result Value Ref Range    Color YELLOW      Appearance CLEAR      Specific gravity 1.015 1.005 - 1.030      pH (UA) 5.5 5.0 - 8.0      Protein 30 (A) NEG mg/dL    Glucose NEGATIVE  NEG mg/dL    Ketone NEGATIVE  NEG mg/dL    Bilirubin NEGATIVE  NEG      Blood NEGATIVE  NEG      Urobilinogen 0.2 0.2 - 1.0 EU/dL    Nitrites NEGATIVE  NEG      Leukocyte Esterase SMALL (A) NEG     URINE MICROSCOPIC ONLY    Collection Time: 06/22/18  2:30 PM   Result Value Ref Range    WBC 10 to 15 0 - 4 /hpf    RBC NONE 0 - 5 /hpf    Epithelial cells 1+ 0 - 5 /lpf    Bacteria 4+ (A) NEG /hpf       Radiologic Studies -   No results found. Medical Decision Making   I am the first provider for this patient.     I reviewed the vital signs, available nursing notes, past medical history, past surgical history, family history and social history. Vital Signs-Reviewed the patient's vital signs. Pulse Oximetry Analysis -  98% on room air (Interpretation)    Records Reviewed: Nursing Notes, Previous Laboratory Studies and Triage notes  (Time of Review: 12:13 PM)      Provider Notes (Medical Decision Making):   ASSESSMENT / PLAN:        64y/o AA woman, PMHx of HTN, DM, COPD, Hyperlidpidemia who presents via POV for lab call back. Seen by her PCP yesterday for routine visit, had basic labs done and creatinine came back today at 2.45 (previously 1.29 in May 2018). Pt reports no symptoms today. Had some twitching of arms yesterday but gone now. BP a bit elevated here ~170/100 but famly/cargiver reports ~120-130 at home normally. Exam benign. Repeat CMP here shows improvement w/Cre 2.0, normal electrolytes and no acidosis. CBC normal. Hgb A1C from a few months ago 4.8 so DM well controlled. -UA pending  -Will speak with her PCP (Dr. Chris Benjamin) about f/u as renal function improving, making good urine, normal/stable vitals. Can likely f/u with outpt nephrology      Berto Hurtado MD  EM-IM Physician          ED Course: Progress Notes, Reevaluation, and Consults:  Consult:  Discussed care with Dr. Chris Benjamin, Patient's PCP Standard discussion; including history of patients chief complaint, available diagnostic results, and treatment course. She agrees with continued outpatient management. She will have the patient see Nephrology to further evaluate her kidney function and the Neurology doctor if she continues to have movement disorders. 2:09 PM      Diagnosis     Clinical Impression:   1. DIPESH (acute kidney injury) ([facility] [address]    2.  Cystitis        Disposition: Discharged     Follow-up Information     Follow up With Details Comments Contact Elena Goodrich MD Call in 1 day  [address] [facility] [address] [place] [address]      [facility] EMERGENCY DEPT  As needed, If symptoms worsen [address] [place] [address]  [phone]           Patient's Medications   Start Taking    CEPHALEXIN (KEFLEX) 500 MG CAPSULE    Take 1 Cap by mouth two (2) times a day for 7 days. Continue Taking    ACETAMINOPHEN (TYLENOL EXTRA STRENGTH) 500 MG TABLET    Take 1,000 mg by mouth every six (6) hours as needed for Pain. ALBUTEROL (VENTOLIN HFA) 90 MCG/ACTUATION INHALER    Take 2 Puffs by inhalation every four (4) hours as needed for Wheezing or Shortness of Breath. AMLODIPINE (NORVASC) 5 MG TABLET    Take 1 Tab by mouth daily. ASPIRIN DELAYED-RELEASE 81 MG TABLET    Take 81 mg by mouth daily. CARBOXYMETHYLCELLULOS/GLYCERIN (REFRESH OPTIVE OP)    Administer 1 Drop to both eyes six (6) times daily. CARVEDILOL (COREG) 25 MG TABLET    Take 1 Tab by mouth two (2) times daily (with meals). FLUTICASONE (FLONASE) 50 MCG/ACTUATION NASAL SPRAY    2 Sprays by Both Nostrils route daily. FUROSEMIDE (LASIX) 20 MG TABLET    Take 1 Tab by mouth two (2) times a day. GABAPENTIN (NEURONTIN) 100 MG CAPSULE    1 cap po with breakfast and dinner, and 3 caps po qHS    HYDRALAZINE (APRESOLINE) 100 MG TABLET    Take 1 Tab by mouth three (3) times daily. INSULIN GLARGINE (LANTUS,BASAGLAR) 100 UNIT/ML (3 ML) INPN    7 Units by SubCUTAneous route in the morning. 15 units in the evening. INSULIN LISPRO (HUMALOG) 100 UNIT/ML KWIKPEN    15 Units by SubCUTAneous route daily. ISOSORBIDE DINITRATE (ISORDIL) 10 MG TABLET    Take 2 Tabs by mouth three (3) times daily. MOMETASONE-FORMOTEROL (DULERA) 200-5 MCG/ACTUATION HFA INHALER    Take 2 Puffs by inhalation two (2) times a day. PRAVASTATIN (PRAVACHOL) 20 MG TABLET    Take 20 mg by mouth nightly. SPIRONOLACTONE (ALDACTONE) 50 MG TABLET    Take 1 Tab by mouth two (2) times a day.    These Medications have changed    No medications on file   Stop Taking    No medications on file     _______________________________    Attestations:  Carl Attestation     5 Ellen Portillo acting as a scribe for and in the presence of Govind Galvin MD      June 22, 2018 at 12:13 PM       Provider Attestation:      I personally performed the services described in the documentation, reviewed the documentation, as recorded by the scribe in my presence, and it accurately and completely records my words and actions.  June 22, 2018 at 12:13 PM - Govind Galvin MD    _________________________  ______

## 2018-06-22 NOTE — DISCHARGE INSTRUCTIONS
Acute Kidney Injury: Care Instructions  Your Care Instructions    Acute kidney injury (DIPESH) is a sudden decrease in kidney function. This can happen over a period of hours, days or, in some cases, weeks. DIPESH used to be called acute renal failure, but kidney failure doesn't always happen with DIPESH. Common causes of DIPESH are dehydration, blood loss, and medicines. When DIPESH happens, the kidneys have trouble removing waste and excess fluids from the body. The waste and fluids build up and become harmful. Kidney function may return to normal if the cause of DIPESH is treated quickly. Your chance of a full recovery depends on what caused the problem, how quickly the cause was treated, and what other medical problems you have. A machine may be used to help your kidneys remove waste and fluids for a short period of time. This is called dialysis. Follow-up care is a key part of your treatment and safety. Be sure to make and go to all appointments, and call your doctor if you are having problems. It's also a good idea to know your test results and keep a list of the medicines you take. How can you care for yourself at home? · Talk to your doctor about how much fluid you should drink. · Eat a balanced diet. Talk to your doctor or a dietitian about what type of diet may be best for you. You may need to limit sodium, potassium, and phosphorus. · If you need dialysis, follow the instructions and schedule for dialysis that your doctor gives you. · Do not smoke. Smoking can make your condition worse. If you need help quitting, talk to your doctor about stop-smoking programs and medicines. These can increase your chances of quitting for good. · Do not drink alcohol. · Review all of your medicines with your doctor. Do not take any medicines, including nonsteroidal anti-inflammatory drugs (NSAIDs) such as ibuprofen (Advil, Motrin) or naproxen (Aleve), unless your doctor says it is safe for you to do so.   · Make sure that anyone treating you for any health problem knows that you have had DIPESH. When should you call for help? Call 911 anytime you think you may need emergency care. For example, call if:  ? · You passed out (lost consciousness). ?Call your doctor now or seek immediate medical care if:  ? · You have new or worse nausea and vomiting. ? · You have much less urine than normal, or you have no urine. ? · You are feeling confused or cannot think clearly. ? · You have new or more blood in your urine. ? · You have new swelling. ? · You are dizzy or lightheaded, or feel like you may faint. ? Watch closely for changes in your health, and be sure to contact your doctor if:  ? · You do not get better as expected. Where can you learn more? Go to http://chanel-connor.info/. Enter A641 in the search box to learn more about \"Acute Kidney Injury: Care Instructions. \"  Current as of: May 12, 2017  Content Version: 11.4  © 2899-0769 [employer]. Care instructions adapted under license by [employer] (which disclaims liability or warranty for this information). If you have questions about a medical condition or this instruction, always ask your healthcare professional. [place] [address] any warranty or liability for your use of this information. Urinary Tract Infection in Women: Care Instructions  Your Care Instructions    A urinary tract infection, or UTI, is a general term for an infection anywhere between the kidneys and the urethra (where urine comes out). Most UTIs are bladder infections. They often cause pain or burning when you urinate. UTIs are caused by bacteria and can be cured with antibiotics. Be sure to complete your treatment so that the infection goes away. Follow-up care is a key part of your treatment and safety. Be sure to make and go to all appointments, and call your doctor if you are having problems.  It's also a good idea to know your test results and keep a list of the medicines you take. How can you care for yourself at home? · Take your antibiotics as directed. Do not stop taking them just because you feel better. You need to take the full course of antibiotics. · Drink extra water and other fluids for the next day or two. This may help wash out the bacteria that are causing the infection. (If you have kidney, heart, or liver disease and have to limit fluids, talk with your doctor before you increase your fluid intake.)  · Avoid drinks that are carbonated or have caffeine. They can irritate the bladder. · Urinate often. Try to empty your bladder each time. · To relieve pain, take a hot bath or lay a heating pad set on low over your lower belly or genital area. Never go to sleep with a heating pad in place. To prevent UTIs  · Drink plenty of water each day. This helps you urinate often, which clears bacteria from your system. (If you have kidney, heart, or liver disease and have to limit fluids, talk with your doctor before you increase your fluid intake.)  · Urinate when you need to. · Urinate right after you have sex. · Change sanitary pads often. · Avoid douches, bubble baths, feminine hygiene sprays, and other feminine hygiene products that have deodorants. · After going to the bathroom, wipe from front to back. When should you call for help? Call your doctor now or seek immediate medical care if:  ? · Symptoms such as fever, chills, nausea, or vomiting get worse or appear for the first time. ? · You have new pain in your back just below your rib cage. This is called flank pain. ? · There is new blood or pus in your urine. ? · You have any problems with your antibiotic medicine. ? Watch closely for changes in your health, and be sure to contact your doctor if:  ? · You are not getting better after taking an antibiotic for 2 days. ? · Your symptoms go away but then come back. Where can you learn more?   Go to http://chanel-connor.info/. Enter A506 in the search box to learn more about \"Urinary Tract Infection in Women: Care Instructions. \"  Current as of: May 12, 2017  Content Version: 11.4  © 2197-6570 [employer]. Care instructions adapted under license by [employer] (which disclaims liability or warranty for this information). If you have questions about a medical condition or this instruction, always ask your healthcare professional. [address] any warranty or liability for your use of this information.

## 2018-06-22 NOTE — TELEPHONE ENCOUNTER
Pt called and stated that she is using One Touch Meter and needs test strips for this meter. Please advise.

## 2018-06-22 NOTE — PROGRESS NOTES
Patients daughter notified of providers comments on results listed. Patients daughter verbalizes understand and states that she will make arrangements to bring her mother to [facility].

## 2018-06-22 NOTE — ED TRIAGE NOTES
Pt to ED whom states was told to come to ED by Dr Jimmy Medina for Renal failure, due to  Abnormal labs

## 2018-06-23 LAB
BACTERIA SPEC CULT: ABNORMAL
SERVICE CMNT-IMP: ABNORMAL

## 2018-06-23 PROCEDURE — 3331090002 HH PPS REVENUE DEBIT

## 2018-06-23 PROCEDURE — 3331090001 HH PPS REVENUE CREDIT

## 2018-06-24 PROCEDURE — 3331090002 HH PPS REVENUE DEBIT

## 2018-06-24 PROCEDURE — 3331090001 HH PPS REVENUE CREDIT

## 2018-06-25 PROCEDURE — 3331090001 HH PPS REVENUE CREDIT

## 2018-06-25 PROCEDURE — 3331090002 HH PPS REVENUE DEBIT

## 2018-06-26 ENCOUNTER — HOME CARE VISIT (OUTPATIENT)
Dept: SCHEDULING | Facility: HOME HEALTH | Age: 63
End: 2018-06-26
Payer: MEDICARE

## 2018-06-26 VITALS
DIASTOLIC BLOOD PRESSURE: 90 MMHG | HEART RATE: 86 BPM | TEMPERATURE: 97.8 F | OXYGEN SATURATION: 98 % | SYSTOLIC BLOOD PRESSURE: 150 MMHG

## 2018-06-26 PROCEDURE — G0157 HHC PT ASSISTANT EA 15: HCPCS

## 2018-06-26 PROCEDURE — 3331090002 HH PPS REVENUE DEBIT

## 2018-06-26 PROCEDURE — 3331090001 HH PPS REVENUE CREDIT

## 2018-06-27 PROCEDURE — 3331090002 HH PPS REVENUE DEBIT

## 2018-06-27 PROCEDURE — 3331090001 HH PPS REVENUE CREDIT

## 2018-06-28 ENCOUNTER — HOME CARE VISIT (OUTPATIENT)
Dept: HOME HEALTH SERVICES | Facility: HOME HEALTH | Age: 63
End: 2018-06-28
Payer: MEDICARE

## 2018-06-28 ENCOUNTER — HOME CARE VISIT (OUTPATIENT)
Dept: SCHEDULING | Facility: HOME HEALTH | Age: 63
End: 2018-06-28
Payer: MEDICARE

## 2018-06-28 VITALS
HEART RATE: 78 BPM | OXYGEN SATURATION: 97 % | DIASTOLIC BLOOD PRESSURE: 78 MMHG | TEMPERATURE: 97.8 F | SYSTOLIC BLOOD PRESSURE: 110 MMHG

## 2018-06-28 PROCEDURE — 3331090001 HH PPS REVENUE CREDIT

## 2018-06-28 PROCEDURE — 3331090002 HH PPS REVENUE DEBIT

## 2018-06-28 PROCEDURE — G0151 HHCP-SERV OF PT,EA 15 MIN: HCPCS

## 2018-06-29 PROCEDURE — 3331090001 HH PPS REVENUE CREDIT

## 2018-06-29 PROCEDURE — 3331090002 HH PPS REVENUE DEBIT

## 2018-06-30 PROCEDURE — 3331090001 HH PPS REVENUE CREDIT

## 2018-06-30 PROCEDURE — 3331090002 HH PPS REVENUE DEBIT

## 2018-07-01 PROCEDURE — 3331090001 HH PPS REVENUE CREDIT

## 2018-07-01 PROCEDURE — 3331090002 HH PPS REVENUE DEBIT

## 2018-07-02 ENCOUNTER — HOME CARE VISIT (OUTPATIENT)
Dept: SCHEDULING | Facility: HOME HEALTH | Age: 63
End: 2018-07-02
Payer: MEDICARE

## 2018-07-02 VITALS
HEART RATE: 83 BPM | DIASTOLIC BLOOD PRESSURE: 80 MMHG | SYSTOLIC BLOOD PRESSURE: 140 MMHG | OXYGEN SATURATION: 96 % | TEMPERATURE: 98.6 F

## 2018-07-02 DIAGNOSIS — E11.40 TYPE 2 DIABETES MELLITUS WITH DIABETIC NEUROPATHY, WITH LONG-TERM CURRENT USE OF INSULIN (HCC): Primary | ICD-10-CM

## 2018-07-02 DIAGNOSIS — Z79.4 TYPE 2 DIABETES MELLITUS WITH DIABETIC NEUROPATHY, WITH LONG-TERM CURRENT USE OF INSULIN (HCC): Primary | ICD-10-CM

## 2018-07-02 PROCEDURE — 3331090001 HH PPS REVENUE CREDIT

## 2018-07-02 PROCEDURE — G0151 HHCP-SERV OF PT,EA 15 MIN: HCPCS

## 2018-07-02 PROCEDURE — 3331090003 HH PPS REVENUE ADJ

## 2018-07-02 PROCEDURE — 3331090002 HH PPS REVENUE DEBIT

## 2018-07-02 RX ORDER — LANCETS
EACH MISCELLANEOUS
Qty: 100 EACH | Refills: 12 | Status: SHIPPED | OUTPATIENT
Start: 2018-07-02

## 2018-07-02 RX ORDER — BLOOD-GLUCOSE METER
EACH MISCELLANEOUS
Qty: 1 EACH | Refills: 0 | Status: SHIPPED | OUTPATIENT
Start: 2018-07-02

## 2018-07-03 PROCEDURE — 3331090002 HH PPS REVENUE DEBIT

## 2018-07-03 PROCEDURE — 3331090001 HH PPS REVENUE CREDIT

## 2018-07-04 PROCEDURE — 3331090001 HH PPS REVENUE CREDIT

## 2018-07-04 PROCEDURE — 3331090002 HH PPS REVENUE DEBIT

## 2018-07-05 ENCOUNTER — OFFICE VISIT (OUTPATIENT)
Dept: CARDIOLOGY CLINIC | Age: 63
End: 2018-07-05

## 2018-07-05 ENCOUNTER — HOSPITAL ENCOUNTER (OUTPATIENT)
Dept: LAB | Age: 63
Discharge: HOME | End: 2018-07-05

## 2018-07-05 VITALS
HEIGHT: 69 IN | BODY MASS INDEX: 24.73 KG/M2 | DIASTOLIC BLOOD PRESSURE: 90 MMHG | WEIGHT: 167 LBS | SYSTOLIC BLOOD PRESSURE: 140 MMHG | HEART RATE: 91 BPM | OXYGEN SATURATION: 96 %

## 2018-07-05 DIAGNOSIS — I42.9 CARDIOMYOPATHY, UNSPECIFIED TYPE (HCC): Primary | ICD-10-CM

## 2018-07-05 DIAGNOSIS — I16.1 HYPERTENSIVE EMERGENCY: ICD-10-CM

## 2018-07-05 DIAGNOSIS — I50.9 CONGESTIVE HEART FAILURE, UNSPECIFIED HF CHRONICITY, UNSPECIFIED HEART FAILURE TYPE (HCC): Chronic | ICD-10-CM

## 2018-07-05 DIAGNOSIS — I10 HYPERTENSION, UNSPECIFIED TYPE: ICD-10-CM

## 2018-07-05 DIAGNOSIS — I10 ESSENTIAL HYPERTENSION, BENIGN: ICD-10-CM

## 2018-07-05 PROCEDURE — 99001 SPECIMEN HANDLING PT-LAB: CPT | Performed by: INTERNAL MEDICINE

## 2018-07-05 PROCEDURE — 3331090001 HH PPS REVENUE CREDIT

## 2018-07-05 PROCEDURE — 3331090002 HH PPS REVENUE DEBIT

## 2018-07-05 RX ORDER — HYDRALAZINE HYDROCHLORIDE 100 MG/1
100 TABLET, FILM COATED ORAL 3 TIMES DAILY
Qty: 270 TAB | Refills: 3 | Status: SHIPPED | OUTPATIENT
Start: 2018-07-05 | End: 2020-01-01

## 2018-07-05 RX ORDER — AMLODIPINE BESYLATE 5 MG/1
5 TABLET ORAL DAILY
Qty: 90 TAB | Refills: 3 | Status: SHIPPED | OUTPATIENT
Start: 2018-07-05 | End: 2019-03-08

## 2018-07-05 RX ORDER — ISOSORBIDE DINITRATE 10 MG/1
20 TABLET ORAL 3 TIMES DAILY
Qty: 547 TAB | Refills: 3 | Status: SHIPPED | OUTPATIENT
Start: 2018-07-05 | End: 2019-02-19

## 2018-07-05 NOTE — MR AVS SNAPSHOT
[address] [place] [address] [place] [address] 
[phone] Patient: Walker Kong MRN: [id number] SSN:[id number] Visit Information Date & Time Provider Department Dept. Phone Encounter #  
 7/5/2018 10:40 AM Elvira Villareal MD [facility] [id number] Upcoming Health Maintenance Date Due Hepatitis C Screening 1955 FOOT EXAM Q1 1/21/1963 MICROALBUMIN Q1 1/21/1963 EYE EXAM RETINAL OR DILATED Q1 1/21/1963 FOBT Q 1 YEAR AGE 50-75 1/21/2003 GLAUCOMA SCREENING Q2Y 1/21/2018 Bone Densitometry (Dexa) Screening 1/21/2018 Pneumococcal 65+ Low/Medium Risk (1 of 2 - PCV13) 1/21/2018 MEDICARE YEARLY EXAM 3/14/2018 BREAST CANCER SCRN MAMMOGRAM 10/5/2018 DTaP/Tdap/Td series (1 - Tdap) 6/28/2019* ZOSTER VACCINE AGE 60> 6/29/2019* Influenza Age 5 to Adult 8/1/2018 LIPID PANEL Q1 9/27/2018 HEMOGLOBIN A1C Q6M 11/6/2018 *Topic was postponed. The date shown is not the original due date. Allergies as of 7/5/2018  Review Complete On: 7/5/2018 By: Elvira Villareal MD  
  
 Severity Noted Reaction Type Reactions Codeine  03/01/2017    Nausea Only Sulfa (Sulfonamide Antibiotics)  01/27/2012   Side Effect Rash Current Immunizations  Never Reviewed No immunizations on file. Not reviewed this visit You Were Diagnosed With   
  
 Codes Comments Congestive heart failure, unspecified HF chronicity, unspecified heart failure type ([facility]    -  Primary ICD-10-CM: I50.9 ICD-9-CM: 428.0 Hypertensive emergency     ICD-10-CM: I16.1 ICD-9-CM: 401.9 Essential hypertension, benign     ICD-10-CM: I10 
ICD-9-CM: 401.1 Hypertension, unspecified type     ICD-10-CM: I10 
ICD-9-CM: 401.9 Cardiomyopathy, unspecified type ([facility]     ICD-10-CM: I42.9 ICD-9-CM: 425.4 Vitals BP Pulse Height(growth percentile) Weight(growth percentile) SpO2 BMI  
 140/90 91 5' 9\" (1.753 m) 167 lb (75.8 kg) 96% 24.66 kg/m2 OB Status Smoking Status Postmenopausal Never Smoker Vitals History BMI and BSA Data Body Mass Index Body Surface Area  
 24.66 kg/m 2 1.92 m 2 Preferred Pharmacy Pharmacy Name Phone [facility] [facility]. [phone] Your Updated Medication List  
  
   
This list is accurate as of 7/5/18 11:34 AM.  Always use your most recent med list. amLODIPine 5 mg tablet Commonly known as:  Elmia Jonaser Take 1 Tab by mouth daily. aspirin delayed-release 81 mg tablet Take 81 mg by mouth daily. Blood-Glucose Meter Misc Commonly known as:  TRUE METRIX GLUCOSE METER Test glucose 3 times daily DX: E11.40  
  
 carvedilol 25 mg tablet Commonly known as:  Javad Zay Take 1 Tab by mouth two (2) times daily (with meals). fluticasone 50 mcg/actuation nasal spray Commonly known as:  Mario Blizzard 2 Sprays by Both Nostrils route daily. furosemide 20 mg tablet Commonly known as:  LASIX Take 1 Tab by mouth two (2) times a day.  
  
 gabapentin 100 mg capsule Commonly known as:  NEURONTIN  
1 cap po with breakfast and dinner, and 3 caps po qHS  
  
 glucose blood VI test strips strip Commonly known as:  blood glucose test  
(True Metrix) Test glucose 3 times daily DX: E11.40  
  
 hydrALAZINE 100 mg tablet Commonly known as:  APRESOLINE Take 1 Tab by mouth three (3) times daily. insulin glargine 100 unit/mL (3 mL) Inpn Commonly known as:  LANTUS,BASAGLAR  
7 Units by SubCUTAneous route in the morning. 15 units in the evening. insulin lispro 100 unit/mL kwikpen Commonly known as:  HUMALOG  
15 Units by SubCUTAneous route daily. isosorbide dinitrate 10 mg tablet Commonly known as:  ISORDIL Take 2 Tabs by mouth three (3) times daily. Lancets Misc Test glucose 3 times daily DX: E11.40  
  
 mometasone-formoterol 200-5 mcg/actuation HFA inhaler Commonly known as:  Lou Mess Take 2 Puffs by inhalation two (2) times a day. pravastatin 20 mg tablet Commonly known as:  PRAVACHOL Take 20 mg by mouth nightly. [address] OP Administer 1 Drop to both eyes six (6) times daily. spironolactone 50 mg tablet Commonly known as:  ALDACTONE Take 1 Tab by mouth two (2) times a day. TYLENOL EXTRA STRENGTH 500 mg tablet Generic drug:  acetaminophen Take 1,000 mg by mouth every six (6) hours as needed for Pain. VENTOLIN HFA 90 mcg/actuation inhaler Generic drug:  albuterol Take 2 Puffs by inhalation every four (4) hours as needed for Wheezing or Shortness of Breath. We Performed the Following AMB POC EKG ROUTINE W/ 12 LEADS, INTER & REP [84707 CPT(R)] To-Do List   
 07/05/2018 Lab:  METABOLIC PANEL, BASIC   
  
 08/05/2018 Echocardiography:  ECHO ADULT COMPLETE Patient Instructions Have echo done August and bmp now Introducing [facility]! [facility] introduces Ecociclus patient portal. Now you can access parts of your medical record, email your doctor's office, and request medication refills online. 1. In your internet browser, go to [email or web address] 2. Click on the First Time User? Click Here link in the Sign In box. You will see the New Member Sign Up page. 3. Enter your Ecociclus Access Code exactly as it appears below. You will not need to use this code after youve completed the sign-up process. If you do not sign up before the expiration date, you must request a new code. · Ecociclus Access Code: [email or web address] Expires: 8/3/2018  9:25 AM 
 
4. Enter the last four digits of your Social Security Number (xxxx) and Date of Birth (mm/dd/yyyy) as indicated and click Submit. You will be taken to the next sign-up page. 5. Create a Cinnamon ID. This will be your Cinnamon login ID and cannot be changed, so think of one that is secure and easy to remember. 6. Create a Cinnamon password. You can change your password at any time. 7. Enter your Password Reset Question and Answer. This can be used at a later time if you forget your password. 8. Enter your e-mail address. You will receive e-mail notification when new information is available in [address] 9. Click Sign Up. You can now view and download portions of your medical record. 10. Click the Download Summary menu link to download a portable copy of your medical information. If you have questions, please visit the Frequently Asked Questions section of the Cinnamon website. Remember, Cinnamon is NOT to be used for urgent needs. For medical emergencies, dial 911. Now available from your iPhone and Android! Please provide this summary of care documentation to your next provider. Your primary care clinician is listed as [place] . If you have any questions after today's visit, please call [phone].

## 2018-07-05 NOTE — PROGRESS NOTES
History of Present Illness:  A 70 y.o. female here for follow up. She previously saw Dr. Juan Navarrete. She was in the hospital in early May with significant hypertension and discharged 5/12/18. Her ejection fraction declined down to 30%. She had a renal ultrasound without any significant renal artery stenosis. She was recently seen in the emergency department and she had recent worsening renal failure. Of note, she had a heart catheterization 5/12/18 and was without any epicardial disease. At this point her major complaint is that of diffuse weakness. She has not had any syncope. She has rare palpitations and chest pain, mild dyspnea. She denies PND, orthopnea or edema. Impression/Plan:   Nonischemic cardiomyopathy with ejection fraction 30% May 2018. Heart catheterization at that time did not show any epicardial disease. Significant hypertension, not on medication. This has been labile in the past. Renal doppler 5/7/18 did not show any renal artery stenosis. Chronic Class II systolic heart failure. Diabetes on insulin. Renal failure with last creatinine approximately 2. Deconditioning. Asthma/COPD with home oxygen as needed. Anxiety/depression. Dyslipidemia on statin. At this point her heart failure appears compensated. Her creatinine was increased at last check and I will repeat it. If elevated, I would have her follow up with a nephrologist. I am sure she has seen a nephrologist in the past, cannot remember a name. I will double check the chart. I will repeat an echocardiogram early August and if ejection fraction is > 35%, will continue medical treatment. If < 35%, will have her see Mansi Barger and discuss possible AICD for primary prevention. All questions answered.        Past Medical History:   Diagnosis Date    Anemia     Arthritis     Cataract     Chronic obstructive pulmonary disease ([address]     Diabetes mellitus ([address]     Difficulty swallowing     Both food and liquid    History of echocardiogram 12/29/2009    EF 50%. Mild-mod conc LVH. Mod DDfx. LAE. Mild MR.      Hypercholesterolemia     Hypertension     Hypertensive heart disease     Joint pain     Joint swelling     Normal nuclear stress test 09/08/2000    No ischemia or prior infarction. Neg EKG on submax EST. Ex time 15:02.  Recurrent boils     Rheumatism     Shortness of breath        Current Outpatient Prescriptions   Medication Sig Dispense Refill    Blood-Glucose Meter (TRUE METRIX GLUCOSE METER) misc Test glucose 3 times daily DX: E11.40 1 Each 0    Lancets misc Test glucose 3 times daily DX: E11.40 100 Each 12    glucose blood VI test strips (BLOOD GLUCOSE TEST) strip (True Metrix) Test glucose 3 times daily DX: E11.40 100 Strip 12    gabapentin (NEURONTIN) 100 mg capsule 1 cap po with breakfast and dinner, and 3 caps po qHS 150 Cap 5    insulin glargine (LANTUS,BASAGLAR) 100 unit/mL (3 mL) inpn 7 Units by SubCUTAneous route in the morning. 15 units in the evening. 5 Pen 12    furosemide (LASIX) 20 mg tablet Take 1 Tab by mouth two (2) times a day. 60 Tab 8    amLODIPine (NORVASC) 5 mg tablet Take 1 Tab by mouth daily. 30 Tab 0    acetaminophen (TYLENOL EXTRA STRENGTH) 500 mg tablet Take 1,000 mg by mouth every six (6) hours as needed for Pain.  isosorbide dinitrate (ISORDIL) 10 mg tablet Take 2 Tabs by mouth three (3) times daily. 180 Tab 2    mometasone-formoterol (DULERA) 200-5 mcg/actuation HFA inhaler Take 2 Puffs by inhalation two (2) times a day. 1 Inhaler 0    spironolactone (ALDACTONE) 50 mg tablet Take 1 Tab by mouth two (2) times a day. 60 Tab 1    hydrALAZINE (APRESOLINE) 100 mg tablet Take 1 Tab by mouth three (3) times daily. 4 Tab 0    carvedilol (COREG) 25 mg tablet Take 1 Tab by mouth two (2) times daily (with meals). 180 Tab 3    insulin lispro (HUMALOG) 100 unit/mL kwikpen 15 Units by SubCUTAneous route daily.       CARBOXYMETHYLCELLULOS/GLYCERIN (REFRESH OPTIVE OP) Administer 1 Drop to both eyes six (6) times daily.  albuterol (VENTOLIN HFA) 90 mcg/actuation inhaler Take 2 Puffs by inhalation every four (4) hours as needed for Wheezing or Shortness of Breath.  fluticasone (FLONASE) 50 mcg/actuation nasal spray 2 Sprays by Both Nostrils route daily. 1 Bottle 1    aspirin delayed-release 81 mg tablet Take 81 mg by mouth daily.  pravastatin (PRAVACHOL) 20 mg tablet Take 20 mg by mouth nightly. Social History   reports that she has never smoked. She has never used smokeless tobacco.   reports that she does not drink alcohol. Family History  family history includes Breast Cancer in her maternal aunt; Heart Attack in her father; Hypertension in her mother; Ovarian Cancer in her mother; Stroke in her mother. Review of Systems  Except as stated above include:  Constitutional: Negative for fever, chills and malaise/fatigue. HEENT: No congestion or recent URI. Gastrointestinal: No nausea, vomiting, abdominal pain, bloody stools. Pulmonary:  Negative except as stated above. Cardiac:  Negative except as stated above. Musculoskeletal: Negative except as stated above. Neurological:  No localized symptoms. Skin:  Negative except as stated above. Psych:  Negative except as stated above. Endocrine:  Negative except as stated above. PHYSICAL EXAM  BP Readings from Last 3 Encounters:   07/05/18 140/90   07/02/18 140/80   06/28/18 110/78     Pulse Readings from Last 3 Encounters:   07/05/18 91   07/02/18 83   06/28/18 78     Wt Readings from Last 3 Encounters:   07/05/18 75.8 kg (167 lb)   06/21/18 74.4 kg (164 lb)   06/05/18 75.8 kg (167 lb)     General:   Well developed, well groomed. Head/Neck:   No jugular venous distention     No carotid bruits. No evidence of xanthelasma. Lungs:   No respiratory distress. Clear bilaterally. Heart:    Regular rate and rhythm. Normal S1/S2. Palpation of heart with normal point of maximum impulse.     No significant murmurs, rubs or gallops. Abdomen:   Soft and nontender. No palpable abdominal mass or bruits. Extremities:   Intact peripheral pulses. No significant edema. Neurological:   Alert and oriented to person, place, time. No focal neurological deficit visually.   Skin:   No obvious rash    Blood Pressure Metric:  Maura Bowen has been given the following recommendations today due to her elevated BP reading: monitor

## 2018-07-05 NOTE — PROGRESS NOTES
1. Have you been to the ER, urgent care clinic since your last visit? Hospitalized since your last visit? Yes, 5/5/18 - 5/12/18 for hypertensive emergency; 5/15/18 for fatigue     2. Have you seen or consulted any other health care providers outside of the [facility] since your last visit? Include any pap smears or colon screening.   No

## 2018-07-06 LAB
BUN SERPL-MCNC: 32 MG/DL (ref 8–27)
BUN/CREAT SERPL: 19 (ref 12–28)
CALCIUM SERPL-MCNC: 9.3 MG/DL (ref 8.7–10.3)
CHLORIDE SERPL-SCNC: 102 MMOL/L (ref 96–106)
CO2 SERPL-SCNC: 25 MMOL/L (ref 20–29)
CREAT SERPL-MCNC: 1.72 MG/DL (ref 0.57–1)
GLUCOSE SERPL-MCNC: 103 MG/DL (ref 65–99)
POTASSIUM SERPL-SCNC: 4.6 MMOL/L (ref 3.5–5.2)
SODIUM SERPL-SCNC: 141 MMOL/L (ref 134–144)

## 2018-07-06 PROCEDURE — 3331090002 HH PPS REVENUE DEBIT

## 2018-07-06 PROCEDURE — 3331090001 HH PPS REVENUE CREDIT

## 2018-07-07 PROCEDURE — 3331090001 HH PPS REVENUE CREDIT

## 2018-07-07 PROCEDURE — 3331090002 HH PPS REVENUE DEBIT

## 2018-07-08 PROCEDURE — 3331090002 HH PPS REVENUE DEBIT

## 2018-07-08 PROCEDURE — 3331090001 HH PPS REVENUE CREDIT

## 2018-07-13 ENCOUNTER — PATIENT OUTREACH (OUTPATIENT)
Dept: CARDIOLOGY CLINIC | Age: 63
End: 2018-07-13

## 2018-07-13 NOTE — PROGRESS NOTES
NN contacted the patient by telephone to perform CHF follow Up. Noted Priorities/Plan:  Not to forget weighing daily. Daily Weight: 161 (stable)  Zone: green   Signs/Symptoms: Edema none at this time; SOB none at this time; orthopnea none, sleeps with 1 pillow    Goals      Maintains daily weight.            5/16/18 Patient to provide teach back on daily weights and what to do when gain is 3 lbs in a day and 5 lbs in a week by 6/11/18.  6/8/18 Patient has not weighed herself today, she forgot, will weigh again tomorrow, no edema or SOB.  6/14/18 Patient did not weigh herself today, no edema or SOB. Reviewed importance of daily weighing, patient was able to verbalize if weight gain 3 lbs in day or 5 lbs in a week to call physician/NN.  Patient verbalizes understanding of self-management goals of living with Congestive Heart Failure            5/16/18 Patient to verbalize heart failure zones and recognize red flags and report to physician/NN by 6/25/18       Understands and adheres to diet. 5/16/18 Patient will demonstrate understanding of low sodium diet and its effects on the heart by 7/6/18. Needs addressed from pathway:   Week 5-8   Provide Daily Disease Management (patient/caregiver initiated)  ? Daily weight (Before Breakfast  ? Daily Zone Identification (symptom management; weight, edema, SOB, activity/sleep changes)-notify provider immediately as indicated  ? Cardiac Low-sodium Diet (No added sodium; 1500mg as indicated). If  Diabetic:  include carbohydrate controlled   Additional Assessments  ? Activity tolerance assessment   (eg: Vital signs; level of consciousness; dyspnea on exertion; pillow usage; recliner vs bed)  ? Energy conservation management (balance activity w/ rest)  Psychosocial: Reassurance and emotional support; depression screening. Monitoring:  ? Home health  Education:  ? Advanced Care Planning status (follow up)   ?  Patient/Caregiver verifies support systems (meal planning, medication and transportation needs, community resources)  ? Health literacy for heart failure                 Have you been to an ER/Hospital since discharge from [facility]? yes      Have you followed up with PCP/Cardiologist/Specialist? Has attended follow up appointments    Transportation: daughter/son  Diet: diabetic/cardiac/low sodium  Activity/ADLs: able to perform ADL's. Medications Reconciled at this time:  no  Home health:  Company/Completion: [employer] [facility]  Social Support: family    Advanced Care Plan: (addressed)    Patient reminded that there is a physician on call 24 hours a day / 7 days a week (M-F 5pm to 8am and from Friday 5pm until Monday 8a for the weekend) should the patient have questions or concerns. Patient reminded to call 911 if situation is emergent or patient feels the situation is emergent. Pt verbalizes understanding.

## 2018-07-17 ENCOUNTER — HOSPITAL ENCOUNTER (OUTPATIENT)
Dept: NON INVASIVE DIAGNOSTICS | Age: 63
Discharge: HOME | End: 2018-07-17
Attending: INTERNAL MEDICINE
Payer: MEDICARE

## 2018-07-17 VITALS
DIASTOLIC BLOOD PRESSURE: 90 MMHG | SYSTOLIC BLOOD PRESSURE: 140 MMHG | HEIGHT: 69 IN | WEIGHT: 167 LBS | BODY MASS INDEX: 24.73 KG/M2

## 2018-07-17 DIAGNOSIS — I42.9 CARDIOMYOPATHY, UNSPECIFIED TYPE (HCC): ICD-10-CM

## 2018-07-17 DIAGNOSIS — I50.9 CONGESTIVE HEART FAILURE, UNSPECIFIED HF CHRONICITY, UNSPECIFIED HEART FAILURE TYPE (HCC): Chronic | ICD-10-CM

## 2018-07-17 LAB
ECHO LV EDV A2C: 90.7 ML
ECHO LV EDV A4C: 58.6 ML
ECHO LV EDV BP: 73.8 ML (ref 56–104)
ECHO LV EDV INDEX A4C: 30.6 ML/M2
ECHO LV EDV INDEX BP: 38.6 ML/M2
ECHO LV EDV NDEX A2C: 47.4 ML/M2
ECHO LV EJECTION FRACTION A2C: 61 %
ECHO LV EJECTION FRACTION A4C: 47 %
ECHO LV EJECTION FRACTION BIPLANE: 52.6 % (ref 55–100)
ECHO LV ESV A2C: 35.4 ML
ECHO LV ESV A4C: 30.8 ML
ECHO LV ESV BP: 35 ML (ref 19–49)
ECHO LV ESV INDEX A2C: 18.5 ML/M2
ECHO LV ESV INDEX A4C: 16.1 ML/M2
ECHO LV ESV INDEX BP: 18.3 ML/M2
ECHO LV INTERNAL DIMENSION DIASTOLIC: 4.34 CM (ref 3.9–5.3)
ECHO LV INTERNAL DIMENSION SYSTOLIC: 3.18 CM
ECHO LV IVSD: 1.59 CM (ref 0.6–0.9)
ECHO LV MASS 2D: 380.5 G (ref 67–162)
ECHO LV MASS INDEX 2D: 198.9 G/M2
ECHO LV POSTERIOR WALL DIASTOLIC: 1.79 CM (ref 0.6–0.9)

## 2018-07-17 PROCEDURE — 93308 TTE F-UP OR LMTD: CPT

## 2018-07-18 ENCOUNTER — HOSPITAL ENCOUNTER (OUTPATIENT)
Dept: LAB | Age: 63
Discharge: HOME | End: 2018-07-18

## 2018-07-18 PROCEDURE — 99001 SPECIMEN HANDLING PT-LAB: CPT | Performed by: PSYCHIATRY & NEUROLOGY

## 2018-07-23 ENCOUNTER — HOSPITAL ENCOUNTER (OUTPATIENT)
Dept: MRI IMAGING | Age: 63
Discharge: HOME | End: 2018-07-23
Attending: PSYCHIATRY & NEUROLOGY
Payer: MEDICARE

## 2018-07-23 DIAGNOSIS — I63.311 CEREBRAL INFARCTION DUE TO THROMBOSIS OF RIGHT MIDDLE CEREBRAL ARTERY (HCC): ICD-10-CM

## 2018-07-23 PROCEDURE — 70551 MRI BRAIN STEM W/O DYE: CPT

## 2018-07-24 ENCOUNTER — PATIENT OUTREACH (OUTPATIENT)
Dept: CARDIOLOGY CLINIC | Age: 63
End: 2018-07-24

## 2018-07-24 NOTE — PROGRESS NOTES
NN contacted the patient by telephone to perform CHF follow Up. Noted Priorities/Plan:  Not to forget weighing daily. Daily Weight: 162 (stable)  Zone: green   Signs/Symptoms: Edema none at this time; SOB none at this time; orthopnea none, sleeps with 1 pillow    Goals      Maintains daily weight.            5/16/18 Patient to provide teach back on daily weights and what to do when gain is 3 lbs in a day and 5 lbs in a week by 6/11/18.  6/8/18 Patient has not weighed herself today, she forgot, will weigh again tomorrow, no edema or SOB.  6/14/18 Patient did not weigh herself today, no edema or SOB. Reviewed importance of daily weighing, patient was able to verbalize if weight gain 3 lbs in day or 5 lbs in a week to call physician/NN.  Patient verbalizes understanding of self-management goals of living with Congestive Heart Failure            5/16/18 Patient to verbalize heart failure zones and recognize red flags and report to physician/NN by 6/25/18       Understands and adheres to diet. 5/16/18 Patient will demonstrate understanding of low sodium diet and its effects on the heart by 7/6/18. Needs addressed from pathway:   Week 9-12   Provide Daily Disease Management (patient/caregiver initiated)  ? Daily weight (Before Breakfast  ? Daily Zone Identification (symptom management; weight, edema, SOB, activity/sleep changes)-notify provider immediately as indicated  ? Cardiac Low-sodium Diet (No added sodium; 1500mg as indicated). If  Diabetic:  include carbohydrate controlled   Additional Assessments  ? Activity tolerance assessment   (eg: Vital signs; level of consciousness; dyspnea on exertion; pillow usage; recliner vs bed)  ? Energy conservation management (balance activity w/ rest)  Psychosocial: Reassurance and emotional support; depression screening. Monitoring:  ? Home health  Education:  ? Advanced Care Planning status (follow up)   ?  Patient/Caregiver verifies support systems (meal planning, medication and transportation needs, community resources)  ? Health literacy for heart failure                 Have you been to an ER/Hospital since discharge from [facility]? yes      Have you followed up with PCP/Cardiologist/Specialist? Has attended follow up appointments    Transportation: daughter/son  Diet: diabetic/cardiac/low sodium  Activity/ADLs: able to perform ADL's. Medications Reconciled at this time:  no  Home health:  Company/Completion: [employer] [facility]  Social Support: family    Advanced Care Plan: (addressed)    Patient reminded that there is a physician on call 24 hours a day / 7 days a week (M-F 5pm to 8am and from Friday 5pm until Monday 8a for the weekend) should the patient have questions or concerns. Patient reminded to call 911 if situation is emergent or patient feels the situation is emergent. Pt verbalizes understanding.

## 2018-08-02 ENCOUNTER — PATIENT OUTREACH (OUTPATIENT)
Dept: CARDIOLOGY CLINIC | Age: 63
End: 2018-08-02

## 2018-08-02 NOTE — PROGRESS NOTES
NN contacted the patient by telephone to perform CHF follow Up. Noted Priorities/Plan:  Patient states she fell last week, \"my legs just gave out\". She did not seek medical attention or call to see PCP at that time. She has taken Tylenol and BC powder for pain. She is going to have her daughter take her to the emergency department today, it hurts for her to take deep breaths, states daughter said it looks bruised. NN will follow up. Daily Weight: 164 (increase) Zone: green Signs/Symptoms: Edema none at this time; SOB sometimes; orthopnea none, sleeps with 1 pillow Goals  Maintains daily weight.   
     
  5/16/18 Patient to provide teach back on daily weights and what to do when gain is 3 lbs in a day and 5 lbs in a week by 6/11/18. 
6/8/18 Patient has not weighed herself today, she forgot, will weigh again tomorrow, no edema or SOB. 
6/14/18 Patient did not weigh herself today, no edema or SOB. Reviewed importance of daily weighing, patient was able to verbalize if weight gain 3 lbs in day or 5 lbs in a week to call physician/NN.  Patient verbalizes understanding of self-management goals of living with Congestive Heart Failure 5/16/18 Patient to verbalize heart failure zones and recognize red flags and report to physician/NN by 6/25/18  Understands and adheres to diet. 5/16/18 Patient will demonstrate understanding of low sodium diet and its effects on the heart by 7/6/18. Needs addressed from pathway:  
Week 9-12 Provide Daily Disease Management (patient/caregiver initiated) ? Daily weight (Before Breakfast 
? Daily Zone Identification (symptom management; weight, edema, SOB, activity/sleep changes)-notify provider immediately as indicated ? Cardiac Low-sodium Diet (No added sodium; 1500mg as indicated). If  Diabetic:  include carbohydrate controlled Additional Assessments ?  Activity tolerance assessment  
(eg: Vital signs; level of consciousness; dyspnea on exertion; pillow usage; recliner vs bed) ? Energy conservation management (balance activity w/ rest) Psychosocial: Reassurance and emotional support; depression screening. Monitoring: ? Home health Education: 
? Advanced Care Planning status (follow up) ? Patient/Caregiver verifies support systems (meal planning, medication and transportation needs, community resources) ? Health literacy for heart failure Have you been to an ER/Hospital since discharge from [facility]? yes Have you followed up with PCP/Cardiologist/Specialist? Has attended follow up appointments Transportation: daughter/son Diet: diabetic/cardiac/low sodium Activity/ADLs: able to perform ADL's. Medications Reconciled at this time:  no 
Home health:  Company/Completion: [employer] [facility] completed 7/2/18 Social Support: family Advanced Care Plan: (addressed) Patient reminded that there is a physician on call 24 hours a day / 7 days a week (M-F 5pm to 8am and from Friday 5pm until Monday 8a for the weekend) should the patient have questions or concerns. Patient reminded to call 911 if situation is emergent or patient feels the situation is emergent. Pt verbalizes understanding.

## 2018-08-03 ENCOUNTER — HOSPITAL ENCOUNTER (EMERGENCY)
Age: 63
Discharge: HOME | End: 2018-08-03
Attending: EMERGENCY MEDICINE
Payer: MEDICARE

## 2018-08-03 ENCOUNTER — APPOINTMENT (OUTPATIENT)
Dept: GENERAL RADIOLOGY | Age: 63
End: 2018-08-03
Attending: EMERGENCY MEDICINE
Payer: MEDICARE

## 2018-08-03 VITALS
WEIGHT: 162 LBS | SYSTOLIC BLOOD PRESSURE: 130 MMHG | TEMPERATURE: 97 F | RESPIRATION RATE: 18 BRPM | OXYGEN SATURATION: 99 % | BODY MASS INDEX: 23.92 KG/M2 | DIASTOLIC BLOOD PRESSURE: 77 MMHG | HEART RATE: 90 BPM

## 2018-08-03 DIAGNOSIS — S20.212A CONTUSION OF RIB ON LEFT SIDE, INITIAL ENCOUNTER: Primary | ICD-10-CM

## 2018-08-03 PROCEDURE — 77030027138 HC INCENT SPIROMETER -A

## 2018-08-03 PROCEDURE — 71101 X-RAY EXAM UNILAT RIBS/CHEST: CPT

## 2018-08-03 PROCEDURE — 96372 THER/PROPH/DIAG INJ SC/IM: CPT

## 2018-08-03 PROCEDURE — 74011250637 HC RX REV CODE- 250/637: Performed by: EMERGENCY MEDICINE

## 2018-08-03 PROCEDURE — 74011250636 HC RX REV CODE- 250/636: Performed by: EMERGENCY MEDICINE

## 2018-08-03 PROCEDURE — 99283 EMERGENCY DEPT VISIT LOW MDM: CPT

## 2018-08-03 RX ORDER — MORPHINE SULFATE 2 MG/ML
2 INJECTION, SOLUTION INTRAMUSCULAR; INTRAVENOUS
Status: COMPLETED | OUTPATIENT
Start: 2018-08-03 | End: 2018-08-03

## 2018-08-03 RX ORDER — HYDROCODONE BITARTRATE AND ACETAMINOPHEN 5; 325 MG/1; MG/1
1 TABLET ORAL
Status: COMPLETED | OUTPATIENT
Start: 2018-08-03 | End: 2018-08-03

## 2018-08-03 RX ORDER — OXYCODONE AND ACETAMINOPHEN 5; 325 MG/1; MG/1
TABLET ORAL
Qty: 12 TAB | Refills: 0 | Status: SHIPPED | OUTPATIENT
Start: 2018-08-03 | End: 2019-02-01

## 2018-08-03 RX ADMIN — HYDROCODONE BITARTRATE AND ACETAMINOPHEN 1 TABLET: 5; 325 TABLET ORAL at 09:40

## 2018-08-03 RX ADMIN — Medication 2 MG: at 10:18

## 2018-08-03 NOTE — DISCHARGE INSTRUCTIONS
Chest Contusion: Care Instructions  Your Care Instructions  A chest contusion, or bruise, is caused by a fall or direct blow to the chest. Car crashes, falls, getting punched, and injury from bicycle handlebars are common causes of chest contusions. A very forceful blow to the chest can injure the heart or blood vessels in the chest, the lungs, the airway, the liver, or the spleen. Pain may be caused by an injury to muscles, cartilage, or ribs. Deep breathing, coughing, or sneezing can increase your pain. Lying on the injured area also can cause pain. Follow-up care is a key part of your treatment and safety. Be sure to make and go to all appointments, and call your doctor if you are having problems. It's also a good idea to know your test results and keep a list of the medicines you take. How can you care for yourself at home? · Rest and protect the injured or sore area. Stop, change, or take a break from any activity that may be causing your pain. · Put ice or a cold pack on the area for 10 to 20 minutes at a time. Put a thin cloth between the ice and your skin. · After 2 or 3 days, if your swelling is gone, apply a heating pad set on low or a warm cloth to your chest. Some doctors suggest that you go back and forth between hot and cold. Put a thin cloth between the heating pad and your skin. · Do not wrap or tape your ribs for support. This may cause you to take smaller breaths, which could increase your risk of pneumonia and lung collapse. · Ask your doctor if you can take an over-the-counter pain medicine, such as acetaminophen (Tylenol), ibuprofen (Advil, Motrin), or naproxen (Aleve). Be safe with medicines. Read and follow all instructions on the label. · Take your medicines exactly as prescribed. Call your doctor if you think you are having a problem with your medicine.   · Gentle stretching and massage may help you feel better after a few days of rest. Stretch slowly to the point just before discomfort begins, then hold the stretch for at least 15 to 30 seconds. Do this 3 or 4 times per day. · As your pain gets better, slowly return to your normal activities. Be patient, because chest bruises can take weeks or months to heal. Any increased pain may be a sign that you need to rest a while longer. When should you call for help? Call 911 anytime you think you may need emergency care. For example, call if:    · You have severe trouble breathing.     · You cough up blood.    Call your doctor now or seek immediate medical care if:    · You have belly pain.     · You are dizzy or lightheaded, or you feel like you may faint.     · You develop new symptoms with the chest pain.     · Your chest pain gets worse.     · You have a fever.     · You have some shortness of breath.     · You have a cough that brings up mucus from the lungs.    Watch closely for changes in your health, and be sure to contact your doctor if:    · Your chest pain is not improving after 1 week. Where can you learn more? Go to http://chanel-connor.info/. Enter I174 in the search box to learn more about \"Chest Contusion: Care Instructions. \"  Current as of: November 20, 2017  Content Version: 11.7  © 1587-8347 [employer]. Care instructions adapted under license by [employer] (which disclaims liability or warranty for this information). If you have questions about a medical condition or this instruction, always ask your healthcare professional. [place] [address] any warranty or liability for your use of this information.

## 2018-08-03 NOTE — ED PROVIDER NOTES
EMERGENCY DEPARTMENT HISTORY AND PHYSICAL EXAM 
 
9:30 AM 
 
 
Date: 8/3/2018 Patient Name: Nolvia Reed History of Presenting Illness Chief Complaint Patient presents with  Fall  Rib Pain  Hip Pain  Arm Pain History Provided By: Patient Chief Complaint: Flank Pain Duration: 30  Minutes Timing:  Constant Location: Left Flank Quality: Not Obtained Severity: 7 out of 10 Modifying Factors: Patient took 2 BCs with short-term relief Associated Symptoms: denies any other associated signs or symptoms Additional History (Context): 9:39 AM Nolvia Reed is a 70 y.o. female with h/o DM, MI, CVA, and COPD who presents to ED complaining of constant 7/10 left flank pain onset 9am this morning. The patient states that she suffers from neuropathy, which caused leg-shaking and the subsequent fall. She reports taking gabapentin for her neuropathy. She claims that she fell onto her side on the hard kitchen floor. The patient attempted to stand up but kept falling down due to continuous leg-shaking. The patient reports taking 2 BCs, resulting in short-term relief for flank pain, but the pain has returned to a 7 in a severity. The patient's daughter noticed bilateral leg-swelling onset 2 days ago. She reports taking furosemide for chronic leg-swelling. The patient admits a baseline cough related to COPD. She denies any current smoking, EtOH use, and drug use. The patient denies any urinary Sx like dysuria, hematuria, or related fever. No other concerns or symptoms at this time. PCP: Hortensia Mcghee MD 
 
 
Current Outpatient Prescriptions Medication Sig Dispense Refill  oxyCODONE-acetaminophen (PERCOCET) 5-325 mg per tablet Take 1 tablet every 4-6 hours as needed for pain control. If you were instructed to try over the counter ibuprofen or tylenol, only take the percocet for pain not controlled with the over the counter medication.  12 Tab 0  
 amLODIPine (NORVASC) 5 mg tablet Take 1 Tab by mouth daily. 90 Tab 3  
 isosorbide dinitrate (ISORDIL) 10 mg tablet Take 2 Tabs by mouth three (3) times daily. 547 Tab 3  
 hydrALAZINE (APRESOLINE) 100 mg tablet Take 1 Tab by mouth three (3) times daily. 270 Tab 3  Blood-Glucose Meter (TRUE METRIX GLUCOSE METER) misc Test glucose 3 times daily DX: E11.40 1 Each 0  
 Lancets misc Test glucose 3 times daily DX: E11.40 100 Each 12  
 glucose blood VI test strips (BLOOD GLUCOSE TEST) strip (True Metrix) Test glucose 3 times daily DX: E11.40 100 Strip 12  
 gabapentin (NEURONTIN) 100 mg capsule 1 cap po with breakfast and dinner, and 3 caps po qHS 150 Cap 5  
 insulin glargine (LANTUS,BASAGLAR) 100 unit/mL (3 mL) inpn 7 Units by SubCUTAneous route in the morning. 15 units in the evening. 5 Pen 12  
 furosemide (LASIX) 20 mg tablet Take 1 Tab by mouth two (2) times a day. 60 Tab 8  acetaminophen (TYLENOL EXTRA STRENGTH) 500 mg tablet Take 1,000 mg by mouth every six (6) hours as needed for Pain.  mometasone-formoterol (DULERA) 200-5 mcg/actuation HFA inhaler Take 2 Puffs by inhalation two (2) times a day. 1 Inhaler 0  
 spironolactone (ALDACTONE) 50 mg tablet Take 1 Tab by mouth two (2) times a day. 60 Tab 1  carvedilol (COREG) 25 mg tablet Take 1 Tab by mouth two (2) times daily (with meals). 180 Tab 3  
 insulin lispro (HUMALOG) 100 unit/mL kwikpen 15 Units by SubCUTAneous route daily.  CARBOXYMETHYLCELLULOS/GLYCERIN (REFRESH OPTIVE OP) Administer 1 Drop to both eyes six (6) times daily.  albuterol (VENTOLIN HFA) 90 mcg/actuation inhaler Take 2 Puffs by inhalation every four (4) hours as needed for Wheezing or Shortness of Breath.  fluticasone (FLONASE) 50 mcg/actuation nasal spray 2 Sprays by Both Nostrils route daily. 1 Bottle 1  
 aspirin delayed-release 81 mg tablet Take 81 mg by mouth daily.  pravastatin (PRAVACHOL) 20 mg tablet Take 20 mg by mouth nightly. Past History Past Medical History: 
Past Medical History:  
Diagnosis Date  Anemia  Arthritis  Cataract  Chronic obstructive pulmonary disease ([facility] [address] [facility]  Diabetes mellitus ([facility]  Difficulty swallowing Both food and liquid  History of echocardiogram 12/29/2009 EF 50%. Mild-mod conc LVH. Mod DDfx. LAE. Mild MR.    
 Hypercholesterolemia  Hypertension  Hypertensive heart disease  Joint pain  Joint swelling  Normal nuclear stress test 09/08/2000 No ischemia or prior infarction. Neg EKG on submax EST. Ex time 15:02.  Recurrent boils  Rheumatism  Shortness of breath Past Surgical History: 
Past Surgical History:  
Procedure Laterality Date  CARDIAC CATHETERIZATION  5/10/2018  CORONARY ARTERY ANGIOGRAM  5/10/2018  HX CHOLECYSTECTOMY  [id number]  MODERATE SEDATION  5/10/2018 Family History: 
Family History Problem Relation Age of Onset  Hypertension Mother  Ovarian Cancer Mother  Stroke Mother  Heart Attack Father  Breast Cancer Maternal Aunt Social History: 
Social History Substance Use Topics  Smoking status: Never Smoker  Smokeless tobacco: Never Used  Alcohol use No  
 
 
Allergies: Allergies Allergen Reactions  Codeine Nausea Only  Sulfa (Sulfonamide Antibiotics) Rash Review of Systems Review of Systems Constitutional: Negative. Negative for chills, diaphoresis and fever. HENT: Negative. Negative for congestion, rhinorrhea and sore throat. Eyes: Negative. Negative for pain, discharge and redness. Respiratory: Positive for cough (baseline). Negative for chest tightness, shortness of breath and wheezing. Cardiovascular: Positive for leg swelling. Negative for chest pain. Gastrointestinal: Negative. Negative for abdominal pain, constipation, diarrhea, nausea and vomiting. Genitourinary: Positive for flank pain.  Negative for dysuria, frequency, hematuria and urgency. Musculoskeletal: Negative for back pain and neck pain. Skin: Negative. Negative for rash. Neurological: Negative. Negative for syncope, weakness, numbness and headaches. Psychiatric/Behavioral: Negative. All other systems reviewed and are negative. Physical Exam  
 
Visit Vitals  /77  Pulse 90  Temp 97 °F (36.1 °C)  Resp 18  Wt 73.5 kg (162 lb)  SpO2 99%  BMI 23.92 kg/m2 Physical Exam  
Constitutional: She appears well-developed and well-nourished. Non-toxic appearance. She does not have a sickly appearance. She does not appear ill. No distress. HENT:  
Head: Normocephalic and atraumatic. Mouth/Throat: Oropharynx is clear and moist. No oropharyngeal exudate. Eyes: Conjunctivae and EOM are normal. Pupils are equal, round, and reactive to light. No scleral icterus. Neck: Normal range of motion. Neck supple. No hepatojugular reflux and no JVD present. No tracheal deviation present. No thyromegaly present. Cardiovascular: Normal rate, regular rhythm, S1 normal, S2 normal, normal heart sounds, intact distal pulses and normal pulses. Exam reveals no gallop, no S3 and no S4. No murmur heard. Pulses: 
     Radial pulses are 2+ on the right side, and 2+ on the left side. Dorsalis pedis pulses are 2+ on the right side, and 2+ on the left side. Pulmonary/Chest: Effort normal and breath sounds normal. No respiratory distress. She has no decreased breath sounds. She has no wheezes. She has no rhonchi. She has no rales. Abdominal: Soft. Normal appearance and bowel sounds are normal. She exhibits no distension and no mass. There is no hepatosplenomegaly. There is no tenderness. There is no rigidity, no rebound, no guarding, no CVA tenderness, no tenderness at McBurney's point and negative Elizondo's sign. Musculoskeletal: Normal range of motion.   
     Back: 
 
Lymphadenopathy:  
     Head (right side): No submental, no submandibular, no preauricular and no occipital adenopathy present. Head (left side): No submental, no submandibular, no preauricular and no occipital adenopathy present. She has no cervical adenopathy. Right: No supraclavicular adenopathy present. Left: No supraclavicular adenopathy present. Neurological: She is alert. She has normal strength and normal reflexes. She is not disoriented. No cranial nerve deficit or sensory deficit. Coordination and gait normal. GCS eye subscore is 4. GCS verbal subscore is 5. GCS motor subscore is 6. Grossly intact Skin: Skin is warm, dry and intact. No rash noted. She is not diaphoretic. Psychiatric: She has a normal mood and affect. Her speech is normal and behavior is normal. Judgment and thought content normal. Cognition and memory are normal.  
Nursing note and vitals reviewed. Diagnostic Study Results Labs - No results found for this or any previous visit (from the past 12 hour(s)). Radiologic Studies -  
XR RIBS LT W PA CXR MIN 3 V    (Results Pending) Preliminary Result IMPRESSION: 
 
No acute process. Interpreted by Dilcia Vásquez DO 10:05 AM 
 
 
Medical Decision Making Provider Notes (Medical Decision Making): MDM Number of Diagnoses or Management Options Contusion of rib on left side, initial encounter:  
 
 
I am the first provider for this patient. I reviewed the vital signs, available nursing notes, past medical history, past surgical history, family history and social history. Vital Signs-Reviewed the patient's vital signs. Records Reviewed: Nursing Notes and Old Medical Records (Time of Review: 9:30 AM) ED Course: Progress Notes, Reevaluation, and Consults: 
 
Chest X-Ray showed No acute process. 9:30 AM 8/3/2018 Diagnosis I have reassessed the patient. Patient is feeling better. Patient will be prescribed Percocet. Patient was discharged in stable condition.   Patient is to return to emergency department if any new or worsening condition. Clinical Impression: 1. Contusion of rib on left side, initial encounter Disposition: DC Follow-up Information Follow up With Details Comments Contact Info Lashay Galvin MD Schedule an appointment as soon as possible for a visit in 2 days  [address] [place] [id number] [phone] [facility] EMERGENCY DEPT Go to If symptoms worsen, As needed [address] [place] [address] [address]  
  
  
 
_______________________________ Attestations: 
Scribe Attestation Jazmine Booker acting as a scribe for and in the presence of [employer], DO August 03, 2018 at 9:30 AM 
    
Provider Attestation:     
I personally performed the services described in the documentation, reviewed the documentation, as recorded by the scribe in my presence, and it accurately and completely records my words and actions. August 03, 2018 at 9:30 AM - [employer], DO   
_______________________________

## 2018-08-03 NOTE — ED TRIAGE NOTES
Pt states she fell last Friday and hurt her left side, and left arm. Pt is has frequent falls due to neuropathy. Denies LOC or head injury

## 2018-08-08 ENCOUNTER — PATIENT OUTREACH (OUTPATIENT)
Dept: CARDIOLOGY CLINIC | Age: 63
End: 2018-08-08

## 2018-08-08 NOTE — PROGRESS NOTES
Patient has graduated from the Disease Specific Care Management  program on 8/8/18. Patient's symptoms are stable at this time. Patient/family has the ability to self-manage. Care management goals have been completed at this time. No further nurse navigator follow up scheduled. Goals Addressed             Most Recent     Maintains daily weight. On track (8/8/2018)             5/16/18 Patient to provide teach back on daily weights and what to do when gain is 3 lbs in a day and 5 lbs in a week by 6/11/18.  6/8/18 Patient has not weighed herself today, she forgot, will weigh again tomorrow, no edema or SOB.  6/14/18 Patient did not weigh herself today, no edema or SOB. Reviewed importance of daily weighing, patient was able to verbalize if weight gain 3 lbs in day or 5 lbs in a week to call physician/NN.  Patient verbalizes understanding of self-management goals of living with Congestive Heart Failure   On track (8/8/2018)             5/16/18 Patient to verbalize heart failure zones and recognize red flags and report to physician/NN by 6/25/18       Understands and adheres to diet. On track (8/8/2018)             5/16/18 Patient will demonstrate understanding of low sodium diet and its effects on the heart by 7/6/18. Pt has nurse navigator's contact information for any further questions, concerns, or needs.   Patients upcoming visits:  Future Appointments  Date Time Provider Brandi Lees   8/16/2018 11:00 AM Stacy Lowery NP [address] [place] [address]

## 2018-08-30 ENCOUNTER — APPOINTMENT (OUTPATIENT)
Dept: PHYSICAL THERAPY | Age: 63
End: 2018-08-30

## 2018-09-25 ENCOUNTER — OFFICE VISIT (OUTPATIENT)
Dept: FAMILY MEDICINE CLINIC | Age: 63
End: 2018-09-25

## 2018-09-25 VITALS
HEART RATE: 74 BPM | BODY MASS INDEX: 23.7 KG/M2 | SYSTOLIC BLOOD PRESSURE: 190 MMHG | TEMPERATURE: 97.2 F | WEIGHT: 160 LBS | HEIGHT: 69 IN | DIASTOLIC BLOOD PRESSURE: 103 MMHG | RESPIRATION RATE: 14 BRPM

## 2018-09-25 DIAGNOSIS — R53.83 FATIGUE, UNSPECIFIED TYPE: ICD-10-CM
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
## 2018-09-25 DIAGNOSIS — N18.30 CKD (CHRONIC KIDNEY DISEASE) STAGE 3, GFR 30-59 ML/MIN (HCC): ICD-10-CM

## 2018-09-25 DIAGNOSIS — J44.9 CHRONIC OBSTRUCTIVE PULMONARY DISEASE, UNSPECIFIED COPD TYPE (HCC): ICD-10-CM

## 2018-09-25 DIAGNOSIS — Z79.4 TYPE 2 DIABETES MELLITUS WITH DIABETIC NEUROPATHY, WITH LONG-TERM CURRENT USE OF INSULIN (HCC): ICD-10-CM

## 2018-09-25 DIAGNOSIS — R42 DIZZINESS: ICD-10-CM

## 2018-09-25 DIAGNOSIS — S20.212A CONTUSION OF RIB ON LEFT SIDE, INITIAL ENCOUNTER: ICD-10-CM

## 2018-09-25 DIAGNOSIS — I10 ESSENTIAL HYPERTENSION: ICD-10-CM

## 2018-09-25 DIAGNOSIS — Z79.899 POLYPHARMACY: ICD-10-CM

## 2018-09-25 DIAGNOSIS — R40.0 DAYTIME SOMNOLENCE: ICD-10-CM

## 2018-09-25 DIAGNOSIS — E11.40 TYPE 2 DIABETES MELLITUS WITH DIABETIC NEUROPATHY, WITH LONG-TERM CURRENT USE OF INSULIN (HCC): ICD-10-CM

## 2018-09-25 DIAGNOSIS — E78.5 HYPERLIPIDEMIA, UNSPECIFIED HYPERLIPIDEMIA TYPE: Primary | ICD-10-CM

## 2018-09-25 DIAGNOSIS — J01.00 ACUTE NON-RECURRENT MAXILLARY SINUSITIS: ICD-10-CM

## 2018-09-25 RX ORDER — LIDOCAINE 50 MG/G
PATCH TOPICAL
Qty: 1 EACH | Refills: 0 | Status: SHIPPED | OUTPATIENT
Start: 2018-09-25 | End: 2019-02-01

## 2018-09-25 RX ORDER — AMOXICILLIN AND CLAVULANATE POTASSIUM 875; 125 MG/1; MG/1
1 TABLET, FILM COATED ORAL 2 TIMES DAILY
Qty: 20 TAB | Refills: 0 | Status: SHIPPED | OUTPATIENT
Start: 2018-09-25 | End: 2018-10-05

## 2018-09-25 RX ORDER — ALBUTEROL SULFATE 90 UG/1
2 AEROSOL, METERED RESPIRATORY (INHALATION)
Qty: 1 INHALER | Refills: 5 | Status: SHIPPED | OUTPATIENT
Start: 2018-09-25 | End: 2020-01-01

## 2018-09-25 NOTE — PROGRESS NOTES
Chief Complaint   Patient presents with    Fall     Patient seen at ER on 8-3-18 for fall, patient fell again x 3 weeks ago on the same left side of the body. Patient reports lower ext weakness as the cause of the falls. HISTORY OF PRESENT ILLNESS  Ganesh Rao is a 70 y.o. female. HPI  Pt here for routine f/u of htn. Pt has been feeling dizzy for some time now and has had a few falls. She has seen neuro (Dr Ashley Menezes). She had an eeg and mri. The eeg was nl, the mri showed an old infarct. Pt was referred to PT but dtr reports that she was so weak that they cancelled the appt for the initial assessment. Pt has been seen by cards. She had a repeat echo which did show improvement in her EF. Pt did not get a call about the results but cards notes reflect that the plan was to cont med management if her EF was >35%. Labs reviewed in detail with pt and dtr. Pt's dtr reports that she seems to remain very groggy and they feel this may be due to her meds. Pt needs refills of albuterol and dulera but c/o trouble breathing due to congestion. Pt seen at the ER for a fall; dx with contusion of rib cage. She is still having significant pain and requests medication for this. There is pain with deep breathing and with movement. Review of Systems   Constitutional: Negative. HENT: Positive for congestion. Respiratory: Negative. Cardiovascular: Negative. Musculoskeletal: Positive for falls. Neurological: Positive for dizziness. All other systems reviewed and are negative. Physical Exam   Constitutional: She is oriented to person, place, and time. She appears well-developed and well-nourished. No distress. HENT:   Head: Normocephalic and atraumatic. Right Ear: Hearing, tympanic membrane, external ear and ear canal normal.   Left Ear: Hearing, tympanic membrane, external ear and ear canal normal.   Nose: Mucosal edema present.  Right sinus exhibits maxillary sinus tenderness. Right sinus exhibits no frontal sinus tenderness. Left sinus exhibits maxillary sinus tenderness. Left sinus exhibits no frontal sinus tenderness. Mouth/Throat: Uvula is midline, oropharynx is clear and moist and mucous membranes are normal.   Eyes: Conjunctivae and EOM are normal.   Neck: Normal range of motion. Neck supple. No JVD present. No thyromegaly present. Cardiovascular: Normal rate, regular rhythm and normal heart sounds. Exam reveals no gallop and no friction rub. No murmur heard. Pulmonary/Chest: Effort normal and breath sounds normal. She has no wheezes. She has no rhonchi. She has no rales. She exhibits bony tenderness. Musculoskeletal: Normal range of motion. Back:    Lymphadenopathy:     She has no cervical adenopathy. Neurological: She is alert and oriented to person, place, and time. She displays no tremor. She exhibits normal muscle tone. Coordination normal.   Skin: Skin is warm and dry. Psychiatric: She has a normal mood and affect. Her behavior is normal. Judgment and thought content normal.   Nursing note and vitals reviewed. ASSESSMENT and PLAN  Diagnoses and all orders for this visit:    1. Hyperlipidemia, unspecified hyperlipidemia type  -     METABOLIC PANEL, COMPREHENSIVE; Future  -     LIPID PANEL; Future    2. Acute non-recurrent maxillary sinusitis  -     amoxicillin-clavulanate (AUGMENTIN) 875-125 mg per tablet; Take 1 Tab by mouth two (2) times a day for 10 days. 3. Type 2 diabetes mellitus with diabetic neuropathy, with long-term current use of insulin ([facility])  -     METABOLIC PANEL, COMPREHENSIVE; Future  -     LIPID PANEL; Future  -     HEMOGLOBIN A1C WITH EAG; Future    4. Essential hypertension  -     METABOLIC PANEL, COMPREHENSIVE; Future  -     LIPID PANEL; Future  Not at goal; recheck at f/u    5. Dizziness  -     REFERRAL TO PHARMACIST    6. Polypharmacy  -     REFERRAL TO PHARMACIST    7.  Daytime somnolence  -     REFERRAL TO PHARMACIST    8. Fatigue, unspecified type  -     VITAMIN D, 25 HYDROXY; Future    9. Fall, initial encounter  -     REFERRAL TO PHYSICAL THERAPY    10. Chronic obstructive pulmonary disease, unspecified COPD type ([facility]  -     mometasone-formoterol (DULERA) 200-5 mcg/actuation HFA inhaler; Take 2 Puffs by inhalation two (2) times a day. -     albuterol (VENTOLIN HFA) 90 mcg/actuation inhaler; Take 2 Puffs by inhalation every four (4) hours as needed for Wheezing or Shortness of Breath. 11. CKD (chronic kidney disease) stage 3, GFR 30-59 ml/min  -     METABOLIC PANEL, COMPREHENSIVE; Future  -     LIPID PANEL; Future  -     VITAMIN D, 25 HYDROXY; Future  -     REFERRAL TO NEPHROLOGY    12. Contusion of rib on left side, initial encounter  -     lidocaine (LIDODERM) 5 %; Apply patch to the affected area for 12 hours a day and remove for 12 hours a day. Follow-up Disposition:  Return in about 4 weeks (around 10/23/2018).

## 2018-09-25 NOTE — MR AVS SNAPSHOT
[facility] 
 
 
 [address] [facility] [id number] [phone] Patient: Tino Urrutia MRN: [id number] INV:2/83/1261 Visit Information Date & Time Provider Department Dept. Phone Encounter #  
 9/25/2018  1:15 PM Marizol Warner MD [address] [phone] [id number] Follow-up Instructions Return in about 4 weeks (around 10/23/2018). Upcoming Health Maintenance Date Due Hepatitis C Screening 1955 FOOT EXAM Q1 1/21/1963 MICROALBUMIN Q1 1/21/1963 EYE EXAM RETINAL OR DILATED Q1 1/21/1963 Shingrix Vaccine Age 50> (1 of 2) 1/21/2003 FOBT Q 1 YEAR AGE 50-75 1/21/2003 GLAUCOMA SCREENING Q2Y 1/21/2018 Bone Densitometry (Dexa) Screening 1/21/2018 Pneumococcal 65+ Low/Medium Risk (1 of 2 - PCV13) 1/21/2018 MEDICARE YEARLY EXAM 3/14/2018 Influenza Age 5 to Adult 8/1/2018 LIPID PANEL Q1 9/27/2018 BREAST CANCER SCRN MAMMOGRAM 10/5/2018 DTaP/Tdap/Td series (1 - Tdap) 6/28/2019* HEMOGLOBIN A1C Q6M 11/6/2018 *Topic was postponed. The date shown is not the original due date. Allergies as of 9/25/2018  Review Complete On: 9/25/2018 By: Marizol Warner MD  
  
 Severity Noted Reaction Type Reactions Codeine  03/01/2017    Nausea Only Sulfa (Sulfonamide Antibiotics)  01/27/2012   Side Effect Rash Current Immunizations  Never Reviewed No immunizations on file. Not reviewed this visit You Were Diagnosed With   
  
 Codes Comments Hyperlipidemia, unspecified hyperlipidemia type    -  Primary ICD-10-CM: E78.5 ICD-9-CM: 272.4 Acute non-recurrent maxillary sinusitis     ICD-10-CM: J01.00 ICD-9-CM: 461.0 Type 2 diabetes mellitus with diabetic neuropathy, with long-term current use of insulin (HCC)     ICD-10-CM: E11.40, Z79.4 ICD-9-CM: 250.60, 357.2, V58.67 Essential hypertension     ICD-10-CM: I10 
ICD-9-CM: 401.9  Dizziness     ICD-10-CM: G98 
 ICD-9-CM: 780.4 Polypharmacy     ICD-10-CM: S66.429 ICD-9-CM: V58.69 Daytime somnolence     ICD-10-CM: R40.0 ICD-9-CM: 780.54 Fatigue, unspecified type     ICD-10-CM: R53.83 ICD-9-CM: 780.79 Fall, initial encounter     ICD-10-CM: W19Kevin Mayer ICD-9-CM: [id number]. 9 Chronic obstructive pulmonary disease, unspecified COPD type ([facility]     ICD-10-CM: J44.9 ICD-9-CM: 009 CKD (chronic kidney disease) stage 3, GFR 30-59 ml/min     ICD-10-CM: N18.3 ICD-9-CM: 851. 3 Contusion of rib on left side, initial encounter     ICD-10-CM: S20.212A ICD-9-CM: 922.1 Vitals BP Pulse Temp Resp Height(growth percentile) Weight(growth percentile) (!) 190/103 74 97.2 °F (36.2 °C) (Oral) 14 5' 9\" (1.753 m) 160 lb (72.6 kg) BMI OB Status Smoking Status 23.63 kg/m2 Postmenopausal Never Smoker Vitals History BMI and BSA Data Body Mass Index Body Surface Area  
 23.63 kg/m 2 1.88 m 2 Preferred Pharmacy Pharmacy Name Phone [address] [place] [address]. [phone] Your Updated Medication List  
  
   
This list is accurate as of 9/25/18  2:48 PM.  Always use your most recent med list.  
  
  
  
  
 albuterol 90 mcg/actuation inhaler Commonly known as:  VENTOLIN HFA Take 2 Puffs by inhalation every four (4) hours as needed for Wheezing or Shortness of Breath. amLODIPine 5 mg tablet Commonly known as:  Franki Machado Take 1 Tab by mouth daily. aspirin delayed-release 81 mg tablet Take 81 mg by mouth daily. Blood-Glucose Meter Misc Commonly known as:  TRUE METRIX GLUCOSE METER Test glucose 3 times daily DX: E11.40  
  
 carvedilol 25 mg tablet Commonly known as:  May Hush Take 1 Tab by mouth two (2) times daily (with meals). fluticasone 50 mcg/actuation nasal spray Commonly known as:  Hortensia Dejseus 2 Sprays by Both Nostrils route daily. furosemide 20 mg tablet Commonly known as:  LASIX Take 1 Tab by mouth two (2) times a day.  
  
 gabapentin 100 mg capsule Commonly known as:  NEURONTIN  
1 cap po with breakfast and dinner, and 3 caps po qHS  
  
 glucose blood VI test strips strip Commonly known as:  blood glucose test  
(True Metrix) Test glucose 3 times daily DX: E11.40  
  
 hydrALAZINE 100 mg tablet Commonly known as:  APRESOLINE Take 1 Tab by mouth three (3) times daily. insulin glargine 100 unit/mL (3 mL) Inpn Commonly known as:  LANTUSBASAGLAR  
7 Units by SubCUTAneous route in the morning. 15 units in the evening. insulin lispro 100 unit/mL kwikpen Commonly known as:  HUMALOG  
15 Units by SubCUTAneous route daily. isosorbide dinitrate 10 mg tablet Commonly known as:  ISORDIL Take 2 Tabs by mouth three (3) times daily. Lancets Misc Test glucose 3 times daily DX: E11.40  
  
 lidocaine 5 % Commonly known as:  Hamp [place] Apply patch to the affected area for 12 hours a day and remove for 12 hours a day. mometasone-formoterol 200-5 mcg/actuation HFA inhaler Commonly known as:  Nitza Butts Take 2 Puffs by inhalation two (2) times a day. oxyCODONE-acetaminophen 5-325 mg per tablet Commonly known as:  PERCOCET Take 1 tablet every 4-6 hours as needed for pain control. If you were instructed to try over the counter ibuprofen or tylenol, only take the percocet for pain not controlled with the over the counter medication. pravastatin 20 mg tablet Commonly known as:  PRAVACHOL Take 20 mg by mouth nightly. [address] [facility] Administer 1 Drop to both eyes six (6) times daily. spironolactone 50 mg tablet Commonly known as:  ALDACTONE Take 1 Tab by mouth two (2) times a day. TYLENOL EXTRA STRENGTH 500 mg tablet Generic drug:  acetaminophen Take 1,000 mg by mouth every six (6) hours as needed for Pain. Prescriptions Sent to Pharmacy Refills mometasone-formoterol (DULERA) 200-5 mcg/actuation HFA inhaler 5 Sig: Take 2 Puffs by inhalation two (2) times a day. Class: Normal  
 [facility] [address] [facility] [place] [address]. Ph #: [phone] Route: Inhalation  
 albuterol (VENTOLIN HFA) 90 mcg/actuation inhaler 5 Sig: Take 2 Puffs by inhalation every four (4) hours as needed for Wheezing or Shortness of Breath. Class: Normal  
 Pharmacy: [address] [place], [place] [address] [address]. Ph #: [phone] Route: Inhalation  
 lidocaine (LIDODERM) 5 % 0 Sig: Apply patch to the affected area for 12 hours a day and remove for 12 hours a day. Class: Normal  
 Pharmacy: [address] [place] [address]. Ph #: [phone] We Performed the Following REFERRAL TO PHARMACIST [[id number] Custom] Comments:  
 Please evaluate patient for med review. Pt is having grogginess during the daytime. Suspect this is due to master but pt and dtr would like to have a consultation with the pharmacist to review meds. Follow-up Instructions Return in about 4 weeks (around 10/23/2018). Referral Information Referral ID Referred By Referred To  
  
 [id number] Gibson JOHNSON Visits Status Start Date End Date 1 New Request 9/25/18 9/25/19 If your referral has a status of pending review or denied, additional information will be sent to support the outcome of this decision. Please provide this summary of care documentation to your next provider. Your primary care clinician is listed as Ivette Blanks. If you have any questions after today's visit, please call [phone].

## 2018-10-12 ENCOUNTER — HOSPITAL ENCOUNTER (OUTPATIENT)
Dept: PHYSICAL THERAPY | Age: 63
Discharge: HOME | End: 2018-10-12
Payer: MEDICARE

## 2018-10-12 PROCEDURE — 97163 PT EVAL HIGH COMPLEX 45 MIN: CPT

## 2018-10-12 PROCEDURE — 97110 THERAPEUTIC EXERCISES: CPT

## 2018-10-12 NOTE — PROGRESS NOTES
[facility] [place] [facility]  [address] [place] [address]  [phone] fax    Plan of Care/ Statement of Necessity for Physical Therapy Services    Patient name: Suly Quan Start of Care: 10/12/2018   Referral source: Ellen Bell MD : 1955    Medical Diagnosis: Lack of coordination [R27.9]   Onset Date:3 months    Treatment Diagnosis: Weakness/Balance   Prior Hospitalization: see medical history Provider#: [id number]   Medications: Verified on Patient summary List    Comorbidities: HTN, Diabetes, COPD, Arthritis, Stroke. Prior Level of Function: Multiple falls and contusions. . Old Infarct from MRI. Daughter is caregiver currently. The Plan of Care and following information is based on the information from the initial evaluation. Assessment/ key information: Pt is a 70 yr old female who reported 2 falls in the last 3 months. She reported her legs began to shake and she fell to the kitchen  Floor. She tried to stand up and fell again. She did not loose consciousness. She went to the ER and was evaluated. Upon evaluation today ,she reports residual left rib/flank pain from the falls. Today's pain is 4/10. She demonstrates LE weakness of grossly 4-/5 to her LE's. LE ROM was limited due to ongoing weakness. She required assist for SLR. Static and dynamic balance strategies were moderate for fall risk. Pt was educated to use a walker for more stability and to decrease fall risk. She ambulates with a SPC with a very slow and unstable gait. Pt reports she lives with her daughter who is her main caregiver. BP at evaluation was 187/112 (reports she did not take her BP meds today). Due to financial limitations, pt can attend 1-2 sessions per week. Pt was educated on HBP ( medical emergency) as well as taking meds on time.      Pt will benefit from skilled therapy to improve ROM, strength, balance ,fall prevention and to improve QOL, to continue to be independent. Evaluation Complexity History HIGH Complexity :3+ comorbidities / personal factors will impact the outcome/ POC ; Examination HIGH Complexity : 4+ Standardized tests and measures addressing body structure, function, activity limitation and / or participation in recreation  ;Presentation HIGH Complexity : Unstable and unpredictable characteristics  ; Clinical Decision Making MEDIUM Complexity : FOTO score of 26-74  Overall Complexity Rating: HIGH   Problem List: pain affecting function, decrease ROM, decrease strength, edema affecting function, impaired gait/ balance, decrease ADL/ functional abilitiies, decrease activity tolerance, decrease flexibility/ joint mobility and decrease transfer abilities   Treatment Plan may include any combination of the following: Therapeutic exercise, Therapeutic activities, Neuromuscular re-education, Physical agent/modality, Gait/balance training, Manual therapy, Patient education, Self Care training, Functional mobility training, Home safety training and Stair training  Patient / Family readiness to learn indicated by: asking questions, trying to perform skills and interest  Persons(s) to be included in education: patient (P) and family support person (FSP);list daughter  Barriers to Learning/Limitations: None  Patient Goal (s): To get stronger with mobility  Patient Self Reported Health Status: fair  Rehabilitation Potential: good    Short Term Goals: To be accomplished in 1 weeks:   1. Pt will be compliant with aHEP to decrease fall risk. Long Term Goals: To be accomplished in 10 treatments:   1. Pt will increase FOTO score by 16 pts to improve function for safe independent living. 2. Pt will ambulate with a safe appropriate AD >150 ft to improve ambulation tolerance. 3. Pt will perform ROmberg EC on a compliant surface to decrease fall risk.     4. Pt will   Frequency / Duration: Patient to be seen 1-2 times per week for 12 treatments.       Patient/ Caregiver education and instruction: Diagnosis, prognosis, self care and exercises   [x]  Plan of care has been reviewed with PTA     G-Codes (GP)  Mobility   Current  CL= 60-79%   Goal  CK= 40-59%     The severity rating is based on clinical judgment and the FOTO score.     Certification Period: 10/12/18-1/11/19    Daniel Jcaobs, PT 10/12/2018 2:06 PM    ________________________________________________________________________    I certify that the above Therapy Services are being furnished while the patient is under my care. I agree with the treatment plan and certify that this therapy is necessary.     Physician's Signature:____________Date:_________TIME:________    ** Signature, Date and Time must be completed for valid certification **    Please sign and return to [facility] [employer]  [address] [place] [address]  [phone] fax

## 2018-10-12 NOTE — PROGRESS NOTES
PT DAILY TREATMENT NOTE - [facility]     Patient Name: Alexus Marie  Date:10/12/2018  : 1955  [x]  Patient  Verified  Payor: Magui Ring / Plan: [address] [place] [address] HMO / Product Type: Managed Care Medicare /    In time:910  Out time:1000  Total Treatment Time (min): 50  Total Timed Codes (min): 25  1:1 Treatment Time ([address] only): 50   Visit #: 1 of 12    Treatment Area: Lack of coordination [R27.9]    SUBJECTIVE  Pain Level (0-10 scale): 4/10 flank from fall  Any medication changes, allergies to medications, adverse drug reactions, diagnosis change, or new procedure performed?: [x] No    [] Yes (see summary sheet for update)  Subjective functional status/changes:   [] No changes reported  Onset fell 3 months ago and ten 2 months  and hurt ribs both times. Lef t rib pain from falls and flank pain as well. Lives with maria luisa in 1 story home. Severe weakness and fall risk. Pt is Diabetic and has HTN. She did not take any meds today and did not eat prior to eval .        OBJECTIVE      25 min []Eval                  []Re-Eval       25 min Therapeutic Exercise:  [] See flow sheet :   Rationale: increase ROM and increase strength to improve the patients ability to ease with ambulation. With   [] TE   [] TA   [] neuro   [] other: Patient Education: [x] Review HEP    [] Progressed/Changed HEP based on:   [] positioning   [] body mechanics   [] transfers   [] heat/ice application    [] other:      Other Objective/Functional Measures: TO=455/112 w/o meds today. Pt educated on improtance of meds and maintaining a normal BP austen for PT. Daughter was notified of high levels. SLR with difficulty due to severe weakness to LE's  Ambulates with a SPC with severe instability  Romberg is positive for increased sway. Pain Level (0-10 scale) post treatment: 4/10    ASSESSMENT/Changes in Function: see poc.      Patient will continue to benefit from skilled PT services to modify and progress therapeutic interventions, address functional mobility deficits, address ROM deficits, address strength deficits, analyze and address soft tissue restrictions, analyze and cue movement patterns, analyze and modify body mechanics/ergonomics, assess and modify postural abnormalities and address imbalance/dizziness to attain remaining goals.      []  See Plan of Care  []  See progress note/recertification  []  See Discharge Summary         Progress towards goals / Updated goals:  See poc    PLAN  [x]  Upgrade activities as tolerated     [x]  Continue plan of care  []  Update interventions per flow sheet       []  Discharge due to:_  []  Other:_      Kita Wood, STEVE 10/12/2018  9:08 AM    Future Appointments  Date Time Provider Brandi Yoana   10/23/2018 12:00 PM [facility]

## 2018-10-23 ENCOUNTER — APPOINTMENT (OUTPATIENT)
Dept: PHYSICAL THERAPY | Age: 63
End: 2018-10-23
Payer: MEDICARE

## 2018-10-23 ENCOUNTER — OFFICE VISIT (OUTPATIENT)
Dept: FAMILY MEDICINE CLINIC | Age: 63
End: 2018-10-23

## 2018-10-23 VITALS
WEIGHT: 175 LBS | HEIGHT: 69 IN | BODY MASS INDEX: 25.92 KG/M2 | RESPIRATION RATE: 18 BRPM | SYSTOLIC BLOOD PRESSURE: 149 MMHG | TEMPERATURE: 97.9 F | HEART RATE: 85 BPM | DIASTOLIC BLOOD PRESSURE: 88 MMHG

## 2018-10-23 DIAGNOSIS — E78.5 HYPERLIPIDEMIA, UNSPECIFIED HYPERLIPIDEMIA TYPE: ICD-10-CM

## 2018-10-23 DIAGNOSIS — J44.9 CHRONIC OBSTRUCTIVE PULMONARY DISEASE, UNSPECIFIED COPD TYPE (HCC): Primary | ICD-10-CM

## 2018-10-23 DIAGNOSIS — J33.9 NASAL POLYPS: ICD-10-CM

## 2018-10-23 DIAGNOSIS — I10 ESSENTIAL HYPERTENSION: ICD-10-CM

## 2018-10-23 DIAGNOSIS — E11.40 TYPE 2 DIABETES MELLITUS WITH DIABETIC NEUROPATHY, WITH LONG-TERM CURRENT USE OF INSULIN (HCC): ICD-10-CM

## 2018-10-23 DIAGNOSIS — Z79.4 TYPE 2 DIABETES MELLITUS WITH DIABETIC NEUROPATHY, WITH LONG-TERM CURRENT USE OF INSULIN (HCC): ICD-10-CM

## 2018-10-23 PROBLEM — J30.9 ALLERGIC RHINITIS: Status: ACTIVE | Noted: 2018-10-23

## 2018-10-23 PROBLEM — J32.9 CHRONIC SINUSITIS: Status: ACTIVE | Noted: 2017-06-13

## 2018-10-23 RX ORDER — GABAPENTIN 400 MG/1
1 CAPSULE ORAL
COMMUNITY
End: 2019-02-01

## 2018-10-23 RX ORDER — CARVEDILOL 12.5 MG/1
2 TABLET ORAL DAILY
COMMUNITY
End: 2019-02-01

## 2018-10-23 NOTE — PATIENT INSTRUCTIONS
High Blood Pressure: Care Instructions  Your Care Instructions    If your blood pressure is usually above 130/80, you have high blood pressure, or hypertension. That means the top number is 130 or higher or the bottom number is 80 or higher, or both. Despite what a lot of people think, high blood pressure usually doesn't cause headaches or make you feel dizzy or lightheaded. It usually has no symptoms. But it does increase your risk for heart attack, stroke, and kidney or eye damage. The higher your blood pressure, the more your risk increases. Your doctor will give you a goal for your blood pressure. Your goal will be based on your health and your age. Lifestyle changes, such as eating healthy and being active, are always important to help lower blood pressure. You might also take medicine to reach your blood pressure goal.  Follow-up care is a key part of your treatment and safety. Be sure to make and go to all appointments, and call your doctor if you are having problems. It's also a good idea to know your test results and keep a list of the medicines you take. How can you care for yourself at home? Medical treatment  · If you stop taking your medicine, your blood pressure will go back up. You may take one or more types of medicine to lower your blood pressure. Be safe with medicines. Take your medicine exactly as prescribed. Call your doctor if you think you are having a problem with your medicine. · Talk to your doctor before you start taking aspirin every day. Aspirin can help certain people lower their risk of a heart attack or stroke. But taking aspirin isn't right for everyone, because it can cause serious bleeding. · See your doctor regularly. You may need to see the doctor more often at first or until your blood pressure comes down. · If you are taking blood pressure medicine, talk to your doctor before you take decongestants or anti-inflammatory medicine, such as ibuprofen.  Some of these medicines can raise blood pressure. · Learn how to check your blood pressure at home. Lifestyle changes  · Stay at a healthy weight. This is especially important if you put on weight around the waist. Losing even 10 pounds can help you lower your blood pressure. · If your doctor recommends it, get more exercise. Walking is a good choice. Bit by bit, increase the amount you walk every day. Try for at least 30 minutes on most days of the week. You also may want to swim, bike, or do other activities. · Avoid or limit alcohol. Talk to your doctor about whether you can drink any alcohol. · Try to limit how much sodium you eat to less than 2,300 milligrams (mg) a day. Your doctor may ask you to try to eat less than 1,500 mg a day. · Eat plenty of fruits (such as bananas and oranges), vegetables, legumes, whole grains, and low-fat dairy products. · Lower the amount of saturated fat in your diet. Saturated fat is found in animal products such as milk, cheese, and meat. Limiting these foods may help you lose weight and also lower your risk for heart disease. · Do not smoke. Smoking increases your risk for heart attack and stroke. If you need help quitting, talk to your doctor about stop-smoking programs and medicines. These can increase your chances of quitting for good. When should you call for help? Call 911 anytime you think you may need emergency care. This may mean having symptoms that suggest that your blood pressure is causing a serious heart or blood vessel problem. Your blood pressure may be over 180/120.   For example, call 911 if:    · You have symptoms of a heart attack. These may include:  ? Chest pain or pressure, or a strange feeling in the chest.  ? Sweating. ? Shortness of breath. ? Nausea or vomiting. ? Pain, pressure, or a strange feeling in the back, neck, jaw, or upper belly or in one or both shoulders or arms. ? Lightheadedness or sudden weakness.   ? A fast or irregular heartbeat.     · You have symptoms of a stroke. These may include:  ? Sudden numbness, tingling, weakness, or loss of movement in your face, arm, or leg, especially on only one side of your body. ? Sudden vision changes. ? Sudden trouble speaking. ? Sudden confusion or trouble understanding simple statements. ? Sudden problems with walking or balance. ? A sudden, severe headache that is different from past headaches.     · You have severe back or belly pain.    Do not wait until your blood pressure comes down on its own. Get help right away.   Call your doctor now or seek immediate care if:    · Your blood pressure is much higher than normal (such as 180/120 or higher), but you don't have symptoms.     · You think high blood pressure is causing symptoms, such as:  ? Severe headache.  ? Blurry vision.    Watch closely for changes in your health, and be sure to contact your doctor if:    · Your blood pressure measures higher than your doctor recommends at least 2 times. That means the top number is higher or the bottom number is higher, or both.     · You think you may be having side effects from your blood pressure medicine. Where can you learn more? Go to http://chanel-connor.info/. Enter W485 in the search box to learn more about \"High Blood Pressure: Care Instructions. \"  Current as of: December 6, 2017  Content Version: 11.8  © 7382-2478 [employer]. Care instructions adapted under license by [employer] (which disclaims liability or warranty for this information). If you have questions about a medical condition or this instruction, always ask your healthcare professional. [place] [address] any warranty or liability for your use of this information.

## 2018-10-23 NOTE — PROGRESS NOTES
Chief Complaint   Patient presents with    Hypertension     follow up    Diabetes    Chronic Kidney Disease    Nasal Laceration     Patient stated that she been having polyps in her Bilateral nasal x 2 weeks. HISTORY OF PRESENT ILLNESS  Galo Soni is a 70 y.o. female. HPI  Patient is here for a 3 mo follow up of dm, htn, ckd. Home bs readings have ranged . Patient had labs on 9/25/18. Labs no available for review today. Patient does not need medication refills today. New concerns today: pt reports that she is having trouble breathing due to sinus problems and her copd. She had seen Dr Jen Cortes (ENT) for nasal polyps about a year ago and they had improved so she has not followed up. Pt also c/o pain in her L side where she had fallen previously. She did use the lidocaine patches but they were otc and did not seem to last long. She has had a gel that helped but it also did not last very long. Review of Systems   Constitutional: Negative. HENT: Positive for congestion. Respiratory: Positive for shortness of breath. Cardiovascular:        Chest wall pain   All other systems reviewed and are negative. Physical Exam  Physical Exam   Nursing note and vitals reviewed. Constitutional: She is oriented to person, place, and time. She appears well-developed and well-nourished. HENT:   Head: Normocephalic and atraumatic. Right Ear: External ear normal.   Left Ear: External ear normal.   Nose: Nose normal.   Eyes: Conjunctivae and EOM are normal.   Neck: Normal range of motion. Neck supple. No JVD present. Carotid bruit is not present. No thyromegaly present. Cardiovascular: Normal rate, regular rhythm, normal heart sounds and intact distal pulses. Exam reveals no gallop and no friction rub. No murmur heard. Pulmonary/Chest: Effort normal and breath sounds normal. She has no wheezes. She has no rhonchi. She has no rales. Abdominal: Soft.  Bowel sounds are normal.   Musculoskeletal: Normal range of motion. Neurological: She is alert and oriented to person, place, and time. Coordination normal.   Skin: Skin is warm and dry. Psychiatric: She has a normal mood and affect. Her behavior is normal. Judgment and thought content normal.     ASSESSMENT and PLAN  Diagnoses and all orders for this visit:    1. Chronic obstructive pulmonary disease, unspecified COPD type ([facility] [address]  -     REFERRAL TO PULMONARY DISEASE    2. Type 2 diabetes mellitus with diabetic neuropathy, with long-term current use of insulin (HCC)  Stable, cont pres tx plan. Review labs when available    3. Essential hypertension  Slightly elevated today. Review labs when available    4. Hyperlipidemia, unspecified hyperlipidemia type  Review labs when available    5. Nasal polyps  Patient and daughter given contact information for Dr. Reed Sage. They are to call to set up a follow-up appointment themselves    Follow-up Disposition:  Return in about 3 months (around 1/23/2019) for high blood pressure, high cholesterol, diabetes, chronic kidney disease.

## 2018-10-23 NOTE — PROGRESS NOTES
Jeremy Barnett presents today for   Chief Complaint   Patient presents with    Hypertension     follow up    Diabetes    Chronic Kidney Disease    Nasal Laceration     Patient stated that she been having polyps in her Bilateral nasal x 2 weeks. Is someone accompanying this pt? Yes Daughter    Is the patient using any DME equipment during [address]? Yes walker    Depression Screening:  PHQ over the last two weeks 5/21/2018   Little interest or pleasure in doing things Not at all   Feeling down, depressed, irritable, or hopeless Not at all   Total Score PHQ 2 0       Learning Assessment:  Learning Assessment 9/25/2018   PRIMARY LEARNER Patient   HIGHEST LEVEL OF EDUCATION - PRIMARY LEARNER  GRADUATED HIGH SCHOOL OR GED   BARRIERS PRIMARY LEARNER NONE   CO-LEARNER CAREGIVER No   PRIMARY LANGUAGE ENGLISH   LEARNER PREFERENCE PRIMARY DEMONSTRATION     LISTENING   ANSWERED BY patient   RELATIONSHIP SELF       Abuse Screening:  Abuse Screening Questionnaire 9/25/2018   Do you ever feel afraid of your partner? N   Are you in a relationship with someone who physically or mentally threatens you? N   Is it safe for you to go home? Y           Health Maintenance Due   Topic Date Due    Hepatitis C Screening  1955    FOOT EXAM Q1  01/21/1963    MICROALBUMIN Q1  01/21/1963    EYE EXAM RETINAL OR DILATED Q1  01/21/1963    Shingrix Vaccine Age 50> (1 of 2) 01/21/2003    FOBT Q 1 YEAR AGE 50-75  01/21/2003    GLAUCOMA SCREENING Q2Y  01/21/2018    Bone Densitometry (Dexa) Screening  01/21/2018    Pneumococcal 65+ Low/Medium Risk (1 of 2 - PCV13) 01/21/2018    MEDICARE YEARLY EXAM  03/14/2018    Influenza Age 9 to Adult  08/01/2018    LIPID PANEL Q1  09/27/2018    BREAST CANCER SCRN MAMMOGRAM  10/05/2018    HEMOGLOBIN A1C Q6M  11/06/2018   . Health Maintenance reviewed and discussed and ordered per Provider. Jeremy Barnett is updated on all       Coordination of Care:  1.  Have you been to the ER, urgent care clinic since your last visit? Hospitalized since your last visit? no    2. Have you seen or consulted any other health care providers outside of the [address] [place] [address] since your last visit? Include any pap smears or colon screening. no          Advance Directive:  1. Do you have an advance directive in place? Patient Reply:yes    2. Per patient no changes to their ACP contact no.

## 2018-11-05 ENCOUNTER — APPOINTMENT (OUTPATIENT)
Dept: PHYSICAL THERAPY | Age: 63
End: 2018-11-05

## 2018-11-13 ENCOUNTER — APPOINTMENT (OUTPATIENT)
Dept: PHYSICAL THERAPY | Age: 63
End: 2018-11-13

## 2018-11-19 ENCOUNTER — APPOINTMENT (OUTPATIENT)
Dept: PHYSICAL THERAPY | Age: 63
End: 2018-11-19

## 2018-11-26 ENCOUNTER — APPOINTMENT (OUTPATIENT)
Dept: PHYSICAL THERAPY | Age: 63
End: 2018-11-26

## 2018-12-18 ENCOUNTER — DOCUMENTATION ONLY (OUTPATIENT)
Dept: FAMILY MEDICINE CLINIC | Age: 63
End: 2018-12-18

## 2018-12-18 NOTE — PROGRESS NOTES
[facility] [facility] called and said that they have made several attempts to schedule this patient and have not had any luck.

## 2018-12-19 ENCOUNTER — OFFICE VISIT (OUTPATIENT)
Dept: PULMONOLOGY | Age: 63
End: 2018-12-19

## 2018-12-19 VITALS
TEMPERATURE: 97.4 F | RESPIRATION RATE: 20 BRPM | WEIGHT: 171 LBS | HEIGHT: 69 IN | SYSTOLIC BLOOD PRESSURE: 128 MMHG | HEART RATE: 85 BPM | DIASTOLIC BLOOD PRESSURE: 66 MMHG | OXYGEN SATURATION: 98 % | BODY MASS INDEX: 25.33 KG/M2

## 2018-12-19 DIAGNOSIS — I50.22 CHRONIC SYSTOLIC CONGESTIVE HEART FAILURE (HCC): ICD-10-CM

## 2018-12-19 DIAGNOSIS — J90 PLEURAL EFFUSION: ICD-10-CM

## 2018-12-19 DIAGNOSIS — R06.02 SHORTNESS OF BREATH: Primary | ICD-10-CM

## 2018-12-19 DIAGNOSIS — Z87.09 HISTORY OF COPD: ICD-10-CM

## 2018-12-19 DIAGNOSIS — Z99.81 HYPOXEMIA REQUIRING SUPPLEMENTAL OXYGEN: ICD-10-CM

## 2018-12-19 DIAGNOSIS — R09.02 HYPOXEMIA REQUIRING SUPPLEMENTAL OXYGEN: ICD-10-CM

## 2018-12-19 DIAGNOSIS — J33.9 NASAL POLYPS: ICD-10-CM

## 2018-12-19 NOTE — PROGRESS NOTES
HISTORY OF PRESENT ILLNESS  Bud Arizmendi is a 70 y.o. female referred for shortness of breath. Pt carries a diagnosis of COPD for years, with complaints of shortness of breath and wheezing. She presents with worsening shortness of breath over the past 6 months, sometimes associated with wheezing. She also complains of a chronic cough occasionally productive of mucoid phlegm. Pt was started on Dulera and Albuterol 3 years ago with moderate response. She was also started on O2 which she uses HS and prn. Lately though, pt has been using a mask for O2 due to nasal blockage from polyps. She has occasional 2 pillow orthopnea, chronic pedal edema and PDN. PMH is significant for systolic heart failure, latest Echo is below. Review of Systems   Constitutional: Positive for malaise/fatigue. Negative for chills, diaphoresis, fever and weight loss. HENT: Positive for congestion. Negative for ear discharge, ear pain, hearing loss, nosebleeds, sinus pain, sore throat and tinnitus. Eyes: Negative for blurred vision, double vision, photophobia, pain, discharge and redness. Respiratory: Positive for cough, sputum production, shortness of breath and wheezing. Negative for hemoptysis and stridor. Cardiovascular: Positive for PND. Negative for chest pain, palpitations and claudication. Orthopnea: occasional. Leg swelling: chronic. Gastrointestinal: Negative for abdominal pain, blood in stool, constipation, diarrhea, heartburn, melena, nausea and vomiting. Genitourinary: Negative for dysuria, flank pain, frequency, hematuria and urgency. Musculoskeletal: Negative for back pain, falls, joint pain, myalgias and neck pain. Skin: Negative for itching and rash. Neurological: Positive for sensory change. Negative for dizziness, tingling, tremors, speech change, focal weakness, seizures, loss of consciousness, weakness and headaches. Endo/Heme/Allergies: Negative for environmental allergies and polydipsia. Does not bruise/bleed easily. Psychiatric/Behavioral: Negative for depression, hallucinations, memory loss, substance abuse and suicidal ideas. The patient is not nervous/anxious and does not have insomnia. Past Medical History:   Diagnosis Date    Anemia     Arthritis     Cataract     Chronic lung disease     Chronic obstructive pulmonary disease ([facility] [address]     Diabetes mellitus ([facility] [address]     Difficulty swallowing     Both food and liquid    History of echocardiogram 12/29/2009    EF 50%. Mild-mod conc LVH. Mod DDfx. LAE. Mild MR.      Hypercholesterolemia     Hypertension     Hypertensive heart disease     Joint pain     Joint swelling     Normal nuclear stress test 09/08/2000    No ischemia or prior infarction. Neg EKG on submax EST. Ex time 15:02.  Recurrent boils     Rheumatism     Shortness of breath      Past Surgical History:   Procedure Laterality Date    CARDIAC CATHETERIZATION  5/10/2018         CORONARY ARTERY ANGIOGRAM  5/10/2018         HX CHOLECYSTECTOMY  2001    HX TUBAL LIGATION  1975    MODERATE SEDATION  5/10/2018          Current Outpatient Medications on File Prior to Visit   Medication Sig Dispense Refill    carvedilol (COREG) 12.5 mg tablet Take 2 Tabs by mouth daily.  mometasone-formoterol (DULERA) 200-5 mcg/actuation HFA inhaler Take 2 Puffs by inhalation two (2) times a day. 1 Inhaler 5    albuterol (VENTOLIN HFA) 90 mcg/actuation inhaler Take 2 Puffs by inhalation every four (4) hours as needed for Wheezing or Shortness of Breath. 1 Inhaler 5    lidocaine (LIDODERM) 5 % Apply patch to the affected area for 12 hours a day and remove for 12 hours a day. 1 Each 0    amLODIPine (NORVASC) 5 mg tablet Take 1 Tab by mouth daily. 90 Tab 3    isosorbide dinitrate (ISORDIL) 10 mg tablet Take 2 Tabs by mouth three (3) times daily. 547 Tab 3    hydrALAZINE (APRESOLINE) 100 mg tablet Take 1 Tab by mouth three (3) times daily.  270 Tab 3    Blood-Glucose Meter (TRUE METRIX GLUCOSE METER) misc Test glucose 3 times daily DX: E11.40 1 Each 0    Lancets misc Test glucose 3 times daily DX: E11.40 100 Each 12    glucose blood VI test strips (BLOOD GLUCOSE TEST) strip (True Metrix) Test glucose 3 times daily DX: E11.40 100 Strip 12    gabapentin (NEURONTIN) 100 mg capsule 1 cap po with breakfast and dinner, and 3 caps po qHS 150 Cap 5    insulin glargine (LANTUS,BASAGLAR) 100 unit/mL (3 mL) inpn 7 Units by SubCUTAneous route in the morning. 15 units in the evening. (Patient taking differently: 12 Units by SubCUTAneous route in the morning. 7 units in the evening.) 5 Pen 12    furosemide (LASIX) 20 mg tablet Take 1 Tab by mouth two (2) times a day. 60 Tab 8    acetaminophen (TYLENOL EXTRA STRENGTH) 500 mg tablet Take 1,000 mg by mouth every six (6) hours as needed for Pain.  spironolactone (ALDACTONE) 50 mg tablet Take 1 Tab by mouth two (2) times a day. (Patient taking differently: Take 25 mg by mouth two (2) times a day.) 60 Tab 1    CARBOXYMETHYLCELLULOS/GLYCERIN (REFRESH OPTIVE OP) Administer 1 Drop to both eyes six (6) times daily.  fluticasone (FLONASE) 50 mcg/actuation nasal spray 2 Sprays by Both Nostrils route daily. 1 Bottle 1    aspirin delayed-release 81 mg tablet Take 81 mg by mouth daily.  pravastatin (PRAVACHOL) 20 mg tablet Take 20 mg by mouth nightly.  Cetirizine 10 mg cap       gabapentin (NEURONTIN) 400 mg capsule Take 1 Cap by mouth.  oxyCODONE-acetaminophen (PERCOCET) 5-325 mg per tablet Take 1 tablet every 4-6 hours as needed for pain control. If you were instructed to try over the counter ibuprofen or tylenol, only take the percocet for pain not controlled with the over the counter medication. 12 Tab 0    carvedilol (COREG) 25 mg tablet Take 1 Tab by mouth two (2) times daily (with meals). 180 Tab 3    insulin lispro (HUMALOG) 100 unit/mL kwikpen 15 Units by SubCUTAneous route daily.        No current facility-administered medications on file prior to visit. Family History   Problem Relation Age of Onset    Hypertension Mother     Ovarian Cancer Mother     Stroke Mother     Heart Attack Father     Breast Cancer Maternal Aunt      Social History     Socioeconomic History    Marital status:      Spouse name: Not on file    Number of children: Not on file    Years of education: Not on file    Highest education level: Not on file   Social Needs    Financial resource strain: Not on file    Food insecurity - worry: Not on file    Food insecurity - inability: Not on file   [employer] needs - medical: Not on file   [employer] needs - non-medical: Not on file   Occupational History    Not on file   Tobacco Use    Smoking status: Never Smoker    Smokeless tobacco: Never Used    Tobacco comment: second hand smoke exposure as a child   Substance and Sexual Activity    Alcohol use: No    Drug use: No    Sexual activity: No   Other Topics Concern    Not on file   Social History Narrative    Pt used to be a  for the school system. Blood pressure 128/66, pulse 85, temperature 97.4 °F (36.3 °C), temperature source Oral, resp. rate 20, height 5' 9\" (1.753 m), weight 77.6 kg (171 lb), SpO2 98 %. Ambulatory oxymetry per nurse note. Physical Exam   Constitutional: She is oriented to person, place, and time. She appears well-developed and well-nourished. Distressed: mild. Appears frail, uses a walker   HENT:   Head: Normocephalic and atraumatic. Nose: Nose normal.   Mouth/Throat: Oropharynx is clear and moist. No oropharyngeal exudate. Eyes: Conjunctivae and EOM are normal. Pupils are equal, round, and reactive to light. Right eye exhibits no discharge. Left eye exhibits no discharge. No scleral icterus. Neck: No JVD present. No tracheal deviation present. No thyromegaly present.    Cardiovascular: Normal rate, regular rhythm, normal heart sounds and intact distal pulses. Exam reveals no gallop and no friction rub. No murmur heard. Frequent PAC's   Pulmonary/Chest: Effort normal. No stridor. No respiratory distress. Wheezes: faint right mid lung field. She has no rales. She exhibits no tenderness. Decreased breath sounds both bases   Abdominal: Soft. She exhibits no mass. There is no tenderness. There is no rebound. Musculoskeletal: She exhibits no tenderness or deformity. Edema: 2+ pitting. Lymphadenopathy:     She has no cervical adenopathy. Neurological: She is alert and oriented to person, place, and time. Coordination normal.   Skin: Skin is warm and dry. No rash noted. She is not diaphoretic. No erythema. No pallor. Psychiatric: She has a normal mood and affect. Her behavior is normal. Judgment and thought content normal.     Spirometry: reduced FEV1 with normal ratio    07/17/18   ECHO ADULT FOLLOW-UP OR LIMITED 07/17/2018 7/17/2018    Narrative · Left ventricular low normal systolic function. Estimated left   ventricular ejection fraction is 51 - 55%. Left ventricular severe   concentric hypertrophy observed. Normal left ventricular wall motion, no   regional wall motion abnormality noted. Signed by: Rodriguez Terry DO     Chest PA L  Personal review: cardiomegaly stable. Compared with study dine 5/15/2018 R pleural effusion resolved but L pleural effusion increased, now moderate in size. ASSESSMENT and PLAN  Encounter Diagnoses   Name Primary?  Shortness of breath Yes    History of COPD     Hypoxemia requiring supplemental oxygen     Nasal polyps     Chronic systolic congestive heart failure (HCC)     Pleural effusion        Suspect SOB is more related to cardiac issues causing fluid overload including pleural effusions. Spirometry is not consistent with COPD but cannot rule out Asthma. Reduced FEV1 with a normal ratio is more consistent with restrictive physiology, would therefore schedule lung volumes and DLCO.   In the meantime will continue Dulera and rescue Albuterol pending further testing. Would also suspect that nasal polyps are contributing to pt's dyspnea, as well as interfering with proper O2 delivery via nasal cannula. Will await word from ENT on decision to resect polyps. Pt to continue supplemental O2. Reviewed CXR with pt and daughter, will monitor effusion but at this time see no strong indication for thoracentesis. Follow up in 3 months.

## 2018-12-19 NOTE — PROGRESS NOTES
Verbal Order with read back per Candice Naik MD  For PFT smart panel. AMB POC PFT complete w/ bronchodilator  AMB POC PFT complete w/o bronchodilator    Dr. Keesha Rodriguez MD will co-sign the orders.

## 2018-12-19 NOTE — LETTER
12/19/2018 4:38 PM 
 
Patient:  Joan Corona YOB: 1955 Date of Visit: 12/19/2018 Dear Jeff Kim MD 
[address] [place] [id number] VIA In Basket 
 : Thank you for referring Ms. Hari Carballo to me for evaluation/treatment. Attached is my note with the relevant portions of my assessment and plan of care. If you have questions, please do not hesitate to call me. I look forward to following Ms. Rober Choe along with you. Sincerely, Cely Estevez MD

## 2018-12-19 NOTE — PROGRESS NOTES
Chief Complaint   Patient presents with   [place].David Referral / Consult     referred  by Dr. Coni Romero for COPD; CXR 5/15/2018    COPD    Shortness of Breath       1. Have you been to the ER, urgent care clinic since your last visit? Hospitalized since your last visit? Yes When: 6/22/2018 & 8/3/2018 Where: [facility] Reason for visit: Cystitis/Acute Kidney Injury & Contusion of rib ((L) side)    2. Have you seen or consulted any other health care providers outside of the [address] [place] [address] since your last visit? Include any pap smears or colon screening. No    [facility]  [address] [facility]. [address], [address] [address]  [facility]  () [phone]      Simple walk test done in office today. Qualifying O2 sats:     O2 Sat at rest on room air is: 97 %, Pulse 91, SOB scale 5    Walked: 29   m on Room Air : 97 %, Pulse 86, SOB scale 5      68   m on Room Air : 98 %, Pulse 83, SOB scale 5        O2 Sat at rest on room air is:  97%, Pulse 81 , SOB scale 5    Tech comments regarding testing: Patient did simple walk and walked a total of 68 meters on room air. VSS with C/O SOB while walking. Dr. Eloina Augustine notified.      Mahendra Mendoza LPN

## 2019-01-23 ENCOUNTER — OFFICE VISIT (OUTPATIENT)
Dept: FAMILY MEDICINE CLINIC | Age: 64
End: 2019-01-23

## 2019-01-23 VITALS
OXYGEN SATURATION: 100 % | TEMPERATURE: 98.7 F | WEIGHT: 170 LBS | HEIGHT: 69 IN | DIASTOLIC BLOOD PRESSURE: 87 MMHG | BODY MASS INDEX: 25.18 KG/M2 | RESPIRATION RATE: 20 BRPM | HEART RATE: 89 BPM | SYSTOLIC BLOOD PRESSURE: 147 MMHG

## 2019-01-23 DIAGNOSIS — J90 PLEURAL EFFUSION: ICD-10-CM

## 2019-01-23 DIAGNOSIS — Z79.4 TYPE 2 DIABETES MELLITUS WITH DIABETIC NEUROPATHY, WITH LONG-TERM CURRENT USE OF INSULIN (HCC): ICD-10-CM

## 2019-01-23 DIAGNOSIS — N18.30 CKD (CHRONIC KIDNEY DISEASE) STAGE 3, GFR 30-59 ML/MIN (HCC): ICD-10-CM

## 2019-01-23 DIAGNOSIS — E78.5 HYPERLIPIDEMIA, UNSPECIFIED HYPERLIPIDEMIA TYPE: ICD-10-CM

## 2019-01-23 DIAGNOSIS — N30.00 ACUTE CYSTITIS WITHOUT HEMATURIA: ICD-10-CM

## 2019-01-23 DIAGNOSIS — E11.40 TYPE 2 DIABETES MELLITUS WITH DIABETIC NEUROPATHY, WITH LONG-TERM CURRENT USE OF INSULIN (HCC): ICD-10-CM

## 2019-01-23 DIAGNOSIS — I50.9 CONGESTIVE HEART FAILURE, UNSPECIFIED HF CHRONICITY, UNSPECIFIED HEART FAILURE TYPE (HCC): ICD-10-CM

## 2019-01-23 DIAGNOSIS — J44.9 CHRONIC OBSTRUCTIVE PULMONARY DISEASE, UNSPECIFIED COPD TYPE (HCC): ICD-10-CM

## 2019-01-23 DIAGNOSIS — R39.15 URINARY URGENCY: ICD-10-CM

## 2019-01-23 DIAGNOSIS — E07.9 THYROID DISEASE: ICD-10-CM

## 2019-01-23 DIAGNOSIS — R32 URINARY INCONTINENCE, UNSPECIFIED TYPE: ICD-10-CM

## 2019-01-23 DIAGNOSIS — I10 ESSENTIAL HYPERTENSION: Primary | ICD-10-CM

## 2019-01-23 LAB
BILIRUB UR QL STRIP: NEGATIVE
GLUCOSE UR-MCNC: NEGATIVE MG/DL
KETONES P FAST UR STRIP-MCNC: NEGATIVE MG/DL
PH UR STRIP: 6 [PH] (ref 4.6–8)
PROT UR QL STRIP: NORMAL
SP GR UR STRIP: 1.03 (ref 1–1.03)
UA UROBILINOGEN AMB POC: NORMAL (ref 0.2–1)
URINALYSIS CLARITY POC: CLEAR
URINALYSIS COLOR POC: YELLOW
URINE BLOOD POC: NEGATIVE
URINE LEUKOCYTES POC: NORMAL
URINE NITRITES POC: NEGATIVE

## 2019-01-23 RX ORDER — NITROFURANTOIN 25; 75 MG/1; MG/1
100 CAPSULE ORAL 2 TIMES DAILY
Qty: 14 CAP | Refills: 0 | Status: SHIPPED | OUTPATIENT
Start: 2019-01-23 | End: 2019-01-30

## 2019-01-23 NOTE — PROGRESS NOTES
Chief Complaint Patient presents with  Hypertension  
  follow up  Thyroid Problem  Diabetes  Chest Pain Patient stated that she been having some chest pain x 1 week with mucus. Patient also stated that its hard for her to breath. Patient stated her pain is a 4/10. HISTORY OF PRESENT ILLNESS Marce Farias is a 75 y.o. female. HPI Patient is here for a routine follow up of htn, dm, thyroid dz. Home bs readings are good per pt but she cannot remember what it has been like. Her dtr has not taken it for a few days now. Home bp readings have been up and down per pt; she thinks it has been as high as 200. She feels that stress is contributing to this. She has a relative who lives with her who is legally blind. Pt reports that she had labs in Sept 2018. Labs not available for review. New concerns today: pt c/o chest pain. She notes it is the same as it was in Dec at her visit with pulm  hse was noted to have a pulm effusion that was thought to be related to her heart. Pt has not seen cards since last summer. She missed a f/u appt after her echo; she was to be seen in Aug 2018. Pt also c/o chest congestion. She is coughing up mucus and wheezing. She complains that she was up much of last night wheezing. She did not use her nebulizer then. She does keep her inhalers with her. Pt also c/o incontinence. She thinks she may have a bladder infection. Review of Systems Constitutional: Negative. HENT: Negative. Respiratory: Positive for cough and sputum production. Cardiovascular: Positive for chest pain. Genitourinary: Positive for dysuria, flank pain and urgency. Negative for frequency and hematuria. All other systems reviewed and are negative. Physical Exam  
Constitutional: She is oriented to person, place, and time. She appears well-developed and well-nourished. No distress. HENT:  
Head: Normocephalic and atraumatic. Right Ear: External ear normal.  
Left Ear: External ear normal.  
Nose: Nose normal.  
Eyes: Conjunctivae and EOM are normal.  
Neck: Normal range of motion. Neck supple. No JVD present. No thyromegaly present. Cardiovascular: Normal rate, regular rhythm and normal heart sounds. Exam reveals no gallop and no friction rub. No murmur heard. Pulmonary/Chest: Effort normal. She has decreased breath sounds. She has no wheezes. She has no rhonchi. She has no rales. Abdominal: There is no CVA tenderness. Musculoskeletal: Normal range of motion. Lymphadenopathy:  
  She has no cervical adenopathy. Neurological: She is alert and oriented to person, place, and time. Coordination normal.  
Skin: Skin is warm and dry. She is not diaphoretic. Psychiatric: She has a normal mood and affect. Her behavior is normal. Judgment and thought content normal.  
Nursing note and vitals reviewed. ASSESSMENT and PLAN Diagnoses and all orders for this visit: 1. Essential hypertension -     METABOLIC PANEL, COMPREHENSIVE; Future -     LIPID PANEL; Future 2. Hyperlipidemia, unspecified hyperlipidemia type -     METABOLIC PANEL, COMPREHENSIVE; Future -     LIPID PANEL; Future 3. Type 2 diabetes mellitus with diabetic neuropathy, with long-term current use of insulin ([facility] [address] -     METABOLIC PANEL, COMPREHENSIVE; Future -     LIPID PANEL; Future 
-     HEMOGLOBIN A1C WITH EAG; Future -     MICROALBUMIN, UR, RAND W/ MICROALB/CREAT RATIO; Future 4. CKD (chronic kidney disease) stage 3, GFR 30-59 ml/min ([facility] -     METABOLIC PANEL, COMPREHENSIVE; Future -     LIPID PANEL; Future 5. Thyroid disease 
-     TSH 3RD GENERATION; Future 6. Congestive heart failure, unspecified HF chronicity, unspecified heart failure type ([address] -     XR CHEST PA LAT; Future Pt to resched her cardiology appt asap. 7. Chronic obstructive pulmonary disease, unspecified COPD type ([address] -     XR CHEST PA LAT; Future 8. Pleural effusion -     XR CHEST PA LAT; Future 9. Urinary incontinence, unspecified type -     AMB POC URINALYSIS DIP STICK AUTO W/O MICRO 10. Urinary urgency -     AMB POC URINALYSIS DIP STICK AUTO W/O MICRO 11. Acute cystitis without hematuria 
-     CULTURE, URINE; Future 
-     nitrofurantoin, macrocrystal-monohydrate, (MACROBID) 100 mg capsule; Take 1 Cap by mouth two (2) times a day for 7 days. Follow-up Disposition: 
Return in about 3 months (around 4/23/2019) for diabetes, high blood pressure, high cholesterol.

## 2019-01-23 NOTE — PATIENT INSTRUCTIONS
High Blood Pressure: Care Instructions Overview It's normal for blood pressure to go up and down throughout the day. But if it stays up, you have high blood pressure. Another name for high blood pressure is hypertension. Despite what a lot of people think, high blood pressure usually doesn't cause headaches or make you feel dizzy or lightheaded. It usually has no symptoms. But it does increase your risk of stroke, heart attack, and other problems. You and your doctor will talk about your risks of these problems based on your blood pressure. Your doctor will give you a goal for your blood pressure. Your goal will be based on your health and your age. Lifestyle changes, such as eating healthy and being active, are always important to help lower blood pressure. You might also take medicine to reach your blood pressure goal. 
Follow-up care is a key part of your treatment and safety. Be sure to make and go to all appointments, and call your doctor if you are having problems. It's also a good idea to know your test results and keep a list of the medicines you take. How can you care for yourself at home? Medical treatment · If you stop taking your medicine, your blood pressure will go back up. You may take one or more types of medicine to lower your blood pressure. Be safe with medicines. Take your medicine exactly as prescribed. Call your doctor if you think you are having a problem with your medicine. · Talk to your doctor before you start taking aspirin every day. Aspirin can help certain people lower their risk of a heart attack or stroke. But taking aspirin isn't right for everyone, because it can cause serious bleeding. · See your doctor regularly. You may need to see the doctor more often at first or until your blood pressure comes down. · If you are taking blood pressure medicine, talk to your doctor before you take decongestants or anti-inflammatory medicine, such as ibuprofen. Some of these medicines can raise blood pressure. · Learn how to check your blood pressure at home. Lifestyle changes · Stay at a healthy weight. This is especially important if you put on weight around the waist. Losing even 10 pounds can help you lower your blood pressure. · If your doctor recommends it, get more exercise. Walking is a good choice. Bit by bit, increase the amount you walk every day. Try for at least 30 minutes on most days of the week. You also may want to swim, bike, or do other activities. · Avoid or limit alcohol. Talk to your doctor about whether you can drink any alcohol. · Try to limit how much sodium you eat to less than 2,300 milligrams (mg) a day. Your doctor may ask you to try to eat less than 1,500 mg a day. · Eat plenty of fruits (such as bananas and oranges), vegetables, legumes, whole grains, and low-fat dairy products. · Lower the amount of saturated fat in your diet. Saturated fat is found in animal products such as milk, cheese, and meat. Limiting these foods may help you lose weight and also lower your risk for heart disease. · Do not smoke. Smoking increases your risk for heart attack and stroke. If you need help quitting, talk to your doctor about stop-smoking programs and medicines. These can increase your chances of quitting for good. When should you call for help? Call 911 anytime you think you may need emergency care. This may mean having symptoms that suggest that your blood pressure is causing a serious heart or blood vessel problem. Your blood pressure may be over 180/120. 
 For example, call 911 if: 
  · You have symptoms of a heart attack. These may include: 
? Chest pain or pressure, or a strange feeling in the chest. 
? Sweating. ? Shortness of breath. ? Nausea or vomiting. ? Pain, pressure, or a strange feeling in the back, neck, jaw, or upper belly or in one or both shoulders or arms. ? Lightheadedness or sudden weakness. ? A fast or irregular heartbeat.  
  · You have symptoms of a stroke. These may include: 
? Sudden numbness, tingling, weakness, or loss of movement in your face, arm, or leg, especially on only one side of your body. ? Sudden vision changes. ? Sudden trouble speaking. ? Sudden confusion or trouble understanding simple statements. ? Sudden problems with walking or balance. ? A sudden, severe headache that is different from past headaches.  
  · You have severe back or belly pain.  
 Do not wait until your blood pressure comes down on its own. Get help right away. 
 Call your doctor now or seek immediate care if: 
  · Your blood pressure is much higher than normal (such as 180/120 or higher), but you don't have symptoms.  
  · You think high blood pressure is causing symptoms, such as: 
? Severe headache. 
? Blurry vision.  
 Watch closely for changes in your health, and be sure to contact your doctor if: 
  · Your blood pressure measures higher than your doctor recommends at least 2 times. That means the top number is higher or the bottom number is higher, or both.  
  · You think you may be having side effects from your blood pressure medicine. Where can you learn more? Go to http://chanel-connor.info/. Enter U634 in the search box to learn more about \"High Blood Pressure: Care Instructions. \" Current as of: July 22, 2018 Content Version: 11.9 © 2432-0040 [employer]. Care instructions adapted under license by [employer] (which disclaims liability or warranty for this information). If you have questions about a medical condition or this instruction, always ask your healthcare professional. [place] [address] any warranty or liability for your use of this information.

## 2019-01-23 NOTE — ACP (ADVANCE CARE PLANNING)
Advance Directive: 1. Do you have an advance directive in place? Patient Reply:yes 2. Per patient no changes to their ACP contact no.

## 2019-01-23 NOTE — PROGRESS NOTES
Antoinette Maddox presents today for Chief Complaint Patient presents with  Hypertension  
  follow up  Thyroid Problem  Diabetes  Chest Pain Patient stated that she been having some chest pain x 1 week with mucus. Patient stated her pain is a 4/10. Is someone accompanying this pt? no 
 
Is the patient using any DME equipment during [address]? Yes walker Depression Screening: PHQ over the last two weeks 1/23/2019 Little interest or pleasure in doing things Not at all Feeling down, depressed, irritable, or hopeless Not at all Total Score PHQ 2 0 Learning Assessment: 
Learning Assessment 1/23/2019 PRIMARY LEARNER Patient HIGHEST LEVEL OF EDUCATION - PRIMARY LEARNER  GRADUATED HIGH SCHOOL OR GED  
BARRIERS PRIMARY LEARNER NONE  
CO-LEARNER CAREGIVER No  
PRIMARY LANGUAGE ENGLISH  
LEARNER PREFERENCE PRIMARY LISTENING  
  -  
ANSWERED BY self RELATIONSHIP SELF Abuse Screening: 
Abuse Screening Questionnaire 1/23/2019 Do you ever feel afraid of your partner? Seretha Marilou Are you in a relationship with someone who physically or mentally threatens you? Seretha Marilou Is it safe for you to go home? Garrick Montoya Health Maintenance Due Topic Date Due  
 Hepatitis C Screening  1955  
 FOOT EXAM Q1  01/21/1963  MICROALBUMIN Q1  01/21/1963  
 EYE EXAM RETINAL OR DILATED  01/21/1963  Shingrix Vaccine Age 50> (1 of 2) 01/21/2003  FOBT Q 1 YEAR AGE 50-75  01/21/2003  GLAUCOMA SCREENING Q2Y  01/21/2018  Bone Densitometry (Dexa) Screening  01/21/2018  Pneumococcal 65+ Low/Medium Risk (1 of 2 - PCV13) 01/21/2018  MEDICARE YEARLY EXAM  03/14/2018  Influenza Age 5 to Adult  08/01/2018  LIPID PANEL Q1  09/27/2018  BREAST CANCER SCRN MAMMOGRAM  10/05/2018  HEMOGLOBIN A1C Q6M  11/06/2018 [facility] reviewed and discussed and ordered per Provider. Antoinette Maddox is updated on all  Coordination of Care 1. Have you been to the ER, urgent care clinic since your last visit? Hospitalized since your last visit? No 
 
2. Have you seen or consulted any other health care providers outside of the [address] [place] [address] since your last visit? Include any pap smears or colon screening. No 
 
Per-Dr. Bandar Kenny ok medication not taking to be deleted. Advance Directive: 1. Do you have an advance directive in place? Patient Reply:yes 2. Per patient no changes to their ACP contact no.

## 2019-01-31 ENCOUNTER — HOSPITAL ENCOUNTER (OUTPATIENT)
Dept: GENERAL RADIOLOGY | Age: 64
Discharge: HOME | End: 2019-01-31
Payer: MEDICARE

## 2019-01-31 DIAGNOSIS — J90 PLEURAL EFFUSION: ICD-10-CM

## 2019-01-31 DIAGNOSIS — J44.9 CHRONIC OBSTRUCTIVE PULMONARY DISEASE, UNSPECIFIED COPD TYPE (HCC): ICD-10-CM

## 2019-01-31 DIAGNOSIS — I50.9 CONGESTIVE HEART FAILURE, UNSPECIFIED HF CHRONICITY, UNSPECIFIED HEART FAILURE TYPE (HCC): ICD-10-CM

## 2019-01-31 PROCEDURE — 71046 X-RAY EXAM CHEST 2 VIEWS: CPT

## 2019-02-01 ENCOUNTER — HOSPITAL ENCOUNTER (EMERGENCY)
Age: 64
Discharge: HOME | End: 2019-02-01
Attending: EMERGENCY MEDICINE
Payer: MEDICARE

## 2019-02-01 ENCOUNTER — APPOINTMENT (OUTPATIENT)
Dept: GENERAL RADIOLOGY | Age: 64
End: 2019-02-01
Attending: EMERGENCY MEDICINE
Payer: MEDICARE

## 2019-02-01 ENCOUNTER — OFFICE VISIT (OUTPATIENT)
Dept: CARDIOLOGY CLINIC | Age: 64
End: 2019-02-01

## 2019-02-01 VITALS
RESPIRATION RATE: 15 BRPM | OXYGEN SATURATION: 100 % | DIASTOLIC BLOOD PRESSURE: 86 MMHG | SYSTOLIC BLOOD PRESSURE: 181 MMHG | HEART RATE: 90 BPM | TEMPERATURE: 97.7 F

## 2019-02-01 VITALS
WEIGHT: 166 LBS | SYSTOLIC BLOOD PRESSURE: 220 MMHG | HEART RATE: 87 BPM | BODY MASS INDEX: 24.59 KG/M2 | OXYGEN SATURATION: 97 % | DIASTOLIC BLOOD PRESSURE: 122 MMHG | HEIGHT: 69 IN

## 2019-02-01 DIAGNOSIS — I10 ELEVATED BLOOD PRESSURE READING WITH DIAGNOSIS OF HYPERTENSION: ICD-10-CM

## 2019-02-01 DIAGNOSIS — R11.2 NON-INTRACTABLE VOMITING WITH NAUSEA, UNSPECIFIED VOMITING TYPE: ICD-10-CM

## 2019-02-01 DIAGNOSIS — I10 HYPERTENSION, UNSPECIFIED TYPE: ICD-10-CM

## 2019-02-01 DIAGNOSIS — I50.9 OTHER CONGESTIVE HEART FAILURE (HCC): Primary | ICD-10-CM

## 2019-02-01 DIAGNOSIS — I50.9 CONGESTIVE HEART FAILURE, UNSPECIFIED HF CHRONICITY, UNSPECIFIED HEART FAILURE TYPE (HCC): Primary | Chronic | ICD-10-CM

## 2019-02-01 DIAGNOSIS — N28.9 RENAL INSUFFICIENCY: ICD-10-CM

## 2019-02-01 DIAGNOSIS — E11.21 TYPE 2 DIABETES MELLITUS WITH NEPHROPATHY (HCC): ICD-10-CM

## 2019-02-01 DIAGNOSIS — I42.9 CARDIOMYOPATHY, UNSPECIFIED TYPE (HCC): ICD-10-CM

## 2019-02-01 LAB
ANION GAP SERPL CALC-SCNC: 4 MMOL/L (ref 3–18)
BASOPHILS # BLD: 0 K/UL (ref 0–0.1)
BASOPHILS NFR BLD: 1 % (ref 0–2)
BNP SERPL-MCNC: 5033 PG/ML (ref 0–900)
BUN SERPL-MCNC: 14 MG/DL (ref 7–18)
BUN/CREAT SERPL: 10 (ref 12–20)
CALCIUM SERPL-MCNC: 9.1 MG/DL (ref 8.5–10.1)
CHLORIDE SERPL-SCNC: 105 MMOL/L (ref 100–108)
CK MB CFR SERPL CALC: 0.8 % (ref 0–4)
CK MB SERPL-MCNC: 1.1 NG/ML (ref 5–25)
CK SERPL-CCNC: 130 U/L (ref 26–192)
CO2 SERPL-SCNC: 30 MMOL/L (ref 21–32)
CREAT SERPL-MCNC: 1.37 MG/DL (ref 0.6–1.3)
DIFFERENTIAL METHOD BLD: ABNORMAL
EOSINOPHIL # BLD: 0.7 K/UL (ref 0–0.4)
EOSINOPHIL NFR BLD: 17 % (ref 0–5)
ERYTHROCYTE [DISTWIDTH] IN BLOOD BY AUTOMATED COUNT: 15.4 % (ref 11.6–14.5)
GLUCOSE BLD STRIP.AUTO-MCNC: 80 MG/DL (ref 70–110)
GLUCOSE SERPL-MCNC: 90 MG/DL (ref 74–99)
HCT VFR BLD AUTO: 31.8 % (ref 35–45)
HGB BLD-MCNC: 10 G/DL (ref 12–16)
LYMPHOCYTES # BLD: 0.8 K/UL (ref 0.9–3.6)
LYMPHOCYTES NFR BLD: 19 % (ref 21–52)
MCH RBC QN AUTO: 27.6 PG (ref 24–34)
MCHC RBC AUTO-ENTMCNC: 31.4 G/DL (ref 31–37)
MCV RBC AUTO: 87.8 FL (ref 74–97)
MONOCYTES # BLD: 0.4 K/UL (ref 0.05–1.2)
MONOCYTES NFR BLD: 9 % (ref 3–10)
NEUTS SEG # BLD: 2.3 K/UL (ref 1.8–8)
NEUTS SEG NFR BLD: 54 % (ref 40–73)
PLATELET # BLD AUTO: 153 K/UL (ref 135–420)
PMV BLD AUTO: 11.3 FL (ref 9.2–11.8)
POTASSIUM SERPL-SCNC: 3.5 MMOL/L (ref 3.5–5.5)
RBC # BLD AUTO: 3.62 M/UL (ref 4.2–5.3)
SODIUM SERPL-SCNC: 139 MMOL/L (ref 136–145)
TROPONIN I SERPL-MCNC: 0.05 NG/ML (ref 0–0.06)
TSH SERPL DL<=0.05 MIU/L-ACNC: 2.01 UIU/ML (ref 0.36–3.74)
WBC # BLD AUTO: 4.2 K/UL (ref 4.6–13.2)

## 2019-02-01 PROCEDURE — 99285 EMERGENCY DEPT VISIT HI MDM: CPT

## 2019-02-01 PROCEDURE — 96375 TX/PRO/DX INJ NEW DRUG ADDON: CPT

## 2019-02-01 PROCEDURE — 82962 GLUCOSE BLOOD TEST: CPT

## 2019-02-01 PROCEDURE — 83880 ASSAY OF NATRIURETIC PEPTIDE: CPT

## 2019-02-01 PROCEDURE — 93005 ELECTROCARDIOGRAM TRACING: CPT

## 2019-02-01 PROCEDURE — 71045 X-RAY EXAM CHEST 1 VIEW: CPT

## 2019-02-01 PROCEDURE — 74011250636 HC RX REV CODE- 250/636: Performed by: EMERGENCY MEDICINE

## 2019-02-01 PROCEDURE — 82550 ASSAY OF CK (CPK): CPT

## 2019-02-01 PROCEDURE — 84443 ASSAY THYROID STIM HORMONE: CPT

## 2019-02-01 PROCEDURE — 80048 BASIC METABOLIC PNL TOTAL CA: CPT

## 2019-02-01 PROCEDURE — 96374 THER/PROPH/DIAG INJ IV PUSH: CPT

## 2019-02-01 PROCEDURE — 85025 COMPLETE CBC W/AUTO DIFF WBC: CPT

## 2019-02-01 PROCEDURE — 94762 N-INVAS EAR/PLS OXIMTRY CONT: CPT

## 2019-02-01 RX ORDER — MELOXICAM 7.5 MG/1
7.5 TABLET ORAL 2 TIMES DAILY
COMMUNITY
End: 2019-08-21

## 2019-02-01 RX ORDER — FUROSEMIDE 10 MG/ML
20 INJECTION INTRAMUSCULAR; INTRAVENOUS
Status: COMPLETED | OUTPATIENT
Start: 2019-02-01 | End: 2019-02-01

## 2019-02-01 RX ORDER — ONDANSETRON 4 MG/1
4 TABLET, FILM COATED ORAL
Qty: 10 TAB | Refills: 0 | Status: SHIPPED | OUTPATIENT
Start: 2019-02-01 | End: 2019-02-01

## 2019-02-01 RX ORDER — HYDRALAZINE HYDROCHLORIDE 20 MG/ML
20 INJECTION INTRAMUSCULAR; INTRAVENOUS ONCE
Status: COMPLETED | OUTPATIENT
Start: 2019-02-01 | End: 2019-02-01

## 2019-02-01 RX ORDER — ONDANSETRON 4 MG/1
4 TABLET, FILM COATED ORAL
Qty: 10 TAB | Refills: 0 | Status: SHIPPED | OUTPATIENT
Start: 2019-02-01 | End: 2019-09-18

## 2019-02-01 RX ORDER — ONDANSETRON 2 MG/ML
4 INJECTION INTRAMUSCULAR; INTRAVENOUS
Status: COMPLETED | OUTPATIENT
Start: 2019-02-01 | End: 2019-02-01

## 2019-02-01 RX ADMIN — FUROSEMIDE 20 MG: 10 INJECTION, SOLUTION INTRAMUSCULAR; INTRAVENOUS at 18:12

## 2019-02-01 RX ADMIN — HYDRALAZINE HYDROCHLORIDE 20 MG: 20 INJECTION INTRAMUSCULAR; INTRAVENOUS at 18:09

## 2019-02-01 RX ADMIN — ONDANSETRON 4 MG: 2 INJECTION INTRAMUSCULAR; INTRAVENOUS at 18:08

## 2019-02-01 NOTE — ED TRIAGE NOTES
Pt presents with nausea vomiting and elevated blood pressure. Pt sent to ed from Dr. Gadiel Meade office for same. Pt states last vomited this am and nausea has resolved. Pt denies dizziness/lightheadedness, headache, facial droop or unilateral weakness/numbness. Family with pt denies slurred speech or change in mental status. Pt does state has floaters in both eyes and numbness/weakness in all extremities though states these symptoms have been going on for a while.

## 2019-02-01 NOTE — PROGRESS NOTES
History of Present Illness: A 75 y.o. female here for follow up. She previously saw Dr. Petra Elkins and I saw her last July 2018. She was seen in the hospital May 2018 with significant hypertension and heart failure. Her EF declined to 30% and heart catheterization did not show any epicardial disease. She also had renal ultrasound without any significant stenosis. With medical therapy, her EF improved to > 50% and no AICD was therefore indicated. She had been taking her medications and doing reasonably well, but she has been having some increasing dyspnea. She was just seen by pulmonary for concern for COPD and asthma but it was felt most of her dyspnea was due to heart failure. Today she tells me for the past day or so, she has had some nausea and throwing up. No blood, abdominal pain, fevers, or chills. She has some rare palpitations with no presyncope or syncope. She tells me she has had an issue with nausea and vomiting in the past. She has had it up to four days in a row previously. Of note, she is diabetic. She has minimal edema today. Her systolic blood pressure is 218 mmHg and on recheck it is 220 mmHg. Impression/Plan: Hypertension poorly controlled as she has not been able to keep anything down for the past day or so due to some nausea and vomiting. She has not had any blood in her emesis and she has been unable to eat much. History of transient nonischemic cardiomyopathy with ejection fraction 30% May 2018 improving to approximately 51% July 2018, likely hypertensive-induced. History of heart catheterization May 2018 without any epicardial disease. History of renal doppler May 2017 without renal artery stenosis. Chronic Class II diastolic heart failure. Diabetes on insulin. Chronic renal disease at least stage III with creatinine baseline approximately 2. Deconditioning. History of asthma and COPD with intermittent home oxygen. Dyslipidemia on statin. Anxiety, depression. History of UTI's. At this point since her blood pressure is elevated and she has not been able to eat or drink much, I recommended she go to the emergency department for further evaluation and triage. With her blood pressure being so high and unable to adequately keep medications down, I have nothing in the office to treat her appropriately. I talked with her and her  about it. At this time they would like to hold off and agreed to go home, but if the systolic blood pressure remains elevated and she cannot keep any medications down, she needs to go for urgent triage in the emergency department. I am worried about her kidney function as well given her history and clearly her significant hypertension could worsen her heart failure although her oxygen saturation is 97% on exam and she is not having any chest pain. If she does not end up going to the emergency department, my plan would be to continue with her antihypertensives including Norvasc, Isordil, Hydralazine, Coreg and Spironolactone. She is currently on Lasix 20 mg twice daily as well. I have asked her to follow up with our advanced practitioner for further titration of her diuretics as well as BMP to check her kidney function and help guide therapy. All questions answered. Again, I tried to persuade her to go to the emergency department for further triage but she would like to go home and try to take her medications and control her blood pressure. I discussed the risk for worsening heart failure, myocardial infarction, arrhythmias as well as sudden cardiac death. I talked with patient/daughter again, they are now agreeable to [facility] ER for triage. I have called and discussed with ER team. 
 
She needs BP control, enzymes, and labs to exclude worsening renal disease. Past Medical History:  
Diagnosis Date  Anemia  Arthritis  Cataract  Chronic lung disease  Chronic obstructive pulmonary disease ([facility] [address]  Diabetes mellitus ([facility] [address]  Difficulty swallowing Both food and liquid  History of echocardiogram 12/29/2009 EF 50%. Mild-mod conc LVH. Mod DDfx. LAE. Mild MR.    
 Hypercholesterolemia  Hypertension  Hypertensive heart disease  Joint pain  Joint swelling  Normal nuclear stress test 09/08/2000 No ischemia or prior infarction. Neg EKG on submax EST. Ex time 15:02.  Recurrent boils  Rheumatism  Shortness of breath Current Outpatient Medications Medication Sig Dispense Refill  Cetirizine 10 mg cap  carvedilol (COREG) 12.5 mg tablet Take 2 Tabs by mouth daily.  gabapentin (NEURONTIN) 400 mg capsule Take 1 Cap by mouth.  mometasone-formoterol (DULERA) 200-5 mcg/actuation HFA inhaler Take 2 Puffs by inhalation two (2) times a day. 1 Inhaler 5  
 albuterol (VENTOLIN HFA) 90 mcg/actuation inhaler Take 2 Puffs by inhalation every four (4) hours as needed for Wheezing or Shortness of Breath. 1 Inhaler 5  
 lidocaine (LIDODERM) 5 % Apply patch to the affected area for 12 hours a day and remove for 12 hours a day. 1 Each 0  
 oxyCODONE-acetaminophen (PERCOCET) 5-325 mg per tablet Take 1 tablet every 4-6 hours as needed for pain control. If you were instructed to try over the counter ibuprofen or tylenol, only take the percocet for pain not controlled with the over the counter medication. 12 Tab 0  
 amLODIPine (NORVASC) 5 mg tablet Take 1 Tab by mouth daily. 90 Tab 3  
 isosorbide dinitrate (ISORDIL) 10 mg tablet Take 2 Tabs by mouth three (3) times daily. 547 Tab 3  
 hydrALAZINE (APRESOLINE) 100 mg tablet Take 1 Tab by mouth three (3) times daily. 270 Tab 3  Blood-Glucose Meter (TRUE METRIX GLUCOSE METER) misc Test glucose 3 times daily DX: E11.40 1 Each 0  
 Lancets misc Test glucose 3 times daily DX: E11.40 100 Each 12  
 glucose blood VI test strips (BLOOD GLUCOSE TEST) strip (True Metrix) Test glucose 3 times daily DX: E11.40 100 Strip 12  
 gabapentin (NEURONTIN) 100 mg capsule 1 cap po with breakfast and dinner, and 3 caps po qHS 150 Cap 5  
 insulin glargine (LANTUS,BASAGLAR) 100 unit/mL (3 mL) inpn 7 Units by SubCUTAneous route in the morning. 15 units in the evening. (Patient taking differently: 12 Units by SubCUTAneous route in the morning. 7 units in the evening.) 5 Pen 12  
 furosemide (LASIX) 20 mg tablet Take 1 Tab by mouth two (2) times a day. 60 Tab 8  acetaminophen (TYLENOL EXTRA STRENGTH) 500 mg tablet Take 1,000 mg by mouth every six (6) hours as needed for Pain.  spironolactone (ALDACTONE) 50 mg tablet Take 1 Tab by mouth two (2) times a day. (Patient taking differently: Take 25 mg by mouth two (2) times a day.) 60 Tab 1  carvedilol (COREG) 25 mg tablet Take 1 Tab by mouth two (2) times daily (with meals). 180 Tab 3  
 insulin lispro (HUMALOG) 100 unit/mL kwikpen 15 Units by SubCUTAneous route daily.  CARBOXYMETHYLCELLULOS/GLYCERIN (REFRESH OPTIVE OP) Administer 1 Drop to both eyes six (6) times daily.  fluticasone (FLONASE) 50 mcg/actuation nasal spray 2 Sprays by Both Nostrils route daily. 1 Bottle 1  
 aspirin delayed-release 81 mg tablet Take 81 mg by mouth daily.  pravastatin (PRAVACHOL) 20 mg tablet Take 20 mg by mouth nightly. Social History 
 reports that  has never smoked. she has never used smokeless tobacco. 
 reports that she does not drink alcohol. Family History 
family history includes Breast Cancer in her maternal aunt; Heart Attack in her father; Hypertension in her mother; Ovarian Cancer in her mother; Stroke in her mother. Review of Systems Except as stated above include: 
Constitutional: Negative for fever, chills and malaise/fatigue. HEENT: No congestion or recent URI. Gastrointestinal: No nausea, vomiting, abdominal pain, bloody stools. Pulmonary:  Negative except as stated above. Cardiac:  Negative except as stated above. Musculoskeletal: Negative except as stated above. Neurological:  No localized symptoms. Skin:  Negative except as stated above. Psych:  Negative except as stated above. Endocrine:  Negative except as stated above. PHYSICAL EXAM 
BP Readings from Last 3 Encounters:  
01/23/19 147/87  
12/19/18 128/66  
10/23/18 149/88 Pulse Readings from Last 3 Encounters:  
01/23/19 89  
12/19/18 85  
10/23/18 85 Wt Readings from Last 3 Encounters:  
01/23/19 77.1 kg (170 lb) 12/19/18 77.6 kg (171 lb) 10/23/18 79.4 kg (175 lb) General:   Well developed, well groomed. Head/Neck:   No jugular venous distention No carotid bruits. No evidence of xanthelasma. Lungs:   No respiratory distress. Decreased, right > left, no crackles Heart:    Regular rate and rhythm. Normal S1/S2. Palpation of heart with normal point of maximum impulse. No significant murmurs, rubs or gallops. Abdomen:   Soft and nontender. No palpable abdominal mass or bruits. Extremities:   Intact peripheral pulses. Trace edema. Neurological:   Alert and oriented to person, place, time. No focal neurological deficit visually. Skin:   No obvious rash Blood Pressure Metric: 
Arash Shore has been given the following recommendations today due to her elevated BP reading: I recommended ER visit, patient declined, wanted to try and take medications at home as she has had nausea/emesis.

## 2019-02-01 NOTE — ED PROVIDER NOTES
EMERGENCY DEPARTMENT HISTORY AND PHYSICAL EXAM 
 
5:48 PM 
 
 
Date: 2/1/2019 Patient Name: Simone Corbett History of Presenting Illness Chief Complaint Patient presents with  Nausea  Hypertension History Provided By: Patient Chief Complaint: Nausea/Vomiting Duration:  Starting last night Timing:  Worsening Severity: Not described Modifying Factors: None identified Associated Symptoms: chest pain and shortness of breath Additional History (Context): Simone Corbett is a 75 y.o. female with diabetes, hypertension and SOB who presents with nausea and high blood pressure. The pt was seeing Dr. Saurabh Coleman when they were sent to ED for elevated BP. Pt BP is 214/111 mmHg here in the ED. She reports to have had associated CP described as dull earlier today, but that symptom has resolved on its own. The pt has experienced x3 vomiting today, symptoms started last night, and the pt has been unable to keep her BP medication now. The pt presents no further medical complaints or concerns to the ED at this time. PCP: Sarahy Silveria MD 
 
 
Past History Past Medical History: 
Past Medical History:  
Diagnosis Date  Anemia  Arthritis  Cataract  Chronic lung disease  Chronic obstructive pulmonary disease ([address]  Diabetes mellitus ([address]  Difficulty swallowing Both food and liquid  History of echocardiogram 12/29/2009 EF 50%. Mild-mod conc LVH. Mod DDfx. LAE. Mild MR.    
 Hypercholesterolemia  Hypertension  Hypertensive heart disease  Joint pain  Joint swelling  Normal nuclear stress test 09/08/2000 No ischemia or prior infarction. Neg EKG on submax EST. Ex time 15:02.  Recurrent boils  Rheumatism  Shortness of breath Past Surgical History: 
Past Surgical History:  
Procedure Laterality Date  CARDIAC CATHETERIZATION  5/10/2018  CORONARY ARTERY ANGIOGRAM  5/10/2018  HX CHOLECYSTECTOMY  [address]  MODERATE SEDATION  5/10/2018 Family History: 
Family History Problem Relation Age of Onset  Hypertension Mother  Ovarian Cancer Mother  Stroke Mother  Heart Attack Father  Breast Cancer Maternal Aunt Social History: 
Social History Tobacco Use  Smoking status: Never Smoker  Smokeless tobacco: Never Used  Tobacco comment: second hand smoke exposure as a child Substance Use Topics  Alcohol use: No  
 Drug use: No  
 
 
Allergies: Allergies Allergen Reactions  Codeine Nausea Only  Sulfa (Sulfonamide Antibiotics) Rash Review of Systems Review of Systems Constitutional: Negative for chills and fever. HENT: Negative for congestion, rhinorrhea, sore throat and trouble swallowing. Eyes: Negative for visual disturbance. Respiratory: Positive for shortness of breath. Negative for cough and wheezing. Cardiovascular: Positive for chest pain and leg swelling. Gastrointestinal: Positive for nausea. Negative for abdominal pain, diarrhea and vomiting. Endocrine: Negative for polyuria. Genitourinary: Negative for dysuria. Musculoskeletal: Negative for arthralgias and neck stiffness. Skin: Negative for pallor and rash. Neurological: Negative for dizziness, weakness, numbness and headaches. Hematological: Does not bruise/bleed easily. Psychiatric/Behavioral: Negative for confusion and dysphoric mood. All other systems reviewed and are negative. Physical Exam  
 
Visit Vitals /86 Pulse 90 Temp 97.7 °F (36.5 °C) Resp 15 SpO2 100% Physical Exam  
Constitutional: She is oriented to person, place, and time. She appears well-developed and well-nourished. No distress. HENT:  
Head: Normocephalic and atraumatic.   
Mouth/Throat: Oropharynx is clear and moist.  
Eyes: Conjunctivae are normal. Pupils are equal, round, and reactive to light. No scleral icterus. Neck: Normal range of motion. Neck supple. Cardiovascular: Normal rate and intact distal pulses. Capillary refill < 3 seconds Pulmonary/Chest: Effort normal and breath sounds normal. No respiratory distress. She has no wheezes. She has no rhonchi. Abdominal: Soft. Bowel sounds are normal. She exhibits no distension. There is no tenderness. Musculoskeletal: Normal range of motion. She exhibits edema (1+ pitting edema of the lower extremities, bilaterally). Lymphadenopathy:  
  She has no cervical adenopathy. Neurological: She is alert and oriented to person, place, and time. No cranial nerve deficit. Pt is generally weak but strength is symmetrical. No slurred speech. No facial droop. Skin: Skin is warm and dry. She is not diaphoretic. Nursing note and vitals reviewed. Diagnostic Study Results Labs - Recent Results (from the past 12 hour(s)) EKG, 12 LEAD, INITIAL Collection Time: 02/01/19  5:21 PM  
Result Value Ref Range Ventricular Rate 85 BPM  
 Atrial Rate 85 BPM  
 P-R Interval 182 ms QRS Duration 106 ms  
 Q-T Interval 390 ms QTC Calculation (Bezet) 464 ms Calculated P Axis 63 degrees Calculated R Axis -21 degrees Calculated T Axis 129 degrees Diagnosis Normal sinus rhythm Left ventricular hypertrophy with repolarization abnormality Cannot rule out Septal infarct (cited on or before 01-FEB-2014) Abnormal ECG When compared with ECG of 15-MAY-2018 11:40, Serial changes of Septal infarct present GLUCOSE, POC Collection Time: 02/01/19  5:27 PM  
Result Value Ref Range Glucose (POC) 80 70 - 110 mg/dL CBC WITH AUTOMATED DIFF Collection Time: 02/01/19  5:41 PM  
Result Value Ref Range WBC 4.2 (L) 4.6 - 13.2 K/uL  
 RBC 3.62 (L) 4.20 - 5.30 M/uL  
 HGB 10.0 (L) 12.0 - 16.0 g/dL HCT 31.8 (L) 35.0 - 45.0 % MCV 87.8 74.0 - 97.0 FL  
 MCH 27.6 24.0 - 34.0 PG  
 MCHC 31.4 31.0 - 37.0 g/dL RDW 15.4 (H) 11.6 - 14.5 % PLATELET 958 058 - 064 K/uL MPV 11.3 9.2 - 11.8 FL  
 NEUTROPHILS 54 40 - 73 % LYMPHOCYTES 19 (L) 21 - 52 % MONOCYTES 9 3 - 10 % EOSINOPHILS 17 (H) 0 - 5 % BASOPHILS 1 0 - 2 %  
 ABS. NEUTROPHILS 2.3 1.8 - 8.0 K/UL  
 ABS. LYMPHOCYTES 0.8 (L) 0.9 - 3.6 K/UL  
 ABS. MONOCYTES 0.4 0.05 - 1.2 K/UL  
 ABS. EOSINOPHILS 0.7 (H) 0.0 - 0.4 K/UL  
 ABS. BASOPHILS 0.0 0.0 - 0.1 K/UL  
 DF AUTOMATED METABOLIC PANEL, BASIC Collection Time: 02/01/19  5:41 PM  
Result Value Ref Range Sodium 139 136 - 145 mmol/L Potassium 3.5 3.5 - 5.5 mmol/L Chloride 105 100 - 108 mmol/L  
 CO2 30 21 - 32 mmol/L Anion gap 4 3.0 - 18 mmol/L Glucose 90 74 - 99 mg/dL BUN 14 7.0 - 18 MG/DL Creatinine 1.37 (H) 0.6 - 1.3 MG/DL  
 BUN/Creatinine ratio 10 (L) 12 - 20 GFR est AA 47 (L) >60 ml/min/1.73m2 GFR est non-AA 39 (L) >60 ml/min/1.73m2 Calcium 9.1 8.5 - 10.1 MG/DL  
CARDIAC PANEL,(CK, CKMB & TROPONIN) Collection Time: 02/01/19  5:41 PM  
Result Value Ref Range  26 - 192 U/L  
 CK - MB 1.1 <3.6 ng/ml CK-MB Index 0.8 0.0 - 4.0 % Troponin-I, QT 0.05 0.00 - 0.06 NG/ML  
NT-PRO BNP Collection Time: 02/01/19  5:41 PM  
Result Value Ref Range NT pro-BNP 5,033 (H) 0 - 900 PG/ML  
TSH 3RD GENERATION Collection Time: 02/01/19  5:41 PM  
Result Value Ref Range TSH 2.01 0.36 - 3.74 uIU/mL Radiologic Studies -  
XR CHEST PORT    (Results Pending) 6:59 PM 
Preliminary read of CXR: Mild pulmonary congestion. Otherwise no acute process. Medical Decision Making I am the first provider for this patient. I reviewed the vital signs, available nursing notes, past medical history, past surgical history, family history and social history. Vital Signs-Reviewed the patient's vital signs.  
 
Pulse Oximetry Analysis -  95% on room air (Interpretation), wnl 
 
Cardiac Monitor: 
Rate: 81 BPM 
 
 Records Reviewed: Nursing Notes (Time of Review: 5:48 PM) Provider Notes (Medical Decision Making): MDM Number of Diagnoses or Management Options Diagnosis management comments: DDx: Hypertensive urgency vs emergency, eleveated bp, CHF, other cardiac, metabolic, viral illness. Pt has not been able to keep medication down due to vomiting. Get CXR, EKG, give IV meds, Lasix, and antiemetics. Medications  
furosemide (LASIX) injection 20 mg (20 mg IntraVENous Given 2/1/19 1812) ondansetron [facility]) injection 4 mg (4 mg IntraVENous Given 2/1/19 1808) hydrALAZINE (APRESOLINE) 20 mg/mL injection 20 mg (20 mg IntraVENous Given 2/1/19 1809) ED Course: Progress Notes, Reevaluation, and Consults: WBC wnl 
Mild pulmonary edema. Pt diuresing after lasix IV. No vomiting after zofran. /86 significantly improved. Pt can now take her own BP meds as nausea resolved. No HA or dizziness. I have reassessed the patient. I have discussed the workup, results and plan with the patient and patient is in agreement. Patient is feeling better. Patient will be prescribed zofran. Patient was discharge in stable condition. Patient was given outpatient follow up. Patient is to return to emergency department if any new or worsening condition. Diagnosis Clinical Impression: 1. Other congestive heart failure ([address] 2. Non-intractable vomiting with nausea, unspecified vomiting type 3. Elevated blood pressure reading with diagnosis of hypertension Disposition: TBD Follow-up Information Follow up With Specialties Details Why Contact Info Dimitry Moreno MD Family Practice Schedule an appointment as soon as possible for a visit in 3 days  [address] [place] [id number] [phone] Brii Arias MD Cardiology Schedule an appointment as soon as possible for a visit  [address] [place] [address] [facility] [address] [place] [address] 
[phone] [address] [facility] EMERGENCY DEPT Emergency Medicine  As needed, If symptoms worsen Shankar Sosa [id number] [phone] Medication List  
  
START taking these medications   
ondansetron hcl 4 mg tablet Commonly known as:  Candiss Mattheweling Take 1 Tab by mouth every eight (8) hours as needed for Nausea. ASK your doctor about these medications   
albuterol 90 mcg/actuation inhaler Commonly known as:  VENTOLIN HFA Take 2 Puffs by inhalation every four (4) hours as needed for Wheezing or Shortness of Breath. amLODIPine 5 mg tablet Commonly known as:  Lizett Genesis Take 1 Tab by mouth daily. aspirin delayed-release 81 mg tablet Blood-Glucose Meter Misc Commonly known as:  TRUE METRIX GLUCOSE METER Test glucose 3 times daily DX: E11.40 
  
furosemide 20 mg tablet Commonly known as:  LASIX Take 1 Tab by mouth two (2) times a day. 
  
gabapentin 100 mg capsule Commonly known as:  NEURONTIN 
1 cap po with breakfast and dinner, and 3 caps po qHS 
  
glucose blood VI test strips strip Commonly known as:  blood glucose test 
(True Metrix) Test glucose 3 times daily DX: E11.40 
  
hydrALAZINE 100 mg tablet Commonly known as:  APRESOLINE Take 1 Tab by mouth three (3) times daily. insulin glargine 100 unit/mL (3 mL) Inpn Commonly known as:  LANMENDELBASAGLAR 
7 Units by SubCUTAneous route in the morning. 15 units in the evening. insulin lispro 100 unit/mL kwikpen Commonly known as:  HUMALOG 
  
isosorbide dinitrate 10 mg tablet Commonly known as:  ISORDIL Take 2 Tabs by mouth three (3) times daily. lancets Misc Test glucose 3 times daily DX: E11.40 
  
meloxicam 7.5 mg tablet Commonly known as:  MOBIC 
  
mometasone-formoterol 200-5 mcg/actuation HFA inhaler Commonly known as:  Bina Nae Take 2 Puffs by inhalation two (2) times a day. Oxygen 
  
potassium 99 mg tablet 
  
pravastatin 20 mg tablet Commonly known as:  [address] 
  
 sodium chloride 0.65 % nasal squeeze bottle Commonly known as:  OCEAN 
  
spironolactone 50 mg tablet Commonly known as:  ALDACTONE Take 1 Tab by mouth two (2) times a day. TYLENOL EXTRA STRENGTH 500 mg tablet Generic drug:  acetaminophen Where to Get Your Medications Information about where to get these medications is not yet available Ask your nurse or doctor about these medications · ondansetron hcl 4 mg tablet 
  
 
_______________________________ Attestations: 
Scribe Attestation Jc Sharma acting as a scribe for and in the presence of Wilder StoreyMemphis, DO February 01, 2019 at 5:48 PM 
    
Provider Attestation:     
I personally performed the services described in the documentation, reviewed the documentation, as recorded by the scribe in my presence, and it accurately and completely records my words and actions. February 01, 2019 at 5:48 PM - Gay Stafford DO   
_______________________________

## 2019-02-02 NOTE — ED NOTES
Pt. Reassessed and is resting in bed in the POC with no new complaints at this time. Pt. Blood pressure has come down into the 241O Systolic. MD notified and will continue to monitor.

## 2019-02-02 NOTE — DISCHARGE INSTRUCTIONS
Patient Education      If you were prescribed any medication take as directed. Do not drive or use heavy equipment if prescribed narcotics. Follow up with your primary care physician or with specialist as directed. Return to the emergency room with any new or worsening conditions. Heart Failure: Care Instructions  Your Care Instructions    Heart failure occurs when your heart does not pump as much blood as the body needs. Failure does not mean that the heart has stopped pumping but rather that it is not pumping as well as it should. Over time, this causes fluid buildup in your lungs and other parts of your body. Fluid buildup can cause shortness of breath, fatigue, swollen ankles, and other problems. By taking medicines regularly, reducing sodium (salt) in your diet, checking your weight every day, and making lifestyle changes, you can feel better and live longer. Follow-up care is a key part of your treatment and safety. Be sure to make and go to all appointments, and call your doctor if you are having problems. It's also a good idea to know your test results and keep a list of the medicines you take. How can you care for yourself at home? Medicines    · Be safe with medicines. Take your medicines exactly as prescribed. Call your doctor if you think you are having a problem with your medicine.     · Do not take any vitamins, over-the-counter medicine, or herbal products without talking to your doctor first. Lara Mendoza not take ibuprofen (Advil or Motrin) and naproxen (Aleve) without talking to your doctor first. They could make your heart failure worse.     · You may be taking some of the following medicine. ? Beta-blockers can slow heart rate, decrease blood pressure, and improve your condition. Taking a beta-blocker may lower your chance of needing to be hospitalized. ? Angiotensin-converting enzyme inhibitors (ACEIs) reduce the heart's workload, lower blood pressure, and reduce swelling.  Taking an ACEI may lower your chance of needing to be hospitalized again. ? Angiotensin II receptor blockers (ARBs) work like ACEIs. Your doctor may prescribe them instead of ACEIs. ? Diuretics, also called water pills, reduce swelling. ? Potassium supplements replace this important mineral, which is sometimes lost with diuretics. ? Aspirin and other blood thinners prevent blood clots, which can cause a stroke or heart attack.    You will get more details on the specific medicines your doctor prescribes. Diet    · Your doctor may suggest that you limit sodium to 2,000 milligrams (mg) a day or less. That is less than 1 teaspoon of salt a day, including all the salt you eat in cooking or in packaged foods. People get most of their sodium from processed foods. Fast food and restaurant meals also tend to be very high in sodium.     · Ask your doctor how much liquid you can drink each day. You may have to limit liquids.    Weight    · Weigh yourself without clothing at the same time each day. Record your weight. Call your doctor if you have a sudden weight gain, such as more than 2 to 3 pounds in a day or 5 pounds in a week. (Your doctor may suggest a different range of weight gain.) A sudden weight gain may mean that your heart failure is getting worse.    Activity level    · Start light exercise (if your doctor says it is okay). Even if you can only do a small amount, exercise will help you get stronger, have more energy, and manage your weight and your stress. Walking is an easy way to get exercise. Start out by walking a little more than you did before. Bit by bit, increase the amount you walk.     · When you exercise, watch for signs that your heart is working too hard. You are pushing yourself too hard if you cannot talk while you are exercising.  If you become short of breath or dizzy or have chest pain, stop, sit down, and rest.     · If you feel \"wiped out\" the day after you exercise, walk slower or for a shorter distance until you can work up to a better pace.     · Get enough rest at night. Sleeping with 1 or 2 pillows under your upper body and head may help you breathe easier.    Lifestyle changes    · Do not smoke. Smoking can make a heart condition worse. If you need help quitting, talk to your doctor about stop-smoking programs and medicines. These can increase your chances of quitting for good. Quitting smoking may be the most important step you can take to protect your heart.     · Limit alcohol to 2 drinks a day for men and 1 drink a day for women. Too much alcohol can cause health problems.     · Avoid getting sick from colds and the flu. Get a pneumococcal vaccine shot. If you have had one before, ask your doctor whether you need another dose. Get a flu shot each year. If you must be around people with colds or the flu, wash your hands often. When should you call for help? Call 911 if you have symptoms of sudden heart failure such as:    · You have severe trouble breathing.     · You cough up pink, foamy mucus.     · You have a new irregular or rapid heartbeat.    Call your doctor now or seek immediate medical care if:    · You have new or increased shortness of breath.     · You are dizzy or lightheaded, or you feel like you may faint.     · You have sudden weight gain, such as more than 2 to 3 pounds in a day or 5 pounds in a week. (Your doctor may suggest a different range of weight gain.)     · You have increased swelling in your legs, ankles, or feet.     · You are suddenly so tired or weak that you cannot do your usual activities.    Watch closely for changes in your health, and be sure to contact your doctor if you develop new symptoms. Where can you learn more? Go to http://chanel-connor.info/. Enter E495 in the search box to learn more about \"Heart Failure: Care Instructions. \"  Current as of: July 22, 2018  Content Version: 11.9  © 8447-9762 [employer].  Care instructions adapted under license by [employer] (which disclaims liability or warranty for this information). If you have questions about a medical condition or this instruction, always ask your healthcare professional. [address] any warranty or liability for your use of this information. Patient Education        Learning About High Blood Pressure  What is high blood pressure? Blood pressure is a measure of how hard the blood pushes against the walls of your arteries. It's normal for blood pressure to go up and down throughout the day, but if it stays up, you have high blood pressure. Another name for high blood pressure is hypertension. Two numbers tell you your blood pressure. The first number is the systolic pressure. It shows how hard the blood pushes when your heart is pumping. The second number is the diastolic pressure. It shows how hard the blood pushes between heartbeats, when your heart is relaxed and filling with blood. Your doctor will give you a goal for your blood pressure. Your goal will be based on your health and your age. High blood pressure (hypertension) means that the top number stays high, or the bottom number stays high, or both. High blood pressure increases the risk of stroke, heart attack, and other problems. You and your doctor will talk about your risks of these problems based on your blood pressure. What happens when you have high blood pressure? · Blood flows through your arteries with too much force. Over time, this damages the walls of your arteries. But you can't feel it. High blood pressure usually doesn't cause symptoms. · Fat and calcium start to build up in your arteries. This buildup is called plaque. Plaque makes your arteries narrower and stiffer. Blood can't flow through them as easily. · This lack of good blood flow starts to damage some of the organs in your body.  This can lead to problems such as coronary artery disease and heart attack, heart failure, stroke, kidney failure, and eye damage. How can you prevent high blood pressure? · Stay at a healthy weight. · Try to limit how much sodium you eat to less than 2,300 milligrams (mg) a day. If you limit your sodium to 1,500 mg a day, you can lower your blood pressure even more. ? Buy foods that are labeled \"unsalted,\" \"sodium-free,\" or \"low-sodium. \" Foods labeled \"reduced-sodium\" and \"light sodium\" may still have too much sodium. ? Flavor your food with garlic, lemon juice, onion, vinegar, herbs, and spices instead of salt. Do not use soy sauce, steak sauce, onion salt, garlic salt, mustard, or ketchup on your food. ? Use less salt (or none) when recipes call for it. You can often use half the salt a recipe calls for without losing flavor. · Be physically active. Get at least 30 minutes of exercise on most days of the week. Walking is a good choice. You also may want to do other activities, such as running, swimming, cycling, or playing tennis or team sports. · Limit alcohol to 2 drinks a day for men and 1 drink a day for women. · Eat plenty of fruits, vegetables, and low-fat dairy products. Eat less saturated and total fats. How is high blood pressure treated? · Your doctor will suggest making lifestyle changes. For example, your doctor may ask you to eat healthy foods, quit smoking, lose extra weight, and be more active. · If lifestyle changes don't help enough, your doctor may recommend that you take medicine. · When blood pressure is very high, medicines are needed to lower it. Follow-up care is a key part of your treatment and safety. Be sure to make and go to all appointments, and call your doctor if you are having problems. It's also a good idea to know your test results and keep a list of the medicines you take. Where can you learn more? Go to http://chanel-connor.info/.   Enter P501 in the search box to learn more about \"Learning About High Blood Pressure. \"  Current as of: July 22, 2018  Content Version: 11.9  © 3014-8153 [employer]. Care instructions adapted under license by [employer] (which disclaims liability or warranty for this information). If you have questions about a medical condition or this instruction, always ask your healthcare professional. [address] any warranty or liability for your use of this information. Patient Education        Low Sodium Diet (2,000 Milligram): Care Instructions  Your Care Instructions    Too much sodium causes your body to hold on to extra water. This can raise your blood pressure and force your heart and kidneys to work harder. In very serious cases, this could cause you to be put in the hospital. It might even be life-threatening. By limiting sodium, you will feel better and lower your risk of serious problems. The most common source of sodium is salt. People get most of the salt in their diet from canned, prepared, and packaged foods. Fast food and restaurant meals also are very high in sodium. Your doctor will probably limit your sodium to less than 2,000 milligrams (mg) a day. This limit counts all the sodium in prepared and packaged foods and any salt you add to your food. Follow-up care is a key part of your treatment and safety. Be sure to make and go to all appointments, and call your doctor if you are having problems. It's also a good idea to know your test results and keep a list of the medicines you take. How can you care for yourself at home? Read food labels  · Read labels on cans and food packages. The labels tell you how much sodium is in each serving. Make sure that you look at the serving size. If you eat more than the serving size, you have eaten more sodium. · Food labels also tell you the Percent Daily Value for sodium. Choose products with low Percent Daily Values for sodium.   · Be aware that sodium can come in forms other than salt, including monosodium glutamate (MSG), sodium citrate, and sodium bicarbonate (baking soda). MSG is often added to Asian food. When you eat out, you can sometimes ask for food without MSG or added salt. Buy low-sodium foods  · Buy foods that are labeled \"unsalted\" (no salt added), \"sodium-free\" (less than 5 mg of sodium per serving), or \"low-sodium\" (less than 140 mg of sodium per serving). Foods labeled \"reduced-sodium\" and \"light sodium\" may still have too much sodium. Be sure to read the label to see how much sodium you are getting. · Buy fresh vegetables, or frozen vegetables without added sauces. Buy low-sodium versions of canned vegetables, soups, and other canned goods. Prepare low-sodium meals  · Cut back on the amount of salt you use in cooking. This will help you adjust to the taste. Do not add salt after cooking. One teaspoon of salt has about 2,300 mg of sodium. · Take the salt shaker off the table. · Flavor your food with garlic, lemon juice, onion, vinegar, herbs, and spices. Do not use soy sauce, lite soy sauce, steak sauce, onion salt, garlic salt, celery salt, mustard, or ketchup on your food. · Use low-sodium salad dressings, sauces, and ketchup. Or make your own salad dressings and sauces without adding salt. · Use less salt (or none) when recipes call for it. You can often use half the salt a recipe calls for without losing flavor. Other foods such as rice, pasta, and grains do not need added salt. · Rinse canned vegetables, and cook them in fresh water. This removes some--but not all--of the salt. · Avoid water that is naturally high in sodium or that has been treated with water softeners, which add sodium. Call your local water company to find out the sodium content of your water supply. If you buy bottled water, read the label and choose a sodium-free brand. Avoid high-sodium foods  · Avoid eating:  ? Smoked, cured, salted, and canned meat, fish, and poultry.   ? Ham, boykin, hot dogs, and luncheon meats. ? Regular, hard, and processed cheese and regular peanut butter. ? Crackers with salted tops, and other salted snack foods such as pretzels, chips, and salted popcorn. ? Frozen prepared meals, unless labeled low-sodium. ? Canned and dried soups, broths, and bouillon, unless labeled sodium-free or low-sodium. ? Canned vegetables, unless labeled sodium-free or low-sodium. ? [place] fries, pizza, tacos, and other fast foods. ? Pickles, olives, ketchup, and other condiments, especially soy sauce, unless labeled sodium-free or low-sodium. Where can you learn more? Go to http://chanel-connor.info/. Enter B455 in the search box to learn more about \"Low Sodium Diet (2,000 Milligram): Care Instructions. \"  Current as of: March 28, 2018  Content Version: 11.9  © 3369-5600 [employer]. Care instructions adapted under license by [employer] (which disclaims liability or warranty for this information). If you have questions about a medical condition or this instruction, always ask your healthcare professional. [place] [address] any warranty or liability for your use of this information. Patient Education        Nausea and Vomiting: Care Instructions  Your Care Instructions    When you are nauseated, you may feel weak and sweaty and notice a lot of saliva in your mouth. Nausea often leads to vomiting. Most of the time you do not need to worry about nausea and vomiting, but they can be signs of other illnesses. Two common causes of nausea and vomiting are stomach flu and food poisoning. Nausea and vomiting from viral stomach flu will usually start to improve within 24 hours. Nausea and vomiting from food poisoning may last from 12 to 48 hours. The doctor has checked you carefully, but problems can develop later. If you notice any problems or new symptoms, get medical treatment right away.   Follow-up care is a key part of your treatment and safety. Be sure to make and go to all appointments, and call your doctor if you are having problems. It's also a good idea to know your test results and keep a list of the medicines you take. How can you care for yourself at home? · To prevent dehydration, drink plenty of fluids, enough so that your urine is light yellow or clear like water. Choose water and other caffeine-free clear liquids until you feel better. If you have kidney, heart, or liver disease and have to limit fluids, talk with your doctor before you increase the amount of fluids you drink. · Rest in bed until you feel better. · When you are able to eat, try clear soups, mild foods, and liquids until all symptoms are gone for 12 to 48 hours. Other good choices include dry toast, crackers, cooked cereal, and gelatin dessert, such as Jell-O. When should you call for help? Call 911 anytime you think you may need emergency care. For example, call if:    · You passed out (lost consciousness).    Call your doctor now or seek immediate medical care if:    · You have symptoms of dehydration, such as:  ? Dry eyes and a dry mouth. ? Passing only a little dark urine. ? Feeling thirstier than usual.     · You have new or worsening belly pain.     · You have a new or higher fever.     · You vomit blood or what looks like coffee grounds.    Watch closely for changes in your health, and be sure to contact your doctor if:    · You have ongoing nausea and vomiting.     · Your vomiting is getting worse.     · Your vomiting lasts longer than 2 days.     · You are not getting better as expected. Where can you learn more? Go to http://chanel-connor.info/. Enter 25 [id number] in the search box to learn more about \"Nausea and Vomiting: Care Instructions. \"  Current as of: September 23, 2018  Content Version: 11.9  © 5938-7998 [employer].  Care instructions adapted under license by [employer] (which disclaims liability or warranty for this information). If you have questions about a medical condition or this instruction, always ask your healthcare professional. [place] [address] any warranty or liability for your use of this information.

## 2019-02-03 LAB
ATRIAL RATE: 85 BPM
CALCULATED P AXIS, ECG09: 63 DEGREES
CALCULATED R AXIS, ECG10: -21 DEGREES
CALCULATED T AXIS, ECG11: 129 DEGREES
DIAGNOSIS, 93000: NORMAL
P-R INTERVAL, ECG05: 182 MS
Q-T INTERVAL, ECG07: 390 MS
QRS DURATION, ECG06: 106 MS
QTC CALCULATION (BEZET), ECG08: 464 MS
VENTRICULAR RATE, ECG03: 85 BPM

## 2019-02-16 NOTE — PROGRESS NOTES
Sally Mancia presents today for a post-ER follow-up. She was sent to the ER from our office (was seeing Dr. Lilliam Malik for follow-up) on 2/1/19. She was complaining of nausea and her blood pressure was very elevated. She apparently had not been able to keep anything down for 1-2 days prior to coming to the office due to nausea and vomiting. Upon arrival to the ER, her blood pressure was 214/111. Her troponin was 0.05 and NT pro-BNP was 5,033. Chest x-ray showed mild pulmonary edema. She was given a dose of IV lasix, hydralazine, and also given Zofran. Her blood pressure improved and she was able to take her medications and after a period of observation, she was discharged home from the ER. She is a 68-year-old -American female with history of hypertension, hyperlipidemia, diabetes, CHF, asthma/COPD (on oxygen therapy at home as needed), anxiety, and depression. She was previously followed by Dr. Perla Monae and her last visit with him was on January 26, 2018. She was hospitalized from May 5, 2018 through May 12, 2018 for hypertensive urgency secondary to noncompliance with her medication regimen as well as acute on chronic heart failure. She was evaluated for renal artery stenosis and it was negative. An echocardiogram done during her admission showed an ejection fraction of 30%, there was hypokinesis of the apical anterior, mid anteroseptal, and mid anterolateral and apical lateral walls, grade 2 diastolic dysfunction, and moderate to severe tricuspid regurgitation. Her RVSP was also noted to be 45 mmHg. On May 10, 2018, she underwent cardiac catheterization which showed angiographically normal coronary arteries and she was diagnosed with nonischemic cardiomyopathy. She had a limited echo done in July 2018, and it showed an EF of 51-55% and no WMA.     While reviewing her medication list today, her caregiver reported that she has not taken her spironolactone for about 4-5 months as her prescription was not refillable due to exceeding prescription date. It was also noted that she was not taking her isordil correctly. She is only taking it twice a day instead of 3 times a day. Overall, since being discharged home, she has been feeling better. She does complain of chronic mild shortness of breath at rest and with exertion and she also complains of occasional palpitations. Denies chest pain, tightness, heaviness. Denies orthopnea and PND. Denies abdominal bloating. Denies lightheadedness, dizziness, and syncope. Denies lower extremity edema and claudication. Denies nausea, vomiting, diarrhea, melena, hematochezia. Denies hematuria, urgency, frequency. Denies fever, chills. PMH:  Past Medical History:   Diagnosis Date    Anemia     Arthritis     Cataract     Chronic lung disease     [facility] [address]     Diabetes mellitus ([facility] [address]     Difficulty swallowing     Both food and liquid    History of echocardiogram 12/29/2009    EF 50%. Mild-mod conc LVH. Mod DDfx. LAE. Mild MR.      Hypercholesterolemia     Hypertension     Hypertensive heart disease     Joint pain     Joint swelling     Normal nuclear stress test 09/08/2000    No ischemia or prior infarction. Neg EKG on submax EST. Ex time 15:02.  Recurrent boils     Rheumatism     Shortness of breath        PSH:  Past Surgical History:   Procedure Laterality Date    CARDIAC CATHETERIZATION  5/10/2018         CORONARY ARTERY ANGIOGRAM  5/10/2018         HX CHOLECYSTECTOMY  2001    HX TUBAL LIGATION  1975    MODERATE SEDATION  5/10/2018            MEDS:  Current Outpatient Medications   Medication Sig    Oxygen 3L per min as needed    potassium 99 mg tablet Take 99 mg by mouth daily.  sodium chloride (OCEAN) 0.65 % nasal squeeze bottle 2 Sprays as needed for Congestion.  meloxicam (MOBIC) 7.5 mg tablet Take 7.5 mg by mouth two (2) times a day.     ondansetron hcl (ZOFRAN) 4 mg tablet Take 1 Tab by mouth every eight (8) hours as needed for Nausea.  mometasone-formoterol (DULERA) 200-5 mcg/actuation HFA inhaler Take 2 Puffs by inhalation two (2) times a day.  albuterol (VENTOLIN HFA) 90 mcg/actuation inhaler Take 2 Puffs by inhalation every four (4) hours as needed for Wheezing or Shortness of Breath.  amLODIPine (NORVASC) 5 mg tablet Take 1 Tab by mouth daily.  isosorbide dinitrate (ISORDIL) 10 mg tablet Take 2 Tabs by mouth three (3) times daily. (Patient taking differently: Take 20 mg by mouth two (2) times a day.)    hydrALAZINE (APRESOLINE) 100 mg tablet Take 1 Tab by mouth three (3) times daily.  Blood-Glucose Meter (TRUE METRIX GLUCOSE METER) misc Test glucose 3 times daily DX: E11.40    Lancets misc Test glucose 3 times daily DX: E11.40    glucose blood VI test strips (BLOOD GLUCOSE TEST) strip (True Metrix) Test glucose 3 times daily DX: E11.40    gabapentin (NEURONTIN) 100 mg capsule 1 cap po with breakfast and dinner, and 3 caps po qHS    insulin glargine (LANTUS,BASAGLAR) 100 unit/mL (3 mL) inpn 7 Units by SubCUTAneous route in the morning. 15 units in the evening. (Patient taking differently: 12 Units by SubCUTAneous route in the morning. 7 units in the evening.)    furosemide (LASIX) 20 mg tablet Take 1 Tab by mouth two (2) times a day.  acetaminophen (TYLENOL EXTRA STRENGTH) 500 mg tablet Take 1,000 mg by mouth every six (6) hours as needed for Pain.  insulin lispro (HUMALOG) 100 unit/mL kwikpen 15 Units by SubCUTAneous route daily.  CARBOXYMETHYLCELLULOS/GLYCERIN (REFRESH OPTIVE OP) Administer 1 Drop to both eyes six (6) times daily.  aspirin delayed-release 81 mg tablet Take 81 mg by mouth daily.  pravastatin (PRAVACHOL) 20 mg tablet Take 20 mg by mouth nightly.  spironolactone (ALDACTONE) 50 mg tablet Take 1 Tab by mouth two (2) times a day.  (Patient taking differently: Take 25 mg by mouth two (2) times a day.)     No current facility-administered medications for this visit. Allergies and Sensitivities:  Allergies   Allergen Reactions    Codeine Nausea Only    Sulfa (Sulfonamide Antibiotics) Rash       Family History:  Family History   Problem Relation Age of Onset    Hypertension Mother     Ovarian Cancer Mother     Stroke Mother     Heart Attack Father     Breast Cancer Maternal Aunt        Social History:  She  reports that  has never smoked. she has never used smokeless tobacco.  She  reports that she does not drink alcohol. Physical:  Visit Vitals  BP (!) 196/100 (BP 1 Location: Left arm, BP Patient Position: Sitting)   Pulse 88   Ht 5' 9\" (1.753 m)   Wt 74.4 kg (164 lb)   SpO2 98%   BMI 24.22 kg/m²     BP when I rechecked it was 200/110. Exam:  Neck:  Supple, no JVD, no carotid bruits  CV:  Normal S1 and  S2, no murmurs, rubs, or gallops noted  Lungs:  Clear to ausculation throughout but slightly diminished, no wheezes or rales  Abd:  Soft, non-tender, non-distended with good bowel sounds. No hepatosplenomegaly  Extremities:  Trace non-pitting lower extremity edema      Data:  EKG:    Not done today      LABS:  Lab Results   Component Value Date/Time    Sodium 139 02/01/2019 05:41 PM    Potassium 3.5 02/01/2019 05:41 PM    Chloride 105 02/01/2019 05:41 PM    CO2 30 02/01/2019 05:41 PM    Glucose 90 02/01/2019 05:41 PM    BUN 14 02/01/2019 05:41 PM    Creatinine 1.37 (H) 02/01/2019 05:41 PM     Lab Results   Component Value Date/Time    Cholesterol, total 104 09/27/2017 04:15 AM    HDL Cholesterol 35 (L) 09/27/2017 04:15 AM    LDL, calculated 44.6 09/27/2017 04:15 AM    Triglyceride 122 09/27/2017 04:15 AM    CHOL/HDL Ratio 3.0 09/27/2017 04:15 AM     Lab Results   Component Value Date/Time    ALT (SGPT) 15 06/22/2018 12:41 PM         Impression/Plan:  1. Essential hypertension, blood pressure poorly controlled, not taking all medications as prescribed  2. Hyperlipidemia, on pravastatin 20mg   3. Diabetes mellitus, recommend Hgb A1c less than 7% from cardiac standpoint  4. Chronic systolic heart failure, appears compensated  5. Asthma/COPD, on oxygen as needed at home  6. Anxiety/depression    Mrs. Sabrina Chan was seen today for a post-ER follow-up of hypertensive urgency and acute on chronic systolic heart failure. She was sent to the ER from our office on 2/1/19 as her blood pressure was quite elevated and she had not been able to keep anything down due to nausea and vomiting. In the ER, she was noted to have an elevated NT pro-BNP at 5033 and her blood pressure was elevated. She received a dose of IV lasix, hydralazine and Zofran. Her blood pressure began to decrease and after a few hours in the ER, she was discharged home. Her blood pressure today was 200/110. Breath sounds are clear and she has trace lower extremity edema. She complains of fatigue, some dyspnea, and intermittent chest discomfort (no epicardial CAD). While reviewing her medications, it was noted that she has not been taking her spironolactone (for about 4 months) and she has not been taking her isordil 3 times a day. She was instructed to discontinue her OTC potassium supplementation, restart her spironolactone at 25mg BID, increase her isordil to 20mg TID, and take an additional dose of amlodipine 5mg today as soon as she gets home. She will follow-up with me in 2 weeks and will have a BMP and NT pro-BNP done prior to returning for follow-up. We had a long discussion regarding the importance of taking her medications as prescribed as well as the importance of sodium limitation, daily weights, and limiting fluid intake. I also discussed with her the importance of optimal blood pressure control and the possible complications of continued uncontrolled blood pressure. She verbalized her understanding. Congestive heart failure teaching reinforced today.   Advised to limit sodium intake to no more than 2000mg per day and to also limit fluid intake to 48 ounces per day (unless otherwise specified). Advised to weigh daily every morning and record weights. Instructed to call our office if progressive weight gain is noted over a 2 to 3 day period of time, shortness of breath increases, or if abdominal bloating, nausea, fatigue, or increased lower extremity edema is noted. Patient education material regarding CHF, low-sodium diet, and DASH diet was attached to her after visit summary. Greater than 50% of a 40 minute visit was spent in discussion, counseling, and answering questions. She will follow-up with Dr. Digna Molina as scheduled and as needed. Naomie Alcala MSN, FNP-BC    Please note:  Portions of this chart were created with Dragon medical speech to text program.  Unrecognized errors may be present.

## 2019-02-19 ENCOUNTER — OFFICE VISIT (OUTPATIENT)
Dept: CARDIOLOGY CLINIC | Age: 64
End: 2019-02-19

## 2019-02-19 VITALS
HEART RATE: 88 BPM | DIASTOLIC BLOOD PRESSURE: 100 MMHG | OXYGEN SATURATION: 98 % | WEIGHT: 164 LBS | HEIGHT: 69 IN | BODY MASS INDEX: 24.29 KG/M2 | SYSTOLIC BLOOD PRESSURE: 196 MMHG

## 2019-02-19 DIAGNOSIS — I42.9 CARDIOMYOPATHY, UNSPECIFIED TYPE (HCC): ICD-10-CM

## 2019-02-19 DIAGNOSIS — I50.42 CHRONIC COMBINED SYSTOLIC AND DIASTOLIC CONGESTIVE HEART FAILURE (HCC): Primary | ICD-10-CM

## 2019-02-19 DIAGNOSIS — R06.02 SHORTNESS OF BREATH: ICD-10-CM

## 2019-02-19 DIAGNOSIS — E11.21 TYPE 2 DIABETES MELLITUS WITH NEPHROPATHY (HCC): ICD-10-CM

## 2019-02-19 DIAGNOSIS — I10 HYPERTENSION, UNSPECIFIED TYPE: ICD-10-CM

## 2019-02-19 RX ORDER — SPIRONOLACTONE 25 MG/1
25 TABLET ORAL 2 TIMES DAILY
Qty: 180 TAB | Refills: 3 | Status: ON HOLD | OUTPATIENT
Start: 2019-02-19 | End: 2020-01-01 | Stop reason: SDUPTHER

## 2019-02-19 RX ORDER — ISOSORBIDE DINITRATE 10 MG/1
20 TABLET ORAL 3 TIMES DAILY
Qty: 1 TAB | Refills: 0
Start: 2019-02-19 | End: 2019-09-18 | Stop reason: SDUPTHER

## 2019-02-19 NOTE — PROGRESS NOTES
Burgess Rapp presents today for   Chief Complaint   Patient presents with    CHF     2-3 follow up        Burgess Rapp preferred language for health care discussion is english/other. Is someone accompanying this pt? caregiver    Is the patient using any DME equipment during [address]? [employer]     Depression Screening:  3 most recent PHQ Screens 1/23/2019   Little interest or pleasure in doing things Not at all   Feeling down, depressed, irritable, or hopeless Not at all   Total Score PHQ 2 0       Learning Assessment:  Learning Assessment 1/23/2019   PRIMARY LEARNER Patient   HIGHEST LEVEL OF EDUCATION - PRIMARY LEARNER  GRADUATED HIGH SCHOOL OR GED   BARRIERS PRIMARY LEARNER NONE   CO-LEARNER CAREGIVER No   PRIMARY LANGUAGE ENGLISH   LEARNER PREFERENCE PRIMARY LISTENING     -   ANSWERED BY self   RELATIONSHIP SELF       Abuse Screening:  Abuse Screening Questionnaire 1/23/2019   Do you ever feel afraid of your partner? N   Are you in a relationship with someone who physically or mentally threatens you? N   Is it safe for you to go home? Y       Fall Risk  Fall Risk Assessment, last 12 mths 1/23/2019   Able to walk? Yes   Fall in past 12 months? No   Fall with injury? -   Number of falls in past 12 months -   Fall Risk Score -       Pt currently taking Anticoagulant therapy? ASA 81mg daily     Coordination of Care:  1. Have you been to the ER, urgent care clinic since your last visit? Hospitalized since your last visit? 2/1/19 for HTN and Nausea     2. Have you seen or consulted any other health care providers outside of the [address] [place] [address] since your last visit? Include any pap smears or colon screening.  no

## 2019-02-19 NOTE — PATIENT INSTRUCTIONS
Stop over the counter potassium supplement  Restart spironolactone 25mg twice a day  Increase isordil to 20mg, 3 times a day. Take 2 of your 10mg tablets, 3 times a day  Take additional 5mg of amlodipine as soon as you get home today  Follow-up with [facility] in 2 weeks  LORETO, NT pro-BNP to be done 1-2 days prior to returning for follow-up  Follow-up with Dr. Lilliam Malik as scheduled and as needed  Weigh daily and record  Limit sodium intake to 2000mg per day  Limit fluid intake to no more than  6, eight ounce glasses of any type of fluids per day (total of 48 ounces per day)  Call if you notice sudden or progressive weight gain (3-5 pounds in 2-3 days), increasing shortness of breath, abdominal bloating, increasing lower extremity edema, inability to lie flat or on your normal number of pillows, having to sit up to catch your breath, fatigue, increased somnolence (sleeping more), poor appetite     Heart Failure: Care Instructions  Your Care Instructions    Heart failure occurs when your heart does not pump as much blood as the body needs. Failure does not mean that the heart has stopped pumping but rather that it is not pumping as well as it should. Over time, this causes fluid buildup in your lungs and other parts of your body. Fluid buildup can cause shortness of breath, fatigue, swollen ankles, and other problems. By taking medicines regularly, reducing sodium (salt) in your diet, checking your weight every day, and making lifestyle changes, you can feel better and live longer. Follow-up care is a key part of your treatment and safety. Be sure to make and go to all appointments, and call your doctor if you are having problems. It's also a good idea to know your test results and keep a list of the medicines you take. How can you care for yourself at home? Medicines    · Be safe with medicines. Take your medicines exactly as prescribed.  Call your doctor if you think you are having a problem with your medicine.     · Do not take any vitamins, over-the-counter medicine, or herbal products without talking to your doctor first. Atiya Boys not take ibuprofen (Advil or Motrin) and naproxen (Aleve) without talking to your doctor first. They could make your heart failure worse.     · You may be taking some of the following medicine. ? Beta-blockers can slow heart rate, decrease blood pressure, and improve your condition. Taking a beta-blocker may lower your chance of needing to be hospitalized. ? Angiotensin-converting enzyme inhibitors (ACEIs) reduce the heart's workload, lower blood pressure, and reduce swelling. Taking an ACEI may lower your chance of needing to be hospitalized again. ? Angiotensin II receptor blockers (ARBs) work like ACEIs. Your doctor may prescribe them instead of ACEIs. ? Diuretics, also called water pills, reduce swelling. ? Potassium supplements replace this important mineral, which is sometimes lost with diuretics. ? Aspirin and other blood thinners prevent blood clots, which can cause a stroke or heart attack.    You will get more details on the specific medicines your doctor prescribes. Diet    · Your doctor may suggest that you limit sodium to 2,000 milligrams (mg) a day or less. That is less than 1 teaspoon of salt a day, including all the salt you eat in cooking or in packaged foods. People get most of their sodium from processed foods. Fast food and restaurant meals also tend to be very high in sodium.     · Ask your doctor how much liquid you can drink each day. You may have to limit liquids.    Weight    · Weigh yourself without clothing at the same time each day. Record your weight. Call your doctor if you have a sudden weight gain, such as more than 2 to 3 pounds in a day or 5 pounds in a week. (Your doctor may suggest a different range of weight gain.) A sudden weight gain may mean that your heart failure is getting worse.    Activity level    · Start light exercise (if your doctor says it is okay). Even if you can only do a small amount, exercise will help you get stronger, have more energy, and manage your weight and your stress. Walking is an easy way to get exercise. Start out by walking a little more than you did before. Bit by bit, increase the amount you walk.     · When you exercise, watch for signs that your heart is working too hard. You are pushing yourself too hard if you cannot talk while you are exercising. If you become short of breath or dizzy or have chest pain, stop, sit down, and rest.     · If you feel \"wiped out\" the day after you exercise, walk slower or for a shorter distance until you can work up to a better pace.     · Get enough rest at night. Sleeping with 1 or 2 pillows under your upper body and head may help you breathe easier.    Lifestyle changes    · Do not smoke. Smoking can make a heart condition worse. If you need help quitting, talk to your doctor about stop-smoking programs and medicines. These can increase your chances of quitting for good. Quitting smoking may be the most important step you can take to protect your heart.     · Limit alcohol to 2 drinks a day for men and 1 drink a day for women. Too much alcohol can cause health problems.     · Avoid getting sick from colds and the flu. Get a pneumococcal vaccine shot. If you have had one before, ask your doctor whether you need another dose. Get a flu shot each year. If you must be around people with colds or the flu, wash your hands often. When should you call for help?   Call 911 if you have symptoms of sudden heart failure such as:    · You have severe trouble breathing.     · You cough up pink, foamy mucus.     · You have a new irregular or rapid heartbeat.    Call your doctor now or seek immediate medical care if:    · You have new or increased shortness of breath.     · You are dizzy or lightheaded, or you feel like you may faint.     · You have sudden weight gain, such as more than 2 to 3 pounds in a day or 5 pounds in a week. (Your doctor may suggest a different range of weight gain.)     · You have increased swelling in your legs, ankles, or feet.     · You are suddenly so tired or weak that you cannot do your usual activities.    Watch closely for changes in your health, and be sure to contact your doctor if you develop new symptoms. Where can you learn more? Go to http://chanel-connor.info/. Enter N930 in the search box to learn more about \"Heart Failure: Care Instructions. \"  Current as of: July 22, 2018  Content Version: 11.9  © 6043-9945 "[employer]". Care instructions adapted under license by [employer] (which disclaims liability or warranty for this information). If you have questions about a medical condition or this instruction, always ask your healthcare professional. [address] any warranty or liability for your use of this information. Limiting Sodium and Fluids With Heart Failure: Care Instructions  Your Care Instructions    Sodium causes your body to hold on to extra water. This may cause your heart failure symptoms to get worse. Limiting sodium may help you feel better and lower your risk of having to go to the hospital.  People get most of their sodium from processed foods. Fast food and restaurant meals also tend to be very high in sodium. Your doctor may suggest that you limit sodium to 2,000 milligrams (mg) a day or less. That is less than 1 teaspoon of salt a day, including all the salt you eat in cooked or packaged foods. Usually, you have to limit the amount of liquids you drink only if your heart failure is severe. Limiting sodium alone often is enough to help your body get rid of extra fluids. However, your doctor may tell you to limit your fluid intake to a set amount each day. Follow-up care is a key part of your treatment and safety.  Be sure to make and go to all appointments, and call your doctor if you are having problems. It's also a good idea to know your test results and keep a list of the medicines you take. How can you care for yourself at home? Read food labels  · Read food labels on cans and food packages. The labels tell you how much sodium is in each serving. Make sure that you look at the serving size. If you eat more than the serving size, you have eaten more sodium than is listed for one serving. · Food labels also tell you the Percent Daily Value. If the Percent Daily Value says 50%, it means that you will get at least 50% of all the sodium you need for the entire day in one serving. Choose products with low Percent Daily Values for sodium. · Be aware that sodium can come in forms other than salt, including monosodium glutamate (MSG), sodium citrate, and sodium bicarbonate (baking soda). MSG is often added to Asian food. You can sometimes ask for food without MSG or salt. Buy low-sodium foods  · Buy foods that are labeled \"unsalted\" (no salt added), \"sodium-free\" (less than 5 mg of sodium per serving), or \"low-sodium\" (less than 140 mg of sodium per serving). A food labeled \"light sodium\" has less than half of the full-sodium version of that food. Foods labeled \"reduced-sodium\" may still have too much sodium. · Buy fresh vegetables or plain, frozen vegetables. Buy low-sodium versions of canned vegetables, soups, and other canned goods. Prepare low-sodium meals  · Use less salt each day when cooking. Reducing salt in this way will help you adjust to the taste. Do not add salt after cooking. Take the salt shaker off the table. · Flavor your food with garlic, lemon juice, onion, vinegar, herbs, and spices instead of salt. Do not use soy sauce, steak sauce, onion salt, garlic salt, mustard, or ketchup on your food. · Make your own salad dressings, sauces, and ketchup without adding salt. · Use less salt (or none) when recipes call for it.  You can often use half the salt a recipe calls for without losing flavor. Other dishes like rice, pasta, and grains do not need added salt. · Rinse canned vegetables. This removes some--but not all--of the salt. · Avoid water that has a naturally high sodium content or that has been treated with water softeners, which add sodium. Call your local water company to find out the sodium content of your water supply. If you buy bottled water, read the label and choose a sodium-free brand. Avoid high-sodium foods, such as:  · Smoked, cured, salted, and canned meat, fish, and poultry. · Ham, boykin, hot dogs, and luncheon meats. · Regular, hard, and processed cheese and regular peanut butter. · Crackers with salted tops. · Frozen prepared meals. · Canned and dried soups, broths, and bouillon, unless labeled sodium-free or low-sodium. · Canned vegetables, unless labeled sodium-free or low-sodium. · Salted snack foods such as chips and pretzels. · [place] fries, pizza, tacos, and other fast foods. · Pickles, olives, ketchup, and other condiments, especially soy sauce, unless labeled sodium-free or low-sodium. If you cannot cook for yourself  · Have family members or friends help you, or have someone cook low-sodium meals. · Check with your local senior nutrition program to find out where meals are served and whether they offer a low-sodium option. You can often find these programs through your local health department or hospital.  · Have meals delivered to your home. Most [facility] have a [place] on [address]. These programs provide one hot meal a day for older adults, delivered to their homes. Ask whether these meals are low-sodium. Let them know that you are on a low-sodium diet. Limiting fluid intake  · Find a method that works for you. You might simply write down how much you drink every time you do. Some people keep a container filled with the amount of fluid allowed for that day.  If they drink from a source other than the container, then they pour out that amount. · Measure your regular drinking glasses to find out how much fluid each one holds. Once you know this, you will not have to measure every time. · Besides water, milk, juices, and other drinks, some foods have a lot of fluid. Count any foods that will melt (such as ice cream or gelatin dessert) or liquid foods (such as soup) as part of your fluid intake for the day. Where can you learn more? Go to http://chanel-connor.info/. Enter A166 in the search box to learn more about \"Limiting Sodium and Fluids With Heart Failure: Care Instructions. \"  Current as of: July 22, 2018  Content Version: 11.9  © 6108-1567 [employer]. Care instructions adapted under license by [employer] (which disclaims liability or warranty for this information). If you have questions about a medical condition or this instruction, always ask your healthcare professional. [place] [address] any warranty or liability for your use of this information. Low Sodium Diet (2,000 Milligram): Care Instructions  Your Care Instructions    Too much sodium causes your body to hold on to extra water. This can raise your blood pressure and force your heart and kidneys to work harder. In very serious cases, this could cause you to be put in the hospital. It might even be life-threatening. By limiting sodium, you will feel better and lower your risk of serious problems. The most common source of sodium is salt. People get most of the salt in their diet from canned, prepared, and packaged foods. Fast food and restaurant meals also are very high in sodium. Your doctor will probably limit your sodium to less than 2,000 milligrams (mg) a day. This limit counts all the sodium in prepared and packaged foods and any salt you add to your food. Follow-up care is a key part of your treatment and safety. Be sure to make and go to all appointments, and call your doctor if you are having problems. It's also a good idea to know your test results and keep a list of the medicines you take. How can you care for yourself at home? Read food labels  · Read labels on cans and food packages. The labels tell you how much sodium is in each serving. Make sure that you look at the serving size. If you eat more than the serving size, you have eaten more sodium. · Food labels also tell you the Percent Daily Value for sodium. Choose products with low Percent Daily Values for sodium. · Be aware that sodium can come in forms other than salt, including monosodium glutamate (MSG), sodium citrate, and sodium bicarbonate (baking soda). MSG is often added to Asian food. When you eat out, you can sometimes ask for food without MSG or added salt. Buy low-sodium foods  · Buy foods that are labeled \"unsalted\" (no salt added), \"sodium-free\" (less than 5 mg of sodium per serving), or \"low-sodium\" (less than 140 mg of sodium per serving). Foods labeled \"reduced-sodium\" and \"light sodium\" may still have too much sodium. Be sure to read the label to see how much sodium you are getting. · Buy fresh vegetables, or frozen vegetables without added sauces. Buy low-sodium versions of canned vegetables, soups, and other canned goods. Prepare low-sodium meals  · Cut back on the amount of salt you use in cooking. This will help you adjust to the taste. Do not add salt after cooking. One teaspoon of salt has about 2,300 mg of sodium. · Take the salt shaker off the table. · Flavor your food with garlic, lemon juice, onion, vinegar, herbs, and spices. Do not use soy sauce, lite soy sauce, steak sauce, onion salt, garlic salt, celery salt, mustard, or ketchup on your food. · Use low-sodium salad dressings, sauces, and ketchup. Or make your own salad dressings and sauces without adding salt. · Use less salt (or none) when recipes call for it. You can often use half the salt a recipe calls for without losing flavor.  Other foods such as rice, pasta, and grains do not need added salt. · Rinse canned vegetables, and cook them in fresh water. This removes some--but not all--of the salt. · Avoid water that is naturally high in sodium or that has been treated with water softeners, which add sodium. Call your local water company to find out the sodium content of your water supply. If you buy bottled water, read the label and choose a sodium-free brand. Avoid high-sodium foods  · Avoid eating:  ? Smoked, cured, salted, and canned meat, fish, and poultry. ? Ham, boykin, hot dogs, and luncheon meats. ? Regular, hard, and processed cheese and regular peanut butter. ? Crackers with salted tops, and other salted snack foods such as pretzels, chips, and salted popcorn. ? Frozen prepared meals, unless labeled low-sodium. ? Canned and dried soups, broths, and bouillon, unless labeled sodium-free or low-sodium. ? Canned vegetables, unless labeled sodium-free or low-sodium. ? [place] fries, pizza, tacos, and other fast foods. ? Pickles, olives, ketchup, and other condiments, especially soy sauce, unless labeled sodium-free or low-sodium. Where can you learn more? Go to http://chanel-connor.info/. Enter F177 in the search box to learn more about \"Low Sodium Diet (2,000 Milligram): Care Instructions. \"  Current as of: March 28, 2018  Content Version: 11.9  © 9587-8611 [employer]. Care instructions adapted under license by [employer] (which disclaims liability or warranty for this information). If you have questions about a medical condition or this instruction, always ask your healthcare professional. [place] [address] any warranty or liability for your use of this information.

## 2019-03-03 NOTE — PROGRESS NOTES
Akash Coleman presents today for follow-up of her blood pressure after being instructed to restart her spironolactone at 25mg BID and increase her isordil to 20mg TID. During her last visit, she reported that she was not taking her spironolactone (without it for 4-5 months) as she could not refill the prescription as it had . Today, she reports that she has not taken any of her medications yet and she had to rush to get to the office and it was already 12 noon. She was sent to the ER from our office (was seeing Dr. Trino French for follow-up) on 19. She was complaining of nausea and her blood pressure was very elevated. She apparently had not been able to keep anything down for 1-2 days prior to coming to the office due to nausea and vomiting. Upon arrival to the ER, her blood pressure was 214/111. Her troponin was 0.05 and NT pro-BNP was 5,033. Chest x-ray showed mild pulmonary edema. She was given a dose of IV lasix, hydralazine, and also given Zofran. Her blood pressure improved and she was able to take her medications and after a period of observation, she was discharged home from the ER. She is a 78-year-old -American female with history of hypertension, hyperlipidemia, diabetes, CHF, asthma/COPD (on oxygen therapy at home as needed), anxiety, and depression. She was previously followed by Dr. Nan Martinez and her last visit with him was on 2018. She was hospitalized from May 5, 2018 through May 12, 2018 for hypertensive urgency secondary to noncompliance with her medication regimen as well as acute on chronic heart failure. She was evaluated for renal artery stenosis and it was negative. An echocardiogram done during her admission showed an ejection fraction of 30%, there was hypokinesis of the apical anterior, mid anteroseptal, and mid anterolateral and apical lateral walls, grade 2 diastolic dysfunction, and moderate to severe tricuspid regurgitation.   Her RVSP was also noted to be 45 mmHg. On May 10, 2018, she underwent cardiac catheterization which showed angiographically normal coronary arteries and she was diagnosed with nonischemic cardiomyopathy. She had a limited echo done in July 2018, and it showed an EF of 51-55% and no WMA. She does complain of chronic mild shortness of breath at rest and with exertion and she also complains of occasional palpitations. Denies chest pain, tightness, heaviness. Denies orthopnea and PND. Denies abdominal bloating. Denies lightheadedness, dizziness, and syncope. Denies lower extremity edema and claudication. Denies nausea, vomiting, diarrhea, melena, hematochezia. Denies hematuria, urgency, frequency. Denies fever, chills. PMH:  Past Medical History:   Diagnosis Date    Anemia     Arthritis     Cataract     Chronic lung disease     [facility] obstructive [facility] [address] [facility]     Diabetes mellitus ([facility] [address]     Difficulty swallowing     Both food and liquid    History of echocardiogram 12/29/2009    EF 50%. Mild-mod conc LVH. Mod DDfx. LAE. Mild MR.      Hypercholesterolemia     Hypertension     Hypertensive heart disease     Joint pain     Joint swelling     Normal nuclear stress test 09/08/2000    No ischemia or prior infarction. Neg EKG on submax EST. Ex time 15:02.  Recurrent boils     Rheumatism     Shortness of breath        PSH:  Past Surgical History:   Procedure Laterality Date    CARDIAC CATHETERIZATION  5/10/2018         CORONARY ARTERY ANGIOGRAM  5/10/2018         HX CHOLECYSTECTOMY  2001    HX TUBAL LIGATION  1975    MODERATE SEDATION  5/10/2018            MEDS:  Current Outpatient Medications   Medication Sig    isosorbide dinitrate (ISORDIL) 10 mg tablet Take 2 Tabs by mouth three (3) times daily.  spironolactone (ALDACTONE) 25 mg tablet Take 1 Tab by mouth two (2) times a day.     Oxygen 3L per min as needed    sodium chloride (OCEAN) 0.65 % nasal squeeze bottle 2 Sprays as needed for Congestion.  meloxicam (MOBIC) 7.5 mg tablet Take 7.5 mg by mouth two (2) times a day.  ondansetron hcl (ZOFRAN) 4 mg tablet Take 1 Tab by mouth every eight (8) hours as needed for Nausea.  mometasone-formoterol (DULERA) 200-5 mcg/actuation HFA inhaler Take 2 Puffs by inhalation two (2) times a day.  albuterol (VENTOLIN HFA) 90 mcg/actuation inhaler Take 2 Puffs by inhalation every four (4) hours as needed for Wheezing or Shortness of Breath.  amLODIPine (NORVASC) 5 mg tablet Take 1 Tab by mouth daily.  hydrALAZINE (APRESOLINE) 100 mg tablet Take 1 Tab by mouth three (3) times daily.  Blood-Glucose Meter (TRUE METRIX GLUCOSE METER) misc Test glucose 3 times daily DX: E11.40    Lancets misc Test glucose 3 times daily DX: E11.40    glucose blood VI test strips (BLOOD GLUCOSE TEST) strip (True Metrix) Test glucose 3 times daily DX: E11.40    insulin glargine (LANTUS,BASAGLAR) 100 unit/mL (3 mL) inpn 7 Units by SubCUTAneous route in the morning. 15 units in the evening. (Patient taking differently: 12 Units by SubCUTAneous route in the morning. 7 units in the evening.)    furosemide (LASIX) 20 mg tablet Take 1 Tab by mouth two (2) times a day.  acetaminophen (TYLENOL EXTRA STRENGTH) 500 mg tablet Take 1,000 mg by mouth every six (6) hours as needed for Pain.  CARBOXYMETHYLCELLULOS/GLYCERIN (REFRESH OPTIVE OP) Administer 1 Drop to both eyes six (6) times daily.  aspirin delayed-release 81 mg tablet Take 81 mg by mouth daily.  pravastatin (PRAVACHOL) 20 mg tablet Take 20 mg by mouth nightly. No current facility-administered medications for this visit.           Allergies and Sensitivities:  Allergies   Allergen Reactions    Codeine Nausea Only    Sulfa (Sulfonamide Antibiotics) Rash       Family History:  Family History   Problem Relation Age of Onset    Hypertension Mother     Ovarian Cancer Mother     Stroke Mother     Heart Attack Father     Breast Cancer Maternal Aunt        Social History:  She  reports that  has never smoked. she has never used smokeless tobacco.  She  reports that she does not drink alcohol. Physical:  Visit Vitals  BP (!) 202/110   Pulse 84   Ht 5' 9\" (1.753 m)   Wt 74.8 kg (165 lb)   SpO2 96%   BMI 24.37 kg/m²     *her blood pressure was checked several times. She has not taken any medications yet today and it was already 12 noon. *      Exam:  Neck:  Supple, no JVD, no carotid bruits  CV:  Normal S1 and  S2, no murmurs, rubs, or gallops noted  Lungs:  Clear to ausculation throughout but slightly diminished, no wheezes or rales  Abd:  Soft, non-tender, non-distended with good bowel sounds. No hepatosplenomegaly  Extremities:  no lower extremity edema      Data:  EKG:    Not done today      LABS:  Lab Results   Component Value Date/Time    Sodium 139 02/01/2019 05:41 PM    Potassium 3.5 02/01/2019 05:41 PM    Chloride 105 02/01/2019 05:41 PM    CO2 30 02/01/2019 05:41 PM    Glucose 90 02/01/2019 05:41 PM    BUN 14 02/01/2019 05:41 PM    Creatinine 1.37 (H) 02/01/2019 05:41 PM     Lab Results   Component Value Date/Time    Cholesterol, total 104 09/27/2017 04:15 AM    HDL Cholesterol 35 (L) 09/27/2017 04:15 AM    LDL, calculated 44.6 09/27/2017 04:15 AM    Triglyceride 122 09/27/2017 04:15 AM    CHOL/HDL Ratio 3.0 09/27/2017 04:15 AM     Lab Results   Component Value Date/Time    ALT (SGPT) 15 06/22/2018 12:41 PM         Impression/Plan:  1. Essential hypertension, blood pressure markedly elevated and admits to not taking medications yet this morning. 2.  Hyperlipidemia, on pravastatin 20mg   3. Diabetes mellitus, recommend Hgb A1c less than 7% from cardiac standpoint  4. Chronic systolic heart failure, appears compensated  5. Asthma/COPD, on oxygen as needed at home  6.   Anxiety/depression    Mrs. Rebel Godwin was seen today for follow-up of her hypertensive after being restarted on her spironolactone at 25mg BID and her isordil increased to 20mg TID. She was supposed to have a BMP and NT pro-BNP done prior to returning for follow-up and she did not have it done. Her blood pressure today was markedly elevated. She reports that she has not taken any of her medications yet today. Her caregiver states that she has been busy this morning and just has not ensured that she had taken her medications. She was instructed to increase her amlodipine to 5mg BID to provide some additional blood pressure control at night. She was instructed to take a dose of all of her normal morning medications as soon as they return home. We discussed possibly sending her to ER for further evaluation and treatment but she was hesitant to do this. I asked that they develop a better system with regards to taking her medications at a timely manner. They are to check her blood pressure after about 2-3 hours after taking her medications. She should notice some improvement. If none noted, ER evaluation is warranted. She was reminded that medications taken 4 times a day should be taken at least every 8 hours. As it it now, she may only be able to get 2 dose of her TID medications in today that may be problematic. They verbalized their understanding of the importance of taking her medications as prescribed. Congestive heart failure teaching reinforced today. Advised to limit sodium intake to no more than 2000mg per day and to also limit fluid intake to 48 ounces per day (unless otherwise specified). Advised to weigh daily every morning and record weights. Instructed to call our office if progressive weight gain is noted over a 2 to 3 day period of time, shortness of breath increases, or if abdominal bloating, nausea, fatigue, or increased lower extremity edema is noted. She will follow-up with Dr. Danny Esparza as scheduled and as needed.       Ramos Cazares MSN, FNP-BC    Please note:  Portions of this chart were created with [employer] to text program.  Unrecognized errors may be present.

## 2019-03-08 ENCOUNTER — OFFICE VISIT (OUTPATIENT)
Dept: CARDIOLOGY CLINIC | Age: 64
End: 2019-03-08

## 2019-03-08 VITALS
WEIGHT: 165 LBS | HEART RATE: 84 BPM | DIASTOLIC BLOOD PRESSURE: 110 MMHG | SYSTOLIC BLOOD PRESSURE: 202 MMHG | OXYGEN SATURATION: 96 % | BODY MASS INDEX: 24.44 KG/M2 | HEIGHT: 69 IN

## 2019-03-08 DIAGNOSIS — E11.21 TYPE 2 DIABETES MELLITUS WITH NEPHROPATHY (HCC): ICD-10-CM

## 2019-03-08 DIAGNOSIS — I16.1 HYPERTENSIVE EMERGENCY: Primary | ICD-10-CM

## 2019-03-08 DIAGNOSIS — I10 HYPERTENSION, UNSPECIFIED TYPE: ICD-10-CM

## 2019-03-08 DIAGNOSIS — I50.42 CHRONIC COMBINED SYSTOLIC AND DIASTOLIC CONGESTIVE HEART FAILURE (HCC): ICD-10-CM

## 2019-03-08 RX ORDER — AMLODIPINE BESYLATE 5 MG/1
5 TABLET ORAL 2 TIMES DAILY
Qty: 180 TAB | Refills: 3 | Status: SHIPPED | OUTPATIENT
Start: 2019-03-08 | End: 2019-08-21

## 2019-03-08 NOTE — PATIENT INSTRUCTIONS
Increase amlodipine to 5mg twice a day  Please take a dose of all of your morning medications as soon as you get home (since no meds have been taken yet)  All other medications to remain the same  Please make sure that you take your medications at a timely manner so that we can control your blood pressure.   Medications taken 3 times a day should be as close to 8 hours apart as possible  Follow-up with Marlyn Shi in 10 days

## 2019-03-08 NOTE — PROGRESS NOTES
Juan Rod presents today for   Chief Complaint   Patient presents with    CHF     2 week follow up after meds adjusted     Shortness of Breath     exertion    Leg Swelling     mild    Palpitations     racing and fluttering       Juan Rod preferred language for health care discussion is english/other. Is someone accompanying this pt? caregiver    Is the patient using any DME equipment during [address]? [employer]    Depression Screening:  3 most recent PHQ Screens 1/23/2019   Little interest or pleasure in doing things Not at all   Feeling down, depressed, irritable, or hopeless Not at all   Total Score PHQ 2 0       Learning Assessment:  Learning Assessment 1/23/2019   PRIMARY LEARNER Patient   HIGHEST LEVEL OF EDUCATION - PRIMARY LEARNER  GRADUATED HIGH SCHOOL OR GED   BARRIERS PRIMARY LEARNER NONE   CO-LEARNER CAREGIVER No   PRIMARY LANGUAGE ENGLISH   LEARNER PREFERENCE PRIMARY LISTENING     -   ANSWERED BY self   RELATIONSHIP SELF       Abuse Screening:  Abuse Screening Questionnaire 1/23/2019   Do you ever feel afraid of your partner? N   Are you in a relationship with someone who physically or mentally threatens you? N   Is it safe for you to go home? Y       Fall Risk  Fall Risk Assessment, last 12 mths 1/23/2019   Able to walk? Yes   Fall in past 12 months? No   Fall with injury? -   Number of falls in past 12 months -   Fall Risk Score -       Pt currently taking Anticoagulant therapy? ASA 81mg daily    Coordination of Care:  1. Have you been to the ER, urgent care clinic since your last visit? Hospitalized since your last visit? no    2. Have you seen or consulted any other health care providers outside of the [address] [place] [address] [place] since your last visit? Include any pap smears or colon screening.  no

## 2019-03-18 NOTE — ANCILLARY DISCHARGE INSTRUCTIONS
[facility] - [facility] [employer] [facility] [employer]  [place]  [address] [place] [address]  [phone] fax    Physical Therapy Discharge Summary         Patient name: Nirali Ferro Start of Care: 10/12/2018   Referral source: Shadia Mustafa MD : [id number]                Medical Diagnosis: Lack of coordination [R27.9]    Onset Date:3 months                Treatment Diagnosis: Weakness/Balance   Prior Hospitalization: see medical history Provider#: [id number]   Medications: Verified on Patient summary List    Comorbidities: HTN, Diabetes, COPD, Arthritis, Stroke. Prior Level of Function: Multiple falls and contusions. . Old Infarct from MRI. Daughter is caregiver currently.               Visits from Start of Care: 1    Missed Visits: 3        Summary of Care:   Pt attended [address] [facility] [address] only with a BP of 187/119on 10/12/18. She was unable to follow up with PT due to unstable HBP.    PT D/C'd.    ASSESSMENT/RECOMMENDATIONS:  [x]Discontinue therapy: []Patient has reached or is progressing toward set goals      []Patient is non-compliant or has abdicated      []Due to lack of appreciable progress towards set goals    Rosaleen Boxer, PT 3/18/2019 10:11 AM

## 2019-04-03 ENCOUNTER — OFFICE VISIT (OUTPATIENT)
Dept: PULMONOLOGY | Age: 64
End: 2019-04-03

## 2019-04-03 VITALS
SYSTOLIC BLOOD PRESSURE: 140 MMHG | RESPIRATION RATE: 21 BRPM | TEMPERATURE: 97.2 F | OXYGEN SATURATION: 97 % | DIASTOLIC BLOOD PRESSURE: 70 MMHG | HEART RATE: 85 BPM | HEIGHT: 69 IN | BODY MASS INDEX: 24.88 KG/M2 | WEIGHT: 168 LBS

## 2019-04-03 DIAGNOSIS — I50.20 SYSTOLIC HEART FAILURE, UNSPECIFIED HF CHRONICITY (HCC): ICD-10-CM

## 2019-04-03 DIAGNOSIS — Z99.81 HYPOXEMIA REQUIRING SUPPLEMENTAL OXYGEN: ICD-10-CM

## 2019-04-03 DIAGNOSIS — R09.02 HYPOXEMIA REQUIRING SUPPLEMENTAL OXYGEN: ICD-10-CM

## 2019-04-03 DIAGNOSIS — J45.30 MILD PERSISTENT ASTHMA, UNSPECIFIED WHETHER COMPLICATED: Primary | ICD-10-CM

## 2019-04-03 DIAGNOSIS — J90 CHRONIC BILATERAL PLEURAL EFFUSIONS: ICD-10-CM

## 2019-04-03 DIAGNOSIS — N18.9 CHRONIC KIDNEY DISEASE, UNSPECIFIED CKD STAGE: ICD-10-CM

## 2019-04-03 RX ORDER — FLUTICASONE FUROATE AND VILANTEROL 100; 25 UG/1; UG/1
1 POWDER RESPIRATORY (INHALATION) DAILY
Qty: 1 INHALER | Refills: 5 | Status: SHIPPED | OUTPATIENT
Start: 2019-04-03 | End: 2019-08-21

## 2019-04-03 RX ORDER — FLUTICASONE FUROATE AND VILANTEROL 100; 25 UG/1; UG/1
1 POWDER RESPIRATORY (INHALATION) DAILY
Qty: 1 INHALER | Refills: 0 | Status: ON HOLD | COMMUNITY
Start: 2019-04-03 | End: 2020-01-01 | Stop reason: SDUPTHER

## 2019-04-03 NOTE — PROGRESS NOTES
Chief Complaint Patient presents with  COPD  Shortness of Breath 1. Have you been to the ER, urgent care clinic since your last visit? Hospitalized since your last visit? Yes Where: [facility] 2. Have you seen or consulted any other health care providers outside of the [address] [place] [address] since your last visit? Include any pap smears or colon screening.  No

## 2019-04-03 NOTE — PROGRESS NOTES
Verbal Order with read back per Dr. Gilles Camilo MD  For PFT smart panel. Gas Dilute/ wash out lung vol w/wo distrib vet & vol 
Diffusing capacity Dr. Gilles Camilo MD will co-sign the orders.

## 2019-08-18 ENCOUNTER — APPOINTMENT (OUTPATIENT)
Dept: CT IMAGING | Age: 64
DRG: 077 | End: 2019-08-18
Attending: EMERGENCY MEDICINE
Payer: MEDICARE

## 2019-08-18 ENCOUNTER — APPOINTMENT (OUTPATIENT)
Dept: GENERAL RADIOLOGY | Age: 64
DRG: 077 | End: 2019-08-18
Attending: EMERGENCY MEDICINE
Payer: MEDICARE

## 2019-08-18 ENCOUNTER — HOSPITAL ENCOUNTER (INPATIENT)
Age: 64
LOS: 3 days | Discharge: NURSING, REHAB OR LONG-TERM CARE | DRG: 077 | End: 2019-08-21
Attending: EMERGENCY MEDICINE | Admitting: HOSPITALIST
Payer: MEDICARE

## 2019-08-18 ENCOUNTER — APPOINTMENT (OUTPATIENT)
Dept: MRI IMAGING | Age: 64
DRG: 077 | End: 2019-08-18
Attending: EMERGENCY MEDICINE
Payer: MEDICARE

## 2019-08-18 DIAGNOSIS — G45.9 TIA (TRANSIENT ISCHEMIC ATTACK): Primary | ICD-10-CM

## 2019-08-18 PROBLEM — E11.40 TYPE 2 DIABETES MELLITUS WITH DIABETIC NEUROPATHY (HCC): Chronic | Status: ACTIVE | Noted: 2018-06-21

## 2019-08-18 LAB
ALBUMIN SERPL-MCNC: 2.8 G/DL (ref 3.4–5)
ALBUMIN/GLOB SERPL: 0.6 {RATIO} (ref 0.8–1.7)
ALP SERPL-CCNC: 101 U/L (ref 45–117)
ALT SERPL-CCNC: 11 U/L (ref 13–56)
ANION GAP SERPL CALC-SCNC: 7 MMOL/L (ref 3–18)
ANION GAP SERPL CALC-SCNC: 8 MMOL/L (ref 3–18)
AST SERPL-CCNC: 12 U/L (ref 10–38)
BASOPHILS # BLD: 0 K/UL (ref 0–0.1)
BASOPHILS NFR BLD: 0 % (ref 0–2)
BILIRUB SERPL-MCNC: 0.9 MG/DL (ref 0.2–1)
BUN SERPL-MCNC: 22 MG/DL (ref 7–18)
BUN SERPL-MCNC: 23 MG/DL (ref 7–18)
BUN/CREAT SERPL: 15 (ref 12–20)
BUN/CREAT SERPL: 16 (ref 12–20)
CALCIUM SERPL-MCNC: 8.5 MG/DL (ref 8.5–10.1)
CALCIUM SERPL-MCNC: 8.6 MG/DL (ref 8.5–10.1)
CHLORIDE SERPL-SCNC: 107 MMOL/L (ref 100–111)
CHLORIDE SERPL-SCNC: 109 MMOL/L (ref 100–111)
CK MB CFR SERPL CALC: 2 % (ref 0–4)
CK MB SERPL-MCNC: 1.1 NG/ML (ref 5–25)
CK SERPL-CCNC: 56 U/L (ref 26–192)
CO2 SERPL-SCNC: 28 MMOL/L (ref 21–32)
CO2 SERPL-SCNC: 29 MMOL/L (ref 21–32)
CREAT SERPL-MCNC: 1.45 MG/DL (ref 0.6–1.3)
CREAT SERPL-MCNC: 1.49 MG/DL (ref 0.6–1.3)
DIFFERENTIAL METHOD BLD: ABNORMAL
EOSINOPHIL # BLD: 0.7 K/UL (ref 0–0.4)
EOSINOPHIL NFR BLD: 15 % (ref 0–5)
ERYTHROCYTE [DISTWIDTH] IN BLOOD BY AUTOMATED COUNT: 13.8 % (ref 11.6–14.5)
EST. AVERAGE GLUCOSE BLD GHB EST-MCNC: 97 MG/DL
GLOBULIN SER CALC-MCNC: 4.4 G/DL (ref 2–4)
GLUCOSE BLD STRIP.AUTO-MCNC: 79 MG/DL (ref 70–110)
GLUCOSE SERPL-MCNC: 87 MG/DL (ref 74–99)
GLUCOSE SERPL-MCNC: 93 MG/DL (ref 74–99)
HBA1C MFR BLD: 5 % (ref 4.2–5.6)
HCT VFR BLD AUTO: 31.9 % (ref 35–45)
HGB BLD-MCNC: 10.5 G/DL (ref 12–16)
LYMPHOCYTES # BLD: 0.8 K/UL (ref 0.9–3.6)
LYMPHOCYTES NFR BLD: 15 % (ref 21–52)
MAGNESIUM SERPL-MCNC: 2 MG/DL (ref 1.6–2.6)
MCH RBC QN AUTO: 28.2 PG (ref 24–34)
MCHC RBC AUTO-ENTMCNC: 32.9 G/DL (ref 31–37)
MCV RBC AUTO: 85.5 FL (ref 74–97)
MONOCYTES # BLD: 0.3 K/UL (ref 0.05–1.2)
MONOCYTES NFR BLD: 6 % (ref 3–10)
NEUTS SEG # BLD: 3.2 K/UL (ref 1.8–8)
NEUTS SEG NFR BLD: 64 % (ref 40–73)
PLATELET # BLD AUTO: 134 K/UL (ref 135–420)
PMV BLD AUTO: 12.9 FL (ref 9.2–11.8)
POTASSIUM SERPL-SCNC: 3.1 MMOL/L (ref 3.5–5.5)
POTASSIUM SERPL-SCNC: 3.4 MMOL/L (ref 3.5–5.5)
PROT SERPL-MCNC: 7.2 G/DL (ref 6.4–8.2)
RBC # BLD AUTO: 3.73 M/UL (ref 4.2–5.3)
SODIUM SERPL-SCNC: 143 MMOL/L (ref 136–145)
SODIUM SERPL-SCNC: 145 MMOL/L (ref 136–145)
TROPONIN I SERPL-MCNC: 0.03 NG/ML (ref 0–0.04)
TROPONIN I SERPL-MCNC: 0.03 NG/ML (ref 0–0.04)
WBC # BLD AUTO: 5 K/UL (ref 4.6–13.2)

## 2019-08-18 PROCEDURE — 84484 ASSAY OF TROPONIN QUANT: CPT

## 2019-08-18 PROCEDURE — 80053 COMPREHEN METABOLIC PANEL: CPT

## 2019-08-18 PROCEDURE — 83735 ASSAY OF MAGNESIUM: CPT

## 2019-08-18 PROCEDURE — 85025 COMPLETE CBC W/AUTO DIFF WBC: CPT

## 2019-08-18 PROCEDURE — 99285 EMERGENCY DEPT VISIT HI MDM: CPT

## 2019-08-18 PROCEDURE — 82962 GLUCOSE BLOOD TEST: CPT

## 2019-08-18 PROCEDURE — 96374 THER/PROPH/DIAG INJ IV PUSH: CPT

## 2019-08-18 PROCEDURE — 93005 ELECTROCARDIOGRAM TRACING: CPT

## 2019-08-18 PROCEDURE — 74011250636 HC RX REV CODE- 250/636: Performed by: INTERNAL MEDICINE

## 2019-08-18 PROCEDURE — 65660000000 HC RM CCU STEPDOWN

## 2019-08-18 PROCEDURE — 70450 CT HEAD/BRAIN W/O DYE: CPT

## 2019-08-18 PROCEDURE — 82550 ASSAY OF CK (CPK): CPT

## 2019-08-18 PROCEDURE — 83036 HEMOGLOBIN GLYCOSYLATED A1C: CPT

## 2019-08-18 PROCEDURE — 74011250636 HC RX REV CODE- 250/636: Performed by: EMERGENCY MEDICINE

## 2019-08-18 PROCEDURE — 70551 MRI BRAIN STEM W/O DYE: CPT

## 2019-08-18 PROCEDURE — 74011250637 HC RX REV CODE- 250/637: Performed by: EMERGENCY MEDICINE

## 2019-08-18 PROCEDURE — 71045 X-RAY EXAM CHEST 1 VIEW: CPT

## 2019-08-18 RX ORDER — SODIUM CHLORIDE 0.9 % (FLUSH) 0.9 %
5-40 SYRINGE (ML) INJECTION AS NEEDED
Status: DISCONTINUED | OUTPATIENT
Start: 2019-08-18 | End: 2019-08-21 | Stop reason: HOSPADM

## 2019-08-18 RX ORDER — HEPARIN SODIUM 5000 [USP'U]/ML
5000 INJECTION, SOLUTION INTRAVENOUS; SUBCUTANEOUS EVERY 8 HOURS
Status: DISCONTINUED | OUTPATIENT
Start: 2019-08-18 | End: 2019-08-19

## 2019-08-18 RX ORDER — DIPHENHYDRAMINE HYDROCHLORIDE 50 MG/ML
25 INJECTION, SOLUTION INTRAMUSCULAR; INTRAVENOUS
Status: DISCONTINUED | OUTPATIENT
Start: 2019-08-18 | End: 2019-08-21 | Stop reason: HOSPADM

## 2019-08-18 RX ORDER — ASPIRIN 81 MG/1
81 TABLET ORAL DAILY
Status: DISCONTINUED | OUTPATIENT
Start: 2019-08-19 | End: 2019-08-21 | Stop reason: HOSPADM

## 2019-08-18 RX ORDER — FUROSEMIDE 20 MG/1
20 TABLET ORAL 2 TIMES DAILY
Status: DISCONTINUED | OUTPATIENT
Start: 2019-08-19 | End: 2019-08-21 | Stop reason: HOSPADM

## 2019-08-18 RX ORDER — GUAIFENESIN 100 MG/5ML
324 LIQUID (ML) ORAL
Status: COMPLETED | OUTPATIENT
Start: 2019-08-18 | End: 2019-08-18

## 2019-08-18 RX ORDER — HYDRALAZINE HYDROCHLORIDE 50 MG/1
100 TABLET, FILM COATED ORAL
Status: COMPLETED | OUTPATIENT
Start: 2019-08-18 | End: 2019-08-18

## 2019-08-18 RX ORDER — ONDANSETRON 2 MG/ML
4 INJECTION INTRAMUSCULAR; INTRAVENOUS
Status: DISCONTINUED | OUTPATIENT
Start: 2019-08-18 | End: 2019-08-21 | Stop reason: HOSPADM

## 2019-08-18 RX ORDER — TRAMADOL HYDROCHLORIDE 50 MG/1
50 TABLET ORAL
Status: DISCONTINUED | OUTPATIENT
Start: 2019-08-18 | End: 2019-08-21 | Stop reason: HOSPADM

## 2019-08-18 RX ORDER — DEXTROSE 50 % IN WATER (D50W) INTRAVENOUS SYRINGE
25-50 AS NEEDED
Status: DISCONTINUED | OUTPATIENT
Start: 2019-08-18 | End: 2019-08-19

## 2019-08-18 RX ORDER — FLUTICASONE FUROATE AND VILANTEROL 100; 25 UG/1; UG/1
1 POWDER RESPIRATORY (INHALATION) DAILY
Status: DISCONTINUED | OUTPATIENT
Start: 2019-08-19 | End: 2019-08-21 | Stop reason: HOSPADM

## 2019-08-18 RX ORDER — INSULIN LISPRO 100 [IU]/ML
INJECTION, SOLUTION INTRAVENOUS; SUBCUTANEOUS
Status: DISCONTINUED | OUTPATIENT
Start: 2019-08-19 | End: 2019-08-21 | Stop reason: HOSPADM

## 2019-08-18 RX ORDER — ISOSORBIDE DINITRATE 20 MG/1
20 TABLET ORAL 3 TIMES DAILY
Status: DISCONTINUED | OUTPATIENT
Start: 2019-08-18 | End: 2019-08-21 | Stop reason: HOSPADM

## 2019-08-18 RX ORDER — LORAZEPAM 2 MG/ML
1 INJECTION INTRAMUSCULAR AS NEEDED
Status: DISCONTINUED | OUTPATIENT
Start: 2019-08-18 | End: 2019-08-18

## 2019-08-18 RX ORDER — TRAMADOL HYDROCHLORIDE 50 MG/1
50 TABLET ORAL
Status: DISCONTINUED | OUTPATIENT
Start: 2019-08-18 | End: 2019-08-18

## 2019-08-18 RX ORDER — ONDANSETRON 2 MG/ML
4 INJECTION INTRAMUSCULAR; INTRAVENOUS
Status: COMPLETED | OUTPATIENT
Start: 2019-08-18 | End: 2019-08-18

## 2019-08-18 RX ORDER — MAGNESIUM SULFATE 100 %
4 CRYSTALS MISCELLANEOUS AS NEEDED
Status: DISCONTINUED | OUTPATIENT
Start: 2019-08-18 | End: 2019-08-21 | Stop reason: HOSPADM

## 2019-08-18 RX ORDER — LORAZEPAM 2 MG/ML
1 INJECTION INTRAMUSCULAR AS NEEDED
Status: COMPLETED | OUTPATIENT
Start: 2019-08-18 | End: 2019-08-18

## 2019-08-18 RX ORDER — AMLODIPINE BESYLATE 5 MG/1
5 TABLET ORAL 2 TIMES DAILY
Status: DISCONTINUED | OUTPATIENT
Start: 2019-08-19 | End: 2019-08-18

## 2019-08-18 RX ORDER — ALBUTEROL SULFATE 0.83 MG/ML
2.5 SOLUTION RESPIRATORY (INHALATION)
Status: DISCONTINUED | OUTPATIENT
Start: 2019-08-18 | End: 2019-08-21 | Stop reason: HOSPADM

## 2019-08-18 RX ORDER — PRAVASTATIN SODIUM 20 MG/1
20 TABLET ORAL
Status: DISCONTINUED | OUTPATIENT
Start: 2019-08-18 | End: 2019-08-19

## 2019-08-18 RX ORDER — SODIUM CHLORIDE 0.9 % (FLUSH) 0.9 %
5-40 SYRINGE (ML) INJECTION EVERY 8 HOURS
Status: DISCONTINUED | OUTPATIENT
Start: 2019-08-18 | End: 2019-08-21 | Stop reason: HOSPADM

## 2019-08-18 RX ORDER — ACETAMINOPHEN 325 MG/1
650 TABLET ORAL ONCE
Status: ACTIVE | OUTPATIENT
Start: 2019-08-18 | End: 2019-08-19

## 2019-08-18 RX ORDER — HYDRALAZINE HYDROCHLORIDE 50 MG/1
100 TABLET, FILM COATED ORAL 3 TIMES DAILY
Status: DISCONTINUED | OUTPATIENT
Start: 2019-08-18 | End: 2019-08-18

## 2019-08-18 RX ORDER — ACETAMINOPHEN 325 MG/1
650 TABLET ORAL
Status: DISCONTINUED | OUTPATIENT
Start: 2019-08-18 | End: 2019-08-21 | Stop reason: HOSPADM

## 2019-08-18 RX ADMIN — HYDRALAZINE HYDROCHLORIDE 100 MG: 50 TABLET, FILM COATED ORAL at 15:10

## 2019-08-18 RX ADMIN — ONDANSETRON 4 MG: 2 INJECTION INTRAMUSCULAR; INTRAVENOUS at 17:32

## 2019-08-18 RX ADMIN — LORAZEPAM 1 MG: 2 INJECTION INTRAMUSCULAR; INTRAVENOUS at 21:49

## 2019-08-18 RX ADMIN — ASPIRIN 81 MG 324 MG: 81 TABLET ORAL at 15:10

## 2019-08-18 NOTE — ED NOTES
Bedside and verbal shift report given by Alee Zamarripa RN (off going nurse) to Carmelo Carnes RN (oncoming nurse). Report included the following information SBAR and ED Summary.

## 2019-08-18 NOTE — ED TRIAGE NOTES
Pt BP is elevated. Daughter at bedside states that patient has been having generalized weakness for the last two days. Pt has a hx of MS. During assessment pt is confused. She is alert to name and , but has a a hard time remembering who her family members are.

## 2019-08-18 NOTE — ED NOTES
Pt mental status changed. She became confused and was no longer able to answer questions. BG was 79. Informed MD and she activated a stroke alert. When I got pt to the Ct scan she began answering questions and stated that she was ready to go home. She did not recall anything that had happened prior. Currently pt is back in Rm 7 and we are awaiting neurology.

## 2019-08-18 NOTE — ED PROVIDER NOTES
EMERGENCY DEPARTMENT HISTORY AND PHYSICAL EXAM    4:07 PM  Date: 8/18/2019  Patient Name: Carmen Garcia    History of Presenting Illness     Chief Complaint   Patient presents with    Hypertension        History Provided By: Patient    HPI: Carmen Garcia is a 75 y.o. female with multiple medical problems as below. Patient is presenting with substernal chest pain that started this morning. Patient is on and off and not related to activity. Pressure-like, lasting less than 20 minutes. Pain was associated with shortness of breath and diaphoresis. No history of nausea or vomiting. No history of fever or chills. No history of cough. She is currently pain-free. No prior history of DVT or PE    Location:  Severity:  Timing/course: Onset/Duration:     PCP: Stephy Sue MD    Past History     Past Medical History:  Past Medical History:   Diagnosis Date    Anemia     Arthritis     Cataract     Chronic lung disease     Chronic obstructive pulmonary disease ([address]     [facility] mellitus [facility]     Difficulty swallowing     Both food and liquid    History of echocardiogram 12/29/2009    EF 50%. Mild-mod conc LVH. Mod DDfx. LAE. Mild MR.      Hypercholesterolemia     Hypertension     Hypertensive heart disease     Joint pain     Joint swelling     Normal nuclear stress test 09/08/2000    No ischemia or prior infarction. Neg EKG on submax EST. Ex time 15:02.     Recurrent boils     Rheumatism     Shortness of breath        Past Surgical History:  Past Surgical History:   Procedure Laterality Date    CARDIAC CATHETERIZATION  5/10/2018         CORONARY ARTERY ANGIOGRAM  5/10/2018         HX CHOLECYSTECTOMY  2001    HX TUBAL LIGATION  1975    MODERATE SEDATION  5/10/2018            Family History:  Family History   Problem Relation Age of Onset    Hypertension Mother     Ovarian Cancer Mother     Stroke Mother     Heart Attack Father     Breast Cancer Maternal Aunt        Social History:  Social History     Tobacco Use    Smoking status: Never Smoker    Smokeless tobacco: Never Used    Tobacco comment: second hand smoke exposure as a child   Substance Use Topics    Alcohol use: No    Drug use: No       Allergies: Allergies   Allergen Reactions    Codeine Nausea Only    Sulfa (Sulfonamide Antibiotics) Rash       Review of Systems   Review of Systems   Respiratory: Positive for chest tightness and shortness of breath. Cardiovascular: Positive for chest pain. Physical Exam     Patient Vitals for the past 12 hrs:   Temp Pulse Resp BP SpO2   08/18/19 1605  91  148/78 99 %   08/18/19 1540  90  (!) 207/109 98 %   08/18/19 1443  85 18 (!) 211/153 98 %   08/18/19 1436 97.3 °F (36.3 °C) 84 18 (!) 207/125 98 %       Physical Exam   Constitutional: She is oriented to person, place, and time. She appears well-developed and well-nourished. HENT:   Head: Normocephalic and atraumatic. Eyes: Conjunctivae and EOM are normal.   Neck: Normal range of motion. Neck supple. Cardiovascular: Normal rate and normal heart sounds. Pulmonary/Chest: Effort normal. No respiratory distress. Musculoskeletal: Normal range of motion. She exhibits no edema or deformity. Neurological: She is alert and oriented to person, place, and time. Skin: Skin is warm and dry. Psychiatric: She has a normal mood and affect.  Her behavior is normal. Judgment and thought content normal.       Diagnostic Study Results     Labs -  Recent Results (from the past 12 hour(s))   CBC WITH AUTOMATED DIFF    Collection Time: 08/18/19  2:30 PM   Result Value Ref Range    WBC 5.0 4.6 - 13.2 K/uL    RBC 3.73 (L) 4.20 - 5.30 M/uL    HGB 10.5 (L) 12.0 - 16.0 g/dL    HCT 31.9 (L) 35.0 - 45.0 %    MCV 85.5 74.0 - 97.0 FL    MCH 28.2 24.0 - 34.0 PG    MCHC 32.9 31.0 - 37.0 g/dL    RDW 13.8 11.6 - 14.5 %    PLATELET 919 (L) 766 - 420 K/uL    MPV 12.9 (H) 9.2 - 11.8 FL    NEUTROPHILS 64 40 - 73 %    LYMPHOCYTES 15 (L) 21 - 52 %    MONOCYTES 6 3 - 10 %    EOSINOPHILS 15 (H) 0 - 5 %    BASOPHILS 0 0 - 2 %    ABS. NEUTROPHILS 3.2 1.8 - 8.0 K/UL    ABS. LYMPHOCYTES 0.8 (L) 0.9 - 3.6 K/UL    ABS. MONOCYTES 0.3 0.05 - 1.2 K/UL    ABS. EOSINOPHILS 0.7 (H) 0.0 - 0.4 K/UL    ABS. BASOPHILS 0.0 0.0 - 0.1 K/UL    DF AUTOMATED     METABOLIC PANEL, COMPREHENSIVE    Collection Time: 08/18/19  2:30 PM   Result Value Ref Range    Sodium 145 136 - 145 mmol/L    Potassium 3.4 (L) 3.5 - 5.5 mmol/L    Chloride 109 100 - 111 mmol/L    CO2 28 21 - 32 mmol/L    Anion gap 8 3.0 - 18 mmol/L    Glucose 87 74 - 99 mg/dL    BUN 22 (H) 7.0 - 18 MG/DL    Creatinine 1.49 (H) 0.6 - 1.3 MG/DL    BUN/Creatinine ratio 15 12 - 20      GFR est AA 42 (L) >60 ml/min/1.73m2    GFR est non-AA 35 (L) >60 ml/min/1.73m2    Calcium 8.6 8.5 - 10.1 MG/DL    Bilirubin, total 0.9 0.2 - 1.0 MG/DL    ALT (SGPT) 11 (L) 13 - 56 U/L    AST (SGOT) 12 10 - 38 U/L    Alk. phosphatase 101 45 - 117 U/L    Protein, total 7.2 6.4 - 8.2 g/dL    Albumin 2.8 (L) 3.4 - 5.0 g/dL    Globulin 4.4 (H) 2.0 - 4.0 g/dL    A-G Ratio 0.6 (L) 0.8 - 1.7     TROPONIN I    Collection Time: 08/18/19  2:30 PM   Result Value Ref Range    Troponin-I, QT 0.03 0.0 - 0.045 NG/ML   MAGNESIUM    Collection Time: 08/18/19  2:30 PM   Result Value Ref Range    Magnesium 2.0 1.6 - 2.6 mg/dL       Radiologic Studies -   No results found. Medical Decision Making     ED Course: Progress Notes, Reevaluation, and Consults:    4:07 PM Initial assessment performed. The patients presenting problems have been discussed, and they/their family are in agreement with the care plan formulated and outlined with them. I have encouraged them to ask questions as they arise throughout their visit. 16:37  I was called to evaluate the patient for change in mental status. Patient was a phasic and had a left-sided facial droop. Code as was immediately called and patient was taken to CT scan.   Upon arrival to CT scan patient started to improve and was able to speak again. Case was discussed with tele-neuro and the plan is to admit her for MRI and TIA work-up. Hospitalist was paged    6:27 PM  Still waiting for Dr. Hill Forward to call back, she is busy seeing other patients in the ER      Provider Notes (Medical Decision Making): 57-year-old female with multiple medical problems presenting with substernal chest pain concerning for ACS. She is currently pain-free however will check cardiac enzymes, 2 sets then reevaluate. Also obtain a chest x-ray to evaluate for pneumonia as a cause. Unlikely to be PE, her only risk factor is her age, she is not hypoxic or tachycardic. She is a hypertensive at the time but she did not take her morning hydralazine. We will give her her morning hydralazine now this could potentially relieve her pain. Procedures:     Critical Care Time:     Vital Signs-Reviewed the patient's vital signs. Reviewed pt's pulse ox reading. EKG: Interpreted by the EP. Time Interpreted:    Rate:    Rhythm: Normal Sinus Rhythm 87    Interpretation:LVH   Comparison: No significant interval changes    Records Reviewed: Nursing Notes, Old Medical Records and Previous electrocardiograms (Time of Review: 4:07 PM)  -I am the first provider for this patient.  -I reviewed the vital signs, available nursing notes, past medical history, past surgical history, family history and social history. Current Outpatient Medications   Medication Sig Dispense Refill    fluticasone furoate-vilanterol (BREO ELLIPTA) 100-25 mcg/dose inhaler Take 1 Puff by inhalation daily. 1 Inhaler 0    fluticasone furoate-vilanterol (BREO ELLIPTA) 100-25 mcg/dose inhaler Take 1 Puff by inhalation daily. 1 Inhaler 5    amLODIPine (NORVASC) 5 mg tablet Take 1 Tab by mouth two (2) times a day. 180 Tab 3    isosorbide dinitrate (ISORDIL) 10 mg tablet Take 2 Tabs by mouth three (3) times daily.  1 Tab 0    spironolactone (ALDACTONE) 25 mg tablet Take 1 Tab by mouth two (2) times a day. 180 Tab 3    Oxygen 3L per min as needed      sodium chloride (OCEAN) 0.65 % nasal squeeze bottle 2 Sprays as needed for Congestion.  meloxicam (MOBIC) 7.5 mg tablet Take 7.5 mg by mouth two (2) times a day.  ondansetron hcl (ZOFRAN) 4 mg tablet Take 1 Tab by mouth every eight (8) hours as needed for Nausea. 10 Tab 0    albuterol (VENTOLIN HFA) 90 mcg/actuation inhaler Take 2 Puffs by inhalation every four (4) hours as needed for Wheezing or Shortness of Breath. 1 Inhaler 5    hydrALAZINE (APRESOLINE) 100 mg tablet Take 1 Tab by mouth three (3) times daily. 270 Tab 3    Blood-Glucose Meter (TRUE METRIX GLUCOSE METER) misc Test glucose 3 times daily DX: E11.40 1 Each 0    Lancets misc Test glucose 3 times daily DX: E11.40 100 Each 12    glucose blood VI test strips (BLOOD GLUCOSE TEST) strip (True Metrix) Test glucose 3 times daily DX: E11.40 100 Strip 12    insulin glargine (LANTUS,BASAGLAR) 100 unit/mL (3 mL) inpn 7 Units by SubCUTAneous route in the morning. 15 units in the evening. (Patient taking differently: 12 Units by SubCUTAneous route in the morning. 7 units in the evening.) 5 Pen 12    furosemide (LASIX) 20 mg tablet Take 1 Tab by mouth two (2) times a day. 60 Tab 8    acetaminophen (TYLENOL EXTRA STRENGTH) 500 mg tablet Take 1,000 mg by mouth every six (6) hours as needed for Pain.  CARBOXYMETHYLCELLULOS/GLYCERIN (REFRESH OPTIVE OP) Administer 1 Drop to both eyes six (6) times daily.  aspirin delayed-release 81 mg tablet Take 81 mg by mouth daily.  pravastatin (PRAVACHOL) 20 mg tablet Take 20 mg by mouth nightly. Clinical Impression     Clinical Impression: No diagnosis found. Disposition: admit      This note was dictated utilizing voice recognition software which may lead to typographical errors. I apologize in advance if the situation occurs. If questions arise please do not hesitate to contact me or call our department.     Angela DONAHUE Lamonte Ventura MD  4:07 PM

## 2019-08-18 NOTE — ED NOTES
Pt is sleeping. I woke pt up and she answered both of my questions. She has a headache but does not want any tylenol. She states that she just wants to sleep. Current vs are 164/99, , o2 98% RA. Family is at bedside. Will continue tomonitor.

## 2019-08-19 PROBLEM — J32.9 SINUSITIS WITH NASAL POLYPS: Chronic | Status: ACTIVE | Noted: 2019-08-18

## 2019-08-19 PROBLEM — I69.391 DYSPHAGIA FOLLOWING CEREBRAL INFARCTION: Chronic | Status: ACTIVE | Noted: 2017-09-17

## 2019-08-19 PROBLEM — I11.0 CARDIOMYOPATHY DUE TO HYPERTENSION, WITH HEART FAILURE (HCC): Chronic | Status: ACTIVE | Noted: 2017-05-19

## 2019-08-19 PROBLEM — J33.9 SINUSITIS WITH NASAL POLYPS: Chronic | Status: ACTIVE | Noted: 2019-08-18

## 2019-08-19 PROBLEM — G93.40 ENCEPHALOPATHY: Status: ACTIVE | Noted: 2019-08-18

## 2019-08-19 PROBLEM — I43 CARDIOMYOPATHY DUE TO HYPERTENSION, WITH HEART FAILURE (HCC): Chronic | Status: ACTIVE | Noted: 2017-05-19

## 2019-08-19 PROBLEM — I63.9 CVA (CEREBRAL VASCULAR ACCIDENT) (HCC): Status: ACTIVE | Noted: 2019-08-18

## 2019-08-19 PROBLEM — I63.81 INFARCTION OF RIGHT THALAMUS (HCC): Chronic | Status: ACTIVE | Noted: 2017-09-17

## 2019-08-19 PROBLEM — I50.42 CHRONIC COMBINED SYSTOLIC AND DIASTOLIC CONGESTIVE HEART FAILURE (HCC): Chronic | Status: ACTIVE | Noted: 2018-05-03

## 2019-08-19 LAB
ALBUMIN SERPL-MCNC: 2.8 G/DL (ref 3.4–5)
ALBUMIN/GLOB SERPL: 0.7 {RATIO} (ref 0.8–1.7)
ALP SERPL-CCNC: 99 U/L (ref 45–117)
ALT SERPL-CCNC: 10 U/L (ref 13–56)
ANION GAP SERPL CALC-SCNC: 4 MMOL/L (ref 3–18)
APTT PPP: 28.7 SEC (ref 23–36.4)
AST SERPL-CCNC: 11 U/L (ref 10–38)
ATRIAL RATE: 92 BPM
BASOPHILS # BLD: 0 K/UL (ref 0–0.1)
BASOPHILS NFR BLD: 0 % (ref 0–2)
BILIRUB SERPL-MCNC: 0.8 MG/DL (ref 0.2–1)
BUN SERPL-MCNC: 25 MG/DL (ref 7–18)
BUN/CREAT SERPL: 16 (ref 12–20)
CALCIUM SERPL-MCNC: 8.4 MG/DL (ref 8.5–10.1)
CALCULATED P AXIS, ECG09: 69 DEGREES
CALCULATED R AXIS, ECG10: -31 DEGREES
CALCULATED T AXIS, ECG11: 137 DEGREES
CHLORIDE SERPL-SCNC: 109 MMOL/L (ref 100–111)
CK MB CFR SERPL CALC: 2.5 % (ref 0–4)
CK MB CFR SERPL CALC: 3 % (ref 0–4)
CK MB SERPL-MCNC: 1.4 NG/ML (ref 5–25)
CK MB SERPL-MCNC: 1.8 NG/ML (ref 5–25)
CK SERPL-CCNC: 55 U/L (ref 26–192)
CK SERPL-CCNC: 60 U/L (ref 26–192)
CO2 SERPL-SCNC: 31 MMOL/L (ref 21–32)
CREAT SERPL-MCNC: 1.58 MG/DL (ref 0.6–1.3)
DIAGNOSIS, 93000: NORMAL
DIFFERENTIAL METHOD BLD: ABNORMAL
EOSINOPHIL # BLD: 0.1 K/UL (ref 0–0.4)
EOSINOPHIL NFR BLD: 1 % (ref 0–5)
ERYTHROCYTE [DISTWIDTH] IN BLOOD BY AUTOMATED COUNT: 13.9 % (ref 11.6–14.5)
GLOBULIN SER CALC-MCNC: 4.1 G/DL (ref 2–4)
GLUCOSE BLD STRIP.AUTO-MCNC: 107 MG/DL (ref 70–110)
GLUCOSE BLD STRIP.AUTO-MCNC: 113 MG/DL (ref 70–110)
GLUCOSE BLD STRIP.AUTO-MCNC: 219 MG/DL (ref 70–110)
GLUCOSE BLD STRIP.AUTO-MCNC: 70 MG/DL (ref 70–110)
GLUCOSE BLD STRIP.AUTO-MCNC: 93 MG/DL (ref 70–110)
GLUCOSE SERPL-MCNC: 87 MG/DL (ref 74–99)
HCT VFR BLD AUTO: 31.3 % (ref 35–45)
HGB BLD-MCNC: 10.9 G/DL (ref 12–16)
INR PPP: 1.1 (ref 0.8–1.2)
LACTATE BLD-SCNC: 0.69 MMOL/L (ref 0.4–2)
LACTATE SERPL-SCNC: 0.8 MMOL/L (ref 0.4–2)
LYMPHOCYTES # BLD: 0.6 K/UL (ref 0.9–3.6)
LYMPHOCYTES NFR BLD: 13 % (ref 21–52)
MAGNESIUM SERPL-MCNC: 2 MG/DL (ref 1.6–2.6)
MCH RBC QN AUTO: 29.9 PG (ref 24–34)
MCHC RBC AUTO-ENTMCNC: 34.8 G/DL (ref 31–37)
MCV RBC AUTO: 86 FL (ref 74–97)
MONOCYTES # BLD: 0.2 K/UL (ref 0.05–1.2)
MONOCYTES NFR BLD: 5 % (ref 3–10)
NEUTS SEG # BLD: 3.7 K/UL (ref 1.8–8)
NEUTS SEG NFR BLD: 81 % (ref 40–73)
P-R INTERVAL, ECG05: 172 MS
PHOSPHATE SERPL-MCNC: 4 MG/DL (ref 2.5–4.9)
PLATELET # BLD AUTO: 117 K/UL (ref 135–420)
PMV BLD AUTO: 11.9 FL (ref 9.2–11.8)
POTASSIUM SERPL-SCNC: 3.4 MMOL/L (ref 3.5–5.5)
PROT SERPL-MCNC: 6.9 G/DL (ref 6.4–8.2)
PROTHROMBIN TIME: 13.5 SEC (ref 11.5–15.2)
Q-T INTERVAL, ECG07: 372 MS
QRS DURATION, ECG06: 106 MS
QTC CALCULATION (BEZET), ECG08: 460 MS
RBC # BLD AUTO: 3.64 M/UL (ref 4.2–5.3)
SODIUM SERPL-SCNC: 144 MMOL/L (ref 136–145)
TROPONIN I SERPL-MCNC: 0.37 NG/ML (ref 0–0.04)
TROPONIN I SERPL-MCNC: 0.39 NG/ML (ref 0–0.04)
VENTRICULAR RATE, ECG03: 92 BPM
WBC # BLD AUTO: 4.6 K/UL (ref 4.6–13.2)

## 2019-08-19 PROCEDURE — 85610 PROTHROMBIN TIME: CPT

## 2019-08-19 PROCEDURE — 85730 THROMBOPLASTIN TIME PARTIAL: CPT

## 2019-08-19 PROCEDURE — 97116 GAIT TRAINING THERAPY: CPT

## 2019-08-19 PROCEDURE — 65660000000 HC RM CCU STEPDOWN

## 2019-08-19 PROCEDURE — 80053 COMPREHEN METABOLIC PANEL: CPT

## 2019-08-19 PROCEDURE — 93005 ELECTROCARDIOGRAM TRACING: CPT

## 2019-08-19 PROCEDURE — 85025 COMPLETE CBC W/AUTO DIFF WBC: CPT

## 2019-08-19 PROCEDURE — 97165 OT EVAL LOW COMPLEX 30 MIN: CPT

## 2019-08-19 PROCEDURE — 74011250636 HC RX REV CODE- 250/636: Performed by: HOSPITALIST

## 2019-08-19 PROCEDURE — 92610 EVALUATE SWALLOWING FUNCTION: CPT

## 2019-08-19 PROCEDURE — 83605 ASSAY OF LACTIC ACID: CPT

## 2019-08-19 PROCEDURE — 82550 ASSAY OF CK (CPK): CPT

## 2019-08-19 PROCEDURE — 97535 SELF CARE MNGMENT TRAINING: CPT

## 2019-08-19 PROCEDURE — 74011250637 HC RX REV CODE- 250/637: Performed by: HOSPITALIST

## 2019-08-19 PROCEDURE — 36415 COLL VENOUS BLD VENIPUNCTURE: CPT

## 2019-08-19 PROCEDURE — 82962 GLUCOSE BLOOD TEST: CPT

## 2019-08-19 PROCEDURE — 74011000258 HC RX REV CODE- 258: Performed by: INTERNAL MEDICINE

## 2019-08-19 PROCEDURE — 74011000250 HC RX REV CODE- 250: Performed by: HOSPITALIST

## 2019-08-19 PROCEDURE — 84100 ASSAY OF PHOSPHORUS: CPT

## 2019-08-19 PROCEDURE — 97161 PT EVAL LOW COMPLEX 20 MIN: CPT

## 2019-08-19 PROCEDURE — 92522 EVALUATE SPEECH PRODUCTION: CPT

## 2019-08-19 PROCEDURE — 74011250637 HC RX REV CODE- 250/637: Performed by: PHYSICIAN ASSISTANT

## 2019-08-19 PROCEDURE — 74011250636 HC RX REV CODE- 250/636: Performed by: INTERNAL MEDICINE

## 2019-08-19 PROCEDURE — 74011636637 HC RX REV CODE- 636/637: Performed by: INTERNAL MEDICINE

## 2019-08-19 PROCEDURE — 83735 ASSAY OF MAGNESIUM: CPT

## 2019-08-19 RX ORDER — SODIUM CHLORIDE 0.9 % (FLUSH) 0.9 %
5-40 SYRINGE (ML) INJECTION EVERY 8 HOURS
Status: DISCONTINUED | OUTPATIENT
Start: 2019-08-19 | End: 2019-08-21 | Stop reason: HOSPADM

## 2019-08-19 RX ORDER — FACIAL-BODY WIPES
10 EACH TOPICAL DAILY PRN
Status: DISCONTINUED | OUTPATIENT
Start: 2019-08-19 | End: 2019-08-21 | Stop reason: HOSPADM

## 2019-08-19 RX ORDER — LABETALOL HCL 20 MG/4 ML
5 SYRINGE (ML) INTRAVENOUS
Status: DISCONTINUED | OUTPATIENT
Start: 2019-08-19 | End: 2019-08-20

## 2019-08-19 RX ORDER — HYDRALAZINE HYDROCHLORIDE 50 MG/1
100 TABLET, FILM COATED ORAL 3 TIMES DAILY
Status: DISCONTINUED | OUTPATIENT
Start: 2019-08-19 | End: 2019-08-21 | Stop reason: HOSPADM

## 2019-08-19 RX ORDER — SODIUM CHLORIDE 0.9 % (FLUSH) 0.9 %
5-40 SYRINGE (ML) INJECTION AS NEEDED
Status: DISCONTINUED | OUTPATIENT
Start: 2019-08-19 | End: 2019-08-21 | Stop reason: HOSPADM

## 2019-08-19 RX ORDER — DEXTROSE MONOHYDRATE 100 MG/ML
125-250 INJECTION, SOLUTION INTRAVENOUS AS NEEDED
Status: DISCONTINUED | OUTPATIENT
Start: 2019-08-19 | End: 2019-08-21 | Stop reason: HOSPADM

## 2019-08-19 RX ORDER — SPIRONOLACTONE 25 MG/1
25 TABLET ORAL DAILY
Status: DISCONTINUED | OUTPATIENT
Start: 2019-08-20 | End: 2019-08-21 | Stop reason: HOSPADM

## 2019-08-19 RX ORDER — AMLODIPINE BESYLATE 5 MG/1
5 TABLET ORAL DAILY
Status: DISCONTINUED | OUTPATIENT
Start: 2019-08-20 | End: 2019-08-20

## 2019-08-19 RX ORDER — HEPARIN SODIUM 5000 [USP'U]/ML
5000 INJECTION, SOLUTION INTRAVENOUS; SUBCUTANEOUS EVERY 8 HOURS
Status: DISCONTINUED | OUTPATIENT
Start: 2019-08-19 | End: 2019-08-21 | Stop reason: HOSPADM

## 2019-08-19 RX ORDER — ATORVASTATIN CALCIUM 40 MG/1
80 TABLET, FILM COATED ORAL
Status: DISCONTINUED | OUTPATIENT
Start: 2019-08-19 | End: 2019-08-21 | Stop reason: HOSPADM

## 2019-08-19 RX ADMIN — FUROSEMIDE 20 MG: 20 TABLET ORAL at 09:12

## 2019-08-19 RX ADMIN — HYDRALAZINE HYDROCHLORIDE 100 MG: 50 TABLET, FILM COATED ORAL at 17:31

## 2019-08-19 RX ADMIN — FUROSEMIDE 20 MG: 20 TABLET ORAL at 17:31

## 2019-08-19 RX ADMIN — LABETALOL 20 MG/4 ML (5 MG/ML) INTRAVENOUS SYRINGE 5 MG: at 09:11

## 2019-08-19 RX ADMIN — ISOSORBIDE DINITRATE 20 MG: 20 TABLET ORAL at 09:12

## 2019-08-19 RX ADMIN — ISOSORBIDE DINITRATE 20 MG: 20 TABLET ORAL at 17:31

## 2019-08-19 RX ADMIN — Medication 10 ML: at 09:15

## 2019-08-19 RX ADMIN — POTASSIUM CHLORIDE: 2 INJECTION, SOLUTION, CONCENTRATE INTRAVENOUS at 02:31

## 2019-08-19 RX ADMIN — Medication 10 ML: at 17:45

## 2019-08-19 RX ADMIN — ATORVASTATIN CALCIUM 80 MG: 40 TABLET, FILM COATED ORAL at 22:07

## 2019-08-19 RX ADMIN — Medication 10 ML: at 00:09

## 2019-08-19 RX ADMIN — FAMOTIDINE 20 MG: 10 INJECTION, SOLUTION INTRAVENOUS at 09:12

## 2019-08-19 RX ADMIN — HEPARIN SODIUM 5000 UNITS: 5000 INJECTION INTRAVENOUS; SUBCUTANEOUS at 00:07

## 2019-08-19 RX ADMIN — POTASSIUM CHLORIDE: 2 INJECTION, SOLUTION, CONCENTRATE INTRAVENOUS at 01:16

## 2019-08-19 RX ADMIN — INSULIN LISPRO 4 UNITS: 100 INJECTION, SOLUTION INTRAVENOUS; SUBCUTANEOUS at 12:37

## 2019-08-19 RX ADMIN — HYDRALAZINE HYDROCHLORIDE 100 MG: 50 TABLET, FILM COATED ORAL at 22:07

## 2019-08-19 RX ADMIN — Medication 10 ML: at 22:08

## 2019-08-19 RX ADMIN — ISOSORBIDE DINITRATE 20 MG: 20 TABLET ORAL at 22:07

## 2019-08-19 RX ADMIN — HEPARIN SODIUM 5000 UNITS: 5000 INJECTION, SOLUTION INTRAVENOUS; SUBCUTANEOUS at 17:31

## 2019-08-19 RX ADMIN — Medication 81 MG: at 09:12

## 2019-08-19 NOTE — PROGRESS NOTES
Speech Therapy:     Cleared for eval with RN prior to eval. Speech/swallow eval completed with recs of mech soft with thin liquids, meds as tolerated. Full report to follow.     Thank you for this referral.    Mona Camejo M.S. CCC-SLP/L  Speech-Language Pathologist

## 2019-08-19 NOTE — PROGRESS NOTES
Reason for Admission:  TIA (transient ischemic attack) [G45.9]                 RRAT Score:    33            Plan for utilizing home health:   Pending Recommendation                      Likelihood of Readmission:   LOW                         Transition of Care Plan:    Home with home health or rehab placement        Initial assessment completed with patient. Cognitive status of patient: oriented to time, place, person and situation. Face sheet information confirmed:  yes. The patient designates son,Gray Holbrook-[phone], to participate in her discharge plan and to receive any needed information. This patient lives in her own home with Daughter and grand daughter. Patient is able to navigate steps as needed. Prior to hospitalization, patient was considered to be independent with ADLs/IADLS : yes . If not independent,  patient needs assist with : bathing and cooking    Patient has a current ACP document on file: yes  The patient and son will be available to transport patient home upon discharge. Patient is not currently active with home health. If active, agency name is . Prior home health with [facility] services. List of available [address]    The patient states that she can obtain her medications from the pharmacy, and take her medications as directed. Patient's current insurance is Medicare       Care Management Interventions  PCP Verified by CM: Yes(DR. Litzy Javier)  Mode of Transport at Discharge:  Other (see comment)(FAMILY WILL TRANSPORT)  Transition of Care Consult (CM Consult): Discharge Planning  Current Support Network: Own Home, Family Lives Nearby, Other(PT'S DAUGHTER AND GRAND DAUGHTER LIVE WITH THIS PT)  Confirm Follow Up Transport: Family  Plan discussed with Pt/Family/Caregiver: Yes  Freedom of Choice Offered: Yes  Discharge Location  Discharge Placement: Home with home health        [facility]

## 2019-08-19 NOTE — PROGRESS NOTES
Pt generated mews score of 3 related to bp of 209/119 at 0800. Pt given prn labetalol at 0911 5mg. Will repeat bp reading and monitor pt.  Repeat bp was 142/70 at 1000

## 2019-08-19 NOTE — ROUTINE PROCESS
Bedside and Verbal shift change report given to [address] (oncoming nurse) by De Dios Igor (offgoing nurse). Report included the following information SBAR, Kardex, Intake/Output and MAR.

## 2019-08-19 NOTE — PROGRESS NOTES
Problem: Self Care Deficits Care Plan (Adult)  Goal: *Acute Goals and Plan of Care (Insert Text)  Description  Occupational Therapy Goals  Initiated 8/19/2019 within 7 day(s). 1.  Patient will perform functional activity for 3-5 minutes with independence standing and G balance. 2.  Patient will perform lower body dressing with modified independence. 3.  Patient will perform toilet transfers with modified independence. 4.  Patient will perform all aspects of toileting with modified independence. 5.  Patient will utilize energy conservation techniques during functional activities with verbal cues. 6. Patient will participate in therapeutic exercises with independence for 5-7 minutes to increase BUE strength/ROM for self-care    Prior Level of Function: Patient was modified independent with self-care and used a RW for functional mobility PTA. Pt reports her daughter does help with ADLs depending on the type of day she is having but mostly she can \"do for herself\"; son confirms. Outcome: Progressing Towards Goal     OCCUPATIONAL THERAPY EVALUATION    Patient: Nakul Escobedo (89 y.o. female)  Date: 8/19/2019  Primary Diagnosis: TIA (transient ischemic attack) [G45.9]  Precautions: Fall    ASSESSMENT :  Pt cleared to participate in OT evaluation by RN. Upon entering the room, the pt was supine in bed, alert, and agreeable to participate in OT evaluation with son present. Pt was seen with PT to maximize pt safety and participation. Pt's o2 donned throughout session and BP supine 164/82. Pt performed bed mobility with contact guard assist -min assist; retake BP while seated /83. Pt performed functional transfers using RW with contact guard assist, functional mobility with contact-guard assist, and LBD with min assist for donning. Pt required increased time this session to process commands and for initiation. Pt with decreased attention requiring verbal and tactile cues for task completion.  Pt oriented to self only and pt's son reports pt's memory has been declining since previous CVA (2017). Pt session ended with BP of 136/78. Based on the objective data described below, the patient presents with decreased independence, decreased endurance, decreased safety awareness, decreased functional balance, and decreased functional mobility, which impedes pt performance in basic self-care/ADL tasks. Pt would benefit from skilled OT to restore PLOF and maximize function. Patient will benefit from skilled intervention to address the above impairments. Patient's rehabilitation potential is considered to be Good  Factors which may influence rehabilitation potential include:   ? None noted  ? Mental ability/status  ? Medical condition  ? Home/family situation and support systems  ? Safety awareness  ? Pain tolerance/management  ? Other:      PLAN :  Recommendations and Planned Interventions:   ?               Self Care Training                  ? Therapeutic Activities  ? Functional Mobility Training   ? Cognitive Retraining  ? Therapeutic Exercises           ? Endurance Activities  ? Balance Training                    ? Neuromuscular Re-Education  ? Visual/Perceptual Training     ? Home Safety Training  ? Patient Education                   ? Family Training/Education  ? Other (comment):    Frequency/Duration: Patient will be followed by occupational therapy 1-2 times per day/4-7 days per week to address goals.   Discharge Recommendations: Marcello Pollard  Further Equipment Recommendations for Discharge: N/A     SUBJECTIVE:   Patient stated you look chinese when asked to read OT's nametag    OBJECTIVE DATA SUMMARY:     Past Medical History:   Diagnosis Date    Anemia     Arthritis     Cardi[facility] due to hypertension, with [facility] 5/19/2017    Cataract     Chronic combined systolic and diastolic congestive heart failure ([facility] 5/3/2018    Chronic lung disease     Chronic obstructive pulmonary disease (HCC)     Chronic systolic congestive heart failure ([facility] 2/3/2014    Diabetes mellitus ([facility]     Difficulty swallowing     Both food and liquid    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 Mild-mod conc LVH. Mod DDfx. LAE. Mild MR. Hypercholesterolemia     Hypertension     Hypertensive heart disease     Infarction of right thalamus ([facility] 9/17/2017    Lacunar infarct 2017    Joint pain     Joint swelling     Normal nuclear stress test 09/08/2000    No ischemia or prior infarction. Neg EKG on submax EST. Ex time 15:02. Recurrent boils     Rheumatism     Shortness of breath     Sinusitis with nasal polyps 8/18/2019     Past Surgical History:   Procedure Laterality Date    CARDIAC CATHETERIZATION  5/10/2018         CORONARY ARTERY ANGIOGRAM  5/10/2018         HX CHOLECYSTECTOMY  2001    HX TUBAL LIGATION  1975    MODERATE SEDATION  5/10/2018          Barriers to Learning/Limitations: yes;  altered mental status (i.e.Sedation, Confusion)  Compensate with: visual, verbal, tactile, kinesthetic cues/model    Home Situation:   Home Situation  Home Environment: Private residence  # Steps to Enter: 3  Rails to Enter: Yes  Hand Rails : Bilateral  One/Two Story Residence: One story  Living Alone: No  Support Systems: Child(masha), Family member(s)  Patient Expects to be Discharged to[de-identified] Private residence  Current DME Used/Available at Home: Walker  Tub or Shower Type: Tub/Shower combination  ? Right hand dominant   ?   Left hand dominant    Cognitive/Behavioral Status:  Neurologic State: Alert;Confused  Orientation Level: Oriented to person;Disoriented to time;Disoriented to situation;Disoriented to place  Cognition: Decreased attention/concentration; Follows commands  Safety/Judgement: Fall prevention    Skin: Intact  Edema: None noted    Vision/Perceptual:    Acuity: Impaired near vision; Impaired far vision    Corrective Lenses: (pt reports having reading glasses at home)    Coordination: BUE  Fine Motor Skills-Upper: Left Impaired;Right Impaired    Gross Motor Skills-Upper: Left Impaired;Right Impaired    Balance:  Sitting: Impaired; With support  Sitting - Static: Good (unsupported)  Sitting - Dynamic: Fair (occasional)  Standing: Impaired; With support  Standing - Static: Fair  Standing - Dynamic : Fair    Strength: BUE  Strength: Generally decreased, functional      Tone & Sensation: BUE  Tone: Normal  Sensation: Impaired    Range of Motion: BUE  AROM: Within functional limits      Functional Mobility and Transfers for ADLs:  Bed Mobility:  Supine to Sit: Contact guard assistance;Minimum assistance  Scooting: Contact guard assistance    Transfers:  Sit to Stand: Contact guard assistance; Additional time  Stand to Sit: Contact guard assistance   Toilet Transfer : Contact guard assistance; Additional time    ADL Assessment:   Feeding: Independent    Oral Facial Hygiene/Grooming: Independent    Bathing: Moderate assistance    Upper Body Dressing: Minimum assistance    Lower Body Dressing: Minimum assistance    Toileting: Minimum assistance    ADL Intervention:  Grooming  Grooming Assistance: Independent  Brushing/Combing Hair: Independent(seated)    Lower Body Dressing Assistance  Dressing Assistance: Minimum assistance(donning)  Socks: Minimum assistance(donning)  Leg Crossed Method Used: No  Position Performed: Seated edge of bed;Bending forward method    Cognitive Retraining  Safety/Judgement: Fall prevention    Pain:  Pain level pre-treatment: 5/10, R knee   Pain level post-treatment: 5/10, R knee  Pain Intervention(s): Medication (see MAR);    Response to intervention: See doc flow    Activity Tolerance:   Patient demonstrated decreased activity tolerance during OT evaluation. Please refer to the flowsheet for vital signs taken during this treatment. After treatment:   ? Patient left in no apparent distress sitting up in chair  ? Patient left in no apparent distress in bed  ? Call bell left within reach  ? Nursing notified  ? Caregiver present  ? Bed alarm activated    COMMUNICATION/EDUCATION:   ? Role of Occupational Therapy in the acute care setting  ? Home safety education was provided and the patient/caregiver indicated understanding. ? Patient/family have participated as able in goal setting and plan of care. ? Patient/family agree to work toward stated goals and plan of care. ? Patient understands intent and goals of therapy, but is neutral about his/her participation. ? Patient is unable to participate in goal setting and plan of care. Thank you for this referral.  Liu Lerner OTR/L  Time Calculation: 32 mins    Eval Complexity: History: HIGH Complexity : Extensive review of history including physical, cognitive and psychosocial history ; Examination: MEDIUM Complexity : 3-5 performance deficits relating to physical, cognitive , or psychosocial skils that result in activity limitations and / or participation restrictions; Decision Making:MEDIUM Complexity : Patient may present with comorbidities that affect occupational performnce.  Miniml to moderate modification of tasks or assistance (eg, physical or verbal ) with assesment(s) is necessary to enable patient to complete evaluation

## 2019-08-19 NOTE — ROUTINE PROCESS
TRANSFER - OUT REPORT:    Verbal report given to Jose Manuel Lara on Brandon Villalobos  being transferred to [employer] for routine progression of care       Report consisted of patients Situation, Background, Assessment and   Recommendations(SBAR). Information from the following report(s) SBAR, ED Summary, Procedure Summary, Intake/Output and MAR was reviewed with the receiving nurse. Lines:   Peripheral IV 08/18/19 Left Wrist (Active)   Site Assessment Clean, dry, & intact 8/18/2019  2:37 PM   Phlebitis Assessment 0 8/18/2019  2:37 PM   Infiltration Assessment 0 8/18/2019  2:37 PM   Dressing Status Clean, dry, & intact 8/18/2019  2:37 PM   Dressing Type Transparent 8/18/2019  2:37 PM   Hub Color/Line Status Green;Patent; Flushed 8/18/2019  2:37 PM        Opportunity for questions and clarification was provided.       Patient transported with:   Monitor  Registered Nurse

## 2019-08-19 NOTE — ROUTINE PROCESS
TRANSFER - IN REPORT:    Verbal report received from [facility]) on Wojciech Mckeon  being received from ED(unit) for routine progression of care      Report consisted of patients Situation, Background, Assessment and   Recommendations(SBAR). Information from the following report(s) Recent Results was reviewed with the receiving nurse. Opportunity for questions and clarification was provided. Assessment completed upon patients arrival to unit and care assumed. Arrived 0205  Primary Nurse Bernardo Stubbs RN and Ramirez Tong RN performed a dual skin assessment on this patient No impairment noted  Jhon score is 15    Patient has calluses on feet   Left foot with 2 and right has one     Stroke Education provided to patient and the following topics were discussed    1. Patients personal risk factors for stroke are hypertension    2. Warning signs of Stroke:        * Sudden numbness or weakness of the face, arm or leg, especially on one side of          The body            * Sudden confusion, trouble speaking or understanding        * Sudden trouble seeing in one or both eyes        * Sudden trouble walking, dizziness, loss of balance or coordination        * Sudden severe headache with no known cause      3. Importance of activation Emergency Medical Services ( 9-1-1 ) immediately if experience any warning signs of stroke. 4. Be sure and schedule a follow-up appointment with your primary care doctor or any specialists as instructed. 5. You must take medicine every day to treat your risk factors for stroke. Be sure to take your medicines exactly as your doctor tells you: no more, no less. Know what your medicines are for , what they do. Anti-thrombotics /anticoagulants can help prevent strokes. You are taking the following medicine(s) LASIX, IMDUR       6.  Smoking and second-hand smoke greatly increase your risk of stroke, cardiovascular disease and death.  Smoking history PATIENT STATES NEVER SMOKED      7. Information provided was [facility] Stroke Education Binder    8. Documentation of teaching completed in Patient Education Activity and on Care Plan with teaching response noted? yes    Bedside and Verbal shift change report given to REUBEN RN   (oncoming nurse) by Kwaku Shipley RN (offgoing nurse). Report given with SBAR, Kardex, Intake/Output, MAR, Accordion and Recent Results.

## 2019-08-19 NOTE — CONSULTS
NEUROLOGY CONSULT NOTE    Patient ID:  Burgess Espinoza  [id number]  46 y.o.  1955    Date of Consultation:  August 19, 2019    Referring Physician: Dr Kerwin Barros    Reason for Consultation:  Altered mental status        Subjective:       History of Present Illness: This is a 76 y/o AAF with a history of severe hypertension, prior strokes and COPD who was admitted primarily with a complaint of chest pain along with diffuse weakness and some confusion. Leading up to this she had apparently been having some nausea and vomiting over the preceding couple of days. She cannot provide much in the way of further details. She was noted to be markedly hypertensive on initial presentation.        Patient Active Problem List    Diagnosis Date Noted    TIA (transient ischemic attack) 08/18/2019    CVA (cerebral vascular accident) ([address] 08/18/2019    Encephalopathy 08/18/2019    Sinusitis with nasal polyps 08/18/2019    Allergic rhinitis 10/23/2018    Acute non-recurrent maxillary sinusitis 09/25/2018    Dizziness 09/25/2018    CKD (chronic kidney disease) stage 3, GFR 30-59 ml/min ([address] 09/25/2018    Fall 09/25/2018    Chronic obstructive pulmonary disease (HCC)     Type 2 diabetes mellitus with diabetic neuropathy (HCC) 06/21/2018    Hypokalemia 05/05/2018    Acute pulmonary edema ([address] 05/05/2018    Hypertensive emergency 05/05/2018    Headache 05/05/2018    Acute on chronic systolic (congestive) heart failure ([address] 05/05/2018    Chronic combined systolic and diastolic congestive heart failure ([address] 05/03/2018    Type 2 diabetes mellitus with nephropathy ([address] 12/14/2017    Infarction of right thalamus ([address] 09/17/2017    Dysphagia following cerebral infarction 09/17/2017    Chronic sinusitis 06/13/2017    Cardiomyopathy due to hypertension, with heart failure (HCC) 05/19/2017    Pleural effusion 04/30/2014    Cervical myelopathy ([address] 02/03/2014    Eczema 02/03/2014    Asthma 02/03/2014  Bladder prolapse, female, acquired 02/03/2014    IBS (irritable bowel syndrome) 02/03/2014    Osteoporosis 02/03/2014    Migraine 02/03/2014    Anxiety and depression 02/03/2014    Essential hypertension 01/27/2012    Diabetes mellitus ([facility] 01/27/2012    Hyperlipidemia 01/27/2012     Past Medical History:   Diagnosis Date    Anemia     Arthritis     Cardiomyopathy due to hypertension, with heart failure ([facility] 5/19/2017    Cataract     Chronic combined systolic and diastolic congestive heart failure (HCC) 5/3/2018    Chronic lung disease     Chronic obstructive pulmonary disease (HCC)     Chronic systolic congestive heart failure ([facility] 2/3/2014    Diabetes mellitus (HCC)     Difficulty swallowing     Both food and liquid    Dysphagia following cerebral infarction 8/17/2017    History of echocardiogram 12/29/2009    EF 50%. Mild-mod conc LVH. Mod DDfx. LAE. Mild MR.      Hypercholesterolemia     Hypertension     Hypertensive heart disease     Infarction of right thalamus ([facility] 9/17/2017    Lacunar infarct 2017    Joint pain     Joint swelling     Normal nuclear stress test 09/08/2000    No ischemia or prior infarction. Neg EKG on submax EST. Ex time 15:02.  Recurrent boils     Rheumatism     Shortness of breath     Sinusitis with nasal polyps 8/18/2019      Past Surgical History:   Procedure Laterality Date    CARDIAC CATHETERIZATION  5/10/2018         CORONARY ARTERY ANGIOGRAM  5/10/2018         HX CHOLECYSTECTOMY  2001    HX TUBAL LIGATION  1975    MODERATE SEDATION  5/10/2018           Prior to Admission medications    Medication Sig Start Date End Date Taking? Authorizing Provider   fluticasone furoate-vilanterol (BREO ELLIPTA) 100-25 mcg/dose inhaler Take 1 Puff by inhalation daily. 4/3/19   Lesley Ahs MD   fluticasone furoate-vilanterol (BREO ELLIPTA) 100-25 mcg/dose inhaler Take 1 Puff by inhalation daily.  4/3/19   Lesley Ash MD   amLODIPine ([address]) 5 mg tablet Take 1 Tab by mouth two (2) times a day. 3/8/19   Roberta Enciso NP   isosorbide dinitrate (ISORDIL) 10 mg tablet Take 2 Tabs by mouth three (3) times daily. 2/19/19   Roberta Enciso NP   spironolactone (ALDACTONE) 25 mg tablet Take 1 Tab by mouth two (2) times a day. 2/19/19   Roberta Enciso NP   Oxygen 3L per min as needed    Other, MD Santos   sodium chloride (OCEAN) 0.65 % nasal squeeze bottle 2 Sprays as needed for Congestion. Other, MD Santos   meloxicam (MOBIC) 7.5 mg tablet Take 7.5 mg by mouth two (2) times a day. Other, MD Santos   ondansetron hcl (ZOFRAN) 4 mg tablet Take 1 Tab by mouth every eight (8) hours as needed for Nausea. 2/1/19   Fawn Templeton PA-C   albuterol (VENTOLIN HFA) 90 mcg/actuation inhaler Take 2 Puffs by inhalation every four (4) hours as needed for Wheezing or Shortness of Breath. 9/25/18   Richie Rick MD   hydrALAZINE (APRESOLINE) 100 mg tablet Take 1 Tab by mouth three (3) times daily. 7/5/18   Lucien Cabezas MD   Blood-Glucose Meter (TRUE METRIX GLUCOSE METER) misc Test glucose 3 times daily DX: E11.40 7/2/18   Lizzette Eaton DO   Lancets misc Test glucose 3 times daily DX: E11.40 7/2/18   Lizzette Eaton DO   glucose blood VI test strips (BLOOD GLUCOSE TEST) strip (True Metrix) Test glucose 3 times daily DX: E11.40 7/2/18   Lizzette Eaton DO   insulin glargine (LANTUS,BASAGLAR) 100 unit/mL (3 mL) inpn 7 Units by SubCUTAneous route in the morning. 15 units in the evening. Patient taking differently: 12 Units by SubCUTAneous route in the morning. 7 units in the evening. 6/21/18   Richie Rick MD   furosemide (LASIX) 20 mg tablet Take 1 Tab by mouth two (2) times a day. 6/4/18   Roberta Enciso, NP   acetaminophen (TYLENOL EXTRA STRENGTH) 500 mg tablet Take 1,000 mg by mouth every six (6) hours as needed for Pain.     Provider, Historical   CARBOXYMETHYLCELLULOS/GLYCERIN (REFRESH OPTIVE OP) Administer 1 Drop to both eyes six (6) times daily. Geo Caban MD   aspirin delayed-release 81 mg tablet Take 81 mg by mouth daily. Santos Centeno MD   pravastatin (PRAVACHOL) 20 mg tablet Take 20 mg by mouth nightly. Santos Centeno MD     Allergies   Allergen Reactions    Codeine Nausea Only    Sulfa (Sulfonamide Antibiotics) Rash      Social History     Tobacco Use    Smoking status: Never Smoker    Smokeless tobacco: Never Used    Tobacco comment: second hand smoke exposure as a child   Substance Use Topics    Alcohol use: No      Family History   Problem Relation Age of Onset    Hypertension Mother     Ovarian Cancer Mother     Stroke Mother     Heart Attack Father     Breast Cancer Maternal Aunt               Review of Systems    Review of systems not obtained due to patient factors. -somnolence    Objective:     Patient Vitals for the past 8 hrs:   BP Temp Pulse Resp SpO2   08/19/19 1200 (!) 175/107 98.3 °F (36.8 °C) 89 16 99 %   08/19/19 1000 142/70 97 °F (36.1 °C) 80 16 (!) 88 %   08/19/19 0800 (!) 209/119 98.7 °F (37.1 °C) 91 17 93 %   08/19/19 0600 (!) 199/118 98.1 °F (36.7 °C) 96 18 93 %       General Exam  No acute distress, normal body habitus    HEENT: Normocephalic, atraumatic, Sclera anicteric, normal conjunctiva  Mucous membranes: normal color and hydration     Neurologic Exam:    Mental status:  Somnolent but will open eyes to voice and answer some questions.   oriented to person, place and month  Language: grossly fluent and comprehension    Cranial nerves: PERRL, Extraocular movements intact and full, face symmetric to movement, Tongue midline with normal strength, palat symmetric, mild dysarthria    Motor: antigravity throughout    Coordination: No abnormal movements      DTRs (R/L)  0 throughout    Gait: not tested        Data Review:    Recent Results (from the past 24 hour(s))   CBC WITH AUTOMATED DIFF    Collection Time: 08/18/19  2:30 PM   Result Value Ref Range    WBC 5.0 4.6 - 13.2 K/uL    RBC 3.73 (L) 4.20 - 5.30 M/uL    HGB 10.5 (L) 12.0 - 16.0 g/dL    HCT 31.9 (L) 35.0 - 45.0 %    MCV 85.5 74.0 - 97.0 FL    MCH 28.2 24.0 - 34.0 PG    MCHC 32.9 31.0 - 37.0 g/dL    RDW 13.8 11.6 - 14.5 %    PLATELET 802 (L) 751 - 420 K/uL    MPV 12.9 (H) 9.2 - 11.8 FL    NEUTROPHILS 64 40 - 73 %    LYMPHOCYTES 15 (L) 21 - 52 %    MONOCYTES 6 3 - 10 %    EOSINOPHILS 15 (H) 0 - 5 %    BASOPHILS 0 0 - 2 %    ABS. NEUTROPHILS 3.2 1.8 - 8.0 K/UL    ABS. LYMPHOCYTES 0.8 (L) 0.9 - 3.6 K/UL    ABS. MONOCYTES 0.3 0.05 - 1.2 K/UL    ABS. EOSINOPHILS 0.7 (H) 0.0 - 0.4 K/UL    ABS. BASOPHILS 0.0 0.0 - 0.1 K/UL    DF AUTOMATED     METABOLIC PANEL, COMPREHENSIVE    Collection Time: 08/18/19  2:30 PM   Result Value Ref Range    Sodium 145 136 - 145 mmol/L    Potassium 3.4 (L) 3.5 - 5.5 mmol/L    Chloride 109 100 - 111 mmol/L    CO2 28 21 - 32 mmol/L    Anion gap 8 3.0 - 18 mmol/L    Glucose 87 74 - 99 mg/dL    BUN 22 (H) 7.0 - 18 MG/DL    Creatinine 1.49 (H) 0.6 - 1.3 MG/DL    BUN/Creatinine ratio 15 12 - 20      GFR est AA 42 (L) >60 ml/min/1.73m2    GFR est non-AA 35 (L) >60 ml/min/1.73m2    Calcium 8.6 8.5 - 10.1 MG/DL    Bilirubin, total 0.9 0.2 - 1.0 MG/DL    ALT (SGPT) 11 (L) 13 - 56 U/L    AST (SGOT) 12 10 - 38 U/L    Alk.  phosphatase 101 45 - 117 U/L    Protein, total 7.2 6.4 - 8.2 g/dL    Albumin 2.8 (L) 3.4 - 5.0 g/dL    Globulin 4.4 (H) 2.0 - 4.0 g/dL    A-G Ratio 0.6 (L) 0.8 - 1.7     TROPONIN I    Collection Time: 08/18/19  2:30 PM   Result Value Ref Range    Troponin-I, QT 0.03 0.0 - 0.045 NG/ML   MAGNESIUM    Collection Time: 08/18/19  2:30 PM   Result Value Ref Range    Magnesium 2.0 1.6 - 2.6 mg/dL   HEMOGLOBIN A1C WITH EAG    Collection Time: 08/18/19  2:30 PM   Result Value Ref Range    Hemoglobin A1c 5.0 4.2 - 5.6 %    Est. average glucose 97 mg/dL   EKG, 12 LEAD, INITIAL    Collection Time: 08/18/19  2:30 PM   Result Value Ref Range    Ventricular Rate 87 BPM Atrial Rate 87 BPM    P-R Interval 166 ms    QRS Duration 110 ms    Q-T Interval 394 ms    QTC Calculation (Bezet) 474 ms    Calculated P Axis 43 degrees    Calculated R Axis -29 degrees    Calculated T Axis 139 degrees    Diagnosis       Normal sinus rhythm  Possible Left atrial enlargement  Left ventricular hypertrophy with repolarization abnormality  Cannot rule out Septal infarct (cited on or before 01-FEB-2014)  Abnormal ECG  When compared with ECG of 01-FEB-2019 17:21,  No significant change was found  Confirmed by Luiza Clemente ([id number]) on 8/19/2019 12:56:52 PM     GLUCOSE, POC    Collection Time: 08/18/19  4:38 PM   Result Value Ref Range    Glucose (POC) 79 70 - 113 mg/dL   METABOLIC PANEL, BASIC    Collection Time: 08/18/19  6:50 PM   Result Value Ref Range    Sodium 143 136 - 145 mmol/L    Potassium 3.1 (L) 3.5 - 5.5 mmol/L    Chloride 107 100 - 111 mmol/L    CO2 29 21 - 32 mmol/L    Anion gap 7 3.0 - 18 mmol/L    Glucose 93 74 - 99 mg/dL    BUN 23 (H) 7.0 - 18 MG/DL    Creatinine 1.45 (H) 0.6 - 1.3 MG/DL    BUN/Creatinine ratio 16 12 - 20      GFR est AA 44 (L) >60 ml/min/1.73m2    GFR est non-AA 36 (L) >60 ml/min/1.73m2    Calcium 8.5 8.5 - 10.1 MG/DL   CARDIAC PANEL,(CK, CKMB & TROPONIN)    Collection Time: 08/18/19  6:50 PM   Result Value Ref Range    CK 56 26 - 192 U/L    CK - MB 1.1 <3.6 ng/ml    CK-MB Index 2.0 0.0 - 4.0 %    Troponin-I, QT 0.03 0.0 - 0.045 NG/ML   GLUCOSE, POC    Collection Time: 08/19/19 12:21 AM   Result Value Ref Range    Glucose (POC) 93 70 - 167 mg/dL   METABOLIC PANEL, COMPREHENSIVE    Collection Time: 08/19/19  1:10 AM   Result Value Ref Range    Sodium 144 136 - 145 mmol/L    Potassium 3.4 (L) 3.5 - 5.5 mmol/L    Chloride 109 100 - 111 mmol/L    CO2 31 21 - 32 mmol/L    Anion gap 4 3.0 - 18 mmol/L    Glucose 87 74 - 99 mg/dL    BUN 25 (H) 7.0 - 18 MG/DL    Creatinine 1.58 (H) 0.6 - 1.3 MG/DL    BUN/Creatinine ratio 16 12 - 20      GFR est AA 40 (L) >60 ml/min/1.73m2    GFR est non-AA 33 (L) >60 ml/min/1.73m2    Calcium 8.4 (L) 8.5 - 10.1 MG/DL    Bilirubin, total 0.8 0.2 - 1.0 MG/DL    ALT (SGPT) 10 (L) 13 - 56 U/L    AST (SGOT) 11 10 - 38 U/L    Alk. phosphatase 99 45 - 117 U/L    Protein, total 6.9 6.4 - 8.2 g/dL    Albumin 2.8 (L) 3.4 - 5.0 g/dL    Globulin 4.1 (H) 2.0 - 4.0 g/dL    A-G Ratio 0.7 (L) 0.8 - 1.7     CARDIAC PANEL,(CK, CKMB & TROPONIN)    Collection Time: 08/19/19  1:10 AM   Result Value Ref Range    CK 60 26 - 192 U/L    CK - MB 1.8 <3.6 ng/ml    CK-MB Index 3.0 0.0 - 4.0 %    Troponin-I, QT 0.39 (H) 0.0 - 0.045 NG/ML   MAGNESIUM    Collection Time: 08/19/19  1:10 AM   Result Value Ref Range    Magnesium 2.0 1.6 - 2.6 mg/dL   PHOSPHORUS    Collection Time: 08/19/19  1:10 AM   Result Value Ref Range    Phosphorus 4.0 2.5 - 4.9 MG/DL   CBC WITH AUTOMATED DIFF    Collection Time: 08/19/19  1:10 AM   Result Value Ref Range    WBC 4.6 4.6 - 13.2 K/uL    RBC 3.64 (L) 4.20 - 5.30 M/uL    HGB 10.9 (L) 12.0 - 16.0 g/dL    HCT 31.3 (L) 35.0 - 45.0 %    MCV 86.0 74.0 - 97.0 FL    MCH 29.9 24.0 - 34.0 PG    MCHC 34.8 31.0 - 37.0 g/dL    RDW 13.9 11.6 - 14.5 %    PLATELET 057 (L) 181 - 420 K/uL    MPV 11.9 (H) 9.2 - 11.8 FL    NEUTROPHILS 81 (H) 40 - 73 %    LYMPHOCYTES 13 (L) 21 - 52 %    MONOCYTES 5 3 - 10 %    EOSINOPHILS 1 0 - 5 %    BASOPHILS 0 0 - 2 %    ABS. NEUTROPHILS 3.7 1.8 - 8.0 K/UL    ABS. LYMPHOCYTES 0.6 (L) 0.9 - 3.6 K/UL    ABS. MONOCYTES 0.2 0.05 - 1.2 K/UL    ABS. EOSINOPHILS 0.1 0.0 - 0.4 K/UL    ABS.  BASOPHILS 0.0 0.0 - 0.1 K/UL    DF AUTOMATED     PROTHROMBIN TIME + INR    Collection Time: 08/19/19  1:10 AM   Result Value Ref Range    Prothrombin time 13.5 11.5 - 15.2 sec    INR 1.1 0.8 - 1.2     PTT    Collection Time: 08/19/19  1:10 AM   Result Value Ref Range    aPTT 28.7 23.0 - 36.4 SEC   POC LACTIC ACID    Collection Time: 08/19/19  1:18 AM   Result Value Ref Range    Lactic Acid (POC) 0.69 0.40 - 2.00 mmol/L   LACTIC ACID    Collection Time: 08/19/19 4:06 AM   Result Value Ref Range    Lactic acid 0.8 0.4 - 2.0 MMOL/L   GLUCOSE, POC    Collection Time: 08/19/19  6:42 AM   Result Value Ref Range    Glucose (POC) 70 70 - 110 mg/dL   EKG, 12 LEAD, INITIAL    Collection Time: 08/19/19  7:25 AM   Result Value Ref Range    Ventricular Rate 92 BPM    Atrial Rate 92 BPM    P-R Interval 172 ms    QRS Duration 106 ms    Q-T Interval 372 ms    QTC Calculation (Bezet) 460 ms    Calculated P Axis 69 degrees    Calculated R Axis -31 degrees    Calculated T Axis 137 degrees    Diagnosis       Normal sinus rhythm  Left axis deviation  Possible Anterior infarct (cited on or before 01-FEB-2014)  ST & T wave abnormality, consider lateral ischemia  Abnormal ECG  When compared with ECG of 18-AUG-2019 14:30,  No significant change was found  Confirmed by aRquel Ramos ([id number]) on 8/19/2019 12:58:06 PM     GLUCOSE, POC    Collection Time: 08/19/19 11:17 AM   Result Value Ref Range    Glucose (POC) 219 (H) 70 - 110 mg/dL         Radiology studies: MRI images reviewed and reveals what appears to be a small subacute lacunar infarct in the left subcortical white matter      Assessment: This is a 76 y/o AAF with known hypertension who presented with some mild status changes in the setting of chest pain, fatigue and severe HTN. This symptom complex is most suggestive of a hypertensive urgency associated with encephalopathy. The noted finding on MRI is likely a subacute small vessel infarct which is likely clinically silent and other sign that her blood pressure is poorly controlled. Perhaps her preceding N/V and inability to take her BP meds was the precipitant to this event.        Principal Problem:    TIA (transient ischemic attack) (8/18/2019)    Active Problems:    Essential hypertension (1/27/2012)      Asthma (2/3/2014)      Chronic combined systolic and diastolic congestive heart failure (HCC) (5/3/2018)      Pleural effusion (4/30/2014)      Type 2 diabetes mellitus with diabetic neuropathy ([facility] (6/21/2018)      CVA (cerebral vascular accident) ([address] (8/18/2019)      Encephalopathy (8/18/2019)      Infarction of right thalamus ([address] (9/17/2017)      Overview: Lacunar infarct 2017      Dysphagia following cerebral infarction (9/17/2017)      Sinusitis with nasal polyps (8/18/2019)      Overview: MRI 8/18/2019      Cardiomyopathy due to hypertension, with heart failure ([address] (5/19/2017)        Plan:     1. Control blood pressure, no need for permissive HTN, to be managed per cardiology recs  2. IV hydration if not taking PO  3. Additional vascular imaging will be low yield but a carotid duplex should be adequate  4. Expect further improvement with optimization of BP and other electrolyte disturbances. 5. Can continue outpatient ASA at [facility] M.D.   Clinical Neurophysiology  Neuromuscular specialist

## 2019-08-19 NOTE — PROGRESS NOTES
Problem: Mobility Impaired (Adult and Pediatric)  Goal: *Acute Goals and Plan of Care (Insert Text)  Description  Physical Therapy Goals  Initiated 8/19/2019 and to be accomplished within 7 day(s)  1. Patient will move from supine to sit and sit to supine , scoot up and down and roll side to side in bed with modified independence. 2.  Patient will transfer from bed to chair and chair to bed with supervision/set-up using the least restrictive device. 3.  Patient will perform sit to stand with supervision/set-up. 4.  Patient will ambulate with supervision/set-up for >50 feet with the least restrictive device. 5.  Patient will ascend/descend 3 stairs with 1 handrail(s) with minimal assistance/contact guard assist.    PLOF: Patient lives with her daughter and granddaughter and reports that her daughter works during the day. Patient reports she was sometimes able to get to the bathroom and that she walks more with her cane than a walker. She reports she has cane, RW, Rollator, and required assist for I/ADL's. Outcome: Progressing Towards Goal   PHYSICAL THERAPY EVALUATION    Patient: Yohan Prieto (23 y.o. female)  Date: 8/19/2019  Primary Diagnosis: TIA (transient ischemic attack) [G45.9]    Precautions:   Fall    ASSESSMENT :  Patient is 69yo F admitted to hospital for TIA and presents today alert and agreeable to therapy and was supine in bed upon arrival. Patient demos mild confusion and son in room reports patient has some confusion/memory issues at baseline. Patient transferred to sitting EOB and performed objective assessment; LLE weakness evident with PMH Right thalamic CVA 2017. Patient also demos mild RLE weakness. Patient required re-direction during session as she is easily distracted with decreased attention to task and decreased/delayed initiation. Patient required tactile cues for tasks. Once standing, patient used RW to ambulated 5ft with CG to locked recliner.  Patient educated not to get up without assist and oriented to call bell; she acknowledge understanding. Chair alarm activated. Would recommend SNF with concerns for weakness, decreased endurance, and safety concerns. If patient/family declined SNF would recommend 24/7 supervision. Patient will benefit from skilled intervention to address the above impairments. Patient's rehabilitation potential is considered to be Good  Factors which may influence rehabilitation potential include:   ? None noted  ? Mental ability/status  ? Medical condition  ? Home/family situation and support systems  ? Safety awareness  ? Pain tolerance/management  ? Other:      PLAN :  Recommendations and Planned Interventions:   ?           Bed Mobility Training             ? Neuromuscular Re-Education  ? Transfer Training                   ? Orthotic/Prosthetic Training  ? Gait Training                          ? Modalities  ? Therapeutic Exercises           ? Edema Management/Control  ? Therapeutic Activities            ? Family Training/Education  ? Patient Education  ? Other (comment):    Frequency/Duration: Patient will be followed by physical therapy 1-2 times per day/4-7 days per week to address goals. Discharge Recommendations: Skilled Nursing Facility  Further Equipment Recommendations for Discharge: N/A     SUBJECTIVE:   Patient stated I'm seeing what he's looking at.  during objective MMT; patient distractible, though easily re-directed    OBJECTIVE DATA SUMMARY:     Past Medical History:   Diagnosis Date    Anemia     Arthritis     Cardiomyopathy due to hypertension, with heart failure ([facility] [address] 5/19/2017    Cataract     Chronic combined systolic and diastolic congestive heart failure ([address] 5/3/2018    Chronic lung disease     Chronic obstructive pulmonary disease (HCC)     Chronic systolic congestive heart failure ([address] 2/3/2014    Diabetes mellitus ([facility]     Difficulty swallowing     Both food and liquid    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 Mild-mod conc LVH. Mod DDfx. LAE. Mild MR. Hypercholesterolemia     Hypertension     Hypertensive heart disease     Infarction of right thalamus ([facility] 9/17/2017    Lacunar infarct 2017    Joint pain     Joint swelling     Normal nuclear stress test 09/08/2000    No ischemia or prior infarction. Neg EKG on submax EST. Ex time 15:02. Recurrent boils     Rheumatism     Shortness of breath     Sinusitis with nasal polyps 8/18/2019     Past Surgical History:   Procedure Laterality Date    CARDIAC CATHETERIZATION  5/10/2018         CORONARY ARTERY ANGIOGRAM  5/10/2018         HX CHOLECYSTECTOMY  2001    HX TUBAL LIGATION  1975    MODERATE SEDATION  5/10/2018          Barriers to Learning/Limitations: None  Compensate with: N/A  Home Situation:  Home Situation  Home Environment: Private residence  # Steps to Enter: 3  Rails to Enter: Yes  Hand Rails : Bilateral  One/Two Story Residence: One story  Living Alone: No  Support Systems: Child(masha), Family member(s)  Patient Expects to be Discharged to[de-identified] Private residence  Current DME Used/Available at Home: Walker  Tub or Shower Type: Tub/Shower combination  Critical Behavior:  Neurologic State: Alert;Confused  Orientation Level: Oriented to person;Disoriented to time;Disoriented to situation;Disoriented to place  Cognition: Decreased attention/concentration; Follows commands  Safety/Judgement: Fall prevention  Strength:    Strength: Generally decreased, functional   Tone & Sensation:   Tone: Normal   Sensation: Impaired   Range Of Motion:  AROM: Within functional limits  Functional Mobility:  Bed Mobility:   Supine to Sit: Contact guard assistance;Minimum assistance   Scooting: Contact guard assistance  Transfers:  Sit to Stand: Contact guard assistance; Additional time  Stand to Sit: Contact guard assistance    Balance:   Sitting: Impaired; With support  Sitting - Static: Good (unsupported)  Sitting - Dynamic: Fair (occasional)  Standing: Impaired; With support  Standing - Static: Fair  Standing - Dynamic : Fair  Ambulation/Gait Training:  Distance (ft): 5 Feet (ft)  Assistive Device: Walker, rolling  Ambulation - Level of Assistance: Contact guard assistance;Minimal assistance   Gait Abnormalities: Decreased step clearance   Base of Support: Narrowed   Speed/Laila: Slow  Step Length: Left shortened;Right shortened   Interventions: Verbal cues; Visual/Demos  Pain:  Pain level pre-treatment: 0/10   Pain level post-treatment: 0/10     Activity Tolerance:   Patient tolerated activity well; required re-direction at times. Please refer to the flowsheet for vital signs taken during this treatment. After treatment:   ?         Patient left in no apparent distress sitting up in chair  ? Patient left in no apparent distress in bed  ? Call bell left within reach  ? Nursing notified  ? Caregiver present  ? Chair alarm activated  ? SCDs applied    COMMUNICATION/EDUCATION:   ?         Role of Physical Therapy in the acute care setting. ?         Fall prevention education was provided and the patient/caregiver indicated understanding. ? Patient/family have participated as able in goal setting and plan of care. ?         Patient/family agree to work toward stated goals and plan of care. ?         Patient understands intent and goals of therapy, but is neutral about his/her participation. ? Patient is unable to participate in goal setting/plan of care: ongoing with therapy staff. ?         Other:     Thank you for this referral.  Lane Chandra, PT   Time Calculation: 25 mins      Eval Complexity: History: MEDIUM  Complexity : 1-2 comorbidities / personal factors will impact the outcome/ POC Exam:LOW Complexity : 1-2 Standardized tests and measures addressing body structure, function, activity limitation and / or participation in recreation  Presentation: LOW Complexity : Stable, uncomplicated  Clinical Decision Making:Low Complexity    Overall Complexity:LOW

## 2019-08-19 NOTE — PROGRESS NOTES
Problem: Falls - Risk of  Goal: *Absence of Falls  Description  Document Pamella Laci Fall Risk and appropriate interventions in the flowsheet. Outcome: Progressing Towards Goal  Note:   Fall Risk Interventions:  Mobility Interventions: Bed/chair exit alarm, OT consult for ADLs, Patient to call before getting OOB, PT Consult for mobility concerns    Mentation Interventions: Adequate sleep, hydration, pain control, Door open when patient unattended, Update white board    Medication Interventions: Bed/chair exit alarm, Patient to call before getting OOB    Elimination Interventions: Bed/chair exit alarm, Call light in reach              Problem: Patient Education: Go to Patient Education Activity  Goal: Patient/Family Education  Outcome: Progressing Towards Goal     Problem: Pressure Injury - Risk of  Goal: *Prevention of pressure injury  Description  Document Jhon Scale and appropriate interventions in the flowsheet. Outcome: Progressing Towards Goal  Note:   Pressure Injury Interventions:  Sensory Interventions: Assess changes in LOC    Moisture Interventions: Absorbent underpads, Assess need for specialty bed    Activity Interventions: Increase time out of bed, Pressure redistribution bed/mattress(bed type), PT/OT evaluation    Mobility Interventions: PT/OT evaluation    Nutrition Interventions: Document food/fluid/supplement intake    Friction and Shear Interventions: Foam dressings/transparent film/skin sealants, Lift team/patient mobility team                Problem: Patient Education: Go to Patient Education Activity  Goal: Patient/Family Education  Outcome: Progressing Towards Goal     Problem: Falls - Risk of  Goal: *Absence of Falls  Description  Document Pamella Hernandezon Fall Risk and appropriate interventions in the flowsheet.   Outcome: Progressing Towards Goal  Note:   Fall Risk Interventions:  Mobility Interventions: Bed/chair exit alarm, OT consult for ADLs, Patient to call before getting OOB, PT Consult for mobility concerns    Mentation Interventions: Adequate sleep, hydration, pain control, Door open when patient unattended, Update white board    Medication Interventions: Bed/chair exit alarm, Patient to call before getting OOB    Elimination Interventions: Bed/chair exit alarm, Call light in reach              Problem: Patient Education: Go to Patient Education Activity  Goal: Patient/Family Education  Outcome: Progressing Towards Goal     Problem: Pain  Goal: *Control of Pain  Outcome: Progressing Towards Goal  Goal: *PALLIATIVE CARE:  Alleviation of Pain  Outcome: Progressing Towards Goal     Problem: Patient Education: Go to Patient Education Activity  Goal: Patient/Family Education  Outcome: Progressing Towards Goal     Problem: Patient Education: Go to Patient Education Activity  Goal: Patient/Family Education  Outcome: Progressing Towards Goal     Problem: TIA/CVA Stroke: 0-24 hours  Goal: Off Pathway (Use only if patient is Off Pathway)  Outcome: Progressing Towards Goal  Goal: Activity/Safety  Outcome: Progressing Towards Goal  Goal: Consults, if ordered  Outcome: Progressing Towards Goal  Goal: Diagnostic Test/Procedures  Outcome: Progressing Towards Goal  Goal: Nutrition/Diet  Outcome: Progressing Towards Goal  Goal: Discharge Planning  Outcome: Progressing Towards Goal  Goal: Medications  Outcome: Progressing Towards Goal  Goal: Respiratory  Outcome: Progressing Towards Goal  Goal: Treatments/Interventions/Procedures  Outcome: Progressing Towards Goal  Goal: Minimize risk of bleeding post-thrombolytic infusion  Outcome: Progressing Towards Goal  Goal: Monitor for complications post-thrombolytic infusion  Outcome: Progressing Towards Goal  Goal: Psychosocial  Outcome: Progressing Towards Goal  Goal: *Hemodynamically stable  Outcome: Progressing Towards Goal  Goal: *Neurologically stable  Description  Absence of additional neurological deficits    Outcome: Progressing Towards Goal  Goal: *Verbalizes anxiety and depression are reduced or absent  Outcome: Progressing Towards Goal  Goal: *Absence of Signs of Aspiration on Current Diet  Outcome: Progressing Towards Goal  Goal: *Absence of deep venous thrombosis signs and symptoms(Stroke Metric)  Outcome: Progressing Towards Goal  Goal: *Ability to perform ADLs and demonstrates progressive mobility and function  Outcome: Progressing Towards Goal  Goal: *Stroke education started(Stroke Metric)  Outcome: Progressing Towards Goal  Goal: *Dysphagia screen performed(Stroke Metric)  Outcome: Progressing Towards Goal  Goal: *Rehab consulted(Stroke Metric)  Outcome: Progressing Towards Goal     Problem: TIA/CVA Stroke: Day 2 Until Discharge  Goal: Off Pathway (Use only if patient is Off Pathway)  Outcome: Progressing Towards Goal  Goal: Activity/Safety  Outcome: Progressing Towards Goal  Goal: Diagnostic Test/Procedures  Outcome: Progressing Towards Goal  Goal: Nutrition/Diet  Outcome: Progressing Towards Goal  Goal: Discharge Planning  Outcome: Progressing Towards Goal  Goal: Medications  Outcome: Progressing Towards Goal  Goal: Respiratory  Outcome: Progressing Towards Goal  Goal: Treatments/Interventions/Procedures  Outcome: Progressing Towards Goal  Goal: Psychosocial  Outcome: Progressing Towards Goal  Goal: *Verbalizes anxiety and depression are reduced or absent  Outcome: Progressing Towards Goal  Goal: *Absence of aspiration  Outcome: Progressing Towards Goal  Goal: *Absence of deep venous thrombosis signs and symptoms(Stroke Metric)  Outcome: Progressing Towards Goal  Goal: *Optimal pain control at patient's stated goal  Outcome: Progressing Towards Goal  Goal: *Tolerating diet  Outcome: Progressing Towards Goal  Goal: *Ability to perform ADLs and demonstrates progressive mobility and function  Outcome: Progressing Towards Goal  Goal: *Stroke education continued(Stroke Metric)  Outcome: Progressing Towards Goal     Problem: Ischemic Stroke: Discharge Outcomes  Goal: *Verbalizes anxiety and depression are reduced or absent  Outcome: Progressing Towards Goal  Goal: *Verbalize understanding of risk factor modification(Stroke Metric)  Outcome: Progressing Towards Goal  Goal: *Hemodynamically stable  Outcome: Progressing Towards Goal  Goal: *Absence of aspiration pneumonia  Outcome: Progressing Towards Goal  Goal: *Aware of needed dietary changes  Outcome: Progressing Towards Goal  Goal: *Verbalize understanding of prescribed medications including anti-coagulants, anti-lipid, and/or anti-platelets(Stroke Metric)  Outcome: Progressing Towards Goal  Goal: *Tolerating diet  Outcome: Progressing Towards Goal  Goal: *Aware of follow-up diagnostics related to anticoagulants  Outcome: Progressing Towards Goal  Goal: *Ability to perform ADLs and demonstrates progressive mobility and function  Outcome: Progressing Towards Goal  Goal: *Absence of DVT(Stroke Metric)  Outcome: Progressing Towards Goal  Goal: *Absence of aspiration  Outcome: Progressing Towards Goal  Goal: *Optimal pain control at patient's stated goal  Outcome: Progressing Towards Goal  Goal: *Home safety concerns addressed  Outcome: Progressing Towards Goal  Goal: *Describes available resources and support systems  Outcome: Progressing Towards Goal  Goal: *Verbalizes understanding of activation of EMS(911) for stroke symptoms(Stroke Metric)  Outcome: Progressing Towards Goal  Goal: *Understands and describes signs and symptoms to report to providers(Stroke Metric)  Outcome: Progressing Towards Goal  Goal: *Neurolgocially stable (absence of additional neurological deficits)  Outcome: Progressing Towards Goal  Goal: *Verbalizes importance of follow-up with primary care physician(Stroke Metric)  Outcome: Progressing Towards Goal  Goal: *Smoking cessation discussed,if applicable(Stroke Metric)  Outcome: Progressing Towards Goal  Goal: *Depression screening completed(Stroke Metric)  Outcome: Progressing Towards Goal     Problem: Injury - Risk of, Adverse Drug Event  Goal: *Absence of adverse drug events  Outcome: Progressing Towards Goal  Goal: *Absence of medication errors  Outcome: Progressing Towards Goal  Goal: *Knowledge of prescribed medications  Outcome: Progressing Towards Goal     Problem: Patient Education: Go to Patient Education Activity  Goal: Patient/Family Education  Outcome: Progressing Towards Goal

## 2019-08-19 NOTE — H&P
History & Physical    Patient: Tiffany White MRN: [id number]  CSN: [id number]    YOB: 1955  Age: 75 y.o. Sex: female      DOA: 8/18/2019       HPI:     Tiffany White is a 75 y.o. female with a several year history of uncontrolled hypertension and non-ischemic cardiomyopathy, chronic systolic and diastolic CHF ( EF C Cath 9/4560 was 30% improved to 50%   July 2018, felt to be hypertensive induced), mild persistent asthma vs restrictive lung disease started on  Breo 4/2019, chronic bilateral pleural effusions, O 2 at home 3 l/min, prn, right thalamic lacunar infarct 2017 with dysphasia that gradually improved. Earlier this year she saw Dr Carlos Buenrostro and Dr Freddie Saba in the office. On 8/18/2019 Ms. Tristan London came to [facility] ER with her family reporting several days of generalized weakness and confusion. Triage reported they gave a history of MS, that Could not be collaborated by this writer in the records. She presented able to state her name and birthday, but unable to name her family members. Report to MD was substernal chest pain/pressure that morning lasting about 20 minutes with SOB and diaphoresis, that resolved and patient was pain free in the ER. BP was elevated 207/125; 211/153. She received asa 324 mg and her morning dose of Hydralazine 100 mg PO at 3 PM. /78, HR 91 at 4 PM.    At 4:37 PM Ms. Ashleigh Mckeon had code S with mental status change, aphasia and left facial droop and mental status change. On arrival at CT scan she began speaking and improved. Tele Neuro was consulted with plan to admit for TIA, consult to inpatient Neurology and MRI. On return from MRI patient was somnolent following 1 mg IV Ativan for study. Patient would wake up with loud call of name and follow one step commands, stick out tongue (midline) and open mouth. No drooling or cough. Patient controlling airway adequately at present.    MRI with chronic left frontoparietal changes with differential noted of chronic infarct vs demyelination; no acute hemorrhage or acute territorial infarct. Discussed patient's complicated status with neurology for consult in am.  Patient admitted to Neuroscience Unit       Past Medical History:   Diagnosis Date    Anemia     Arthritis     Cataract     Chronic lung disease     Chronic obstructive pulmonary disease ([address]     Diabetes mellitus ([facility] [address]     Difficulty swallowing     Both food and liquid    History of echocardiogram 12/29/2009    EF 50%. Mild-mod conc LVH. Mod DDfx. LAE. Mild MR.      Hypercholesterolemia     Hypertension     Hypertensive heart disease     Joint pain     Joint swelling     Normal nuclear stress test 09/08/2000    No ischemia or prior infarction. Neg EKG on submax EST. Ex time 15:02.  Recurrent boils     Rheumatism     Shortness of breath        Past Surgical History:   Procedure Laterality Date    CARDIAC CATHETERIZATION  5/10/2018         CORONARY ARTERY ANGIOGRAM  5/10/2018         HX CHOLECYSTECTOMY  2001    HX TUBAL LIGATION  1975    MODERATE SEDATION  5/10/2018            Family History   Problem Relation Age of Onset    Hypertension Mother     Ovarian Cancer Mother     Stroke Mother     Heart Attack Father     Breast Cancer Maternal Aunt        Social History     Socioeconomic History    Marital status:      Spouse name: Not on file    Number of children: Not on file    Years of education: Not on file    Highest education level: Not on file   Tobacco Use    Smoking status: Never Smoker    Smokeless tobacco: Never Used    Tobacco comment: second hand smoke exposure as a child   Substance and Sexual Activity    Alcohol use: No    Drug use: No    Sexual activity: Never   Social History Narrative    Pt used to be a  for the school system. Prior to Admission medications    Medication Sig Start Date End Date Taking?  Authorizing Provider   fluticasone furoate-vilanterol (BREO ELLIPTA) 100-25 mcg/dose inhaler Take 1 Puff by inhalation daily. 4/3/19   Patsy Flores MD   fluticasone furoate-vilanterol (BREO ELLIPTA) 100-25 mcg/dose inhaler Take 1 Puff by inhalation daily. 4/3/19   Patsy Flores MD   amLODIPine (NORVASC) 5 mg tablet Take 1 Tab by mouth two (2) times a day. 3/8/19   Winter Enciso NP   isosorbide dinitrate (ISORDIL) 10 mg tablet Take 2 Tabs by mouth three (3) times daily. 2/19/19   Winter Enciso NP   spironolactone (ALDACTONE) 25 mg tablet Take 1 Tab by mouth two (2) times a day. 2/19/19   Winter Enciso NP   Oxygen 3L per min as needed    Other, MD Santos   sodium chloride (OCEAN) 0.65 % nasal squeeze bottle 2 Sprays as needed for Congestion. Other, MD Santos   meloxicam (MOBIC) 7.5 mg tablet Take 7.5 mg by mouth two (2) times a day. Other, MD Santos   ondansetron hcl (ZOFRAN) 4 mg tablet Take 1 Tab by mouth every eight (8) hours as needed for Nausea. 2/1/19   Emeka Matos PA-C   albuterol (VENTOLIN HFA) 90 mcg/actuation inhaler Take 2 Puffs by inhalation every four (4) hours as needed for Wheezing or Shortness of Breath. 9/25/18   Reid Pavon MD   hydrALAZINE (APRESOLINE) 100 mg tablet Take 1 Tab by mouth three (3) times daily. 7/5/18   Armond Cabezas MD   Blood-Glucose Meter (TRUE METRIX GLUCOSE METER) misc Test glucose 3 times daily DX: E11.40 7/2/18   Lizzette Eaton, DO   Lancets misc Test glucose 3 times daily DX: E11.40 7/2/18   Lizzette Eaton, DO   glucose blood VI test strips (BLOOD GLUCOSE TEST) strip (True Metrix) Test glucose 3 times daily DX: E11.40 7/2/18   Uma, Ervin-Min B, DO   insulin glargine (LANTUS,BASAGLAR) 100 unit/mL (3 mL) inpn 7 Units by SubCUTAneous route in the morning. 15 units in the evening. Patient taking differently: 12 Units by SubCUTAneous route in the morning.  7 units in the evening. 6/21/18   Reid Pavon MD   furosemide (LASIX) 20 mg tablet Take 1 Tab by mouth two (2) times a day. 6/4/18   Brianna Enciso, NP   acetaminophen (TYLENOL EXTRA STRENGTH) 500 mg tablet Take 1,000 mg by mouth every six (6) hours as needed for Pain. Provider, Alexandrea   CARBOXYMETHYLCELLULOS/GLYCERIN (REFRESH OPTIVE OP) Administer 1 Drop to both eyes six (6) times daily. Christina Terrazas MD   aspirin delayed-release 81 mg tablet Take 81 mg by mouth daily. Santos Centeno MD   pravastatin (PRAVACHOL) 20 mg tablet Take 20 mg by mouth nightly. Other, MD Santos       Allergies   Allergen Reactions    Codeine Nausea Only    Sulfa (Sulfonamide Antibiotics) Rash     Review of Systems  12-point ROS obtained in detail. Pertinent positives as mentioned in the HPI,  otherwise negative         Physical Exam:      Visit Vitals  BP (!) 184/99   Pulse (!) 104   Temp 98.1 °F (36.7 °C)   Resp 18   Ht 5' 5\" (1.651 m)   Wt 74.8 kg (165 lb)   SpO2 95%   BMI 27.46 kg/m²       Physical Exam:  GENERAL: delirious, slowed mentation  HEENT head atraumatic, AMINATA 2 mm, conjunctiva clear, anicteric,   mucous membranes dry  Neck supple, - JVD, - bruit,   Chest lungs decreased bases, clear anteriorly, no wheezing,   HR tachy 104   Abdomen soft, BS+, no elicited tenderness  Extremities RLE 3/5, LLE 4/5, BUE weakness 3/5, + dp and pt pulses,   Poor turgor, no edema      Lab/Data Review:  Labs: Results:       Chemistry Recent Labs     08/18/19  1850 08/18/19  1430   GLU 93 87    145   K 3.1* 3.4*    109   CO2 29 28   BUN 23* 22*   CREA 1.45* 1.49*   CA 8.5 8.6   AGAP 7 8   BUCR 16 15   AP  --  101   TP  --  7.2   ALB  --  2.8*   GLOB  --  4.4*   AGRAT  --  0.6*      CBC w/Diff Recent Labs     08/18/19  1430   WBC 5.0   RBC 3.73*   HGB 10.5*   HCT 31.9*   *   GRANS 64   LYMPH 15*   EOS 15*      Coagulation No results for input(s): PTP, INR, APTT in the last 72 hours. No lab exists for component: INREXT    Iron/Ferritin No results for input(s): IRON in the last 72 hours.     No lab exists for component: TIBCCALC   BNP No results for input(s): BNPP in the last 72 hours. Cardiac Enzymes Recent Labs     08/18/19  1850   CPK 56   CKND1 2.0      Liver Enzymes Recent Labs     08/18/19  1430   TP 7.2   ALB 2.8*      SGOT 12      Thyroid Studies Lab Results   Component Value Date/Time    T4, Total 9.1 12/23/2009 11:33 AM    TSH 2.01 02/01/2019 05:41 PM          All Micro Results     None      Results for Rima Garza (MRN [id number])    Ref. Range 8/18/2019 14:30 8/18/2019 18:50   Troponin-I, Qt. Latest Ref Range: 0.0 - 0.045 NG/ML 0.03 0.03       Imaging Reviewed:  CT Results  (Last 48 hours)               08/18/19 1651  CT HEAD WO CONT Final result    Impression:  IMPRESSION[de-identified]   1.  No acute intracranial hemorrhage or territorial infarct. 2. Progression of white matter lucencies. Most often chronic small vessel   ischemic sequelae, though can be seen in the setting of reversible   encephalopathy from poorly controlled significant hypertension. 3. Sinusitis. 4. Stroke alert results discussed by me with Dr. Lokesh Salcedo 8/18/2019 at 1700 hours           Thank you for this referral.       Narrative:  CT HEAD WO CONT       History: cva ; stroke alert; patient suddenly became more confused with slurred   speech in the ED; generalized weakness for last 2 days, with history of multiple   sclerosis; confusion; severe hypertension       Technique: 5 mm axial images acquired through the brain. CT scans at this facility are performed using dose optimization technique as   appropriate with performed exam, to include automated exposure control,   adjustment of mA and/or kV according to patient's size (including appropriate   matching for site-specific examinations), or use of iterative reconstruction   technique. Comparison: May 2018       Findings:       Soft tissues: No significant findings. Skull base/calvarium: Unremarkable.        Brain: There is no acute intracranial hemorrhage or territorial infarction. There is a mild to moderate amount of periventricular white matter lucencies,   that does look progressed from May 2018. Small focus of hypodensity within the   right basal ganglia, is subtly evident on prior examination. Ventricles and cisterns: Unremarkable for age. Orbits: Unremarkable. Paranasal Sinuses: Extensive sinus opacification without expansile or   destructive change. Other findings: None               MRI Results (most recent):  Results from [place] encounter on 08/18/19   MRI BRAIN WO CONT    Narrative Brain MR without contrast    History: Progressive generalized weakness over the past 2 days with history of  multiple sclerosis. Hypertension. Comparison: Current CT; prior CT and MRI 2018    Technique: Multisequence multiplanar MR imaging acquired through the brain. Includes diffusion imaging and heme sensitive imaging. Contrast used: None    Findings:     Parenchyma: There is a subcentimeter rounded focus of ring shaped diffusion  restriction at the left frontoparietal junction, area of premotor cortex. Axial  diffusion image 21. Has some associated T2 shine through. This focus has reduced  T1 and bright T2 signal. No territorial restriction. Background of mild to  moderate chronic T2 and FLAIR signal increase in predominantly periventricular  distribution. Also with some signal increase within the jose as before. No acute  hemorrhage. There is however again demonstrated a small focus of susceptibility  artifact at the right temporal occipital junction which could be from an occult  cavernoma. CSF spaces: Ventricles and cisterns remain midline in position    IAC regions: Unremarkable    Parasellar region: Unremarkable    Vasculature: Appropriate flow voids within the major skull base vasculature.     Cervicomedullary junction: Patent    Orbits: Unremarkable    Paranasal sinuses: Extensive sinus disease again demonstrated. Most pronounced  within the frontal ethmoid and sphenoid sinus segments. Mild mucosal thickening  of the maxillary sinuses. Pattern as before. Impression IMPRESSION:    1. No acute hemorrhage or acute territorial infarct. 2. Small ring shaped focus of diffusion hyperintensity at left frontoparietal  junction is more characteristic of T2 shine through from a chronic lesion. It is  conceivable that it is a later subacute to chronic focus of infarct. Could  conceivably relate to chronic signal changes of the demyelination plaques, but  was not evident July 2018. 3. Background of mild to moderate amount of chronic white matter disease. Pattern is not specific for multiple sclerosis. Thank you for enabling us to participate in the care of this patient. XR Results (most recent):  Results from Hospital Encounter encounter on 08/18/19   XR CHEST PORT    Narrative EXAM: Chest Radiograph    INDICATION:  cp    TECHNIQUE: AP view of the chest    COMPARISON: 4/3/2019, 2/1/2019, 1/31/2019 and 12/19/2018    FINDINGS: No pneumothorax identified. Moderate left pleural effusion is  present. This is possibly increased in size. Adjacent atelectasis or infiltrate  in the left lower lung field is noted. A cardiac silhouette is enlarged. This  is unchanged. The pulmonary vasculature is unremarkable. The osseous  structures are unremarkable. Impression Impression:  1. Moderate left pleural effusion. This has been present previously. It is  possibly mildly increased in size. 2.  Adjacent left lower lobe atelectasis or infiltrate.      Assessment:   Principal Problem:    TIA (transient ischemic attack) (8/18/2019)    Active Problems:    Essential hypertension (1/27/2012)      Asthma (2/3/2014)      Chronic combined systolic and diastolic congestive heart failure (HCC) (5/3/2018)      Pleural effusion (4/30/2014)      Type 2 diabetes mellitus with diabetic neuropathy ([facility] [address] (6/21/2018)      CVA (cerebral vascular accident) ([address] (8/18/2019)      Encephalopathy (8/18/2019)      Infarction of right thalamus ([address] (9/17/2017)      Overview: Lacunar infarct 2017      Dysphagia following cerebral infarction (9/17/2017)      Sinusitis with nasal polyps (8/18/2019)      Overview: MRI 8/18/2019      Cardiomyopathy due to hypertension, with heart failure ([address] (5/19/2017)        Plan:   Admit to Neuroscience Unit on telemetry  [place] BP  Cardiology consulted from ER  Discussed with Neurology, will consult in am-call back if any acute   emergent changes  Stroke protocol  NPO for now    Discussed code status with daughter at bedside   Patient is a [address], DO  8/18/2019, 10:08 PM

## 2019-08-19 NOTE — PROGRESS NOTES
NUTRITION    Nursing Referral: [facility]     RECOMMENDATIONS / PLAN:     - Add Glucerna Shakes TID to optimize nutrition intake. - Continue RD inpatient monitoring and evaluation. NUTRITION INTERVENTIONS & DIAGNOSIS:     - Meals/Snacks: modified composition  - Medical Food Supplement Therapy: initiate    Nutrition Diagnosis: Predicted inadequate energy intake related to appetite as evidenced by family report of meal intake PTA and pt consuming <50% of lunch. ASSESSMENT:     Admitted with TIA. Evaluated by SLP. Pt sleeping at time of visit, but family in room. Reports variable meal intake PTA. States she has had steady wt loss over the last 5-6 years. Lunch tray at bedside, pt consumed 25%. Discussed nutritional supplements. Hypokalemic, replacement given. Average po intake adequate to meet patients estimated nutritional needs:   [] Yes     [x] No   [] Unable to determine at this time    Diet: DIET DIABETIC CONSISTENT CARB Mechanical Soft; AHA-LOW-CHOL FAT      Food Allergies: NKFA  Current Appetite: Unable to determine at this time   Appetite/meal intake prior to admission:   varies from fair to poor   Feeding Limitations:  [x] Swallowing difficulty: SLP eval    [] Chewing difficulty    [] Other:  Current Meal Intake: No data found.     BM: unknown  Skin Integrity: WDL  Edema:   [x] No     [] Yes   Pertinent Medications: Reviewed    Recent Labs     08/19/19  0110 08/18/19  1850 08/18/19  1430    143 145   K 3.4* 3.1* 3.4*    107 109   CO2 31 29 28   GLU 87 93 87   BUN 25* 23* 22*   CREA 1.58* 1.45* 1.49*   CA 8.4* 8.5 8.6   MG 2.0  --  2.0   PHOS 4.0  --   --    ALB 2.8*  --  2.8*   SGOT 11  --  12   ALT 10*  --  11*     No intake or output data in the 24 hours ending 08/19/19 1317    Anthropometrics:  Ht Readings from Last 1 Encounters:   08/18/19 5' 5\" (1.651 m)     Last 3 Recorded Weights in this Encounter    08/18/19 1436   Weight: 74.8 kg (165 lb)     Body mass index is 27.46 kg/m². Weight History:   Weight Metrics 8/18/2019 4/3/2019 3/8/2019 2/19/2019 2/1/2019 1/23/2019 12/19/2018   Weight 165 lb 168 lb 165 lb 164 lb 166 lb 170 lb 171 lb   BMI 27.46 kg/m2 24.81 kg/m2 24.37 kg/m2 24.22 kg/m2 24.51 kg/m2 25.1 kg/m2 25.25 kg/m2        Admitting Diagnosis: TIA (transient ischemic attack) [G45.9]  Pertinent PMHx: COPD, DM, hypercholesterolemia, HTN, difficulty swallowing    Education Needs:        [x] None identified  [] Identified - Not appropriate at this time  []  Identified and addressed - refer to education log  Learning Limitations:   [x] None identified  [] Identified    Cultural, Orthodox & ethnic food preferences:  [x] None identified    [] Identified and addressed     ESTIMATED NUTRITION NEEDS:     Calories: 3413-5529 kcal (MSJx1.2-1.3) based on  [x] Actual BW      [] IBW   Protein: 60-75 gm (0.8-1 gm/kg) based on  [x] Actual BW      [] IBW   Fluid: 1 mL/kcal     MONITORING & EVALUATION:     Nutrition Goal(s):   - PO nutrition intake will meet >75% of patient estimated nutritional needs within the next 7 days. Outcome: New/Initial goal       Monitoring:   [x] Food and beverage intake   [x] Diet order   [x] Nutrition-focused physical findings   [x] Treatment/therapy   [] Weight   [] Enteral nutrition intake        Previous Recommendations (for follow-up assessments only):  Not Applicable      Discharge Planning: No nutritional discharge needs at this time.      [x] Participated in care planning, discharge planning, & interdisciplinary rounds as appropriate      Shannan Magallanes RD, [address]   Pager: [phone]

## 2019-08-19 NOTE — INTERDISCIPLINARY ROUNDS
Per Dr. Ben Lemos, the NIHSS q4 hours is discontinued. However, with any acute neurological changes, the nurse should do an NIHSS, contact the doctor, and resume floor protocol for CVA/TIA patients. Spoke with Anitha Glass who feels it is HTN urgency. Also spoke with Dr. Ben Lemos who expressed the same. Discussed started BP meds and cleared it with both Hazel and Dr. Ben Lemos. Hazel states she will write some orders. Informed RYLEY Garcia, the patient's nurse.

## 2019-08-19 NOTE — PROGRESS NOTES
PT eval order received and chart reviewed. Attempted to see patient, however BP elevated >200/100. Will follow up as patient schedule allows. Thank you for this referral. Porfirio Brown, PT, DPT.

## 2019-08-19 NOTE — PROGRESS NOTES
OT order received and chart reviewed. Pt not seen for skilled OT as pt with BP of 199/118. Will f/u when pt is medically appropriate to be seen and as patient schedule allows.       Thank you for this referral.  Marry Pope OTR/VIDYA

## 2019-08-19 NOTE — PROGRESS NOTES
[facility] - Consult Note    Consultation request by Sahnnon Collins DO for advice/opinion related to evaluating TIA (transient ischemic attack) [G45.9]     Elevated troponin    Date of  Admission: 8/18/2019  2:16 PM   Primary Care Physician:  Jaren Garzon MD     Assessment:     Patient Active Problem List   Diagnosis Code    Essential hypertension I10    Diabetes mellitus ([address] E11.9    Hyperlipidemia E78.5    Cervical myelopathy ([address] G95.9    Eczema L30.9    Asthma J45.909    Chronic combined systolic and diastolic congestive heart failure ([address] I50.42    Bladder prolapse, female, acquired N81.10    IBS (irritable bowel syndrome) K58.9    Osteoporosis M81.0    Migraine G43.909    Anxiety and depression F41.9, F32.9    Pleural effusion J90    Type 2 diabetes mellitus with nephropathy ([address] E11.21    Hypokalemia E87.6    Acute pulmonary edema ([address] J81.0    Hypertensive emergency I16.1    Headache R51    Acute on chronic systolic (congestive) heart failure (HCC) I50.23    Type 2 diabetes mellitus with diabetic neuropathy ([facility]) E11.40    Acute non-recurrent maxillary sinusitis J01.00    Dizziness R42    Chronic obstructive pulmonary disease (HCC) J44.9    CKD (chronic kidney disease) stage 3, GFR 30-59 ml/min ([facility]) N18.3    Fall W19. 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 Presented with confusion. Head CT without acute infarct. Head MRI with possible subacute infarct. Similar presentations on past in setting of uncontrolled HTN per family. -Hypertensive heart disease.  Elevated BP this admission. Historically difficult to control in setting of difficult compliance. No MARITA on doppler 5/2018. Severe concentric LVH on echo 7/2018.  -Troponin indeterminate suspect secondary demand ischemia in setting of hypertensive heart disease. ECG with chronic LVH and strain pattern. No CAD on cath 5/2018.  -Diabetes mellitus. HgbA1c 5.0.  -Dyslipidemia. On statin.  -Chronic diastolic heart failure. Does not appear significantly volume overloaded at present.  -Asthma/COPD on home oxygen.  -Chronic moderate left pleural effusion.  -Chronic kidney disease stage 3 stable. -H/o transient nonischemic cardiomyopathy with EF 30% 5/2018 improving to 51-55% 7/2018, no coronary artery disease on cardiac catheterization 5/2018.  -Hypokalemia replace as needed.  -Chronic anemia.  -Thrombocytopenia continue to trend. Primary cardiologist Dr. Mayer Needs. Plan: Will review echocardiogram once complete. Will check follow up troponin and ECG. Allowing for permissive HTN in setting of possible neurologic event. Would resume antihypertensives when ok from neurologic standpoint. Will await further neurology evaluation. Continue to express the importance of compliance with medical regimen with patient and family. History of Present Illness: This is a 75 y.o. female admitted for TIA (transient ischemic attack) [G45.9]. Patient complains of:  AMS. Patient is a 75year old female who presented with confusion. She is admitted with possible CVA/TIA. Cardiology is asked to comment on elevated troponin. Patient reports she feels her heart racing but denies CP or SOB. Family at bedside reports main complaint was AMS. Cardiac risk factors: dyslipidemia, diabetes mellitus, hypertension      Review of Symptoms:  Unable to provide reliable 10 point ros at this time.      Past Medical History:     Past Medical History:   Diagnosis Date    Anemia     Arthritis     Cardiomyopathy due to hypertension, with heart failure ([facility] 5/19/2017    Cataract     Chronic combined systolic and diastolic congestive heart failure ([facility] 5/3/2018    Chronic lung disease     Chronic obstructive pulmonary disease (HCC)     Chronic systolic congestive heart failure ([facility] 2/3/2014    Diabetes mellitus ([facility]     Difficulty swallowing     Both food and liquid    Dysphagia following cerebral infarction 8/17/2017    History of echocardiogram 12/29/2009    EF 50%. Mild-mod conc LVH. Mod DDfx. LAE. Mild MR.      Hypercholesterolemia     Hypertension     Hypertensive heart disease     Infarction of right thalamus ([facility] 9/17/2017    Lacunar infarct 2017    Joint pain     Joint swelling     Normal nuclear stress test 09/08/2000    No ischemia or prior infarction. Neg EKG on submax EST. Ex time 15:02.  Recurrent boils     Rheumatism     Shortness of breath     Sinusitis with nasal polyps 8/18/2019         Social History:     Social History     Socioeconomic History    Marital status:      Spouse name: Not on file    Number of children: Not on file    Years of education: Not on file    Highest education level: Not on file   Tobacco Use    Smoking status: Never Smoker    Smokeless tobacco: Never Used    Tobacco comment: second hand smoke exposure as a child   Substance and Sexual Activity    Alcohol use: No    Drug use: No    Sexual activity: Never   Social History Narrative    Pt used to be a  for the school system. Family History:     Family History   Problem Relation Age of Onset    Hypertension Mother     Ovarian Cancer Mother     Stroke Mother     Heart Attack Father     Breast Cancer Maternal Aunt         Medications:      Allergies   Allergen Reactions    Codeine Nausea Only    Sulfa (Sulfonamide Antibiotics) Rash        Current Facility-Administered Medications   Medication Dose Route Frequency    sodium chloride (NS) flush 5-40 mL  5-40 mL IntraVENous Q8H    sodium chloride (NS) flush 5-40 mL  5-40 mL IntraVENous PRN    atorvastatin (LIPITOR) tablet 80 mg  80 mg Oral QHS    labetalol (NORMODYNE;TRANDATE) 20 mg/4 mL (5 mg/mL) injection 5 mg  5 mg IntraVENous Q10MIN PRN    bisacodyl (DULCOLAX) suppository 10 mg  10 mg Rectal DAILY PRN    famotidine (PF) (PEPCID) 20 mg in sodium chloride 0.9% 10 mL injection  20 mg IntraVENous Q24H    heparin (porcine) injection 5,000 Units  5,000 Units SubCUTAneous Q8H    dextrose 10% infusion 125-250 mL  125-250 mL IntraVENous PRN    sodium chloride (NS) flush 5-40 mL  5-40 mL IntraVENous Q8H    sodium chloride (NS) flush 5-40 mL  5-40 mL IntraVENous PRN    acetaminophen (TYLENOL) tablet 650 mg  650 mg Oral Q4H PRN    diphenhydrAMINE (BENADRYL) injection 25 mg  25 mg IntraVENous Q4H PRN    ondansetron (ZOFRAN) injection 4 mg  4 mg IntraVENous Q4H PRN    albuterol (PROVENTIL VENTOLIN) nebulizer solution 2.5 mg  2.5 mg Nebulization Q4H PRN    aspirin delayed-release tablet 81 mg  81 mg Oral DAILY    fluticasone-vilanterol (BREO ELLIPTA) 100mcg-25mcg/puff  1 Puff Inhalation DAILY    furosemide (LASIX) tablet 20 mg  20 mg Oral BID    isosorbide dinitrate (ISORDIL) tablet 20 mg  20 mg Oral TID    traMADol (ULTRAM) tablet 50 mg  50 mg Oral Q6H PRN    insulin lispro (HUMALOG) injection   SubCUTAneous AC&HS    glucose chewable tablet 16 g  4 Tab Oral PRN    glucagon (GLUCAGEN) injection 1 mg  1 mg IntraMUSCular PRN         Physical Exam:     Visit Vitals  /70 (BP 1 Location: Left arm, BP Patient Position: Supine)   Pulse 80   Temp 97 °F (36.1 °C)   Resp 16   Ht 5' 5\" (1.651 m)   Wt 74.8 kg (165 lb)   SpO2 (!) 88%   Breastfeeding?  No   BMI 27.46 kg/m²     BP Readings from Last 3 Encounters:   08/19/19 142/70   04/03/19 140/70   03/08/19 (!) 202/110     Pulse Readings from Last 3 Encounters:   08/19/19 80   04/03/19 85   03/08/19 84     Wt Readings from Last 3 Encounters:   08/18/19 74.8 kg (165 lb)   04/03/19 76.2 kg (168 lb)   03/08/19 74.8 kg (165 lb)       General:  fatigued, cooperative, no distress, appears stated age  Neck:  JVD  Lungs:  Poor effort but clear to auscultation bilaterally  Heart:  regular rate and rhythm  Abdomen:  abdomen is soft without significant tenderness, masses, organomegaly or guarding  Extremities:  extremities normal, atraumatic, no cyanosis or edema  Skin: Warm and dry. no hyperpigmentation, vitiligo, or suspicious lesions  Neuro: fatigued, oriented to person place   Psych: non focal     Data Review:     Recent Labs     08/19/19  0110 08/18/19  1430   WBC 4.6 5.0   HGB 10.9* 10.5*   HCT 31.3* 31.9*   * 134*     Recent Labs     08/19/19  0110 08/18/19  1850 08/18/19  1430    143 145   K 3.4* 3.1* 3.4*    107 109   CO2 31 29 28   GLU 87 93 87   BUN 25* 23* 22*   CREA 1.58* 1.45* 1.49*   CA 8.4* 8.5 8.6   MG 2.0  --  2.0   PHOS 4.0  --   --    ALB 2.8*  --  2.8*   SGOT 11  --  12   ALT 10*  --  11*   INR 1.1  --   --        Results for orders placed or performed during the hospital encounter of 08/18/19   EKG, 12 LEAD, INITIAL   Result Value Ref Range    Ventricular Rate 92 BPM    Atrial Rate 92 BPM    P-R Interval 172 ms    QRS Duration 106 ms    Q-T Interval 372 ms    QTC Calculation (Bezet) 460 ms    Calculated P Axis 69 degrees    Calculated R Axis -31 degrees    Calculated T Axis 137 degrees    Diagnosis       Normal sinus rhythm  Left axis deviation  Possible Anterior infarct (cited on or before 01-FEB-2014)  ST & T wave abnormality, consider lateral ischemia  Abnormal ECG  When compared with ECG of 18-AUG-2019 14:30,  No significant change was found     Results for orders placed or performed in visit on 02/01/19   AMB POC EKG ROUTINE W/ 12 LEADS, INTER & REP    Impression    Sinus rhythm. Biatrial enlargement. IVCD.  LVH. Nonspecific lateral T-wave abnormality.        All Cardiac Markers in the last 24 hours:    Lab Results   Component Value Date/Time    CPK 60 08/19/2019 01:10 AM    CPK 56 08/18/2019 06:50 PM    CKMB 1.8 08/19/2019 01:10 AM    CKMB 1.1 08/18/2019 06:50 PM    CKND1 3.0 08/19/2019 01:10 AM    CKND1 2.0 08/18/2019 06:50 PM    TROIQ 0.39 (H) 08/19/2019 01:10 AM    TROIQ 0.03 08/18/2019 06:50 PM    TROIQ 0.03 08/18/2019 02:30 PM       Last Lipid:    Lab Results   Component Value Date/Time    Cholesterol, total 104 09/27/2017 04:15 AM    HDL Cholesterol 35 (L) 09/27/2017 04:15 AM    LDL, calculated 44.6 09/27/2017 04:15 AM    Triglyceride 122 09/27/2017 04:15 AM    CHOL/HDL Ratio 3.0 09/27/2017 04:15 AM       Signed By: ROSEANNE Fajardo     August 19, 2019

## 2019-08-19 NOTE — PROGRESS NOTES
ARU/IPR REFERRAL CONTACT NOTE  [id number] [place] [address] for Physical Rehabilitation    RE:  Liu Shore Thank you for the opportunity to review this patient's case for admission to [id number] [place] [address] for Physical Rehabilitation. Based on our pre-admission screening:     Erika ] Our Team/Medical Director is following this case. Comments:  Pending PT/OT consults and recommendations. Should patient meet pre-admission criteria please be advised that admission to ARU will be contingent on authorization from  [facility]    Again, Thank you for this referral. Should you have any questions please do not hesitate to call. Sincerely,  AGUEDA Graf PTA for Tami Lopez RN  Admissions [facility] for [facility]  [phone]

## 2019-08-19 NOTE — INTERDISCIPLINARY ROUNDS
Interdisciplinary STROKE Rounds       Recommendations:     Recommendations are as follows:   1. Neurology will see pt today. Dr. Ailyn Rader is recommending CTA or MRA of pt. Stated HX of MS but  Is not on any MS drugs in prior to admission medication. 2. Continue education about stroke risk factors, stroke recognition and medication compliance. Stroke book has been given. 3. SLP saw pt and is recommending mech soft diet. Stroke Meds currently prescribed: ASA 81, Lipitor 80 mg  DVT prophylaxis: heparin    Tests ordered:none at this time, Cardiology consulted for rising troponins. Discharge Plan: TBD    Patient admitted for: TIA/Stroke like S/S    Advanced Imaging of MRI done  8/18 shows 1. No acute hemorrhage or acute territorial infarct.    2. Small ring shaped focus of diffusion hyperintensity at left frontoparietal junction is more characteristic of T2 shine through from a chronic lesion. It is conceivable that it is a later subacute to chronic focus of infarct. Could  conceivably relate to chronic signal changes of the demyelination plaques, but was not evident July 2018.     3. Background of mild to moderate amount of chronic white matter disease. Pattern is not specific for multiple sclerosis. Discipline Participation:   Interdisciplinary rounds were conducted by:  Dr. Mu Crouch, Neuroscience Medical director,   Katie Bowman RN, stroke coordinator  Radha Cabrera, SLP,  Radha HEDRICK, the patient's nurse. Discussion included patient's current condition, any tests and patient's expected discharge outcome.

## 2019-08-19 NOTE — PROGRESS NOTES
Problem: Motor Speech Impaired (Adult)  Goal: *Acute Goals and Plan of Care (Insert Text)  Description  Goals:  1. Utilize compensatory strategies (decrease rate, overarticulate, increase intensity) to increase intelligibility to >90% at word level with mod A visual/verbal cues in 4/5 trials. 2. Perform oral motor exercises (with and without resistance) in therapy and at home to increase oral motor strength/range-of-motion for articulation tasks with mod A with visual/verbal cues in 4/5 trials. 3. Complete articulatory agility tasks (reading-conversation) with mod A with visual/verbal cues in 4/5 trials. 4. Demonstrate functional increase in articulation skills for effective participation in communication ADLs with mod A. Outcome: Progressing Towards Goal      SPEECH-LANGUAGE PATHOLOGY EVALUATION    Patient: Klarissa Geronimo (86 y.o. female)  Date: 8/19/2019  Primary Diagnosis: TIA (transient ischemic attack) [G45.9]        Precautions:   Fall  PLOF: As per H&P     ASSESSMENT :  Based on the objective data described below, the patient presents with mild-mod dysarthria c/b blended word boundaries, imprecise articulation, and slowed speech rate. Oral mech exam revealed decreased orolingual strength, ROM, and coordination as well as a very slight left facial droop. Speech intelligibility ~70% to unfamiliar listener at conversational level. Expressive/receptive speech production appeared to be functional throughout evaluation. Pt communicated in sentences of appropriate form, content, and use. Social convo was appropriate. ST will continue to follow to address above stated impairments. She may benefit from SLP services upon DC from this facility if slurred speech does not improve during hospital stay. Patient will benefit from skilled intervention to address the above impairments.   Patient's rehabilitation potential is considered to be Good  Factors which may influence rehabilitation potential include: ?              Medical condition     PLAN :  Recommendations and Planned Interventions: See above  Frequency/Duration: Patient will be followed by speech-language pathology 1-2 times per day/4-7 days per week to address goals. Discharge Recommendations: Home Health, Inpatient Rehab and To Be Determined     SUBJECTIVE:   Patient stated My tongue feels heavy when I talk. OBJECTIVE:     Past Medical History:   Diagnosis Date    Anemia     Arthritis     Cardiomyopathy due to hypertension, with heart failure ([facility] 5/19/2017    Cataract     Chronic combined systolic and diastolic congestive heart failure (HCC) 5/3/2018    Chronic lung disease     Chronic obstructive pulmonary disease (HCC)     Chronic systolic congestive heart failure ([facility] 2/3/2014    Diabetes mellitus ([facility]     Difficulty swallowing     Both food and liquid    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 Mild-mod conc LVH. Mod DDfx. LAE. Mild MR. Hypercholesterolemia     Hypertension     Hypertensive heart disease     Infarction of right thalamus ([facility] 9/17/2017    Lacunar infarct 2017    Joint pain     Joint swelling     Normal nuclear stress test 09/08/2000    No ischemia or prior infarction. Neg EKG on submax EST. Ex time 15:02.     Recurrent boils     Rheumatism     Shortness of breath     Sinusitis with nasal polyps 8/18/2019     Past Surgical History:   Procedure Laterality Date    CARDIAC CATHETERIZATION  5/10/2018         CORONARY ARTERY ANGIOGRAM  5/10/2018         HX CHOLECYSTECTOMY  2001    HX TUBAL LIGATION  1975    MODERATE SEDATION  5/10/2018          Prior Level of Function/Home Situation: see below  Home Situation  Home Environment: Private residence  # Steps to Enter: 3  Rails to Enter: Yes  Hand Rails : Bilateral  One/Two Story Residence: One story  Living Alone: No  Support Systems: Child(masha), Family member(s)  Patient Expects to be Discharged to[de-identified] Private residence  Current DME Used/Available at Home: [employer] or Shower Type: Tub/Shower combination  Mental Status:  Neurologic State: Alert, Confused  Orientation Level: Oriented to person, Disoriented to time, Disoriented to situation, Disoriented to place  Cognition: Decreased attention/concentration, Follows commands  Perception: Appears intact  Perseveration: Perseverates during ADLS, Perseverates during conversation  Safety/Judgement: Fall prevention  Motor Speech:  Oral-Motor Structure/Motor Speech  Labial: Left droop(very minimal)  Dentition: Natural  Oral Hygiene: adequate  Lingual: Decreased strength  Velum: No impairment  Mandible: No impairment  Apraxic Characteristics: None  Dysarthric Characteristics: Blended word boundaries; Imprecise;Decreased rate  Intelligibility: Impaired  Word Intelligibility (%): 80 %  Phrase Intelligibility (%): 80 %  Sentence Intelligibility (%): 70 %  Conversation Intelligibility (%): 70 %  Intonation: WFL  Rate: Decreased  Prosody: Decreased  Overall Impairment Severity: Mild-moderate    PAIN:  Start of Eval: 0  End of Eval: 0     After treatment:   ?              Patient left in no apparent distress sitting up in chair  ? Patient left in no apparent distress in bed  ? Call bell left within reach  ? Nursing notified  ? Caregiver present  ? Bed alarm activated    COMMUNICATION/EDUCATION:   ? Patient/family have participated as able in goal setting and plan of care. ?  Patient/family agree to work toward stated goals and plan of care.     Thank you for this referral.    Swathi Moreno M.S. CCC-SLP/L  Speech-Language Pathologist

## 2019-08-19 NOTE — PROGRESS NOTES
Problem: Dysphagia (Adult)  Goal: *Acute Goals and Plan of Care (Insert Text)  Description  Patient will:  1. Tolerate PO trials with 0 s/s overt distress in 4/5 trials  2. Utilize compensatory swallow strategies/maneuvers (decrease bite/sip, size/rate, alt. liq/sol) with min cues in 4/5 trials  3. Perform oral-motor/laryngeal exercises to increase oropharyngeal swallow function with min cues    Rec:     Mech-soft diet with thin liquids  Aspiration precautions  HOB >45 during po intake, remain >30 for 30-45 minutes after po   Small bites/sips; alternate liquid/solid with slow feeding rate   Oral care TID  Meds per pt preference         Outcome: Progressing Towards Goal    SPEECH LANGUAGE PATHOLOGY BEDSIDE SWALLOW EVALUATION    Patient: Marilee Emmanuel (21 y.o. female)  Date: 8/19/2019  Primary Diagnosis: TIA (transient ischemic attack) [G45.9]        Precautions: aspiration Fall  PLOF: As per H&P    ASSESSMENT :  Based on the objective data described below, the patient presents with mild-mod oral dysphagia. Oral mech exam revealed very slight left orofacial droop/weakness. Decreased orolingual strength, ROM, and motility noted resulting increased mastication of reg solid consistencies. Pt tolerated mech soft solid, puree, and thin liquids +/- straw consecutive swallows with no overt s/sx of aspiration and positive oral clearance. Laryngeal elevation appeared functional/timely to palpation. Rec initiation of mech soft diet with thin liquids, aspiration precautions, oral care TID, and meds as tolerated. ST will continue to follow to ensure safety of PO. Patient will benefit from skilled intervention to address the above impairments.   Patient's rehabilitation potential is considered to be Good  Factors which may influence rehabilitation potential include:   ?            Medical condition     PLAN :  Recommendations and Planned Interventions: See above  Frequency/Duration: Patient will be followed by speech-language pathology 1-2 times per day/3-5 days per week to address goals. Discharge Recommendations: Home Health, Inpatient Rehab and To Be Determined     SUBJECTIVE:   Patient stated That applesauce is so good! . OBJECTIVE:     Past Medical History:   Diagnosis Date    Anemia     Arthritis     Cardiomyopathy due to hypertension, with heart failure ([facility] 5/19/2017    Cataract     Chronic combined systolic and diastolic congestive heart failure ([facility] 5/3/2018    Chronic lung disease     Chronic obstructive pulmonary disease (HCC)     Chronic systolic congestive heart failure ([facility] 2/3/2014    [facility]     Difficulty swallowing     Both food and liquid    Dysphagia following cerebral infarction 8/17/2017    History of echocardiogram 12/29/2009    EF 50%. Mild-mod conc LVH. Mod DDfx. LAE. Mild MR.      Hypercholesterolemia     Hypertension     Hypertensive heart disease     Infarction of right thalamus ([facility] 9/17/2017    Lacunar infarct 2017    Joint pain     Joint swelling     Normal nuclear stress test 09/08/2000    No ischemia or prior infarction. Neg EKG on submax EST. Ex time 15:02.     Recurrent boils     Rheumatism     Shortness of breath     Sinusitis with nasal polyps 8/18/2019     Past Surgical History:   Procedure Laterality Date    CARDIAC CATHETERIZATION  5/10/2018         CORONARY ARTERY ANGIOGRAM  5/10/2018         HX CHOLECYSTECTOMY  2001    HX TUBAL LIGATION  1975    MODERATE SEDATION  5/10/2018          Prior Level of Function/Home Situation: see below  Home Situation  Home Environment: Private residence  # Steps to Enter: 3  Rails to Enter: Yes  Hand Rails : Bilateral  One/Two Story Residence: One story  Living Alone: No  Support Systems: Child(masha), Family member(s)  Patient Expects to be Discharged to[de-identified] Private residence  Current DME Used/Available at Home: Walker  Tub or Shower Type: Tub/Shower combination    Diet prior to admission: reg solid with thin  Current Diet:  [facility] soft with thin    Cognitive and Communication Status:  Neurologic State: Alert, Confused  Orientation Level: Oriented to person, Disoriented to time, Disoriented to situation, Disoriented to place  Cognition: Decreased attention/concentration, Follows commands  Perception: Appears intact  Perseveration: Perseverates during ADLS, Perseverates during conversation  Safety/Judgement: Fall prevention  Oral Assessment:  Oral Assessment  Labial: Left droop(very minimal)  Dentition: Natural  Oral Hygiene: adequate  Lingual: Decreased strength  Velum: No impairment  Mandible: No impairment  P.O. Trials:  Patient Position: [facility] at [facility]  Vocal quality prior to P.O.: No impairment  Consistency Presented: Thin liquid; Solid;Puree;Mechanical soft  How Presented: Self-fed/presented;Cup/sip;Spoon;Straw  Bolus Acceptance: No impairment  Bolus Formation/Control: Impaired  Type of Impairment: Delayed;Mastication  Propulsion: Delayed (# of seconds)  Oral Residue: None  Initiation of Swallow: No impairment  Laryngeal Elevation: Functional  Aspiration Signs/Symptoms: None  Pharyngeal Phase Characteristics: Audible swallow  Effective Modifications: Small sips and bites; Alternate liquids/solids  Cues for Modifications: Moderate     Oral Phase Severity: Mild-moderate  Pharyngeal Phase Severity : No impairment    PAIN:  Start of Eval: 0  End of Eval: 0     After treatment:   ?            Patient left in no apparent distress sitting up in chair  ? Patient left in no apparent distress in bed  ? Call bell left within reach  ? Nursing notified  ? Family present  ? Caregiver present  ? Bed alarm activated    COMMUNICATION/EDUCATION:   ?            Aspiration precautions; swallow safety; compensatory techniques. ?            Patient/family have participated as able in goal setting and plan of care. ?          Posted safety precautions in patient's room.      Thank you for this referral.    Rachael Hatfield M.S. CCC-SLP/L  Speech-Language Pathologist

## 2019-08-20 ENCOUNTER — APPOINTMENT (OUTPATIENT)
Dept: VASCULAR SURGERY | Age: 64
DRG: 077 | End: 2019-08-20
Attending: HOSPITALIST
Payer: MEDICARE

## 2019-08-20 ENCOUNTER — APPOINTMENT (OUTPATIENT)
Dept: NON INVASIVE DIAGNOSTICS | Age: 64
DRG: 077 | End: 2019-08-20
Attending: PHYSICIAN ASSISTANT
Payer: MEDICARE

## 2019-08-20 LAB
CHOLEST SERPL-MCNC: 162 MG/DL
ECHO AO ROOT DIAM: 3.12 CM
ECHO IVC SNIFF: 1.79 CM
ECHO LA AREA 4C: 29.2 CM2
ECHO LA VOL 2C: 113.57 ML (ref 22–52)
ECHO LA VOL 4C: 102.23 ML (ref 22–52)
ECHO LA VOL BP: 117.08 ML (ref 22–52)
ECHO LA VOL/BSA BIPLANE: 64.23 ML/M2 (ref 16–28)
ECHO LA VOLUME INDEX A2C: 62.3 ML/M2 (ref 16–28)
ECHO LA VOLUME INDEX A4C: 56.08 ML/M2 (ref 16–28)
ECHO LV GLOBAL LONGITUDINAL STRAIN (GLS): -9.7 %
ECHO LV INTERNAL DIMENSION DIASTOLIC: 4.33 CM (ref 3.9–5.3)
ECHO LV INTERNAL DIMENSION SYSTOLIC: 3.73 CM
ECHO LV IVSD: 1.81 CM (ref 0.6–0.9)
ECHO LV MASS 2D: 425 G (ref 67–162)
ECHO LV MASS INDEX 2D: 233.2 G/M2 (ref 43–95)
ECHO LV POSTERIOR WALL DIASTOLIC: 1.81 CM (ref 0.6–0.9)
ECHO LVOT DIAM: 2.23 CM
ECHO LVOT PEAK GRADIENT: 2.9 MMHG
ECHO LVOT PEAK VELOCITY: 84.48 CM/S
ECHO LVOT VTI: 15.01 CM
ECHO MV A VELOCITY: 96.7 CM/S
ECHO MV E DECELERATION TIME (DT): 180.1 MS
ECHO MV E VELOCITY: 66.5 CM/S
ECHO MV E/A RATIO: 0.69
ECHO TV REGURGITANT MAX VELOCITY: 298.94 CM/S
ECHO TV REGURGITANT PEAK GRADIENT: 35.7 MMHG
GLUCOSE BLD STRIP.AUTO-MCNC: 105 MG/DL (ref 70–110)
GLUCOSE BLD STRIP.AUTO-MCNC: 110 MG/DL (ref 70–110)
GLUCOSE BLD STRIP.AUTO-MCNC: 115 MG/DL (ref 70–110)
GLUCOSE BLD STRIP.AUTO-MCNC: 89 MG/DL (ref 70–110)
HDLC SERPL-MCNC: 49 MG/DL (ref 40–60)
HDLC SERPL: 3.3 {RATIO} (ref 0–5)
LDLC SERPL CALC-MCNC: 91.4 MG/DL (ref 0–100)
LEFT CCA DIST DIAS: 6.9 CM/S
LEFT CCA DIST SYS: 35.7 CM/S
LEFT CCA MID DIAS: 10.88 CM/S
LEFT CCA MID SYS: 61.44 CM/S
LEFT CCA PROX DIAS: 14.3 CM/S
LEFT CCA PROX SYS: 70.7 CM/S
LEFT ECA DIAS: 4.27 CM/S
LEFT ECA SYS: 44.4 CM/S
LEFT ICA DIST DIAS: 19.3 CM/S
LEFT ICA DIST SYS: 46.7 CM/S
LEFT ICA MID DIAS: 9.5 CM/S
LEFT ICA MID SYS: 35.7 CM/S
LEFT ICA PROX DIAS: 14.7 CM/S
LEFT ICA PROX SYS: 34.8 CM/S
LEFT ICA/CCA SYS: 1.31
LEFT SUBCLAVIAN DIAS: 0 CM/S
LEFT SUBCLAVIAN SYS: 93.7 CM/S
LEFT VERTEBRAL DIAS: 13.87 CM/S
LEFT VERTEBRAL SYS: 39.2 CM/S
LIPID PROFILE,FLP: NORMAL
RIGHT CCA DIST DIAS: 8.7 CM/S
RIGHT CCA DIST SYS: 46.1 CM/S
RIGHT CCA MID DIAS: 10.3 CM/S
RIGHT CCA MID SYS: 58.56 CM/S
RIGHT CCA PROX DIAS: 7.7 CM/S
RIGHT CCA PROX SYS: 67.7 CM/S
RIGHT ECA DIAS: 4.68 CM/S
RIGHT ECA SYS: 74.1 CM/S
RIGHT ICA DIST DIAS: 16 CM/S
RIGHT ICA DIST SYS: 46.2 CM/S
RIGHT ICA MID DIAS: 17.6 CM/S
RIGHT ICA MID SYS: 41.9 CM/S
RIGHT ICA PROX DIAS: 9.5 CM/S
RIGHT ICA PROX SYS: 26.6 CM/S
RIGHT ICA/CCA SYS: 1
RIGHT SUBCLAVIAN DIAS: 0 CM/S
RIGHT SUBCLAVIAN SYS: 52.5 CM/S
RIGHT VERTEBRAL DIAS: 8.36 CM/S
RIGHT VERTEBRAL SYS: 23.5 CM/S
TRIGL SERPL-MCNC: 108 MG/DL (ref ?–150)
VLDLC SERPL CALC-MCNC: 21.6 MG/DL

## 2019-08-20 PROCEDURE — 36415 COLL VENOUS BLD VENIPUNCTURE: CPT

## 2019-08-20 PROCEDURE — 74011000250 HC RX REV CODE- 250: Performed by: HOSPITALIST

## 2019-08-20 PROCEDURE — 97116 GAIT TRAINING THERAPY: CPT

## 2019-08-20 PROCEDURE — 74011250637 HC RX REV CODE- 250/637: Performed by: PHYSICIAN ASSISTANT

## 2019-08-20 PROCEDURE — 97535 SELF CARE MNGMENT TRAINING: CPT

## 2019-08-20 PROCEDURE — 94760 N-INVAS EAR/PLS OXIMETRY 1: CPT

## 2019-08-20 PROCEDURE — 65660000000 HC RM CCU STEPDOWN

## 2019-08-20 PROCEDURE — 74011250637 HC RX REV CODE- 250/637: Performed by: HOSPITALIST

## 2019-08-20 PROCEDURE — 97110 THERAPEUTIC EXERCISES: CPT

## 2019-08-20 PROCEDURE — 82962 GLUCOSE BLOOD TEST: CPT

## 2019-08-20 PROCEDURE — 93880 EXTRACRANIAL BILAT STUDY: CPT

## 2019-08-20 PROCEDURE — 80061 LIPID PANEL: CPT

## 2019-08-20 PROCEDURE — 74011250636 HC RX REV CODE- 250/636: Performed by: HOSPITALIST

## 2019-08-20 PROCEDURE — 93306 TTE W/DOPPLER COMPLETE: CPT

## 2019-08-20 RX ORDER — HYDRALAZINE HYDROCHLORIDE 20 MG/ML
10 INJECTION INTRAMUSCULAR; INTRAVENOUS
Status: DISCONTINUED | OUTPATIENT
Start: 2019-08-20 | End: 2019-08-21 | Stop reason: HOSPADM

## 2019-08-20 RX ORDER — SODIUM CHLORIDE 9 MG/ML
10 INJECTION INTRAMUSCULAR; INTRAVENOUS; SUBCUTANEOUS
Status: COMPLETED | OUTPATIENT
Start: 2019-08-20 | End: 2019-08-20

## 2019-08-20 RX ORDER — AMLODIPINE BESYLATE 10 MG/1
10 TABLET ORAL DAILY
Status: DISCONTINUED | OUTPATIENT
Start: 2019-08-21 | End: 2019-08-21 | Stop reason: HOSPADM

## 2019-08-20 RX ADMIN — SPIRONOLACTONE 25 MG: 25 TABLET ORAL at 10:00

## 2019-08-20 RX ADMIN — Medication 10 ML: at 10:04

## 2019-08-20 RX ADMIN — FAMOTIDINE 20 MG: 10 INJECTION, SOLUTION INTRAVENOUS at 10:04

## 2019-08-20 RX ADMIN — SODIUM CHLORIDE 10 ML: 9 INJECTION INTRAMUSCULAR; INTRAVENOUS; SUBCUTANEOUS at 09:24

## 2019-08-20 RX ADMIN — FUROSEMIDE 20 MG: 20 TABLET ORAL at 10:01

## 2019-08-20 RX ADMIN — ATORVASTATIN CALCIUM 80 MG: 40 TABLET, FILM COATED ORAL at 21:57

## 2019-08-20 RX ADMIN — ACETAMINOPHEN 650 MG: 325 TABLET ORAL at 10:10

## 2019-08-20 RX ADMIN — ISOSORBIDE DINITRATE 20 MG: 20 TABLET ORAL at 21:57

## 2019-08-20 RX ADMIN — TRAMADOL HYDROCHLORIDE 50 MG: 50 TABLET, FILM COATED ORAL at 15:04

## 2019-08-20 RX ADMIN — ISOSORBIDE DINITRATE 20 MG: 20 TABLET ORAL at 10:00

## 2019-08-20 RX ADMIN — Medication 10 ML: at 22:04

## 2019-08-20 RX ADMIN — FLUTICASONE FUROATE AND VILANTEROL TRIFENATATE 1 PUFF: 100; 25 POWDER RESPIRATORY (INHALATION) at 10:10

## 2019-08-20 RX ADMIN — HYDRALAZINE HYDROCHLORIDE 100 MG: 50 TABLET, FILM COATED ORAL at 10:01

## 2019-08-20 RX ADMIN — HYDRALAZINE HYDROCHLORIDE 100 MG: 50 TABLET, FILM COATED ORAL at 21:57

## 2019-08-20 RX ADMIN — Medication 10 ML: at 14:58

## 2019-08-20 RX ADMIN — HEPARIN SODIUM 5000 UNITS: 5000 INJECTION, SOLUTION INTRAVENOUS; SUBCUTANEOUS at 17:39

## 2019-08-20 RX ADMIN — AMLODIPINE BESYLATE 5 MG: 5 TABLET ORAL at 10:00

## 2019-08-20 RX ADMIN — HEPARIN SODIUM 5000 UNITS: 5000 INJECTION, SOLUTION INTRAVENOUS; SUBCUTANEOUS at 10:03

## 2019-08-20 RX ADMIN — Medication 81 MG: at 10:00

## 2019-08-20 RX ADMIN — FUROSEMIDE 20 MG: 20 TABLET ORAL at 17:39

## 2019-08-20 RX ADMIN — HYDRALAZINE HYDROCHLORIDE 100 MG: 50 TABLET, FILM COATED ORAL at 17:39

## 2019-08-20 RX ADMIN — ISOSORBIDE DINITRATE 20 MG: 20 TABLET ORAL at 17:39

## 2019-08-20 NOTE — PROGRESS NOTES
Pt's son Alexandre Laureano requested [facility]. ARU reports that this pt does not have a medical condition to qualify. [facility] has started the Authorization with [employer] for placement consideration.        [facility]

## 2019-08-20 NOTE — PROGRESS NOTES
New orders received on 8/20/19. Pt currently already on caseload and will cont to follow up per established POC. Thank you.      DANNY Martinez

## 2019-08-20 NOTE — PROGRESS NOTES
Echocardiogram completed. Patient returned to room with armband in place. Report to follow.     [address] [facility]

## 2019-08-20 NOTE — PROGRESS NOTES
Problem: Mobility Impaired (Adult and Pediatric)  Goal: *Acute Goals and Plan of Care (Insert Text)  Description  Physical Therapy Goals  Initiated 8/19/2019 and to be accomplished within 7 day(s)  1. Patient will move from supine to sit and sit to supine , scoot up and down and roll side to side in bed with modified independence. 2.  Patient will transfer from bed to chair and chair to bed with supervision/set-up using the least restrictive device. 3.  Patient will perform sit to stand with supervision/set-up. 4.  Patient will ambulate with supervision/set-up for >50 feet with the least restrictive device. 5.  Patient will ascend/descend 3 stairs with 1 handrail(s) with minimal assistance/contact guard assist.    PLOF: Patient lives with her daughter and granddaughter and reports that her daughter works during the day. Patient reports she was sometimes able to get to the bathroom and that she walks more with her cane than a walker. She reports she has cane, RW, Rollator, and required assist for I/ADL's. Outcome: Progressing Towards Goal   PHYSICAL THERAPY TREATMENT    Patient: Sivan Lyles (17 y.o. female)  Date: 8/20/2019  Diagnosis: TIA (transient ischemic attack) [G45.9] TIA (transient ischemic attack)       Precautions: Fall      ASSESSMENT:  Pt found seated at window w/ son at side willing to participate w/ therapy. Pt is very pleasant and cooperative this visit willing to attempt all mobility tasks asks and able to improve overall. Pt displayed (B) LE weakness w/ no asymmetries noted. Pt performed sit to stand from chair and amb from room to naranjo. Pt req CGA for stability and min A for RW negotiation and path deviations as pt req manual and verbal cueing to redirect. Pt limited in further progression of distance 2/2 to fatigue post 25' and returned to room to recliner w/ a total of 50'. Pt displayed decreased step clearance w/ shorted step length req cueing to increase.  Pt performed/provided there-ex to increase (B) quad strength and left in room w/ all needs in reach. As pt and son state pt is weaker at this time compared to PTA and must be able to take care of self during day as daughter works, pt would greatly benefit from ARU. Progression toward goals: good   ? Improving appropriately and progressing toward goals  ? Improving slowly and progressing toward goals  ? Not making progress toward goals and plan of care will be adjusted     PLAN:  Patient continues to benefit from skilled intervention to address the above impairments. Continue treatment per established plan of care. Discharge Recommendations:  Inpatient Rehab  Further Equipment Recommendations for Discharge:  bedside commode and rolling walker     SUBJECTIVE:   Patient stated I remember you!     OBJECTIVE DATA SUMMARY:   Critical Behavior:  Neurologic State: Alert, Confused  Orientation Level: Oriented to person, Oriented to place, Disoriented to time, Disoriented to situation  Cognition: Follows commands  Safety/Judgement: Fall prevention  Functional Mobility Training:  Transfers:  Sit to Stand: Contact guard assistance  Stand to Sit: Contact guard assistance  Balance:  Sitting: Intact  Standing: Impaired; With support  Standing - Static: Fair  Standing - Dynamic : Fair  Ambulation/Gait Training:  Distance (ft): 50 Feet (ft)  Assistive Device: Walker, rolling  Ambulation - Level of Assistance: Contact guard assistance;Minimal assistance  Gait Abnormalities: Decreased step clearance; Path deviations  Base of Support: Center of gravity altered; Widened  Speed/Laila: Slow  Step Length: Right shortened;Left shortened  Interventions: Manual cues; Verbal cues; Visual/Demos  Therapeutic Exercises:      EXERCISE   Sets   Reps   Active Active Assist   Passive Self ROM   Comments   Ankle Pumps    ? ? ? ?    Quad Sets/Glut Sets    ? ? ? ? Hold for 5 secs   Hamstring Sets   ? ? ? ? Short Arc Quads   ?  ? ? ? Heel Slides   ? ? ? ? Straight Leg Raises   ? ? ? ? Hip Add   ? ? ? ? Hold for 5 secs, w/ pillow squeeze   Long Arc Quads 1 10 ? ? ? ? Seated Marching   ? ? ? ? Standing Marching   ? ? ? ?       ? ? ? ? Pain:  Pain level pre-treatment: 0/10  Pain level post-treatment: 0/10       Activity Tolerance:   Good  Please refer to the flowsheet for vital signs taken during this treatment. After treatment:   ? Patient left in no apparent distress sitting up in chair  ? Patient left in no apparent distress in bed  ? Call bell left within reach  ? Nursing notified  ? Caregiver present  ? Chair alarm activated  ? SCDs applied      COMMUNICATION/EDUCATION:   ?         Role of Physical Therapy in the acute care setting. ?         Fall prevention education was provided and the patient/caregiver indicated understanding. ? Patient/family have participated as able in working toward goals and plan of care. ?         Patient/family agree to work toward stated goals and plan of care. ?         Patient understands intent and goals of therapy, but is neutral about his/her participation. ? Patient is unable to participate in stated goals/plan of care: ongoing with therapy staff.   ?         Other:        Elvi Gray PTA   Time Calculation: 23 mins

## 2019-08-20 NOTE — ROUTINE PROCESS
2215-- PATIENT AROUSED CONFUSED, AGITATED AND PARANOID-- REFUSING TO LET ANYONE TOUCH HER-- SHE IS INSISTING TO WALK TO BATHROOM AND DOWN THE CASTRO--     ATTEMPTED TO CALL THE FAMILY A COUPLE OF TIMES    PATIENT ENDED UP SITTING IN WHEEL CHAIR     Bedside and Verbal shift change report given to REUBEN HEDRICK (oncoming nurse) by Fang Robles RN (offgoing nurse). Report given with SBAR, Kardex, Intake/Output, MAR, Accordion and Recent Results.

## 2019-08-20 NOTE — ROUTINE PROCESS
Bedside and Verbal shift change report given to [address] (oncoming nurse) by Noemí Boles (offgoing nurse). Report included the following information SBAR, Kardex, Intake/Output and MAR.

## 2019-08-20 NOTE — PROGRESS NOTES
[facility] Hospitalist Group  Progress Note    Patient: Rajni Kuhn Age: 75 y.o. : 1955 MR#: [id number] SSN: [id number]  Date/Time: 2019     Subjective:     Pt seen & evaluated - sitting up in bed - no new complaints         Assessment/Plan:     1. TIA - appreciate Neuro input - continue ASA & Lipitor  - MRI Brain reviewed - will get carotid duplex    2. Malignant HTN - BP better controlled   3. PT & OT eval   DVT Px - Heparin   FC           Case discussed with:  [x]Patient  []Family  []Nursing  []Case Management  DVT Prophylaxis:  []Lovenox  [x]Hep SQ  []SCDs  []Coumadin   []On Heparin gtt    Objective:   VS:   Visit Vitals  /85 (BP 1 Location: Left arm)   Pulse 89   Temp 97.7 °F (36.5 °C)   Resp 18   Ht 5' 5\" (1.651 m)   Wt 74.8 kg (165 lb)   SpO2 98%   Breastfeeding? No   BMI 27.46 kg/m²      Tmax/24hrs: Temp (24hrs), Av.3 °F (36.8 °C), Min:97 °F (36.1 °C), Max:99 °F (37.2 °C)  IOBRIEF    Intake/Output Summary (Last 24 hours) at 2019 2153  Last data filed at 2019 1820  Gross per 24 hour   Intake 1060 ml   Output 0 ml   Net 1060 ml       General:  Alert, cooperative, no acute distress    HEENT: PERRLA, anicteric sclerae. Pulmonary:  CTA Bilaterally. No Wheezing/Rhonchi/Rales. Cardiovascular: Regular rate and Rhythm. GI:  Soft, Non distended, Non tender. + Bowel sounds. Extremities:  No edema, cyanosis, clubbing. No calf tenderness. Neurologic: Alert and oriented X 3. No acute neuro deficits.   Additional:    Medications:   Current Facility-Administered Medications   Medication Dose Route Frequency    sodium chloride (NS) flush 5-40 mL  5-40 mL IntraVENous Q8H    sodium chloride (NS) flush 5-40 mL  5-40 mL IntraVENous PRN    atorvastatin (LIPITOR) tablet 80 mg  80 mg Oral QHS    labetalol (NORMODYNE;TRANDATE) 20 mg/4 mL (5 mg/mL) injection 5 mg  5 mg IntraVENous Q10MIN PRN    bisacodyl (DULCOLAX) suppository 10 mg  10 mg Rectal DAILY PRN    famotidine (PF) (PEPCID) 20 mg in sodium chloride 0.9% 10 mL injection  20 mg IntraVENous Q24H    heparin (porcine) injection 5,000 Units  5,000 Units SubCUTAneous Q8H    dextrose 10% infusion 125-250 mL  125-250 mL IntraVENous PRN    [START ON 8/20/2019] amLODIPine (NORVASC) tablet 5 mg  5 mg Oral DAILY    hydrALAZINE (APRESOLINE) tablet 100 mg  100 mg Oral TID    [START ON 8/20/2019] spironolactone (ALDACTONE) tablet 25 mg  25 mg Oral DAILY    sodium chloride (NS) flush 5-40 mL  5-40 mL IntraVENous Q8H    sodium chloride (NS) flush 5-40 mL  5-40 mL IntraVENous PRN    acetaminophen (TYLENOL) tablet 650 mg  650 mg Oral Q4H PRN    diphenhydrAMINE (BENADRYL) injection 25 mg  25 mg IntraVENous Q4H PRN    ondansetron (ZOFRAN) injection 4 mg  4 mg IntraVENous Q4H PRN    albuterol (PROVENTIL VENTOLIN) nebulizer solution 2.5 mg  2.5 mg Nebulization Q4H PRN    aspirin delayed-release tablet 81 mg  81 mg Oral DAILY    fluticasone-vilanterol (BREO ELLIPTA) 100mcg-25mcg/puff  1 Puff Inhalation DAILY    furosemide (LASIX) tablet 20 mg  20 mg Oral BID    isosorbide dinitrate (ISORDIL) tablet 20 mg  20 mg Oral TID    traMADol (ULTRAM) tablet 50 mg  50 mg Oral Q6H PRN    insulin lispro (HUMALOG) injection   SubCUTAneous AC&HS    glucose chewable tablet 16 g  4 Tab Oral PRN    glucagon (GLUCAGEN) injection 1 mg  1 mg IntraMUSCular PRN       Labs:    Recent Results (from the past 24 hour(s))   GLUCOSE, POC    Collection Time: 08/19/19 12:21 AM   Result Value Ref Range    Glucose (POC) 93 70 - 569 mg/dL   METABOLIC PANEL, COMPREHENSIVE    Collection Time: 08/19/19  1:10 AM   Result Value Ref Range    Sodium 144 136 - 145 mmol/L    Potassium 3.4 (L) 3.5 - 5.5 mmol/L    Chloride 109 100 - 111 mmol/L    CO2 31 21 - 32 mmol/L    Anion gap 4 3.0 - 18 mmol/L    Glucose 87 74 - 99 mg/dL    BUN 25 (H) 7.0 - 18 MG/DL    Creatinine 1.58 (H) 0.6 - 1.3 MG/DL    BUN/Creatinine ratio 16 12 - 20      GFR est AA 40 (L) >60 ml/min/1.73m2    GFR est non-AA 33 (L) >60 ml/min/1.73m2    Calcium 8.4 (L) 8.5 - 10.1 MG/DL    Bilirubin, total 0.8 0.2 - 1.0 MG/DL    ALT (SGPT) 10 (L) 13 - 56 U/L    AST (SGOT) 11 10 - 38 U/L    Alk. phosphatase 99 45 - 117 U/L    Protein, total 6.9 6.4 - 8.2 g/dL    Albumin 2.8 (L) 3.4 - 5.0 g/dL    Globulin 4.1 (H) 2.0 - 4.0 g/dL    A-G Ratio 0.7 (L) 0.8 - 1.7     CARDIAC PANEL,(CK, CKMB & TROPONIN)    Collection Time: 08/19/19  1:10 AM   Result Value Ref Range    CK 60 26 - 192 U/L    CK - MB 1.8 <3.6 ng/ml    CK-MB Index 3.0 0.0 - 4.0 %    Troponin-I, QT 0.39 (H) 0.0 - 0.045 NG/ML   MAGNESIUM    Collection Time: 08/19/19  1:10 AM   Result Value Ref Range    Magnesium 2.0 1.6 - 2.6 mg/dL   PHOSPHORUS    Collection Time: 08/19/19  1:10 AM   Result Value Ref Range    Phosphorus 4.0 2.5 - 4.9 MG/DL   CBC WITH AUTOMATED DIFF    Collection Time: 08/19/19  1:10 AM   Result Value Ref Range    WBC 4.6 4.6 - 13.2 K/uL    RBC 3.64 (L) 4.20 - 5.30 M/uL    HGB 10.9 (L) 12.0 - 16.0 g/dL    HCT 31.3 (L) 35.0 - 45.0 %    MCV 86.0 74.0 - 97.0 FL    MCH 29.9 24.0 - 34.0 PG    MCHC 34.8 31.0 - 37.0 g/dL    RDW 13.9 11.6 - 14.5 %    PLATELET 096 (L) 301 - 420 K/uL    MPV 11.9 (H) 9.2 - 11.8 FL    NEUTROPHILS 81 (H) 40 - 73 %    LYMPHOCYTES 13 (L) 21 - 52 %    MONOCYTES 5 3 - 10 %    EOSINOPHILS 1 0 - 5 %    BASOPHILS 0 0 - 2 %    ABS. NEUTROPHILS 3.7 1.8 - 8.0 K/UL    ABS. LYMPHOCYTES 0.6 (L) 0.9 - 3.6 K/UL    ABS. MONOCYTES 0.2 0.05 - 1.2 K/UL    ABS. EOSINOPHILS 0.1 0.0 - 0.4 K/UL    ABS.  BASOPHILS 0.0 0.0 - 0.1 K/UL    DF AUTOMATED     PROTHROMBIN TIME + INR    Collection Time: 08/19/19  1:10 AM   Result Value Ref Range    Prothrombin time 13.5 11.5 - 15.2 sec    INR 1.1 0.8 - 1.2     PTT    Collection Time: 08/19/19  1:10 AM   Result Value Ref Range    aPTT 28.7 23.0 - 36.4 SEC   POC LACTIC ACID    Collection Time: 08/19/19  1:18 AM   Result Value Ref Range    Lactic Acid (POC) 0.69 0.40 - 2.00 mmol/L   LACTIC ACID Collection Time: 08/19/19  4:06 AM   Result Value Ref Range    Lactic acid 0.8 0.4 - 2.0 MMOL/L   GLUCOSE, POC    Collection Time: 08/19/19  6:42 AM   Result Value Ref Range    Glucose (POC) 70 70 - 110 mg/dL   EKG, 12 LEAD, INITIAL    Collection Time: 08/19/19  7:25 AM   Result Value Ref Range    Ventricular Rate 92 BPM    Atrial Rate 92 BPM    P-R Interval 172 ms    QRS Duration 106 ms    Q-T Interval 372 ms    QTC Calculation (Bezet) 460 ms    Calculated P Axis 69 degrees    Calculated R Axis -31 degrees    Calculated T Axis 137 degrees    Diagnosis       Normal sinus rhythm  Left axis deviation  Possible Anterior infarct (cited on or before 01-FEB-2014)  ST & T wave abnormality, consider lateral ischemia  Abnormal ECG  When compared with ECG of 18-AUG-2019 14:30,  No significant change was found  Confirmed by Verónica Byers ([id number]) on 8/19/2019 12:58:06 PM     GLUCOSE, POC    Collection Time: 08/19/19 11:17 AM   Result Value Ref Range    Glucose (POC) 219 (H) 70 - 110 mg/dL   CARDIAC PANEL,(CK, CKMB & TROPONIN)    Collection Time: 08/19/19  1:18 PM   Result Value Ref Range    CK 55 26 - 192 U/L    CK - MB 1.4 <3.6 ng/ml    CK-MB Index 2.5 0.0 - 4.0 %    Troponin-I, QT 0.37 (H) 0.0 - 0.045 NG/ML   GLUCOSE, POC    Collection Time: 08/19/19  4:03 PM   Result Value Ref Range    Glucose (POC) 113 (H) 70 - 110 mg/dL   GLUCOSE, POC    Collection Time: 08/19/19  8:57 PM   Result Value Ref Range    Glucose (POC) 107 70 - 110 mg/dL       Signed By: Cherrie Zavala MD     August 19, 2019

## 2019-08-20 NOTE — PROGRESS NOTES
ARU/IPR REFERRAL CONTACT NOTE  [id number] [place] [address] for Physical Rehabilitation    RE:  Gerimark anthony Suaerzkwaku Thank you for the opportunity to review this patient's case for admission to [address] [place] [address] for Physical Rehabilitation. Based on our pre-admission screening:     Nas.Piggs ] This patient does not meet criteria for admission to [facility] for Physical        Rehabilitation due to:    Nas.Rehan ] Documents do not reflect active medical necessity requiring close Physician involvement and Rehabilitation Nursing. Again, Thank you for this referral. Should you have any questions please do not hesitate to call. Sincerely,  AGUEDA Graf PTA for Tami Webb, RYLEY  Admissions [facility] for Physical Rehabilitation  [phone]

## 2019-08-20 NOTE — PROGRESS NOTES
Problem: Self Care Deficits Care Plan (Adult)  Goal: *Acute Goals and Plan of Care (Insert Text)  Description  Occupational Therapy Goals  Initiated 8/19/2019 within 7 day(s). 1.  Patient will perform functional activity for 3-5 minutes with independence standing and G balance. 2.  Patient will perform lower body dressing with modified independence. 3.  Patient will perform toilet transfers with modified independence. 4.  Patient will perform all aspects of toileting with modified independence. 5.  Patient will utilize energy conservation techniques during functional activities with verbal cues. 6. Patient will participate in therapeutic exercises with independence for 5-7 minutes to increase BUE strength/ROM for self-care    Prior Level of Function: Patient was modified independent with self-care and used a RW for functional mobility PTA. Pt reports her daughter does help with ADLs depending on the type of day she is having but mostly she can \"do for herself\"; son confirms. Outcome: Progressing Towards Goal   OCCUPATIONAL THERAPY TREATMENT    Patient: Lisette Luis (25 y.o. female)  Date: 8/20/2019  Diagnosis: TIA (transient ischemic attack) [G45.9] TIA (transient ischemic attack)       Precautions: Fall    Chart, occupational therapy assessment, plan of care, and goals were reviewed. ASSESSMENT:  Pt sitting up in room in window, looking outside. Pt reported she likes the warmth of the window, pleasantly confused. She was agreeable with extensive eduction on role of OT in acute setting. She completed bathroom mobility Min A using RW with cues for safe walker placement. Pt refused to complete toilet transfer reporting it is dirty, no toileting needs at this time. CGA standing at sink for hand hygiene tolerating approx 1 min. Requested pt participate in further ADL retraining with pt reporting increased fatigue and wanting to return to bed.  She side stepped to [facility], sat EOB then proceeded to stand without telling therapist, looking for something. Pt easily redirected and transitioned to supine with CGA. All needs in reach with bed alarm activated. RN notified, reporting pt recently received pain medication which could be causing increased fatigue. Progression toward goals:  ?          Improving appropriately and progressing toward goals  ? Improving slowly and progressing toward goals  ? Not making progress toward goals and plan of care will be adjusted     PLAN:  Patient continues to benefit from skilled intervention to address the above impairments. Continue treatment per established plan of care. Discharge Recommendations:  Inpatient Rehab vs Rehab    Further Equipment Recommendations for Discharge:  3:1 commode      SUBJECTIVE:   Patient stated I am tired, I want to go to bed.     OBJECTIVE DATA SUMMARY:   Cognitive/Behavioral Status:  Neurologic State: Alert, Confused  Orientation Level: Oriented to person, Oriented to place  Cognition: Follows commands  Safety/Judgement: Fall prevention    Functional Mobility and Transfers for ADLs:  Bed Mobility:  Sit to Supine: Contact guard assistance     Transfers:  Sit to Stand: Contact guard assistance  Stand to Sit: Contact guard assistance  Bathroom Mobility: Minimum assistance      Balance:  Sitting: Intact  Standing: Impaired; With support  Standing - Static: Fair  Standing - Dynamic : Fair    ADL Intervention:  Grooming  Grooming Assistance: Contact guard assistance  Washing Hands: Contact guard assistance    Cognitive Retraining  Safety/Judgement: Fall prevention    Pain:  Pt reported no pain this session     Activity Tolerance:    Fair  Please refer to the flowsheet for vital signs taken during this treatment. After treatment:   ?  Patient left in no apparent distress sitting up in chair  ? Patient left in no apparent distress in bed  ? Call bell left within reach  ? Nursing notified  ? Caregiver present  ?   Bed alarm activated    COMMUNICATION/EDUCATION:   ? Role of Occupational Therapy in the acute care setting  ? Home safety education was provided and the patient/caregiver indicated understanding. ? Patient/family have participated as able in working towards goals and plan of care. ? Patient/family agree to work toward stated goals and plan of care. ? Patient understands intent and goals of therapy, but is neutral about his/her participation. ? Patient is unable to participate in goal setting and plan of care.       Thank you for this referral.  AARON Ortega   Time Calculation: 11 mins

## 2019-08-20 NOTE — PROGRESS NOTES
[facility] Hospitalist Group  Progress Note    Patient: Bia Hankins Age: 75 y.o. : 1955 MR#: [id number] SSN: [id number]  Date/Time: 2019     Subjective:     Pt seen & evaluated - sitting up in bed - no new complaints         Assessment/Plan:     1. TIA - appreciate Neuro input - continue ASA & Lipitor  - MRI Brain reviewed - carotid duplex reviewed - Per neuro - need to control BP better   2. Malignant HTN - BP better controlled - continue current meds - added hydralazine prn   3. PT & OT eval - SNF - will monitor   Discussed with son at bedside   DVT Px - Heparin   FC           Case discussed with:  [x]Patient  []Family  []Nursing  []Case Management  DVT Prophylaxis:  []Lovenox  [x]Hep SQ  []SCDs  []Coumadin   []On Heparin gtt    Objective:   VS:   Visit Vitals  BP (!) 180/99 (BP 1 Location: Left arm, BP Patient Position: At rest)   Pulse 83   Temp 97.9 °F (36.6 °C)   Resp 18   Ht 5' 5\" (1.651 m)   Wt 74.8 kg (165 lb)   SpO2 99%   Breastfeeding? No   BMI 27.46 kg/m²      Tmax/24hrs: Temp (24hrs), Av.7 °F (36.5 °C), Min:97.5 °F (36.4 °C), Max:97.9 °F (36.6 °C)  IOBRIEF    Intake/Output Summary (Last 24 hours) at 2019 1531  Last data filed at 2019 0851  Gross per 24 hour   Intake 1005 ml   Output 100 ml   Net 905 ml       General:  Alert, cooperative, no acute distress    HEENT: PERRLA, anicteric sclerae. Pulmonary:  CTA Bilaterally. No Wheezing/Rhonchi/Rales. Cardiovascular: Regular rate and Rhythm. GI:  Soft, Non distended, Non tender. + Bowel sounds. Extremities:  No edema, cyanosis, clubbing. No calf tenderness. Neurologic: Alert and oriented X 3. No acute neuro deficits.   Additional:    Medications:   Current Facility-Administered Medications   Medication Dose Route Frequency    [START ON 2019] amLODIPine (NORVASC) tablet 10 mg  10 mg Oral DAILY    hydrALAZINE (APRESOLINE) 20 mg/mL injection 10 mg  10 mg IntraVENous Q6H PRN    sodium chloride (NS) flush 5-40 mL  5-40 mL IntraVENous Q8H    sodium chloride (NS) flush 5-40 mL  5-40 mL IntraVENous PRN    atorvastatin (LIPITOR) tablet 80 mg  80 mg Oral QHS    bisacodyl (DULCOLAX) suppository 10 mg  10 mg Rectal DAILY PRN    famotidine (PF) (PEPCID) 20 mg in sodium chloride 0.9% 10 mL injection  20 mg IntraVENous Q24H    heparin (porcine) injection 5,000 Units  5,000 Units SubCUTAneous Q8H    dextrose 10% infusion 125-250 mL  125-250 mL IntraVENous PRN    hydrALAZINE (APRESOLINE) tablet 100 mg  100 mg Oral TID    spironolactone (ALDACTONE) tablet 25 mg  25 mg Oral DAILY    sodium chloride (NS) flush 5-40 mL  5-40 mL IntraVENous Q8H    sodium chloride (NS) flush 5-40 mL  5-40 mL IntraVENous PRN    acetaminophen (TYLENOL) tablet 650 mg  650 mg Oral Q4H PRN    diphenhydrAMINE (BENADRYL) injection 25 mg  25 mg IntraVENous Q4H PRN    ondansetron (ZOFRAN) injection 4 mg  4 mg IntraVENous Q4H PRN    albuterol (PROVENTIL VENTOLIN) nebulizer solution 2.5 mg  2.5 mg Nebulization Q4H PRN    aspirin delayed-release tablet 81 mg  81 mg Oral DAILY    fluticasone-vilanterol (BREO ELLIPTA) 100mcg-25mcg/puff  1 Puff Inhalation DAILY    furosemide (LASIX) tablet 20 mg  20 mg Oral BID    isosorbide dinitrate (ISORDIL) tablet 20 mg  20 mg Oral TID    traMADol (ULTRAM) tablet 50 mg  50 mg Oral Q6H PRN    insulin lispro (HUMALOG) injection   SubCUTAneous AC&HS    glucose chewable tablet 16 g  4 Tab Oral PRN    glucagon (GLUCAGEN) injection 1 mg  1 mg IntraMUSCular PRN       Labs:    Recent Results (from the past 24 hour(s))   GLUCOSE, POC    Collection Time: 08/19/19  4:03 PM   Result Value Ref Range    Glucose (POC) 113 (H) 70 - 110 mg/dL   GLUCOSE, POC    Collection Time: 08/19/19  8:57 PM   Result Value Ref Range    Glucose (POC) 107 70 - 110 mg/dL   GLUCOSE, POC    Collection Time: 08/20/19  6:40 AM   Result Value Ref Range    Glucose (POC) 89 70 - 110 mg/dL   DUPLEX CAROTID BILATERAL Collection Time: 08/20/19  8:25 AM   Result Value Ref Range    Right subclavian sys 52.5 cm/s    RIGHT SUBCLAVIAN ARTERY D 0.00 cm/s    Right cca dist sys 46.1 cm/s    Right CCA dist wang 8.7 cm/s    RIGHT COMMON CAROTID ARTERY MID S 58.56 cm/s    RIGHT COMMON CAROTID ARTERY MID D 10.30 cm/s    Right CCA prox sys 67.7 cm/s    Right CCA prox wang 7.7 cm/s    Right eca sys 74.1 cm/s    RIGHT EXTERNAL CAROTID ARTERY D 4.68 cm/s    Right ICA dist sys 46.2 cm/s    Right ICA dist wang 16.0 cm/s    Right ICA mid sys 41.9 cm/s    Right ICA mid wang 17.6 cm/s    Right ICA prox sys 26.6 cm/s    Right ICA prox wang 9.5 cm/s    Right vertebral sys 23.5 cm/s    RIGHT VERTEBRAL ARTERY D 8.36 cm/s    Right ICA/CCA sys 1.0     Left subclavian sys 93.7 cm/s    LEFT SUBCLAVIAN ARTERY D 0.00 cm/s    Left CCA dist sys 35.7 cm/s    Left CCA dist wang 6.9 cm/s    LEFT COMMON CAROTID ARTERY MID S 61.44 cm/s    LEFT COMMON CAROTID ARTERY MID D 10.88 cm/s    Left CCA prox sys 70.7 cm/s    Left CCA prox wang 14.3 cm/s    Left ECA sys 44.4 cm/s    LEFT EXTERNAL CAROTID ARTERY D 4.27 cm/s    Left ICA dist sys 46.7 cm/s    Left ICA dist wang 19.3 cm/s    Left ICA mid sys 35.7 cm/s    Left ICA mid wang 9.5 cm/s    Left ICA prox sys 34.8 cm/s    Left ICA prox wang 14.7 cm/s    Left vertebral sys 39.2 cm/s    LEFT VERTEBRAL ARTERY D 13.87 cm/s    Left ICA/CCA sys 1.31    ECHO ADULT COMPLETE    Collection Time: 08/20/19  9:23 AM   Result Value Ref Range    LVIDd 4.33 3.9 - 5.3 cm    LVPWd 1.81 (A) 0.6 - 0.9 cm    LVIDs 3.73 cm    IVSd 1.81 (A) 0.6 - 0.9 cm    LVOT d 2.23 cm    LVOT Peak Velocity 84.48 cm/s    LVOT Peak Gradient 2.9 mmHg    LVOT VTI 15.01 cm    MV A Real 96.70 cm/s    MV E Real 66.50 cm/s    MV E/A 0.69     Global Longitudinal Strain -9.70 %    Inferior Vena Cava Diameter Sniffing 1.79 cm    LA Vol 4C 102.23 (A) 22 - 52 mL    LA Vol 2C 113.57 (A) 22 - 52 mL    LA Area 4C 29.2 cm2    LV Mass .0 (A) 67 - 162 g    LV Mass AL Index 233.2 (A) 43 - 95 g/m2    Mitral Valve E Wave Deceleration Time 180.1 ms    Triscuspid Valve Regurgitation Peak Gradient 35.7 mmHg    TR Max Velocity 298.94 cm/s    LA Vol Index 64.23 16 - 28 ml/m2    LA Vol Index 62.30 16 - 28 ml/m2    LA Vol Index 56.08 16 - 28 ml/m2    LA Volume 117.08 22 - 52 mL    Ao Root D 3.12 cm   GLUCOSE, POC    Collection Time: 08/20/19 12:13 PM   Result Value Ref Range    Glucose (POC) 105 70 - 110 mg/dL   LIPID PANEL    Collection Time: 08/20/19  1:50 PM   Result Value Ref Range    LIPID PROFILE          Cholesterol, total 162 <200 MG/DL    Triglyceride 108 <150 MG/DL    HDL Cholesterol 49 40 - 60 MG/DL    LDL, calculated 91.4 0 - 100 MG/DL    VLDL, calculated 21.6 MG/DL    CHOL/HDL Ratio 3.3 0 - 5.0         Signed By: Nina Daly MD     August 20, 2019

## 2019-08-20 NOTE — PROGRESS NOTES
[facility]  -  Progress Note      Patient: Jd Reza MRN: [id number]  SSN: [id number]    YOB: 1955  Age: 75 y.o. Sex: female      Admit Date: 8/18/2019    Assessment:     -Admitted with possible CVA/TIA. Presented with confusion. Head CT without acute infarct. Head MRI with possible subacute infarct. Similar presentations on past in setting of uncontrolled HTN per family. -Hypertensive heart disease. Elevated BP this admission. Historically difficult to control in setting of difficult compliance. No MARITA on doppler 5/2018. Severe concentric LVH on echo 7/2018.  -Troponin indeterminate suspect secondary demand ischemia in setting of hypertensive heart disease. ECG with chronic LVH and strain pattern. No CAD on cath 5/2018.  -H/o transient nonischemic cardiomyopathy with EF 30% 5/2018 improving to 51-55% 7/2018, no coronary artery disease on cardiac catheterization 5/2018.  -Echo 8/20/2019: EF 36-40% with severe concentric LVH and global LV hypokinesis, grade 2 diastolic dysfunction, mild TR + MR, severely dilated LA, dilated RA, dilated RV with borderline low systolic function.  -Diabetes mellitus. HgbA1c 5.0.  -Dyslipidemia. On statin.  -Chronic diastolic heart failure. Does not appear significantly volume overloaded at present.  -Asthma/COPD on home oxygen.  -Chronic moderate left pleural effusion.  -Chronic kidney disease stage 3 stable. -Hypokalemia replace as needed.  -Chronic anemia.  -Thrombocytopenia continue to trend.     Primary cardiologist Dr. Antonietta Velez. Plan:     -Continue Hydralazine, Aldactone. Starting Amlodipine tomorrow per primary team.  -May consider switching Isordil to Imdur for improved compliance as outpatient.  -Not on ACEi/ARB due to underlying CKD, not on BB due to underlying COPD. -Pt reports non-compliance with medications due to fear that she is taking the wrong one.   She states she waits for her daughter to help her with her medications. Advised system such as pill box to help with compliance. Subjective:     No new complaints.       Objective:      Patient Vitals for the past 8 hrs:   Temp Pulse Resp BP SpO2   08/20/19 1200 97.9 °F (36.6 °C) 83 18 (!) 180/99 99 %   08/20/19 1000  82  (!) 186/108    08/20/19 0851    (!) 175/107    08/20/19 0800 97.6 °F (36.4 °C) 84 17  98 %         Patient Vitals for the past 96 hrs:   Weight   08/20/19 0851 165 lb (74.8 kg)   08/18/19 1436 165 lb (74.8 kg)         Intake/Output Summary (Last 24 hours) at 8/20/2019 1321  Last data filed at 8/20/2019 0851  Gross per 24 hour   Intake 1005 ml   Output 100 ml   Net 905 ml       Physical Exam:  General:  alert, cooperative, no distress, appears stated age  Neck:  No JVD  Lungs:  clear to auscultation bilaterally  Heart:  Regular rate and rhythm  Abdomen:  abdomen is soft without significant tenderness, masses, organomegaly or guarding  Extremities:  Atraumatic, no edema     Data Review:     Labs: Results:       Chemistry Recent Labs     08/19/19  0110 08/18/19  1850 08/18/19  1430   GLU 87 93 87    143 145   K 3.4* 3.1* 3.4*    107 109   CO2 31 29 28   BUN 25* 23* 22*   CREA 1.58* 1.45* 1.49*   CA 8.4* 8.5 8.6   MG 2.0  --  2.0   PHOS 4.0  --   --    AGAP 4 7 8   BUCR 16 16 15   AP 99  --  101   TP 6.9  --  7.2   ALB 2.8*  --  2.8*   GLOB 4.1*  --  4.4*   AGRAT 0.7*  --  0.6*      CBC w/Diff Recent Labs     08/19/19  0110 08/18/19  1430   WBC 4.6 5.0   RBC 3.64* 3.73*   HGB 10.9* 10.5*   HCT 31.3* 31.9*   * 134*   GRANS 81* 64   LYMPH 13* 15*   EOS 1 15*      Coagulation Recent Labs     08/19/19  0110   PTP 13.5   INR 1.1   APTT 28.7       Lipid Panel Lab Results   Component Value Date/Time    Cholesterol, total 104 09/27/2017 04:15 AM    HDL Cholesterol 35 (L) 09/27/2017 04:15 AM    LDL, calculated 44.6 09/27/2017 04:15 AM    VLDL, calculated 24.4 09/27/2017 04:15 AM    Triglyceride 122 09/27/2017 04:15 AM    CHOL/HDL Ratio 3.0 09/27/2017 04:15 AM      BNP Lab Results   Component Value Date/Time    B-type Natriuretic Peptide 223.7 (H) 04/12/2014 12:00 AM      Liver Enzymes Recent Labs     08/19/19  0110   TP 6.9   ALB 2.8*   AP 99   SGOT 11      Thyroid Studies Lab Results   Component Value Date/Time    T4, Total 9.1 12/23/2009 11:33 AM    TSH 2.01 02/01/2019 05:41 PM

## 2019-08-21 VITALS
HEART RATE: 91 BPM | WEIGHT: 165 LBS | TEMPERATURE: 97.4 F | OXYGEN SATURATION: 98 % | RESPIRATION RATE: 18 BRPM | DIASTOLIC BLOOD PRESSURE: 92 MMHG | BODY MASS INDEX: 27.49 KG/M2 | SYSTOLIC BLOOD PRESSURE: 179 MMHG | HEIGHT: 65 IN

## 2019-08-21 LAB
ATRIAL RATE: 87 BPM
CALCULATED P AXIS, ECG09: 43 DEGREES
CALCULATED R AXIS, ECG10: -29 DEGREES
CALCULATED T AXIS, ECG11: 139 DEGREES
DIAGNOSIS, 93000: NORMAL
GLUCOSE BLD STRIP.AUTO-MCNC: 100 MG/DL (ref 70–110)
GLUCOSE BLD STRIP.AUTO-MCNC: 106 MG/DL (ref 70–110)
GLUCOSE BLD STRIP.AUTO-MCNC: 116 MG/DL (ref 70–110)
P-R INTERVAL, ECG05: 166 MS
Q-T INTERVAL, ECG07: 394 MS
QRS DURATION, ECG06: 110 MS
QTC CALCULATION (BEZET), ECG08: 474 MS
VENTRICULAR RATE, ECG03: 87 BPM

## 2019-08-21 PROCEDURE — 74011000250 HC RX REV CODE- 250: Performed by: HOSPITALIST

## 2019-08-21 PROCEDURE — 74011250636 HC RX REV CODE- 250/636: Performed by: HOSPITALIST

## 2019-08-21 PROCEDURE — 82962 GLUCOSE BLOOD TEST: CPT

## 2019-08-21 PROCEDURE — 74011250637 HC RX REV CODE- 250/637: Performed by: HOSPITALIST

## 2019-08-21 PROCEDURE — 74011250637 HC RX REV CODE- 250/637: Performed by: PHYSICIAN ASSISTANT

## 2019-08-21 PROCEDURE — 97530 THERAPEUTIC ACTIVITIES: CPT

## 2019-08-21 PROCEDURE — 97116 GAIT TRAINING THERAPY: CPT

## 2019-08-21 PROCEDURE — 94640 AIRWAY INHALATION TREATMENT: CPT

## 2019-08-21 PROCEDURE — 94760 N-INVAS EAR/PLS OXIMETRY 1: CPT

## 2019-08-21 PROCEDURE — 97535 SELF CARE MNGMENT TRAINING: CPT

## 2019-08-21 RX ORDER — AMLODIPINE BESYLATE 10 MG/1
10 TABLET ORAL DAILY
Qty: 30 TAB | Refills: 0 | Status: SHIPPED
Start: 2019-08-22 | End: 2019-09-18 | Stop reason: SDUPTHER

## 2019-08-21 RX ADMIN — SPIRONOLACTONE 25 MG: 25 TABLET ORAL at 08:30

## 2019-08-21 RX ADMIN — FUROSEMIDE 20 MG: 20 TABLET ORAL at 08:30

## 2019-08-21 RX ADMIN — FLUTICASONE FUROATE AND VILANTEROL TRIFENATATE 1 PUFF: 100; 25 POWDER RESPIRATORY (INHALATION) at 09:04

## 2019-08-21 RX ADMIN — HYDRALAZINE HYDROCHLORIDE 100 MG: 50 TABLET, FILM COATED ORAL at 08:29

## 2019-08-21 RX ADMIN — FAMOTIDINE 20 MG: 10 INJECTION, SOLUTION INTRAVENOUS at 08:30

## 2019-08-21 RX ADMIN — Medication 81 MG: at 08:29

## 2019-08-21 RX ADMIN — HEPARIN SODIUM 5000 UNITS: 5000 INJECTION, SOLUTION INTRAVENOUS; SUBCUTANEOUS at 08:31

## 2019-08-21 RX ADMIN — FUROSEMIDE 20 MG: 20 TABLET ORAL at 17:28

## 2019-08-21 RX ADMIN — AMLODIPINE BESYLATE 10 MG: 10 TABLET ORAL at 08:29

## 2019-08-21 RX ADMIN — ISOSORBIDE DINITRATE 20 MG: 20 TABLET ORAL at 08:30

## 2019-08-21 RX ADMIN — HEPARIN SODIUM 5000 UNITS: 5000 INJECTION, SOLUTION INTRAVENOUS; SUBCUTANEOUS at 17:33

## 2019-08-21 RX ADMIN — ISOSORBIDE DINITRATE 20 MG: 20 TABLET ORAL at 17:28

## 2019-08-21 RX ADMIN — Medication 10 ML: at 14:02

## 2019-08-21 RX ADMIN — Medication 10 ML: at 06:26

## 2019-08-21 RX ADMIN — HYDRALAZINE HYDROCHLORIDE 100 MG: 50 TABLET, FILM COATED ORAL at 17:28

## 2019-08-21 NOTE — ROUTINE PROCESS
Bedside and Verbal shift change report given to [facility] RN (oncoming nurse) by Osiris Gtz RN (offgoing nurse). Report given with SBAR, Kardex, Intake/Output, MAR, Accordion and Recent Results.

## 2019-08-21 NOTE — PROGRESS NOTES
Report called to Garo Pavon LPN @ [facility] IV, jil, and junior discontinued awaiting medical transport to  patient at [phone]

## 2019-08-21 NOTE — PROGRESS NOTES
[facility]  -  Progress Note      Patient: Pamela Ruiz MRN: [id number]  SSN: [id number]    YOB: 1955  Age: 75 y.o. Sex: female      Admit Date: 8/18/2019    Assessment:     -Admitted with possible CVA/TIA. Presented with confusion. Head CT without acute infarct. Head MRI with possible subacute infarct. Similar presentations on past in setting of uncontrolled HTN per family. -Hypertensive heart disease. Elevated BP this admission. Historically difficult to control in setting of difficult compliance. No MARITA on doppler 5/2018. Severe concentric LVH on echo 7/2018.  -Troponin indeterminate suspect secondary demand ischemia in setting of hypertensive heart disease. ECG with chronic LVH and strain pattern. No CAD on cath 5/2018.  -H/o transient nonischemic cardiomyopathy with EF 30% 5/2018 improving to 51-55% 7/2018, no coronary artery disease on cardiac catheterization 5/2018.  -Echo 8/20/2019: EF 36-40% with severe concentric LVH and global LV hypokinesis, grade 2 diastolic dysfunction, mild TR + MR, severely dilated LA, dilated RA, dilated RV with borderline low systolic function.  -Diabetes mellitus. HgbA1c 5.0.  -Dyslipidemia. On statin.  -Chronic diastolic heart failure. Does not appear significantly volume overloaded at present.  -Asthma/COPD on home oxygen.  -Chronic moderate left pleural effusion.  -Chronic kidney disease stage 3 stable. -Hypokalemia replace as needed.  -Chronic anemia.  -Thrombocytopenia continue to trend.     Primary cardiologist Dr. Eduardo Romo. Plan:     -BP appears overall better controlled today, Amlodipine increased to 10 mg this morning. Continue Amlodipine, Hydralazine, Isordil, Aldactone, PO Lasix.  -Not on ACEi/ARB due to underlying CKD, not on BB due to underlying COPD.  -No further recommendations from cardiac standpoint. Subjective:     Reports some nausea this AM, denies chest pain or shortness of breath.     Objective:      Patient Vitals for the past 8 hrs:   Temp Pulse Resp BP SpO2   08/21/19 0800 97.9 °F (36.6 °C) 89 17 161/88 99 %   08/21/19 0400 97.9 °F (36.6 °C) 85 16 138/81 98 %         Patient Vitals for the past 96 hrs:   Weight   08/20/19 0851 165 lb (74.8 kg)   08/18/19 1436 165 lb (74.8 kg)         Intake/Output Summary (Last 24 hours) at 8/21/2019 1111  Last data filed at 8/21/2019 3373  Gross per 24 hour   Intake 360 ml   Output    Net 360 ml       Physical Exam:  General:  alert, cooperative, no distress, appears stated age  Neck:  No JVD  Lungs:  clear to auscultation bilaterally  Heart:  Regular rate and rhythm  Abdomen:  abdomen is soft without significant tenderness, masses, organomegaly or guarding  Extremities:  Atraumatic, no edema     Data Review:     Labs: Results:       Chemistry Recent Labs     08/19/19  0110 08/18/19  1850 08/18/19  1430   GLU 87 93 87    143 145   K 3.4* 3.1* 3.4*    107 109   CO2 31 29 28   BUN 25* 23* 22*   CREA 1.58* 1.45* 1.49*   CA 8.4* 8.5 8.6   MG 2.0  --  2.0   PHOS 4.0  --   --    AGAP 4 7 8   BUCR 16 16 15   AP 99  --  101   TP 6.9  --  7.2   ALB 2.8*  --  2.8*   GLOB 4.1*  --  4.4*   AGRAT 0.7*  --  0.6*      CBC w/Diff Recent Labs     08/19/19  0110 08/18/19  1430   WBC 4.6 5.0   RBC 3.64* 3.73*   HGB 10.9* 10.5*   HCT 31.3* 31.9*   * 134*   GRANS 81* 64   LYMPH 13* 15*   EOS 1 15*      Coagulation Recent Labs     08/19/19  0110   PTP 13.5   INR 1.1   APTT 28.7       Lipid Panel Lab Results   Component Value Date/Time    Cholesterol, total 162 08/20/2019 01:50 PM    HDL Cholesterol 49 08/20/2019 01:50 PM    LDL, calculated 91.4 08/20/2019 01:50 PM    VLDL, calculated 21.6 08/20/2019 01:50 PM    Triglyceride 108 08/20/2019 01:50 PM    CHOL/HDL Ratio 3.3 08/20/2019 01:50 PM      Liver Enzymes Recent Labs     08/19/19  0110   TP 6.9   ALB 2.8*   AP 99   SGOT 11      Thyroid Studies Lab Results   Component Value Date/Time    T4, Total 9.1 12/23/2009 11:33 AM    TSH 2.01 02/01/2019 05:41 PM

## 2019-08-21 NOTE — CDMP QUERY
Patient admitted with question of CVA. Noted documentation of  \"subacute small vessel infarct\"  and \"hypertensive urgency associated with encephalopathy\" per Neuro and \"TIA\" and \"Malignant HTN\" per attending MD.    Please clarify specificity of HTN, encephalopathy, and if this was TIA or subacute small vessel infarct      => Hypertensive Emergency and Hypertensive Encephalopathy with (TIA or subacute small vessel infarct)  =>Hypertensive Emergency and Acute Encephalopathy with (TIA or subacute small vessel infarct)  =>Hypertensive Crisis and Hypertensive Encephalopathy with (TIA or subacute small vessel infarct)  =>Hypertensive Crisis and Acute Encephalopathy with (TIA or subacute small vessel infarct)  =>Hypertensive Urgency and Hypertensive Encephalopathy with (TIA or subacute small vessel infarct)  =>Hypertensive Urgency and Acute  Encephalopathy with (TIA or subacute small vessel infarct)  =>Other Explanation of clinical findings  =>Clinically Undetermined (no explanation for clinical findings)    The medical record reflects the following:    Risk Factors: HTN    Clinical Indicators:   ER BP's 207/125 and 211/153  8/19 neuro: \"The noted finding on MRI is likely a subacute small vessel infarct which is likely clinically silent\"  \"This symptom complex is most suggestive of a hypertensive urgency associated with encephalopathy\"  8/20 neuro \"Her MRI did reveal a small subacute small subcortical infarct which is/was likely clinical silent and an epiphenomena of her severe uncontrolled HTN. \"  8/20 PN \"1. TIA - appreciate Neuro input - continue ASA & Lipitor  - MRI Brain reviewed - carotid duplex reviewed - Per neuro - need to control BP better, Malignant HTN - BP better controlled - continue current meds - added hydralazine prn\"   Treatment: Aggressive BP control    *Hypertensive Urgency: Defined as systolic BP of > 801 mmHG OR diastolic BP > 459 mmHG without associated organ damage.  Symptoms may or may not be present, but can include: severe headache, shortness of breath, nosebleeds, severe anxiety. Treatment: adjustment of oral blood pressure medications; IV medication is not usually required. These patients may not be admitted. *Hypertensive Emergency: Defined as more critical than \"urgency,\" systolic BP of > 374 mmHG OR diastolic BP > 121 mmHG with associated organ damage. Requires immediate treatment, usually with IV medications. Hypertensive Crisis, unspecified: Systolic BP of > 311 mmHG OR diastolic BP > 137 mmHG. *Hypertensive encephalopathy. Symptoms: Significantly elevated blood pressure (systolic >012 mmHg; diastolic >707 mmHg), headaches, obtundation, confusion or stupor, convulsions/seizures and a return to baseline with normalization of blood pressure. Treatment: IV medication (Nipride, Lopressor, Labetalol), Frequent adjustment of medication, Frequent vital signs (blood pressure every 15 minutes and neuro checks every hour). (http://www.[employer].ru/. com/nephrology-hypertension/hypertension-hypertension-crisis-hypertension-hypertensive-emergencies-ybnqimacwaok-picthkg-cdyrtkity-hypertension/article/616545/; https://www. frontiersin.org/articles/10.3389/fcvm. 2016.01392/full)  Thank you.   Kiersten Arango RN BSN CCDS [phone]

## 2019-08-21 NOTE — PROGRESS NOTES
Transition of Care Plan to SNF/Rehab    SNF/Rehab Transition:  Patient has been accepted to [facility] and meets criteria for admission. Patient will  be transported by Elena. Tavia [address]  and expected to leave at 5:45 P. M..    Communication to Patient/Family:  Met with patient and SPOKE WITH FAMILY MEMBERS BY PHONE (identified care giver) and they are agreeable to the transition plan. Communication to SNF/Rehab:  Bedside RN,JAGRUTI, has been notified of the transition plan to the facility and to call report (phone number [phone]). Discharge information has been updated on the AVS. And communicated to facility via [facility], or CC link. SNF/Rehab Transition:    PCP/Specialist: DR. Mortimer Dolphin    Nursing Please include all hard scripts for controlled substances, med rec and dc summary, and AVS in packet. Reviewed and confirmed with facility representative, HEAVEN  at [facility] they can manage the patient care needs for the following:     Kelsey Diaz with (X) only those applicable:    Medication:  [x]  Medications will be available at the facility  []  IV Antibiotics   []  Controlled Substance - hard copy to be sent with patient   []  Weekly Labs    Documents:  [x] Hard RX  Number sent   [x] MAR  [] Kardex  [] AVS  [] Wound care note  [x]Transfer Summary/Discharge Summary    Equipment:  []  CPAP/BiPAP  []  Wound Vacuum  []  Garrett or Urinary Device  []  PICC/Central Line  []  Nebulizer  []  Ventilator    Treatment:  []Isolation (for MRSA, VRE, etc.)  []Surgical Drain Management  []Tracheostomy Care  []Dressing Changes  []Dialysis with transportation and chair time Center Mode of tranpsort  []PEG Care  []Oxygen  []Daily Weights for Heart Failure    Dietary:  []Any diet limitations  []Tube Feedings   []Total Parenteral Management (TPN)    Eligible for Medicaid Long Term Services and Supports  Yes:  [] Eligible for medical assistance or will become eligible within 180 days and LTSS completed. [] Provider/Patient and/or support system has requested screening.  [] LTSS copy provided to patient or responsible party,  [] LTSS unavailable at discharge will send once processed to SNF provider.  [] LTSS  unavailable at discharged mailed to patient  [] LTSS performed by outside agency on  with tracking number   No:   [] Private pay and is not financially eligible for Medicaid within the next 180 days. [] Reside out-of-state.   [] A residents of a state owned/operated facility that is licensed  by [facility] or [facility]  [] Enrollment in [facility] hospice services  [] [address] [place] [address]  [x] Patient /Family declines to have screening completed or provide financial information for screening          Financial Resources:  Medicaid    [] Initiated and application pending   [x] Full coverage      Advanced Care Plan:  []Surrogate Decision Maker of Care  []POA  []Communicated Code Status/ sent (DDNR, POST, Advance Directive)     Other

## 2019-08-21 NOTE — PROGRESS NOTES
Problem: Self Care Deficits Care Plan (Adult)  Goal: *Acute Goals and Plan of Care (Insert Text)  Description  Occupational Therapy Goals  Initiated 8/19/2019 within 7 day(s). 1.  Patient will perform functional activity for 3-5 minutes with independence standing and G balance. 2.  Patient will perform lower body dressing with modified independence. 3.  Patient will perform toilet transfers with modified independence. 4.  Patient will perform all aspects of toileting with modified independence. 5.  Patient will utilize energy conservation techniques during functional activities with verbal cues. 6. Patient will participate in therapeutic exercises with independence for 5-7 minutes to increase BUE strength/ROM for self-care    Prior Level of Function: Patient was modified independent with self-care and used a RW for functional mobility PTA. Pt reports her daughter does help with ADLs depending on the type of day she is having but mostly she can \"do for herself\"; son confirms. Outcome: Progressing Towards Goal   OCCUPATIONAL THERAPY TREATMENT    Patient: Porter Manzano (1 y.o. female)  Date: 8/21/2019  Diagnosis: TIA (transient ischemic attack) [G45.9] TIA (transient ischemic attack)       Precautions: Fall    Chart, occupational therapy assessment, plan of care, and goals were reviewed. ASSESSMENT:  Pt is sitting at EOB upon entry, finishing breakfast. Pt appears pleasantly confused, however is oriented to person and place. Pt required CGA to don gown simulating donning jacket. Pt benefiting from short one-step commands and demonstration prior to performing task. Pt maneuvered to the BR w/FWW, performed all aspects of toileting followed by std grooming task for ~ 10 min w/SBA for safety when std unsupported and VCs for use of FWW and grab bars.  Pt maneuvered to the chair, educated on adaptive strategy for LB dressing, pt required Min A to doff/don socks, reports her daughter is helping her with all aspects of ADLs. Pt left comfortable seated in the chair at the end of the session w/call bell within reach and chair alarm on for pt's safety. Progression toward goals:  ?          Improving appropriately and progressing toward goals  ? Improving slowly and progressing toward goals  ? Not making progress toward goals and plan of care will be adjusted     PLAN:  Patient continues to benefit from skilled intervention to address the above impairments. Continue treatment per established plan of care. Discharge Recommendations:  [facility] vs [facility] w/24/7 Supervision and assistance. Further Equipment Recommendations for Discharge:  bedside commode and shower chair     SUBJECTIVE:   Patient stated My daughter works all the time, she helps me with everything.     OBJECTIVE DATA SUMMARY:   Cognitive/Behavioral Status:  Neurologic State: Alert  Orientation Level: Oriented to person, Oriented to place  Cognition: Follows commands  Safety/Judgement: Fall prevention    Functional Mobility and Transfers for ADLs:   Transfers:  Sit to Stand: Stand-by assistance  Stand to Sit: Stand-by assistance   Toilet Transfer : Stand-by assistance    Balance:  Sitting: Intact  Standing: Impaired; With support  Standing - Static: Fair  Standing - Dynamic : Fair    ADL Intervention:       Grooming  Grooming Assistance: Stand-by assistance(std sinkside)  Washing Face: Stand-by assistance  Washing Hands: Stand-by assistance  Brushing Teeth: Stand-by assistance    Upper Body Dressing Assistance  Shirt simulation with hospital gown: Contact guard assistance    Lower Body Dressing Assistance  Socks: Minimum assistance    Toileting  Bladder Hygiene: Supervision    Pain:  Pain level pre-treatment: 0/10   Pain level post-treatment: 0/10    Activity Tolerance:    Fair   Please refer to the flowsheet for vital signs taken during this treatment.   After treatment:   ?  Patient left in no apparent distress sitting up in chair  ? Patient left in no apparent distress in bed  ? Call bell left within reach  ? Nursing notified  ? Caregiver present  ? Chair alarm activated    COMMUNICATION/EDUCATION:   ? Role of Occupational Therapy in the acute care setting  ? Home safety education was provided and the patient/caregiver indicated understanding. ? Patient/family have participated as able in working towards goals and plan of care. ? Patient/family agree to work toward stated goals and plan of care. ? Patient understands intent and goals of therapy, but is neutral about his/her participation. ? Patient is unable to participate in goal setting and plan of care.       Thank you for this referral.  DANIELA Matos/L  Time Calculation: 38 mins

## 2019-08-21 NOTE — DISCHARGE SUMMARY
Discharge Summary    Patient: Marilee Emmanuel MRN: [id number]  CSN: [id number]    YOB: 1955  Age: 75 y.o. Sex: female    DOA: 8/18/2019 LOS:  LOS: 3 days   Discharge Date:      Admission Diagnoses: TIA (transient ischemic attack) [G45.9]    Discharge Diagnoses:    Hypertensive Urgency with Hypertensive Encephalopathy   HTN   T2DM   HLPD  Severe protein calorie malnutrition   H/o chr systolic CHF   Elevated Trop likely in the setting of hypertensive urgency       Discharge Condition: Stable    Consults: Cardiology and Neurology    PHYSICAL EXAM  Visit Vitals  /73 (BP 1 Location: Right arm, BP Patient Position: Sitting)   Pulse 89   Temp 97.3 °F (36.3 °C)   Resp 18   Ht 5' 5\" (1.651 m)   Wt 74.8 kg (165 lb)   SpO2 100%   Breastfeeding? No   BMI 27.46 kg/m²       General: Alert, cooperative, no acute distress    HEENT: PERRLA, EOMI. Anicteric sclerae. Lungs:  CTA Bilaterally. No Wheezing/Rhonchi/Rales. Heart:  Regular rate and Rhythm. Abdomen: Soft, Non distended, Non tender. + Bowel sounds. Extremities: No edema/ cyanosis/ clubbing  Psych:   Good insight. Not anxious or agitated. Neurologic:  AA oriented X 3. Moves all extremities. HPI:  Marilee Emmanuel is a 75 y.o. female with a several year history of uncontrolled hypertension and non-ischemic cardiomyopathy, chronic systolic and diastolic CHF ( EF C Cath 5/6796 was 30% improved to 50%   July 2018, felt to be hypertensive induced), mild persistent asthma vs restrictive lung disease started on  Breo 4/2019, chronic bilateral pleural effusions, O 2 at home 3 l/min, prn, right thalamic lacunar infarct 2017 with dysphasia that gradually improved. Earlier this year she saw Dr Courtney Marr and Dr Urbano Zambrano in the office.     On 8/18/2019 Ms. Armond Fernandez came to [facility] ER with her family reporting several days of generalized weakness and confusion.  Triage reported they gave a history of MS, that Could not be collaborated by this writer in the records. She presented able to state her name and birthday, but unable to name her family members. Report to MD was substernal chest pain/pressure that morning lasting about 20 minutes with SOB and diaphoresis, that resolved and patient was pain free in the ER. BP was elevated 207/125; 211/153. She received asa 324 mg and her morning dose of Hydralazine 100 mg PO at 3 PM. /78, HR 91 at 4 PM.    At 4:37 PM Ms. Adalid Lofton had code S with mental status change, aphasia and left facial droop and mental status change. On arrival at CT scan she began speaking and improved. Tele Neuro was consulted with plan to admit for TIA, consult to inpatient Neurology and MRI. On return from MRI patient was somnolent following 1 mg IV Ativan for study. Patient would wake up with loud call of name and follow one step commands, stick out tongue (midline) and open mouth. No drooling or cough. Patient controlling airway adequately at present. MRI with chronic left frontoparietal changes with differential noted of chronic infarct vs demyelination; no acute hemorrhage or acute territorial infarct.   Discussed patient's complicated status with neurology for consult in am.  Patient admitted to Neuroscience Unit           Hospital Course:   Pt admitted to the hops with hypertensive urgency & signs of TIA with Gen weakness & confusion   CT head done & pt admitted for further eval of TIA   MRI brain done - results attached below - No acute Hge or infarct   Neuro consulted on pt - advised that this is likely hypertensive Encephalopathy 2y to uncontrolled HTN   BP better controlled with meds now   Pt seen by PT & OT recommended SNF   Also seen by cardiology for elevated Trop - likely trop leak 2y to elevated BP - advised medical management   Pt is being discharged to SNF & is advised to f/u with PCP & cardiology in 2 weeks       Imaging:  CT head   IMPRESSION[de-identified]  1.  No acute intracranial hemorrhage or territorial infarct.     2. Progression of white matter lucencies. Most often chronic small vessel  ischemic sequelae, though can be seen in the setting of reversible  encephalopathy from poorly controlled significant hypertension.     3. Sinusitis.     4. Stroke alert results discussed by me with Dr. Madelyn Malhotra 8/18/2019 at 1700 hours  MRI brain   IMPRESSION:     1. No acute hemorrhage or acute territorial infarct.     2. Small ring shaped focus of diffusion hyperintensity at left frontoparietal  junction is more characteristic of T2 shine through from a chronic lesion. It is  conceivable that it is a later subacute to chronic focus of infarct. Could  conceivably relate to chronic signal changes of the demyelination plaques, but  was not evident July 2018.     3. Background of mild to moderate amount of chronic white matter disease. Pattern is not specific for multiple sclerosis.     Thank you for enabling us to participate in the care of this patient.       Echo   · Left Ventricle: Normal cavity size. Severe concentric hypertrophy. Moderate systolic dysfunction. Estimated left ventricular ejection fraction is 36 - 40%. Biplane method used to measure ejection fraction. Left ventricular global hypokinesis. Abnormal left ventricular strain. Moderate (grade 2) left ventricular diastolic dysfunction. · Interatrial Septum: Agitated saline contrast study was performed. There was no shunting at baseline or with Valsalva. · Tricuspid Valve: Mild tricuspid valve regurgitation is present. · Pulmonary Artery: Mild pulmonary hypertension. Pulmonary arterial systolic pressure is 39 mmHg. · Mitral Valve: Mitral valve thickening. Mitral annular calcification. Mild mitral valve regurgitation. · Left Atrium: Severely dilated left atrium. · Right Atrium: Dilated right atrium. · Right Ventricle: Dilated right ventricle. Borderline low systolic function.     Procedures:   None         Discharge Medications:     Current Discharge Medication List      CONTINUE these medications which have CHANGED    Details   amLODIPine (NORVASC) 10 mg tablet Take 1 Tab by mouth daily. Qty: 30 Tab, Refills: 0         CONTINUE these medications which have NOT CHANGED    Details   fluticasone furoate-vilanterol (BREO ELLIPTA) 100-25 mcg/dose inhaler Take 1 Puff by inhalation daily. Qty: 1 Inhaler, Refills: 0      isosorbide dinitrate (ISORDIL) 10 mg tablet Take 2 Tabs by mouth three (3) times daily. Qty: 1 Tab, Refills: 0      spironolactone (ALDACTONE) 25 mg tablet Take 1 Tab by mouth two (2) times a day. Qty: 180 Tab, Refills: 3      Oxygen 3L per min as needed      sodium chloride (OCEAN) 0.65 % nasal squeeze bottle 2 Sprays as needed for Congestion. ondansetron hcl (ZOFRAN) 4 mg tablet Take 1 Tab by mouth every eight (8) hours as needed for Nausea. Qty: 10 Tab, Refills: 0      albuterol (VENTOLIN HFA) 90 mcg/actuation inhaler Take 2 Puffs by inhalation every four (4) hours as needed for Wheezing or Shortness of Breath. Qty: 1 Inhaler, Refills: 5    Associated Diagnoses: Chronic obstructive pulmonary disease, unspecified COPD type ([facility])      hydrALAZINE (APRESOLINE) 100 mg tablet Take 1 Tab by mouth three (3) times daily.   Qty: 270 Tab, Refills: 3      Blood-Glucose Meter (TRUE METRIX GLUCOSE METER) misc Test glucose 3 times daily DX: E11.40  Qty: 1 Each, Refills: 0    Associated Diagnoses: Type 2 diabetes mellitus with diabetic neuropathy, with long-term current use of insulin ([facility])      Lancets misc Test glucose 3 times daily DX: E11.40  Qty: 100 Each, Refills: 12    Associated Diagnoses: Type 2 diabetes mellitus with diabetic neuropathy, with long-term current use of insulin ([facility])      glucose blood VI test strips (BLOOD GLUCOSE TEST) strip (True Metrix) Test glucose 3 times daily DX: E11.40  Qty: 100 Strip, Refills: 12    Associated Diagnoses: Type 2 diabetes mellitus with diabetic neuropathy, with long-term current use of insulin ([facility]) insulin glargine (LANTUS,BASAGLAR) 100 unit/mL (3 mL) inpn 7 Units by SubCUTAneous route in the morning. 15 units in the evening. Qty: 5 Pen, Refills: 12    Associated Diagnoses: Type 2 diabetes mellitus with diabetic neuropathy, with long-term current use of insulin ([facility])      furosemide (LASIX) 20 mg tablet Take 1 Tab by mouth two (2) times a day. Qty: 60 Tab, Refills: 8      acetaminophen (TYLENOL EXTRA STRENGTH) 500 mg tablet Take 1,000 mg by mouth every six (6) hours as needed for Pain. CARBOXYMETHYLCELLULOS/GLYCERIN (REFRESH OPTIVE OP) Administer 1 Drop to both eyes six (6) times daily. aspirin delayed-release 81 mg tablet Take 81 mg by mouth daily. pravastatin (PRAVACHOL) 20 mg tablet Take 20 mg by mouth nightly.          STOP taking these medications       meloxicam (MOBIC) 7.5 mg tablet Comments:   Reason for Stopping:               Current Facility-Administered Medications:     amLODIPine (NORVASC) tablet 10 mg, 10 mg, Oral, DAILY, Lizbeth Reilly MD, 10 mg at 08/21/19 0829    hydrALAZINE (APRESOLINE) 20 mg/mL injection 10 mg, 10 mg, IntraVENous, Q6H PRN, Lizbeth Reilly MD    sodium chloride (NS) flush 5-40 mL, 5-40 mL, IntraVENous, Q8H, Megan Robin DO, 10 mL at 08/21/19 3656    sodium chloride (NS) flush 5-40 mL, 5-40 mL, IntraVENous, PRN, Binu Riggs,     atorvastatin (LIPITOR) tablet 80 mg, 80 mg, Oral, QHS, Binu Riggs DO, 80 mg at 08/20/19 2157    bisacodyl (DULCOLAX) suppository 10 mg, 10 mg, Rectal, DAILY PRN, Binu Riggs DO    famotidine (PF) (PEPCID) 20 mg in sodium chloride 0.9% 10 mL injection, 20 mg, IntraVENous, Q24H, Megan Aden A DO, 20 mg at 08/21/19 0830    heparin (porcine) injection 5,000 Units, 5,000 Units, SubCUTAneous, Q8H, Rod Oppjonathan DONAHUE DO, 5,000 Units at 08/21/19 0831    dextrose 10% infusion 125-250 mL, 125-250 mL, IntraVENous, PRN, Judy Eddy MD    hydrALAZINE (APRESOLINE) tablet 100 mg, 100 mg, Oral, TID, Hazel Cintron PA, 100 mg at 08/21/19 0829    spironolactone (ALDACTONE) tablet 25 mg, 25 mg, Oral, DAILY, Hazel Cintron PA, 25 mg at 08/21/19 0830    sodium chloride (NS) flush 5-40 mL, 5-40 mL, IntraVENous, Q8H, Megan Herring DO, 10 mL at 08/20/19 1458    sodium chloride (NS) flush 5-40 mL, 5-40 mL, IntraVENous, PRN, Fabien Rodriguez DO    acetaminophen (TYLENOL) tablet 650 mg, 650 mg, Oral, Q4H PRN, Ryley DONAHUE DO, 650 mg at 08/20/19 1010    diphenhydrAMINE (BENADRYL) injection 25 mg, 25 mg, IntraVENous, Q4H PRN, Ryley DONAHUE DO    ondansetron (ZOFRAN) injection 4 mg, 4 mg, IntraVENous, Q4H PRN, Ryley DONAHUE DO    albuterol (PROVENTIL VENTOLIN) nebulizer solution 2.5 mg, 2.5 mg, Nebulization, Q4H PRN, Ryley DONAHUE DO    aspirin delayed-release tablet 81 mg, 81 mg, Oral, DAILY, Ryley DONAHUE DO, 81 mg at 08/21/19 0829    fluticasone-vilanterol (BREO ELLIPTA) 100mcg-25mcg/puff, 1 Puff, Inhalation, DAILY, Fabien Rodriguez DO, 1 Puff at 08/21/19 9944    furosemide (LASIX) tablet 20 mg, 20 mg, Oral, BID, Ryley DONAHUE DO, 20 mg at 08/21/19 0830    isosorbide dinitrate (ISORDIL) tablet 20 mg, 20 mg, Oral, TID, Ryley DONAHUE DO, 20 mg at 08/21/19 0830    traMADol (ULTRAM) tablet 50 mg, 50 mg, Oral, Q6H PRN, Ryley DONAHUE DO, 50 mg at 08/20/19 1504    insulin lispro (HUMALOG) injection, , SubCUTAneous, AC&HS, Anai Marcus MD, Stopped at 08/19/19 1630    glucose chewable tablet 16 g, 4 Tab, Oral, PRN, Everett Corbett MD    glucagon (GLUCAGEN) injection 1 mg, 1 mg, IntraMUSCular, PRN, Anai Marcus MD  · It is important that you take the medication exactly as they are prescribed. · Keep your medication in the bottles provided by the pharmacist and keep a list of the medication names, dosages, and times to be taken in your wallet. · Do not take other medications without consulting your doctor.          Minutes spent on discharge: 45 minutes spent coordinating this discharge (review instructions/follow-up, prescriptions, preparing report for sign off)    Nathan Del Rosario MD  8/21/2019 2:14 PM

## 2019-08-21 NOTE — PROGRESS NOTES
Problem: Mobility Impaired (Adult and Pediatric)  Goal: *Acute Goals and Plan of Care (Insert Text)  Description  Physical Therapy Goals  Initiated 8/19/2019 and to be accomplished within 7 day(s)  1. Patient will move from supine to sit and sit to supine , scoot up and down and roll side to side in bed with modified independence. 2.  Patient will transfer from bed to chair and chair to bed with supervision/set-up using the least restrictive device. 3.  Patient will perform sit to stand with supervision/set-up. 4.  Patient will ambulate with supervision/set-up for >50 feet with the least restrictive device. 5.  Patient will ascend/descend 3 stairs with 1 handrail(s) with minimal assistance/contact guard assist.    PLOF: Patient lives with her daughter and granddaughter and reports that her daughter works during the day. Patient reports she was sometimes able to get to the bathroom and that she walks more with her cane than a walker. She reports she has cane, RW, Rollator, and required assist for I/ADL's. Outcome: Progressing Towards Goal   PHYSICAL THERAPY TREATMENT    Patient: Karen Lala (36 y.o. female)  Date: 8/21/2019  Diagnosis: TIA (transient ischemic attack) [G45.9] TIA (transient ischemic attack)       Precautions: Fall    ASSESSMENT:  Pt found seated in recliner willing to participate w/ therapy. Pt able to maintain mobility status this visit, however cont to display lack of endurance w/ gait training, however was able to improve in distance this visit. Pt  req to sit half way in [facility] for rest, and able to return by self w/ RW to room. Pt displayed good stability and balance t/o and return to recliner. When sitting, pt displays fair ecc control w/ heavy reliance on UEs w/ slight valgus collapse. Pt attempted stand to sit w/ manual cueing for hip abd/ER to prevent collapse w/ fair carryover from cueing. Pt performed there-ex in chair to facilitate movement and provided HEP w/ there-ex.  Pt left in recliner w/ all needs in reach. Pt will cont to benefit from either SNF stay of [facility] w/ 24/7 assistance. Progression toward goals: good   ? Improving appropriately and progressing toward goals  ? Improving slowly and progressing toward goals  ? Not making progress toward goals and plan of care will be adjusted     PLAN:  Patient continues to benefit from skilled intervention to address the above impairments. Continue treatment per established plan of care. Discharge Recommendations:  Home Health w/ 24/7 assistance or SNF  Further Equipment Recommendations for Discharge:  has RW and rollator      SUBJECTIVE:   Patient stated I have a rollator at home.     OBJECTIVE DATA SUMMARY:   Critical Behavior:  Neurologic State: Alert  Orientation Level: Oriented to person, Oriented to place  Cognition: Follows commands  Safety/Judgement: Fall prevention  Functional Mobility Training:  Transfers:  Sit to Stand: Stand-by assistance  Stand to Sit: Stand-by assistance  Balance:  Sitting: Intact  Standing: Impaired; With support  Standing - Static: Good  Standing - Dynamic : Fair  Ambulation/Gait Training:  Distance (ft): 75 Feet (ft)  Assistive Device: Walker, rolling  Ambulation - Level of Assistance: Contact guard assistance;Stand-by assistance  Gait Abnormalities: Decreased step clearance  Base of Support: Center of gravity altered; Widened  Speed/Laila: Pace decreased (<100 feet/min)  Step Length: Right shortened;Left shortened  Therapeutic Exercises:       EXERCISE   Sets   Reps   Active Active Assist   Passive Self ROM   Comments   Ankle Pumps 1 10  ? ? ? ?    Quad Sets/Glut Sets 1 10  ? ? ? ? Hold for 5 secs   Seated hip abd/ER w/ OP 1 10 ? ? ? ? Hold 3 sec      Pain:  Pain level pre-treatment: 0/10  Pain level post-treatment: 0/10       Activity Tolerance:   Fair   Please refer to the flowsheet for vital signs taken during this treatment.   After treatment:   ? Patient left in no apparent distress sitting up in chair  ? Patient left in no apparent distress in bed  ? Call bell left within reach  ? Nursing notified  ? Caregiver present  ? Bed alarm activated  ? SCDs applied      COMMUNICATION/EDUCATION:   ?         Role of Physical Therapy in the acute care setting. ?         Fall prevention education was provided and the patient/caregiver indicated understanding. ? Patient/family have participated as able in working toward goals and plan of care. ?         Patient/family agree to work toward stated goals and plan of care. ?         Patient understands intent and goals of therapy, but is neutral about his/her participation. ? Patient is unable to participate in stated goals/plan of care: ongoing with therapy staff.   ?         Other:        Paty Campos, PAMELA   Time Calculation: 24 mins

## 2019-08-22 NOTE — CDMP QUERY
Please provide additional documentation in the patient's medical record supportive of your documented diagnosis of Severe Malnutrition or  (rule out or amend). »  Condition was evident because:  (Please include at least 2 clinical indicators from below ASPEN criteria)     »  Severe Malnutrition was ruled out       »  Other  Severe Malnutrition ruled out, other Dx ruled in (please clarify)     »  Unknown    The medical record reflects the following:       Risk Factors: 76 yo with swallowing difficulty     Clinical Indicators:   8/19 Rd assessment:  Nutrition Diagnosis: Predicted inadequate energy intake related to appetite as evidenced by family report of meal intake PTA and pt consuming <50% of lunch. Treatment: Add Glucerna Shakes TID to optimize nutrition intake. Continue RD inpatient monitoring and evaluation. The clinical indicators for Severe Protein Calorie Malnutrition/ Chronic Illness include:    (2 of 6 to meet criteria)  Energy Intake:  <75% of energy requirement for > 1 month,    Weight loss:   >5%/1 month, >7.5%/3 months, >10%/6 months, >20%/1 year,       Body Fat:  loss of SQ fat from orbits, triceps, or fat overlying the ribs- Severe loss  Muscle Mass:   muscle wasting at the temples, clavicles, shoulders, interosseous spaces, scapula, thigh, calf- Severe wasting  Fluid Accumulation:  localized or generalized edema of the extremities, vulva, scrotum- Severe   Strength:   measurably decreased  Thank you.   Nancy Bergman RN BSN CCDS

## 2019-08-23 ENCOUNTER — PATIENT OUTREACH (OUTPATIENT)
Dept: FAMILY MEDICINE CLINIC | Age: 64
End: 2019-08-23

## 2019-08-23 NOTE — PROGRESS NOTES
Transition of Care Coordination/Hospital to Post Acute Facility:     Date/Time:  8/23/2019 3:12 PM    Patient was admitted to [facility] on 8/18/19 for treatment of   Discharge Diagnoses:    Hypertensive Urgency with Hypertensive Encephalopathy   HTN   T2DM   HLPD  Severe protein calorie malnutrition   H/o chr systolic CHF   Elevated Trop likely in the setting of hypertensive urgency     Patient was discharged8/21/19 to [facility] for continuation of care. Inpatient RRAT score: High Risk            28       Total Score        28 Charlson Comorbidity Score (Age + Comorbid Conditions)        Criteria that do not apply:    Has Seen PCP in Last 6 Months (Yes=3, No=0)    . Living with Significant Other. Assisted Living. LTAC. SNF. or   Rehab    Patient Length of Stay (>5 days = 3)    IP Visits Last 12 Months (1-3=4, 4=9, >4=11)    Pt. Coverage (Medicare=5 , Medicaid, or Self-Pay=4)          NN attempted to reach facility, no answer to phone.

## 2019-08-28 ENCOUNTER — HOSPITAL ENCOUNTER (OUTPATIENT)
Dept: LAB | Age: 64
Discharge: HOME | End: 2019-08-28

## 2019-08-28 LAB
ANION GAP SERPL CALC-SCNC: 7 MMOL/L (ref 3–18)
BASOPHILS # BLD: 0 K/UL (ref 0–0.06)
BASOPHILS NFR BLD: 0 % (ref 0–3)
BUN SERPL-MCNC: 28 MG/DL (ref 7–18)
BUN/CREAT SERPL: 14 (ref 12–20)
CALCIUM SERPL-MCNC: 8.3 MG/DL (ref 8.5–10.1)
CHLORIDE SERPL-SCNC: 104 MMOL/L (ref 100–111)
CO2 SERPL-SCNC: 32 MMOL/L (ref 21–32)
CREAT SERPL-MCNC: 2.06 MG/DL (ref 0.6–1.3)
DIFFERENTIAL METHOD BLD: ABNORMAL
EOSINOPHIL # BLD: 1.1 K/UL (ref 0–0.4)
EOSINOPHIL NFR BLD: 22 % (ref 0–5)
ERYTHROCYTE [DISTWIDTH] IN BLOOD BY AUTOMATED COUNT: 14.8 % (ref 11.6–14.5)
GLUCOSE SERPL-MCNC: 74 MG/DL (ref 74–99)
HCT VFR BLD AUTO: 28.3 % (ref 35–45)
HGB BLD-MCNC: 9 G/DL (ref 12–16)
LYMPHOCYTES # BLD: 0.3 K/UL (ref 0.8–3.5)
LYMPHOCYTES NFR BLD: 7 % (ref 20–51)
MCH RBC QN AUTO: 28.1 PG (ref 24–34)
MCHC RBC AUTO-ENTMCNC: 31.8 G/DL (ref 31–37)
MCV RBC AUTO: 88.4 FL (ref 74–97)
MONOCYTES # BLD: 0.5 K/UL (ref 0–1)
MONOCYTES NFR BLD: 10 % (ref 2–9)
NEUTS SEG # BLD: 3 K/UL (ref 1.8–8)
NEUTS SEG NFR BLD: 61 % (ref 42–75)
PLATELET # BLD AUTO: 177 K/UL (ref 135–420)
PLATELET COMMENTS,PCOM: ABNORMAL
PMV BLD AUTO: 11.5 FL (ref 9.2–11.8)
POTASSIUM SERPL-SCNC: 4.1 MMOL/L (ref 3.5–5.5)
RBC # BLD AUTO: 3.2 M/UL (ref 4.2–5.3)
RBC MORPH BLD: ABNORMAL
RBC MORPH BLD: ABNORMAL
SODIUM SERPL-SCNC: 143 MMOL/L (ref 136–145)
WBC # BLD AUTO: 4.9 K/UL (ref 4.6–13.2)

## 2019-08-28 PROCEDURE — 80048 BASIC METABOLIC PNL TOTAL CA: CPT

## 2019-08-28 PROCEDURE — 36415 COLL VENOUS BLD VENIPUNCTURE: CPT

## 2019-08-28 PROCEDURE — 85025 COMPLETE CBC W/AUTO DIFF WBC: CPT

## 2019-08-30 ENCOUNTER — PATIENT OUTREACH (OUTPATIENT)
Dept: CASE MANAGEMENT | Age: 64
End: 2019-08-30

## 2019-08-30 NOTE — PROGRESS NOTES
Community Care Team Documentation for Patient in [facility]     [facility] discharged [facility]  to [address] [place] [address],[address], on 8/21/19. Hospital Discharge diagnosis:       Hypertensive Urgency with Hypertensive Encephalopathy   Type II DM   HLPD   H/o Chronic systolic CHF   Severe protein calorie malnutrition   HTN    SNF Attending Provider:  Dr. Adelia Lockhart     Anticipated discharge date from SNF:  TBD    PCP : Saurabh Long MD    Nurse Navigator: Bright Show, [place] Team rounds completed, updates provided by facility. Brief Summary of Care:    Receiving PT/OT. Progressing well. Dispo: Ins updates sent 8/30, Son involved in care. Lives with daughter and granddaughter. RRAT:  High Risk            28       Total Score        28 Charlson Comorbidity Score (Age + Comorbid Conditions)        Criteria that do not apply:    Has Seen PCP in Last 6 Months (Yes=3, No=0)    . Living with Significant Other. Assisted Living. LTAC. SNF. or   Rehab    Patient Length of Stay (>5 days = 3)    IP Visits Last 12 Months (1-3=4, 4=9, >4=11)    Pt.  Coverage (Medicare=5 , Medicaid, or Self-Pay=4)        Active Ambulatory Problems     Diagnosis Date Noted    Essential hypertension 01/27/2012    Diabetes mellitus ([address] 01/27/2012    Hyperlipidemia 01/27/2012    Cervical myelopathy ([address] 02/03/2014    Eczema 02/03/2014    Asthma 02/03/2014    Chronic combined systolic and diastolic congestive heart failure ([address] 05/03/2018    Bladder prolapse, female, acquired 02/03/2014    IBS (irritable bowel syndrome) 02/03/2014    Osteoporosis 02/03/2014    Migraine 02/03/2014    Anxiety and depression 02/03/2014    Pleural effusion 04/30/2014    Type 2 diabetes mellitus with nephropathy ([address] 12/14/2017    Hypokalemia 05/05/2018    Acute pulmonary edema ([address] 05/05/2018    Hypertensive emergency 05/05/2018    Headache 05/05/2018  Acute on chronic systolic (congestive) heart failure ([address] 05/05/2018    Type 2 diabetes mellitus with diabetic neuropathy (HCC) 06/21/2018    Acute non-recurrent maxillary sinusitis 09/25/2018    Dizziness 09/25/2018    Chronic obstructive pulmonary disease (HCC)     CKD (chronic kidney disease) stage 3, GFR 30-59 ml/min ([address] 09/25/2018    Fall 09/25/2018    Allergic rhinitis 10/23/2018    Chronic sinusitis 06/13/2017    TIA (transient ischemic attack) 08/18/2019    CVA (cerebral vascular accident) ([address] 08/18/2019    Encephalopathy 08/18/2019    Infarction of right thalamus ([address] 09/17/2017    Dysphagia following cerebral infarction 09/17/2017    Sinusitis with nasal polyps 08/18/2019    Cardiomyopathy due to hypertension, with heart failure ([address] 05/19/2017     Resolved Ambulatory Problems     Diagnosis Date Noted    Chest pain 09/22/2017     Past Medical History:   Diagnosis Date    Anemia     Arthritis     Cataract     Chronic lung disease     Chronic systolic congestive heart failure ([address] 2/3/2014    Difficulty swallowing     History of echocardiogram 12/29/2009    Hypercholesterolemia     Hypertension     Hypertensive heart disease     Joint pain     Joint swelling     Normal nuclear stress test 09/08/2000    Recurrent boils     Rheumatism     Shortness of breath

## 2019-09-02 ENCOUNTER — HOME HEALTH ADMISSION (OUTPATIENT)
Dept: HOME HEALTH SERVICES | Facility: HOME HEALTH | Age: 64
End: 2019-09-02
Payer: MEDICARE

## 2019-09-03 ENCOUNTER — HOME CARE VISIT (OUTPATIENT)
Dept: HOME HEALTH SERVICES | Facility: HOME HEALTH | Age: 64
End: 2019-09-03

## 2019-09-04 ENCOUNTER — HOME CARE VISIT (OUTPATIENT)
Dept: SCHEDULING | Facility: HOME HEALTH | Age: 64
End: 2019-09-04
Payer: MEDICARE

## 2019-09-04 PROCEDURE — G0151 HHCP-SERV OF PT,EA 15 MIN: HCPCS

## 2019-09-04 PROCEDURE — 400013 HH SOC

## 2019-09-04 PROCEDURE — 3331090001 HH PPS REVENUE CREDIT

## 2019-09-04 PROCEDURE — 3331090002 HH PPS REVENUE DEBIT

## 2019-09-05 ENCOUNTER — HOME CARE VISIT (OUTPATIENT)
Dept: SCHEDULING | Facility: HOME HEALTH | Age: 64
End: 2019-09-05
Payer: MEDICARE

## 2019-09-05 ENCOUNTER — HOME CARE VISIT (OUTPATIENT)
Dept: HOME HEALTH SERVICES | Facility: HOME HEALTH | Age: 64
End: 2019-09-05
Payer: MEDICARE

## 2019-09-05 ENCOUNTER — PATIENT OUTREACH (OUTPATIENT)
Dept: CASE MANAGEMENT | Age: 64
End: 2019-09-05

## 2019-09-05 VITALS
HEART RATE: 80 BPM | OXYGEN SATURATION: 97 % | TEMPERATURE: 98.7 F | DIASTOLIC BLOOD PRESSURE: 67 MMHG | SYSTOLIC BLOOD PRESSURE: 128 MMHG

## 2019-09-05 PROCEDURE — 3331090001 HH PPS REVENUE CREDIT

## 2019-09-05 PROCEDURE — G0157 HHC PT ASSISTANT EA 15: HCPCS

## 2019-09-05 PROCEDURE — 3331090002 HH PPS REVENUE DEBIT

## 2019-09-05 NOTE — PROGRESS NOTES
Community Care Team Documentation for Patient in [facility]     [facility] discharged [facility]  to [address] [place] [address],[address], on 8/21/19. Hospital Discharge diagnosis:       Hypertensive Urgency with Hypertensive Encephalopathy   Type II DM   HLPD   H/o Chronic systolic CHF   Severe protein calorie malnutrition   HTN    SNF Attending Provider:  Dr. Susana Hunter     Anticipated discharge date from SNF:  Was d/c on 9/2/19 with [facility]    PCP : Bharati Troy MD    Nurse Navigator: Kam Pierce Team rounds completed, updates provided by facility. Brief Summary of Care:    Receiving PT/OT. Progressing well. Dispo: Ins updates sent 8/30, Son involved in care. Lives with daughter and granddaughter. RRAT:  High Risk            28       Total Score        28 Charlson Comorbidity Score (Age + Comorbid Conditions)        Criteria that do not apply:    Has Seen PCP in Last 6 Months (Yes=3, No=0)    . Living with Significant Other. Assisted Living. LTAC. SNF. or   Rehab    Patient Length of Stay (>5 days = 3)    IP Visits Last 12 Months (1-3=4, 4=9, >4=11)    Pt.  Coverage (Medicare=5 , Medicaid, or Self-Pay=4)        Active Ambulatory Problems     Diagnosis Date Noted    Essential hypertension 01/27/2012    Diabetes mellitus ([address] 01/27/2012    Hyperlipidemia 01/27/2012    Cervical myelopathy ([address] 02/03/2014    Eczema 02/03/2014    Asthma 02/03/2014    Chronic combined systolic and diastolic congestive heart failure ([address] 05/03/2018    Bladder prolapse, female, acquired 02/03/2014    IBS (irritable bowel syndrome) 02/03/2014    Osteoporosis 02/03/2014    Migraine 02/03/2014    Anxiety and depression 02/03/2014    Pleural effusion 04/30/2014    Type 2 diabetes mellitus with nephropathy ([address] 12/14/2017    Hypokalemia 05/05/2018    Acute pulmonary edema ([address] 05/05/2018    Hypertensive emergency 05/05/2018  Headache 05/05/2018    Acute on chronic systolic (congestive) heart failure ([address] 05/05/2018    Type 2 diabetes mellitus with diabetic neuropathy (HCC) 06/21/2018    Acute non-recurrent maxillary sinusitis 09/25/2018    Dizziness 09/25/2018    Chronic obstructive pulmonary disease (HCC)     CKD (chronic kidney disease) stage 3, GFR 30-59 ml/min ([address] 09/25/2018    Fall 09/25/2018    Allergic rhinitis 10/23/2018    Chronic sinusitis 06/13/2017    TIA (transient ischemic attack) 08/18/2019    CVA (cerebral vascular accident) ([address] 08/18/2019    Encephalopathy 08/18/2019    Infarction of right thalamus ([address] 09/17/2017    Dysphagia following cerebral infarction 09/17/2017    Sinusitis with nasal polyps 08/18/2019    Cardiomyopathy due to hypertension, with heart failure ([address] 05/19/2017     Resolved Ambulatory Problems     Diagnosis Date Noted    Chest pain 09/22/2017     Past Medical History:   Diagnosis Date    Anemia     Arthritis     Cataract     Chronic lung disease     Chronic systolic congestive heart failure ([address] 2/3/2014    Difficulty swallowing     History of echocardiogram 12/29/2009    Hypercholesterolemia     Hypertension     Hypertensive heart disease     Joint pain     Joint swelling     Normal nuclear stress test 09/08/2000    Recurrent boils     Rheumatism     Shortness of breath

## 2019-09-06 ENCOUNTER — PATIENT OUTREACH (OUTPATIENT)
Dept: FAMILY MEDICINE CLINIC | Age: 64
End: 2019-09-06

## 2019-09-06 PROCEDURE — 3331090002 HH PPS REVENUE DEBIT

## 2019-09-06 PROCEDURE — 3331090001 HH PPS REVENUE CREDIT

## 2019-09-06 NOTE — PROGRESS NOTES
[facility]   Discharge Diagnoses:    Hypertensive Urgency with Hypertensive Encephalopathy   HTN   T2DM   HLPD  Severe protein calorie malnutrition   H/o chr systolic CHF   Elevated Trop likely in the setting of hypertensive urgency      Patient was discharged 19 to [facility] for continuation of care. Patient was discharged to home on 19. Top Challenges reviewed with the provider   none         Method of communication with provider : chart routing      Nurse Navigator (NN) contacted the patient by telephone to perform post hospital discharge assessment. Verified name and  with patient as identifiers. Provided introduction to self, and explanation of the Nurse Navigator role. Reviewed discharge instructions and red flags with patient who verbalized understanding and requested that NN discuss with her daughter. Patient given an opportunity to ask questions and does not have any further questions or concerns at this time. The patient agrees to contact the PCP office for questions related to their healthcare. NN provided contact information for future reference. Disease Specific:   N/A    Summary of patient's top problems:  1. HTN- patient reports that daughter takes BP daily but that she is sleeping at present time because she works nights. States that daughter told her that BP has been \"good\"  2. DM - States daughter also checks glucose and that it has been \"good too\". Home Health orders at discharge: [address]: NA  Date of initial visit: NA    Durable Medical Equipment ordered/company: NA  Durable Medical Equipment received: NA    Barriers to care? None noted at present    Advance Care Planning:   Does patient have an Advance Directive:  on file     Medication(s):   Medication reconciliation was not performed. Patient states she will have daughter call NN when she wakes up.  Patient given NN contact information. [facility] follow up appointment(s): States daughter schedules appointments and she does not know date of PCP follow up but \"it is soon\". Future Appointments   Date Time Provider Department Center   9/9/2019 To Be Determined Oumou Scrape [address]   9/11/2019 To Be Determined Oumou Scrape [address]   9/12/2019  3:30 PM Dora SOLORIO,  [facility]   9/13/2019 To Be Determined Oumou Scrape [facility]   9/16/2019 To Be Determined [facility]   9/18/2019 To Be Determined STEVE Soriano   9/20/2019 To Be Determined Oumou Scrape [facility]   9/23/2019 To Be Determined Oumou Scrape [facility]   9/25/2019 To Be Determined Oumou Scrape [facility]   9/27/2019 To Be Determined Oumou Scrape [facility]   9/30/2019 To Be Determined Karlos Lewis PT [facility]      Non-[facility] follow up appointment(s): NA  [facility]:  out of service area     Goals      Prevent complications post hospitalization.       Patient will attend all physician appointments as scheduled    NN will follow patient for at least 4 weeks post discharge from SNF

## 2019-09-07 PROCEDURE — 3331090002 HH PPS REVENUE DEBIT

## 2019-09-07 PROCEDURE — 3331090001 HH PPS REVENUE CREDIT

## 2019-09-08 PROCEDURE — 3331090001 HH PPS REVENUE CREDIT

## 2019-09-08 PROCEDURE — 3331090002 HH PPS REVENUE DEBIT

## 2019-09-09 ENCOUNTER — HOME CARE VISIT (OUTPATIENT)
Dept: SCHEDULING | Facility: HOME HEALTH | Age: 64
End: 2019-09-09
Payer: MEDICARE

## 2019-09-09 PROCEDURE — 3331090002 HH PPS REVENUE DEBIT

## 2019-09-09 PROCEDURE — G0157 HHC PT ASSISTANT EA 15: HCPCS

## 2019-09-09 PROCEDURE — 3331090001 HH PPS REVENUE CREDIT

## 2019-09-10 ENCOUNTER — HOME CARE VISIT (OUTPATIENT)
Dept: SCHEDULING | Facility: HOME HEALTH | Age: 64
End: 2019-09-10
Payer: MEDICARE

## 2019-09-10 VITALS
SYSTOLIC BLOOD PRESSURE: 126 MMHG | OXYGEN SATURATION: 80 % | DIASTOLIC BLOOD PRESSURE: 70 MMHG | HEART RATE: 80 BPM | RESPIRATION RATE: 18 BRPM | TEMPERATURE: 98.5 F

## 2019-09-10 PROCEDURE — 3331090001 HH PPS REVENUE CREDIT

## 2019-09-10 PROCEDURE — 3331090002 HH PPS REVENUE DEBIT

## 2019-09-10 PROCEDURE — G0152 HHCP-SERV OF OT,EA 15 MIN: HCPCS

## 2019-09-11 ENCOUNTER — HOME CARE VISIT (OUTPATIENT)
Dept: SCHEDULING | Facility: HOME HEALTH | Age: 64
End: 2019-09-11
Payer: MEDICARE

## 2019-09-11 VITALS
OXYGEN SATURATION: 98 % | RESPIRATION RATE: 17 BRPM | HEART RATE: 84 BPM | TEMPERATURE: 98.6 F | SYSTOLIC BLOOD PRESSURE: 124 MMHG | DIASTOLIC BLOOD PRESSURE: 72 MMHG

## 2019-09-11 PROCEDURE — 3331090002 HH PPS REVENUE DEBIT

## 2019-09-11 PROCEDURE — G0157 HHC PT ASSISTANT EA 15: HCPCS

## 2019-09-11 PROCEDURE — 3331090001 HH PPS REVENUE CREDIT

## 2019-09-12 VITALS
TEMPERATURE: 98.1 F | SYSTOLIC BLOOD PRESSURE: 128 MMHG | DIASTOLIC BLOOD PRESSURE: 67 MMHG | OXYGEN SATURATION: 95 % | HEART RATE: 81 BPM

## 2019-09-12 PROCEDURE — 3331090002 HH PPS REVENUE DEBIT

## 2019-09-12 PROCEDURE — 3331090001 HH PPS REVENUE CREDIT

## 2019-09-13 ENCOUNTER — HOME CARE VISIT (OUTPATIENT)
Dept: SCHEDULING | Facility: HOME HEALTH | Age: 64
End: 2019-09-13
Payer: MEDICARE

## 2019-09-13 VITALS
RESPIRATION RATE: 18 BRPM | HEART RATE: 82 BPM | DIASTOLIC BLOOD PRESSURE: 70 MMHG | TEMPERATURE: 98.2 F | SYSTOLIC BLOOD PRESSURE: 126 MMHG | OXYGEN SATURATION: 98 %

## 2019-09-13 PROCEDURE — G0157 HHC PT ASSISTANT EA 15: HCPCS

## 2019-09-13 PROCEDURE — 3331090001 HH PPS REVENUE CREDIT

## 2019-09-13 PROCEDURE — 3331090002 HH PPS REVENUE DEBIT

## 2019-09-14 PROCEDURE — 3331090001 HH PPS REVENUE CREDIT

## 2019-09-14 PROCEDURE — 3331090002 HH PPS REVENUE DEBIT

## 2019-09-15 PROCEDURE — 3331090001 HH PPS REVENUE CREDIT

## 2019-09-15 PROCEDURE — 3331090002 HH PPS REVENUE DEBIT

## 2019-09-16 ENCOUNTER — HOME CARE VISIT (OUTPATIENT)
Dept: SCHEDULING | Facility: HOME HEALTH | Age: 64
End: 2019-09-16
Payer: MEDICARE

## 2019-09-16 VITALS
DIASTOLIC BLOOD PRESSURE: 70 MMHG | RESPIRATION RATE: 18 BRPM | HEART RATE: 76 BPM | SYSTOLIC BLOOD PRESSURE: 120 MMHG | TEMPERATURE: 98.6 F | OXYGEN SATURATION: 98 %

## 2019-09-16 PROCEDURE — G0157 HHC PT ASSISTANT EA 15: HCPCS

## 2019-09-16 PROCEDURE — 3331090002 HH PPS REVENUE DEBIT

## 2019-09-16 PROCEDURE — 3331090001 HH PPS REVENUE CREDIT

## 2019-09-17 PROCEDURE — 3331090001 HH PPS REVENUE CREDIT

## 2019-09-17 PROCEDURE — 3331090002 HH PPS REVENUE DEBIT

## 2019-09-18 ENCOUNTER — HOME CARE VISIT (OUTPATIENT)
Dept: SCHEDULING | Facility: HOME HEALTH | Age: 64
End: 2019-09-18
Payer: MEDICARE

## 2019-09-18 ENCOUNTER — OFFICE VISIT (OUTPATIENT)
Dept: FAMILY MEDICINE CLINIC | Age: 64
End: 2019-09-18

## 2019-09-18 VITALS
RESPIRATION RATE: 20 BRPM | BODY MASS INDEX: 24.39 KG/M2 | SYSTOLIC BLOOD PRESSURE: 174 MMHG | TEMPERATURE: 97.3 F | OXYGEN SATURATION: 100 % | DIASTOLIC BLOOD PRESSURE: 92 MMHG | HEIGHT: 65 IN | HEART RATE: 75 BPM | WEIGHT: 146.4 LBS

## 2019-09-18 DIAGNOSIS — I11.0 CARDIOMYOPATHY DUE TO HYPERTENSION, WITH HEART FAILURE (HCC): Chronic | ICD-10-CM

## 2019-09-18 DIAGNOSIS — G45.9 TIA (TRANSIENT ISCHEMIC ATTACK): Primary | ICD-10-CM

## 2019-09-18 DIAGNOSIS — E11.40 TYPE 2 DIABETES MELLITUS WITH DIABETIC NEUROPATHY, WITH LONG-TERM CURRENT USE OF INSULIN (HCC): ICD-10-CM

## 2019-09-18 DIAGNOSIS — E11.21 TYPE 2 DIABETES MELLITUS WITH NEPHROPATHY (HCC): ICD-10-CM

## 2019-09-18 DIAGNOSIS — I50.42 CHRONIC COMBINED SYSTOLIC AND DIASTOLIC CONGESTIVE HEART FAILURE (HCC): Chronic | ICD-10-CM

## 2019-09-18 DIAGNOSIS — N18.30 CKD (CHRONIC KIDNEY DISEASE) STAGE 3, GFR 30-59 ML/MIN (HCC): ICD-10-CM

## 2019-09-18 DIAGNOSIS — I10 ESSENTIAL HYPERTENSION: Chronic | ICD-10-CM

## 2019-09-18 DIAGNOSIS — J44.9 CHRONIC OBSTRUCTIVE PULMONARY DISEASE, UNSPECIFIED COPD TYPE (HCC): Chronic | ICD-10-CM

## 2019-09-18 DIAGNOSIS — Z79.4 TYPE 2 DIABETES MELLITUS WITH DIABETIC NEUROPATHY, WITH LONG-TERM CURRENT USE OF INSULIN (HCC): ICD-10-CM

## 2019-09-18 DIAGNOSIS — I43 CARDIOMYOPATHY DUE TO HYPERTENSION, WITH HEART FAILURE (HCC): Chronic | ICD-10-CM

## 2019-09-18 PROCEDURE — 3331090002 HH PPS REVENUE DEBIT

## 2019-09-18 PROCEDURE — 3331090001 HH PPS REVENUE CREDIT

## 2019-09-18 PROCEDURE — G0151 HHCP-SERV OF PT,EA 15 MIN: HCPCS

## 2019-09-18 RX ORDER — ISOSORBIDE DINITRATE 10 MG/1
20 TABLET ORAL 3 TIMES DAILY
Qty: 1 TAB | Refills: 0 | Status: SHIPPED | OUTPATIENT
Start: 2019-09-18 | End: 2020-01-01 | Stop reason: SDUPTHER

## 2019-09-18 RX ORDER — AMLODIPINE BESYLATE 10 MG/1
10 TABLET ORAL DAILY
Qty: 30 TAB | Refills: 0 | Status: SHIPPED | OUTPATIENT
Start: 2019-09-18 | End: 2020-01-01 | Stop reason: DRUGHIGH

## 2019-09-18 NOTE — PROGRESS NOTES
HPI  Pt of Dr Aldo Coleman. Was taken to [facility] ER via ambulance because of daughter's concern about \"weakness\" on 08/18. Was admitted with TIA until 08/23. Went to [facility] until 09/02. Has been getting home PT. Some days she is feeling okay, other days she is tired. BP elevated in office today- 153/91. Daughter reports that at home this morning it was 139/79. Reports that it has been well controlled at home. Daughter reports that patient is taking medication as prescribed, but not completely certain if pt is taking Spironolactone. Some shortness of breath at home at times. Has home oxygen at 3 liters. Doesn't use it all the time. Reports that blood sugars are well controlled. Only taking Lantus once daily. Denies memory issues or issues with speech, swallowing, can get around well at home. Some left sided weakness especially with left leg    Needs refills on Amlodipine, Isordil, Lantus and insulin needles    See cardiology on October 31. Doesn't think she has follow up appt with neuro. Noted in chart that she has seen nephrology in the past.  Past Medical History  Past Medical History:   Diagnosis Date    Anemia     Arthritis     Cardiomyopathy due to hypertension, with heart failure ([address] 5/19/2017    Cataract     Chronic combined systolic and diastolic congestive heart failure ([address] 5/3/2018    Chronic lung disease     Chronic obstructive pulmonary disease (HCC)     Chronic systolic congestive heart failure ([address] 2/3/2014    Diabetes mellitus ([address]     Difficulty swallowing     Both food and liquid    Dysphagia following cerebral infarction 8/17/2017    History of echocardiogram 12/29/2009    EF 50%. Mild-mod conc LVH. Mod DDfx. LAE.   Mild MR.      Hypercholesterolemia     Hypertension     Hypertensive heart disease     Infarction of right thalamus ([address] 9/17/2017    Lacunar infarct 2017    Joint pain     Joint swelling     Normal nuclear stress test 09/08/2000    No ischemia or prior infarction. Neg EKG on submax EST. Ex time 15:02.  Recurrent boils     Rheumatism     Shortness of breath     Sinusitis with nasal polyps 8/18/2019       Surgical History  Past Surgical History:   Procedure Laterality Date    CARDIAC CATHETERIZATION  5/10/2018         CORONARY ARTERY ANGIOGRAM  5/10/2018         HX CHOLECYSTECTOMY  2001    HX TUBAL LIGATION  1975    MODERATE SEDATION  5/10/2018             Medications  Current Outpatient Medications   Medication Sig Dispense Refill    amLODIPine (NORVASC) 10 mg tablet Take 1 Tab by mouth daily. 30 Tab 0    isosorbide dinitrate (ISORDIL) 10 mg tablet Take 2 Tabs by mouth three (3) times daily. 1 Tab 0    gabapentin (NEURONTIN) 100 mg capsule Take 100 mg by mouth three (3) times daily. 1 tab at breakfast and dinner and 3 tabs at bedtime  Indications: Neuropathic Pain      fluticasone furoate-vilanterol (BREO ELLIPTA) 100-25 mcg/dose inhaler Take 1 Puff by inhalation daily. (Patient taking differently: Take 1 Puff by inhalation daily. Indications: Bronchospasm Prevention with COPD) 1 Inhaler 0    Oxygen 1 Device by Nasal route as needed (Oxygen supplement). 3L per min as needed   Indications: oxygen supplement      sodium chloride (OCEAN) 0.65 % nasal squeeze bottle 2 Sprays as needed for Congestion.  albuterol (VENTOLIN HFA) 90 mcg/actuation inhaler Take 2 Puffs by inhalation every four (4) hours as needed for Wheezing or Shortness of Breath.  1 Inhaler 5    Blood-Glucose Meter (TRUE METRIX GLUCOSE METER) misc Test glucose 3 times daily DX: E11.40 (Patient taking differently: Test glucose 3 times daily DX: E11.40  Indications: blood sugar readings) 1 Each 0    Lancets misc Test glucose 3 times daily DX: E11.40 (Patient taking differently: Test glucose 3 times daily DX: E11.40  Indications: for blood draws w/ blood sugar readings) 100 Each 12    glucose blood VI test strips (BLOOD GLUCOSE TEST) strip (True Metrix) Test glucose 3 times daily DX: E11.40 100 Strip 12    insulin glargine (LANTUS,BASAGLAR) 100 unit/mL (3 mL) inpn 7 Units by SubCUTAneous route in the morning. 15 units in the evening. (Patient taking differently: 15 Units daily.) 5 Pen 12    furosemide (LASIX) 20 mg tablet Take 1 Tab by mouth two (2) times a day. 60 Tab 8    acetaminophen (TYLENOL EXTRA STRENGTH) 500 mg tablet Take 1,000 mg by mouth every six (6) hours as needed for Pain.  CARBOXYMETHYLCELLULOS/GLYCERIN (REFRESH OPTIVE OP) Administer 1 Drop to both eyes six (6) times daily. Indications: eye health      aspirin delayed-release 81 mg tablet Take 81 mg by mouth daily. Dtr. Shanita Yoder pt no longer takes.  spironolactone (ALDACTONE) 25 mg tablet Take 1 Tab by mouth two (2) times a day. (Patient not taking: Reported on 9/4/2019) 180 Tab 3    hydrALAZINE (APRESOLINE) 100 mg tablet Take 1 Tab by mouth three (3) times daily. 270 Tab 3    pravastatin (PRAVACHOL) 20 mg tablet Take 20 mg by mouth nightly.          Allergies  Allergies   Allergen Reactions    Codeine Nausea Only    Sulfa (Sulfonamide Antibiotics) Rash       Family History  Family History   Problem Relation Age of Onset    Hypertension Mother     Ovarian Cancer Mother     Stroke Mother     Heart Attack Father     Breast Cancer Maternal Aunt        Social History  Social History     Socioeconomic History    Marital status:      Spouse name: Not on file    Number of children: Not on file    Years of education: Not on file    Highest education level: Not on file   Occupational History    Not on file   Social Needs    Financial resource strain: Not on file    Food insecurity:     Worry: Not on file     Inability: Not on file    Transportation needs:     Medical: Not on file     Non-medical: Not on file   Tobacco Use    Smoking status: Never Smoker    Smokeless tobacco: Never Used    Tobacco comment: second hand smoke exposure as a child   Substance and Sexual Activity    Alcohol use: No    Drug use: No    Sexual activity: Never   Lifestyle    Physical activity:     Days per week: Not on file     Minutes per session: Not on file    Stress: Not on file   Relationships    Social connections:     Talks on phone: Not on file     Gets together: Not on file     Attends Voodoo service: Not on file     Active member of club or organization: Not on file     Attends meetings of clubs or organizations: Not on file     Relationship status: Not on file    Intimate partner violence:     Fear of current or ex partner: Not on file     Emotionally abused: Not on file     Physically abused: Not on file     Forced sexual activity: Not on file   Other Topics Concern    Not on file   Social History Narrative    Pt used to be a  for the school system. Problem List  Patient Active Problem List   Diagnosis Code    Essential hypertension I10    Diabetes mellitus ([facility] E11.9    Hyperlipidemia E78.5    Cervical myelopathy ([facility] G95.9    Eczema L30.9    Asthma J45.909    Chronic combined systolic and diastolic congestive heart failure (HCC) I50.42    Bladder prolapse, female, acquired N81.10    IBS (irritable bowel syndrome) K58.9    Osteoporosis M81.0    Migraine G43.909    Anxiety and depression F41.9, F32.9    Pleural effusion J90    Type 2 diabetes mellitus with nephropathy (HCC) E11.21    Hypokalemia E87.6    Acute pulmonary edema (HCC) J81.0    Hypertensive emergency I16.1    Headache R51    Acute on chronic systolic (congestive) heart failure (HCC) I50.23    Type 2 diabetes mellitus with diabetic neuropathy (HCC) E11.40    Acute non-recurrent maxillary sinusitis J01.00    Dizziness R42    Chronic obstructive pulmonary disease (HCC) J44.9    CKD (chronic kidney disease) stage 3, GFR 30-59 ml/min ([facility] N18.3    Fall W19. [place] Roxann    Allergic rhinitis J30.9    Chronic sinusitis J32.9    TIA (transient ischemic attack) G45.9    CVA (cerebral vascular accident) ([facility] I63.9    Encephalopathy G93.40    Infarction of right thalamus (HCC) I63.9    Dysphagia following cerebral infarction I69.391    Sinusitis with nasal polyps J32.9, J33.9    Cardiomyopathy due to hypertension, with heart failure ([facility] I11.0, I43       Review of Systems  Review of Systems   Constitutional:        Fatigue   Respiratory: Positive for shortness of breath. Cardiovascular: Negative. Genitourinary: Negative. Musculoskeletal:        Walks with walker   Neurological: Positive for weakness. Negative for headaches. Left sided weakness       Vital Signs  Vitals:    09/18/19 1431 09/18/19 1615   BP: (!) 153/91 (!) 174/92   Pulse: 75    Resp: 20    Temp: 97.3 °F (36.3 °C)    TempSrc: Oral    SpO2: 100%    Weight: 146 lb 6.4 oz (66.4 kg)    Height: 5' 5\" (1.651 m)    PainSc:   0 - No pain        Physical Exam  Physical Exam   Constitutional: She is oriented to person, place, and time. She appears well-developed and well-nourished. No distress. Cardiovascular: Normal rate, regular rhythm and normal heart sounds. Pulmonary/Chest: Effort normal and breath sounds normal. No respiratory distress. Abdominal: Soft. Bowel sounds are normal. She exhibits no distension. Musculoskeletal: Normal range of motion. She exhibits no edema. Sitting in wheelchair, left sided leg weakness   Neurological: She is alert and oriented to person, place, and time. Skin: Skin is warm and dry. Psychiatric: She has a normal mood and affect. Nursing note and vitals reviewed.       Diagnostics  Orders Placed This Encounter    CBC WITH AUTOMATED DIFF     Standing Status:   Future     Standing Expiration Date:   8/00/5497    METABOLIC PANEL, COMPREHENSIVE     Standing Status:   Future     Standing Expiration Date:   9/18/2020    MICROALBUMIN, UR, RAND W/ MICROALB/CREAT RATIO     Standing Status:   Future     Standing Expiration Date:   9/18/2020    REFERRAL TO NEUROLOGY Referral Priority:   Routine     Referral Type:   Consultation     Referral Reason:   Specialty Services Required     Number of Visits Requested:   20    amLODIPine (NORVASC) 10 mg tablet     Sig: Take 1 Tab by mouth daily. Dispense:  30 Tab     Refill:  0    isosorbide dinitrate (ISORDIL) 10 mg tablet     Sig: Take 2 Tabs by mouth three (3) times daily. Dispense:  1 Tab     Refill:  0       Results  Results for orders placed or performed during the hospital encounter of 08/28/19   CBC WITH AUTOMATED DIFF   Result Value Ref Range    WBC 4.9 4.6 - 13.2 K/uL    RBC 3.20 (L) 4.20 - 5.30 M/uL    HGB 9.0 (L) 12.0 - 16.0 g/dL    HCT 28.3 (L) 35.0 - 45.0 %    MCV 88.4 74.0 - 97.0 FL    MCH 28.1 24.0 - 34.0 PG    MCHC 31.8 31.0 - 37.0 g/dL    RDW 14.8 (H) 11.6 - 14.5 %    PLATELET 444 632 - 553 K/uL    MPV 11.5 9.2 - 11.8 FL    NEUTROPHILS 61 42 - 75 %    LYMPHOCYTES 7 (L) 20 - 51 %    MONOCYTES 10 (H) 2 - 9 %    EOSINOPHILS 22 (H) 0 - 5 %    BASOPHILS 0 0 - 3 %    ABS. NEUTROPHILS 3.0 1.8 - 8.0 K/UL    ABS. LYMPHOCYTES 0.3 (L) 0.8 - 3.5 K/UL    ABS. MONOCYTES 0.5 0 - 1.0 K/UL    ABS. EOSINOPHILS 1.1 (H) 0.0 - 0.4 K/UL    ABS. BASOPHILS 0.0 0.0 - 0.06 K/UL    DF MANUAL      PLATELET COMMENTS ADEQUATE PLATELETS      RBC COMMENTS ANISOCYTOSIS  1+        RBC COMMENTS HYPOCHROMIA  1+       METABOLIC PANEL, BASIC   Result Value Ref Range    Sodium 143 136 - 145 mmol/L    Potassium 4.1 3.5 - 5.5 mmol/L    Chloride 104 100 - 111 mmol/L    CO2 32 21 - 32 mmol/L    Anion gap 7 3.0 - 18 mmol/L    Glucose 74 74 - 99 mg/dL    BUN 28 (H) 7.0 - 18 MG/DL    Creatinine 2.06 (H) 0.6 - 1.3 MG/DL    BUN/Creatinine ratio 14 12 - 20      GFR est AA 29 (L) >60 ml/min/1.73m2    GFR est non-AA 24 (L) >60 ml/min/1.73m2    Calcium 8.3 (L) 8.5 - 10.1 MG/DL       Assessment and Plan  Diagnoses and all orders for this visit:    1. TIA (transient ischemic attack)  -     REFERRAL TO NEUROLOGY    2.  Chronic combined systolic and diastolic congestive heart failure (HCC)  -     isosorbide dinitrate (ISORDIL) 10 mg tablet; Take 2 Tabs by mouth three (3) times daily. 3. Essential hypertension  -     METABOLIC PANEL, COMPREHENSIVE; Future  -     REFERRAL TO NEUROLOGY  -     amLODIPine (NORVASC) 10 mg tablet; Take 1 Tab by mouth daily. Patient was encouraged to take spironolactone as ordered. Was encouraged to monitor blood pressure at home. Follow-up if blood pressure remains elevated    4. Chronic obstructive pulmonary disease, unspecified COPD type ([facility])  Oxygen at 3 L nasal cannula as needed. Continue with present plan of care    5. Type 2 diabetes mellitus with nephropathy ([facility])  -     CBC WITH AUTOMATED DIFF; Future  -     METABOLIC PANEL, COMPREHENSIVE; Future  -     MICROALBUMIN, UR, RAND W/ MICROALB/CREAT RATIO; Future  Continue with present plan of care     6. CKD (chronic kidney disease) stage 3, GFR 30-59 ml/min ([facility])  -     MICROALBUMIN, UR, RAND W/ MICROALB/CREAT RATIO; Future  Patient was encouraged to make an appointment to follow-up with nephrologist as soon as possible    7. Type 2 diabetes mellitus with diabetic neuropathy, with long-term current use of insulin ([facility]    8. Cardiomyopathy due to hypertension, with heart failure [facility])        After care summary printed and reviewed with patient. Plan reviewed with patient. Questions answered. Patient verbalized understanding of plan and is in agreement with plan. Patient to follow up in one week or earlier if needed. Encouraged the use of my chart.     STEPH Garcia

## 2019-09-18 NOTE — PROGRESS NOTES
Paula Hurd presents today for   Chief Complaint   Patient presents with   [facility] Follow Up     patient was transported by ambulance to [facility] on 8/18/19 due to weakness per daughter's statement. It was determined that she suffered 'a slight stroke' therefore admitted to hospital.  She was release to rehab on 8/23/19 and released from rehab on 9/2/19. Is someone accompanying this pt? Yes, daughter    Is the patient using any DME equipment during OV? Yes, walker    Depression Screening:  3 most recent PHQ Screens 1/23/2019   Little interest or pleasure in doing things Not at all   Feeling down, depressed, irritable, or hopeless Not at all   Total Score PHQ 2 0       Learning Assessment:  Learning Assessment 1/23/2019   PRIMARY LEARNER Patient   HIGHEST LEVEL OF EDUCATION - PRIMARY LEARNER  GRADUATED HIGH SCHOOL OR GED   BARRIERS PRIMARY LEARNER NONE   CO-LEARNER CAREGIVER No   PRIMARY LANGUAGE ENGLISH   LEARNER PREFERENCE PRIMARY LISTENING     -   ANSWERED BY self   RELATIONSHIP SELF       Abuse Screening:  Abuse Screening Questionnaire 1/23/2019   Do you ever feel afraid of your partner? N   Are you in a relationship with someone who physically or mentally threatens you? N   Is it safe for you to go home? Y       Fall Screening  Fall Risk Assessment, last 12 mths 4/3/2019   Able to walk? Yes   Fall in past 12 months? No   Fall with injury? -   Number of falls in past 12 months -   Fall Risk Score -       Generalized Anxiety  No flowsheet data found.      Health Maintenance Due   Topic Date Due    Hepatitis C Screening  1955    FOOT EXAM Q1  01/21/1963    MICROALBUMIN Q1  01/21/1963    EYE EXAM RETINAL OR DILATED  01/21/1963    DTaP/Tdap/Td series (1 - Tdap) 01/21/1974    Shingrix Vaccine Age 50> (1 of 2) 01/21/2003    FOBT Q 1 YEAR AGE 50-75  01/21/2003    GLAUCOMA SCREENING Q2Y  01/21/2018    Bone Densitometry (Dexa) Screening  01/21/2018    Pneumococcal 65+ years (1 of 2 - PCV13) 01/21/2018    MEDICARE YEARLY EXAM  03/14/2018    BREAST CANCER SCRN MAMMOGRAM  10/05/2018    Influenza Age 9 to Adult  08/01/2019   . Health Maintenance reviewed and discussed and ordered per Provider. Coordination of Care  1. Have you been to the ER, urgent care clinic since your last visit? Hospitalized since your last visit? Yes, hospital    2. Have you seen or consulted any other health care providers outside of the [address] [place] [address] since your last visit? Include any pap smears or colon screening.  no      Advance Directive discussed 1/23/19

## 2019-09-19 VITALS
RESPIRATION RATE: 18 BRPM | HEART RATE: 78 BPM | SYSTOLIC BLOOD PRESSURE: 140 MMHG | OXYGEN SATURATION: 98 % | TEMPERATURE: 98.1 F | DIASTOLIC BLOOD PRESSURE: 68 MMHG

## 2019-09-19 VITALS
TEMPERATURE: 98.6 F | OXYGEN SATURATION: 96 % | HEART RATE: 81 BPM | SYSTOLIC BLOOD PRESSURE: 131 MMHG | DIASTOLIC BLOOD PRESSURE: 79 MMHG

## 2019-09-19 PROCEDURE — 3331090001 HH PPS REVENUE CREDIT

## 2019-09-19 PROCEDURE — 3331090002 HH PPS REVENUE DEBIT

## 2019-09-20 ENCOUNTER — HOME CARE VISIT (OUTPATIENT)
Dept: SCHEDULING | Facility: HOME HEALTH | Age: 64
End: 2019-09-20
Payer: MEDICARE

## 2019-09-20 PROCEDURE — 3331090002 HH PPS REVENUE DEBIT

## 2019-09-20 PROCEDURE — 3331090001 HH PPS REVENUE CREDIT

## 2019-09-20 PROCEDURE — G0157 HHC PT ASSISTANT EA 15: HCPCS

## 2019-09-21 PROCEDURE — 3331090001 HH PPS REVENUE CREDIT

## 2019-09-21 PROCEDURE — 3331090002 HH PPS REVENUE DEBIT

## 2019-09-22 PROCEDURE — 3331090001 HH PPS REVENUE CREDIT

## 2019-09-22 PROCEDURE — 3331090002 HH PPS REVENUE DEBIT

## 2019-09-23 ENCOUNTER — HOME CARE VISIT (OUTPATIENT)
Dept: HOME HEALTH SERVICES | Facility: HOME HEALTH | Age: 64
End: 2019-09-23
Payer: MEDICARE

## 2019-09-23 ENCOUNTER — TELEPHONE (OUTPATIENT)
Dept: FAMILY MEDICINE CLINIC | Age: 64
End: 2019-09-23

## 2019-09-23 ENCOUNTER — HOME CARE VISIT (OUTPATIENT)
Dept: SCHEDULING | Facility: HOME HEALTH | Age: 64
End: 2019-09-23
Payer: MEDICARE

## 2019-09-23 PROCEDURE — 3331090002 HH PPS REVENUE DEBIT

## 2019-09-23 PROCEDURE — G0157 HHC PT ASSISTANT EA 15: HCPCS

## 2019-09-23 PROCEDURE — 3331090001 HH PPS REVENUE CREDIT

## 2019-09-23 NOTE — TELEPHONE ENCOUNTER
Called to inform pt has vomiting and diarrhea  --talked with daughter,she said mother is very weak,has been throwing up and diarrhea since this morning--feels she is too weak to bring in to an appt ?

## 2019-09-24 PROCEDURE — 3331090001 HH PPS REVENUE CREDIT

## 2019-09-24 PROCEDURE — 3331090002 HH PPS REVENUE DEBIT

## 2019-09-24 NOTE — TELEPHONE ENCOUNTER
Called patient and chart review was done. Patient was advised per- KESHAWN Hayes to go to the emergency room. Patient daughter stated that the vomiting and diarrhea has stop and don't think her mother has to go to the emergency room. Patient daughter also stated that her mother has an appointment on October 4th with KESHAWN Rodriguez. Provider KESHAWN Hayes was made aware and stated okay an document information.

## 2019-09-25 ENCOUNTER — HOME CARE VISIT (OUTPATIENT)
Dept: SCHEDULING | Facility: HOME HEALTH | Age: 64
End: 2019-09-25
Payer: MEDICARE

## 2019-09-25 VITALS
HEART RATE: 90 BPM | TEMPERATURE: 99.1 F | SYSTOLIC BLOOD PRESSURE: 130 MMHG | RESPIRATION RATE: 18 BRPM | DIASTOLIC BLOOD PRESSURE: 74 MMHG | OXYGEN SATURATION: 98 %

## 2019-09-25 PROCEDURE — 3331090001 HH PPS REVENUE CREDIT

## 2019-09-25 PROCEDURE — G0157 HHC PT ASSISTANT EA 15: HCPCS

## 2019-09-25 PROCEDURE — 3331090002 HH PPS REVENUE DEBIT

## 2019-09-26 PROCEDURE — 3331090001 HH PPS REVENUE CREDIT

## 2019-09-26 PROCEDURE — 3331090002 HH PPS REVENUE DEBIT

## 2019-09-27 ENCOUNTER — HOME CARE VISIT (OUTPATIENT)
Dept: SCHEDULING | Facility: HOME HEALTH | Age: 64
End: 2019-09-27
Payer: MEDICARE

## 2019-09-27 ENCOUNTER — TELEPHONE (OUTPATIENT)
Dept: FAMILY MEDICINE CLINIC | Age: 64
End: 2019-09-27

## 2019-09-27 VITALS
RESPIRATION RATE: 17 BRPM | TEMPERATURE: 98.7 F | OXYGEN SATURATION: 98 % | HEART RATE: 70 BPM | DIASTOLIC BLOOD PRESSURE: 70 MMHG | SYSTOLIC BLOOD PRESSURE: 124 MMHG

## 2019-09-27 PROCEDURE — 3331090002 HH PPS REVENUE DEBIT

## 2019-09-27 PROCEDURE — 3331090001 HH PPS REVENUE CREDIT

## 2019-09-27 PROCEDURE — G0157 HHC PT ASSISTANT EA 15: HCPCS

## 2019-09-28 PROCEDURE — 3331090002 HH PPS REVENUE DEBIT

## 2019-09-28 PROCEDURE — 3331090001 HH PPS REVENUE CREDIT

## 2019-09-29 PROCEDURE — 3331090001 HH PPS REVENUE CREDIT

## 2019-09-29 PROCEDURE — 3331090002 HH PPS REVENUE DEBIT

## 2019-09-30 PROCEDURE — 3331090001 HH PPS REVENUE CREDIT

## 2019-09-30 PROCEDURE — 3331090002 HH PPS REVENUE DEBIT

## 2019-10-01 VITALS
OXYGEN SATURATION: 98 % | SYSTOLIC BLOOD PRESSURE: 114 MMHG | TEMPERATURE: 98.1 F | HEART RATE: 88 BPM | DIASTOLIC BLOOD PRESSURE: 68 MMHG | RESPIRATION RATE: 17 BRPM

## 2019-10-01 PROCEDURE — 3331090002 HH PPS REVENUE DEBIT

## 2019-10-01 PROCEDURE — 3331090001 HH PPS REVENUE CREDIT

## 2019-10-02 PROCEDURE — 3331090002 HH PPS REVENUE DEBIT

## 2019-10-02 PROCEDURE — 3331090001 HH PPS REVENUE CREDIT

## 2019-10-03 PROCEDURE — 3331090001 HH PPS REVENUE CREDIT

## 2019-10-03 PROCEDURE — 3331090002 HH PPS REVENUE DEBIT

## 2019-10-04 PROCEDURE — 3331090002 HH PPS REVENUE DEBIT

## 2019-10-04 PROCEDURE — 3331090001 HH PPS REVENUE CREDIT

## 2019-10-05 PROCEDURE — 3331090001 HH PPS REVENUE CREDIT

## 2019-10-05 PROCEDURE — 3331090002 HH PPS REVENUE DEBIT

## 2019-10-06 PROCEDURE — 3331090001 HH PPS REVENUE CREDIT

## 2019-10-06 PROCEDURE — 3331090002 HH PPS REVENUE DEBIT

## 2019-10-07 PROCEDURE — 3331090001 HH PPS REVENUE CREDIT

## 2019-10-07 PROCEDURE — 3331090002 HH PPS REVENUE DEBIT

## 2019-10-08 ENCOUNTER — HOME CARE VISIT (OUTPATIENT)
Dept: HOME HEALTH SERVICES | Facility: HOME HEALTH | Age: 64
End: 2019-10-08
Payer: MEDICARE

## 2019-10-08 PROCEDURE — 3331090001 HH PPS REVENUE CREDIT

## 2019-10-08 PROCEDURE — 3331090002 HH PPS REVENUE DEBIT

## 2019-10-08 PROCEDURE — G0157 HHC PT ASSISTANT EA 15: HCPCS

## 2019-10-09 PROCEDURE — 3331090001 HH PPS REVENUE CREDIT

## 2019-10-09 PROCEDURE — 3331090002 HH PPS REVENUE DEBIT

## 2019-10-09 NOTE — TELEPHONE ENCOUNTER
[facility] and advised them that per- Dr. Corinna Vigil to extended home health care for 2 times a week for 2 more weeks. Home health stated ok great.

## 2019-10-10 ENCOUNTER — HOME CARE VISIT (OUTPATIENT)
Dept: SCHEDULING | Facility: HOME HEALTH | Age: 64
End: 2019-10-10
Payer: MEDICARE

## 2019-10-10 VITALS
HEART RATE: 86 BPM | DIASTOLIC BLOOD PRESSURE: 70 MMHG | TEMPERATURE: 98.6 F | SYSTOLIC BLOOD PRESSURE: 116 MMHG | OXYGEN SATURATION: 98 % | RESPIRATION RATE: 18 BRPM

## 2019-10-10 PROCEDURE — G0151 HHCP-SERV OF PT,EA 15 MIN: HCPCS

## 2019-10-10 PROCEDURE — 3331090002 HH PPS REVENUE DEBIT

## 2019-10-10 PROCEDURE — 3331090001 HH PPS REVENUE CREDIT

## 2019-10-11 VITALS
DIASTOLIC BLOOD PRESSURE: 110 MMHG | HEART RATE: 86 BPM | TEMPERATURE: 98.3 F | SYSTOLIC BLOOD PRESSURE: 190 MMHG | OXYGEN SATURATION: 98 %

## 2019-10-11 PROCEDURE — 3331090001 HH PPS REVENUE CREDIT

## 2019-10-11 PROCEDURE — 3331090002 HH PPS REVENUE DEBIT

## 2019-10-12 PROCEDURE — 3331090001 HH PPS REVENUE CREDIT

## 2019-10-12 PROCEDURE — 3331090002 HH PPS REVENUE DEBIT

## 2019-10-13 PROCEDURE — 3331090002 HH PPS REVENUE DEBIT

## 2019-10-13 PROCEDURE — 3331090001 HH PPS REVENUE CREDIT

## 2019-10-14 PROCEDURE — 3331090001 HH PPS REVENUE CREDIT

## 2019-10-14 PROCEDURE — 3331090002 HH PPS REVENUE DEBIT

## 2019-10-15 ENCOUNTER — HOME CARE VISIT (OUTPATIENT)
Dept: SCHEDULING | Facility: HOME HEALTH | Age: 64
End: 2019-10-15
Payer: MEDICARE

## 2019-10-15 PROCEDURE — 3331090002 HH PPS REVENUE DEBIT

## 2019-10-15 PROCEDURE — G0157 HHC PT ASSISTANT EA 15: HCPCS

## 2019-10-15 PROCEDURE — 3331090001 HH PPS REVENUE CREDIT

## 2019-10-16 PROCEDURE — 3331090002 HH PPS REVENUE DEBIT

## 2019-10-16 PROCEDURE — 3331090001 HH PPS REVENUE CREDIT

## 2019-10-17 ENCOUNTER — HOME CARE VISIT (OUTPATIENT)
Dept: SCHEDULING | Facility: HOME HEALTH | Age: 64
End: 2019-10-17
Payer: MEDICARE

## 2019-10-17 PROCEDURE — 3331090001 HH PPS REVENUE CREDIT

## 2019-10-17 PROCEDURE — 3331090003 HH PPS REVENUE ADJ

## 2019-10-17 PROCEDURE — 3331090002 HH PPS REVENUE DEBIT

## 2019-10-17 PROCEDURE — G0151 HHCP-SERV OF PT,EA 15 MIN: HCPCS

## 2019-10-18 VITALS — HEART RATE: 82 BPM | DIASTOLIC BLOOD PRESSURE: 99 MMHG | OXYGEN SATURATION: 97 % | SYSTOLIC BLOOD PRESSURE: 181 MMHG

## 2019-10-18 VITALS
HEART RATE: 92 BPM | TEMPERATURE: 98.2 F | RESPIRATION RATE: 18 BRPM | DIASTOLIC BLOOD PRESSURE: 86 MMHG | OXYGEN SATURATION: 98 % | SYSTOLIC BLOOD PRESSURE: 142 MMHG

## 2019-10-18 PROCEDURE — 3331090002 HH PPS REVENUE DEBIT

## 2019-10-18 PROCEDURE — 3331090001 HH PPS REVENUE CREDIT

## 2019-10-19 PROCEDURE — 3331090002 HH PPS REVENUE DEBIT

## 2019-10-19 PROCEDURE — 3331090001 HH PPS REVENUE CREDIT

## 2019-10-20 PROCEDURE — 3331090002 HH PPS REVENUE DEBIT

## 2019-10-20 PROCEDURE — 3331090001 HH PPS REVENUE CREDIT

## 2019-10-30 ENCOUNTER — PATIENT OUTREACH (OUTPATIENT)
Dept: FAMILY MEDICINE CLINIC | Age: 64
End: 2019-10-30

## 2019-10-30 NOTE — PROGRESS NOTES
Patient has graduated from the Transitions of Care Coordination  program on 10/30/19. Patient's symptoms are stable at this time. Patient/family has the ability to self-manage. Care management goals have been completed at this time. No further nurse navigator follow up scheduled. Goals Addressed This Visit's Progress  COMPLETED: Prevent complications post hospitalization. Patient will attend all physician appointments as scheduled NN will follow patient for at least 4 weeks post discharge from SNF Pt has nurse navigator's contact information for any further questions, concerns, or needs. Patients upcoming visits:   
Future Appointments Date Time Provider Brandi Lees 10/31/2019 10:40 AM Roosevelt Chacon MD [address] [place] [address]

## 2019-10-31 ENCOUNTER — OFFICE VISIT (OUTPATIENT)
Dept: CARDIOLOGY CLINIC | Age: 64
End: 2019-10-31

## 2019-10-31 VITALS
BODY MASS INDEX: 23.99 KG/M2 | OXYGEN SATURATION: 97 % | DIASTOLIC BLOOD PRESSURE: 90 MMHG | WEIGHT: 144 LBS | SYSTOLIC BLOOD PRESSURE: 150 MMHG | HEART RATE: 84 BPM | HEIGHT: 65 IN

## 2019-10-31 DIAGNOSIS — I10 HYPERTENSION, UNSPECIFIED TYPE: ICD-10-CM

## 2019-10-31 DIAGNOSIS — I50.42 CHRONIC COMBINED SYSTOLIC AND DIASTOLIC CONGESTIVE HEART FAILURE (HCC): ICD-10-CM

## 2019-10-31 DIAGNOSIS — N28.9 RENAL INSUFFICIENCY: ICD-10-CM

## 2019-10-31 DIAGNOSIS — I42.9 CARDIOMYOPATHY, UNSPECIFIED TYPE (HCC): Primary | ICD-10-CM

## 2019-10-31 DIAGNOSIS — R06.02 SHORTNESS OF BREATH: ICD-10-CM

## 2019-10-31 DIAGNOSIS — I16.1 HYPERTENSIVE EMERGENCY: ICD-10-CM

## 2019-10-31 NOTE — PROGRESS NOTES
HPI: A 68-year old female here for follow up. Today she is accompanied by a . She was seen in the hospital in August with a hypertension emergency and concern for confusion and stroke. Her blood pressure was controlled. She is doing well. She does have some exertional dyspnea at times but this appears to be chronic and unchanged. She denies chest pain, syncope. She has some occasional edema. Impression/Plan:  1. Admission to the hospital August 2019 with possible stroke/TIA presenting with confusion. Head MRI was possible subacute infarct. This happens commonly with uncontrolled hypertension. 2. Hypertensive heart disease with blood pressure > 200 August 2019. 3. Nonischemic cardiomyopathy with EF 36-40% August 2019, severe LVH. Heart catheterization May 2018 without any epicardial disease. 4. Diabetes. 5. Dyslipidemia. 6. Chronic systolic heart failure. 7. Asthma and COPD on home oxygen intermittently. 8. Chronic kidney disease stage 3.   9. Chronic anemia. At this point her blood pressure is reasonable. She does have some exertional dyspnea at times. Her EF has fluctuated through the years. I will repeat to make sure it is stable. It had been down to 30% May 2018 and then improved. I suspect she has some underlying COPD as well. In regard to medications, her blood pressure is still suboptimal but given her kidney disease we need to be cautious in adjusting her medications. When she sees her primary physician, it may be reasonable to increase her Isordil to 30 mg three times a day and switch her to Imdur 90 mg daily. She remains on Norvasc 10 mg, hydralazine 100 mg tid, Lasix 20 mg twice daily. I will plan to see her back in six months. Total care time spent in reviewing the case, multiple EMR databases, physician notes, procedure notes, examining the patient, and documentation, is 40 minutes.      Discussed in details with patient.  All questions were answered to their full satisfaction. Risk, benefit and alternatives were discussed. More than 50% time spent in counseling and coordination of care. Past Medical History:   Diagnosis Date    Anemia     Arthritis     Cardiomyopathy due to hypertension, with heart failure ([facility] 5/19/2017    Cataract     Chronic combined systolic and diastolic congestive heart failure ([facility] 5/3/2018    Chronic lung disease     Chronic obstructive pulmonary disease (HCC)     Chronic systolic congestive heart failure (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 Mild-mod conc LVH. Mod DDfx. LAE. Mild MR.      Hypercholesterolemia     Hypertension     Hypertensive heart disease     [facility] right [facility] 9/17/2017    Lacunar infarct 2017    Joint pain     Joint swelling     Normal nuclear stress test 09/08/2000    No ischemia or prior infarction. Neg EKG on submax EST. Ex time 15:02.  Recurrent boils     Rheumatism     Shortness of breath     Sinusitis with nasal polyps 8/18/2019       Current Outpatient Medications   Medication Sig Dispense Refill    amLODIPine (NORVASC) 10 mg tablet Take 1 Tab by mouth daily. (Patient taking differently: Take 5 mg by mouth daily.) 30 Tab 0    isosorbide dinitrate (ISORDIL) 10 mg tablet Take 2 Tabs by mouth three (3) times daily. 1 Tab 0    gabapentin (NEURONTIN) 100 mg capsule Take 100 mg by mouth three (3) times daily. 10/30/19 - 1 cap at breakfast, 2 caps at dinner and  bedtime  Indications: Neuropathic Pain      fluticasone furoate-vilanterol (BREO ELLIPTA) 100-25 mcg/dose inhaler Take 1 Puff by inhalation daily. (Patient taking differently: Take 1 Puff by inhalation daily. Indications: Bronchospasm Prevention with COPD) 1 Inhaler 0    spironolactone (ALDACTONE) 25 mg tablet Take 1 Tab by mouth two (2) times a day.  (Patient taking differently: Take 25 mg by mouth two (2) times a day. 10/30/19 - daughter states patient takes 0.5 tab because it causes dizziness) 180 Tab 3    Oxygen 1 Device by Nasal route as needed (Oxygen supplement). 3L per min as needed   Indications: oxygen supplement      sodium chloride (OCEAN) 0.65 % nasal squeeze bottle 2 Sprays as needed for Congestion.  albuterol (VENTOLIN HFA) 90 mcg/actuation inhaler Take 2 Puffs by inhalation every four (4) hours as needed for Wheezing or Shortness of Breath. 1 Inhaler 5    hydrALAZINE (APRESOLINE) 100 mg tablet Take 1 Tab by mouth three (3) times daily. 270 Tab 3    Blood-Glucose Meter (TRUE METRIX GLUCOSE METER) misc Test glucose 3 times daily DX: E11.40 (Patient taking differently: Test glucose 3 times daily DX: E11.40  Indications: blood sugar readings) 1 Each 0    Lancets misc Test glucose 3 times daily DX: E11.40 (Patient taking differently: Test glucose 3 times daily DX: E11.40  Indications: for blood draws w/ blood sugar readings) 100 Each 12    glucose blood VI test strips (BLOOD GLUCOSE TEST) strip (True Metrix) Test glucose 3 times daily DX: E11.40 100 Strip 12    insulin glargine (LANTUS,BASAGLAR) 100 unit/mL (3 mL) inpn 7 Units by SubCUTAneous route in the morning. 15 units in the evening. (Patient taking differently: 15 Units daily.) 5 Pen 12    furosemide (LASIX) 20 mg tablet Take 1 Tab by mouth two (2) times a day. 60 Tab 8    acetaminophen (TYLENOL EXTRA STRENGTH) 500 mg tablet Take 1,000 mg by mouth every six (6) hours as needed for Pain.  CARBOXYMETHYLCELLULOS/GLYCERIN (REFRESH OPTIVE OP) Administer 1 Drop to both eyes six (6) times daily. Indications: eye health      aspirin delayed-release 81 mg tablet Take 81 mg by mouth daily. Dtr. Karina Fields pt no longer takes.  pravastatin (PRAVACHOL) 20 mg tablet Take 20 mg by mouth nightly. Social History   reports that she has never smoked.  She has never used smokeless tobacco. reports that she does not drink alcohol. Family History  family history includes Breast Cancer in her maternal aunt; Heart Attack in her father; Hypertension in her mother; Ovarian Cancer in her mother; Stroke in her mother. Review of Systems  Except as stated above include:  Constitutional: Negative for fever, chills and malaise/fatigue. HEENT: No congestion or recent URI. Gastrointestinal: No nausea, vomiting, abdominal pain, bloody stools. Pulmonary:  Negative except as stated above. Cardiac:  Negative except as stated above. Musculoskeletal: Negative except as stated above. Neurological:  No localized symptoms. Skin:  Negative except as stated above. Psych:  Negative except as stated above. Endocrine:  Negative except as stated above. PHYSICAL EXAM  BP Readings from Last 3 Encounters:   10/31/19 150/90   10/17/19 (!) 181/99   10/15/19 142/86     Pulse Readings from Last 3 Encounters:   10/31/19 84   10/17/19 82   10/15/19 92     Wt Readings from Last 3 Encounters:   10/31/19 65.3 kg (144 lb)   09/18/19 66.4 kg (146 lb 6.4 oz)   08/20/19 74.8 kg (165 lb)     General:   Well developed, well groomed. Head/Neck:   No jugular venous distention     No carotid bruits. No evidence of xanthelasma. Lungs:   No respiratory distress. Clear bilaterally. Heart:    Regular rate and rhythm. Normal S1/S2. Palpation of heart with normal point of maximum impulse. No significant murmurs, rubs or gallops. Abdomen:   Soft and nontender. No palpable abdominal mass or bruits. Extremities:   Intact peripheral pulses. No significant edema. Neurological:   Alert and oriented to person, place, time. No focal neurological deficit visually. Skin:   No obvious rash    Blood Pressure Metric:  Monitor recommended and adjustments stated if needed.

## 2019-10-31 NOTE — PROGRESS NOTES
German Carballo presents today for   Chief Complaint   Patient presents with    CHF     follow up     Swelling     mild    Shortness of Breath     exertion    Dizziness     sometimes    Chest Pain     intermittent tightness, middle of chest     Palpitations     racing        German Carballo preferred language for health care discussion is english/other. Is someone accompanying this pt? Caregiver    Is the patient using any DME equipment during OV? [employer]     Depression Screening:  3 most recent PHQ Screens 1/23/2019   Little interest or pleasure in doing things Not at all   Feeling down, depressed, irritable, or hopeless Not at all   Total Score PHQ 2 0       Learning Assessment:  Learning Assessment 1/23/2019   PRIMARY LEARNER Patient   HIGHEST LEVEL OF EDUCATION - PRIMARY LEARNER  GRADUATED HIGH SCHOOL OR GED   BARRIERS PRIMARY LEARNER NONE   CO-LEARNER CAREGIVER No   PRIMARY LANGUAGE ENGLISH   LEARNER PREFERENCE PRIMARY LISTENING     -   ANSWERED BY self   RELATIONSHIP SELF       Abuse Screening:  Abuse Screening Questionnaire 1/23/2019   Do you ever feel afraid of your partner? N   Are you in a relationship with someone who physically or mentally threatens you? N   Is it safe for you to go home? Y       Fall Risk  Fall Risk Assessment, last 12 mths 4/3/2019   Able to walk? Yes   Fall in past 12 months? No   Fall with injury? -   Number of falls in past 12 months -   Fall Risk Score -       Pt currently taking Anticoagulant therapy? no    Coordination of Care:  1. Have you been to the ER, urgent care clinic since your last visit? Hospitalized since your last visit? 8/18 - 8/21 for TIA     2. Have you seen or consulted any other health care providers outside of the [address] [place] [address] since your last visit? Include any pap smears or colon screening.  no

## 2019-11-12 ENCOUNTER — PATIENT OUTREACH (OUTPATIENT)
Dept: FAMILY MEDICINE CLINIC | Age: 64
End: 2019-11-12

## 2019-11-13 NOTE — PROGRESS NOTES
NN health screening:    Ms Estela Hernandez stated Rosa Mcmahon is so sweet of you to send those gas cards. Since I had my stroke I have to ask for a lot of help periodically and those gas cards will help pay for gas for my family to drive me cale I can't drive anymore. \"

## 2019-12-05 ENCOUNTER — PATIENT OUTREACH (OUTPATIENT)
Dept: FAMILY MEDICINE CLINIC | Age: 64
End: 2019-12-05

## 2019-12-05 NOTE — PROGRESS NOTES
NN health screening: We agreed I would call the first week of January to f/u and if scheduled screenings I will mail gas cards.

## 2020-01-01 ENCOUNTER — APPOINTMENT (OUTPATIENT)
Dept: GENERAL RADIOLOGY | Age: 65
DRG: 190 | End: 2020-01-01
Attending: EMERGENCY MEDICINE
Payer: MEDICARE

## 2020-01-01 ENCOUNTER — HOSPITAL ENCOUNTER (INPATIENT)
Age: 65
LOS: 5 days | Discharge: HOME HEALTH | DRG: 190 | End: 2020-04-29
Attending: EMERGENCY MEDICINE | Admitting: INTERNAL MEDICINE
Payer: MEDICARE

## 2020-01-01 ENCOUNTER — APPOINTMENT (OUTPATIENT)
Dept: GENERAL RADIOLOGY | Age: 65
DRG: 065 | End: 2020-01-01
Attending: HOSPITALIST
Payer: MEDICARE

## 2020-01-01 ENCOUNTER — PATIENT OUTREACH (OUTPATIENT)
Dept: FAMILY MEDICINE CLINIC | Age: 65
End: 2020-01-01

## 2020-01-01 ENCOUNTER — APPOINTMENT (OUTPATIENT)
Dept: CT IMAGING | Age: 65
DRG: 065 | End: 2020-01-01
Attending: EMERGENCY MEDICINE
Payer: MEDICARE

## 2020-01-01 ENCOUNTER — PATIENT OUTREACH (OUTPATIENT)
Dept: CASE MANAGEMENT | Age: 65
End: 2020-01-01

## 2020-01-01 ENCOUNTER — APPOINTMENT (OUTPATIENT)
Dept: NON INVASIVE DIAGNOSTICS | Age: 65
DRG: 065 | End: 2020-01-01
Attending: PHYSICIAN ASSISTANT
Payer: MEDICARE

## 2020-01-01 ENCOUNTER — HOSPITAL ENCOUNTER (OUTPATIENT)
Dept: LAB | Age: 65
Discharge: HOME | End: 2020-04-06
Payer: MEDICARE

## 2020-01-01 ENCOUNTER — APPOINTMENT (OUTPATIENT)
Dept: VASCULAR SURGERY | Age: 65
DRG: 065 | End: 2020-01-01
Attending: PHYSICIAN ASSISTANT
Payer: MEDICARE

## 2020-01-01 ENCOUNTER — APPOINTMENT (OUTPATIENT)
Dept: GENERAL RADIOLOGY | Age: 65
DRG: 065 | End: 2020-01-01
Attending: EMERGENCY MEDICINE
Payer: MEDICARE

## 2020-01-01 ENCOUNTER — HOSPITAL ENCOUNTER (INPATIENT)
Age: 65
LOS: 4 days | Discharge: HOME HEALTH | DRG: 190 | End: 2020-07-19
Attending: EMERGENCY MEDICINE | Admitting: HOSPITALIST
Payer: MEDICARE

## 2020-01-01 ENCOUNTER — VIRTUAL VISIT (OUTPATIENT)
Dept: FAMILY MEDICINE CLINIC | Age: 65
End: 2020-01-01

## 2020-01-01 ENCOUNTER — HOSPITAL ENCOUNTER (EMERGENCY)
Age: 65
Discharge: HOME | End: 2020-09-25
Attending: EMERGENCY MEDICINE
Payer: MEDICARE

## 2020-01-01 ENCOUNTER — APPOINTMENT (OUTPATIENT)
Dept: MRI IMAGING | Age: 65
DRG: 065 | End: 2020-01-01
Attending: PHYSICIAN ASSISTANT
Payer: MEDICARE

## 2020-01-01 ENCOUNTER — APPOINTMENT (OUTPATIENT)
Dept: GENERAL RADIOLOGY | Age: 65
End: 2020-01-01
Attending: EMERGENCY MEDICINE
Payer: MEDICARE

## 2020-01-01 ENCOUNTER — VIRTUAL VISIT (OUTPATIENT)
Dept: FAMILY MEDICINE CLINIC | Age: 65
End: 2020-01-01
Payer: MEDICARE

## 2020-01-01 ENCOUNTER — HOSPITAL ENCOUNTER (INPATIENT)
Age: 65
LOS: 3 days | Discharge: NURSING, REHAB OR LONG-TERM CARE | DRG: 065 | End: 2020-04-01
Attending: EMERGENCY MEDICINE | Admitting: HOSPITALIST
Payer: MEDICARE

## 2020-01-01 ENCOUNTER — TELEPHONE (OUTPATIENT)
Dept: FAMILY MEDICINE CLINIC | Age: 65
End: 2020-01-01

## 2020-01-01 ENCOUNTER — HOSPITAL ENCOUNTER (EMERGENCY)
Age: 65
End: 2020-12-31
Attending: EMERGENCY MEDICINE
Payer: MEDICARE

## 2020-01-01 ENCOUNTER — OFFICE VISIT (OUTPATIENT)
Dept: PULMONOLOGY | Age: 65
End: 2020-01-01

## 2020-01-01 ENCOUNTER — HOSPITAL ENCOUNTER (OUTPATIENT)
Dept: LAB | Age: 65
Discharge: HOME | End: 2020-04-09

## 2020-01-01 VITALS
OXYGEN SATURATION: 100 % | HEIGHT: 69 IN | WEIGHT: 151.5 LBS | RESPIRATION RATE: 17 BRPM | TEMPERATURE: 97.8 F | BODY MASS INDEX: 22.44 KG/M2 | DIASTOLIC BLOOD PRESSURE: 62 MMHG | SYSTOLIC BLOOD PRESSURE: 160 MMHG | HEART RATE: 100 BPM

## 2020-01-01 VITALS
WEIGHT: 140 LBS | HEART RATE: 93 BPM | BODY MASS INDEX: 20.73 KG/M2 | RESPIRATION RATE: 15 BRPM | HEIGHT: 69 IN | SYSTOLIC BLOOD PRESSURE: 140 MMHG | TEMPERATURE: 98.3 F | DIASTOLIC BLOOD PRESSURE: 70 MMHG | OXYGEN SATURATION: 100 %

## 2020-01-01 VITALS
BODY MASS INDEX: 21.69 KG/M2 | HEART RATE: 96 BPM | HEIGHT: 65 IN | WEIGHT: 130.2 LBS | SYSTOLIC BLOOD PRESSURE: 98 MMHG | OXYGEN SATURATION: 96 % | RESPIRATION RATE: 18 BRPM | DIASTOLIC BLOOD PRESSURE: 64 MMHG

## 2020-01-01 VITALS
RESPIRATION RATE: 21 BRPM | OXYGEN SATURATION: 95 % | SYSTOLIC BLOOD PRESSURE: 185 MMHG | DIASTOLIC BLOOD PRESSURE: 113 MMHG | HEART RATE: 93 BPM | TEMPERATURE: 97 F

## 2020-01-01 VITALS
HEIGHT: 69 IN | SYSTOLIC BLOOD PRESSURE: 140 MMHG | HEART RATE: 75 BPM | OXYGEN SATURATION: 97 % | BODY MASS INDEX: 20.07 KG/M2 | TEMPERATURE: 97.5 F | DIASTOLIC BLOOD PRESSURE: 77 MMHG | RESPIRATION RATE: 18 BRPM | WEIGHT: 135.5 LBS

## 2020-01-01 VITALS — OXYGEN SATURATION: 92 %

## 2020-01-01 DIAGNOSIS — E11.21 TYPE 2 DIABETES MELLITUS WITH NEPHROPATHY (HCC): ICD-10-CM

## 2020-01-01 DIAGNOSIS — I50.23 ACUTE ON CHRONIC SYSTOLIC (CONGESTIVE) HEART FAILURE (HCC): Primary | ICD-10-CM

## 2020-01-01 DIAGNOSIS — Z12.11 COLON CANCER SCREENING: Primary | ICD-10-CM

## 2020-01-01 DIAGNOSIS — J44.9 COPD MIXED TYPE (HCC): Chronic | ICD-10-CM

## 2020-01-01 DIAGNOSIS — I50.42 CHRONIC COMBINED SYSTOLIC AND DIASTOLIC CONGESTIVE HEART FAILURE (HCC): Chronic | ICD-10-CM

## 2020-01-01 DIAGNOSIS — I10 UNCONTROLLED HYPERTENSION: ICD-10-CM

## 2020-01-01 DIAGNOSIS — N18.30 CHRONIC KIDNEY DISEASE, STAGE 3 (HCC): ICD-10-CM

## 2020-01-01 DIAGNOSIS — J45.30 MILD PERSISTENT ASTHMA WITHOUT COMPLICATION: Primary | ICD-10-CM

## 2020-01-01 DIAGNOSIS — J44.9 COPD MIXED TYPE (HCC): Primary | ICD-10-CM

## 2020-01-01 DIAGNOSIS — D64.9 CHRONIC ANEMIA: ICD-10-CM

## 2020-01-01 DIAGNOSIS — Z99.81 ON HOME OXYGEN THERAPY: ICD-10-CM

## 2020-01-01 DIAGNOSIS — J96.11 CHRONIC HYPOXEMIC RESPIRATORY FAILURE (HCC): ICD-10-CM

## 2020-01-01 DIAGNOSIS — R63.4 UNEXPLAINED WEIGHT LOSS: ICD-10-CM

## 2020-01-01 DIAGNOSIS — I10 HYPERTENSION, UNSPECIFIED TYPE: ICD-10-CM

## 2020-01-01 DIAGNOSIS — R53.1 LEFT-SIDED WEAKNESS: Chronic | ICD-10-CM

## 2020-01-01 DIAGNOSIS — Z76.0 MEDICATION REFILL: ICD-10-CM

## 2020-01-01 DIAGNOSIS — I46.9 CARDIAC ARREST (HCC): Primary | ICD-10-CM

## 2020-01-01 DIAGNOSIS — R47.81 SLURRED SPEECH: ICD-10-CM

## 2020-01-01 DIAGNOSIS — R53.81 PHYSICAL DEBILITY: ICD-10-CM

## 2020-01-01 DIAGNOSIS — R53.1 LEFT-SIDED WEAKNESS: ICD-10-CM

## 2020-01-01 DIAGNOSIS — L89.309 PRESSURE INJURY OF SKIN OF BUTTOCK, UNSPECIFIED INJURY STAGE, UNSPECIFIED LATERALITY: Primary | ICD-10-CM

## 2020-01-01 DIAGNOSIS — L29.9 PRURITUS: ICD-10-CM

## 2020-01-01 DIAGNOSIS — E78.5 HYPERLIPIDEMIA, UNSPECIFIED HYPERLIPIDEMIA TYPE: ICD-10-CM

## 2020-01-01 DIAGNOSIS — J20.9 ACUTE BRONCHITIS WITH CHRONIC OBSTRUCTIVE PULMONARY DISEASE (COPD) (HCC): Primary | ICD-10-CM

## 2020-01-01 DIAGNOSIS — R06.02 SHORTNESS OF BREATH: Primary | ICD-10-CM

## 2020-01-01 DIAGNOSIS — Z86.73 HISTORY OF CVA (CEREBROVASCULAR ACCIDENT): ICD-10-CM

## 2020-01-01 DIAGNOSIS — N18.30 STAGE 3 CHRONIC KIDNEY DISEASE, UNSPECIFIED WHETHER STAGE 3A OR 3B CKD (HCC): ICD-10-CM

## 2020-01-01 DIAGNOSIS — E87.6 HYPOKALEMIA: ICD-10-CM

## 2020-01-01 DIAGNOSIS — J44.0 ACUTE BRONCHITIS WITH CHRONIC OBSTRUCTIVE PULMONARY DISEASE (COPD) (HCC): Primary | ICD-10-CM

## 2020-01-01 DIAGNOSIS — J44.9 COPD MIXED TYPE (HCC): ICD-10-CM

## 2020-01-01 DIAGNOSIS — J44.9 CHRONIC OBSTRUCTIVE PULMONARY DISEASE, UNSPECIFIED COPD TYPE (HCC): ICD-10-CM

## 2020-01-01 DIAGNOSIS — I10 ESSENTIAL HYPERTENSION: Chronic | ICD-10-CM

## 2020-01-01 DIAGNOSIS — I10 ESSENTIAL HYPERTENSION: ICD-10-CM

## 2020-01-01 DIAGNOSIS — I50.42 CHRONIC COMBINED SYSTOLIC AND DIASTOLIC CONGESTIVE HEART FAILURE (HCC): ICD-10-CM

## 2020-01-01 DIAGNOSIS — I63.9 ISCHEMIC STROKE (HCC): Primary | ICD-10-CM

## 2020-01-01 LAB
ALBUMIN SERPL-MCNC: 2.5 G/DL (ref 3.4–5)
ALBUMIN SERPL-MCNC: 3 G/DL (ref 3.4–5)
ALBUMIN SERPL-MCNC: 3.3 G/DL (ref 3.4–5)
ALBUMIN/GLOB SERPL: 0.5 {RATIO} (ref 0.8–1.7)
ALBUMIN/GLOB SERPL: 0.6 {RATIO} (ref 0.8–1.7)
ALBUMIN/GLOB SERPL: 0.7 {RATIO} (ref 0.8–1.7)
ALP SERPL-CCNC: 107 U/L (ref 45–117)
ALP SERPL-CCNC: 113 U/L (ref 45–117)
ALP SERPL-CCNC: 124 U/L (ref 45–117)
ALT SERPL-CCNC: 10 U/L (ref 13–56)
ALT SERPL-CCNC: 10 U/L (ref 13–56)
ALT SERPL-CCNC: 11 U/L (ref 13–56)
ANION GAP SERPL CALC-SCNC: 3 MMOL/L (ref 3–18)
ANION GAP SERPL CALC-SCNC: 3 MMOL/L (ref 3–18)
ANION GAP SERPL CALC-SCNC: 4 MMOL/L (ref 3–18)
ANION GAP SERPL CALC-SCNC: 4 MMOL/L (ref 3–18)
ANION GAP SERPL CALC-SCNC: 5 MMOL/L (ref 3–18)
ANION GAP SERPL CALC-SCNC: 6 MMOL/L (ref 3–18)
ANION GAP SERPL CALC-SCNC: 6 MMOL/L (ref 3–18)
ANION GAP SERPL CALC-SCNC: 8 MMOL/L (ref 3–18)
ANION GAP SERPL CALC-SCNC: 9 MMOL/L (ref 3–18)
APTT PPP: 29.4 SEC (ref 23–36.4)
APTT PPP: 29.5 SEC (ref 23–36.4)
APTT PPP: 30.5 SEC (ref 23–36.4)
AST SERPL-CCNC: 6 U/L (ref 10–38)
AST SERPL-CCNC: 8 U/L (ref 10–38)
AST SERPL-CCNC: 9 U/L (ref 10–38)
ATRIAL RATE: 110 BPM
ATRIAL RATE: 86 BPM
ATRIAL RATE: 94 BPM
ATRIAL RATE: 95 BPM
BACTERIA SPEC CULT: NORMAL
BACTERIA SPEC CULT: NORMAL
BASOPHILS # BLD: 0 K/UL (ref 0–0.06)
BASOPHILS # BLD: 0 K/UL (ref 0–0.1)
BASOPHILS # BLD: 0.1 K/UL (ref 0–0.06)
BASOPHILS # BLD: 0.1 K/UL (ref 0–0.06)
BASOPHILS # BLD: 0.2 K/UL (ref 0–0.06)
BASOPHILS NFR BLD: 0 % (ref 0–2)
BASOPHILS NFR BLD: 0 % (ref 0–3)
BASOPHILS NFR BLD: 1 % (ref 0–3)
BASOPHILS NFR BLD: 1 % (ref 0–3)
BASOPHILS NFR BLD: 3 % (ref 0–3)
BILIRUB SERPL-MCNC: 0.2 MG/DL (ref 0.2–1)
BILIRUB SERPL-MCNC: 0.4 MG/DL (ref 0.2–1)
BILIRUB SERPL-MCNC: 0.7 MG/DL (ref 0.2–1)
BNP SERPL-MCNC: 2416 PG/ML (ref 0–900)
BNP SERPL-MCNC: 2593 PG/ML (ref 0–900)
BUN SERPL-MCNC: 102 MG/DL (ref 7–18)
BUN SERPL-MCNC: 19 MG/DL (ref 7–18)
BUN SERPL-MCNC: 19 MG/DL (ref 7–18)
BUN SERPL-MCNC: 20 MG/DL (ref 7–18)
BUN SERPL-MCNC: 23 MG/DL (ref 7–18)
BUN SERPL-MCNC: 35 MG/DL (ref 7–18)
BUN SERPL-MCNC: 39 MG/DL (ref 7–18)
BUN SERPL-MCNC: 40 MG/DL (ref 7–18)
BUN SERPL-MCNC: 41 MG/DL (ref 7–18)
BUN SERPL-MCNC: 51 MG/DL (ref 7–18)
BUN SERPL-MCNC: 53 MG/DL (ref 7–18)
BUN SERPL-MCNC: 61 MG/DL (ref 7–18)
BUN SERPL-MCNC: 64 MG/DL (ref 7–18)
BUN SERPL-MCNC: 76 MG/DL (ref 7–18)
BUN SERPL-MCNC: 80 MG/DL (ref 7–18)
BUN/CREAT SERPL: 14 (ref 12–20)
BUN/CREAT SERPL: 14 (ref 12–20)
BUN/CREAT SERPL: 16 (ref 12–20)
BUN/CREAT SERPL: 17 (ref 12–20)
BUN/CREAT SERPL: 19 (ref 12–20)
BUN/CREAT SERPL: 20 (ref 12–20)
BUN/CREAT SERPL: 21 (ref 12–20)
BUN/CREAT SERPL: 27 (ref 12–20)
BUN/CREAT SERPL: 27 (ref 12–20)
BUN/CREAT SERPL: 31 (ref 12–20)
BUN/CREAT SERPL: 31 (ref 12–20)
BUN/CREAT SERPL: 32 (ref 12–20)
BUN/CREAT SERPL: 34 (ref 12–20)
BUN/CREAT SERPL: 36 (ref 12–20)
BUN/CREAT SERPL: 37 (ref 12–20)
CALCIUM SERPL-MCNC: 8 MG/DL (ref 8.5–10.1)
CALCIUM SERPL-MCNC: 8.1 MG/DL (ref 8.5–10.1)
CALCIUM SERPL-MCNC: 8.1 MG/DL (ref 8.5–10.1)
CALCIUM SERPL-MCNC: 8.2 MG/DL (ref 8.5–10.1)
CALCIUM SERPL-MCNC: 8.3 MG/DL (ref 8.5–10.1)
CALCIUM SERPL-MCNC: 8.4 MG/DL (ref 8.5–10.1)
CALCIUM SERPL-MCNC: 8.8 MG/DL (ref 8.5–10.1)
CALCIUM SERPL-MCNC: 8.9 MG/DL (ref 8.5–10.1)
CALCIUM SERPL-MCNC: 9 MG/DL (ref 8.5–10.1)
CALCIUM SERPL-MCNC: 9.1 MG/DL (ref 8.5–10.1)
CALCIUM SERPL-MCNC: 9.3 MG/DL (ref 8.5–10.1)
CALCULATED P AXIS, ECG09: -5 DEGREES
CALCULATED P AXIS, ECG09: 54 DEGREES
CALCULATED P AXIS, ECG09: 73 DEGREES
CALCULATED P AXIS, ECG09: 81 DEGREES
CALCULATED R AXIS, ECG10: -39 DEGREES
CALCULATED R AXIS, ECG10: -43 DEGREES
CALCULATED R AXIS, ECG10: -50 DEGREES
CALCULATED R AXIS, ECG10: 107 DEGREES
CALCULATED T AXIS, ECG11: -61 DEGREES
CALCULATED T AXIS, ECG11: 124 DEGREES
CALCULATED T AXIS, ECG11: 127 DEGREES
CALCULATED T AXIS, ECG11: 132 DEGREES
CHLORIDE SERPL-SCNC: 104 MMOL/L (ref 100–111)
CHLORIDE SERPL-SCNC: 107 MMOL/L (ref 100–111)
CHLORIDE SERPL-SCNC: 107 MMOL/L (ref 100–111)
CHLORIDE SERPL-SCNC: 108 MMOL/L (ref 100–111)
CHLORIDE SERPL-SCNC: 109 MMOL/L (ref 100–111)
CHLORIDE SERPL-SCNC: 109 MMOL/L (ref 100–111)
CHLORIDE SERPL-SCNC: 110 MMOL/L (ref 100–111)
CHLORIDE SERPL-SCNC: 111 MMOL/L (ref 100–111)
CHLORIDE SERPL-SCNC: 112 MMOL/L (ref 100–111)
CHLORIDE SERPL-SCNC: 114 MMOL/L (ref 100–111)
CHOLEST SERPL-MCNC: 133 MG/DL
CHOLEST SERPL-MCNC: 172 MG/DL
CK MB CFR SERPL CALC: 3.2 % (ref 0–4)
CK MB CFR SERPL CALC: 5.2 % (ref 0–4)
CK MB CFR SERPL CALC: NORMAL % (ref 0–4)
CK MB SERPL-MCNC: 3.2 NG/ML (ref 5–25)
CK MB SERPL-MCNC: 5.5 NG/ML (ref 5–25)
CK MB SERPL-MCNC: <1 NG/ML (ref 5–25)
CK SERPL-CCNC: 100 U/L (ref 26–192)
CK SERPL-CCNC: 105 U/L (ref 26–192)
CK SERPL-CCNC: 36 U/L (ref 26–192)
CK SERPL-CCNC: 38 U/L (ref 26–192)
CK SERPL-CCNC: 46 U/L (ref 26–192)
CK SERPL-CCNC: 73 U/L (ref 26–192)
CO2 SERPL-SCNC: 23 MMOL/L (ref 21–32)
CO2 SERPL-SCNC: 25 MMOL/L (ref 21–32)
CO2 SERPL-SCNC: 25 MMOL/L (ref 21–32)
CO2 SERPL-SCNC: 26 MMOL/L (ref 21–32)
CO2 SERPL-SCNC: 27 MMOL/L (ref 21–32)
CO2 SERPL-SCNC: 28 MMOL/L (ref 21–32)
CO2 SERPL-SCNC: 31 MMOL/L (ref 21–32)
CREAT SERPL-MCNC: 1.19 MG/DL (ref 0.6–1.3)
CREAT SERPL-MCNC: 1.34 MG/DL (ref 0.6–1.3)
CREAT SERPL-MCNC: 1.37 MG/DL (ref 0.6–1.3)
CREAT SERPL-MCNC: 1.43 MG/DL (ref 0.6–1.3)
CREAT SERPL-MCNC: 1.46 MG/DL (ref 0.6–1.3)
CREAT SERPL-MCNC: 1.62 MG/DL (ref 0.6–1.3)
CREAT SERPL-MCNC: 1.88 MG/DL (ref 0.6–1.3)
CREAT SERPL-MCNC: 1.96 MG/DL (ref 0.6–1.3)
CREAT SERPL-MCNC: 1.96 MG/DL (ref 0.6–1.3)
CREAT SERPL-MCNC: 1.97 MG/DL (ref 0.6–1.3)
CREAT SERPL-MCNC: 2 MG/DL (ref 0.6–1.3)
CREAT SERPL-MCNC: 2.03 MG/DL (ref 0.6–1.3)
CREAT SERPL-MCNC: 2.25 MG/DL (ref 0.6–1.3)
CREAT SERPL-MCNC: 2.25 MG/DL (ref 0.6–1.3)
CREAT SERPL-MCNC: 2.78 MG/DL (ref 0.6–1.3)
CRP SERPL HS-MCNC: 0.2 MG/L
CRP SERPL HS-MCNC: 0.7 MG/L
CRP SERPL HS-MCNC: 1.1 MG/L
D DIMER PPP FEU-MCNC: 1.32 UG/ML(FEU)
D DIMER PPP FEU-MCNC: 1.35 UG/ML(FEU)
D DIMER PPP FEU-MCNC: 1.44 UG/ML(FEU)
D DIMER PPP FEU-MCNC: 1.55 UG/ML(FEU)
DIAGNOSIS, 93000: NORMAL
DIFFERENTIAL METHOD BLD: ABNORMAL
ECHO IVC SNIFF: 1.55 CM
ECHO LV EDV TEICHHOLZ: 0.51 ML
ECHO LV ESV TEICHHOLZ: 0.31 ML
ECHO LV INTERNAL DIMENSION DIASTOLIC: 4.45 CM (ref 3.9–5.3)
ECHO LV INTERNAL DIMENSION SYSTOLIC: 3.6 CM
ECHO LV IVSD: 1.98 CM (ref 0.6–0.9)
ECHO LV MASS 2D: 423 G (ref 67–162)
ECHO LV MASS INDEX 2D: 225.1 G/M2 (ref 43–95)
ECHO LV POSTERIOR WALL DIASTOLIC: 1.55 CM (ref 0.6–0.9)
ECHO TV REGURGITANT MAX VELOCITY: 395.59 CM/S
ECHO TV REGURGITANT PEAK GRADIENT: 62.6 MMHG
EOSINOPHIL # BLD: 0 K/UL (ref 0–0.4)
EOSINOPHIL # BLD: 0.1 K/UL (ref 0–0.4)
EOSINOPHIL # BLD: 0.1 K/UL (ref 0–0.4)
EOSINOPHIL # BLD: 0.9 K/UL (ref 0–0.4)
EOSINOPHIL # BLD: 1.3 K/UL (ref 0–0.4)
EOSINOPHIL # BLD: 1.8 K/UL (ref 0–0.4)
EOSINOPHIL # BLD: 2 K/UL (ref 0–0.4)
EOSINOPHIL # BLD: 2.6 K/UL (ref 0–0.4)
EOSINOPHIL # BLD: 2.8 K/UL (ref 0–0.4)
EOSINOPHIL NFR BLD: 0 % (ref 0–5)
EOSINOPHIL NFR BLD: 1 % (ref 0–5)
EOSINOPHIL NFR BLD: 15 % (ref 0–5)
EOSINOPHIL NFR BLD: 2 % (ref 0–5)
EOSINOPHIL NFR BLD: 21 % (ref 0–5)
EOSINOPHIL NFR BLD: 30 % (ref 0–5)
EOSINOPHIL NFR BLD: 31 % (ref 0–5)
EOSINOPHIL NFR BLD: 33 % (ref 0–5)
EOSINOPHIL NFR BLD: 36 % (ref 0–5)
ERYTHROCYTE [DISTWIDTH] IN BLOOD BY AUTOMATED COUNT: 13.5 % (ref 11.6–14.5)
ERYTHROCYTE [DISTWIDTH] IN BLOOD BY AUTOMATED COUNT: 14.7 % (ref 11.6–14.5)
ERYTHROCYTE [DISTWIDTH] IN BLOOD BY AUTOMATED COUNT: 14.8 % (ref 11.6–14.5)
ERYTHROCYTE [DISTWIDTH] IN BLOOD BY AUTOMATED COUNT: 14.9 % (ref 11.6–14.5)
ERYTHROCYTE [DISTWIDTH] IN BLOOD BY AUTOMATED COUNT: 15 % (ref 11.6–14.5)
ERYTHROCYTE [DISTWIDTH] IN BLOOD BY AUTOMATED COUNT: 15 % (ref 11.6–14.5)
ERYTHROCYTE [DISTWIDTH] IN BLOOD BY AUTOMATED COUNT: 15.1 % (ref 11.6–14.5)
ERYTHROCYTE [DISTWIDTH] IN BLOOD BY AUTOMATED COUNT: 15.1 % (ref 11.6–14.5)
ERYTHROCYTE [DISTWIDTH] IN BLOOD BY AUTOMATED COUNT: 15.2 % (ref 11.6–14.5)
ERYTHROCYTE [DISTWIDTH] IN BLOOD BY AUTOMATED COUNT: 15.3 % (ref 11.6–14.5)
ERYTHROCYTE [DISTWIDTH] IN BLOOD BY AUTOMATED COUNT: 15.4 % (ref 11.6–14.5)
ERYTHROCYTE [DISTWIDTH] IN BLOOD BY AUTOMATED COUNT: 15.4 % (ref 11.6–14.5)
ERYTHROCYTE [DISTWIDTH] IN BLOOD BY AUTOMATED COUNT: 15.6 % (ref 11.6–14.5)
ERYTHROCYTE [DISTWIDTH] IN BLOOD BY AUTOMATED COUNT: 16.6 % (ref 11.6–14.5)
EST. AVERAGE GLUCOSE BLD GHB EST-MCNC: NORMAL MG/DL
FERRITIN SERPL-MCNC: 47 NG/ML (ref 8–388)
FERRITIN SERPL-MCNC: 50 NG/ML (ref 8–388)
FERRITIN SERPL-MCNC: 53 NG/ML (ref 8–388)
FERRITIN SERPL-MCNC: 62 NG/ML (ref 8–388)
FERRITIN SERPL-MCNC: 93 NG/ML (ref 8–388)
GLOBULIN SER CALC-MCNC: 4.7 G/DL (ref 2–4)
GLOBULIN SER CALC-MCNC: 4.9 G/DL (ref 2–4)
GLOBULIN SER CALC-MCNC: 5 G/DL (ref 2–4)
GLUCOSE BLD STRIP.AUTO-MCNC: 101 MG/DL (ref 70–110)
GLUCOSE BLD STRIP.AUTO-MCNC: 102 MG/DL (ref 70–110)
GLUCOSE BLD STRIP.AUTO-MCNC: 104 MG/DL (ref 70–110)
GLUCOSE BLD STRIP.AUTO-MCNC: 107 MG/DL (ref 70–110)
GLUCOSE BLD STRIP.AUTO-MCNC: 116 MG/DL (ref 70–110)
GLUCOSE BLD STRIP.AUTO-MCNC: 118 MG/DL (ref 70–110)
GLUCOSE BLD STRIP.AUTO-MCNC: 119 MG/DL (ref 70–110)
GLUCOSE BLD STRIP.AUTO-MCNC: 120 MG/DL (ref 70–110)
GLUCOSE BLD STRIP.AUTO-MCNC: 121 MG/DL (ref 70–110)
GLUCOSE BLD STRIP.AUTO-MCNC: 121 MG/DL (ref 70–110)
GLUCOSE BLD STRIP.AUTO-MCNC: 125 MG/DL (ref 70–110)
GLUCOSE BLD STRIP.AUTO-MCNC: 127 MG/DL (ref 70–110)
GLUCOSE BLD STRIP.AUTO-MCNC: 129 MG/DL (ref 70–110)
GLUCOSE BLD STRIP.AUTO-MCNC: 130 MG/DL (ref 70–110)
GLUCOSE BLD STRIP.AUTO-MCNC: 136 MG/DL (ref 70–110)
GLUCOSE BLD STRIP.AUTO-MCNC: 138 MG/DL (ref 70–110)
GLUCOSE BLD STRIP.AUTO-MCNC: 152 MG/DL (ref 70–110)
GLUCOSE BLD STRIP.AUTO-MCNC: 153 MG/DL (ref 70–110)
GLUCOSE BLD STRIP.AUTO-MCNC: 156 MG/DL (ref 70–110)
GLUCOSE BLD STRIP.AUTO-MCNC: 161 MG/DL (ref 70–110)
GLUCOSE BLD STRIP.AUTO-MCNC: 162 MG/DL (ref 70–110)
GLUCOSE BLD STRIP.AUTO-MCNC: 163 MG/DL (ref 70–110)
GLUCOSE BLD STRIP.AUTO-MCNC: 170 MG/DL (ref 70–110)
GLUCOSE BLD STRIP.AUTO-MCNC: 171 MG/DL (ref 70–110)
GLUCOSE BLD STRIP.AUTO-MCNC: 194 MG/DL (ref 70–110)
GLUCOSE BLD STRIP.AUTO-MCNC: 207 MG/DL (ref 70–110)
GLUCOSE BLD STRIP.AUTO-MCNC: 207 MG/DL (ref 70–110)
GLUCOSE BLD STRIP.AUTO-MCNC: 218 MG/DL (ref 70–110)
GLUCOSE BLD STRIP.AUTO-MCNC: 237 MG/DL (ref 70–110)
GLUCOSE BLD STRIP.AUTO-MCNC: 254 MG/DL (ref 70–110)
GLUCOSE BLD STRIP.AUTO-MCNC: 259 MG/DL (ref 70–110)
GLUCOSE BLD STRIP.AUTO-MCNC: 293 MG/DL (ref 70–110)
GLUCOSE BLD STRIP.AUTO-MCNC: 67 MG/DL (ref 70–110)
GLUCOSE BLD STRIP.AUTO-MCNC: 76 MG/DL (ref 70–110)
GLUCOSE BLD STRIP.AUTO-MCNC: 78 MG/DL (ref 70–110)
GLUCOSE BLD STRIP.AUTO-MCNC: 82 MG/DL (ref 70–110)
GLUCOSE BLD STRIP.AUTO-MCNC: 86 MG/DL (ref 70–110)
GLUCOSE BLD STRIP.AUTO-MCNC: 86 MG/DL (ref 70–110)
GLUCOSE BLD STRIP.AUTO-MCNC: 88 MG/DL (ref 70–110)
GLUCOSE BLD STRIP.AUTO-MCNC: 89 MG/DL (ref 70–110)
GLUCOSE BLD STRIP.AUTO-MCNC: 91 MG/DL (ref 70–110)
GLUCOSE BLD STRIP.AUTO-MCNC: 97 MG/DL (ref 70–110)
GLUCOSE BLD STRIP.AUTO-MCNC: 98 MG/DL (ref 70–110)
GLUCOSE BLD STRIP.AUTO-MCNC: 99 MG/DL (ref 70–110)
GLUCOSE SERPL-MCNC: 100 MG/DL (ref 74–99)
GLUCOSE SERPL-MCNC: 102 MG/DL (ref 74–99)
GLUCOSE SERPL-MCNC: 104 MG/DL (ref 74–99)
GLUCOSE SERPL-MCNC: 111 MG/DL (ref 74–99)
GLUCOSE SERPL-MCNC: 114 MG/DL (ref 74–99)
GLUCOSE SERPL-MCNC: 125 MG/DL (ref 74–99)
GLUCOSE SERPL-MCNC: 138 MG/DL (ref 74–99)
GLUCOSE SERPL-MCNC: 142 MG/DL (ref 74–99)
GLUCOSE SERPL-MCNC: 146 MG/DL (ref 74–99)
GLUCOSE SERPL-MCNC: 76 MG/DL (ref 74–99)
GLUCOSE SERPL-MCNC: 77 MG/DL (ref 74–99)
GLUCOSE SERPL-MCNC: 79 MG/DL (ref 74–99)
GLUCOSE SERPL-MCNC: 92 MG/DL (ref 74–99)
GLUCOSE SERPL-MCNC: 93 MG/DL (ref 74–99)
GLUCOSE SERPL-MCNC: 99 MG/DL (ref 74–99)
HBA1C MFR BLD: 4.5 % (ref 4.2–5.6)
HCT VFR BLD AUTO: 26.8 % (ref 35–45)
HCT VFR BLD AUTO: 28.8 % (ref 35–45)
HCT VFR BLD AUTO: 29.5 % (ref 35–45)
HCT VFR BLD AUTO: 30.7 % (ref 35–45)
HCT VFR BLD AUTO: 31.1 % (ref 35–45)
HCT VFR BLD AUTO: 31.2 % (ref 35–45)
HCT VFR BLD AUTO: 32.2 % (ref 35–45)
HCT VFR BLD AUTO: 32.5 % (ref 35–45)
HCT VFR BLD AUTO: 33.3 % (ref 35–45)
HCT VFR BLD AUTO: 34 % (ref 35–45)
HCT VFR BLD AUTO: 34.3 % (ref 35–45)
HCT VFR BLD AUTO: 34.6 % (ref 35–45)
HCT VFR BLD AUTO: 36.5 % (ref 35–45)
HCT VFR BLD AUTO: 40.1 % (ref 35–45)
HDLC SERPL-MCNC: 47 MG/DL (ref 40–60)
HDLC SERPL-MCNC: 72 MG/DL (ref 40–60)
HDLC SERPL: 2.4 {RATIO} (ref 0–5)
HDLC SERPL: 2.8 {RATIO} (ref 0–5)
HGB BLD-MCNC: 10.1 G/DL (ref 12–16)
HGB BLD-MCNC: 10.3 G/DL (ref 12–16)
HGB BLD-MCNC: 10.6 G/DL (ref 12–16)
HGB BLD-MCNC: 10.9 G/DL (ref 12–16)
HGB BLD-MCNC: 11 G/DL (ref 12–16)
HGB BLD-MCNC: 11 G/DL (ref 12–16)
HGB BLD-MCNC: 11.3 G/DL (ref 12–16)
HGB BLD-MCNC: 11.7 G/DL (ref 12–16)
HGB BLD-MCNC: 13.1 G/DL (ref 12–16)
HGB BLD-MCNC: 8.3 G/DL (ref 12–16)
HGB BLD-MCNC: 9.3 G/DL (ref 12–16)
HGB BLD-MCNC: 9.5 G/DL (ref 12–16)
HGB BLD-MCNC: 9.8 G/DL (ref 12–16)
HGB BLD-MCNC: 9.8 G/DL (ref 12–16)
INR PPP: 1 (ref 0.8–1.2)
INR PPP: 1 (ref 0.8–1.2)
INTERPRETATION: NORMAL
IRON SATN MFR SERPL: 23 % (ref 20–50)
IRON SERPL-MCNC: 50 UG/DL (ref 50–175)
LACTATE BLD-SCNC: 0.74 MMOL/L (ref 0.4–2)
LDH SERPL L TO P-CCNC: 148 U/L (ref 81–234)
LDH SERPL L TO P-CCNC: 151 U/L (ref 81–234)
LDH SERPL L TO P-CCNC: 172 U/L (ref 81–234)
LDH SERPL L TO P-CCNC: 184 U/L (ref 81–234)
LDLC SERPL CALC-MCNC: 65.6 MG/DL (ref 0–100)
LDLC SERPL CALC-MCNC: 92.6 MG/DL (ref 0–100)
LEFT CCA DIST DIAS: 8.6 CM/S
LEFT CCA DIST SYS: 39.8 CM/S
LEFT CCA MID DIAS: 8.55 CM/S
LEFT CCA MID SYS: 37.59 CM/S
LEFT CCA PROX DIAS: 11.1 CM/S
LEFT CCA PROX SYS: 47.4 CM/S
LEFT ECA SYS: 45.3 CM/S
LEFT ICA DIST DIAS: 17.8 CM/S
LEFT ICA DIST SYS: 43.2 CM/S
LEFT ICA MID DIAS: 17.9 CM/S
LEFT ICA MID SYS: 44.7 CM/S
LEFT ICA PROX DIAS: 14.6 CM/S
LEFT ICA PROX SYS: 37.1 CM/S
LEFT ICA/CCA SYS: 1.12
LEFT SUBCLAVIAN SYS: 60.1 CM/S
LEFT VERTEBRAL DIAS: 17.1 CM/S
LEFT VERTEBRAL SYS: 50.3 CM/S
LIPID PROFILE,FLP: ABNORMAL
LIPID PROFILE,FLP: NORMAL
LVFS 2D: 19.15 %
LVSV (TEICH): 18.98 ML
LYMPHOCYTES # BLD: 0.2 K/UL (ref 0.9–3.6)
LYMPHOCYTES # BLD: 0.3 K/UL (ref 0.9–3.6)
LYMPHOCYTES # BLD: 0.3 K/UL (ref 0.9–3.6)
LYMPHOCYTES # BLD: 0.5 K/UL (ref 0.9–3.6)
LYMPHOCYTES # BLD: 0.5 K/UL (ref 0.9–3.6)
LYMPHOCYTES # BLD: 0.7 K/UL (ref 0.9–3.6)
LYMPHOCYTES # BLD: 1 K/UL (ref 0.9–3.6)
LYMPHOCYTES # BLD: 1.1 K/UL (ref 0.8–3.5)
LYMPHOCYTES # BLD: 1.3 K/UL (ref 0.8–3.5)
LYMPHOCYTES # BLD: 1.6 K/UL (ref 0.8–3.5)
LYMPHOCYTES # BLD: 1.6 K/UL (ref 0.8–3.5)
LYMPHOCYTES # BLD: 1.8 K/UL (ref 0.9–3.6)
LYMPHOCYTES NFR BLD: 10 % (ref 21–52)
LYMPHOCYTES NFR BLD: 12 % (ref 21–52)
LYMPHOCYTES NFR BLD: 15 % (ref 20–51)
LYMPHOCYTES NFR BLD: 16 % (ref 21–52)
LYMPHOCYTES NFR BLD: 21 % (ref 20–51)
LYMPHOCYTES NFR BLD: 21 % (ref 21–52)
LYMPHOCYTES NFR BLD: 26 % (ref 20–51)
LYMPHOCYTES NFR BLD: 26 % (ref 20–51)
LYMPHOCYTES NFR BLD: 5 % (ref 21–52)
LYMPHOCYTES NFR BLD: 5 % (ref 21–52)
LYMPHOCYTES NFR BLD: 7 % (ref 21–52)
LYMPHOCYTES NFR BLD: 9 % (ref 21–52)
MAGNESIUM SERPL-MCNC: 2.1 MG/DL (ref 1.6–2.6)
MAGNESIUM SERPL-MCNC: 2.5 MG/DL (ref 1.6–2.6)
MCH RBC QN AUTO: 27.9 PG (ref 24–34)
MCH RBC QN AUTO: 28 PG (ref 24–34)
MCH RBC QN AUTO: 28.1 PG (ref 24–34)
MCH RBC QN AUTO: 28.1 PG (ref 24–34)
MCH RBC QN AUTO: 28.2 PG (ref 24–34)
MCH RBC QN AUTO: 28.3 PG (ref 24–34)
MCH RBC QN AUTO: 28.3 PG (ref 24–34)
MCH RBC QN AUTO: 28.4 PG (ref 24–34)
MCH RBC QN AUTO: 28.4 PG (ref 24–34)
MCH RBC QN AUTO: 28.5 PG (ref 24–34)
MCH RBC QN AUTO: 28.5 PG (ref 24–34)
MCH RBC QN AUTO: 28.7 PG (ref 24–34)
MCH RBC QN AUTO: 28.7 PG (ref 24–34)
MCH RBC QN AUTO: 29 PG (ref 24–34)
MCHC RBC AUTO-ENTMCNC: 31 G/DL (ref 31–37)
MCHC RBC AUTO-ENTMCNC: 31.5 G/DL (ref 31–37)
MCHC RBC AUTO-ENTMCNC: 31.9 G/DL (ref 31–37)
MCHC RBC AUTO-ENTMCNC: 32 G/DL (ref 31–37)
MCHC RBC AUTO-ENTMCNC: 32.1 G/DL (ref 31–37)
MCHC RBC AUTO-ENTMCNC: 32.1 G/DL (ref 31–37)
MCHC RBC AUTO-ENTMCNC: 32.2 G/DL (ref 31–37)
MCHC RBC AUTO-ENTMCNC: 32.3 G/DL (ref 31–37)
MCHC RBC AUTO-ENTMCNC: 32.4 G/DL (ref 31–37)
MCHC RBC AUTO-ENTMCNC: 32.4 G/DL (ref 31–37)
MCHC RBC AUTO-ENTMCNC: 32.6 G/DL (ref 31–37)
MCHC RBC AUTO-ENTMCNC: 32.7 G/DL (ref 31–37)
MCV RBC AUTO: 86.7 FL (ref 74–97)
MCV RBC AUTO: 86.9 FL (ref 74–97)
MCV RBC AUTO: 87.1 FL (ref 74–97)
MCV RBC AUTO: 87.5 FL (ref 74–97)
MCV RBC AUTO: 87.5 FL (ref 74–97)
MCV RBC AUTO: 87.9 FL (ref 74–97)
MCV RBC AUTO: 88.1 FL (ref 74–97)
MCV RBC AUTO: 88.3 FL (ref 74–97)
MCV RBC AUTO: 88.4 FL (ref 74–97)
MCV RBC AUTO: 88.5 FL (ref 74–97)
MCV RBC AUTO: 88.6 FL (ref 74–97)
MCV RBC AUTO: 88.7 FL (ref 74–97)
MCV RBC AUTO: 89.6 FL (ref 74–97)
MCV RBC AUTO: 90.5 FL (ref 74–97)
MONOCYTES # BLD: 0.1 K/UL (ref 0.05–1.2)
MONOCYTES # BLD: 0.1 K/UL (ref 0–1)
MONOCYTES # BLD: 0.2 K/UL (ref 0.05–1.2)
MONOCYTES # BLD: 0.2 K/UL (ref 0.05–1.2)
MONOCYTES # BLD: 0.2 K/UL (ref 0–1)
MONOCYTES # BLD: 0.4 K/UL (ref 0–1)
MONOCYTES # BLD: 0.4 K/UL (ref 0–1)
MONOCYTES # BLD: 0.5 K/UL (ref 0.05–1.2)
MONOCYTES # BLD: 0.5 K/UL (ref 0.05–1.2)
MONOCYTES # BLD: 0.6 K/UL (ref 0.05–1.2)
MONOCYTES NFR BLD: 1 % (ref 3–10)
MONOCYTES NFR BLD: 2 % (ref 2–9)
MONOCYTES NFR BLD: 2 % (ref 3–10)
MONOCYTES NFR BLD: 3 % (ref 2–9)
MONOCYTES NFR BLD: 3 % (ref 3–10)
MONOCYTES NFR BLD: 3 % (ref 3–10)
MONOCYTES NFR BLD: 4 % (ref 3–10)
MONOCYTES NFR BLD: 6 % (ref 2–9)
MONOCYTES NFR BLD: 6 % (ref 2–9)
MONOCYTES NFR BLD: 6 % (ref 3–10)
MONOCYTES NFR BLD: 8 % (ref 3–10)
MONOCYTES NFR BLD: 8 % (ref 3–10)
NEUTS BAND NFR BLD MANUAL: 1 % (ref 0–5)
NEUTS BAND NFR BLD MANUAL: 3 % (ref 0–5)
NEUTS SEG # BLD: 2.4 K/UL (ref 1.8–8)
NEUTS SEG # BLD: 2.7 K/UL (ref 1.8–8)
NEUTS SEG # BLD: 2.8 K/UL (ref 1.8–8)
NEUTS SEG # BLD: 2.8 K/UL (ref 1.8–8)
NEUTS SEG # BLD: 3 K/UL (ref 1.8–8)
NEUTS SEG # BLD: 3.5 K/UL (ref 1.8–8)
NEUTS SEG # BLD: 3.5 K/UL (ref 1.8–8)
NEUTS SEG # BLD: 3.7 K/UL (ref 1.8–8)
NEUTS SEG # BLD: 4.2 K/UL (ref 1.8–8)
NEUTS SEG # BLD: 4.9 K/UL (ref 1.8–8)
NEUTS SEG # BLD: 4.9 K/UL (ref 1.8–8)
NEUTS SEG # BLD: 6.2 K/UL (ref 1.8–8)
NEUTS SEG NFR BLD: 39 % (ref 42–75)
NEUTS SEG NFR BLD: 40 % (ref 40–73)
NEUTS SEG NFR BLD: 41 % (ref 42–75)
NEUTS SEG NFR BLD: 44 % (ref 42–75)
NEUTS SEG NFR BLD: 44 % (ref 42–75)
NEUTS SEG NFR BLD: 61 % (ref 40–73)
NEUTS SEG NFR BLD: 82 % (ref 40–73)
NEUTS SEG NFR BLD: 84 % (ref 40–73)
NEUTS SEG NFR BLD: 89 % (ref 40–73)
NEUTS SEG NFR BLD: 90 % (ref 40–73)
NEUTS SEG NFR BLD: 91 % (ref 40–73)
NEUTS SEG NFR BLD: 92 % (ref 40–73)
P-R INTERVAL, ECG05: 168 MS
P-R INTERVAL, ECG05: 172 MS
P-R INTERVAL, ECG05: 172 MS
P-R INTERVAL, ECG05: 180 MS
PHOSPHATE SERPL-MCNC: 4.2 MG/DL (ref 2.5–4.9)
PLATELET # BLD AUTO: 132 K/UL (ref 135–420)
PLATELET # BLD AUTO: 137 K/UL (ref 135–420)
PLATELET # BLD AUTO: 138 K/UL (ref 135–420)
PLATELET # BLD AUTO: 141 K/UL (ref 135–420)
PLATELET # BLD AUTO: 142 K/UL (ref 135–420)
PLATELET # BLD AUTO: 148 K/UL (ref 135–420)
PLATELET # BLD AUTO: 149 K/UL (ref 135–420)
PLATELET # BLD AUTO: 150 K/UL (ref 135–420)
PLATELET # BLD AUTO: 156 K/UL (ref 135–420)
PLATELET # BLD AUTO: 173 K/UL (ref 135–420)
PLATELET # BLD AUTO: 185 K/UL (ref 135–420)
PLATELET # BLD AUTO: 191 K/UL (ref 135–420)
PLATELET # BLD AUTO: 193 K/UL (ref 135–420)
PLATELET # BLD AUTO: 194 K/UL (ref 135–420)
PLATELET COMMENTS,PCOM: ABNORMAL
PMV BLD AUTO: 11.3 FL (ref 9.2–11.8)
PMV BLD AUTO: 11.4 FL (ref 9.2–11.8)
PMV BLD AUTO: 11.5 FL (ref 9.2–11.8)
PMV BLD AUTO: 11.6 FL (ref 9.2–11.8)
PMV BLD AUTO: 11.7 FL (ref 9.2–11.8)
PMV BLD AUTO: 11.9 FL (ref 9.2–11.8)
PMV BLD AUTO: 12 FL (ref 9.2–11.8)
PMV BLD AUTO: 12.2 FL (ref 9.2–11.8)
PMV BLD AUTO: 12.4 FL (ref 9.2–11.8)
PMV BLD AUTO: 12.4 FL (ref 9.2–11.8)
PMV BLD AUTO: 12.6 FL (ref 9.2–11.8)
PMV BLD AUTO: 12.6 FL (ref 9.2–11.8)
PMV BLD AUTO: 12.8 FL (ref 9.2–11.8)
PMV BLD AUTO: 12.8 FL (ref 9.2–11.8)
POTASSIUM SERPL-SCNC: 3.2 MMOL/L (ref 3.5–5.5)
POTASSIUM SERPL-SCNC: 3.3 MMOL/L (ref 3.5–5.5)
POTASSIUM SERPL-SCNC: 3.4 MMOL/L (ref 3.5–5.5)
POTASSIUM SERPL-SCNC: 3.8 MMOL/L (ref 3.5–5.5)
POTASSIUM SERPL-SCNC: 3.9 MMOL/L (ref 3.5–5.5)
POTASSIUM SERPL-SCNC: 4 MMOL/L (ref 3.5–5.5)
POTASSIUM SERPL-SCNC: 4 MMOL/L (ref 3.5–5.5)
POTASSIUM SERPL-SCNC: 4.1 MMOL/L (ref 3.5–5.5)
POTASSIUM SERPL-SCNC: 4.4 MMOL/L (ref 3.5–5.5)
POTASSIUM SERPL-SCNC: 4.6 MMOL/L (ref 3.5–5.5)
POTASSIUM SERPL-SCNC: 4.8 MMOL/L (ref 3.5–5.5)
POTASSIUM SERPL-SCNC: 4.8 MMOL/L (ref 3.5–5.5)
POTASSIUM SERPL-SCNC: 5.1 MMOL/L (ref 3.5–5.5)
PROCALCITONIN SERPL-MCNC: 0.1 NG/ML
PROCALCITONIN SERPL-MCNC: 0.13 NG/ML
PROCALCITONIN SERPL-MCNC: 0.21 NG/ML
PROCALCITONIN SERPL-MCNC: 0.24 NG/ML
PROT SERPL-MCNC: 7.4 G/DL (ref 6.4–8.2)
PROT SERPL-MCNC: 7.7 G/DL (ref 6.4–8.2)
PROT SERPL-MCNC: 8.3 G/DL (ref 6.4–8.2)
PROTHROMBIN TIME: 12.6 SEC (ref 11.5–15.2)
PROTHROMBIN TIME: 13.4 SEC (ref 11.5–15.2)
Q-T INTERVAL, ECG07: 334 MS
Q-T INTERVAL, ECG07: 376 MS
Q-T INTERVAL, ECG07: 384 MS
Q-T INTERVAL, ECG07: 394 MS
QRS DURATION, ECG06: 102 MS
QRS DURATION, ECG06: 110 MS
QRS DURATION, ECG06: 110 MS
QRS DURATION, ECG06: 84 MS
QTC CALCULATION (BEZET), ECG08: 452 MS
QTC CALCULATION (BEZET), ECG08: 470 MS
QTC CALCULATION (BEZET), ECG08: 471 MS
QTC CALCULATION (BEZET), ECG08: 482 MS
RBC # BLD AUTO: 2.96 M/UL (ref 4.2–5.3)
RBC # BLD AUTO: 3.26 M/UL (ref 4.2–5.3)
RBC # BLD AUTO: 3.35 M/UL (ref 4.2–5.3)
RBC # BLD AUTO: 3.47 M/UL (ref 4.2–5.3)
RBC # BLD AUTO: 3.51 M/UL (ref 4.2–5.3)
RBC # BLD AUTO: 3.52 M/UL (ref 4.2–5.3)
RBC # BLD AUTO: 3.64 M/UL (ref 4.2–5.3)
RBC # BLD AUTO: 3.73 M/UL (ref 4.2–5.3)
RBC # BLD AUTO: 3.83 M/UL (ref 4.2–5.3)
RBC # BLD AUTO: 3.9 M/UL (ref 4.2–5.3)
RBC # BLD AUTO: 3.92 M/UL (ref 4.2–5.3)
RBC # BLD AUTO: 3.92 M/UL (ref 4.2–5.3)
RBC # BLD AUTO: 4.13 M/UL (ref 4.2–5.3)
RBC # BLD AUTO: 4.56 M/UL (ref 4.2–5.3)
RBC MORPH BLD: ABNORMAL
RIGHT CCA DIST DIAS: 6.9 CM/S
RIGHT CCA DIST SYS: 37.6 CM/S
RIGHT CCA MID DIAS: 6.12 CM/S
RIGHT CCA MID SYS: 45.98 CM/S
RIGHT CCA PROX DIAS: 6.8 CM/S
RIGHT CCA PROX SYS: 48.8 CM/S
RIGHT ECA SYS: 74.3 CM/S
RIGHT ICA DIST DIAS: 24.1 CM/S
RIGHT ICA DIST SYS: 61.6 CM/S
RIGHT ICA MID DIAS: 17.4 CM/S
RIGHT ICA MID SYS: 53.8 CM/S
RIGHT ICA PROX DIAS: 11.8 CM/S
RIGHT ICA PROX SYS: 34.9 CM/S
RIGHT ICA/CCA SYS: 1.6
RIGHT SUBCLAVIAN SYS: 90.5 CM/S
RIGHT VERTEBRAL DIAS: 10.33 CM/S
RIGHT VERTEBRAL SYS: 29.2 CM/S
SARS-COV-2, COV2NT: NOT DETECTED
SERVICE CMNT-IMP: NORMAL
SERVICE CMNT-IMP: NORMAL
SODIUM SERPL-SCNC: 137 MMOL/L (ref 136–145)
SODIUM SERPL-SCNC: 138 MMOL/L (ref 136–145)
SODIUM SERPL-SCNC: 139 MMOL/L (ref 136–145)
SODIUM SERPL-SCNC: 140 MMOL/L (ref 136–145)
SODIUM SERPL-SCNC: 140 MMOL/L (ref 136–145)
SODIUM SERPL-SCNC: 142 MMOL/L (ref 136–145)
SODIUM SERPL-SCNC: 143 MMOL/L (ref 136–145)
SODIUM SERPL-SCNC: 144 MMOL/L (ref 136–145)
SODIUM SERPL-SCNC: 145 MMOL/L (ref 136–145)
TIBC SERPL-MCNC: 222 UG/DL (ref 250–450)
TRIGL SERPL-MCNC: 102 MG/DL (ref ?–150)
TRIGL SERPL-MCNC: 37 MG/DL (ref ?–150)
TROPONIN I SERPL-MCNC: 0.02 NG/ML (ref 0–0.04)
TROPONIN I SERPL-MCNC: <0.02 NG/ML (ref 0–0.04)
TSH SERPL DL<=0.05 MIU/L-ACNC: 2.31 UIU/ML (ref 0.36–3.74)
VENTRICULAR RATE, ECG03: 110 BPM
VENTRICULAR RATE, ECG03: 86 BPM
VENTRICULAR RATE, ECG03: 94 BPM
VENTRICULAR RATE, ECG03: 95 BPM
VLDLC SERPL CALC-MCNC: 20.4 MG/DL
VLDLC SERPL CALC-MCNC: 7.4 MG/DL
WBC # BLD AUTO: 3 K/UL (ref 4.6–13.2)
WBC # BLD AUTO: 3.4 K/UL (ref 4.6–13.2)
WBC # BLD AUTO: 4.1 K/UL (ref 4.6–13.2)
WBC # BLD AUTO: 4.7 K/UL (ref 4.6–13.2)
WBC # BLD AUTO: 5.3 K/UL (ref 4.6–13.2)
WBC # BLD AUTO: 5.3 K/UL (ref 4.6–13.2)
WBC # BLD AUTO: 6 K/UL (ref 4.6–13.2)
WBC # BLD AUTO: 6.1 K/UL (ref 4.6–13.2)
WBC # BLD AUTO: 6.2 K/UL (ref 4.6–13.2)
WBC # BLD AUTO: 6.3 K/UL (ref 4.6–13.2)
WBC # BLD AUTO: 6.4 K/UL (ref 4.6–13.2)
WBC # BLD AUTO: 7.2 K/UL (ref 4.6–13.2)
WBC # BLD AUTO: 7.5 K/UL (ref 4.6–13.2)
WBC # BLD AUTO: 8.6 K/UL (ref 4.6–13.2)

## 2020-01-01 PROCEDURE — 87040 BLOOD CULTURE FOR BACTERIA: CPT

## 2020-01-01 PROCEDURE — 74011000250 HC RX REV CODE- 250: Performed by: EMERGENCY MEDICINE

## 2020-01-01 PROCEDURE — 74011250637 HC RX REV CODE- 250/637: Performed by: HOSPITALIST

## 2020-01-01 PROCEDURE — 94761 N-INVAS EAR/PLS OXIMETRY MLT: CPT

## 2020-01-01 PROCEDURE — 97110 THERAPEUTIC EXERCISES: CPT

## 2020-01-01 PROCEDURE — 74011250636 HC RX REV CODE- 250/636: Performed by: FAMILY MEDICINE

## 2020-01-01 PROCEDURE — 82962 GLUCOSE BLOOD TEST: CPT

## 2020-01-01 PROCEDURE — 99291 CRITICAL CARE FIRST HOUR: CPT

## 2020-01-01 PROCEDURE — 85379 FIBRIN DEGRADATION QUANT: CPT

## 2020-01-01 PROCEDURE — 85610 PROTHROMBIN TIME: CPT

## 2020-01-01 PROCEDURE — 80048 BASIC METABOLIC PNL TOTAL CA: CPT

## 2020-01-01 PROCEDURE — 94640 AIRWAY INHALATION TREATMENT: CPT

## 2020-01-01 PROCEDURE — 65270000029 HC RM PRIVATE

## 2020-01-01 PROCEDURE — 92526 ORAL FUNCTION THERAPY: CPT

## 2020-01-01 PROCEDURE — 74011000250 HC RX REV CODE- 250: Performed by: HOSPITALIST

## 2020-01-01 PROCEDURE — 74011000258 HC RX REV CODE- 258: Performed by: HOSPITALIST

## 2020-01-01 PROCEDURE — 74011636637 HC RX REV CODE- 636/637: Performed by: PHYSICIAN ASSISTANT

## 2020-01-01 PROCEDURE — 74011000258 HC RX REV CODE- 258: Performed by: NURSE PRACTITIONER

## 2020-01-01 PROCEDURE — 74011250636 HC RX REV CODE- 250/636: Performed by: PHYSICIAN ASSISTANT

## 2020-01-01 PROCEDURE — 65660000000 HC RM CCU STEPDOWN

## 2020-01-01 PROCEDURE — 74011250636 HC RX REV CODE- 250/636: Performed by: HOSPITALIST

## 2020-01-01 PROCEDURE — 74011636637 HC RX REV CODE- 636/637: Performed by: HOSPITALIST

## 2020-01-01 PROCEDURE — 97535 SELF CARE MNGMENT TRAINING: CPT

## 2020-01-01 PROCEDURE — 92611 MOTION FLUOROSCOPY/SWALLOW: CPT

## 2020-01-01 PROCEDURE — 82550 ASSAY OF CK (CPK): CPT

## 2020-01-01 PROCEDURE — 74011250636 HC RX REV CODE- 250/636

## 2020-01-01 PROCEDURE — 92610 EVALUATE SWALLOWING FUNCTION: CPT

## 2020-01-01 PROCEDURE — 74011250636 HC RX REV CODE- 250/636: Performed by: STUDENT IN AN ORGANIZED HEALTH CARE EDUCATION/TRAINING PROGRAM

## 2020-01-01 PROCEDURE — 93005 ELECTROCARDIOGRAM TRACING: CPT

## 2020-01-01 PROCEDURE — 77030041247 HC PROTECTOR HEEL HEELMEDIX MDII -B

## 2020-01-01 PROCEDURE — 85025 COMPLETE CBC W/AUTO DIFF WBC: CPT

## 2020-01-01 PROCEDURE — 97116 GAIT TRAINING THERAPY: CPT

## 2020-01-01 PROCEDURE — 36415 COLL VENOUS BLD VENIPUNCTURE: CPT

## 2020-01-01 PROCEDURE — 83615 LACTATE (LD) (LDH) ENZYME: CPT

## 2020-01-01 PROCEDURE — 83036 HEMOGLOBIN GLYCOSYLATED A1C: CPT

## 2020-01-01 PROCEDURE — 99285 EMERGENCY DEPT VISIT HI MDM: CPT

## 2020-01-01 PROCEDURE — 74011250637 HC RX REV CODE- 250/637: Performed by: EMERGENCY MEDICINE

## 2020-01-01 PROCEDURE — 74011636637 HC RX REV CODE- 636/637: Performed by: INTERNAL MEDICINE

## 2020-01-01 PROCEDURE — 84484 ASSAY OF TROPONIN QUANT: CPT

## 2020-01-01 PROCEDURE — 84145 PROCALCITONIN (PCT): CPT

## 2020-01-01 PROCEDURE — 71045 X-RAY EXAM CHEST 1 VIEW: CPT

## 2020-01-01 PROCEDURE — 99443 PR PHYS/QHP TELEPHONE EVALUATION 21-30 MIN: CPT | Performed by: FAMILY MEDICINE

## 2020-01-01 PROCEDURE — 82728 ASSAY OF FERRITIN: CPT

## 2020-01-01 PROCEDURE — 96375 TX/PRO/DX INJ NEW DRUG ADDON: CPT

## 2020-01-01 PROCEDURE — 70450 CT HEAD/BRAIN W/O DYE: CPT

## 2020-01-01 PROCEDURE — 74011000250 HC RX REV CODE- 250

## 2020-01-01 PROCEDURE — 80061 LIPID PANEL: CPT

## 2020-01-01 PROCEDURE — 80053 COMPREHEN METABOLIC PANEL: CPT

## 2020-01-01 PROCEDURE — 77010033678 HC OXYGEN DAILY

## 2020-01-01 PROCEDURE — 84100 ASSAY OF PHOSPHORUS: CPT

## 2020-01-01 PROCEDURE — 83880 ASSAY OF NATRIURETIC PEPTIDE: CPT

## 2020-01-01 PROCEDURE — 74011250637 HC RX REV CODE- 250/637: Performed by: PHYSICIAN ASSISTANT

## 2020-01-01 PROCEDURE — 87635 SARS-COV-2 COVID-19 AMP PRB: CPT

## 2020-01-01 PROCEDURE — 84443 ASSAY THYROID STIM HORMONE: CPT

## 2020-01-01 PROCEDURE — 97162 PT EVAL MOD COMPLEX 30 MIN: CPT

## 2020-01-01 PROCEDURE — 86141 C-REACTIVE PROTEIN HS: CPT

## 2020-01-01 PROCEDURE — 85027 COMPLETE CBC AUTOMATED: CPT

## 2020-01-01 PROCEDURE — 70547 MR ANGIOGRAPHY NECK W/O DYE: CPT

## 2020-01-01 PROCEDURE — 77030038269 HC DRN EXT URIN PURWCK BARD -A

## 2020-01-01 PROCEDURE — 94660 CPAP INITIATION&MGMT: CPT

## 2020-01-01 PROCEDURE — 74011250637 HC RX REV CODE- 250/637: Performed by: NURSE PRACTITIONER

## 2020-01-01 PROCEDURE — 70551 MRI BRAIN STEM W/O DYE: CPT

## 2020-01-01 PROCEDURE — 93880 EXTRACRANIAL BILAT STUDY: CPT

## 2020-01-01 PROCEDURE — 77030012341 HC CHMB SPCR OPTC MDI VYRM -A

## 2020-01-01 PROCEDURE — 96374 THER/PROPH/DIAG INJ IV PUSH: CPT

## 2020-01-01 PROCEDURE — 83735 ASSAY OF MAGNESIUM: CPT

## 2020-01-01 PROCEDURE — 85730 THROMBOPLASTIN TIME PARTIAL: CPT

## 2020-01-01 PROCEDURE — 74011000250 HC RX REV CODE- 250: Performed by: PHYSICIAN ASSISTANT

## 2020-01-01 PROCEDURE — 74230 X-RAY XM SWLNG FUNCJ C+: CPT

## 2020-01-01 PROCEDURE — 51798 US URINE CAPACITY MEASURE: CPT

## 2020-01-01 PROCEDURE — 31500 INSERT EMERGENCY AIRWAY: CPT

## 2020-01-01 PROCEDURE — 92522 EVALUATE SPEECH PRODUCTION: CPT

## 2020-01-01 PROCEDURE — 74011250636 HC RX REV CODE- 250/636: Performed by: EMERGENCY MEDICINE

## 2020-01-01 PROCEDURE — 93308 TTE F-UP OR LMTD: CPT

## 2020-01-01 PROCEDURE — 77030040830 HC CATH URETH FOL MDII -A

## 2020-01-01 PROCEDURE — 74011000255 HC RX REV CODE- 255: Performed by: HOSPITALIST

## 2020-01-01 PROCEDURE — 83540 ASSAY OF IRON: CPT

## 2020-01-01 PROCEDURE — 97166 OT EVAL MOD COMPLEX 45 MIN: CPT

## 2020-01-01 PROCEDURE — 74011250636 HC RX REV CODE- 250/636: Performed by: NURSE PRACTITIONER

## 2020-01-01 PROCEDURE — 99442 PR PHYS/QHP TELEPHONE EVALUATION 11-20 MIN: CPT | Performed by: FAMILY MEDICINE

## 2020-01-01 PROCEDURE — 77030040392 HC DRSG OPTIFOAM MDII -A

## 2020-01-01 PROCEDURE — 74011000250 HC RX REV CODE- 250: Performed by: STUDENT IN AN ORGANIZED HEALTH CARE EDUCATION/TRAINING PROGRAM

## 2020-01-01 PROCEDURE — 92507 TX SP LANG VOICE COMM INDIV: CPT

## 2020-01-01 PROCEDURE — 99282 EMERGENCY DEPT VISIT SF MDM: CPT

## 2020-01-01 PROCEDURE — 92950 HEART/LUNG RESUSCITATION CPR: CPT

## 2020-01-01 PROCEDURE — 74011000250 HC RX REV CODE- 250: Performed by: NURSE PRACTITIONER

## 2020-01-01 PROCEDURE — 97530 THERAPEUTIC ACTIVITIES: CPT

## 2020-01-01 PROCEDURE — 0107U C DIFF TOX AG DETCJ IA STOOL: CPT

## 2020-01-01 PROCEDURE — 97165 OT EVAL LOW COMPLEX 30 MIN: CPT

## 2020-01-01 PROCEDURE — 96365 THER/PROPH/DIAG IV INF INIT: CPT

## 2020-01-01 PROCEDURE — 74011636637 HC RX REV CODE- 636/637: Performed by: FAMILY MEDICINE

## 2020-01-01 PROCEDURE — 70544 MR ANGIOGRAPHY HEAD W/O DYE: CPT

## 2020-01-01 PROCEDURE — 83605 ASSAY OF LACTIC ACID: CPT

## 2020-01-01 PROCEDURE — 77030019905 HC CATH URETH INTMIT MDII -A

## 2020-01-01 RX ORDER — BUDESONIDE 1 MG/2ML
1000 INHALANT ORAL
Status: DISCONTINUED | OUTPATIENT
Start: 2020-01-01 | End: 2020-01-01 | Stop reason: HOSPADM

## 2020-01-01 RX ORDER — IPRATROPIUM BROMIDE AND ALBUTEROL SULFATE 2.5; .5 MG/3ML; MG/3ML
3 SOLUTION RESPIRATORY (INHALATION)
Status: COMPLETED | OUTPATIENT
Start: 2020-01-01 | End: 2020-01-01

## 2020-01-01 RX ORDER — SPIRONOLACTONE 25 MG/1
25 TABLET ORAL DAILY
Qty: 30 TAB | Refills: 0 | Status: SHIPPED | OUTPATIENT
Start: 2020-01-01 | End: 2020-01-01

## 2020-01-01 RX ORDER — AMLODIPINE BESYLATE 10 MG/1
10 TABLET ORAL DAILY
Status: DISCONTINUED | OUTPATIENT
Start: 2020-01-01 | End: 2020-01-01 | Stop reason: HOSPADM

## 2020-01-01 RX ORDER — DEXTROSE 50 % IN WATER (D50W) INTRAVENOUS SYRINGE
25-50 AS NEEDED
Status: DISCONTINUED | OUTPATIENT
Start: 2020-01-01 | End: 2020-01-01 | Stop reason: CLARIF

## 2020-01-01 RX ORDER — VITAMIN E 1000 UNIT
1000 CAPSULE ORAL DAILY
Status: DISCONTINUED | OUTPATIENT
Start: 2020-01-01 | End: 2020-01-01 | Stop reason: HOSPADM

## 2020-01-01 RX ORDER — FUROSEMIDE 20 MG/1
20 TABLET ORAL 2 TIMES DAILY
Status: DISCONTINUED | OUTPATIENT
Start: 2020-01-01 | End: 2020-01-01 | Stop reason: HOSPADM

## 2020-01-01 RX ORDER — HEPARIN SODIUM 5000 [USP'U]/ML
5000 INJECTION, SOLUTION INTRAVENOUS; SUBCUTANEOUS 3 TIMES DAILY
Status: DISCONTINUED | OUTPATIENT
Start: 2020-01-01 | End: 2020-01-01 | Stop reason: HOSPADM

## 2020-01-01 RX ORDER — AMLODIPINE BESYLATE 5 MG/1
5 TABLET ORAL DAILY
COMMUNITY
End: 2020-01-01 | Stop reason: SDUPTHER

## 2020-01-01 RX ORDER — CHOLECALCIFEROL (VITAMIN D3) 125 MCG
5000 CAPSULE ORAL DAILY
Status: DISCONTINUED | OUTPATIENT
Start: 2020-01-01 | End: 2020-01-01 | Stop reason: HOSPADM

## 2020-01-01 RX ORDER — ZINC SULFATE 50(220)MG
1 CAPSULE ORAL DAILY
Status: DISCONTINUED | OUTPATIENT
Start: 2020-01-01 | End: 2020-01-01 | Stop reason: HOSPADM

## 2020-01-01 RX ORDER — SPIRONOLACTONE 25 MG/1
25 TABLET ORAL DAILY
Status: DISCONTINUED | OUTPATIENT
Start: 2020-01-01 | End: 2020-01-01 | Stop reason: HOSPADM

## 2020-01-01 RX ORDER — HYDRALAZINE HYDROCHLORIDE 50 MG/1
100 TABLET, FILM COATED ORAL 3 TIMES DAILY
Status: DISCONTINUED | OUTPATIENT
Start: 2020-01-01 | End: 2020-01-01 | Stop reason: HOSPADM

## 2020-01-01 RX ORDER — ISOSORBIDE DINITRATE 10 MG/1
20 TABLET ORAL 3 TIMES DAILY
COMMUNITY

## 2020-01-01 RX ORDER — ALBUTEROL SULFATE 0.83 MG/ML
2.5 SOLUTION RESPIRATORY (INHALATION)
Qty: 30 NEBULE | Refills: 0 | Status: SHIPPED | OUTPATIENT
Start: 2020-01-01 | End: 2020-01-01

## 2020-01-01 RX ORDER — ATORVASTATIN CALCIUM 40 MG/1
80 TABLET, FILM COATED ORAL
Status: DISCONTINUED | OUTPATIENT
Start: 2020-01-01 | End: 2020-01-01 | Stop reason: HOSPADM

## 2020-01-01 RX ORDER — HYDRALAZINE HYDROCHLORIDE 20 MG/ML
20 INJECTION INTRAMUSCULAR; INTRAVENOUS ONCE
Status: COMPLETED | OUTPATIENT
Start: 2020-01-01 | End: 2020-01-01

## 2020-01-01 RX ORDER — BUDESONIDE 0.25 MG/2ML
250 INHALANT ORAL
Status: DISCONTINUED | OUTPATIENT
Start: 2020-01-01 | End: 2020-01-01

## 2020-01-01 RX ORDER — CALCIUM CHLORIDE INJECTION 100 MG/ML
INJECTION, SOLUTION INTRAVENOUS
Status: COMPLETED | OUTPATIENT
Start: 2020-01-01 | End: 2020-01-01

## 2020-01-01 RX ORDER — AMLODIPINE BESYLATE 5 MG/1
5 TABLET ORAL DAILY
Qty: 30 TAB | Refills: 5 | Status: SHIPPED | OUTPATIENT
Start: 2020-01-01

## 2020-01-01 RX ORDER — DEXTROSE MONOHYDRATE 100 MG/ML
125-250 INJECTION, SOLUTION INTRAVENOUS AS NEEDED
Status: DISCONTINUED | OUTPATIENT
Start: 2020-01-01 | End: 2020-01-01 | Stop reason: HOSPADM

## 2020-01-01 RX ORDER — GABAPENTIN 100 MG/1
100 CAPSULE ORAL 3 TIMES DAILY
Qty: 9 CAP | Refills: 0 | Status: SHIPPED | OUTPATIENT
Start: 2020-01-01 | End: 2020-01-01

## 2020-01-01 RX ORDER — ASPIRIN 81 MG/1
81 TABLET ORAL DAILY
Status: DISCONTINUED | OUTPATIENT
Start: 2020-01-01 | End: 2020-01-01 | Stop reason: HOSPADM

## 2020-01-01 RX ORDER — FUROSEMIDE 20 MG/1
20 TABLET ORAL 2 TIMES DAILY
Qty: 60 TAB | Refills: 8 | Status: SHIPPED | OUTPATIENT
Start: 2020-01-01 | End: 2020-01-01 | Stop reason: ALTCHOICE

## 2020-01-01 RX ORDER — POTASSIUM CHLORIDE 750 MG/1
10 TABLET, FILM COATED, EXTENDED RELEASE ORAL DAILY
Status: DISCONTINUED | OUTPATIENT
Start: 2020-01-01 | End: 2020-01-01 | Stop reason: HOSPADM

## 2020-01-01 RX ORDER — ALBUTEROL SULFATE 90 UG/1
2 AEROSOL, METERED RESPIRATORY (INHALATION)
Status: DISCONTINUED | OUTPATIENT
Start: 2020-01-01 | End: 2020-01-01 | Stop reason: HOSPADM

## 2020-01-01 RX ORDER — INSULIN GLARGINE 100 [IU]/ML
7 INJECTION, SOLUTION SUBCUTANEOUS DAILY
Status: DISCONTINUED | OUTPATIENT
Start: 2020-01-01 | End: 2020-01-01 | Stop reason: HOSPADM

## 2020-01-01 RX ORDER — INSULIN GLARGINE 100 [IU]/ML
5 INJECTION, SOLUTION SUBCUTANEOUS
Status: DISCONTINUED | OUTPATIENT
Start: 2020-01-01 | End: 2020-01-01 | Stop reason: HOSPADM

## 2020-01-01 RX ORDER — AMIODARONE HYDROCHLORIDE 150 MG/3ML
INJECTION, SOLUTION INTRAVENOUS
Status: COMPLETED | OUTPATIENT
Start: 2020-01-01 | End: 2020-01-01

## 2020-01-01 RX ORDER — SPIRONOLACTONE 25 MG/1
25 TABLET ORAL DAILY
Qty: 30 TAB | Refills: 0 | Status: SHIPPED | OUTPATIENT
Start: 2020-01-01

## 2020-01-01 RX ORDER — GABAPENTIN 100 MG/1
100 CAPSULE ORAL 3 TIMES DAILY
Status: DISCONTINUED | OUTPATIENT
Start: 2020-01-01 | End: 2020-01-01 | Stop reason: HOSPADM

## 2020-01-01 RX ORDER — ASPIRIN 325 MG
325 TABLET, DELAYED RELEASE (ENTERIC COATED) ORAL
Status: COMPLETED | OUTPATIENT
Start: 2020-01-01 | End: 2020-01-01

## 2020-01-01 RX ORDER — ATORVASTATIN CALCIUM 80 MG/1
80 TABLET, FILM COATED ORAL
Qty: 30 TAB | Refills: 0 | Status: SHIPPED | OUTPATIENT
Start: 2020-01-01 | End: 2020-01-01 | Stop reason: SDUPTHER

## 2020-01-01 RX ORDER — ISOSORBIDE DINITRATE 10 MG/1
20 TABLET ORAL 3 TIMES DAILY
Status: CANCELLED | OUTPATIENT
Start: 2020-01-01

## 2020-01-01 RX ORDER — IPRATROPIUM BROMIDE AND ALBUTEROL SULFATE 2.5; .5 MG/3ML; MG/3ML
3 SOLUTION RESPIRATORY (INHALATION) ONCE
Status: COMPLETED | OUTPATIENT
Start: 2020-01-01 | End: 2020-01-01

## 2020-01-01 RX ORDER — AMLODIPINE BESYLATE 5 MG/1
5 TABLET ORAL DAILY
Status: DISCONTINUED | OUTPATIENT
Start: 2020-01-01 | End: 2020-01-01

## 2020-01-01 RX ORDER — HYDRALAZINE HYDROCHLORIDE 20 MG/ML
20 INJECTION INTRAMUSCULAR; INTRAVENOUS
Status: DISCONTINUED | OUTPATIENT
Start: 2020-01-01 | End: 2020-01-01

## 2020-01-01 RX ORDER — ALBUTEROL SULFATE 0.83 MG/ML
2.5 SOLUTION RESPIRATORY (INHALATION)
Qty: 30 NEBULE | Refills: 3 | Status: SHIPPED | OUTPATIENT
Start: 2020-01-01

## 2020-01-01 RX ORDER — LANOLIN ALCOHOL/MO/W.PET/CERES
100 CREAM (GRAM) TOPICAL DAILY
Status: DISCONTINUED | OUTPATIENT
Start: 2020-01-01 | End: 2020-01-01 | Stop reason: HOSPADM

## 2020-01-01 RX ORDER — DEXTROSE 50 % IN WATER (D50W) INTRAVENOUS SYRINGE
Status: COMPLETED | OUTPATIENT
Start: 2020-01-01 | End: 2020-01-01

## 2020-01-01 RX ORDER — HEPARIN SODIUM 5000 [USP'U]/ML
5000 INJECTION, SOLUTION INTRAVENOUS; SUBCUTANEOUS EVERY 8 HOURS
Status: DISCONTINUED | OUTPATIENT
Start: 2020-01-01 | End: 2020-01-01 | Stop reason: HOSPADM

## 2020-01-01 RX ORDER — ONDANSETRON 2 MG/ML
4 INJECTION INTRAMUSCULAR; INTRAVENOUS
Status: DISCONTINUED | OUTPATIENT
Start: 2020-01-01 | End: 2020-01-01 | Stop reason: HOSPADM

## 2020-01-01 RX ORDER — BISACODYL 5 MG
10 TABLET, DELAYED RELEASE (ENTERIC COATED) ORAL DAILY PRN
Status: DISCONTINUED | OUTPATIENT
Start: 2020-01-01 | End: 2020-01-01 | Stop reason: HOSPADM

## 2020-01-01 RX ORDER — LABETALOL HCL 20 MG/4 ML
5 SYRINGE (ML) INTRAVENOUS
Status: DISCONTINUED | OUTPATIENT
Start: 2020-01-01 | End: 2020-01-01

## 2020-01-01 RX ORDER — SODIUM BICARBONATE 1 MEQ/ML
SYRINGE (ML) INTRAVENOUS
Status: COMPLETED | OUTPATIENT
Start: 2020-01-01 | End: 2020-01-01

## 2020-01-01 RX ORDER — POTASSIUM CHLORIDE 750 MG/1
10 TABLET, FILM COATED, EXTENDED RELEASE ORAL DAILY
Qty: 30 TAB | Refills: 0 | Status: SHIPPED | OUTPATIENT
Start: 2020-01-01 | End: 2020-01-01

## 2020-01-01 RX ORDER — INSULIN GLARGINE 100 [IU]/ML
15 INJECTION, SOLUTION SUBCUTANEOUS
Status: DISCONTINUED | OUTPATIENT
Start: 2020-01-01 | End: 2020-01-01

## 2020-01-01 RX ORDER — ACETAMINOPHEN 325 MG/1
650 TABLET ORAL
Status: DISCONTINUED | OUTPATIENT
Start: 2020-01-01 | End: 2020-01-01 | Stop reason: HOSPADM

## 2020-01-01 RX ORDER — IPRATROPIUM BROMIDE AND ALBUTEROL SULFATE 2.5; .5 MG/3ML; MG/3ML
SOLUTION RESPIRATORY (INHALATION)
Status: COMPLETED
Start: 2020-01-01 | End: 2020-01-01

## 2020-01-01 RX ORDER — HYDRALAZINE HYDROCHLORIDE 20 MG/ML
20 INJECTION INTRAMUSCULAR; INTRAVENOUS
Status: DISCONTINUED | OUTPATIENT
Start: 2020-01-01 | End: 2020-01-01 | Stop reason: HOSPADM

## 2020-01-01 RX ORDER — ISOSORBIDE DINITRATE 20 MG/1
20 TABLET ORAL 3 TIMES DAILY
Status: DISCONTINUED | OUTPATIENT
Start: 2020-01-01 | End: 2020-01-01 | Stop reason: HOSPADM

## 2020-01-01 RX ORDER — ALBUTEROL SULFATE 0.83 MG/ML
10 SOLUTION RESPIRATORY (INHALATION)
Status: COMPLETED | OUTPATIENT
Start: 2020-01-01 | End: 2020-01-01

## 2020-01-01 RX ORDER — MAGNESIUM SULFATE HEPTAHYDRATE 40 MG/ML
INJECTION, SOLUTION INTRAVENOUS
Status: COMPLETED | OUTPATIENT
Start: 2020-01-01 | End: 2020-01-01

## 2020-01-01 RX ORDER — DEXTROSE 50 % IN WATER (D50W) INTRAVENOUS SYRINGE
Status: DISCONTINUED
Start: 2020-01-01 | End: 2021-01-01 | Stop reason: HOSPADM

## 2020-01-01 RX ORDER — HYDRALAZINE HYDROCHLORIDE 100 MG/1
100 TABLET, FILM COATED ORAL 3 TIMES DAILY
Qty: 270 TAB | Refills: 3 | Status: SHIPPED | OUTPATIENT
Start: 2020-01-01 | End: 2020-01-01 | Stop reason: SDUPTHER

## 2020-01-01 RX ORDER — HYDRALAZINE HYDROCHLORIDE 20 MG/ML
10 INJECTION INTRAMUSCULAR; INTRAVENOUS
Status: DISCONTINUED | OUTPATIENT
Start: 2020-01-01 | End: 2020-01-01 | Stop reason: HOSPADM

## 2020-01-01 RX ORDER — EPINEPHRINE 0.1 MG/ML
INJECTION INTRACARDIAC; INTRAVENOUS
Status: COMPLETED | OUTPATIENT
Start: 2020-01-01 | End: 2020-01-01

## 2020-01-01 RX ORDER — MAGNESIUM SULFATE 100 %
16 CRYSTALS MISCELLANEOUS AS NEEDED
Status: DISCONTINUED | OUTPATIENT
Start: 2020-01-01 | End: 2020-01-01 | Stop reason: HOSPADM

## 2020-01-01 RX ORDER — FLUTICASONE FUROATE AND VILANTEROL TRIFENATATE 100; 25 UG/1; UG/1
1 POWDER RESPIRATORY (INHALATION) DAILY
Qty: 1 INHALER | Refills: 0 | Status: SHIPPED | OUTPATIENT
Start: 2020-01-01 | End: 2020-01-01

## 2020-01-01 RX ORDER — ARFORMOTEROL TARTRATE 15 UG/2ML
15 SOLUTION RESPIRATORY (INHALATION)
Status: DISCONTINUED | OUTPATIENT
Start: 2020-01-01 | End: 2020-01-01

## 2020-01-01 RX ORDER — ATORVASTATIN CALCIUM 80 MG/1
80 TABLET, FILM COATED ORAL
Qty: 30 TAB | Refills: 5 | Status: SHIPPED | OUTPATIENT
Start: 2020-01-01

## 2020-01-01 RX ORDER — LABETALOL HCL 20 MG/4 ML
5 SYRINGE (ML) INTRAVENOUS
Status: DISCONTINUED | OUTPATIENT
Start: 2020-01-01 | End: 2020-01-01 | Stop reason: HOSPADM

## 2020-01-01 RX ORDER — POTASSIUM CHLORIDE 20 MEQ/1
20 TABLET, EXTENDED RELEASE ORAL
Status: COMPLETED | OUTPATIENT
Start: 2020-01-01 | End: 2020-01-01

## 2020-01-01 RX ORDER — DIPHENHYDRAMINE HYDROCHLORIDE 50 MG/ML
12.5 INJECTION, SOLUTION INTRAMUSCULAR; INTRAVENOUS
Status: DISCONTINUED | OUTPATIENT
Start: 2020-01-01 | End: 2020-01-01 | Stop reason: HOSPADM

## 2020-01-01 RX ORDER — ALBUTEROL SULFATE 0.83 MG/ML
2.5 SOLUTION RESPIRATORY (INHALATION)
Qty: 30 NEBULE | Refills: 0 | Status: SHIPPED | OUTPATIENT
Start: 2020-01-01 | End: 2020-01-01 | Stop reason: SDUPTHER

## 2020-01-01 RX ORDER — PREDNISONE 20 MG/1
40 TABLET ORAL ONCE
Status: ACTIVE | OUTPATIENT
Start: 2020-01-01 | End: 2020-01-01

## 2020-01-01 RX ORDER — UREA 10 %
2 LOTION (ML) TOPICAL 2 TIMES DAILY
Qty: 8 TAB | Refills: 0 | Status: SHIPPED | OUTPATIENT
Start: 2020-01-01 | End: 2020-01-01

## 2020-01-01 RX ORDER — PREDNISONE 20 MG/1
60 TABLET ORAL
Status: DISCONTINUED | OUTPATIENT
Start: 2020-01-01 | End: 2020-01-01

## 2020-01-01 RX ORDER — ARFORMOTEROL TARTRATE 15 UG/2ML
15 SOLUTION RESPIRATORY (INHALATION)
Status: DISCONTINUED | OUTPATIENT
Start: 2020-01-01 | End: 2020-01-01 | Stop reason: HOSPADM

## 2020-01-01 RX ORDER — MAGNESIUM SULFATE 100 %
4 CRYSTALS MISCELLANEOUS AS NEEDED
Status: DISCONTINUED | OUTPATIENT
Start: 2020-01-01 | End: 2020-01-01 | Stop reason: HOSPADM

## 2020-01-01 RX ORDER — ALBUTEROL SULFATE 90 UG/1
AEROSOL, METERED RESPIRATORY (INHALATION)
COMMUNITY
Start: 2020-01-01

## 2020-01-01 RX ORDER — FLUTICASONE FUROATE AND VILANTEROL TRIFENATATE 100; 25 UG/1; UG/1
1 POWDER RESPIRATORY (INHALATION) DAILY
Qty: 1 INHALER | Refills: 0 | Status: SHIPPED | OUTPATIENT
Start: 2020-01-01

## 2020-01-01 RX ORDER — ISOSORBIDE DINITRATE 10 MG/1
20 TABLET ORAL 3 TIMES DAILY
Qty: 180 TAB | Refills: 0 | Status: SHIPPED | OUTPATIENT
Start: 2020-01-01 | End: 2020-01-01

## 2020-01-01 RX ORDER — INSULIN LISPRO 100 [IU]/ML
INJECTION, SOLUTION INTRAVENOUS; SUBCUTANEOUS
Status: DISCONTINUED | OUTPATIENT
Start: 2020-01-01 | End: 2020-01-01 | Stop reason: HOSPADM

## 2020-01-01 RX ORDER — IPRATROPIUM BROMIDE AND ALBUTEROL SULFATE 2.5; .5 MG/3ML; MG/3ML
3 SOLUTION RESPIRATORY (INHALATION)
Status: DISCONTINUED | OUTPATIENT
Start: 2020-01-01 | End: 2020-01-01 | Stop reason: HOSPADM

## 2020-01-01 RX ORDER — POTASSIUM CHLORIDE 20 MEQ/1
40 TABLET, EXTENDED RELEASE ORAL 2 TIMES DAILY
Status: DISCONTINUED | OUTPATIENT
Start: 2020-01-01 | End: 2020-01-01

## 2020-01-01 RX ORDER — INSULIN GLARGINE 100 [IU]/ML
INJECTION, SOLUTION SUBCUTANEOUS
COMMUNITY
Start: 2020-01-01 | End: 2020-01-01 | Stop reason: SDUPTHER

## 2020-01-01 RX ORDER — UREA 10 %
2 LOTION (ML) TOPICAL 2 TIMES DAILY
Status: DISCONTINUED | OUTPATIENT
Start: 2020-01-01 | End: 2020-01-01 | Stop reason: HOSPADM

## 2020-01-01 RX ORDER — HYDRALAZINE HYDROCHLORIDE 20 MG/ML
20 INJECTION INTRAMUSCULAR; INTRAVENOUS ONCE
Status: DISCONTINUED | OUTPATIENT
Start: 2020-01-01 | End: 2020-01-01

## 2020-01-01 RX ORDER — HYDRALAZINE HYDROCHLORIDE 20 MG/ML
INJECTION INTRAMUSCULAR; INTRAVENOUS
Status: COMPLETED
Start: 2020-01-01 | End: 2020-01-01

## 2020-01-01 RX ORDER — INSULIN GLARGINE 100 [IU]/ML
15 INJECTION, SOLUTION SUBCUTANEOUS
Status: DISCONTINUED | OUTPATIENT
Start: 2020-01-01 | End: 2020-01-01 | Stop reason: HOSPADM

## 2020-01-01 RX ORDER — ASPIRIN 81 MG/1
81 TABLET ORAL DAILY
Qty: 30 TAB | Refills: 0 | Status: SHIPPED | OUTPATIENT
Start: 2020-01-01

## 2020-01-01 RX ORDER — GABAPENTIN 100 MG/1
100 CAPSULE ORAL 3 TIMES DAILY
Qty: 9 CAP | Refills: 0 | Status: SHIPPED | OUTPATIENT
Start: 2020-01-01

## 2020-01-01 RX ORDER — IPRATROPIUM BROMIDE AND ALBUTEROL SULFATE 2.5; .5 MG/3ML; MG/3ML
3 SOLUTION RESPIRATORY (INHALATION)
Status: DISCONTINUED | OUTPATIENT
Start: 2020-01-01 | End: 2020-01-01

## 2020-01-01 RX ORDER — HYDRALAZINE HYDROCHLORIDE 100 MG/1
100 TABLET, FILM COATED ORAL 3 TIMES DAILY
Qty: 90 TAB | Refills: 5 | Status: SHIPPED | OUTPATIENT
Start: 2020-01-01

## 2020-01-01 RX ORDER — POTASSIUM CHLORIDE 750 MG/1
10 TABLET, FILM COATED, EXTENDED RELEASE ORAL DAILY
Status: DISCONTINUED | OUTPATIENT
Start: 2020-01-01 | End: 2020-01-01

## 2020-01-01 RX ORDER — ISOSORBIDE DINITRATE 10 MG/1
TABLET ORAL
Qty: 180 TAB | Refills: 0 | OUTPATIENT
Start: 2020-01-01

## 2020-01-01 RX ORDER — SODIUM CHLORIDE 9 MG/ML
100 INJECTION, SOLUTION INTRAVENOUS CONTINUOUS
Status: DISCONTINUED | OUTPATIENT
Start: 2020-01-01 | End: 2020-01-01

## 2020-01-01 RX ORDER — POTASSIUM CHLORIDE 750 MG/1
10 TABLET, FILM COATED, EXTENDED RELEASE ORAL DAILY
Qty: 30 TAB | Refills: 0 | Status: SHIPPED | OUTPATIENT
Start: 2020-01-01

## 2020-01-01 RX ORDER — PREDNISONE 10 MG/1
TABLET ORAL
Qty: 22 TAB | Refills: 0 | Status: SHIPPED | OUTPATIENT
Start: 2020-01-01 | End: 2020-01-01

## 2020-01-01 RX ORDER — MAGNESIUM SULFATE 1 G/100ML
1 INJECTION INTRAVENOUS
Status: COMPLETED | OUTPATIENT
Start: 2020-01-01 | End: 2020-01-01

## 2020-01-01 RX ORDER — SODIUM CHLORIDE 0.9 % (FLUSH) 0.9 %
5-40 SYRINGE (ML) INJECTION AS NEEDED
Status: DISCONTINUED | OUTPATIENT
Start: 2020-01-01 | End: 2020-01-01 | Stop reason: HOSPADM

## 2020-01-01 RX ORDER — DEXTROSE 50 % IN WATER (D50W) INTRAVENOUS SYRINGE
25-50 AS NEEDED
Status: DISCONTINUED | OUTPATIENT
Start: 2020-01-01 | End: 2020-01-01 | Stop reason: HOSPADM

## 2020-01-01 RX ORDER — ATORVASTATIN CALCIUM 80 MG/1
80 TABLET, FILM COATED ORAL
Qty: 30 TAB | Refills: 0 | Status: SHIPPED | OUTPATIENT
Start: 2020-01-01 | End: 2020-01-01

## 2020-01-01 RX ORDER — AMOXICILLIN AND CLAVULANATE POTASSIUM 875; 125 MG/1; MG/1
1 TABLET, FILM COATED ORAL EVERY 12 HOURS
Status: DISCONTINUED | OUTPATIENT
Start: 2020-01-01 | End: 2020-01-01 | Stop reason: HOSPADM

## 2020-01-01 RX ORDER — PREDNISONE 20 MG/1
40 TABLET ORAL ONCE
Status: COMPLETED | OUTPATIENT
Start: 2020-01-01 | End: 2020-01-01

## 2020-01-01 RX ORDER — SODIUM CHLORIDE 0.9 % (FLUSH) 0.9 %
5-40 SYRINGE (ML) INJECTION EVERY 8 HOURS
Status: DISCONTINUED | OUTPATIENT
Start: 2020-01-01 | End: 2020-01-01 | Stop reason: HOSPADM

## 2020-01-01 RX ORDER — PREDNISONE 10 MG/1
TABLET ORAL
Qty: 42 TAB | Refills: 0 | Status: SHIPPED | OUTPATIENT
Start: 2020-01-01 | End: 2020-01-01

## 2020-01-01 RX ORDER — IPRATROPIUM BROMIDE 0.5 MG/2.5ML
0.5 SOLUTION RESPIRATORY (INHALATION)
COMMUNITY

## 2020-01-01 RX ADMIN — Medication 50 MEQ: at 14:32

## 2020-01-01 RX ADMIN — Medication 50 MEQ: at 14:20

## 2020-01-01 RX ADMIN — ZINC SULFATE 220 MG (50 MG) CAPSULE 1 CAPSULE: CAPSULE at 11:00

## 2020-01-01 RX ADMIN — ASPIRIN 325 MG: 325 TABLET, DELAYED RELEASE ORAL at 19:45

## 2020-01-01 RX ADMIN — HYDRALAZINE HYDROCHLORIDE 20 MG: 20 INJECTION INTRAMUSCULAR; INTRAVENOUS at 00:18

## 2020-01-01 RX ADMIN — Medication 10 ML: at 13:10

## 2020-01-01 RX ADMIN — METHYLPREDNISOLONE SODIUM SUCCINATE 125 MG: 125 INJECTION, POWDER, FOR SOLUTION INTRAMUSCULAR; INTRAVENOUS at 08:49

## 2020-01-01 RX ADMIN — GABAPENTIN 100 MG: 100 CAPSULE ORAL at 17:25

## 2020-01-01 RX ADMIN — ARFORMOTEROL TARTRATE: 15 SOLUTION RESPIRATORY (INHALATION) at 17:56

## 2020-01-01 RX ADMIN — IPRATROPIUM BROMIDE AND ALBUTEROL SULFATE 3 ML: .5; 3 SOLUTION RESPIRATORY (INHALATION) at 16:44

## 2020-01-01 RX ADMIN — GABAPENTIN 100 MG: 100 CAPSULE ORAL at 10:08

## 2020-01-01 RX ADMIN — IPRATROPIUM BROMIDE AND ALBUTEROL SULFATE 3 ML: .5; 3 SOLUTION RESPIRATORY (INHALATION) at 12:53

## 2020-01-01 RX ADMIN — POTASSIUM CHLORIDE 40 MEQ: 1500 TABLET, EXTENDED RELEASE ORAL at 17:20

## 2020-01-01 RX ADMIN — HEPARIN SODIUM 5000 UNITS: 5000 INJECTION INTRAVENOUS; SUBCUTANEOUS at 12:21

## 2020-01-01 RX ADMIN — FUROSEMIDE 20 MG: 20 TABLET ORAL at 09:47

## 2020-01-01 RX ADMIN — HYDRALAZINE HYDROCHLORIDE 100 MG: 50 TABLET ORAL at 21:46

## 2020-01-01 RX ADMIN — IPRATROPIUM BROMIDE AND ALBUTEROL SULFATE 3 ML: .5; 3 SOLUTION RESPIRATORY (INHALATION) at 08:33

## 2020-01-01 RX ADMIN — HEPARIN SODIUM 5000 UNITS: 5000 INJECTION INTRAVENOUS; SUBCUTANEOUS at 22:27

## 2020-01-01 RX ADMIN — HEPARIN SODIUM 5000 UNITS: 5000 INJECTION INTRAVENOUS; SUBCUTANEOUS at 12:23

## 2020-01-01 RX ADMIN — DEXTROSE MONOHYDRATE 25 G: 25 INJECTION, SOLUTION INTRAVENOUS at 14:21

## 2020-01-01 RX ADMIN — ISOSORBIDE DINITRATE 20 MG: 20 TABLET ORAL at 17:40

## 2020-01-01 RX ADMIN — IPRATROPIUM BROMIDE AND ALBUTEROL SULFATE 3 ML: .5; 3 SOLUTION RESPIRATORY (INHALATION) at 17:56

## 2020-01-01 RX ADMIN — ATORVASTATIN CALCIUM 80 MG: 40 TABLET, FILM COATED ORAL at 21:42

## 2020-01-01 RX ADMIN — METHYLPREDNISOLONE SODIUM SUCCINATE 60 MG: 125 INJECTION, POWDER, FOR SOLUTION INTRAMUSCULAR; INTRAVENOUS at 09:16

## 2020-01-01 RX ADMIN — ISOSORBIDE DINITRATE 20 MG: 20 TABLET ORAL at 23:24

## 2020-01-01 RX ADMIN — FUROSEMIDE 20 MG: 20 TABLET ORAL at 17:43

## 2020-01-01 RX ADMIN — Medication 1 MG: at 15:02

## 2020-01-01 RX ADMIN — GABAPENTIN 100 MG: 100 CAPSULE ORAL at 00:14

## 2020-01-01 RX ADMIN — GABAPENTIN 100 MG: 100 CAPSULE ORAL at 21:53

## 2020-01-01 RX ADMIN — SPIRONOLACTONE 25 MG: 25 TABLET ORAL at 09:17

## 2020-01-01 RX ADMIN — ISOSORBIDE DINITRATE 20 MG: 20 TABLET ORAL at 21:14

## 2020-01-01 RX ADMIN — Medication 2 TABLET: at 09:00

## 2020-01-01 RX ADMIN — HYDRALAZINE HYDROCHLORIDE 100 MG: 50 TABLET, FILM COATED ORAL at 11:02

## 2020-01-01 RX ADMIN — GABAPENTIN 100 MG: 100 CAPSULE ORAL at 17:23

## 2020-01-01 RX ADMIN — Medication 5000 UNITS: at 10:59

## 2020-01-01 RX ADMIN — ATORVASTATIN CALCIUM 80 MG: 40 TABLET, FILM COATED ORAL at 21:53

## 2020-01-01 RX ADMIN — HYDRALAZINE HYDROCHLORIDE 100 MG: 50 TABLET ORAL at 10:59

## 2020-01-01 RX ADMIN — SPIRONOLACTONE 25 MG: 25 TABLET ORAL at 10:59

## 2020-01-01 RX ADMIN — Medication 10 ML: at 21:55

## 2020-01-01 RX ADMIN — Medication 1000 MG: at 11:00

## 2020-01-01 RX ADMIN — HEPARIN SODIUM 5000 UNITS: 5000 INJECTION INTRAVENOUS; SUBCUTANEOUS at 04:04

## 2020-01-01 RX ADMIN — CALCIUM CHLORIDE 1 G: 100 INJECTION INTRAVENOUS; INTRAVENTRICULAR at 14:11

## 2020-01-01 RX ADMIN — GABAPENTIN 100 MG: 100 CAPSULE ORAL at 17:40

## 2020-01-01 RX ADMIN — PREDNISONE 40 MG: 20 TABLET ORAL at 13:33

## 2020-01-01 RX ADMIN — ARFORMOTEROL TARTRATE 15 MCG: 15 SOLUTION RESPIRATORY (INHALATION) at 09:37

## 2020-01-01 RX ADMIN — METHYLPREDNISOLONE SODIUM SUCCINATE 60 MG: 125 INJECTION, POWDER, FOR SOLUTION INTRAMUSCULAR; INTRAVENOUS at 20:40

## 2020-01-01 RX ADMIN — FUROSEMIDE 20 MG: 20 TABLET ORAL at 09:16

## 2020-01-01 RX ADMIN — FUROSEMIDE 20 MG: 20 TABLET ORAL at 10:08

## 2020-01-01 RX ADMIN — GABAPENTIN 100 MG: 100 CAPSULE ORAL at 09:00

## 2020-01-01 RX ADMIN — HEPARIN SODIUM 5000 UNITS: 5000 INJECTION INTRAVENOUS; SUBCUTANEOUS at 09:18

## 2020-01-01 RX ADMIN — METHYLPREDNISOLONE SODIUM SUCCINATE 20 MG: 40 INJECTION, POWDER, FOR SOLUTION INTRAMUSCULAR; INTRAVENOUS at 15:11

## 2020-01-01 RX ADMIN — DOXYCYCLINE 100 MG: 100 INJECTION, POWDER, LYOPHILIZED, FOR SOLUTION INTRAVENOUS at 11:32

## 2020-01-01 RX ADMIN — BARIUM SULFATE 30 ML: 400 SUSPENSION ORAL at 10:38

## 2020-01-01 RX ADMIN — AMLODIPINE BESYLATE 10 MG: 10 TABLET ORAL at 09:00

## 2020-01-01 RX ADMIN — ARFORMOTEROL TARTRATE 15 MCG: 15 SOLUTION RESPIRATORY (INHALATION) at 09:20

## 2020-01-01 RX ADMIN — FUROSEMIDE 20 MG: 20 TABLET ORAL at 13:07

## 2020-01-01 RX ADMIN — NITROGLYCERIN 1 INCH: 20 OINTMENT TOPICAL at 03:25

## 2020-01-01 RX ADMIN — AMLODIPINE BESYLATE 10 MG: 10 TABLET ORAL at 10:59

## 2020-01-01 RX ADMIN — DOXYCYCLINE 100 MG: 100 INJECTION, POWDER, LYOPHILIZED, FOR SOLUTION INTRAVENOUS at 20:34

## 2020-01-01 RX ADMIN — MAGNESIUM SULFATE 1 G: 2 INJECTION INTRAVENOUS at 14:12

## 2020-01-01 RX ADMIN — GABAPENTIN 100 MG: 100 CAPSULE ORAL at 17:38

## 2020-01-01 RX ADMIN — AZITHROMYCIN MONOHYDRATE 500 MG: 500 INJECTION, POWDER, LYOPHILIZED, FOR SOLUTION INTRAVENOUS at 09:46

## 2020-01-01 RX ADMIN — HEPARIN SODIUM 5000 UNITS: 5000 INJECTION INTRAVENOUS; SUBCUTANEOUS at 23:25

## 2020-01-01 RX ADMIN — GABAPENTIN 100 MG: 100 CAPSULE ORAL at 09:48

## 2020-01-01 RX ADMIN — POTASSIUM CHLORIDE: 2 INJECTION, SOLUTION, CONCENTRATE INTRAVENOUS at 21:48

## 2020-01-01 RX ADMIN — AMIODARONE HYDROCHLORIDE 300 MG: 50 INJECTION, SOLUTION INTRAVENOUS at 14:19

## 2020-01-01 RX ADMIN — ISOSORBIDE DINITRATE 20 MG: 20 TABLET ORAL at 11:03

## 2020-01-01 RX ADMIN — Medication 1 MG: at 14:10

## 2020-01-01 RX ADMIN — Medication 2 TABLET: at 09:47

## 2020-01-01 RX ADMIN — ARFORMOTEROL TARTRATE: 15 SOLUTION RESPIRATORY (INHALATION) at 08:33

## 2020-01-01 RX ADMIN — BARIUM SULFATE 30 ML: 400 PASTE ORAL at 10:38

## 2020-01-01 RX ADMIN — FUROSEMIDE 20 MG: 20 TABLET ORAL at 17:40

## 2020-01-01 RX ADMIN — HEPARIN SODIUM 5000 UNITS: 5000 INJECTION INTRAVENOUS; SUBCUTANEOUS at 11:00

## 2020-01-01 RX ADMIN — ISOSORBIDE DINITRATE 20 MG: 20 TABLET ORAL at 17:56

## 2020-01-01 RX ADMIN — HEPARIN SODIUM 5000 UNITS: 5000 INJECTION INTRAVENOUS; SUBCUTANEOUS at 09:47

## 2020-01-01 RX ADMIN — Medication 10 ML: at 22:17

## 2020-01-01 RX ADMIN — METHYLPREDNISOLONE SODIUM SUCCINATE 40 MG: 40 INJECTION, POWDER, FOR SOLUTION INTRAMUSCULAR; INTRAVENOUS at 22:32

## 2020-01-01 RX ADMIN — GABAPENTIN 100 MG: 100 CAPSULE ORAL at 09:04

## 2020-01-01 RX ADMIN — Medication 1 MG: at 14:30

## 2020-01-01 RX ADMIN — Medication 1000 MG: at 09:17

## 2020-01-01 RX ADMIN — FUROSEMIDE 20 MG: 20 TABLET ORAL at 17:23

## 2020-01-01 RX ADMIN — INSULIN LISPRO 2 UNITS: 100 INJECTION, SOLUTION INTRAVENOUS; SUBCUTANEOUS at 21:12

## 2020-01-01 RX ADMIN — HYDRALAZINE HYDROCHLORIDE 100 MG: 50 TABLET, FILM COATED ORAL at 09:02

## 2020-01-01 RX ADMIN — AMLODIPINE BESYLATE 5 MG: 5 TABLET ORAL at 11:14

## 2020-01-01 RX ADMIN — Medication 5000 UNITS: at 09:48

## 2020-01-01 RX ADMIN — SPIRONOLACTONE 25 MG: 25 TABLET ORAL at 09:03

## 2020-01-01 RX ADMIN — ASPIRIN 81 MG: 81 TABLET, COATED ORAL at 08:49

## 2020-01-01 RX ADMIN — GABAPENTIN 100 MG: 100 CAPSULE ORAL at 17:43

## 2020-01-01 RX ADMIN — METHYLPREDNISOLONE SODIUM SUCCINATE 60 MG: 125 INJECTION, POWDER, FOR SOLUTION INTRAMUSCULAR; INTRAVENOUS at 06:30

## 2020-01-01 RX ADMIN — ASPIRIN 81 MG: 81 TABLET, COATED ORAL at 10:59

## 2020-01-01 RX ADMIN — METHYLPREDNISOLONE SODIUM SUCCINATE 40 MG: 40 INJECTION, POWDER, FOR SOLUTION INTRAMUSCULAR; INTRAVENOUS at 17:32

## 2020-01-01 RX ADMIN — INSULIN GLARGINE 5 UNITS: 100 INJECTION, SOLUTION SUBCUTANEOUS at 21:12

## 2020-01-01 RX ADMIN — METHYLPREDNISOLONE SODIUM SUCCINATE 40 MG: 40 INJECTION, POWDER, FOR SOLUTION INTRAMUSCULAR; INTRAVENOUS at 18:01

## 2020-01-01 RX ADMIN — AZITHROMYCIN MONOHYDRATE 500 MG: 500 INJECTION, POWDER, LYOPHILIZED, FOR SOLUTION INTRAVENOUS at 09:18

## 2020-01-01 RX ADMIN — Medication 1 MG: at 14:39

## 2020-01-01 RX ADMIN — GABAPENTIN 100 MG: 100 CAPSULE ORAL at 15:35

## 2020-01-01 RX ADMIN — FUROSEMIDE 20 MG: 20 TABLET ORAL at 09:00

## 2020-01-01 RX ADMIN — GABAPENTIN 100 MG: 100 CAPSULE ORAL at 22:28

## 2020-01-01 RX ADMIN — ARFORMOTEROL TARTRATE: 15 SOLUTION RESPIRATORY (INHALATION) at 11:03

## 2020-01-01 RX ADMIN — ALBUTEROL SULFATE 2 PUFF: 90 AEROSOL, METERED RESPIRATORY (INHALATION) at 08:00

## 2020-01-01 RX ADMIN — AMLODIPINE BESYLATE 10 MG: 10 TABLET ORAL at 09:48

## 2020-01-01 RX ADMIN — IPRATROPIUM BROMIDE AND ALBUTEROL SULFATE 3 ML: .5; 3 SOLUTION RESPIRATORY (INHALATION) at 10:34

## 2020-01-01 RX ADMIN — INSULIN GLARGINE 7 UNITS: 100 INJECTION, SOLUTION SUBCUTANEOUS at 11:33

## 2020-01-01 RX ADMIN — Medication 10 ML: at 17:59

## 2020-01-01 RX ADMIN — INSULIN GLARGINE 5 UNITS: 100 INJECTION, SOLUTION SUBCUTANEOUS at 21:43

## 2020-01-01 RX ADMIN — SPIRONOLACTONE 25 MG: 25 TABLET ORAL at 11:03

## 2020-01-01 RX ADMIN — DOXYCYCLINE 100 MG: 100 INJECTION, POWDER, LYOPHILIZED, FOR SOLUTION INTRAVENOUS at 09:47

## 2020-01-01 RX ADMIN — HYDRALAZINE HYDROCHLORIDE 100 MG: 50 TABLET ORAL at 09:00

## 2020-01-01 RX ADMIN — FUROSEMIDE 20 MG: 20 TABLET ORAL at 09:03

## 2020-01-01 RX ADMIN — ISOSORBIDE DINITRATE 20 MG: 20 TABLET ORAL at 10:08

## 2020-01-01 RX ADMIN — METHYLPREDNISOLONE SODIUM SUCCINATE 40 MG: 40 INJECTION, POWDER, FOR SOLUTION INTRAMUSCULAR; INTRAVENOUS at 06:12

## 2020-01-01 RX ADMIN — GABAPENTIN 100 MG: 100 CAPSULE ORAL at 09:20

## 2020-01-01 RX ADMIN — BUDESONIDE 1000 MCG: 1 SUSPENSION RESPIRATORY (INHALATION) at 09:19

## 2020-01-01 RX ADMIN — ACETAMINOPHEN 650 MG: 325 TABLET ORAL at 00:23

## 2020-01-01 RX ADMIN — IPRATROPIUM BROMIDE AND ALBUTEROL SULFATE: .5; 3 SOLUTION RESPIRATORY (INHALATION) at 17:19

## 2020-01-01 RX ADMIN — Medication 2 TABLET: at 18:45

## 2020-01-01 RX ADMIN — FUROSEMIDE 20 MG: 20 TABLET ORAL at 18:34

## 2020-01-01 RX ADMIN — AZITHROMYCIN MONOHYDRATE 500 MG: 500 INJECTION, POWDER, LYOPHILIZED, FOR SOLUTION INTRAVENOUS at 11:32

## 2020-01-01 RX ADMIN — HYDRALAZINE HYDROCHLORIDE 100 MG: 50 TABLET ORAL at 17:43

## 2020-01-01 RX ADMIN — HEPARIN SODIUM 5000 UNITS: 5000 INJECTION INTRAVENOUS; SUBCUTANEOUS at 13:33

## 2020-01-01 RX ADMIN — AMOXICILLIN AND CLAVULANATE POTASSIUM 1 TABLET: 875; 125 TABLET, FILM COATED ORAL at 08:24

## 2020-01-01 RX ADMIN — INSULIN GLARGINE 15 UNITS: 100 INJECTION, SOLUTION SUBCUTANEOUS at 21:47

## 2020-01-01 RX ADMIN — HYDRALAZINE HYDROCHLORIDE 100 MG: 50 TABLET, FILM COATED ORAL at 17:25

## 2020-01-01 RX ADMIN — IPRATROPIUM BROMIDE AND ALBUTEROL SULFATE 3 ML: .5; 3 SOLUTION RESPIRATORY (INHALATION) at 16:56

## 2020-01-01 RX ADMIN — INSULIN GLARGINE 7 UNITS: 100 INJECTION, SOLUTION SUBCUTANEOUS at 11:00

## 2020-01-01 RX ADMIN — AMOXICILLIN AND CLAVULANATE POTASSIUM 1 TABLET: 875; 125 TABLET, FILM COATED ORAL at 09:20

## 2020-01-01 RX ADMIN — HYDRALAZINE HYDROCHLORIDE 10 MG: 20 INJECTION INTRAMUSCULAR; INTRAVENOUS at 12:40

## 2020-01-01 RX ADMIN — ARFORMOTEROL TARTRATE: 15 SOLUTION RESPIRATORY (INHALATION) at 18:01

## 2020-01-01 RX ADMIN — Medication 2 TABLET: at 17:43

## 2020-01-01 RX ADMIN — BUDESONIDE 1000 MCG: 1 SUSPENSION RESPIRATORY (INHALATION) at 09:36

## 2020-01-01 RX ADMIN — ZINC SULFATE 220 MG (50 MG) CAPSULE 1 CAPSULE: CAPSULE at 09:16

## 2020-01-01 RX ADMIN — AZITHROMYCIN MONOHYDRATE 500 MG: 500 INJECTION, POWDER, LYOPHILIZED, FOR SOLUTION INTRAVENOUS at 11:59

## 2020-01-01 RX ADMIN — METHYLPREDNISOLONE SODIUM SUCCINATE 60 MG: 125 INJECTION, POWDER, FOR SOLUTION INTRAMUSCULAR; INTRAVENOUS at 18:47

## 2020-01-01 RX ADMIN — INSULIN LISPRO 6 UNITS: 100 INJECTION, SOLUTION INTRAVENOUS; SUBCUTANEOUS at 22:26

## 2020-01-01 RX ADMIN — AMLODIPINE BESYLATE 10 MG: 10 TABLET ORAL at 09:54

## 2020-01-01 RX ADMIN — ISOSORBIDE DINITRATE 20 MG: 20 TABLET ORAL at 22:28

## 2020-01-01 RX ADMIN — ASPIRIN 81 MG: 81 TABLET, COATED ORAL at 09:47

## 2020-01-01 RX ADMIN — GABAPENTIN 100 MG: 100 CAPSULE ORAL at 21:23

## 2020-01-01 RX ADMIN — Medication 1 MG: at 14:57

## 2020-01-01 RX ADMIN — BARIUM SULFATE 75 G: 960 POWDER, FOR SUSPENSION ORAL at 10:39

## 2020-01-01 RX ADMIN — ISOSORBIDE DINITRATE 20 MG: 20 TABLET ORAL at 16:36

## 2020-01-01 RX ADMIN — ATORVASTATIN CALCIUM 80 MG: 40 TABLET, FILM COATED ORAL at 21:46

## 2020-01-01 RX ADMIN — METHYLPREDNISOLONE SODIUM SUCCINATE 40 MG: 40 INJECTION, POWDER, FOR SOLUTION INTRAMUSCULAR; INTRAVENOUS at 06:18

## 2020-01-01 RX ADMIN — GABAPENTIN 100 MG: 100 CAPSULE ORAL at 23:24

## 2020-01-01 RX ADMIN — INSULIN LISPRO 4 UNITS: 100 INJECTION, SOLUTION INTRAVENOUS; SUBCUTANEOUS at 16:42

## 2020-01-01 RX ADMIN — METHYLPREDNISOLONE SODIUM SUCCINATE 60 MG: 125 INJECTION, POWDER, FOR SOLUTION INTRAMUSCULAR; INTRAVENOUS at 11:06

## 2020-01-01 RX ADMIN — ACETAMINOPHEN 650 MG: 325 TABLET ORAL at 09:20

## 2020-01-01 RX ADMIN — HYDRALAZINE HYDROCHLORIDE 100 MG: 50 TABLET, FILM COATED ORAL at 17:40

## 2020-01-01 RX ADMIN — Medication 10 ML: at 13:11

## 2020-01-01 RX ADMIN — METHYLPREDNISOLONE SODIUM SUCCINATE 60 MG: 125 INJECTION, POWDER, FOR SOLUTION INTRAMUSCULAR; INTRAVENOUS at 02:10

## 2020-01-01 RX ADMIN — AMOXICILLIN AND CLAVULANATE POTASSIUM 1 TABLET: 875; 125 TABLET, FILM COATED ORAL at 21:14

## 2020-01-01 RX ADMIN — Medication 100 MG: at 09:00

## 2020-01-01 RX ADMIN — AMLODIPINE BESYLATE 10 MG: 10 TABLET ORAL at 11:02

## 2020-01-01 RX ADMIN — DEXTROSE MONOHYDRATE 25 G: 25 INJECTION, SOLUTION INTRAVENOUS at 14:29

## 2020-01-01 RX ADMIN — IPRATROPIUM BROMIDE AND ALBUTEROL SULFATE 3 ML: .5; 3 SOLUTION RESPIRATORY (INHALATION) at 11:03

## 2020-01-01 RX ADMIN — METHYLPREDNISOLONE SODIUM SUCCINATE 40 MG: 40 INJECTION, POWDER, FOR SOLUTION INTRAMUSCULAR; INTRAVENOUS at 12:27

## 2020-01-01 RX ADMIN — IPRATROPIUM BROMIDE AND ALBUTEROL SULFATE 3 ML: .5; 3 SOLUTION RESPIRATORY (INHALATION) at 09:55

## 2020-01-01 RX ADMIN — Medication 10 ML: at 17:29

## 2020-01-01 RX ADMIN — HYDRALAZINE HYDROCHLORIDE 20 MG: 20 INJECTION INTRAMUSCULAR; INTRAVENOUS at 22:41

## 2020-01-01 RX ADMIN — ALBUTEROL SULFATE 2 PUFF: 90 AEROSOL, METERED RESPIRATORY (INHALATION) at 00:52

## 2020-01-01 RX ADMIN — ARFORMOTEROL TARTRATE 15 MCG: 15 SOLUTION RESPIRATORY (INHALATION) at 20:00

## 2020-01-01 RX ADMIN — HYDRALAZINE HYDROCHLORIDE 10 MG: 20 INJECTION INTRAMUSCULAR; INTRAVENOUS at 11:28

## 2020-01-01 RX ADMIN — AMLODIPINE BESYLATE 10 MG: 10 TABLET ORAL at 09:17

## 2020-01-01 RX ADMIN — Medication 2 TABLET: at 18:34

## 2020-01-01 RX ADMIN — HEPARIN SODIUM 5000 UNITS: 5000 INJECTION INTRAVENOUS; SUBCUTANEOUS at 15:11

## 2020-01-01 RX ADMIN — FUROSEMIDE 20 MG: 20 TABLET ORAL at 11:02

## 2020-01-01 RX ADMIN — LABETALOL 20 MG/4 ML (5 MG/ML) INTRAVENOUS SYRINGE 5 MG: at 08:57

## 2020-01-01 RX ADMIN — ISOSORBIDE DINITRATE 20 MG: 20 TABLET ORAL at 09:20

## 2020-01-01 RX ADMIN — ATORVASTATIN CALCIUM 80 MG: 40 TABLET, FILM COATED ORAL at 21:23

## 2020-01-01 RX ADMIN — HEPARIN SODIUM 5000 UNITS: 5000 INJECTION INTRAVENOUS; SUBCUTANEOUS at 21:53

## 2020-01-01 RX ADMIN — Medication 100 MG: at 10:59

## 2020-01-01 RX ADMIN — AMLODIPINE BESYLATE 10 MG: 10 TABLET ORAL at 10:08

## 2020-01-01 RX ADMIN — GABAPENTIN 100 MG: 100 CAPSULE ORAL at 22:31

## 2020-01-01 RX ADMIN — GABAPENTIN 100 MG: 100 CAPSULE ORAL at 08:49

## 2020-01-01 RX ADMIN — INSULIN GLARGINE 15 UNITS: 100 INJECTION, SOLUTION SUBCUTANEOUS at 22:32

## 2020-01-01 RX ADMIN — METHYLPREDNISOLONE SODIUM SUCCINATE 40 MG: 40 INJECTION, POWDER, FOR SOLUTION INTRAMUSCULAR; INTRAVENOUS at 11:02

## 2020-01-01 RX ADMIN — GABAPENTIN 100 MG: 100 CAPSULE ORAL at 22:16

## 2020-01-01 RX ADMIN — HYDRALAZINE HYDROCHLORIDE 100 MG: 50 TABLET, FILM COATED ORAL at 17:56

## 2020-01-01 RX ADMIN — ISOSORBIDE DINITRATE 20 MG: 20 TABLET ORAL at 21:53

## 2020-01-01 RX ADMIN — Medication 1 MG: at 14:33

## 2020-01-01 RX ADMIN — AMOXICILLIN AND CLAVULANATE POTASSIUM 1 TABLET: 875; 125 TABLET, FILM COATED ORAL at 21:42

## 2020-01-01 RX ADMIN — Medication 1 MG: at 14:26

## 2020-01-01 RX ADMIN — GABAPENTIN 100 MG: 100 CAPSULE ORAL at 11:00

## 2020-01-01 RX ADMIN — FUROSEMIDE 20 MG: 20 TABLET ORAL at 18:45

## 2020-01-01 RX ADMIN — ALBUTEROL SULFATE 2 PUFF: 90 AEROSOL, METERED RESPIRATORY (INHALATION) at 16:00

## 2020-01-01 RX ADMIN — BARIUM SULFATE 700 MG: 700 TABLET ORAL at 10:38

## 2020-01-01 RX ADMIN — ARFORMOTEROL TARTRATE 15 MCG: 15 SOLUTION RESPIRATORY (INHALATION) at 20:38

## 2020-01-01 RX ADMIN — Medication 1 MG: at 14:42

## 2020-01-01 RX ADMIN — INSULIN GLARGINE 5 UNITS: 100 INJECTION, SOLUTION SUBCUTANEOUS at 00:16

## 2020-01-01 RX ADMIN — HYDRALAZINE HYDROCHLORIDE 20 MG: 20 INJECTION INTRAMUSCULAR; INTRAVENOUS at 17:26

## 2020-01-01 RX ADMIN — ISOSORBIDE DINITRATE 20 MG: 20 TABLET ORAL at 16:43

## 2020-01-01 RX ADMIN — INSULIN LISPRO 2 UNITS: 100 INJECTION, SOLUTION INTRAVENOUS; SUBCUTANEOUS at 22:16

## 2020-01-01 RX ADMIN — INSULIN LISPRO 4 UNITS: 100 INJECTION, SOLUTION INTRAVENOUS; SUBCUTANEOUS at 17:57

## 2020-01-01 RX ADMIN — IPRATROPIUM BROMIDE AND ALBUTEROL SULFATE 3 ML: .5; 3 SOLUTION RESPIRATORY (INHALATION) at 21:35

## 2020-01-01 RX ADMIN — ISOSORBIDE DINITRATE 20 MG: 20 TABLET ORAL at 17:23

## 2020-01-01 RX ADMIN — ARFORMOTEROL TARTRATE: 15 SOLUTION RESPIRATORY (INHALATION) at 10:58

## 2020-01-01 RX ADMIN — ALBUTEROL SULFATE 2 PUFF: 90 AEROSOL, METERED RESPIRATORY (INHALATION) at 12:00

## 2020-01-01 RX ADMIN — GABAPENTIN 100 MG: 100 CAPSULE ORAL at 21:46

## 2020-01-01 RX ADMIN — GABAPENTIN 100 MG: 100 CAPSULE ORAL at 15:32

## 2020-01-01 RX ADMIN — SPIRONOLACTONE 25 MG: 25 TABLET ORAL at 09:54

## 2020-01-01 RX ADMIN — GABAPENTIN 100 MG: 100 CAPSULE ORAL at 15:10

## 2020-01-01 RX ADMIN — DOXYCYCLINE 100 MG: 100 INJECTION, POWDER, LYOPHILIZED, FOR SOLUTION INTRAVENOUS at 09:18

## 2020-01-01 RX ADMIN — Medication 10 ML: at 16:44

## 2020-01-01 RX ADMIN — ASPIRIN 81 MG: 81 TABLET, COATED ORAL at 09:00

## 2020-01-01 RX ADMIN — Medication 1 MG: at 14:54

## 2020-01-01 RX ADMIN — METHYLPREDNISOLONE SODIUM SUCCINATE 125 MG: 125 INJECTION, POWDER, FOR SOLUTION INTRAMUSCULAR; INTRAVENOUS at 10:34

## 2020-01-01 RX ADMIN — SPIRONOLACTONE 25 MG: 25 TABLET ORAL at 09:47

## 2020-01-01 RX ADMIN — INSULIN GLARGINE 7 UNITS: 100 INJECTION, SOLUTION SUBCUTANEOUS at 09:24

## 2020-01-01 RX ADMIN — IPRATROPIUM BROMIDE AND ALBUTEROL SULFATE 3 ML: .5; 3 SOLUTION RESPIRATORY (INHALATION) at 08:49

## 2020-01-01 RX ADMIN — INSULIN LISPRO 2 UNITS: 100 INJECTION, SOLUTION INTRAVENOUS; SUBCUTANEOUS at 13:32

## 2020-01-01 RX ADMIN — AMLODIPINE BESYLATE 10 MG: 10 TABLET ORAL at 08:57

## 2020-01-01 RX ADMIN — HYDRALAZINE HYDROCHLORIDE 100 MG: 50 TABLET, FILM COATED ORAL at 17:23

## 2020-01-01 RX ADMIN — HYDRALAZINE HYDROCHLORIDE 100 MG: 50 TABLET ORAL at 09:48

## 2020-01-01 RX ADMIN — FUROSEMIDE 20 MG: 20 TABLET ORAL at 10:59

## 2020-01-01 RX ADMIN — HEPARIN SODIUM 5000 UNITS: 5000 INJECTION INTRAVENOUS; SUBCUTANEOUS at 13:08

## 2020-01-01 RX ADMIN — DOXYCYCLINE 100 MG: 100 INJECTION, POWDER, LYOPHILIZED, FOR SOLUTION INTRAVENOUS at 21:22

## 2020-01-01 RX ADMIN — DOXYCYCLINE 100 MG: 100 INJECTION, POWDER, LYOPHILIZED, FOR SOLUTION INTRAVENOUS at 22:31

## 2020-01-01 RX ADMIN — METHYLPREDNISOLONE SODIUM SUCCINATE 60 MG: 125 INJECTION, POWDER, FOR SOLUTION INTRAMUSCULAR; INTRAVENOUS at 21:22

## 2020-01-01 RX ADMIN — METHYLPREDNISOLONE SODIUM SUCCINATE 40 MG: 40 INJECTION, POWDER, FOR SOLUTION INTRAMUSCULAR; INTRAVENOUS at 11:00

## 2020-01-01 RX ADMIN — PREDNISONE 50 MG: 20 TABLET ORAL at 09:48

## 2020-01-01 RX ADMIN — INSULIN GLARGINE 15 UNITS: 100 INJECTION, SOLUTION SUBCUTANEOUS at 22:00

## 2020-01-01 RX ADMIN — HEPARIN SODIUM 5000 UNITS: 5000 INJECTION INTRAVENOUS; SUBCUTANEOUS at 22:32

## 2020-01-01 RX ADMIN — Medication 1 MG: at 14:36

## 2020-01-01 RX ADMIN — HEPARIN SODIUM 5000 UNITS: 5000 INJECTION INTRAVENOUS; SUBCUTANEOUS at 21:23

## 2020-01-01 RX ADMIN — MAGNESIUM SULFATE HEPTAHYDRATE 1 G: 1 INJECTION, SOLUTION INTRAVENOUS at 11:52

## 2020-01-01 RX ADMIN — POTASSIUM CHLORIDE: 2 INJECTION, SOLUTION, CONCENTRATE INTRAVENOUS at 18:38

## 2020-01-01 RX ADMIN — Medication 10 ML: at 06:12

## 2020-01-01 RX ADMIN — ATORVASTATIN CALCIUM 80 MG: 40 TABLET, FILM COATED ORAL at 22:31

## 2020-01-01 RX ADMIN — HYDRALAZINE HYDROCHLORIDE 20 MG: 20 INJECTION INTRAMUSCULAR; INTRAVENOUS at 13:01

## 2020-01-01 RX ADMIN — IPRATROPIUM BROMIDE AND ALBUTEROL SULFATE 3 ML: .5; 3 SOLUTION RESPIRATORY (INHALATION) at 11:52

## 2020-01-01 RX ADMIN — HEPARIN SODIUM 5000 UNITS: 5000 INJECTION INTRAVENOUS; SUBCUTANEOUS at 09:00

## 2020-01-01 RX ADMIN — ATORVASTATIN CALCIUM 80 MG: 40 TABLET, FILM COATED ORAL at 22:28

## 2020-01-01 RX ADMIN — LABETALOL 20 MG/4 ML (5 MG/ML) INTRAVENOUS SYRINGE 5 MG: at 01:02

## 2020-01-01 RX ADMIN — Medication 10 ML: at 05:29

## 2020-01-01 RX ADMIN — Medication 10 ML: at 06:35

## 2020-01-01 RX ADMIN — FUROSEMIDE 20 MG: 20 TABLET ORAL at 17:25

## 2020-01-01 RX ADMIN — INSULIN GLARGINE 5 UNITS: 100 INJECTION, SOLUTION SUBCUTANEOUS at 21:53

## 2020-01-01 RX ADMIN — ALBUTEROL SULFATE 2 PUFF: 90 AEROSOL, METERED RESPIRATORY (INHALATION) at 03:45

## 2020-01-01 RX ADMIN — ARFORMOTEROL TARTRATE 15 MCG: 15 SOLUTION RESPIRATORY (INHALATION) at 07:59

## 2020-01-01 RX ADMIN — ZINC SULFATE 220 MG (50 MG) CAPSULE 1 CAPSULE: CAPSULE at 09:47

## 2020-01-01 RX ADMIN — BUDESONIDE 1000 MCG: 1 SUSPENSION RESPIRATORY (INHALATION) at 20:00

## 2020-01-01 RX ADMIN — ISOSORBIDE DINITRATE 20 MG: 20 TABLET ORAL at 17:38

## 2020-01-01 RX ADMIN — CALCIUM CHLORIDE 1 G: 100 INJECTION INTRAVENOUS; INTRAVENTRICULAR at 14:43

## 2020-01-01 RX ADMIN — POTASSIUM CHLORIDE: 2 INJECTION, SOLUTION, CONCENTRATE INTRAVENOUS at 15:35

## 2020-01-01 RX ADMIN — POTASSIUM CHLORIDE 40 MEQ: 1500 TABLET, EXTENDED RELEASE ORAL at 08:24

## 2020-01-01 RX ADMIN — DEXTROSE MONOHYDRATE 25 G: 25 INJECTION, SOLUTION INTRAVENOUS at 14:44

## 2020-01-01 RX ADMIN — HEPARIN SODIUM 5000 UNITS: 5000 INJECTION INTRAVENOUS; SUBCUTANEOUS at 11:04

## 2020-01-01 RX ADMIN — GABAPENTIN 100 MG: 100 CAPSULE ORAL at 16:43

## 2020-01-01 RX ADMIN — FUROSEMIDE 20 MG: 20 TABLET ORAL at 17:57

## 2020-01-01 RX ADMIN — METHYLPREDNISOLONE SODIUM SUCCINATE 40 MG: 40 INJECTION, POWDER, FOR SOLUTION INTRAMUSCULAR; INTRAVENOUS at 06:34

## 2020-01-01 RX ADMIN — ALBUTEROL SULFATE 2 PUFF: 90 AEROSOL, METERED RESPIRATORY (INHALATION) at 03:36

## 2020-01-01 RX ADMIN — ISOSORBIDE DINITRATE 20 MG: 20 TABLET ORAL at 22:16

## 2020-01-01 RX ADMIN — HYDRALAZINE HYDROCHLORIDE 100 MG: 50 TABLET, FILM COATED ORAL at 21:53

## 2020-01-01 RX ADMIN — HYDRALAZINE HYDROCHLORIDE 100 MG: 50 TABLET, FILM COATED ORAL at 09:54

## 2020-01-01 RX ADMIN — HEPARIN SODIUM 5000 UNITS: 5000 INJECTION INTRAVENOUS; SUBCUTANEOUS at 06:34

## 2020-01-01 RX ADMIN — ALBUTEROL SULFATE 2 PUFF: 90 AEROSOL, METERED RESPIRATORY (INHALATION) at 09:26

## 2020-01-01 RX ADMIN — METHYLPREDNISOLONE SODIUM SUCCINATE 40 MG: 40 INJECTION, POWDER, FOR SOLUTION INTRAMUSCULAR; INTRAVENOUS at 01:21

## 2020-01-01 RX ADMIN — HYDRALAZINE HYDROCHLORIDE 100 MG: 50 TABLET, FILM COATED ORAL at 16:43

## 2020-01-01 RX ADMIN — IPRATROPIUM BROMIDE AND ALBUTEROL SULFATE 3 ML: .5; 3 SOLUTION RESPIRATORY (INHALATION) at 12:20

## 2020-01-01 RX ADMIN — Medication 1000 MG: at 09:47

## 2020-01-01 RX ADMIN — GABAPENTIN 100 MG: 100 CAPSULE ORAL at 21:14

## 2020-01-01 RX ADMIN — Medication 10 ML: at 22:29

## 2020-01-01 RX ADMIN — HYDRALAZINE HYDROCHLORIDE 100 MG: 50 TABLET ORAL at 15:10

## 2020-01-01 RX ADMIN — INSULIN LISPRO 2 UNITS: 100 INJECTION, SOLUTION INTRAVENOUS; SUBCUTANEOUS at 11:03

## 2020-01-01 RX ADMIN — DOXYCYCLINE 100 MG: 100 INJECTION, POWDER, LYOPHILIZED, FOR SOLUTION INTRAVENOUS at 11:00

## 2020-01-01 RX ADMIN — HYDRALAZINE HYDROCHLORIDE 100 MG: 50 TABLET, FILM COATED ORAL at 22:28

## 2020-01-01 RX ADMIN — ISOSORBIDE DINITRATE 20 MG: 20 TABLET ORAL at 09:54

## 2020-01-01 RX ADMIN — Medication 100 MG: at 09:16

## 2020-01-01 RX ADMIN — INSULIN LISPRO 6 UNITS: 100 INJECTION, SOLUTION INTRAVENOUS; SUBCUTANEOUS at 12:55

## 2020-01-01 RX ADMIN — Medication 1 MG: at 14:13

## 2020-01-01 RX ADMIN — Medication 100 MG: at 09:47

## 2020-01-01 RX ADMIN — ATORVASTATIN CALCIUM 80 MG: 40 TABLET, FILM COATED ORAL at 21:14

## 2020-01-01 RX ADMIN — SPIRONOLACTONE 25 MG: 25 TABLET ORAL at 10:08

## 2020-01-01 RX ADMIN — ASPIRIN 81 MG: 81 TABLET, COATED ORAL at 09:16

## 2020-01-01 RX ADMIN — HYDRALAZINE HYDROCHLORIDE 100 MG: 50 TABLET, FILM COATED ORAL at 22:16

## 2020-01-01 RX ADMIN — Medication 2 TABLET: at 09:17

## 2020-01-01 RX ADMIN — Medication 10 ML: at 23:26

## 2020-01-01 RX ADMIN — POTASSIUM CHLORIDE 20 MEQ: 1500 TABLET, EXTENDED RELEASE ORAL at 00:15

## 2020-01-01 RX ADMIN — HYDRALAZINE HYDROCHLORIDE 100 MG: 50 TABLET, FILM COATED ORAL at 10:08

## 2020-01-01 RX ADMIN — ALBUTEROL SULFATE 2 PUFF: 90 AEROSOL, METERED RESPIRATORY (INHALATION) at 20:10

## 2020-01-01 RX ADMIN — GABAPENTIN 100 MG: 100 CAPSULE ORAL at 11:03

## 2020-01-01 RX ADMIN — Medication 1000 MG: at 11:30

## 2020-01-01 RX ADMIN — ISOSORBIDE DINITRATE 20 MG: 20 TABLET ORAL at 17:25

## 2020-01-01 RX ADMIN — BUDESONIDE 1000 MCG: 1 SUSPENSION RESPIRATORY (INHALATION) at 07:59

## 2020-01-01 RX ADMIN — HEPARIN SODIUM 5000 UNITS: 5000 INJECTION INTRAVENOUS; SUBCUTANEOUS at 20:58

## 2020-01-01 RX ADMIN — HYDRALAZINE HYDROCHLORIDE 100 MG: 50 TABLET, FILM COATED ORAL at 23:24

## 2020-01-01 RX ADMIN — HEPARIN SODIUM 5000 UNITS: 5000 INJECTION INTRAVENOUS; SUBCUTANEOUS at 21:46

## 2020-01-01 RX ADMIN — HEPARIN SODIUM 5000 UNITS: 5000 INJECTION INTRAVENOUS; SUBCUTANEOUS at 23:12

## 2020-01-01 RX ADMIN — GABAPENTIN 100 MG: 100 CAPSULE ORAL at 18:46

## 2020-01-01 RX ADMIN — ATORVASTATIN CALCIUM 80 MG: 40 TABLET, FILM COATED ORAL at 22:16

## 2020-01-01 RX ADMIN — FUROSEMIDE 20 MG: 20 TABLET ORAL at 18:00

## 2020-01-01 RX ADMIN — Medication 50 MEQ: at 14:28

## 2020-01-01 RX ADMIN — ATORVASTATIN CALCIUM 80 MG: 40 TABLET, FILM COATED ORAL at 00:16

## 2020-01-01 RX ADMIN — POTASSIUM CHLORIDE 10 MEQ: 750 TABLET, FILM COATED, EXTENDED RELEASE ORAL at 15:32

## 2020-01-01 RX ADMIN — ZINC SULFATE 220 MG (50 MG) CAPSULE 1 CAPSULE: CAPSULE at 11:30

## 2020-01-01 RX ADMIN — SPIRONOLACTONE 25 MG: 25 TABLET ORAL at 11:30

## 2020-01-01 RX ADMIN — DEXTROSE MONOHYDRATE 25 G: 25 INJECTION, SOLUTION INTRAVENOUS at 14:35

## 2020-01-01 RX ADMIN — HYDRALAZINE HYDROCHLORIDE 100 MG: 50 TABLET ORAL at 09:16

## 2020-01-01 RX ADMIN — HYDRALAZINE HYDROCHLORIDE 100 MG: 50 TABLET ORAL at 21:23

## 2020-01-01 RX ADMIN — IPRATROPIUM BROMIDE AND ALBUTEROL SULFATE 3 ML: .5; 3 SOLUTION RESPIRATORY (INHALATION) at 19:30

## 2020-01-01 RX ADMIN — INSULIN GLARGINE 7 UNITS: 100 INJECTION, SOLUTION SUBCUTANEOUS at 09:47

## 2020-01-01 RX ADMIN — ALBUTEROL SULFATE 2 PUFF: 90 AEROSOL, METERED RESPIRATORY (INHALATION) at 21:57

## 2020-01-01 RX ADMIN — POTASSIUM CHLORIDE: 2 INJECTION, SOLUTION, CONCENTRATE INTRAVENOUS at 13:00

## 2020-01-01 RX ADMIN — ASPIRIN 81 MG: 81 TABLET, COATED ORAL at 08:24

## 2020-01-01 RX ADMIN — CALCIUM CHLORIDE 1 G: 100 INJECTION INTRAVENOUS; INTRAVENTRICULAR at 14:32

## 2020-01-01 RX ADMIN — HEPARIN SODIUM 5000 UNITS: 5000 INJECTION INTRAVENOUS; SUBCUTANEOUS at 17:44

## 2020-01-01 RX ADMIN — ISOSORBIDE DINITRATE 20 MG: 20 TABLET ORAL at 09:05

## 2020-01-01 RX ADMIN — HYDRALAZINE HYDROCHLORIDE 100 MG: 50 TABLET ORAL at 18:45

## 2020-01-01 RX ADMIN — ARFORMOTEROL TARTRATE: 15 SOLUTION RESPIRATORY (INHALATION) at 17:00

## 2020-01-01 RX ADMIN — DOXYCYCLINE 100 MG: 100 INJECTION, POWDER, LYOPHILIZED, FOR SOLUTION INTRAVENOUS at 21:43

## 2020-01-01 RX ADMIN — ASPIRIN 81 MG: 81 TABLET, COATED ORAL at 09:20

## 2020-01-01 RX ADMIN — HEPARIN SODIUM 5000 UNITS: 5000 INJECTION INTRAVENOUS; SUBCUTANEOUS at 05:29

## 2020-01-01 RX ADMIN — ARFORMOTEROL TARTRATE: 15 SOLUTION RESPIRATORY (INHALATION) at 09:55

## 2020-01-01 RX ADMIN — AMIODARONE HYDROCHLORIDE 150 MG: 50 INJECTION, SOLUTION INTRAVENOUS at 14:51

## 2020-01-01 RX ADMIN — METHYLPREDNISOLONE SODIUM SUCCINATE 40 MG: 40 INJECTION, POWDER, FOR SOLUTION INTRAMUSCULAR; INTRAVENOUS at 00:30

## 2020-01-01 RX ADMIN — FUROSEMIDE 20 MG: 20 TABLET ORAL at 09:54

## 2020-01-01 RX ADMIN — IPRATROPIUM BROMIDE AND ALBUTEROL SULFATE 3 ML: .5; 3 SOLUTION RESPIRATORY (INHALATION) at 10:58

## 2020-01-01 RX ADMIN — GABAPENTIN 100 MG: 100 CAPSULE ORAL at 08:24

## 2020-01-01 RX ADMIN — ATORVASTATIN CALCIUM 80 MG: 40 TABLET, FILM COATED ORAL at 23:24

## 2020-01-01 RX ADMIN — INSULIN LISPRO 6 UNITS: 100 INJECTION, SOLUTION INTRAVENOUS; SUBCUTANEOUS at 21:53

## 2020-01-01 RX ADMIN — Medication 10 ML: at 09:11

## 2020-01-01 RX ADMIN — GABAPENTIN 100 MG: 100 CAPSULE ORAL at 09:17

## 2020-01-01 RX ADMIN — BUDESONIDE 1000 MCG: 1 SUSPENSION RESPIRATORY (INHALATION) at 20:38

## 2020-01-01 RX ADMIN — HEPARIN SODIUM 5000 UNITS: 5000 INJECTION INTRAVENOUS; SUBCUTANEOUS at 18:46

## 2020-01-01 RX ADMIN — INSULIN GLARGINE 15 UNITS: 100 INJECTION, SOLUTION SUBCUTANEOUS at 23:29

## 2020-01-01 RX ADMIN — Medication 5000 UNITS: at 09:16

## 2020-01-01 RX ADMIN — Medication 5000 UNITS: at 09:00

## 2020-01-01 RX ADMIN — GABAPENTIN 100 MG: 100 CAPSULE ORAL at 17:56

## 2020-01-01 RX ADMIN — GABAPENTIN 100 MG: 100 CAPSULE ORAL at 21:42

## 2020-01-01 RX ADMIN — AMOXICILLIN AND CLAVULANATE POTASSIUM 1 TABLET: 875; 125 TABLET, FILM COATED ORAL at 21:53

## 2020-01-01 RX ADMIN — ALBUTEROL SULFATE 10 MG: 2.5 SOLUTION RESPIRATORY (INHALATION) at 14:16

## 2020-01-01 RX ADMIN — POTASSIUM CHLORIDE 40 MEQ: 1500 TABLET, EXTENDED RELEASE ORAL at 11:14

## 2020-01-01 RX ADMIN — HYDRALAZINE HYDROCHLORIDE 100 MG: 50 TABLET ORAL at 22:31

## 2020-01-01 RX ADMIN — Medication 50 MEQ: at 14:43

## 2020-01-01 RX ADMIN — HEPARIN SODIUM 5000 UNITS: 5000 INJECTION INTRAVENOUS; SUBCUTANEOUS at 04:38

## 2020-01-09 NOTE — PROGRESS NOTES
Yefri Jama presents today for   Chief Complaint   Patient presents with    Asthma     CXR done 8/18    COPD    Other     Pleural effusion    Shortness of Breath       Is someone accompanying this pt? Daughter Gustavo Mendoza    Is the patient using any DME equipment during [address]? Oxygen     -[employer]    Depression Screening:  3 most recent PHQ Screens 1/23/2019   Little interest or pleasure in doing things Not at all   Feeling down, depressed, irritable, or hopeless Not at all   Total Score PHQ 2 0       Learning Assessment:  Learning Assessment 1/23/2019   PRIMARY LEARNER Patient   HIGHEST LEVEL OF EDUCATION - PRIMARY LEARNER  GRADUATED HIGH SCHOOL OR GED   BARRIERS PRIMARY LEARNER NONE   CO-LEARNER CAREGIVER No   PRIMARY LANGUAGE ENGLISH   LEARNER PREFERENCE PRIMARY LISTENING     -   ANSWERED BY self   RELATIONSHIP SELF       Abuse Screening:  Abuse Screening Questionnaire 1/23/2019   Do you ever feel afraid of your partner? N   Are you in a relationship with someone who physically or mentally threatens you? N   Is it safe for you to go home? Y       Fall Risk  Fall Risk Assessment, last 12 mths 1/9/2020   Able to walk? Yes   Fall in past 12 months? Yes   Fall with injury? No   Number of falls in past 12 months 1   Fall Risk Score 1         Coordination of Care:  1. Have you been to the ER, urgent care clinic since your last visit? Hospitalized since your last visit? Yes; Name: [facility]    2. Have you seen or consulted any other health care providers outside of the [address] [place] [address] since your last visit? Include any pap smears or colon screening.  No

## 2020-01-09 NOTE — PROGRESS NOTES
HISTORY OF PRESENT ILLNESS  Tatum Johnson is a 75 y.o. female referred for shortness of breath. Pt has carried a diagnosis of COPD for years, with complaints of shortness of breath and wheezing. She presented with worsening shortness of breath, sometimes associated with wheezing. She also complains of a chronic cough occasionally productive of mucoid phlegm. Pt was started on Dulera and Albuterol 4 years ago with moderate response, switched to [facility] due to formulary changes. She was also started on O2 which she uses HS and prn. Pt with some improvement in overall symptom control but complains of occasional SOB and wheezing needing rescue Albuterol. Pt was admitted in August 2019 for hypertensive crisis with encephalopathy. Since then she has lost over 30 lbs due to decreased po intake and poor appetite. Pt and daughter also note occasional post prandial cough and dry heaves. She denies fever or chills. no productive cough. No chest pain. She has occasional 2 pillow orthopnea, chronic pedal edema and PND. PMH is significant for systolic heart failure, latest Echo is below. Pt was admitted late 2019 for hypertensive crisis and TIA, associated with encephalopathy. Asthma   Associated symptoms include shortness of breath. Pertinent negatives include no chest pain, no abdominal pain and no headaches. COPD   Associated symptoms include shortness of breath. Pertinent negatives include no chest pain, no abdominal pain and no headaches. Other   Associated symptoms include shortness of breath. Pertinent negatives include no chest pain, no abdominal pain and no headaches. Shortness of Breath   Associated symptoms include cough and wheezing. Pertinent negatives include no fever, no headaches, no sore throat, no ear pain, no neck pain, no sputum production, no hemoptysis, no PND, no orthopnea, no chest pain, no vomiting, no abdominal pain, no rash, no leg swelling and no claudication.  Associated medical issues include COPD. Review of Systems   Constitutional: Positive for malaise/fatigue and weight loss. Negative for chills, diaphoresis and fever. HENT: Positive for congestion. Negative for ear discharge, ear pain, hearing loss, nosebleeds, sinus pain, sore throat and tinnitus. Eyes: Negative for blurred vision, double vision, photophobia, pain, discharge and redness. Respiratory: Positive for cough, shortness of breath and wheezing. Negative for hemoptysis, sputum production and stridor. Cardiovascular: Negative for chest pain, palpitations, orthopnea, claudication, leg swelling and PND. Gastrointestinal: Negative for abdominal pain, blood in stool, constipation, diarrhea, heartburn, melena, nausea and vomiting. Genitourinary: Negative for dysuria, flank pain, frequency, hematuria and urgency. Musculoskeletal: Negative for back pain, falls, joint pain, myalgias and neck pain. Skin: Negative for itching and rash. Neurological: Positive for sensory change. Negative for dizziness, tingling, tremors, speech change, focal weakness, seizures, loss of consciousness, weakness and headaches. Endo/Heme/Allergies: Negative for environmental allergies and polydipsia. Does not bruise/bleed easily. Psychiatric/Behavioral: Negative for depression, hallucinations, memory loss, substance abuse and suicidal ideas. The patient is not nervous/anxious and does not have insomnia.       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 Mild-mod conc LVH. Mod DDfx. LAE. Mild MR.      Hypercholesterolemia     Hypertension     Hypertensive heart disease     Infarction of right thalamus ([address] 9/17/2017    Lacunar infarct 2017    Joint pain     Joint swelling     Normal nuclear stress test 09/08/2000    No ischemia or prior infarction. Neg EKG on submax EST. Ex time 15:02.  Recurrent boils     Rheumatism     Shortness of breath     Sinusitis with nasal polyps 8/18/2019     Past Surgical History:   Procedure Laterality Date    CARDIAC CATHETERIZATION  5/10/2018         CORONARY ARTERY ANGIOGRAM  5/10/2018         HX CHOLECYSTECTOMY  2001    HX TUBAL LIGATION  1975    MODERATE SEDATION  5/10/2018          Current Outpatient Medications on File Prior to Visit   Medication Sig Dispense Refill    amLODIPine (NORVASC) 10 mg tablet Take 1 Tab by mouth daily. (Patient taking differently: Take 5 mg by mouth daily.) 30 Tab 0    isosorbide dinitrate (ISORDIL) 10 mg tablet Take 2 Tabs by mouth three (3) times daily. 1 Tab 0    gabapentin (NEURONTIN) 100 mg capsule Take 100 mg by mouth three (3) times daily. 10/30/19 - 1 cap at breakfast, 2 caps at dinner and  bedtime  Indications: Neuropathic Pain      fluticasone furoate-vilanterol (BREO ELLIPTA) 100-25 mcg/dose inhaler Take 1 Puff by inhalation daily. (Patient taking differently: Take 1 Puff by inhalation daily. Indications: Bronchospasm Prevention with COPD) 1 Inhaler 0    spironolactone (ALDACTONE) 25 mg tablet Take 1 Tab by mouth two (2) times a day. (Patient taking differently: Take 25 mg by mouth two (2) times a day. 10/30/19 - daughter states patient takes 0.5 tab because it causes dizziness) 180 Tab 3    Oxygen 1 Device by Nasal route as needed (Oxygen supplement).  3L per min as needed   Indications: oxygen supplement      albuterol (VENTOLIN HFA) 90 mcg/actuation inhaler Take 2 Puffs by inhalation every four (4) hours as needed for Wheezing or Shortness of Breath. 1 Inhaler 5    hydrALAZINE (APRESOLINE) 100 mg tablet Take 1 Tab by mouth three (3) times daily. 270 Tab 3    Blood-Glucose Meter (TRUE METRIX GLUCOSE METER) misc Test glucose 3 times daily DX: E11.40 (Patient taking differently: Test glucose 3 times daily DX: E11.40  Indications: blood sugar readings) 1 Each 0    Lancets misc Test glucose 3 times daily DX: E11.40 (Patient taking differently: Test glucose 3 times daily DX: E11.40  Indications: for blood draws w/ blood sugar readings) 100 Each 12    glucose blood VI test strips (BLOOD GLUCOSE TEST) strip (True Metrix) Test glucose 3 times daily DX: E11.40 100 Strip 12    insulin glargine (LANTUS,BASAGLAR) 100 unit/mL (3 mL) inpn 7 Units by SubCUTAneous route in the morning. 15 units in the evening. (Patient taking differently: 15 Units daily.) 5 Pen 12    acetaminophen (TYLENOL EXTRA STRENGTH) 500 mg tablet Take 1,000 mg by mouth every six (6) hours as needed for Pain.  CARBOXYMETHYLCELLULOS/GLYCERIN (REFRESH OPTIVE OP) Administer 1 Drop to both eyes six (6) times daily. Indications: eye health      aspirin delayed-release 81 mg tablet Take 81 mg by mouth daily. Dtr. Aislinn Basket pt no longer takes.  sodium chloride (OCEAN) 0.65 % nasal squeeze bottle 2 Sprays as needed for Congestion.  furosemide (LASIX) 20 mg tablet Take 1 Tab by mouth two (2) times a day. 60 Tab 8    pravastatin (PRAVACHOL) 20 mg tablet Take 20 mg by mouth nightly. No current facility-administered medications on file prior to visit.       Family History   Problem Relation Age of Onset    Hypertension Mother     Ovarian Cancer Mother     Stroke Mother     Heart Attack Father     Breast Cancer Maternal Aunt      Social History     Socioeconomic History    Marital status:      Spouse name: Not on file    Number of children: Not on file    Years of education: Not on file    Highest education level: Not on file   Occupational History    Not on file   Social Needs    Financial resource strain: Not on file    Food insecurity:     Worry: Not on file     Inability: Not on file    Transportation needs:     Medical: Not on file     Non-medical: Not on file   Tobacco Use    Smoking status: Never Smoker    Smokeless tobacco: Never Used    Tobacco comment: second hand smoke exposure as a child   Substance and Sexual Activity    Alcohol use: No    Drug use: No    Sexual activity: Never   Lifestyle    Physical activity:     Days per week: Not on file     Minutes per session: Not on file    Stress: Not on file   Relationships    Social connections:     Talks on phone: Not on file     Gets together: Not on file     Attends Cheondo[employer] service: Not on file     Active member of club or organization: Not on file     Attends meetings of clubs or organizations: Not on file     Relationship status: Not on file    Intimate partner violence:     Fear of current or ex partner: Not on file     Emotionally abused: Not on file     Physically abused: Not on file     Forced sexual activity: Not on file   Other Topics Concern    Not on file   Social History Narrative    Pt used to be a  for the school system. Blood pressure 98/64, pulse 96, resp. rate 18, height 5' 5\" (1.651 m), weight 59.1 kg (130 lb 3.2 oz), SpO2 96 %. Ambulatory oxymetry per nurse note. Physical Exam   Constitutional: She is oriented to person, place, and time. She appears well-developed. No distress. Frail and asthenia, uses a walker   HENT:   Head: Normocephalic and atraumatic. Nose: Nose normal.   Mouth/Throat: Oropharynx is clear and moist. No oropharyngeal exudate. Eyes: Pupils are equal, round, and reactive to light. Conjunctivae and EOM are normal. Right eye exhibits no discharge. Left eye exhibits no discharge. No scleral icterus. Neck: No JVD present. No tracheal deviation present. No thyromegaly present.    Cardiovascular: Normal rate, regular rhythm, normal heart sounds and intact distal pulses. Exam reveals no gallop and no friction rub. No murmur heard. Frequent PAC's   Pulmonary/Chest: Effort normal. No stridor. No respiratory distress. She has no wheezes. She has no rales. She exhibits no tenderness. Decreased breath sounds both bases, rhonchi left base   Abdominal: Soft. She exhibits no mass. There is no tenderness. There is no rebound. Musculoskeletal:         General: No tenderness, deformity or edema. Lymphadenopathy:     She has no cervical adenopathy. Neurological: She is alert and oriented to person, place, and time. Coordination normal.   Skin: Skin is warm and dry. No rash noted. She is not diaphoretic. No erythema. No pallor. Psychiatric: She has a normal mood and affect. Her behavior is normal. Judgment and thought content normal.     PFT: reduced FEV1 with normal ratio, mild restriction TLC 76%, DLCO reduced to 62%      07/17/18   ECHO ADULT FOLLOW-UP OR LIMITED 07/17/2018 7/17/2018    Narrative · Left ventricular low normal systolic function. Estimated left   ventricular ejection fraction is 51 - 55%. Left ventricular severe   concentric hypertrophy observed. Normal left ventricular wall motion, no   regional wall motion abnormality noted. Signed by: Martina Morales DO       Chest PA L personal review: persistent effusion L with apparent decrease in volume  ASSESSMENT and PLAN  Encounter Diagnoses   Name Primary?  Mild persistent asthma without complication Yes    Unexplained weight loss     Hypertension, unspecified type     History of CVA (cerebrovascular accident)        Asthma with some improvement with Breo. Suspect residual SOB is related to cardiac issues. Unexplained weight loss with occasional post prandial cough in pt with history of CVA. Will need to R/o dysphagia. Modified Barium swallow ordered. Pt uses and will continue to benefit from supplemental O2. Discussed nutrition.    Reviewed hospital records. Management of HF per cardiology. Follow up in 3-4 weeks  Discussed plan with pt and daughter. Over 50% of this 26 minute visit was spent in face to face counseling.

## 2020-01-15 NOTE — PROGRESS NOTES
health screening:    Noted Ms Scalret Smith is now scheduled for her mammogram 1/27/20. Mailed 3 gas cards to assist with transportation to [facility] and mammogram screenings.

## 2020-02-03 NOTE — PROGRESS NOTES
[facility] health screening:    Due to complicated comorbid conditions perhaps mammogram and CRC screening should be delayed but will discuss with dtr once she returns home. Pt states \"she should be home in about an hour or so. \" Will offer fit testing for now, perhaps, as she is overdue for pcp OV and can  fit kit then?  Will also suggest discussion of motion sickness med with pcp for times of transportation to and from her appointments as it looks like she has missed a few office visits in the recent past.

## 2020-02-03 NOTE — PROGRESS NOTES
NN health screening:    I've suggested Tami schedule an OV with Dr. Hyacinth Murillo to discuss motion sickness issues prohibiting getting her mother to and from appointments, (missed her mammogram and swallowing function xray due to \"vomiting on the side of the road\"). I've placed an order for fit kit (verified no fm hx of colon cancer) as Yadira Carranza feels her \"mother is too weak to complete the colonoscopy right now\" so they will  the fit kit during the office visit she will schedule soon. Already has another mammogram date scheduled for this month but couldn't remember right off hand what that date is. Verified they are still in possession of the gas cards mailed to her to assist with transportation.

## 2020-02-11 NOTE — PROGRESS NOTES
NN health screening:    Arturo Barnard explained \"if my mom is weak and seems to be getting weaker every week and can't tolerate the drive because she gets sick to her stomach from the motion of the car then how can I get her to all these appointments? She's now having frequent bowel movements everyday-confirmed \"this isn't diarrhea but she goes an awful lot. \" Stressed her mother may be becoming dehydrated and even though it is not diarrhea she will still lose water from the frequent movements. We discussed @ length the importance of keeping all appointments, and staying in close contact with Dr. Jannette Dickerson as her pcp to ensure she is getting the care she needs to maintain as healthy a lifestyle as possible. After lengthy discussion it was decided Arturo Evon would call today to schedule an appointment with Dr. Jannette Dickerson and ask over the phone what can be done for her motion sickness so she can get her to the appointment. We also agreed all other screenings would be delayed until she can be seen by Dr. Jannette Dickerson. I reminded Arturo Barnard of our last conversation about asking Dr. Jannette Dickerson for motion sickness medication and scheduling an OV but she didn't seem to remember we discussed this just last week.

## 2020-03-29 PROBLEM — I63.9 ISCHEMIC STROKE (HCC): Status: ACTIVE | Noted: 2020-01-01

## 2020-03-29 PROBLEM — I10 UNCONTROLLED HYPERTENSION: Status: ACTIVE | Noted: 2020-01-01

## 2020-03-29 PROBLEM — J32.4 PANSINUSITIS: Status: ACTIVE | Noted: 2020-01-01

## 2020-03-29 PROBLEM — N18.9 CKD (CHRONIC KIDNEY DISEASE): Status: ACTIVE | Noted: 2020-01-01

## 2020-03-29 PROBLEM — R47.81 SLURRED SPEECH: Status: ACTIVE | Noted: 2020-01-01

## 2020-03-29 PROBLEM — R53.1 LEFT-SIDED WEAKNESS: Status: ACTIVE | Noted: 2020-01-01

## 2020-03-29 NOTE — ED TRIAGE NOTES
Per ems left sided weakness. Arm, facial droop for 2 days. Left leg sided weakness from previous stroke. Blood glucose high 200s, HR 97, /124. Did not call 911 because she was too tired.

## 2020-03-29 NOTE — ED PROVIDER NOTES
EMERGENCY DEPARTMENT HISTORY AND PHYSICAL EXAM 
 
5:30 PM 
 
 
Date: 3/29/2020 Patient Name: Ladon Prader History of Presenting Illness Chief Complaint Patient presents with  Lethargy  Extremity Weakness History Provided By: Patient Additional History (Context): Ladon Prader is a 77 y.o. female with Past medical history of stroke, diabetes, cardiomyopathy, anemia, CHF, chronic lung disease, history of dysphagia following cerebral infarction who presents with chief complaint of left-sided weakness for the past 2 days. Also complains of a mild headache, fatigue, and slurred speech also for 2 days. No chest pain, fever, cough, nausea, vomiting, chest pain, abdominal pain, dizziness and no other complaint. Patient brought in by EMS who stated initially patient reported symptoms started 3 days ago but patient confirms that it was 2 days. PCP: Radha Estrada MD 
 
 
 
Past History Past Medical History: 
Past Medical History:  
Diagnosis Date  Anemia  Arthritis  Cardiomyopathy due to hypertension, with heart failure ([address] 5/19/2017  Cataract  Chronic combined systolic and diastolic congestive heart failure ([address] 5/3/2018  Chronic lung disease  Chronic obstructive pulmonary disease ([address]  Chronic systolic congestive heart failure ([address] 2/3/2014  Diabetes mellitus ([address]  Difficulty swallowing Both food and liquid  Dysphagia following cerebral infarction 8/17/2017  History of echocardiogram 12/29/2009 EF 50%. Mild-mod conc LVH. Mod DDfx. LAE. Mild MR.    
 Hypercholesterolemia  Hypertension  Hypertensive heart disease  Infarction of right thalamus ([address] 9/17/2017 Lacunar infarct 2017  Joint pain  Joint swelling  Normal nuclear stress test 09/08/2000 No ischemia or prior infarction. Neg EKG on submax EST. Ex time 15:02.  Recurrent boils  Rheumatism  Shortness of breath  Sinusitis with nasal polyps 8/18/2019 Past Surgical History: 
Past Surgical History:  
Procedure Laterality Date  CARDIAC CATHETERIZATION  5/10/2018  CORONARY ARTERY ANGIOGRAM  5/10/2018  HX CHOLECYSTECTOMY  [address] [place]  MODERATE SEDATION  5/10/2018 Family History: 
Family History Problem Relation Age of Onset  Hypertension Mother  Ovarian Cancer Mother  Stroke Mother  Heart Attack Father  Breast Cancer Maternal Aunt Social History: 
Social History Tobacco Use  Smoking status: Never Smoker  Smokeless tobacco: Never Used  Tobacco comment: second hand smoke exposure as a child Substance Use Topics  Alcohol use: No  
 Drug use: No  
 
 
Allergies: Allergies Allergen Reactions  Codeine Nausea Only  Sulfa (Sulfonamide Antibiotics) Rash Review of Systems Review of Systems Constitutional: Positive for fatigue. Negative for chills and fever. HENT: Negative for congestion, rhinorrhea, sore throat and trouble swallowing. Eyes: Negative for visual disturbance. Respiratory: Negative for cough, shortness of breath and wheezing. Cardiovascular: Negative for chest pain. Gastrointestinal: Negative for abdominal pain, nausea and vomiting. Endocrine: Negative for polyuria. Genitourinary: Negative for dysuria and flank pain. Musculoskeletal: Negative for arthralgias and neck stiffness. Skin: Negative for pallor and rash. Neurological: Positive for speech difficulty, weakness (Left-sided) and headaches. Negative for dizziness and numbness. Hematological: Does not bruise/bleed easily. Psychiatric/Behavioral: Negative for confusion and dysphoric mood. All other systems reviewed and are negative. Physical Exam  
 
Visit Vitals BP (!) 190/107 Pulse 84 Temp 99 °F (37.2 °C) Resp 24 Ht 5' 9\" (1.753 m) Wt 72.6 kg (160 lb) SpO2 99% BMI 23.63 kg/m² Physical Exam 
Vitals signs and nursing note reviewed. Constitutional:   
   General: She is not in acute distress. Appearance: She is well-developed. She is not toxic-appearing or diaphoretic. HENT:  
   Head: Normocephalic and atraumatic. Nose: Nose normal.  
   Mouth/Throat:  
   Mouth: Mucous membranes are moist.  
Eyes:  
   General: No scleral icterus. Conjunctiva/sclera: Conjunctivae normal.  
   Pupils: Pupils are equal, round, and reactive to light. Neck: Musculoskeletal: Normal range of motion and neck supple. Vascular: No carotid bruit. Cardiovascular:  
   Rate and Rhythm: Normal rate. Heart sounds: Normal heart sounds. Comments: Capillary refill < 3 seconds Pulmonary:  
   Effort: Pulmonary effort is normal. No respiratory distress. Breath sounds: Normal breath sounds. No wheezing. Abdominal:  
   General: Bowel sounds are normal. There is no distension. Palpations: Abdomen is soft. Tenderness: There is no abdominal tenderness. Musculoskeletal: Normal range of motion. General: No tenderness. Right lower leg: No edema. Left lower leg: No edema. Lymphadenopathy:  
   Cervical: No cervical adenopathy. Skin: 
   General: Skin is warm and dry. Coloration: Skin is not jaundiced or pale. Neurological:  
   Mental Status: She is alert and oriented to person, place, and time. Cranial Nerves: No cranial nerve deficit. Comments: Weakness of the left side noted Has drifting of left upper extremity Has difficulty lifting either leg but more so on the left side Has slight slurring of speech Sensation intact Has slight left facial droop No ataxia on finger-to-nose test 
 EOMI Psychiatric:     
   Thought Content: Thought content normal.  
 
 
 
 
Diagnostic Study Results Labs - Recent Results (from the past 12 hour(s)) CBC WITH AUTOMATED DIFF  Collection Time: 03/29/20  5:39 PM  
 Result Value Ref Range WBC 6.3 4.6 - 13.2 K/uL  
 RBC 3.64 (L) 4.20 - 5.30 M/uL  
 HGB 10.3 (L) 12.0 - 16.0 g/dL HCT 32.2 (L) 35.0 - 45.0 % MCV 88.5 74.0 - 97.0 FL  
 MCH 28.3 24.0 - 34.0 PG  
 MCHC 32.0 31.0 - 37.0 g/dL  
 RDW 15.1 (H) 11.6 - 14.5 % PLATELET 896 686 - 152 K/uL MPV 11.9 (H) 9.2 - 11.8 FL  
 NEUTROPHILS 44 42 - 75 % BAND NEUTROPHILS 1 0 - 5 % LYMPHOCYTES 21 20 - 51 % MONOCYTES 2 2 - 9 % EOSINOPHILS 31 (H) 0 - 5 % BASOPHILS 1 0 - 3 %  
 ABS. NEUTROPHILS 2.8 1.8 - 8.0 K/UL  
 ABS. LYMPHOCYTES 1.3 0.8 - 3.5 K/UL  
 ABS. MONOCYTES 0.1 0 - 1.0 K/UL  
 ABS. EOSINOPHILS 2.0 (H) 0.0 - 0.4 K/UL  
 ABS. BASOPHILS 0.1 (H) 0.0 - 0.06 K/UL  
 DF AUTOMATED PLATELET COMMENTS ADEQUATE PLATELETS    
 RBC COMMENTS ANISOCYTOSIS 1+ 
    
 RBC COMMENTS HYPOCHROMIA 1+ METABOLIC PANEL, COMPREHENSIVE Collection Time: 03/29/20  5:39 PM  
Result Value Ref Range Sodium 145 136 - 145 mmol/L Potassium 3.3 (L) 3.5 - 5.5 mmol/L Chloride 112 (H) 100 - 111 mmol/L  
 CO2 28 21 - 32 mmol/L Anion gap 5 3.0 - 18 mmol/L Glucose 92 74 - 99 mg/dL BUN 19 (H) 7.0 - 18 MG/DL Creatinine 1.34 (H) 0.6 - 1.3 MG/DL  
 BUN/Creatinine ratio 14 12 - 20 GFR est AA 48 (L) >60 ml/min/1.73m2 GFR est non-AA 39 (L) >60 ml/min/1.73m2 Calcium 8.4 (L) 8.5 - 10.1 MG/DL Bilirubin, total 0.2 0.2 - 1.0 MG/DL  
 ALT (SGPT) 10 (L) 13 - 56 U/L  
 AST (SGOT) 9 (L) 10 - 38 U/L Alk. phosphatase 107 45 - 117 U/L Protein, total 7.4 6.4 - 8.2 g/dL Albumin 2.5 (L) 3.4 - 5.0 g/dL Globulin 4.9 (H) 2.0 - 4.0 g/dL A-G Ratio 0.5 (L) 0.8 - 1.7 MAGNESIUM Collection Time: 03/29/20  5:39 PM  
Result Value Ref Range Magnesium 2.1 1.6 - 2.6 mg/dL CARDIAC PANEL,(CK, CKMB & TROPONIN) Collection Time: 03/29/20  5:39 PM  
Result Value Ref Range CK 38 26 - 192 U/L  
 CK - MB <1.0 <3.6 ng/ml  CK-MB Index  0.0 - 4.0 %  
 CALCULATION NOT PERFORMED WHEN RESULT IS BELOW LINEAR LIMIT Troponin-I, QT 0.02 0.0 - 0.045 NG/ML  
PROTHROMBIN TIME + INR Collection Time: 03/29/20  5:39 PM  
Result Value Ref Range Prothrombin time 12.6 11.5 - 15.2 sec INR 1.0 0.8 - 1.2 PTT Collection Time: 03/29/20  5:39 PM  
Result Value Ref Range aPTT 29.4 23.0 - 36.4 SEC Radiologic Studies -  
CT HEAD WO CONT Final Result IMPRESSION:   
  
No acute abnormalities. Multiple old lacunar infarcts and advanced small vessel disease. A hypodensity in the left frontal lobe is new since prior study but is very well  
defined, and compatible with chronic insult. Pansinusitis. Increased density in the right maxillary sinus and right sphenoid  
sinus compatible with chronic fungal infection. XR CHEST PORT    (Results Pending) Per my preliminary read: No acute process Roula Hall DO Medical Decision Making I am the first provider for this patient. I reviewed the vital signs, available nursing notes, past medical history, past surgical history, family history and social history. Vital Signs-Reviewed the patient's vital signs. Cardiac Monitor: 
Rate: 84 Rhythm:  Normal Sinus Rhythm Records Reviewed: Nursing Notes and Old Medical Records (Time of Review: 5:30 PM) Provider Notes (Medical Decision Making): DDX: Stroke, brain mass, metabolic, dehydration Symptoms started 2 days ago, outside window of TPA Labs, EKG, chest x-ray, CT brain, tele-neurology consult MDM Medications  
0.9% sodium chloride infusion (has no administration in time range) potassium chloride (K-DUR, KLOR-CON) SR tablet 20 mEq (has no administration in time range) aspirin delayed-release tablet 325 mg (325 mg Oral Given 3/29/20 1945) ED Course: Progress Notes, Reevaluation, and Consults: 
Consult:  Discussed care with Dr. Stef Jackson, Specialty: Tele-neurologist, standard discussion; including history of patients chief complaint, available diagnostic results, and treatment course. States he will evaluate patient. Outside of the window of any TPA or invention 5:39 PM, 3/29/2020  
 
6:25 PM 
Dr. Lissa Rascon states give patient an aspirin, hydrate gently. Needs admission. He states allow her permissive hypertension less than 220/110 for now until gets further work-up with nonemergent MRI head MRA neck and carotid Dopplers WBC within normal limits Creatinine 1.34 Glucose 92 Magnesium 2.1 Potassium 3.3, repleted with p.o. potassium Patient passed swallow screen Consult:  Discussed care with Dr. Bronson Brady and Elizabeth CRONIN, Specialty: Hospitalist, standard discussion; including history of patients chief complaint, available diagnostic results, and treatment course. She accepts admission 7:53 PM, 3/29/2020 Diagnosis Clinical Impression: 1. Ischemic stroke ([address] 2. Slurred speech 3. Left-sided weakness 4. Uncontrolled hypertension 5. Hypokalemia Disposition: Admitted Follow-up Information None Patient's Medications Start Taking No medications on file Continue Taking ACETAMINOPHEN (TYLENOL EXTRA STRENGTH) 500 MG TABLET    Take 1,000 mg by mouth every six (6) hours as needed for Pain. AMLODIPINE (NORVASC) 10 MG TABLET    Take 1 Tab by mouth daily. ASPIRIN DELAYED-RELEASE 81 MG TABLET    Take 81 mg by mouth daily. Dtr. Linda Good pt no longer takes. BLOOD-GLUCOSE METER (TRUE METRIX GLUCOSE METER) MISC    Test glucose 3 times daily DX: E11.40 CARBOXYMETHYLCELLULOS/GLYCERIN (REFRESH OPTIVE OP)    Administer 1 Drop to both eyes six (6) times daily. Indications: eye health FLUTICASONE FUROATE-VILANTEROL (BREO ELLIPTA) 100-25 MCG/DOSE INHALER    Take 1 Puff by inhalation daily. FUROSEMIDE (LASIX) 20 MG TABLET    Take 1 Tab by mouth two (2) times a day. GABAPENTIN (NEURONTIN) 100 MG CAPSULE    Take 100 mg by mouth three (3) times daily. 10/30/19 - 1 cap at breakfast, 2 caps at dinner and  bedtime  Indications: Neuropathic Pain GLUCOSE BLOOD VI TEST STRIPS (BLOOD GLUCOSE TEST) STRIP    (True Metrix) Test glucose 3 times daily DX: E11.40 HYDRALAZINE (APRESOLINE) 100 MG TABLET    Take 1 Tab by mouth three (3) times daily. INSULIN GLARGINE (LANTUS,BASAGLAR) 100 UNIT/ML (3 ML) INPN    7 Units by SubCUTAneous route in the morning. 15 units in the evening. ISOSORBIDE DINITRATE (ISORDIL) 10 MG TABLET    Take 2 Tabs by mouth three (3) times daily. LANCETS MISC    Test glucose 3 times daily DX: E11.40 OXYGEN    1 Device by Nasal route as needed (Oxygen supplement). 3L per min as needed   Indications: oxygen supplement PRAVASTATIN (PRAVACHOL) 20 MG TABLET    Take 20 mg by mouth nightly. SODIUM CHLORIDE (OCEAN) 0.65 % NASAL SQUEEZE BOTTLE    2 Sprays as needed for Congestion. SPIRONOLACTONE (ALDACTONE) 25 MG TABLET    Take 1 Tab by mouth two (2) times a day. These Medications have changed Modified Medication Previous Medication VENTOLIN HFA 90 MCG/ACTUATION INHALER albuterol (VENTOLIN HFA) 90 mcg/actuation inhaler INHALE 2 PUFFS BY MOUTH EVERY 4 HOURS AS NEEDED FOR WHEEZING OR SHORTNESS OF BREATH    Take 2 Puffs by inhalation every four (4) hours as needed for Wheezing or Shortness of Breath. Stop Taking No medications on file DO Vanda Bains medical dictation software was used for portions of this report. Unintended transcription errors may occur. My signature above authenticates this document and my orders, the final   
diagnosis (es), discharge prescription (s), and instructions in the Epic   
record.

## 2020-03-29 NOTE — H&P
Hospitalist Admission History and Physical 
 
NAME:  January Wang :   1955 MRN:   [id number] PCP:  Emily Yoo MD 
Date/Time:  3/29/2020 7:53 PM 
Subjective: CHIEF COMPLAINT:  Left sided weakness, left facial droop and slurred speech  x2 days HISTORY OF PRESENT ILLNESS:    
Ms. Ebony Barker is a 78 yo AA female with a past medical history of prior CVA with residual mild dysphagia, HTN, DM type 2, COPD/asthma on home O2 3LPM intermitttently who presented to [facility] ED from home via EMS for left sided weakness, left facial droop and slurred speech for 2 days. History obtained from pt. She states she did not call EMS two days ago because she was tired. Per speaking to the nurse, family stated earlier that the symptoms began at 10am this morning. Thus unclear to me when the symptoms started due to two different reports. Patient currently denies any pain. Per RN at bedside, the left sided facial droop is worse than presentation and her speech is unchanged, still slurred. Patient was evaluated by Laura Jeter who recommended admission for TIA/CVA work up. He states allow for permissive HTN and MRI brain + carotid dopplers. ROS: mild headache, \"cold all the time\", myalgias, mild chest pain \"pounding\" sensation, SOB, coughing \"all day and night\", bilateral LE pain Denies fever, chills, abdominal pain, nausea/vomiting, diarrhea, dysuria. Denies contact with covid confirmed pts, stays at home. Denies travel. Daughter no known contact with confirmed covid. ED course: - Vital signs: 99 F, HR 92, /122, RR 22, 99% RA  
- Labs remarkable for: K 3.3 , Cr 1.34  
- Imaging: CT head wo contrast- no acute abnormalities, hypodensity in left frontal lobe, pansinusitis  
chext xray pending.  
- EKG: pending.  
- Patient received:  Aspirin 325 mg. Past medical hx- see above Past surgical hx- no recent surgeries Allergies- codeine cause GI upset.   
Medications- med rec reviewed with pt  
 Social hx- lives with daughter,  Mostly bedbound, needs assistance to get OOB, commode at bedside, denies ETOH/recretional drugs/tobacco 
Specialists: Dr. Jane Holt, Dr Jessica Gordon Past Medical History:  
Diagnosis Date  Anemia  Arthritis  Cardiomyopathy due to hypertension, with heart failure ([facility] 5/19/2017  Cataract  Chronic combined systolic and diastolic congestive heart failure ([facility] 5/3/2018  Chronic lung disease  Chronic obstructive pulmonary disease ([facility]  Chronic systolic congestive heart failure ([facility] 2/3/2014  Diabetes mellitus ([facility]  Difficulty swallowing Both food and liquid  Dysphagia following cerebral infarction 8/17/2017  History of echocardiogram 12/29/2009 EF 50%. Mild-mod conc LVH. Mod DDfx. LAE. Mild MR.    
 Hypercholesterolemia  Hypertension  Hypertensive heart disease  Infarction of right thalamus ([facility] 9/17/2017 Lacunar infarct 2017  Joint pain  Joint swelling  Normal nuclear stress test 09/08/2000 No ischemia or prior infarction. Neg EKG on submax EST. Ex time 15:02.  Recurrent boils  Rheumatism  Shortness of breath  Sinusitis with nasal polyps 8/18/2019 Past Surgical History:  
Procedure Laterality Date  CARDIAC CATHETERIZATION  5/10/2018  CORONARY ARTERY ANGIOGRAM  5/10/2018  HX CHOLECYSTECTOMY  [facility]  MODERATE SEDATION  5/10/2018 Social History Tobacco Use  Smoking status: Never Smoker  Smokeless tobacco: Never Used  Tobacco comment: second hand smoke exposure as a child Substance Use Topics  Alcohol use: No  
  
 
Family History Problem Relation Age of Onset  Hypertension Mother  Ovarian Cancer Mother  Stroke Mother  Heart Attack Father  Breast Cancer Maternal Aunt Allergies Allergen Reactions  Codeine Nausea Only  Sulfa (Sulfonamide Antibiotics) Rash Prior to Admission Medications Prescriptions Last Dose Informant Patient Reported? Taking? Blood-Glucose Meter (TRUE METRIX GLUCOSE METER) misc   No No  
Sig: Test glucose 3 times daily DX:  Patient taking differently: Test glucose 3 times daily DX:   Indications: blood sugar readings CARBOXYMETHYLCELLULOS/GLYCERIN (REFRESH OPTIVE OP)   Yes No  
Sig: Administer 1 Drop to both eyes six (6) times daily. Indications: eye health Lancets misc   No No  
Sig: Test glucose 3 times daily DX: 40 Patient taking differently: Test glucose 3 times daily DX:   Indications: for blood draws w/ blood sugar readings Oxygen   Yes No  
Si Device by Nasal route as needed (Oxygen supplement). 3L per min as needed   Indications: oxygen supplement Ventolin HFA 90 mcg/actuation inhaler   No No  
Sig: INHALE 2 PUFFS BY MOUTH EVERY 4 HOURS AS NEEDED FOR WHEEZING OR SHORTNESS OF BREATH  
acetaminophen (TYLENOL EXTRA STRENGTH) 500 mg tablet   Yes No  
Sig: Take 1,000 mg by mouth every six (6) hours as needed for Pain. amLODIPine (NORVASC) 10 mg tablet  Child No No  
Sig: Take 1 Tab by mouth daily. Patient taking differently: Take 5 mg by mouth daily. aspirin delayed-release 81 mg tablet   Yes No  
Sig: Take 81 mg by mouth daily. Dtr. Quin Mcghee pt no longer takes. fluticasone furoate-vilanterol (BREO ELLIPTA) 100-25 mcg/dose inhaler   No No  
Sig: Take 1 Puff by inhalation daily. Patient taking differently: Take 1 Puff by inhalation daily. Indications: Bronchospasm Prevention with COPD  
furosemide (LASIX) 20 mg tablet   No No  
Sig: Take 1 Tab by mouth two (2) times a day.  
gabapentin (NEURONTIN) 100 mg capsule  Child Yes No  
Sig: Take 100 mg by mouth three (3) times daily. 10/30/19 - 1 cap at breakfast, 2 caps at dinner and  bedtime  Indications: Neuropathic Pain  
glucose blood VI test strips (BLOOD GLUCOSE TEST) strip   No No  
Sig: (True Metrix) Test glucose 3 times daily DX:  hydrALAZINE (APRESOLINE) 100 mg tablet   No No  
Sig: Take 1 Tab by mouth three (3) times daily. insulin glargine (LANTUS,BASAGLAR) 100 unit/mL (3 mL) inpn   No No  
Si Units by SubCUTAneous route in the morning. 15 units in the evening. Patient taking differently: 15 Units daily. isosorbide dinitrate (ISORDIL) 10 mg tablet   No No  
Sig: Take 2 Tabs by mouth three (3) times daily. pravastatin (PRAVACHOL) 20 mg tablet   Yes No  
Sig: Take 20 mg by mouth nightly.  
sodium chloride (OCEAN) 0.65 % nasal squeeze bottle   Yes No  
Si Sprays as needed for Congestion. spironolactone (ALDACTONE) 25 mg tablet  Child No No  
Sig: Take 1 Tab by mouth two (2) times a day. Patient taking differently: Take 25 mg by mouth two (2) times a day. 10/30/19 - daughter states patient takes 0.5 tab because it causes dizziness Facility-Administered Medications: None REVIEW OF SYSTEMS:   
 [] Unable to obtain  ROS due to  []mental status change  []sedated   []intubated 
 [x]Total of 12 systems reviewed as follows: 
Review of Systems Constitutional: Patient denies: fever, chills, weight loss HEENT: Patient denies: change in vision, change in hearing, rhinorrhea, sinus congestion, neck pain/stiffness, sore throat, tongue swelling, lip swelling PULMONARY: Patient denies:, dyspnea on exertion,wheezing Cardiovascular: Patient denies chest pain, palpitations, paroxysmal nocturnal dyspnea, orthopnea, lower extremity edema GASTROINTESTINAL: Patient denies: abdominal pain, nausea, vomiting, diarrhea, constipation, melena, bright red blood per rectum. GENITOURINARY: Patient denies: urinary frequency, urgency, dysuria, hematuria MUSCULOSKELETAL: No joint or muscle pain, no back pain, no muscle weakness, no recent trauma. DERMATOLOGIC: No rash, no itching, no lesions. ENDOCRINE: No polyuria, polydipsia, no heat or cold intolerance. No recent change in weight. HEMATOLOGICAL: No anemia or easy bruising or bleeding. NEUROLOGIC: No, seizures, numbness, tingling or weakness. PSY: No anxiety nor depression Pertinent Positives include : 
 shortness of breath at rest 
Chronic cough Chronic SOB  
 headache mild Leg pain bilateral  
 
Objective: VITALS:   
Visit Vitals BP (!) 190/107 Pulse 84 Temp 99 °F (37.2 °C) Resp 24 Ht 5' 9\" (1.753 m) Wt 72.6 kg (160 lb) SpO2 99% BMI 23.63 kg/m² Temp (24hrs), Av °F (37.2 °C), Min:99 °F (37.2 °C), Max:99 °F (37.2 °C) PHYSICAL EXAM:  
General:    AA frail looking female sitting up in bed, no acute distress, appears stated age. Head:   Normocephalic, without obvious abnormality + temporal wasting Eyes:   Conjunctivae clear, anicteric sclerae, pupils are equal 
Nose:  Nares normal, no drainage Throat:    Lips, mucosa, and tongue normal.   
Neck:  Supple, symmetrical, no JVD. Back:    No CVA tenderness. Lungs: On room air, speaking in complete sentences, clear to auscultation bilaterally- no wheezing, rhonchi or rales Chest wall:  No tenderness or deformity. No Accessory muscle use. Heart:   Regular rate and rhythm;  no murmur, rub or gallop. Abdomen:   Soft, non-tender. Not distended. Bowel sounds normal. No masses Extremities: No LE edema. Skin:     No rashes or lesions. Not Jaundiced Psych:  Good insight. Not depressed, anxious or agitated. Neurologic: Alert and oriented x3. +++ left sided facial drooping.  +++ slurred speech. Moving all extremities. Left UE/LE motor strength weaker than right. LAB DATA REVIEWED:   
No components found for: [place] Recent Labs  
  20 
1739   
K 3.3*  
* CO2 28 BUN 19* CREA 1.34* GLU 92  
CA 8.4* ALB 2.5* WBC 6.3 HGB 10.3* HCT 32.2*  
 IMAGING RESULTS: 
 []  I have personally reviewed the actual   []CXR  []CT scan CT Results (most recent): 
 Results from [facility] Encounter encounter on 03/29/20 CT HEAD WO CONT Narrative CT Of The Head Without Contrast 
 
CPT CODE:  56640 CLINICAL HISTORY: Left-sided weakness, arm and facial droop for 2 days. Left leg 
weakness previous stroke. Chinyere Vincent TECHNIQUE: 5 mm helical scan obtained of the head. All CT scans at this 
facility are performed using dose optimization techniques as appropriate to a 
performed exam, to include automated exposure control, adjustment of the mA 
and/or kV according to patient's size (including appropriate matching for site 
specific examinations), or use of iterative reconstruction technique. COMPARISON: 8/18/2019. FINDINGS:  
There is low-attenuation in periventricular and high white matter tracts. There 
is new hypodensity medially in the left frontal lobe, new since prior study but 
low in attenuation and very well-defined. A right pontine hypodensity is again 
seen. 2 well-defined hypodensities are also seen right caudate head, left 
caudate head. No midline shift, mass effect or abnormal intra-axial or extraaxial fluid 
collection or hydrocephalus is seen. No hemorrhage identified. No mass lesion identified. No acute infarction identified. Mucosal thickening right maxillary sinus has accompanying high attenuation 
centrally, averaging 87 Hounsfield units. There is a fluid level on the left 
maxillary sinus. There are multiple opacified ethmoid air cells. Sphenoid 
sinuses are also opacified with increased density in the right sphenoid sinus. No bony destruction. Chinyere Vincent Impression IMPRESSION:  
 
No acute abnormalities. Multiple old lacunar infarcts and advanced small vessel disease. A hypodensity in the left frontal lobe is new since prior study but is very well 
defined, and compatible with chronic insult. Pansinusitis. Increased density in the right maxillary sinus and right sphenoid 
sinus compatible with chronic fungal infection. Chest xray pending. Assessment/Plan:  
  
Consults: Neurology 1. Left sided weakness, left facial droop and slurred speech concerning for TIA/CVA : 
- please consult Neurology in the morning.  
- TIA/CVA protocol: Continue daily aspirin and statin. Stat MRI brain for now. Carotid dopplers. PT/OT/SLP evaluations. - hold all home BP medications to allow for permissive hypertension for at least 24 hours. - monitor on telemetry  
- as per ED, patient passed bedside swallow evaluation.  
- follow up on routine EKG and chest xray 2. Hypertensive emergency with concerns for TIA/CVA  
- patient with history of HTN with difficulty to control in the past.  
- as stated above, allow for permissive hypertension.  
- can give IV labetalol v. UV hydralazine if SBP >220 3. Hypokalemia, mild 
- being repleted in ED. Recheck in AM and continue to replete as needed. - Mg within normal limits. 4. CKD stage 3  
- last Ce WAS 2.06 IN 8/2019, TODAY Cr 1.34 
- continue to monitor. 5. Chronic? Pansinusitis concerning for chronic fungal infection. - consider ENT consult. 6. Hypoalbuminemia  
- likely from malnutrition at home. 7. Chronic Anemia  
- addon iron and ferritin levels. 8. Hx of CVA with residual mild oral pharyngeal dysphagia 9. HTN 
- hold all home meds for now. 10. HLD  
- check lipid panel in AM, resume high dose statin. 11. DM type 2 A1c 5% in 8/2019 
- check A1c. Takes 7 units lantus in daytime and 15 units at night. Blood glucose is 92. For now, will order 5 units lantus and ISS. Can increase lantus dose pending clinical course. - will consult DM educator 12. COPD/asthma without exacerbation - on home O2 intermittently  
- takes breo ellipta at home. Prn nebs. Akila Lehman while in hospital.  
- continue supplemental o2 as needed. 13. Chronic systolic congestive heart failure EF 36-40%, compensated. - Sept 2017- stress test negative ;  Heart catheterization May 2018 without any epicardial disease; severe LVH Aug 2019 
- follows with Dr Kelvin Wolf. Can consult cardiology if needed. 14. History of nonischemic cardiomyopathy 15. Deconditioned/malnuourished 
- nutriton consult. 16. Impaired mobility 
- bedbound mainly, at home.  
- PT OT fall precautions. Code status: full code DVT/GI prophylaxis: SCDs Disposition: tbd  
__________________________________________________ Risk of deterioration:  []Low    [x]Moderate  []High Care Plan discussed with: 
  [x]Patient   [x]Family- MD to speak with daughter     []ED Care Manager  []ED Doc   []Specialist : 
 
Total Time Coordinating Admission:  60    minutes []Total Critical Care Time:    
___________________________________________________ Admitting Physician: ROSEANNE Frausto

## 2020-03-30 NOTE — PROGRESS NOTES
Bedside shift change report given to Jimmy Ness (oncoming nurse) by Maritza Pagan (offgoing nurse). Report included the following information SBAR, Kardex, ED Summary, Recent Results and Cardiac Rhythm NSR.

## 2020-03-30 NOTE — DIABETES MGMT
Glycemic Control Plan of Care 
 
T2DM with current A1c of 4.5% (3/30/2020). See separate notes, 3/30/2020, for assessment of home diabetes management and education. Home diabetes medications: Patient reported on 3/30/2020: 
Lantus (Basaglar) pen insulin 15 units daily at night. Patient reported that she lives at home with a supportive daughter who is also her caregiver who admin her insulin and checks her blood sugar for her. POC BG report on 03/29/2020: 120. POC BG report on 03/30/2020 at time of review: 66. Current hospital diabetes medications: 
Basal lantus insulin daily at bedtime. Correctional lispro insulin ACHS. Normal sensitivity dose. Total daily dose insulin requirement previous day: 03/29/2020: 
Lantus: 5 units Results for Margy Drake (MRN [id number]) as of 3/30/2020 12:43 Ref. Range 3/29/2020 17:39 3/30/2020 05:06 GFR est non-AA Latest Ref Range: >60 ml/min/1.73m2 39 (L) 45 (L)  
GFR est AA Latest Ref Range: >60 ml/min/1.73m2 48 (L) 55 (L) Recommendation(s): 
1.) cont inpatient glycemic monitoring and intervention. 2.) consider disch patient on low dose 5 units of lantus insulin daily at bedtime if BG >150. 3.) f/u with her PCP for management of diabetes and insulin dose adjustment. Assessment: 
Patient is 77year old with past medical history including type 2 diabetes mellitus with nephropathy, chronic systolic and diastolic CHF, cardiomyopathy, hyperlipidemia, chronic obstructive pulmonary disease, anxiety, depression, hypertension, residual left sided weakness from prior stroke, and CKD - was admitted on 3/29/2020 with report of lethargy, left sided weakness, left facial droop and slurred speech. Noted: 
CVA, acute lacunar infarct. Elevated blood pressure, hypertensive emergency. History of prior stroke with residual left sided weakness and mild oral pharyngeal dysphagia. CKD stage 3. T2DM. Most recent blood glucose values: Current A1C: 4.5% (3/30/2020) which is equivalent to estimated average blood glucose of 75 mg/dL during the past 2-3 months. Diet: Diabetic consistent carb regular; 3 Honey/3 Moderately Thick Goals:  Blood glucose will be within target range of  mg/dL by 04/02/2020 Education:  _X__  Refer to Diabetes Education Record: 03/30/2020 
           ___  Education not indicated at this time Nadia Villela RN [facility] Pager: [phone]

## 2020-03-30 NOTE — CONSULTS
Mir Cummings is a 77 y.o., right handed female, with an established history of hypertension poorly controlled, type 2 diabetes, COPD and asthma, chronic dysphagia, comes into the hospital with a 2-day history or longer of left-sided weakness. Apparently the patient developed slurred speech and left facial droop 2 days prior to coming to the hospital.  He did not come in for unknown reasons. Her symptoms may have gotten a little bit worse and she came into the hospital at that time. She denies any right-sided weakness. She denies any history of stroke. Social History; the patient is single lives with her sister. No alcohol no tobacco. 
 
Family History; mother had hypertension  of ovarian cancer and also had stroke. Her father  from a heart attack. Current Facility-Administered Medications Medication Dose Route Frequency Provider Last Rate Last Dose  labetaloL (NORMODYNE;TRANDATE) 20 mg/4 mL (5 mg/mL) injection 5 mg  5 mg IntraVENous Q10MIN PRN Enma Cortez MD   5 mg at 20 1127  potassium chloride 10 mEq, lidocaine (PF) (XYLOCAINE) 10 mg/mL (1 %) 1 mL in 0.9% sodium chloride 100 mL IVPB   IntraVENous Q1H Erika Lyn NP      
 potassium chloride (K-DUR, KLOR-CON) SR tablet 40 mEq  40 mEq Oral BID Erika Lyn NP   40 mEq at 20 1114  hydrALAZINE (APRESOLINE) 20 mg/mL injection 20 mg  20 mg IntraVENous Q6H PRN Priya Lyn NP      
 0.9% sodium chloride infusion  100 mL/hr IntraVENous CONTINUOUS Pedro Storey,       
 aspirin delayed-release tablet 81 mg  81 mg Oral DAILY Bettejane Shell T, PA   81 mg at 20 0849  
 gabapentin (NEURONTIN) capsule 100 mg  100 mg Oral TID Bettejane Shell T, PA   100 mg at 20 0849  
 atorvastatin (LIPITOR) tablet 80 mg  80 mg Oral QHS Bettejane Shell T, PA   80 mg at 20 8985  acetaminophen (TYLENOL) tablet 650 mg  650 mg Oral Q4H PRN Bettejane Shell TKarenma      
  insulin lispro (HUMALOG) injection   SubCUTAneous AC&HS Yesy Bates   Stopped at 03/30/20 0016  
 glucose chewable tablet 16 g  16 g Oral PRN ROSEANNE Bates      
 glucagon [facility]) injection 1 mg  1 mg IntraMUSCular PRN Yesy Bates      
 dextrose (D50W) injection syrg 12.5-25 g  25-50 mL IntraVENous PRN Yesy Bates      
 insulin glargine (LANTUS) injection 5 Units  5 Units SubCUTAneous QHS Yesy Bates   5 Units at 03/30/20 4085  albuterol-ipratropium (DUO-NEB) 2.5 MG-0.5 MG/3 ML  3 mL Nebulization Q4H PRN Alfred Welch PA      
 arformoteroL (BROVANA) neb solution 15 mcg  15 mcg Nebulization BID RT Yesy Bates   15 mcg at 03/30/20 2539  budesonide (PULMICORT) 1,000 mcg/2 mL nebulizer susp  1,000 mcg Nebulization BID RT Yesy Bates   1,000 mcg at 03/30/20 0137 Past Medical History:  
Diagnosis Date  Anemia  Arthritis  Cardiomyopathy due to hypertension, with heart failure ([address] 5/19/2017  Cataract  Chronic combined systolic and diastolic congestive heart failure ([address] 5/3/2018  Chronic lung disease  Chronic obstructive pulmonary disease ([address]  Chronic systolic congestive heart failure ([address] 2/3/2014  Diabetes mellitus ([address]  Difficulty swallowing Both food and liquid  Dysphagia following cerebral infarction 8/17/2017  History of echocardiogram 12/29/2009 EF 50%. Mild-mod conc LVH. Mod DDfx. LAE. Mild MR.    
 Hypercholesterolemia  Hypertension  Hypertensive heart disease  Infarction of right thalamus ([address] 9/17/2017 Lacunar infarct 2017  Joint pain  Joint swelling  Normal nuclear stress test 09/08/2000 No ischemia or prior infarction. Neg EKG on submax EST. Ex time 15:02.  Recurrent boils  Rheumatism  Shortness of breath  Sinusitis with nasal polyps 8/18/2019 Past Surgical History:  
Procedure Laterality Date  CARDIAC CATHETERIZATION  5/10/2018  CORONARY ARTERY ANGIOGRAM  5/10/2018  HX CHOLECYSTECTOMY  [facility]  MODERATE SEDATION  5/10/2018 Allergies Allergen Reactions  Codeine Nausea Only  Sulfa (Sulfonamide Antibiotics) Rash  Watermelon Hives Reported by pt's daughter Patient Active Problem List  
Diagnosis Code  Essential hypertension I10  
 Diabetes mellitus ([facility] E11.9  Hyperlipidemia E78.5  Cervical myelopathy (HCC) G95.9  Eczema L30.9  Asthma J45.909  Chronic combined systolic and diastolic congestive heart failure (HCC) I50.42  Bladder prolapse, female, acquired N81.10  
 IBS (irritable bowel syndrome) K58.9  Osteoporosis M81.0  Migraine G43.909  Anxiety and depression F41.9, F32.9  Pleural effusion J90  Type 2 diabetes mellitus with nephropathy ([facility]) E11.21  
 Hypokalemia E87.6  Acute pulmonary edema ([facility]) J81.0  Hypertensive emergency I16.1  Headache R51  Acute on chronic systolic (congestive) heart failure ([facility]) I50.23  Type 2 diabetes mellitus with diabetic neuropathy ([facility]) E11.40  Acute non-recurrent maxillary sinusitis J01.00  
 Dizziness R42  Chronic obstructive pulmonary disease ([facility] J44.9  CKD (chronic kidney disease) stage 3, GFR 30-59 ml/min ([facility]) N18.3  Fall W19. Alondra   Allergic rhinitis J30.9  Chronic sinusitis J32.9  TIA (transient ischemic attack) G45.9  CVA (cerebral vascular accident) ([facility] I63.9  
 Encephalopathy G93.40  Infarction of right thalamus ([facility]) I63.9  Dysphagia following cerebral infarction I69.391  Sinusitis with nasal polyps J32.9, J33.9  Cardiomyopathy due to hypertension, with heart failure ([address] I11.0, I43  Slurred speech R47.81  Left-sided weakness R53.1  Uncontrolled hypertension I10  
 Ischemic stroke ([facility]) I63.9  Pansinusitis J32.4  CKD (chronic kidney disease) N18.9 Review of Systems: As above otherwise 11 point review of systems negative including;  
Constitutional no fever or chills Skin denies rash or itching HENT  Denies tinnitus, hearing lose Eyes denies diplopia vision lose Respiratory denies shortness of breath Cardiovascular denies chest pain, dyspnea on exertion Gastrointestinal denies nausea, vomiting, diarrhea, constipation Genitourinary denies incontinence Musculoskeletal denies joint pain or swelling Endocrine denies weight change Hematology denies easy bruising or bleeding Neurological as above in HPI PHYSICAL EXAMINATION:   
 
VITAL SIGNS:   
Visit Vitals BP (!) 192/109 (BP 1 Location: Right arm, BP Patient Position: Supine) Pulse 81 Temp 97.6 °F (36.4 °C) Resp 18 Ht 5' 9\" (1.753 m) Wt 72.6 kg (160 lb) SpO2 98% Breastfeeding No  
BMI 23.63 kg/m² GENERAL: The patient is well developed, well nourished, and in no apparent distress. EXTREMITIES: No clubbing, cyanosis, or edema is identified. Pulses 2+ and symmetrical.  Muscle tone is normal. 
HEAD:   Ear, nose, and throat appear to be without trauma. The patient is normocephalic. NEUROLOGIC EXAMINATION 
 
MENTAL STATUS: The patient is awake, alert, and oriented x 4. Fund of knowledge is adequate. Speech is slurred but fluent and memory appears to be intact, both long and short term. CRANIAL NERVES: II  Visual fields are full to confrontation. Funduscopic examination reveals flat disks bilaterally. Pupils are both 2 mm and briskly reactive to light and accommodation. III, IV, VI  Extraocular movements are intact and there is no nystagmus. V  Facial sensation is intact to pinprick and light touch. VII  Face is asymmetrical, she has a left central pattern facial weakness. VIII - Hearing is present. IX, X, [address] rises symmetrically. Gag is present. Tongue is in the midline. XI - Shoulder shrugging and head turning intact MOTOR:  The patient is weak on the left. She has pronator drift on the left side. She has slowness of fine finger movements on the left side. She is no better than 4/5 throughout the left arm and about 5-/5 in the leg. Tone is normal.  Sensory examination is intact to pinprick, light touch and position sense testing. Reflexes are 2+ and symmetrical. Plantars are down going. Cerebellar examination reveals no gross ataxia or dysmetria. Gait is mildly left hemiparetic. Final result (Exam End: 3/29/2020 23:34) Provider Status: Open Study Result MRI BRAIN WITHOUT CONTRAST 
  
PROVIDED REASON FOR EXAM: left sided droop, left sided weakness Additional History: Patient reports symptoms for 2 days Comparison Studies: Head CT 3/29/2020, brain MRI 8/18/2019 
  
Imaging Technique:  
  Sequences:  Sagittal,  Coronal and axial T1 weighted, Diffusion Weighted 
(DWI), Axial T2 weighted, Axial T2 FLAIR, and SWI/GRE MRI images of the brain. 
  
Contrast Material:  NONE 
  
Limiting Factors/Major Artifacts: None. 
  
FINDINGS: 
  
Brain Parenchyma: Approximately 1 cm diameter focus of hyperintense and 
restricted diffusion signal at the superior margin of the right thalamus at the 
junction of the corona radiata. Findings consistent with an acute infarct. No 
additional findings of acute stroke. 
  
There are multiple chronic lacunar infarcts including the left and right 
thalami, genu of the corpus callosum, left corona radiata and right caudate. No 
findings of chronic cortical infarct. Confluent T2 FLAIR hyperintense signal 
changes in the periventricular white matter with extension to subcortical white 
matter bilaterally consistent with chronic microvascular ischemic changes. Similar findings suggested throughout the central jose. No visible masses or 
midline shift. 
  
CSF Spaces:  Mild global cerebral volume loss, not significantly out of 
proportion to patient age. No hydrocephalus. Vascular System:  Grossly patent flow in basilar and internal cerebral arteries. No findings of dural sinus thrombosis.  
  
Hemorrhage:  A few small foci of susceptibility signal consistent with chronic 
microhemorrhage including the right lentiform nucleus, right posterior lateral 
temporal lobe, left temporal and parietal lobes. Nonspecific distribution, 
suspect findings are related to chronic hypertension. Other Structures: 
  
Calvarium: No suspicious marrow signal. 
  
Sella: Pituitary is not enlarged. Visualized Upper Cervical Spine: There are some degenerative changes partially 
imaged at the C4/C5 disc level. Some endplate sclerosis, likely related to 
degenerative disc disease. No critical central spinal canal stenosis visible on 
this exam. 
Orbits: Bilateral lens surgery, otherwise no grossly visible abnormal finding. Paranasal Sinuses: Complete opacification of the right maxillary sinus. Peripheral T2 hyperintense mucosal thickening. The central contents are 
hyperintense on T2, intermediate to slightly hyperintense signal on T1 
noncontrast. Similar complete opacification with T2 hypointense signal in the 
right sphenoid sinus. Mixed signal complete opacification of the left sphenoid 
sinus, bilateral ethmoid and hypoplastic frontal sinuses. Layering T2 
hyperintense fluid within the left maxillary sinus. Mastoid Air Cells:  Clear. 
  
IMPRESSION IMPRESSION: 
  
1. Acute lacunar infarct in the superior aspect of the right thalamus. 2. Multifocal chronic deep brain lacunar infarcts. Chronic infarct in the genu 
of the corpus callosum. 3. Extensive chronic small vessel ischemic changes in the cerebral white matter. Similar findings in the central jose. 4. A few scattered chronic microbleeds in both cerebral hemispheres. Suspect 
findings are related to chronic hypertension. 5. Extensive diffuse paranasal sinus disease. 
-Complete opacification of majority of the sinuses, some with markedly hypointense T2 signal. Can indicate noninvasive allergic fungal sinus disease. Densely inspissated mucus possible. -Air-fluid level in the left maxillary sinus, could be acute on chronic sinus 
disease. 
  
   
 
Final result (Exam End: 3/29/2020 23:18) Provider Status: Open Study Result Brain Artery MRA 
  
CPT CODE: 90451 
  
HISTORY:  stroke. Patient with 2 day history of left-sided weakness 
  
COMPARISON: None. 
  
TECHNIQUE: Brain arteries scanned with axial 3D TOF source images from near 
foramen magnum up to sylvian region, MIP reformatted images generated by 
technologist.  
  
FINDINGS:  
  
At the right anterior circulation, right ICA is patent through the skull base to 
the ICA terminus. MCA M1 segment and proximal M2 branches are patent. Some loss 
of details in the second-order MCA branches distally due to artifacts. A1 
segment right QUYEN is patent. There is a solitary QUYEN A2 segment in the 
interhemispheric fissure, appears to be developmental variant. The vessel 
divides distally to supply left and right distal QUYEN territory. 
  
At the left anterior circulation, left ICA is patent through the skull base. Some tortuosity through the cavernous and paraclinoid segments without critical 
stenosis. There is some mild narrowing in the distal paraclinoid and 
supraclinoid ICA. MCA M1 segment is patent, second-order branches are patent. Mild narrowing of the A1 segment left QUYEN, slightly hypoplastic relative to the 
right A1 segment. Again, unpaired QUYEN A2 segment as described above. 
  
At the posterior circulation, both intradural vertebral arteries and basilar are 
patent. Some mild narrowing of the distal intradural RVA. Mild to moderate 
segmental narrowing in the right PCA P2 and proximal P3 branches. More moderate 
narrowing in the left PCA P2 segment. A loss of flow signal in the distal left PCA P2 segment appears to be mostly artifactual on the axial source images. 
  
IMPRESSION 
 IMPRESSION: 
  
1. No critical stenosis seen in the anterior circulation. Patent bilateral 
distal internal carotid arteries with mild narrowing in the distal paraclinoid LICA. Mild stenosis at the left QUYEN origin. 
  
2. Patent vertebral arteries and basilar artery. Mild stenosis in the distal 
intradural right vertebral artery. 
  
3. Irregular narrowing seen in the bilateral posterior cerebral arteries. Some 
component of artifact is present on the source images. At least mild to moderate 
narrowing in the right PCA, more moderate stenosis in the left PCA. 
   
 
Final result (Exam End: 3/29/2020 23:18) Provider Status: Open Study Result MRA NECK WITHOUT CONTRAST 
  
HISTORY:  stroke. Patient presented with two-day history of left-sided weakness 
  
COMPARISON: No prior MRA or CTA for comparison. Carotid duplex 8/20/2019. 
  
TECHNIQUE: Noncontrast scanning of the neck arteries focused at the carotid 
artery bifurcations was accomplished with 2D TOF technique. Additional axial 3-D 
TOF SPGR images from the low neck to the region of the skull base. Images were 
post processed with MIP algorithm. 
  
ARTIFACTS/LIMITATIONS: The axial 3-D TOF noncontrast images through the neck 
arteries are nondiagnostic. 
  
CONTRAST: None 
  
FINDINGS:  
  
Antegrade flow seen within the bilateral cervical carotid and vertebral 
arteries. 
  
Aortic Arch: Limited evaluation without IV gadolinium contrast. Cannot evaluate 
the origins of the common carotid arteries or vertebral arteries on this exam. 
  
Right Carotid: Grossly patent right CCA. At the carotid bifurcation no 
significant stenosis. Less than 50% NASCET ICA origin narrowing. Loss of flow 
signal in the right ICA above the bifurcation on axial TOF images is consistent 
with artifacts, there is patent but tortuous right ICA on the 3-D velocity 
sequence. 
  
Right Vertebral: Grossly patent flow, slightly hypoplastic relative to the LVA.  
  
 Left Carotid: Origin cannot be evaluated. Patent CCA to the bifurcation. Mild 
plaque at the ICA origin, no significant stenosis. Estimated 0% NASCET stenosis 
  
Left Vertebral: Patent antegrade flow. 
  
IMPRESSION IMPRESSION: 
  
Exam is limited by artifacts with loss of flow signal on axial 3-D TOF sequence 
and the lack of IV contrast. 
  
Patent bilateral carotid arteries. Less than 50% ICA origin narrowing 
bilaterally. 
  
Both cervical vertebral arteries are patent, left vertebral artery is dominant. I have reviewed the above imagines myself. CBC:  
Lab Results Component Value Date/Time WBC 6.0 03/30/2020 05:06 AM  
 RBC 3.47 (L) 03/30/2020 05:06 AM  
 HGB 9.8 (L) 03/30/2020 05:06 AM  
 HCT 31.1 (L) 03/30/2020 05:06 AM  
 PLATELET 368 32/53/0939 05:06 AM  
 
BMP:  
Lab Results Component Value Date/Time Glucose 77 03/30/2020 05:06 AM  
 Sodium 143 03/30/2020 05:06 AM  
 Potassium 3.2 (L) 03/30/2020 05:06 AM  
 Chloride 112 (H) 03/30/2020 05:06 AM  
 CO2 26 03/30/2020 05:06 AM  
 BUN 19 (H) 03/30/2020 05:06 AM  
 Creatinine 1.19 03/30/2020 05:06 AM  
 Calcium 8.1 (L) 03/30/2020 05:06 AM  
 
CMP:  
Lab Results Component Value Date/Time Glucose 77 03/30/2020 05:06 AM  
 Sodium 143 03/30/2020 05:06 AM  
 Potassium 3.2 (L) 03/30/2020 05:06 AM  
 Chloride 112 (H) 03/30/2020 05:06 AM  
 CO2 26 03/30/2020 05:06 AM  
 BUN 19 (H) 03/30/2020 05:06 AM  
 Creatinine 1.19 03/30/2020 05:06 AM  
 Calcium 8.1 (L) 03/30/2020 05:06 AM  
 Anion gap 5 03/30/2020 05:06 AM  
 BUN/Creatinine ratio 16 03/30/2020 05:06 AM  
 Alk. phosphatase 107 03/29/2020 05:39 PM  
 Protein, total 7.4 03/29/2020 05:39 PM  
 Albumin 2.5 (L) 03/29/2020 05:39 PM  
 Globulin 4.9 (H) 03/29/2020 05:39 PM  
 A-G Ratio 0.5 (L) 03/29/2020 05:39 PM  
 
Coagulation:  
Lab Results Component Value Date/Time  Prothrombin time 12.6 03/29/2020 05:39 PM  
 INR 1.0 03/29/2020 05:39 PM  
 aPTT 29.4 03/29/2020 05:39 PM  
 
 Cardiac markers:  
Lab Results Component Value Date/Time CK 38 03/29/2020 05:39 PM  
 CK-MB Index  03/29/2020 05:39 PM  
  CALCULATION NOT PERFORMED WHEN RESULT IS BELOW LINEAR LIMIT  
  
3/30/2020  6:31 AM - Jose, Lab In [facility]  
 
Component Value Flag Ref Range Units Status Hemoglobin A1c 4.5   4.2 - 5.6 % Final  
Comment:  
(NOTE) HbA1C Interpretive Ranges <5.7              Normal  
5.7 - 6.4         Consider Prediabetes Impression: New onset right subcortical stroke in this patient who has risk factors including hypertension diabetes. Her degree of control of the hypertension is probably poor given the hypertensive hemorrhage that she has on her MRI scan. Plan: Full stroke protocols. Needs to have better risk factor control. We will follow with you. Since she was on aspirin therapy prior to this for now we will put her on full aspirin therapy. Do not feel dual antiplatelet therapy is safe in this patient with a prior history of cerebral hemorrhages. PLEASE NOTE:  
This document has been produced using voice recognition software. Unrecognized errors in transcription may be present.

## 2020-03-30 NOTE — PROGRESS NOTES
Problem: Dysphagia (Adult) Goal: *Acute Goals and Plan of Care (Insert Text) Description: Patient will: 1. Tolerate PO trials with 0 s/s overt distress in 4/5 trials 2. Utilize compensatory swallow strategies/maneuvers (decrease bite/sip, size/rate, alt. liq/sol) with min cues in 4/5 trials 3. Perform oral-motor/laryngeal exercises to increase oropharyngeal swallow function with min cues 4. Complete an objective swallow study (i.e., MBSS) to assess swallow integrity, r/o aspiration, and determine of safest LRD, min A Recommend:  
Continue regular diet with thin liquids Meds as tolerated Aspiration precautions HOB >45 degrees during all intake and for at least 30 min after intake Small bites/sips, slow rate of intake, alternating bites/sips Oral care three times daily MBS to rule out silent aspiration Outcome: Progressing Towards Goal 
 
SPEECH LANGUAGE PATHOLOGY BEDSIDE SWALLOW EVALUATION/TREATMENT Patient: Daiana Barrera (89 y.o. female) Date: 3/30/2020 Primary Diagnosis: Ischemic stroke ([facility] [address] [I63.9] Slurred speech [R47.81] Left-sided weakness [R53.1] Hypokalemia [E87.6] Uncontrolled hypertension [I10] Precautions: Aspiration PLOF: Pt reports regular diet with thin liquids ASSESSMENT : 
Based on the objective data described below, the patient presents with mild oral and suspected mild pharyngeal dysphagia. Pt alert and oriented x4. Oral mech exam revealed L labial droop, decreased orolingual strength/coordination and limited natural dentition. Pt denying dysphagia but MBS was ordered in January due to unexpected weight loss; was previously scheduled for outpatient MBS at this facility but was a no show at that time. Pt with mild dysarthria which she reports \"gets worse at night\".    
Today at bedside, pt demo delayed/discoordinated bolus manipulation with mildly delayed a-p transit, mild swallow delay and decreased laryngeal elevation to palpation. Pt demo mild swallow delay and decreased laryngeal elevation to palpation. Demo x1 delayed cough during intake. Recommend continue current diet with use of the above mentioned compensatory strategies/aspiration precautions with MBS to further assess oropharyngeal swallow integrity and rule out silent aspiration. May benefit from motor speech evaluation as indicated. Discussed with pt and RN. TREATMENT : 
Skilled therapy initiated; Educated pt on aspiration precautions and importance of compensatory swallow techniques to decrease aspiration risk (decrease rate of intake & sip/bite size, upright @HOB for all po intake and ~30 minutes after po); verbalized comprehension. Will continue to follow for further dysphagia management. Patient will benefit from skilled intervention to address the above impairments. Patient's rehabilitation potential is considered to be Good Factors which may influence rehabilitation potential include:  
[]            None noted [x]            Mental ability/status [x]            Medical condition []            Home/family situation and support systems 
[]            Safety awareness 
[]            Pain tolerance/management []            Other: PLAN : 
Recommendations and Planned Interventions: As above Frequency/Duration: Patient will be followed by speech-language pathology 1-2 times per day/4-7 days per week to address goals. Discharge Recommendations: To Be Determined SUBJECTIVE:  
Patient stated Thank you honey. OBJECTIVE:  
 
Past Medical History:  
Diagnosis Date Anemia Arthritis Cardiomyopathy due to hypertension, with heart failure ([address] 5/19/2017 Cataract Chronic combined systolic and diastolic congestive heart failure ([address] 5/3/2018 Chronic lung disease Chronic obstructive pulmonary disease (HCC) Chronic systolic congestive heart failure ([address] 2/3/2014 Diabetes mellitus ([facility] [address] Difficulty swallowing Both food and liquid Dysphagia following cerebral infarction 8/17/2017 History of echocardiogram 12/29/2009 EF 50%. Mild-mod conc LVH. Mod DDfx. LAE. Mild MR. Hypercholesterolemia Hypertension Hypertensive heart disease Infarction of right thalamus ([facility] [address] 9/17/2017 Lacunar infarct 2017 Joint pain Joint swelling Normal nuclear stress test 09/08/2000 No ischemia or prior infarction. Neg EKG on submax EST. Ex time 15:02. Recurrent boils Rheumatism Shortness of breath Sinusitis with nasal polyps 8/18/2019 Past Surgical History:  
Procedure Laterality Date CARDIAC CATHETERIZATION  5/10/2018 CORONARY ARTERY ANGIOGRAM  5/10/2018 HX CHOLECYSTECTOMY  2001 [facility] MODERATE SEDATION  5/10/2018 Prior Level of Function/Home Situation: 
Home Situation Home Environment: Private residence One/Two Story Residence: One story Living Alone: No 
Support Systems: Child(masha), Family member(s) Patient Expects to be Discharged to[de-identified] Private residence Current DME Used/Available at Home: Walker, rolling, Cane, straight, Wheelchair, power, Glucometer, Blood pressure cuff, Commode, bedside, Oxygen, portable, Shower chair Diet prior to admission: Regular diet with thin liquids Current Diet:  Regular diet with thin liquids Cognitive and Communication Status: 
Neurologic State: Alert Orientation Level: Oriented X4 Cognition: Follows commands Oral Assessment: 
Oral Assessment Labial: Left droop Dentition: Natural;Limited Oral Hygiene: Good Lingual: Incoordinated;Decreased rate Velum: No impairment Mandible: No impairment P.O. Trials: 
Patient Position: 45 at [facility] Vocal quality prior to P.O.: No impairment Consistency Presented: Solid; Thin liquid How Presented: Self-fed/presented;Cup/sip; Successive swallows;Spoon;Straw Bolus Acceptance: No impairment Bolus Formation/Control: Impaired Type of Impairment: Delayed Propulsion: Delayed (# of seconds) Oral Residue: Less than 10% of bolus; Lingual 
Initiation of Swallow: Delayed (# of seconds) Laryngeal Elevation: Decreased Aspiration Signs/Symptoms: Delayed cough/throat clear(x1) Pharyngeal Phase Characteristics: No impairment, issues, or problems Effective Modifications: Small sips and bites; Alternate liquids/solids Cues for Modifications: Minimal 
Oral Phase Severity: Mild Pharyngeal Phase Severity : Mild PAIN: 
Start of Eval: 0 End of Eval: 0 After treatment:  
[]            Patient left in no apparent distress sitting up in chair 
[x]            Patient left in no apparent distress in bed 
[x]            Call bell left within reach [x]            Nursing notified []            Family present 
[]            Caregiver present 
[]            Bed alarm activated COMMUNICATION/EDUCATION:  
[x]            Aspiration precautions; swallow safety; compensatory techniques. []            Patient/family have participated as able in goal setting and plan of care. [x]            Patient/family agree to work toward stated goals and plan of care. []            Patient understands intent and goals of therapy; neutral about participation. []            Patient unable to participate in goal setting/plan of care; educ ongoing with interdisciplinary staff 
[]         Posted safety precautions in patient's room. Thank you for this referral, Quan Vásquez M.S., CCC-SLP Speech-Language Pathologist

## 2020-03-30 NOTE — ROUTINE PROCESS
Bedside and Verbal shift change report given to Musa Morales RN (oncoming nurse) by Rock Krishan RN (offgoing nurse). Report included the following information SBAR, Kardex and Recent Results.

## 2020-03-30 NOTE — ED NOTES
TRANSFER - OUT REPORT: 
 
Verbal report given to RYLEY Massey(name) on [facility]  being transferred to [address] [place] [address](unit) for routine progression of care Report consisted of patients Situation, Background, Assessment and  
Recommendations(SBAR). Information from the following report(s) SBAR, Kardex, ED Summary, Recent Results and Quality Measures was reviewed with the receiving nurse. Lines:    
 
Opportunity for questions and clarification was provided. Patient transported with: 
 [employer]

## 2020-03-30 NOTE — PROGRESS NOTES
Reason for Admission:  Ischemic stroke ([facility] [address] [I63.9] Slurred speech [R47.81] Left-sided weakness [R53.1] Hypokalemia [E87.6] Uncontrolled hypertension [I10] RRAT Score:    18% Plan for utilizing home health:    Patient has no home health orders in place at this time. Care manager will continue to monitor for potential home health needs and orders. Likelihood of Readmission:   LOW Transition of Care Plan:  Patient is being recommended for ARU. ARU is currently following. Care manager will continue to monitor for discharge recommendations. Family will transport upon discharge. Initial assessment completed with patient. Cognitive status of patient: Alert and oriented. Face sheet information confirmed:  yes. The patient designates her daughter Ania Hillman to participate in her discharge plan and to receive any needed information. This patient lives in a 1-story house with her daughter Ania Hillman. Patient is able to navigate steps as needed, with assistance. Prior to hospitalization, patient was considered to be independent with ADLs/IADLS : yes . Patient has a current ACP document on file: yes Patient's daughter Ania Hillman will be available to transport patient home upon discharge. The patient already has a walker, a rollator, a bedside commode and home O2 (2L), available in the home. Patient is not currently active with home health. Patient has stayed in a skilled nursing facility or rehab. Was  stay within last 60 days : no. This patient is on dialysis :no 
 
 Currently, the discharge plan is uncertain at this time. ARU is following this patient, for potential admission to ARU. Care manager will monitor for discharge recommendations. Patient's daughter will transport, upon discharge. Patient's daughter is Savage Bautista, WS#[phone]). Patient's PCP is Dr. Parmjit Borges. Patient is insured through [employer]. The patient states that she can obtain her medications from the pharmacy, and take her medications as directed. This writer will continue to monitor for discharge planning to ensure a safe discharge from [facility]. Care Management Interventions PCP Verified by CM: Yes(Dr. Yonny Owens) Palliative Care Criteria Met (RRAT>21 & CHF Dx)?: No 
Mode of Transport at Discharge: Other (see comment)(Daughter will transport.) Transition of Care Consult (CM Consult): Discharge Planning Current Support Network: Own Home, Family Lives Nearby(Daughter lives with her) Confirm Follow Up Transport: Family Discharge Location Discharge Placement: Rehab hospital/unit acute Anthony Leblanc. MSW Care Manager Pager#: [phone]

## 2020-03-30 NOTE — PROGRESS NOTES
Problem: Dysphagia (Adult) Goal: *Acute Goals and Plan of Care (Insert Text) Description: Patient will: 1. Tolerate PO trials with 0 s/s overt distress in 4/5 trials 2. Utilize compensatory swallow strategies/maneuvers (decrease bite/sip, size/rate, alt. liq/sol) with min cues in 4/5 trials 3. Perform oral-motor/laryngeal exercises to increase oropharyngeal swallow function with min cues 4. Complete an objective swallow study (i.e., MBSS) to assess swallow integrity, r/o aspiration, and determine of safest LRD, min A-met 3/30/20 Recommend:  
Regular diet with honey thick liquids Meds with honey thick liquids Aspiration precautions HOB >45 degrees during all intake and for at least 30 min after intake Small bites/sips, slow rate of intake, alternating bites/sips Oral care three times daily 3/30/2020 1333 by Grazyna Doyle SLP Outcome: Progressing Towards Goal 
 
SPEECH PATHOLOGY MODIFIED BARIUM SWALLOW STUDY & TREATMENT Patient: Alexei Batres (43 y.o. female) Date: 3/30/2020 Primary Diagnosis: Ischemic stroke ([facility] [address] [I63.9] Slurred speech [R47.81] Left-sided weakness [R53.1] Hypokalemia [E87.6] Uncontrolled hypertension [I10] Precautions: Aspiration,  Fall ASSESSMENT : 
Based on the objective data described below, the patient presents with mild oral and moderate pharyngeal dysphagia. Pt with silent laryngeal penetration across trials of thin and NTL +/- straw. Pt able to improve tolerance of NTL via cup sip given mod cues for small single sip on 1/3 trials. Pt tolerating HTL, pudding, regular solids and 13 mm Ba pill with HTL wash with positive airway protection noted across multiple trials. Deficits increased oral prep phase with delayed a-p transit, decreased base of tongue strength with premature spillage into the valleculae for ~6 seconds prior to swallow initiation, decreased laryngeal elevation/adduction/sensation and slowed epiglottic inversion.   Mild pharyngeal residuals noted across trials due to overall poor pharyngeal motility. Recommend regular diet with honey thick liquids with strict use of the above mentioned compensatory strategies/aspiration precautions. TREATMENT : 
Treatment provided post diagnostic testing including oropharyngeal anatomy/physiology, MBS results, diet recommendations and compensatory strategies/positioning. Introduced pharyngeal strengthening exercise program and encouraged to complete on own. Pt able to verbalize understanding. Will continue to follow. Patient will benefit from skilled intervention to address the above impairments. Patient's rehabilitation potential is considered to be Excellent Factors which may influence rehabilitation potential include:  
[]              None noted []              Mental ability/status [x]              Medical condition []              Home/family situation and support systems []              Safety awareness []              Pain tolerance/management 
[]              Other: PLAN : 
Recommendations and Planned Interventions: As above Frequency/Duration: Patient will be followed by speech-language pathology 1-2 times per day/4-7 days per week to address goals. Discharge Recommendations: Inpatient Rehab SUBJECTIVE:  
Patient stated That was actually good. OBJECTIVE:  
 
Past Medical History:  
Diagnosis Date Anemia Arthritis Cardiomyopathy due to hypertension, with heart failure ([address] 5/19/2017 Cataract Chronic combined systolic and diastolic congestive heart failure ([address] 5/3/2018 Chronic lung disease Chronic obstructive pulmonary disease (HCC) Chronic systolic congestive heart failure ([address] 2/3/2014 Diabetes mellitus ([address] Difficulty swallowing Both food and liquid Dysphagia following cerebral infarction 8/17/2017 History of echocardiogram 12/29/2009 EF 50%. Mild-mod conc LVH. Mod DDfx. LAE. Mild MR. Hypercholesterolemia Hypertension Hypertensive heart disease Infarction of right thalamus ([facility] [address] 9/17/2017 Lacunar infarct 2017 Joint pain Joint swelling Normal nuclear stress test 09/08/2000 No ischemia or prior infarction. Neg EKG on submax EST. Ex time 15:02. Recurrent boils Rheumatism Shortness of breath Sinusitis with nasal polyps 8/18/2019 Past Surgical History:  
Procedure Laterality Date CARDIAC CATHETERIZATION  5/10/2018 CORONARY ARTERY ANGIOGRAM  5/10/2018 HX CHOLECYSTECTOMY  [address] [place] [address] MODERATE SEDATION  5/10/2018 Prior Level of Function/Home Situation: 
Home Situation Home Environment: Private residence # Steps to Enter: 4 Rails to Enter: Yes Hand Rails : Bilateral 
One/Two Story Residence: One story Living Alone: No 
Support Systems: Child(masha) Patient Expects to be Discharged to[de-identified] Private residence Current DME Used/Available at Home: Walker, rolling, Cane, straight, Wheelchair, power, Glucometer, Blood pressure cuff, Commode, bedside, Oxygen, portable, Shower chair Diet prior to admission: Regular diet with thin liquids Current Diet:  Regular diet with thin liquids; recommend change to regular diet with honey thick liquids Radiologist:   
Film Views: Lateral;Fluoro Patient Position: 90 degrees in chair Trial 1: Trial 2:  
Consistency Presented: Thin liquid; Nectar thick liquid Consistency Presented: Honey thick liquid;Pudding; Solid(13 mm Ba pill with HTL wash) How Presented: Self-fed/presented;Cup/sip;Spoon; Successive swallows;Straw How Presented: Self-fed/presented;Cup/sip;Spoon Bolus Acceptance: No impairment Bolus Acceptance: No impairment Bolus Formation/Control: Impaired: Premature spillage Bolus Formation/Control: Impaired: Delayed;Premature spillage Propulsion: No impairment Propulsion: Discoordination Oral Residue: None Oral Residue: Lingual  
 Initiation of Swallow: No impairment Timing: No impairment Timing: No impairment Penetration: During swallow;From initial swallow Penetration: None Aspiration/Timing: No evidence of aspiration(At Risk ) Aspiration/Timing: No evidence of aspiration Pharyngeal Clearance: Vallecular residue; Less than 10% Pharyngeal Clearance: Vallecular residue; Less than 10% Attempted Modifications: Small sips and bites; Alternate liquids/solids Attempted Modifications: Alternate liquids/solids Effective Modifications: Alternate liquids/solids;Small sips and bites Effective Modifications: Alternate liquids/solids Cues for Modifications: Minimal Cues for Modifications: Minimal  
     
 
 
Decreased Tongue Base Retraction?: Yes Laryngeal Elevation: Inadequate epiglottic inversion; Incomplete laryngeal closure Aspiration/Penetration Score: 3 (Penetration/Visible residue-Contrast remains above the folds/cords, but is not cleared) Pharyngeal Symmetry: Not assessed Pharyngeal-Esophageal Segment: No impairment Pharyngeal Dysfunction: Decreased tongue base retraction;Decreased strength;Decreased elevation/closure Oral Phase Severity: Mild Pharyngeal Phase Severity: Moderate 8-point Penetration-Aspiration Scale: Score 3 PAIN: 
Pt reports 0/10 pain or discomfort prior to MBS. Pt reports 0/10 pain or discomfort post MBS. COMMUNICATION/EDUCATION:  
[x]  Patient educated regarding MBS results and diet recommendations. [x]  Patient/family have participated as able in goal setting and plan of care. []  Patient/family agree to work toward stated goals and plan of care. []  Patient understands intent and goals of therapy, but is neutral about his/her participation. []  Patient is unable to participate in goal setting and plan of care. Thank you for this referral, Audrey Hernadez M.S., CCC-SLP Speech-Language Pathologist

## 2020-03-30 NOTE — PROGRESS NOTES
ARU/IPR REFERRAL CONTACT NOTE [id number] [facility] RE: Leobardo Marking Referral received to review this patient's case for admission to [id number] [facility]. Current status reviewed.  When appropriate, will need PT/OT/ST evaluation/treatment on this patient to complete the pre-admission evaluation.  Will continue to follow. Thank you for this referral.  Should you have any questions please do not hesitate to call. Sincerely, Tami Griffith Mao Admissions LiaFormerly [facility] 
[phone]

## 2020-03-30 NOTE — PROGRESS NOTES
[facility] Hospitalist Group Progress Note Patient: Felipe George Age: 77 y.o. : 1955 MR#: [id number] SSN: [id number] Date: 3/30/2020 Subjective:  
Alert and oriented. NAD. Left side weakness. Denies HA, CP/chest discomfort, SOB, abdominal pain, N/V, fever or chills. Assessment/Plan: 1. CVA, acute lacunar infarct in the superior aspect of the right thalamus 2. Elevated BP, hypertensive emergency 3. Hypokalemia 4. CKDIII 5. Chronic pansinusitis concerning for chronic fungal infection 6. Hypoalbuminemia 7. Chronic anemia 8. History of CVA with residual mild oral pharyngeal dysphagia 9. HLD 10. DMT2. a1c 4.5% 11. COPD/asthma without exacerbation. Home oxygen as needed 12. Chronic systolic HF. EF 36-40%. Compensated 13. History of nonischemic cardiomyopathy 14. Deconditioned/malnourished 15. Impaired mobility Plan 1. Neurology consult. Continue stroke protocol. PT/OT eval and treat once medically stable. ASA and statin 2. hydralazine as needed. Permissive BP with parameters. 3. MBS today with recommendations regular diet honey thick liquids and meds whole with honey thick liquids 4. Monitor metabolic panel and replete. PO and IV potassium supplementation 5. Diabetes management. POC glucose, lantus 6. Nutrition consult. Additional Notes:   
 
Case discussed with:  []Patient  []Family  []Nursing  []Case Management DVT Prophylaxis:  []Lovenox  []Hep SQ  []SCDs  []Coumadin   []On Heparin gtt Objective:  
VS:  
Visit Vitals BP (!) 196/114 (BP 1 Location: Right arm, BP Patient Position: Supine) Pulse 78 Temp 97.6 °F (36.4 °C) Resp 18 Ht 5' 9\" (1.753 m) Wt 72.6 kg (160 lb) SpO2 99% Breastfeeding No  
BMI 23.63 kg/m² Tmax/24hrs: Temp (24hrs), Av °F (36.7 °C), Min:97.5 °F (36.4 °C), Max:99 °F (37.2 °C) No intake or output data in the 24 hours ending 20 1108 General:  Alert, left side facial droop Cardiovascular:  RRR Pulmonary:  LSC throughout; respiratory effort WNL 
GI:  +BS in all four quadrants, soft, non-tender Extremities: left side weakness No edema; 2+ dorsalis pedis pulses bilaterally Neuro: alert and oriented Labs:   
Recent Results (from the past 24 hour(s)) CBC WITH AUTOMATED DIFF Collection Time: 03/29/20  5:39 PM  
Result Value Ref Range WBC 6.3 4.6 - 13.2 K/uL  
 RBC 3.64 (L) 4.20 - 5.30 M/uL  
 HGB 10.3 (L) 12.0 - 16.0 g/dL HCT 32.2 (L) 35.0 - 45.0 % MCV 88.5 74.0 - 97.0 FL  
 MCH 28.3 24.0 - 34.0 PG  
 MCHC 32.0 31.0 - 37.0 g/dL  
 RDW 15.1 (H) 11.6 - 14.5 % PLATELET 024 316 - 374 K/uL MPV 11.9 (H) 9.2 - 11.8 FL  
 NEUTROPHILS 44 42 - 75 % BAND NEUTROPHILS 1 0 - 5 % LYMPHOCYTES 21 20 - 51 % MONOCYTES 2 2 - 9 % EOSINOPHILS 31 (H) 0 - 5 % BASOPHILS 1 0 - 3 %  
 ABS. NEUTROPHILS 2.8 1.8 - 8.0 K/UL  
 ABS. LYMPHOCYTES 1.3 0.8 - 3.5 K/UL  
 ABS. MONOCYTES 0.1 0 - 1.0 K/UL  
 ABS. EOSINOPHILS 2.0 (H) 0.0 - 0.4 K/UL  
 ABS. BASOPHILS 0.1 (H) 0.0 - 0.06 K/UL  
 DF AUTOMATED PLATELET COMMENTS ADEQUATE PLATELETS    
 RBC COMMENTS ANISOCYTOSIS 1+ 
    
 RBC COMMENTS HYPOCHROMIA 1+ METABOLIC PANEL, COMPREHENSIVE Collection Time: 03/29/20  5:39 PM  
Result Value Ref Range Sodium 145 136 - 145 mmol/L Potassium 3.3 (L) 3.5 - 5.5 mmol/L Chloride 112 (H) 100 - 111 mmol/L  
 CO2 28 21 - 32 mmol/L Anion gap 5 3.0 - 18 mmol/L Glucose 92 74 - 99 mg/dL BUN 19 (H) 7.0 - 18 MG/DL Creatinine 1.34 (H) 0.6 - 1.3 MG/DL  
 BUN/Creatinine ratio 14 12 - 20 GFR est AA 48 (L) >60 ml/min/1.73m2 GFR est non-AA 39 (L) >60 ml/min/1.73m2 Calcium 8.4 (L) 8.5 - 10.1 MG/DL Bilirubin, total 0.2 0.2 - 1.0 MG/DL  
 ALT (SGPT) 10 (L) 13 - 56 U/L  
 AST (SGOT) 9 (L) 10 - 38 U/L Alk. phosphatase 107 45 - 117 U/L Protein, total 7.4 6.4 - 8.2 g/dL Albumin 2.5 (L) 3.4 - 5.0 g/dL Globulin 4.9 (H) 2.0 - 4.0 g/dL A-G Ratio 0.5 (L) 0.8 - 1.7 MAGNESIUM Collection Time: 03/29/20  5:39 PM  
Result Value Ref Range Magnesium 2.1 1.6 - 2.6 mg/dL CARDIAC PANEL,(CK, CKMB & TROPONIN) Collection Time: 03/29/20  5:39 PM  
Result Value Ref Range CK 38 26 - 192 U/L  
 CK - MB <1.0 <3.6 ng/ml CK-MB Index  0.0 - 4.0 % CALCULATION NOT PERFORMED WHEN RESULT IS BELOW LINEAR LIMIT Troponin-I, QT 0.02 0.0 - 0.045 NG/ML  
PROTHROMBIN TIME + INR Collection Time: 03/29/20  5:39 PM  
Result Value Ref Range Prothrombin time 12.6 11.5 - 15.2 sec INR 1.0 0.8 - 1.2 PTT Collection Time: 03/29/20  5:39 PM  
Result Value Ref Range aPTT 29.4 23.0 - 36.4 SEC  
IRON PROFILE Collection Time: 03/29/20  5:39 PM  
Result Value Ref Range Iron 50 50 - 175 ug/dL TIBC 222 (L) 250 - 450 ug/dL Iron % saturation 23 20 - 50 % FERRITIN Collection Time: 03/29/20  5:39 PM  
Result Value Ref Range Ferritin 93 8 - 388 NG/ML  
GLUCOSE, POC Collection Time: 03/29/20 11:59 PM  
Result Value Ref Range Glucose (POC) 120 (H) 70 - 110 mg/dL LIPID PANEL Collection Time: 03/30/20  5:06 AM  
Result Value Ref Range LIPID PROFILE Cholesterol, total 133 <200 MG/DL Triglyceride 102 <150 MG/DL  
 HDL Cholesterol 47 40 - 60 MG/DL  
 LDL, calculated 65.6 0 - 100 MG/DL VLDL, calculated 20.4 MG/DL  
 CHOL/HDL Ratio 2.8 0 - 5.0 HEMOGLOBIN A1C WITH EAG Collection Time: 03/30/20  5:06 AM  
Result Value Ref Range Hemoglobin A1c 4.5 4.2 - 5.6 % Est. average glucose Cannot be calculated mg/dL CBC WITH AUTOMATED DIFF Collection Time: 03/30/20  5:06 AM  
Result Value Ref Range WBC 6.0 4.6 - 13.2 K/uL  
 RBC 3.47 (L) 4.20 - 5.30 M/uL HGB 9.8 (L) 12.0 - 16.0 g/dL HCT 31.1 (L) 35.0 - 45.0 % MCV 89.6 74.0 - 97.0 FL  
 MCH 28.2 24.0 - 34.0 PG  
 MCHC 31.5 31.0 - 37.0 g/dL  
 RDW 15.3 (H) 11.6 - 14.5 % PLATELET 223 082 - 235 K/uL  MPV 12.2 (H) 9.2 - 11.8 FL  
 NEUTROPHILS 41 (L) 42 - 75 % LYMPHOCYTES 26 20 - 51 % MONOCYTES 3 2 - 9 % EOSINOPHILS 30 (H) 0 - 5 % BASOPHILS 0 0 - 3 %  
 ABS. NEUTROPHILS 2.4 1.8 - 8.0 K/UL  
 ABS. LYMPHOCYTES 1.6 0.8 - 3.5 K/UL  
 ABS. MONOCYTES 0.2 0 - 1.0 K/UL  
 ABS. EOSINOPHILS 1.8 (H) 0.0 - 0.4 K/UL  
 ABS. BASOPHILS 0.0 0.0 - 0.06 K/UL  
 DF MANUAL PLATELET COMMENTS ADEQUATE PLATELETS    
 RBC COMMENTS NORMOCYTIC, NORMOCHROMIC METABOLIC PANEL, BASIC Collection Time: 03/30/20  5:06 AM  
Result Value Ref Range Sodium 143 136 - 145 mmol/L Potassium 3.2 (L) 3.5 - 5.5 mmol/L Chloride 112 (H) 100 - 111 mmol/L  
 CO2 26 21 - 32 mmol/L Anion gap 5 3.0 - 18 mmol/L Glucose 77 74 - 99 mg/dL BUN 19 (H) 7.0 - 18 MG/DL Creatinine 1.19 0.6 - 1.3 MG/DL  
 BUN/Creatinine ratio 16 12 - 20 GFR est AA 55 (L) >60 ml/min/1.73m2 GFR est non-AA 45 (L) >60 ml/min/1.73m2 Calcium 8.1 (L) 8.5 - 10.1 MG/DL  
GLUCOSE, POC Collection Time: 03/30/20  6:53 AM  
Result Value Ref Range Glucose (POC) 78 70 - 110 mg/dL EKG, 12 LEAD, INITIAL Collection Time: 03/30/20  8:40 AM  
Result Value Ref Range Ventricular Rate 86 BPM  
 Atrial Rate 86 BPM  
 P-R Interval 180 ms QRS Duration 110 ms  
 Q-T Interval 394 ms QTC Calculation (Bezet) 471 ms Calculated P Axis 54 degrees Calculated R Axis -39 degrees Calculated T Axis 132 degrees Diagnosis Sinus rhythm with premature supraventricular complexes Possible Left atrial enlargement Left axis deviation Left ventricular hypertrophy with repolarization abnormality Abnormal ECG When compared with ECG of 19-AUG-2019 07:25, 
premature supraventricular complexes are now present Signed By: Rohit Lopez NP March 30, 2020

## 2020-03-30 NOTE — PROGRESS NOTES
OT order received and chart reviewed. Pt not seen for skilled for OT due to high BP. Pt's BP taken, supine at rest reading at 208/127 mmHg. Second BP taken after 3 mins, reading at 201/119 mmHg. RN notified. Will hold services for now and follow up at a later time when pt is medically appropriate to be seen. Thank you for this referral, Janes Payton MS, OTR/L

## 2020-03-30 NOTE — PROGRESS NOTES
MD aware. Pt on permissive HTN, orders received for Labetolol 5mg IV for BP>220/110. Will continue to monitor. 03/30/20 0000 Vital Signs Temp 97.7 °F (36.5 °C) Pulse (Heart Rate) 89 Resp Rate 20  
O2 Sat (%) 97 % Level of Consciousness Alert  
BP (!) 207/124 MAP (Calculated) 152 BP 1 Method Automatic  
BP 1 Location Left arm BP Patient Position At rest  
More BP/Pulse rows needed? Yes MEWS Score 3 Additional Blood Pressure/Pulse Data Pulse 2 86 BP 2 (!) 203/105 MAP 2 (Calculated) 138 Pain 1 Pain Scale 1 Numeric (0 - 10) Pain Intensity 1 0 Patient Stated Pain Goal 0 Oxygen Therapy O2 Device Room air

## 2020-03-30 NOTE — PROGRESS NOTES
Problem: Mobility Impaired (Adult and Pediatric) Goal: *Acute Goals and Plan of Care (Insert Text) Description: Physical Therapy Goals Initiated 3/30/2020 and to be accomplished within 7 day(s) 1. Patient will move from supine to sit and sit to supine , scoot up and down and roll side to side in bed with supervision/set-up. 2.  Patient will transfer from bed to chair and chair to bed with contact guard assist using the least restrictive device. 3.  Patient will perform sit to stand with  contact guard assist. 
4.  Patient will ambulate with  contact guard assist for 100 feet with the least restrictive device. 5.  Patient will ascend/descend 4 stairs with 1-2 handrail(s) with minimal assistance/contact guard assist.  
 
Prior Level of Function:  
Patient was modified independence for all mobility including gait using single point cane. Patient lives daughter in a single story home 4 steps to enter B handrail assist. Pt has a rolling walker, bedside commode, and a shower chair as needed. Outcome: Progressing Towards Goal 
 PHYSICAL THERAPY EVALUATION Patient: Shadia Muir (18 y.o. female) Date: 3/30/2020 Primary Diagnosis: Ischemic stroke ([facility] [address] [I63.9] Slurred speech [R47.81] Left-sided weakness [R53.1] Hypokalemia [E87.6] Uncontrolled hypertension [I10] Precautions:   Fall ASSESSMENT : 
PT orders received and patient cleared by nursing to participate with therapy. Patient is a 77 y.o. female admitted to the hospital due to L sided weakness and facial droop. MRI shows acute R CVA. Patient consents to PT evaluation and treatment. Pt has weakness noted L LE 4-/5 compared to R LE 5/5. Pt performed supine to sit from elevated head of bed >80 degrees contact guard assistance. Sit to stands from elevated bed minimal assistance. Gait in room 15 feet with rolling walker minimal assistance.  Pt required tactile and verbal cues for step length and weight shifting. Pt takes a larger step with R LE but very small/shuffled step on L LE. Pt drags L foot at times. Cues for safety throughout. BP prior to therapy 192/109 and after gait 192/101. Pt denies dizziness or lightheadedness. Pt ended therapy sitting in chair with all needs met. Patient will benefit from skilled intervention to address the above impairments. Patient's rehabilitation potential is considered to be Good Factors which may influence rehabilitation potential include:   
[]         None noted 
[]         Mental ability/status [x]         Medical condition 
[]         Home/family situation and support systems 
[]         Safety awareness 
[]         Pain tolerance/management 
[]         Other: PLAN : 
Recommendations and Planned Interventions:   
[x]           Bed Mobility Training             [x]    Neuromuscular Re-Education 
[x]           Transfer Training                   []    Orthotic/Prosthetic Training 
[x]           Gait Training                          [x]    Modalities [x]           Therapeutic Exercises           [x]    Edema Management/Control 
[x]           Therapeutic Activities            [x]    Family Training/Education 
[x]           Patient Education 
[]           Other (comment): Frequency/Duration: Patient will be followed by physical therapy 1-2 times per day/4-7 days per week to address goals. Discharge Recommendations: Inpatient Rehab Further Equipment Recommendations for Discharge: N/A  
 
SUBJECTIVE:  
Patient stated That's what they told me (educating pt on need for possible rehab at d/c).  OBJECTIVE DATA SUMMARY:  
 
Past Medical History:  
Diagnosis Date Anemia Arthritis Cardiomyopathy due to hypertension, with heart failure ([facility] [address] 5/19/2017 Cataract Chronic combined systolic and diastolic congestive heart failure ([facility] [address] 5/3/2018 Chronic lung disease Chronic obstructive pulmonary disease (HCC) Chronic systolic congestive heart failure ([facility] [address] 2/3/2014 Diabetes mellitus ([facility] Difficulty swallowing Both food and liquid Dysphagia following cerebral infarction 8/17/2017 History of echocardiogram 12/29/2009 EF 50%. Mild-mod conc LVH. Mod DDfx. LAE. Mild MR. Hypercholesterolemia Hypertension Hypertensive heart disease Infarction of right thalamus ([facility] [address] 9/17/2017 Lacunar infarct 2017 Joint pain Joint swelling Normal nuclear stress test 09/08/2000 No ischemia or prior infarction. Neg EKG on submax EST. Ex time 15:02. Recurrent boils Rheumatism Shortness of breath Sinusitis with nasal polyps 8/18/2019 Past Surgical History:  
Procedure Laterality Date CARDIAC CATHETERIZATION  5/10/2018 CORONARY ARTERY ANGIOGRAM  5/10/2018 HX CHOLECYSTECTOMY  [address] [place] [address] MODERATE SEDATION  5/10/2018 Barriers to Learning/Limitations: yes;  altered mental status (i.e.Sedation, Confusion) Compensate with: Visual Cues, Verbal Cues, and Tactile Cues Home Situation: 
Home Situation Home Environment: Private residence # Steps to Enter: 4 Rails to Enter: Yes Hand Rails : Bilateral 
One/Two Story Residence: One story Living Alone: No 
Support Systems: Child(masha) Patient Expects to be Discharged to[de-identified] Private residence Current DME Used/Available at Home: Walker, rolling, Cane, straight, Wheelchair, power, Glucometer, Blood pressure cuff, Commode, bedside, Oxygen, portable, Shower chair Critical Behavior: 
Neurologic State: Alert Orientation Level: Oriented X4 Cognition: Follows commands Safety/Judgement: Fall prevention Psychosocial 
Patient Behaviors: Calm; Cooperative Purposeful Interaction: Yes Pt Identified Daily Priority: Clinical issues (comment) Caritas Process: Establish trust;Nurture loving kindness; Teaching/learning; Attend basic human needs; Supportive expression Caring Interventions: Therapeutic modalities Therapeutic Modalities: Deep breathing B LE Strength:   
Strength: Generally decreased, functional(R LE 5/5 L LE 4-/5) B LE Tone & Sensation:  
Tone: Normal         
Sensation: Intact B LE Range Of Motion: 
AROM: Generally decreased, functional       
        
Posture: 
Posture (WDL): Exceptions to [facility] Posture Assessment: Forward head Functional Mobility: 
Bed Mobility: 
  
Supine to Sit: Contact guard assistance(with HOB elevated >80 degrees) Scooting: Minimum assistance Transfers: 
Sit to Stand: Minimum assistance Stand to Sit: Minimum assistance Balance:  
Sitting: Intact Standing: Impaired; With support Standing - Static: Fair Standing - Dynamic : Fair;Poor Ambulation/Gait Training: 
Distance (ft): 15 Feet (ft) Assistive Device: Walker, rolling Ambulation - Level of Assistance: Minimal assistance Gait Abnormalities: Antalgic; Ataxic;Decreased step clearance Base of Support: Center of gravity altered Stance: Right increased; Left decreased Speed/Laila: Slow Step Length: Left shortened Therapeutic Exercises:  
Reviewed and performed ankle pumps to increase blood flow and circulation. Pain: No pain noted before, during, or at end of session. Activity Tolerance:  
Fair BP prior to therapy 192/109 and after gait 192/101. Please refer to the flowsheet for vital signs taken during this treatment. After treatment:  
[x]         Patient left in no apparent distress sitting up in chair 
[]         Patient left in no apparent distress in bed 
[x]         Call bell left within reach [x]   Personal items in reach [x]         Nursing notified Altagracia Nielsen 
[]         Caregiver present 
[]         Bed/chair alarm activated 
[]         SCDs applied COMMUNICATION/EDUCATION:  
[x]         Role of Physical Therapy in the acute care setting. [x]         Fall prevention education was provided and the patient/caregiver indicated understanding. [x]         Patient/family have participated as able in goal setting and plan of care. [x]         Patient/family agree to work toward stated goals and plan of care. []         Patient understands intent and goals of therapy, but is neutral about his/her participation. []         Patient is unable to participate in goal setting/plan of care: ongoing with therapy staff. [x]         Out of bed with nursing assistance 3-5 times a day. []         Other: Thank you for this referral. 
Marco Rico, PT, DPT Time Calculation: 23 mins Eval Complexity: History: MEDIUM  Complexity : 1-2 comorbidities / personal factors will impact the outcome/ POC Exam:HIGH Complexity : 4+ Standardized tests and measures addressing body structure, function, activity limitation and / or participation in recreation  Presentation: MEDIUM Complexity : Evolving with changing characteristics  Clinical Decision Making:Medium Complexity    Overall Complexity:MEDIUM

## 2020-03-30 NOTE — ED NOTES
Pt tolerated dysphagia test.  No difficulties swallowing water by cup, water by straw, and applesauce. Pt resting in bed. Daughter via phone reports symptoms began 1000 3/29/2020 of left sided weakness and left side of face \"tightening up\" and \"not drooping\" like before. Daughter answered for MRI screening

## 2020-03-30 NOTE — PROGRESS NOTES
conducted an initial consultation and Spiritual Assessment for Mir Cummings, who is a 77 y.o.,female. Patient's Primary Language is: [place]. According to the patient's EMR Lutheran Affiliation is: [facility].  
 
The reason the Patient came to the hospital is:  
Patient Active Problem List  
 Diagnosis Date Noted  Slurred speech 03/29/2020  Left-sided weakness 03/29/2020  Uncontrolled hypertension 03/29/2020  Ischemic stroke ([address] 03/29/2020  Pansinusitis 03/29/2020  CKD (chronic kidney disease) 03/29/2020  TIA (transient ischemic attack) 08/18/2019  CVA (cerebral vascular accident) ([address] 08/18/2019  Encephalopathy 08/18/2019  Sinusitis with nasal polyps 08/18/2019  Allergic rhinitis 10/23/2018  Acute non-recurrent maxillary sinusitis 09/25/2018  Dizziness 09/25/2018  CKD (chronic kidney disease) stage 3, GFR 30-59 ml/min ([facility]) 09/25/2018  Fall 09/25/2018  Chronic obstructive pulmonary disease ([address]  Type 2 diabetes mellitus with diabetic neuropathy ([address] 06/21/2018  Hypokalemia 05/05/2018  Acute pulmonary edema ([address] 05/05/2018  Hypertensive emergency 05/05/2018  Headache 05/05/2018  Acute on chronic systolic (congestive) heart failure ([address] 05/05/2018  Chronic combined systolic and diastolic congestive heart failure ([address] 05/03/2018  Type 2 diabetes mellitus with nephropathy ([address] 12/14/2017  Infarction of right thalamus ([address] 09/17/2017  Dysphagia following cerebral infarction 09/17/2017  Chronic sinusitis 06/13/2017  Cardiomyopathy due to hypertension, with heart failure ([address] 05/19/2017  Pleural effusion 04/30/2014  Cervical myelopathy ([address] 02/03/2014  Eczema 02/03/2014  Asthma 02/03/2014  Bladder prolapse, female, acquired 02/03/2014  IBS (irritable bowel syndrome) 02/03/2014  Osteoporosis 02/03/2014  Migraine 02/03/2014  Anxiety and depression 02/03/2014  Essential hypertension 01/27/2012  Diabetes mellitus ([facility] [address] 01/27/2012  Hyperlipidemia 01/27/2012 The  provided the following Interventions: 
Initiated a relationship of care and support. Explored issues of tamika, belief, spirituality and Protestant/ritual needs while hospitalized. Listened empathically. Provided chaplaincy education. Provided information about Spiritual Care Services. Offered prayer and assurance of continued prayers on patient's behalf. Chart reviewed. The following outcomes where achieved: 
Patient shared limited information about both their medical narrative and spiritual journey/beliefs. Patient processed feeling about current hospitalization. Patient expressed gratitude for 's visit. Assessment: 
Patient does not have any Protestant/cultural needs that will affect patient's preferences in health care. There are no spiritual or Protestant issues which require intervention at this time. Plan: 
Chaplains will continue to follow and will provide pastoral care on an as needed/requested basis.  recommends bedside caregivers page  on duty if patient shows signs of acute spiritual or emotional distress.  Helga Me[facility]  
[phone]

## 2020-03-30 NOTE — DIABETES MGMT
Diabetes Patient/Family Education Record Factors That  May Influence Patients Ability  to Learn or  Comply with Recommendations 
 []   Language barrier    []   Cultural needs   []   Motivation  
 []   Cognitive limitation    []   Physical   [x]   Education  
 []   Physiological factors   []   Hearing/vision/speaking impairment   []   Taoism beliefs []   Financial factors   []  Other:   []  No factors identified at this time. Person Instructed: 
 [x]   Patient   []   Family   []  Other Preference for Learning: 
 []   Verbal   []   Written   []  Demonstration Level of Comprehension & Competence:   
[x]  Good                                      [] Fair                                     []  Poor                             []  Needs Reinforcement  
[x]  Teachback completed Education Component:  Seen patient this afternoon and noted that she just finished eating her lunch and ate 100%. Patient immediately stated when I entered her room, \"I know you the diabetes elle. \" Patient stated that I had spoken with her from prior hospital admission. [x]  Medication management, including how to administer insulin (if appropriate) and potential medication interactions: Yes. Patient reported list of home diabetes medication: Lantus (Basaglar) pen insulin 15 units daily at bedtime. Patient stated that she lives with her supportive daughter, Victor M Hilliard, who is also her caregiver at home and she gives her the injection every night. [x]  Nutritional management -obtain usual meal pattern: Yes. Patient reported that she does not follow a specific meal plan but already know how carb affect her blood sugar. She eat 3 meals daily most of the time. Discussed the importance of eating adequate meals to help prevent low blood sugar and she verbalized understanding. 
  
[]  Exercise [x]  Signs, symptoms, and treatment of hyperglycemia and hypoglycemia: Yes. Patient verbalized understanding. [x] Prevention, recognition and treatment of hyperglycemia and hypoglycemia: Yes. Patient verbalized understanding. [x]  Importance of blood glucose monitoring and how to obtain a blood glucose meter: Yes. Patient stated that her daughter checks her blood sugar at home regularly. []  Instruction on use of the blood glucose meter [x]  Discuss the importance of HbA1C monitoring: Yes. Patient's current A1c level is 4.5% (3/30/2020) which is equivalent to estimated average blood glucose of 75 mg/dL during the past 2-3 months. Her prior A1c level was 5.0% (8/18/2019). []  Sick day guidelines  
[]  Proper use and disposal of lancets, needles, syringes or insulin pens (if appropriate) []  Potential long-term complications (retinopathy, kidney disease, neuropathy, foot care)  
[] Information about whom to contact in case of emergency or for more information   
[x]  Goal:  Patient/family will demonstrate understanding of Diabetes Self Management Skills by: 04/06/2020 Plan for post-discharge education or self-management support: 
  [x] Outpatient class schedule provided            [] Patient Declined 
  [] Scheduled for outpatient classes (date) _______ Verify: 
Does patient understand how diabetes medications work? Yes. Does patient know what their most recent A1c is? Yes. Does patient monitor glucose at home? Yes. Does patient have difficulty obtaining diabetes medications and testing supplies? No. 
  
 
Susan Calhoun RN [facility] Pager: [phone]

## 2020-03-30 NOTE — PROGRESS NOTES
TRANSFER - IN REPORT: 
 
Verbal report received from Gerald Lyons H.RN(name) on Alicia Sharma  being received from ED (unit) for routine progression of care Report consisted of patients Situation, Background, Assessment and  
Recommendations(SBAR). Information from the following report(s) SBAR, Kardex and ED Summary was reviewed with the receiving nurse. Opportunity for questions and clarification was provided. Assessment completed upon patients arrival to unit and care assumed. MRI tech paged, spoke with Anders Morgan. MRI screening form completed in ED.

## 2020-03-30 NOTE — PROGRESS NOTES
NUTRITION Nutrition Consult: General Nutrition Management & Supplements, Diet Education RECOMMENDATIONS / PLAN:  
 
- Diet education provided today. - Update food allergy. 
- Add supplements: Magic Cup, BID. 
- Continue RD inpatient monitoring and evaluation. NUTRITION INTERVENTIONS & DIAGNOSIS:  
 
- Meals/snacks: modified composition - Medical food supplement therapy: initiate - Nutrition Education: cardiac, low-sodium provided 3/30- provided to pt's daughter via telephone Nutrition Diagnosis: Predicted inadequate energy intake related to recent decreased appetite as evidenced by pt & daughter reportiing decreased/fair intake PTA Undesireable food chocies related to food and nutrition related knowledge deficit along with food preferences as evidenced by pt reports frequent consumption of high-sodium contaning foods with hypertension ASSESSMENT:  
 
Pt interview cut short as pt being taken for MBS. Noted 100% intake of breakfast. Pt reports fair intake PTA, recent decreased intake with wt loss over the past several months. HTN noted, briefly discussed low-sodium diet education, pt receptive and provided to pt's daughter (who does grocery shopping and meal preparation) via telephone. Pt's daughter verifies decreased intake and wt loss x few months. BG levels WNL today, last HbA1c of 4.5% Nutritional intake adequate to meet patients estimated nutritional needs:  Unable to determine at this time Diet: DIET DIABETIC CONSISTENT CARB Regular; 3 Honey/3 Moderately Thick Food Allergies: Watermelon- per pt's daughter Current Appetite: Good Appetite/meal intake prior to admission: Fair; decreased over the past few months; occasional Glucerna Shake consumption 50%-100% usual intake Feeding Limitations:  [x] Swallowing difficulty: SLP following    [] Chewing difficulty    [] Other: 
Current Meal Intake:  
Patient Vitals for the past 100 hrs: 
 % Diet Eaten 03/30/20 0921 100 % BM: 3/27 Skin Integrity: WDL Edema:   [x] No     [] Yes Pertinent Medications: Reviewed: NS at 100 mL/hr, atorvastatin, lantus (5 units), SSI,  80 mEq KCL po, 40 mEq KCL IV Recent Labs  
  03/30/20 
0506 03/29/20 
1739  145  
K 3.2* 3.3*  
* 112* CO2 26 28 GLU 77 92 BUN 19* 19* CREA 1.19 1.34* CA 8.1* 8.4* MG  --  2.1 ALB  --  2.5* SGOT  --  9* ALT  --  10* No intake or output data in the 24 hours ending 03/30/20 1222 Anthropometrics: 
Ht Readings from Last 1 Encounters:  
03/29/20 5' 9\" (1.753 m) Last 3 Recorded Weights in this Encounter 03/29/20 1735 Weight: 72.6 kg (160 lb) Body mass index is 23.63 kg/m². Weight History: Pt reports usual recent body wt of 130#, verified by pt's daughter. Daughter reports pt with gradual wt loss since 2008 with highest wt of 185#. Fluctuations noted per chart hx. Weight Metrics 3/29/2020 1/9/2020 10/31/2019 9/18/2019 8/20/2019 4/3/2019 3/8/2019 Weight 160 lb 130 lb 3.2 oz 144 lb 146 lb 6.4 oz 165 lb 168 lb 165 lb BMI 23.63 kg/m2 21.67 kg/m2 23.96 kg/m2 24.36 kg/m2 27.46 kg/m2 24.81 kg/m2 24.37 kg/m2 Admitting Diagnosis: Ischemic stroke ([facility] [address] [I63.9] Slurred speech [R47.81] Left-sided weakness [R53.1] Hypokalemia [E87.6] Uncontrolled hypertension [I10] Pertinent PMHx: COPD, DM, dysphagia, HTN, hyperchoelsterolemia, CHF Education Needs:        [] None identified  [] Identified - Not appropriate at this time  [x]  Identified and addressed - refer to education log Learning Limitations:   [x] None identified  [] Identified Cultural, Quaker & ethnic food preferences:  [x] None identified    [] Identified and addressed ESTIMATED NUTRITION NEEDS:  
 
Calories: 8956-4671 kcal (MSJx1.2-1.3) based on  [x] Actual BW: 73 kg      [] IBW Protein: 58-73 gm (0.8-1 gm/kg) based on  [x] Actual BW      [] IBW Fluid: 1 mL/kcal 
  
MONITORING & EVALUATION:  
 
Nutrition Goal(s):  
 - PO nutrition intake will meet >75% of patient estimated nutritional needs within the next 7 days. Outcome: New/Initial goal   
- Patient will increase knowledge of appropriate food choices on a cardiac, low-sodium diet prior to discharge. Outcome: New/Initial goal   
 
Monitoring:   [x] Food and nutrient intake   [x] Food and nutrient administration  [x] Comparative standards   [x] Nutrition-focused physical findings   [x] Anthropometric Measurements   [x] Treatment/therapy   [x] Biochemical data, medical tests, and procedures Previous Recommendations (for follow-up assessments only):  Not Applicable Discharge Planning: No nutritional discharge needs at this time. Participated in care planning, discharge planning, & interdisciplinary rounds as appropriate. Dilshad Jones RD Pager: [phone]

## 2020-03-30 NOTE — PROGRESS NOTES
SLP Note: MBS completed; full report to follow. Recommend change diet to regular, HTL. Meds whole with HTL. Alden Sharma M.S., CCC-SLP Speech-Language Pathologist

## 2020-03-30 NOTE — PROGRESS NOTES
PT orders received, chart reviewed. Pt unable to participate with PT due to: 
[]  Nausea/vomiting 
[]  Eating 
[]  Pain 
[]  Pt lethargic 
[]  Off Unit 
[]  Pt refused 
[x]  Other pt's BP is elevated 208/127 and after 3 minutes 201/119 (pt is allowed to have permissive /654 but diastolic BP is above 806). Holding PT at this time. Will f/u later as patient's schedule allows.  Thank you for this referral. 
Nolvia Blair, PT, DPT

## 2020-03-31 NOTE — INTERDISCIPLINARY ROUNDS
Interdisciplinary Round Note Patient Information: Patient Information: Pamela Coyle 402/01 Reason for Admission: Ischemic stroke ([facility] [address] [I63.9] Slurred speech [R47.81] Left-sided weakness [R53.1] Hypokalemia [E87.6] Uncontrolled hypertension [I10] Attending Provider:   Junie Lala MD 
 Past Medical History:  
Past Medical History:  
Diagnosis Date  Anemia  Arthritis  Cardiomyopathy due to hypertension, with heart failure ([address] 5/19/2017  Cataract  Chronic combined systolic and diastolic congestive heart failure ([address] 5/3/2018  Chronic lung disease  Chronic obstructive pulmonary disease ([address]  Chronic systolic congestive heart failure ([address] 2/3/2014  Diabetes mellitus ([address]  Difficulty swallowing Both food and liquid  Dysphagia following cerebral infarction 8/17/2017  History of echocardiogram 12/29/2009 EF 50%. Mild-mod conc LVH. Mod DDfx. LAE. Mild MR.    
 Hypercholesterolemia  Hypertension  Hypertensive heart disease  Infarction of right thalamus ([facility] [address] 9/17/2017 Lacunar infarct 2017  Joint pain  Joint swelling  Normal nuclear stress test 09/08/2000 No ischemia or prior infarction. Neg EKG on submax EST. Ex time 15:02.  Recurrent boils  Rheumatism  Shortness of breath  Sinusitis with nasal polyps 8/18/2019 Hospital day: 2 Estimated discharge date: 04/01/2020 RRAT Score: High Risk   
      
 37 Total Score 4 IP Visits Last 12 Months (1-3=4, 4=9, >4=11) 5 Pt. Coverage (Medicare=5 , Medicaid, or Self-Pay=4) 28 Charlson Comorbidity Score (Age + Comorbid Conditions) Criteria that do not apply:  
 Has Seen PCP in Last 6 Months (Yes=3, No=0) . Living with Significant Other. Assisted Living. LTAC. SNF. or  
Rehab Patient Length of Stay (>5 days = 3) Goals for Today:  
 
Control Blood pressure OT consult VITAL SIGNS Vitals:  
 03/31/20 0400 03/31/20 0800 03/31/20 1200 03/31/20 1600 BP: 145/86 (!) 165/94 167/89 (!) 192/121 Pulse: (!) 104 94 96 93 Resp: 18 18 18 17 Temp: 98 °F (36.7 °C) 97.9 °F (36.6 °C) 97.6 °F (36.4 °C) 97.6 °F (36.4 °C) SpO2: 99% 99% 99% 99% Weight:      
Height:      
 
 
 
 Lines, Drains, & Airways Peripheral IV 03/29/20 Left Wrist-Site Assessment: Clean, dry, & intact VTE Prophylaxis Sequential/Intermittent Compression Device: Bilateral 
       
        
Intake and Output:  
No intake/output data recorded. No intake/output data recorded. Current Diet: DIET DIABETIC CONSISTENT CARB Regular; 3 Honey/3 Moderately Thick DIET NUTRITIONAL SUPPLEMENTS Lunch, Dinner; [address] Abdominal  
Last Bowel Movement Date: 03/27/20 Recent Glucose Results:  
Lab Results Component Value Date/Time GLU 99 03/31/2020 01:32 AM  
 GLUCPOC 121 (H) 03/31/2020 03:33 PM  
 GLUCPOC 102 03/31/2020 11:16 AM  
 GLUCPOC 82 03/31/2020 06:27 AM  
 
 
  
IV Antibiotics? No 
      
Activity Level: Activity Level: Up with Assistance Needs assistance with ADLs: yes PT Consult Status: YES Current Immunizations: There is no immunization history for the selected administration types on file for this patient. Recommendations:  
Discharge Disposition: SNF Medication Reconciliation Completed: NO 
 
Cardiac/Pulmonary Rehab Ordered:  NO 
 
Needs for Discharge: None at this time. Recommendations from IDR team:  None at this time.

## 2020-03-31 NOTE — PROGRESS NOTES
ARU/IPR REFERRAL CONTACT NOTE [id number] [facility] RE: Christina Meraz Referral received to review this patient's case for admission to [id number] [facility]. Current status reviewed.  When appropriate, will need OT evaluation/treatment on this patient to complete the pre-admission evaluation.  Will continue to follow. Thank you for this referral.  Should you have any questions please do not hesitate to call. Sincerely, Tami Lozada [facility] Admissions Liaison [facility] 
[phone]

## 2020-03-31 NOTE — PROGRESS NOTES
CM received a phone call from Umu in [place] that the family and patient want the patient to go to [facility] instead of  [facility]. CM called and spoke to the patient's daughter Umu, and CM received verbal consent from the patient's daughter for [address] for [facility]. CM put patient in [facility] to [facility]. Poncho Bermudez, RN Case Management [phone]

## 2020-03-31 NOTE — PROGRESS NOTES
Problem: Dysphagia (Adult) Goal: *Acute Goals and Plan of Care (Insert Text) Description: Patient will: 1. Tolerate PO trials with 0 s/s overt distress in 4/5 trials 2. Utilize compensatory swallow strategies/maneuvers (decrease bite/sip, size/rate, alt. liq/sol) with min cues in 4/5 trials 3. Perform oral-motor/laryngeal exercises to increase oropharyngeal swallow function with min cues 4. Complete an objective swallow study (i.e., MBSS) to assess swallow integrity, r/o aspiration, and determine of safest LRD, min A-met 3/30/20 Recommend:  
Regular diet with honey thick liquids Meds with honey thick liquids Aspiration precautions HOB >45 degrees during all intake and for at least 30 min after intake Small bites/sips, slow rate of intake, alternating bites/sips Oral care three times daily 3/31/2020 0958 by Derrick Mejia SLP Outcome: Progressing Towards Goal 
SPEECH LANGUAGE PATHOLOGY DYSPHAGIA TREATMENT Patient: Grazyna Lujan (18 y.o. female) Date: 3/31/2020 Diagnosis: Ischemic stroke ([facility] [address] [I63.9] Slurred speech [R47.81] Left-sided weakness [R53.1] Hypokalemia [E87.6] Uncontrolled hypertension [I10] Precautions: Aspiration, Fall PLOF: Regular diet with thin liquids ASSESSMENT: 
Pt seen for follow up dysphagia, reporting \"I like those drinks. They taste really good\". Pt continuing to tolerate honey thick liquids and regular solids with no overt s/sx aspiration. Mildly increased oral prep noted with solids but able to clear with no oral residue. Educated with regard to results of MBS, diet recs, oral care and positioning. Pt able to complete effortful swallow and vowel prolongations x10 each. Pt attempted edna; however, pt reporting \"I have no teeth\". Encouraged to complete on own. Pt able to verbalize understanding. Progression toward goals: 
[x]         Improving appropriately and progressing toward goals []         Improving slowly and progressing toward goals 
[]         Not making progress toward goals and plan of care will be adjusted PLAN: 
Recommendations and Planned Interventions: 
Patient continues to benefit from skilled intervention to address the above impairments. Continue treatment per established plan of care. Discharge Recommendations:  Inpatient Rehab SUBJECTIVE:  
Patient stated I like those drinks. They taste really good. OBJECTIVE:  
Cognitive and Communication Status: 
Neurologic State: Alert Orientation Level: Oriented X4 Cognition: Follows commands Safety/Judgement: Fall prevention Dysphagia Treatment: 
Oral Assessment: 
Oral Assessment Labial: Left droop Dentition: Natural, Limited Oral Hygiene: Good Lingual: Incoordinated, Decreased rate Velum: No impairment Mandible: No impairment P.O. Trials: 
 Patient Position: 45 at [facility] Vocal quality prior to P.O.: Low volume Consistency Presented: Honey thick liquid, Solid How Presented: Self-fed/presented, Cup/sip, Spoon, Successive swallows Bolus Acceptance: No impairment Bolus Formation/Control: Impaired Type of Impairment: Posterior, Poor Propulsion: Discoordination Oral Residue: Less than 10% of bolus, Lingual 
 Initiation of Swallow: Delayed (# of seconds) Laryngeal Elevation: Decreased Aspiration Signs/Symptoms: (Silent penetration with thin and NTL on MBS ) Pharyngeal Phase Characteristics: Easily fatigued Effective Modifications: Small sips and bites Cues for Modifications: Moderate Oral Phase Severity: Mild Pharyngeal Phase Severity:  Moderate Exercises: 
Laryngeal Exercises: 
Sustained \"ah\": Yes Sets : 1 Reps : 5 Effortful Swallow: Yes Sets : 1 Reps : 10 Suze: (Attempted) PAIN: 
Start of Tx: 0 End of Tx: 0 After treatment:  
[]              Patient left in no apparent distress sitting up in chair 
[x]              Patient left in no apparent distress in bed [x]              Call bell left within reach [x]              Nursing notified 
[]              Family present 
[]              Caregiver present 
[]              Bed alarm activated COMMUNICATION/EDUCATION:  
[x] Aspiration precautions; swallow safety; compensatory techniques [x]        Patient/family able to participate in training and education  
[]  Patient unable to participate in training and education, education ongoing with staff  
[] Patient understands goals and intent of therapy; neutral about participation Heather Jimenez M.S., CCC-SLP Speech-Language Pathologist

## 2020-03-31 NOTE — PROGRESS NOTES
[facility] Hospitalist Group Progress Note Patient: Kami Rosa Age: 77 y.o. : 1955 MR#: [id number] SSN: [id number] Date: 3/31/2020 Subjective:  
Alert and oriented. NAD. Left side weakness secondary to CVA. Sitting comfortably in recliner. Assessment/Plan: 1. CVA, acute lacunar infarct in the superior aspect of the right thalamus 2. Elevated BP, hypertensive urgency 3. Hypokalemia 4. CKDIII 5. Chronic pansinusitis concerning for chronic fungal infection 6. Hypoalbuminemia 7. Chronic anemia 8. History of CVA with residual mild oral pharyngeal dysphagia 9. HLD 10. DMT2. a1c 4.5% 11. COPD/asthma without exacerbation. Home oxygen as needed 12. Chronic systolic HF. EF 36-40%. Compensated 13. History of nonischemic cardiomyopathy 14. Deconditioned/malnourished 15. Impaired mobility Plan 1. Neurology consult and input appreciated continue stroke protocol. Aspirin and statins, PT/OT eval noted recommending rehab. 2.  Resumed Isordil, continue hydralazine as needed, blood pressure shows better control. 3. MBS today with recommendations regular diet honey thick liquids and meds whole with honey thick liquids 4. Monitor metabolic panel and replete. PO and IV potassium supplementation 5. Diabetes management. POC glucose, lantus 6. Nutrition consult. Discussed with Tami at [place] [address] who will let us know if the patient can be accepted, will continue to closely monitor. Additional Notes:   
 
Case discussed with:  [x]Patient  []Family  []Nursing  []Case Management DVT Prophylaxis:  []Lovenox  []Hep SQ  []SCDs  []Coumadin   []On Heparin gtt Objective:  
VS:  
Visit Vitals /89 (BP 1 Location: Right arm, BP Patient Position: Sitting) Pulse 96 Temp 97.6 °F (36.4 °C) Resp 18 Ht 5' 9\" (1.753 m) Wt 72.6 kg (160 lb) SpO2 99% Breastfeeding No  
BMI 23.63 kg/m² Tmax/24hrs: Temp (24hrs), Av.8 °F (36.6 °C), Min:97.5 °F (36.4 °C), Max:98.1 °F (36.7 °C) No intake or output data in the 24 hours ending 20 1346 General:  Alert, left side facial droop Cardiovascular:  RRR Pulmonary:  LSC throughout; respiratory effort WNL 
GI:  +BS in all four quadrants, soft, non-tender Extremities: left side weakness No edema; 2+ dorsalis pedis pulses bilaterally Neuro: alert and oriented Labs:   
Recent Results (from the past 24 hour(s)) DUPLEX CAROTID BILATERAL Collection Time: 20  2:14 PM  
Result Value Ref Range Right cca dist sys 37.6 cm/s Right CCA dist wang 6.9 cm/s RIGHT COMMON CAROTID ARTERY MID S 45.98 cm/s RIGHT COMMON CAROTID ARTERY MID D 6.12 cm/s Right CCA prox sys 48.8 cm/s Right CCA prox wang 6.8 cm/s Right ICA dist sys 61.6 cm/s Right ICA dist wang 24.1 cm/s Right ICA mid sys 53.8 cm/s Right ICA mid wang 17.4 cm/s Right ICA prox sys 34.9 cm/s Right ICA prox wang 11.8 cm/s Right vertebral sys 29.2 cm/s RIGHT VERTEBRAL ARTERY D 10.33 cm/s Right ICA/CCA sys 1.6 Left CCA dist sys 39.8 cm/s Left CCA dist wang 8.6 cm/s LEFT COMMON CAROTID ARTERY MID S 37.59 cm/s LEFT COMMON CAROTID ARTERY MID D 8.55 cm/s Left CCA prox sys 47.4 cm/s Left CCA prox wang 11.1 cm/s Left ICA dist sys 43.2 cm/s Left ICA dist wang 17.8 cm/s Left ICA mid sys 44.7 cm/s Left ICA mid wang 17.9 cm/s Left ICA prox sys 37.1 cm/s Left ICA prox wang 14.6 cm/s Left vertebral sys 50.3 cm/s LEFT VERTEBRAL ARTERY D 17.10 cm/s Left ICA/CCA sys 1.12 Right eca sys 74.3 cm/s Right subclavian sys 90.5 cm/s Left ECA sys 45.3 cm/s Left subclavian sys 60.1 cm/s  
ECHO ADULT FOLLOW-UP OR LIMITED Collection Time: 20  3:33 PM  
Result Value Ref Range LVIDd 4.45 3.9 - 5.3 cm  
 LVPWd 1.55 (A) 0.6 - 0.9 cm LVIDs 3.60 cm  IVSd 1.98 (A) 0.6 - 0.9 cm  
 Inferior Vena Cava Diameter Sniffing 1.55 cm  
 LV Mass .0 (A) 67 - 162 g  
 LV Mass AL Index 225.1 (A) 43 - 95 g/m2 Triscuspid Valve Regurgitation Peak Gradient 62.6 mmHg  
 TR Max Velocity 395.59 cm/s Left Ventricular Fractional Shortening by 2D 96.643983686 % Left Ventricular End Diastolic Volume by Teichholz Method [id number] mL Left Ventricular End Systolic Volume by Teichholz Method [id number] mL Left Ventricular Stroke Volume by Teichholz Method 00.369184140 mL  
GLUCOSE, POC Collection Time: 03/30/20  9:32 PM  
Result Value Ref Range Glucose (POC) 107 70 - 110 mg/dL CBC WITH AUTOMATED DIFF Collection Time: 03/31/20  1:32 AM  
Result Value Ref Range WBC 7.2 4.6 - 13.2 K/uL  
 RBC 3.52 (L) 4.20 - 5.30 M/uL  
 HGB 10.1 (L) 12.0 - 16.0 g/dL HCT 31.2 (L) 35.0 - 45.0 % MCV 88.6 74.0 - 97.0 FL  
 MCH 28.7 24.0 - 34.0 PG  
 MCHC 32.4 31.0 - 37.0 g/dL  
 RDW 15.6 (H) 11.6 - 14.5 % PLATELET 051 638 - 776 K/uL MPV 12.0 (H) 9.2 - 11.8 FL  
 NEUTROPHILS 39 (L) 42 - 75 % BAND NEUTROPHILS 3 0 - 5 % LYMPHOCYTES 15 (L) 20 - 51 % MONOCYTES 6 2 - 9 % EOSINOPHILS 36 (H) 0 - 5 % BASOPHILS 1 0 - 3 %  
 ABS. NEUTROPHILS 3.0 1.8 - 8.0 K/UL  
 ABS. LYMPHOCYTES 1.1 0.8 - 3.5 K/UL  
 ABS. MONOCYTES 0.4 0 - 1.0 K/UL  
 ABS. EOSINOPHILS 2.6 (H) 0.0 - 0.4 K/UL  
 ABS. BASOPHILS 0.1 (H) 0.0 - 0.06 K/UL  
 DF AUTOMATED PLATELET COMMENTS ADEQUATE PLATELETS    
 RBC COMMENTS ANISOCYTOSIS 
1+ METABOLIC PANEL, BASIC Collection Time: 03/31/20  1:32 AM  
Result Value Ref Range Sodium 144 136 - 145 mmol/L Potassium 4.8 3.5 - 5.5 mmol/L Chloride 114 (H) 100 - 111 mmol/L  
 CO2 27 21 - 32 mmol/L Anion gap 3 3.0 - 18 mmol/L Glucose 99 74 - 99 mg/dL BUN 23 (H) 7.0 - 18 MG/DL Creatinine 1.37 (H) 0.6 - 1.3 MG/DL  
 BUN/Creatinine ratio 17 12 - 20 GFR est AA 47 (L) >60 ml/min/1.73m2 GFR est non-AA 38 (L) >60 ml/min/1.73m2 Calcium 8.3 (L) 8.5 - 10.1 MG/DL  
GLUCOSE, POC Collection Time: 03/31/20  6:27 AM  
Result Value Ref Range Glucose (POC) 82 70 - 110 mg/dL GLUCOSE, POC Collection Time: 03/31/20 11:16 AM  
Result Value Ref Range Glucose (POC) 102 70 - 110 mg/dL Signed By: Jacqueline Pelayo MD   
 March 31, 2020

## 2020-03-31 NOTE — PROGRESS NOTES
Problem: Mobility Impaired (Adult and Pediatric) Goal: *Acute Goals and Plan of Care (Insert Text) Description: Physical Therapy Goals Initiated 3/30/2020 and to be accomplished within 7 day(s) 1. Patient will move from supine to sit and sit to supine , scoot up and down and roll side to side in bed with supervision/set-up. 2.  Patient will transfer from bed to chair and chair to bed with contact guard assist using the least restrictive device. 3.  Patient will perform sit to stand with  contact guard assist. 
4.  Patient will ambulate with  contact guard assist for 100 feet with the least restrictive device. 5.  Patient will ascend/descend 4 stairs with 1-2 handrail(s) with minimal assistance/contact guard assist.  
 
Prior Level of Function:  
Patient was modified independence for all mobility including gait using single point cane. Patient lives daughter in a single story home 4 steps to enter B handrail assist. Pt has a rolling walker, bedside commode, and a shower chair as needed. Outcome: Progressing Towards Goal 
 
PHYSICAL THERAPY TREATMENT Patient: Laya Gould (33 y.o. female) Date: 3/31/2020 Diagnosis: Ischemic stroke ([facility] [address] [I63.9] Slurred speech [R47.81] Left-sided weakness [R53.1] Hypokalemia [E87.6] Uncontrolled hypertension [I10] <principal problem not specified> Precautions: Fall ASSESSMENT: Patient is motivated to participate in skilled PT treatment session. Pt received semi reclined in bed with active bed alarm; turned alarm off for OOB mobility. Patient mobilizes for supine to sitting EOB with CGA, cues provided for sequencing. Pt scoots towards the EOB with SB/ CGA to perform BLE there-ex in order to prepare for sit to stand. Provided min VC's to place hands on the bed to push, as pt was reaching to pull onto RW for sit to stand. Pt demos functional improvement and carry-over with sit to stand transfers.  She initially required min A, but second trial of sit to stand she could perform with CGA. Pt has narrow ISIS and decreased left step length, improved with verbal cues. She needs min A to ambulate 15 feet for management of RW and safety. Patient presents with decreased strength and endurance, fatigues quickly after gait and needs min A to control descent to sit on EOB; she did not reach her arms back when cued. She recovers with a 2-3 minute rest break and agreeable to ambulate to the chair. Pt required tactile cues to reach arm back to sit in the chair. Patient left in the chair with active alarm and all needs met. Patient will benefit from continued rehab in order to maximize mobility and restore PLOF. Progression toward goals:  
[x]      Improving appropriately and progressing toward goals 
[]      Improving slowly and progressing toward goals 
[]      Not making progress toward goals and plan of care will be adjusted PLAN: 
Patient continues to benefit from skilled intervention to address the above impairments. Continue treatment per established plan of care. Discharge Recommendations:  Inpatient Rehab Further Equipment Recommendations for Discharge: TBD at next level of care SUBJECTIVE:  
Patient stated I like sitting with my feet down.  OBJECTIVE DATA SUMMARY:  
Critical Behavior: 
Neurologic State: Alert Orientation Level: Oriented X4 Cognition: Follows commands Safety/Judgement: Fall prevention Functional Mobility Training: 
Bed Mobility: 
  
Supine to Sit: Contact guard assistance Sit to Supine: Contact guard assistance Scooting: Stand-by assistance;Contact guard assistance Transfers: 
Sit to Stand: Contact guard assistance;Minimum assistance Stand to Sit: Contact guard assistance;Minimum assistance Balance: 
Sitting: Impaired; Without support Sitting - Static: Good (unsupported) Sitting - Dynamic: Fair (occasional) Standing: Impaired; With support Standing - Static: Fair Standing - Dynamic : Fair Range Of Motion: 
 AROM: Generally decreased, functional(LUE) Ambulation/Gait Training: 
Distance (ft): 15 Feet (ft)(+ 5 ft) Assistive Device: Walker, rolling Ambulation - Level of Assistance: Minimal assistance Gait Abnormalities: Decreased step clearance Base of Support: Center of gravity altered;Narrowed Stance: Left decreased Speed/Laila: Slow Step Length: Left shortened Therapeutic Exercises:  
 
 
EXERCISE Sets Reps Active Active Assist  
Passive Self ROM Comments Ankle Pumps 1 20  [x] [] [] [] Quad Sets/Glut Sets    [] [] [] [] Hold for 5 secs Hamstring Sets   [] [] [] [] Short Arc Quads   [] [] [] [] Heel Slides   [] [] [] [] Straight Leg Raises   [] [] [] [] Hip Add   [] [] [] [] Hold for 5 secs, w/ pillow squeeze Long Arc Quads 1 10 [x] [] [] [] Seated Marching 1 5 [x] [] [] []   
Standing Marching   [] [] [] []   
   [] [] [] []   
 
 
Pain: 
Pain level pre-treatment: 0/10 Pain level post-treatment: 0/10 Pain Intervention(s): Medication (see MAR); Rest, Ice, Repositioning Response to intervention: Nurse notified, See doc flow Activity Tolerance:  
Fair Please refer to the flowsheet for vital signs taken during this treatment. After treatment:  
[x] Patient left in no apparent distress sitting up in chair, with active chair alarm 
[] Patient left in no apparent distress in bed 
[x] Call bell left within reach [x] Nursing notified 
[] Caregiver present 
[] Bed alarm activated 
[] SCDs applied COMMUNICATION/EDUCATION:  
[x]         Role of Physical Therapy in the acute care setting. [x]         Fall prevention education was provided and the patient/caregiver indicated understanding. [x]         Patient/family have participated as able in working toward goals and plan of care. [x]         Patient/family agree to work toward stated goals and plan of care.  
[]         Patient understands intent and goals of therapy, but is neutral about his/her participation. []         Patient is unable to participate in stated goals/plan of care: ongoing with therapy staff. 
[]         Other: 
 
   
Carloz Beverly, PT Time Calculation: 26 mins

## 2020-03-31 NOTE — ROUTINE PROCESS
Bedside and Verbal shift change report given to Milagros Marin RN (oncoming nurse) by Pilar Alcocer RN (offgoing nurse). Report included the following information SBAR, Kardex and Recent Results.

## 2020-03-31 NOTE — PROGRESS NOTES
ARU/IPR REFERRAL CONTACT NOTE [id number] [place] [address] for Physical Rehabilitation RE: Krystal Pritchett Thank you for the opportunity to review this patient's case for admission to [address] [place] [address] for Physical Rehabilitation. Based on our pre-admission screening:  
 
[x ] Our Team/Medical Director is following this case. Comments: Spoke with the patient and her daughter and they declined ARU. They have now decided that they would like the patient to go to [facility] because she has been there before and had excellent care. I notified Dr. Penelope Nick and Klever Ortiz CM re: their wishes. I will sign off at this time. Again, Thank you for this referral. Should you have any questions please do not hesitate to call. Sincerely, Tami Felix Admissions [facility] for Physical Rehabilitation 
[phone]

## 2020-03-31 NOTE — PROGRESS NOTES
Problem: Motor Speech Impaired (Adult) Goal: *Acute Goals and Plan of Care (Insert Text) Description: Pt will: 1. Utilize compensatory strategies (decrease rate, overarticulate, increase intensity) to increase intelligibility to >90% at conversational level with min A visual/verbal cues in 4/5 trials. 2. Perform oral motor exercises (with and without resistance) in therapy and at home to increase oral motor strength/range-of-motion for articulation tasks with min A with visual/verbal cues in 4/5 trials. 3. Complete articulatory agility tasks (reading-conversation) with min A with visual/verbal cues in 4/5 trials. Outcome: Progressing Towards Goal 
 
 
SPEECH-LANGUAGE PATHOLOGY EVALUATION & TREATMENT Patient: Ly Maxwell (89 y.o. female) Date: 3/31/2020 Primary Diagnosis: Ischemic stroke ([facility] [address] [I63.9] Slurred speech [R47.81] Left-sided weakness [R53.1] Hypokalemia [E87.6] Uncontrolled hypertension [I10] Precautions: Aspiration,  Fall PLOF: Pt reports speech was \"better before\" ASSESSMENT : 
Based on the objective data described below, the patient presents with mild dysarthria impacting functional communication and independence. Oral mech exam revealed decreased labial seal on L with decreased orolingual strength/control. Decreased breath support with decreased vocal intensity noted. Pt able to sustain vowel for ~8 seconds. Pt with blended word boundaries, imprecise articulation and increased rate of speech. Able to improve intelligibility given mod verbal/visual demos/cues for decreased rate of speech, exaggerated articulation and increased vocal intensity. Recommend SLP to address deficits. Discussed with pt who was able to verbalize understanding. Treatment: 
Initiated speech oral motor exercises and breath support exercises with written handout. Pt able to complete exercises given mod verbal/visual demos and cues.   Educated with regard to compensatory speech strategies and environmental mods including decreased rate of speech, exaggerated articulation and increased vocal intensity. Will continue to follow. Patient will benefit from skilled intervention to address the above impairments. Patient's rehabilitation potential is considered to be Excellent Factors which may influence rehabilitation potential include:  
[]              None noted []              Mental ability/status [x]              Medical condition []              Home/family situation and support systems []              Safety awareness []              Pain tolerance/management 
[]              Other: PLAN : 
Recommendations and Planned Interventions: As above Frequency/Duration: Patient will be followed by speech-language pathology 1-2 times per day/4-7 days per week to address goals. Discharge Recommendations: Inpatient Rehab SUBJECTIVE:  
Patient stated Yeah I don't sound like I used to. OBJECTIVE:  
 
Past Medical History:  
Diagnosis Date Anemia Arthritis Cardiomyopathy due to hypertension, with heart failure ([address] 5/19/2017 Cataract Chronic combined systolic and diastolic congestive heart failure ([address] 5/3/2018 Chronic lung disease Chronic obstructive pulmonary disease (HCC) Chronic systolic congestive heart failure ([address] 2/3/2014 Diabetes mellitus ([address] Difficulty swallowing Both food and liquid Dysphagia following cerebral infarction 8/17/2017 History of echocardiogram 12/29/2009 EF 50%. Mild-mod conc LVH. Mod DDfx. LAE. Mild MR. Hypercholesterolemia Hypertension Hypertensive heart disease Infarction of right thalamus ([facility] [address] 9/17/2017 Lacunar infarct 2017 Joint pain Joint swelling Normal nuclear stress test 09/08/2000 No ischemia or prior infarction. Neg EKG on submax EST. Ex time 15:02. Recurrent boils Rheumatism Shortness of breath Sinusitis with nasal polyps 8/18/2019 Past Surgical History:  
Procedure Laterality Date CARDIAC CATHETERIZATION  5/10/2018 CORONARY ARTERY ANGIOGRAM  5/10/2018 HX CHOLECYSTECTOMY  2001 [address] [facility] MODERATE SEDATION  5/10/2018 Prior Level of Function/Home Situation: 
Home Situation Home Environment: Private residence # Steps to Enter: 4 Rails to Enter: Yes Hand Rails : Bilateral 
One/Two Story Residence: One story Living Alone: No 
Support Systems: Child(masha) Patient Expects to be Discharged to[de-identified] Private residence Current DME Used/Available at Home: Walker, rolling, Cane, straight, Wheelchair, power, Glucometer, Blood pressure cuff, Commode, bedside, Oxygen, portable, Shower chair Tub or Shower Type: Tub/Shower combination Mental Status: 
Neurologic State: Alert Orientation Level: Oriented X4 Cognition: Follows commands Perception: Appears intact Perseveration: No perseveration noted Safety/Judgement: Fall prevention Motor Speech: 
Oral-Motor Structure/Motor Speech Dysarthric Characteristics: Blended word boundaries; Decreased breath support; Imprecise Intelligibility: Impaired Word Intelligibility (%): 100 % Conversation Intelligibility (%): 80 % Overall Impairment Severity: Mild Language Comprehension and Expression: 
Verbal Expression Primary Mode of Expression: Verbal 
Initiation: No impairment Repetition: No impairment Naming: No impairment Voice: 
Vocal Quality: Low volume PAIN: 
Start of Eval: 0 End of Eval: 0 After treatment:  
[]              Patient left in no apparent distress sitting up in chair 
[x]              Patient left in no apparent distress in bed 
[x]              Call bell left within reach [x]              Nursing notified 
[]              Caregiver present 
[]              Bed alarm activated COMMUNICATION/EDUCATION:  
[x] Patient/family have participated as able in goal setting and plan of care. [x]  Patient/family agree to work toward stated goals and plan of care. []  Patient understands intent and goals of therapy, but is neutral about his/her participation. []  Patient is unable to participate in goal setting and plan of care; education ongoing with interdisciplinary staff Thank you for this referral, Dolores Hargrove M.S., CCC-SLP Speech-Language Pathologist

## 2020-03-31 NOTE — ROUTINE PROCESS
Bedside shift change report given to [employer] (oncoming nurse) by Aubrey Queen (offgoing nurse). Report included the following information SBAR, Kardex, Intake/Output, MAR and Recent Results.

## 2020-03-31 NOTE — PROGRESS NOTES
Re:  Luh Lanier up visit     3/31/2020 1:35 PM 
 
SSN: [id number] Subjective: Coy Castellon is seen in follow up. She's found in in a chair resting. Medications:   
Current Facility-Administered Medications Medication Dose Route Frequency Provider Last Rate Last Dose  [START ON 4/1/2020] potassium chloride (KLOR-CON) tablet 10 mEq  10 mEq Oral DAILY Jasmeet Elizalde MD      
 hydrALAZINE (APRESOLINE) 20 mg/mL injection 20 mg  20 mg IntraVENous Q6H PRN Jasmeet Elizalde MD   20 mg at 03/31/20 1974  labetaloL (NORMODYNE;TRANDATE) 20 mg/4 mL (5 mg/mL) injection 5 mg  5 mg IntraVENous Q10MIN PRN Jasmeet Elizalde MD      
 amoxicillin-clavulanate (AUGMENTIN) 875-125 mg per tablet 1 Tab  1 Tab Oral Q12H Jasmeet Elizalde MD   1 Tab at 03/31/20 1062  aspirin delayed-release tablet 81 mg  81 mg Oral DAILY Bradly Calloway [address]   81 mg at 03/31/20 9343  
 gabapentin (NEURONTIN) capsule 100 mg  100 mg Oral TID ROSEANNE Calloway   100 mg at 03/31/20 6230  
 atorvastatin (LIPITOR) tablet 80 mg  80 mg Oral QHS Bradly Calloway [address]   80 mg at 03/30/20 2142  acetaminophen (TYLENOL) tablet 650 mg  650 mg Oral Q4H PRN Bradly Calloway [address]   650 mg at 03/31/20 0023  
 insulin lispro (HUMALOG) injection   SubCUTAneous AC&HS Bradly Callowaye   Stopped at 03/30/20 0016  
 glucose chewable tablet 16 g  16 g Oral PRN ROSEANNE Calloway      
 glucagon [facility]) injection 1 mg  1 mg IntraMUSCular PRN Bradly Calloway      
 dextrose (D50W) injection syrg 12.5-25 g  25-50 mL IntraVENous PRN Bradly Calloway      
 insulin glargine (LANTUS) injection 5 Units  5 Units SubCUTAneous QHS Bradly Calloway   5 Units at 03/30/20 2143  albuterol-ipratropium (DUO-NEB) 2.5 MG-0.5 MG/3 ML  3 mL Nebulization Q4H PRN Alfred Aguirre, PA      
 arformoteroL (BROVANA) neb solution 15 mcg  15 mcg Nebulization BID RT Chavo Kuhn T, [address]   15 mcg at 03/31/20 7611  budesonide (PULMICORT) 1,000 mcg/2 mL nebulizer susp  1,000 mcg Nebulization BID RT Yesy Bates   1,000 mcg at 03/31/20 6820 Vital signs:   
Visit Vitals /89 (BP 1 Location: Right arm, BP Patient Position: Sitting) Pulse 96 Temp 97.6 °F (36.4 °C) Resp 18 Ht 5' 9\" (1.753 m) Wt 72.6 kg (160 lb) SpO2 99% Breastfeeding No  
BMI 23.63 kg/m² Review of Systems: As above otherwise 11 point review of systems negative including;  
Constitutional no fever or chills Skin denies rash or itching HEENT  Denies tinnitus, hearing lose Eyes denies diplopia vision lose Respiratory denies sortness of breath Cardiovascular denies chest pain, dyspnea on exertion Gastrointestinal denies nausea, vomiting, diarrhea, constipation Genitourinary denies incontinence Musculoskeletal denies joint pain or swelling Endocrine denies weight change Hematology denies easy bruising or bleeding Neurological as above in HPI Patient Active Problem List  
Diagnosis Code  Essential hypertension I10  
 Diabetes mellitus ([facility] [address] E11.9  Hyperlipidemia E78.5  Cervical myelopathy (HCC) G95.9  Eczema L30.9  Asthma J45.909  Chronic combined systolic and diastolic congestive heart failure (HCC) I50.42  Bladder prolapse, female, acquired N81.10  
 IBS (irritable bowel syndrome) K58.9  Osteoporosis M81.0  Migraine G43.909  Anxiety and depression F41.9, F32.9  Pleural effusion J90  Type 2 diabetes mellitus with nephropathy (HCC) E11.21  
 Hypokalemia E87.6  Acute pulmonary edema (HCC) J81.0  Hypertensive emergency I16.1  Headache R51  Acute on chronic systolic (congestive) heart failure (HCC) I50.23  Type 2 diabetes mellitus with diabetic neuropathy ([facility]) E11.40  Acute non-recurrent maxillary sinusitis J01.00  
 Dizziness R42  Chronic obstructive pulmonary disease ([facility] [address] J44.9  CKD (chronic kidney disease) stage 3, GFR 30-59 ml/min ([facility]) N18.3  Fall W19. Nanette Francis  Allergic rhinitis J30.9  Chronic sinusitis J32.9  TIA (transient ischemic attack) G45.9  CVA (cerebral vascular accident) ([address] I63.9  
 Encephalopathy G93.40  Infarction of right thalamus (HCC) I63.9  Dysphagia following cerebral infarction I69.391  Sinusitis with nasal polyps J32.9, J33.9  Cardiomyopathy due to hypertension, with heart failure ([address] I11.0, I43  Slurred speech R47.81  Left-sided weakness R53.1  Uncontrolled hypertension I10  
 Ischemic stroke ([facility]) I63.9  Pansinusitis J32.4  CKD (chronic kidney disease) N18.9 Objective: The patient is awake, alert, and oriented x 4. Fund of knowledge is adequate. Speech is slurred but fluent and memory is intact. Cranial Nerves: II  Visual fields are full to confrontation. III, IV, VI  Extraocular movements are intact. There is no nystagmus. V  Facial sensation is intact to pinprick. VII  Face is symmetrical.  VIII - Hearing is present. IX, X, XII  Palate is symmetrical.   XI - Shoulder shrugging and head turning intact Motor: The patient has a left hemiparalysis, about 4/5 in the arm and the leg. Tone is normal. Reflexes are 2+ and symmetrical. Plantars are down going. Gait is not tested at this time. CBC:  
Lab Results Component Value Date/Time WBC 7.2 03/31/2020 01:32 AM  
 RBC 3.52 (L) 03/31/2020 01:32 AM  
 HGB 10.1 (L) 03/31/2020 01:32 AM  
 HCT 31.2 (L) 03/31/2020 01:32 AM  
 PLATELET 028 35/51/6123 01:32 AM  
 
BMP:  
Lab Results Component Value Date/Time Glucose 99 03/31/2020 01:32 AM  
 Sodium 144 03/31/2020 01:32 AM  
 Potassium 4.8 03/31/2020 01:32 AM  
 Chloride 114 (H) 03/31/2020 01:32 AM  
 CO2 27 03/31/2020 01:32 AM  
 BUN 23 (H) 03/31/2020 01:32 AM  
 Creatinine 1.37 (H) 03/31/2020 01:32 AM  
 Calcium 8.3 (L) 03/31/2020 01:32 AM  
 
CMP:  
Lab Results Component Value Date/Time  Glucose 99 03/31/2020 01:32 AM  
 Sodium 144 03/31/2020 01:32 AM  
 Potassium 4.8 03/31/2020 01:32 AM  
 Chloride 114 (H) 03/31/2020 01:32 AM  
 CO2 27 03/31/2020 01:32 AM  
 BUN 23 (H) 03/31/2020 01:32 AM  
 Creatinine 1.37 (H) 03/31/2020 01:32 AM  
 Calcium 8.3 (L) 03/31/2020 01:32 AM  
 Anion gap 3 03/31/2020 01:32 AM  
 BUN/Creatinine ratio 17 03/31/2020 01:32 AM  
 Alk. phosphatase 107 03/29/2020 05:39 PM  
 Protein, total 7.4 03/29/2020 05:39 PM  
 Albumin 2.5 (L) 03/29/2020 05:39 PM  
 Globulin 4.9 (H) 03/29/2020 05:39 PM  
 A-G Ratio 0.5 (L) 03/29/2020 05:39 PM  
 
Coagulation:  
Lab Results Component Value Date/Time Prothrombin time 12.6 03/29/2020 05:39 PM  
 INR 1.0 03/29/2020 05:39 PM  
 aPTT 29.4 03/29/2020 05:39 PM  
 
Cardiac markers:  
Lab Results Component Value Date/Time CK 38 03/29/2020 05:39 PM  
 CK-MB Index  03/29/2020 05:39 PM  
  CALCULATION NOT PERFORMED WHEN RESULT IS BELOW LINEAR LIMIT Assessment: New subcortical stroke on the right side in this patient who has risk factors including hypertension, diabetes. Plan: Needs better control blood pressure which can be initiated at this time. A stroke is probably 67 hours old. Good candidate for rehabilitation. We will follow with you. Sincerely, 
 
 
 
Maximus Horton M.D. PLEASE NOTE:  
This document has been produced using voice recognition software. Unrecognized errors in transcription may be present.

## 2020-03-31 NOTE — PROGRESS NOTES
OT order received and chart reviewed. Patient was seen for skilled OT this morning and OT to recommend Inpatient Rehab. Patient contact guard assist, additional time needed for bed mobility, minimum assist for seated lower body dressing, and minimum assistance for functional transfers/mobility using rolling walker. Full note to follow. Thank you for this referral, Darrelyn Crigler, MS, OTR/L

## 2020-04-01 NOTE — ADVANCED PRACTICE NURSE
I have reviewed discharge instructions with the patient. The patient verbalized understanding. As part of the discharge instructions, medications already given today were discussed with the patient. The next dose due of all ordered meds was highlighted as part of the medication discharge instructions. Discussed with the patient the importance of taking medications as directed, as well as the side effects and adverse reactions to medications ordered.

## 2020-04-01 NOTE — INTERDISCIPLINARY ROUNDS
Interdisciplinary Round Note Patient Information: Patient Information: Esther Ludwig 402/01 Reason for Admission: Ischemic stroke ([address] [I63.9] Slurred speech [R47.81] Left-sided weakness [R53.1] Hypokalemia [E87.6] Uncontrolled hypertension [I10] Attending Provider:   Leonor Curry MD 
 Past Medical History:  
Past Medical History:  
Diagnosis Date  Anemia  Arthritis  Cardiomyopathy due to hypertension, with heart failure ([address] 5/19/2017  Cataract  Chronic combined systolic and diastolic congestive heart failure ([address] 5/3/2018  Chronic lung disease  Chronic obstructive pulmonary disease ([address]  Chronic systolic congestive heart failure ([address] 2/3/2014  Diabetes mellitus ([address]  Difficulty swallowing Both food and liquid  Dysphagia following cerebral infarction 8/17/2017  History of echocardiogram 12/29/2009 EF 50%. Mild-mod conc LVH. Mod DDfx. LAE. Mild MR.    
 Hypercholesterolemia  Hypertension  Hypertensive heart disease  Infarction of right thalamus ([address] 9/17/2017 Lacunar infarct 2017  Joint pain  Joint swelling  Normal nuclear stress test 09/08/2000 No ischemia or prior infarction. Neg EKG on submax EST. Ex time 15:02.  Recurrent boils  Rheumatism  Shortness of breath  Sinusitis with nasal polyps 8/18/2019 Hospital day: 3 Estimated discharge date: 04/01/2020 RRAT Score: High Risk   
      
 37 Total Score 4 IP Visits Last 12 Months (1-3=4, 4=9, >4=11) 5 Pt. Coverage (Medicare=5 , Medicaid, or Self-Pay=4) 28 Charlson Comorbidity Score (Age + Comorbid Conditions) Criteria that do not apply:  
 Has Seen PCP in Last 6 Months (Yes=3, No=0) . Living with Significant Other. Assisted Living. LTAC. SNF. or  
Rehab Patient Length of Stay (>5 days = 3) [place] [facility] [place]:  
 04/01/20 0000 04/01/20 0400 04/01/20 0800 04/01/20 1200 BP: 150/80 160/85 179/90 134/80 Pulse: 97 100 100 (!) 102 Resp: 16 16 17 18 Temp: 98.1 °F (36.7 °C) 98.3 °F (36.8 °C) 97.8 °F (36.6 °C) 98.3 °F (36.8 °C) SpO2: 98% 97% 99% Weight:      
Height:      
 
 
 
 Lines, Drains, & Airways [REMOVED] Peripheral IV 03/29/20 Left Wrist-Site Assessment: Clean, dry, & intact VTE Prophylaxis Sequential/Intermittent Compression Device: Bilateral 
       
        
Intake and Output:  
No intake/output data recorded. 04/01 0701 - 04/01 1900 In: 980 [P.O.:960; I.V.:20] Out: -  Current Diet: DIET DIABETIC CONSISTENT CARB Regular; 3 Honey/3 Moderately Thick DIET NUTRITIONAL SUPPLEMENTS Lunch, Dinner; [address] Abdominal  
Last Bowel Movement Date: 04/01/20 Stool Appearance: Loose Recent Glucose Results:  
Lab Results Component Value Date/Time GLUCPOC 99 04/01/2020 10:38 AM  
 GLUCPOC 89 04/01/2020 06:08 AM  
 GLUCPOC 125 (H) 03/31/2020 09:03 PM  
 
 
  
IV Antibiotics? No 
      
Activity Level: Activity Level: Up with Assistance Needs assistance with ADLs: yes PT Consult Status: YES Current Immunizations: There is no immunization history for the selected administration types on file for this patient. Recommendations:  
Discharge Disposition: SNF Medication Reconciliation Completed: NO 
 
Cardiac/Pulmonary Rehab Ordered:  NO 
 
Needs for Discharge: None at this time. Recommendations from IDR team:  None at this time.

## 2020-04-01 NOTE — PROGRESS NOTES
Discharge order noted for today. Patient has been accepted to [facility] skilled nursing facility. Confirmed with Joi Rod that bed is available today. Spoke with patient's daughter Kee Guardian and she is agreeable to the transition plan today. Insurance authorization has been obtained. Transport to facility has been arranged through [facility] at 6:30pm time. Patient's discharge summary has been forwarded to skilled nursing facility via [facility]. Bedside RN, [facility], has been updated to the transition plan. Discharge information has been updated on the AVS.  Please call report to [phone]. Valdez Dahl RN Case Management [phone]

## 2020-04-01 NOTE — DISCHARGE INSTRUCTIONS
Patient Education        Learning About Antiplatelet Medicines After a Stroke  Introduction    If you have had a stroke, you may have concerns about having another one. You want to do all you can do to avoid this. If your stroke was caused by a blood clot, one of the best things you can do is to take antiplatelet medicines. They can help prevent another stroke. In most cases, you don't take them if you had a stroke caused by a leak in an artery. These medicines are often called blood thinners. But they don't thin your blood. They work to keep platelets from sticking together and forming blood clots. (A platelet is a type of blood cell.) Blood clots can cause a stroke if they block a blood vessel in the brain. So by preventing blood clots, you are helping to prevent a stroke. Examples  · Aspirin (Danna, Bufferin, Ecotrin)  · Aspirin with dipyridamole (Aggrenox)  · Clopidogrel (Plavix)  Possible side effects  These medicines make your blood take longer than normal to clot. This can cause bleeding, and you may bruise easily. In rare cases, they can cause you to bleed inside your body without an injury. If you have an injury, you might have bleeding that is hard to control. These medicines may have other side effects. Depending on which one you take, you may:  · Have diarrhea. · Feel sick to your stomach. · Have a headache. · Have some mild belly pain. You may have other side effects or reactions not listed here. Check the information that comes with your medicine. What to know about taking this medicine  · Be sure you get instructions about how to take your medicine safely. Blood thinners can cause serious bleeding problems. · Be safe with medicines. Take your medicines exactly as prescribed. Call your doctor if you think you are having a problem with your medicine. · Check with your doctor or pharmacist before you use any other medicines, including over-the-counter medicines.  Make sure your doctor knows all of the medicines, vitamins, herbal products, and supplements you take. Taking some medicines together can cause problems. Where can you learn more? Go to http://chanel-connor.info/  Enter T164 in the search box to learn more about \"Learning About Antiplatelet Medicines After a Stroke. \"  Current as of: December 15, 2019Content Version: 12.4  © 0670-8760 [employer]. Care instructions adapted under license by [employer] (which disclaims liability or warranty for this information). If you have questions about a medical condition or this instruction, always ask your healthcare professional. [place] [address] any warranty or liability for your use of this information. Patient Education        Learning About the Risk of Heart Attack and Stroke With Diabetes  How are diabetes, heart attack, and stroke connected? For some people, diabetes can cause problems that increase the risk of a heart attack or stroke. Many things can lead to a heart attack or stroke. These include high blood sugar, insulin resistance, high cholesterol, and high blood pressure. Lifestyle and genetics may also play a part. But here's the good news: The things you're doing to stay healthy with diabetes also help your heart and blood vessels. That means eating healthy foods, quitting smoking, and getting exercise. What increases your risk for heart attack and stroke? When you have diabetes, your risk for heart attack and stroke is even higher if you have:  · High blood pressure. It pushes blood through the arteries with too much force. Over time, this damages the walls of the arteries. · High cholesterol. It causes the buildup of a kind of fat inside the blood vessel walls. This buildup can lower blood flow to the heart muscle and raise your risk for having a heart attack or stroke. · Kidney damage.  It shares many of the risk factors for heart attack and stroke (such as high blood sugar, high blood pressure, and high cholesterol). How do you keep your heart healthy when you have diabetes? Managing your diabetes and keeping your heart and blood vessels healthy are both important. Here are some things you can do. · Test your blood sugar levels and get your diabetes tests on schedule. Try to keep your numbers within your target range. · Keep track of your blood pressure. Your doctor will give you a goal that's right for you. If your blood pressure is high, your treatment may also include medicine. Changes in your lifestyle, such as staying at a healthy weight, may also help you lower your blood pressure. · Eat heart-healthy foods. These include fruits, vegetables, whole grains, fish, and low-fat or nonfat dairy foods. Limit sodium, alcohol, and sweets. · If your doctor recommends it, get more exercise. Walking is a good choice. Bit by bit, increase the amount you walk every day. Try for at least 30 minutes on most days of the week. · Don't smoke. Smoking can make diabetes worse and increase your risk of heart attack or stroke. If you need help quitting, talk to your doctor about stop-smoking programs and medicines. These can increase your chances of quitting for good. · Think about taking medicines for your heart. For example, your doctor may suggest taking a statin or daily aspirin. Where can you learn more? Go to http://chanel-connor.info/  Enter P337 in the search box to learn more about \"Learning About the Risk of Heart Attack and Stroke With Diabetes. \"  Current as of: December 19, 2019Content Version: 12.4  © 3972-8239 [employer]. Care instructions adapted under license by [employer] (which disclaims liability or warranty for this information).  If you have questions about a medical condition or this instruction, always ask your healthcare professional. [place] [address] any warranty or liability for your use of this information. Patient Education        Fatigue: Care Instructions  Your Care Instructions    Fatigue is a feeling of tiredness, exhaustion, or lack of energy. You may feel fatigue because of too much or not enough activity. It can also come from stress, lack of sleep, boredom, and poor diet. Many medical problems, such as viral infections, can cause fatigue. Emotional problems, especially depression, are often the cause of fatigue. Fatigue is most often a symptom of another problem. Treatment for fatigue depends on the cause. For example, if you have fatigue because you have a certain health problem, treating this problem also treats your fatigue. If depression or anxiety is the cause, treatment may help. Follow-up care is a key part of your treatment and safety. Be sure to make and go to all appointments, and call your doctor if you are having problems. It's also a good idea to know your test results and keep a list of the medicines you take. How can you care for yourself at home? · Get regular exercise. But don't overdo it. Go back and forth between rest and exercise. · Get plenty of rest.  · Eat a healthy diet. Do not skip meals, especially breakfast.  · Reduce your use of caffeine, tobacco, and alcohol. Caffeine is most often found in coffee, tea, cola drinks, and chocolate. · Limit medicines that can cause fatigue. This includes tranquilizers and cold and allergy medicines. When should you call for help? Watch closely for changes in your health, and be sure to contact your doctor if:    · You have new symptoms such as fever or a rash.     · Your fatigue gets worse.     · You have been feeling down, depressed, or hopeless. Or you may have lost interest in things that you usually enjoy.     · You are not getting better as expected. Where can you learn more?   Go to http://chanel-connor.info/  Enter [id number] in the search box to learn more about \"Fatigue: Care Instructions. \"  Current as of: June 26, 2019Content Version: 12.4  © 0421-5028 Healthwise, [facility]. Care instructions adapted under license by [employer] (which disclaims liability or warranty for this information). If you have questions about a medical condition or this instruction, always ask your healthcare professional. [address] any warranty or liability for your use of this information. Patient Education        Learning About Diuretics for High Blood Pressure  Overview  Diuretics help to lower blood pressure. This reduces your risk of a heart attack and stroke. It also reduces your risk of kidney disease. Diuretics cause your kidneys to remove sodium and water. They also relax the blood vessel walls. These help lower your blood pressure. Examples  · Chlorthalidone  · Hydrochlorothiazide  Possible side effects  There are some common side effects. They are:  · Too little potassium. · Feeling dizzy. · Rash. · Urinating a lot. · High blood sugar. (But this is not common.)  You may have other side effects. Check the information that comes with your medicine. What to know about taking this medicine  · You may take other medicines for blood pressure. Diuretics can help those work better. They can also prevent extra fluid in your body. · You may need to take potassium pills. Ask your doctor about this. · You may need blood tests to check your kidneys and your potassium level. · Take your medicines exactly as prescribed. Call your doctor if you think you are having a problem with your medicine. · Check with your doctor or pharmacist before you use any other medicines. This includes over-the-counter medicines. Make sure your doctor knows all of the medicines, vitamins, herbal products, and supplements you take. Taking some medicines together can cause problems. Where can you learn more?   Go to http://chanel-connor.info/  Enter [id number] in the search box to learn more about \"Learning About Diuretics for High Blood Pressure. \"  Current as of: December 15, 2019Content Version: 12.4  © 8731-5675 Healthwise, [employer]. Care instructions adapted under license by [employer] (which disclaims liability or warranty for this information). If you have questions about a medical condition or this instruction, always ask your healthcare professional. [address] any warranty or liability for your use of this information. Patient Education        Managing Other Conditions When You Have Heart Failure: Care Instructions  Your Care Instructions    All the systems in your body rely on each other to work properly. Heart failure has effects all through your body that can lead to other problems, such as kidney disease. The reverse is also true. A condition like diabetes or lung disease can damage or stress your heart and cause heart failure. Managing any other problems can help reduce your heart's workload and make your heart failure better. Conditions that commonly cause or occur along with heart failure include high blood pressure, diabetes, COPD, high cholesterol, kidney problems, and anemia. Follow-up care is a key part of your treatment and safety. Be sure to make and go to all appointments, and call your doctor if you are having problems. It's also a good idea to know your test results and keep a list of the medicines you take. How can you care for yourself at home? Steps to help with heart failure and other problems  · Eat less salt (sodium). This helps keep fluid from building up. It may help you feel better. Limiting sodium can also help if you have high blood pressure or kidney disease. · Watch your fluid intake if your doctor tells you to. Reducing fluids can keep your sodium level in balance and may help you feel better. · Get regular exercise. Regular, moderate exercise, such as walking, helps your heart.  It can also help lower your blood pressure, lower stress, and help you lose weight. · Lose weight if you are overweight. Losing weight can help you manage diabetes and lower your blood pressure and cholesterol level. · Stop smoking. Smoking stresses your lungs, interferes with healing, and can make heart failure worse. · Limit or avoid alcohol. Excess alcohol can cause health problems such as increased blood pressure. Ask your doctor how much, if any, is safe. · If you think you might have a problem with alcohol or drug use, talk to your doctor. If your doctor has not set you up with a cardiac rehabilitation (rehab) program, talk to him or her about whether that is right for you. Cardiac rehab includes exercise, help with diet and lifestyle changes, and emotional support. To stay as healthy as possible  · Work closely with your doctor. Have all your tests, and go to all your appointments. · Take your medicines exactly as prescribed. You will take medicines to treat the other conditions you have along with heart failure. It can be hard to balance the treatment for all your conditions. You will need to have follow-up tests to make sure that all your medicines are working well together. Talk to your doctor if you have any problems with your medicine. · Keep all your doctors informed about your health problems and all the medicines you take for them. Medicines that can treat one condition may make another condition worse. · Talk to your doctor before you take any vitamins, over-the-counter drugs, or herbal products. Do not take aspirin, ibuprofen (Advil, Motrin), or naproxen (Aleve) unless you talk to your doctor first. They could make your heart failure and other problems worse. When should you call for help? Call 911 anytime you think you may need emergency care. For example, call if:    · You have symptoms of sudden heart failure, such as:  ? Severe trouble breathing. ? Coughing up pink, foamy mucus. ?  A new irregular or rapid heartbeat.     · You have symptoms of a heart attack. These may include:  ? Chest pain or pressure, or a strange feeling in the chest.  ? Sweating. ? Shortness of breath. ? Nausea or vomiting. ? Pain, pressure, or a strange feeling in the back, neck, jaw, or upper belly or in one or both shoulders or arms. ? Lightheadedness or sudden weakness. ? A fast or irregular heartbeat.    After you call  911, the  may tell you to chew 1 adult-strength or 2 to 4 low-dose aspirin. Wait for an ambulance. Do not try to drive yourself.   Call your doctor now or seek immediate medical care if:    · You have new or increased shortness of breath.     · You are dizzy or lightheaded, or you feel like you may faint.     · You have sudden weight gain, such as more than 2 to 3 pounds in a day or 5 pounds in a week. (Your doctor may suggest a different range of weight gain.)     · You have increased swelling in your legs, ankles, or feet.     · You are suddenly so tired or weak that you cannot do your usual activities.    Watch closely for changes in your health, and be sure to contact your doctor if you develop new symptoms. Where can you learn more? Go to http://chanel-connor.info/  Enter P052 in the search box to learn more about \"Managing Other Conditions When You Have Heart Failure: Care Instructions. \"  Current as of: December 15, 2019Content Version: 12.4  © 1506-3234 Healthwise, [employer]. Care instructions adapted under license by [employer] (which disclaims liability or warranty for this information). If you have questions about a medical condition or this instruction, always ask your healthcare professional. [place] [address] any warranty or liability for your use of this information. Patient Education        Learning About High Blood Pressure  What is high blood pressure?     Blood pressure is a measure of how hard the blood pushes against the walls of your arteries. It's normal for blood pressure to go up and down throughout the day. But if it stays up, you have high blood pressure. Another name for high blood pressure is hypertension. Two numbers tell you your blood pressure. The first number is the systolic pressure (top number). It shows how hard the blood pushes when your heart is pumping. The second number is the diastolic pressure (bottom number). It shows how hard the blood pushes between heartbeats, when your heart is relaxed and filling with blood. Your doctor will give you a goal for your blood pressure based on your health and your age. High blood pressure (hypertension) means that the top number stays high, or the bottom number stays high, or both. High blood pressure increases the risk of stroke, heart attack, and other problems. What happens when you have high blood pressure? · Blood flows through your arteries with too much force. Over time, this damages the walls of your arteries. But you can't feel it. High blood pressure usually doesn't cause symptoms. · Fat and calcium start to build up in your arteries. This buildup is called plaque. Plaque makes your arteries narrower and stiffer. Blood can't flow through them as easily. · This lack of good blood flow starts to damage some of the organs in your body. This can lead to problems such as coronary artery disease and heart attack, heart failure, stroke, kidney failure, and eye damage. How can you prevent high blood pressure? · Stay at a healthy weight. · Try to limit how much sodium you eat to less than 2,300 milligrams (mg) a day. If you limit your sodium to 1,500 mg a day, you can lower your blood pressure even more. ? Buy foods that are labeled \"unsalted,\" \"sodium-free,\" or \"low-sodium. \" Foods labeled \"reduced-sodium\" and \"light sodium\" may still have too much sodium. ? Flavor your food with garlic, lemon juice, onion, vinegar, herbs, and spices instead of salt. Do not use soy sauce, steak sauce, onion salt, garlic salt, mustard, or ketchup on your food. ? Use less salt (or none) when recipes call for it. You can often use half the salt a recipe calls for without losing flavor. · Be physically active. Get at least 30 minutes of exercise on most days of the week. Walking is a good choice. You also may want to do other activities, such as running, swimming, cycling, or playing tennis or team sports. · Limit alcohol to 2 drinks a day for men and 1 drink a day for women. · Eat plenty of fruits, vegetables, and low-fat dairy products. Eat less saturated and total fats. How is high blood pressure treated? · Your doctor will suggest making lifestyle changes to help your heart. For example, your doctor may ask you to eat healthy foods, quit smoking, lose extra weight, and be more active. · If lifestyle changes don't help enough, your doctor may recommend that you take medicine. · When blood pressure is very high, medicines are needed to lower it. Follow-up care is a key part of your treatment and safety. Be sure to make and go to all appointments, and call your doctor if you are having problems. It's also a good idea to know your test results and keep a list of the medicines you take. Where can you learn more? Go to http://chanel-connor.info/  Enter P501 in the search box to learn more about \"Learning About High Blood Pressure. \"  Current as of: December 15, 2019Content Version: 12.4  © 3238-6916 Healthwise, Inc[employer]. Care instructions adapted under license by [employer] (which disclaims liability or warranty for this information). If you have questions about a medical condition or this instruction, always ask your healthcare professional. [place] [address] any warranty or liability for your use of this information.          Patient Education        Learning About Medicines That Lower Your Risk of Another Stroke  Introduction  After you have a stroke, going home may be hard, both for you and for your loved ones. There is a lot to think about. Remember to take one day at a time. An important part of your treatment will be to prevent another stroke. And a big part of that is taking medicines. Some of the medicines your doctor may have you take include:  · Aspirin or other blood thinners. They help prevent blood clots. Most strokes are caused by blood clots. · Statins and other medicines to lower cholesterol. High cholesterol raises your stroke risk. · Medicines for high blood pressure or diabetes. These conditions raise your stroke risk. All medicines can cause side effects. So it is important to understand the pros and cons of any medicine you take. It is also important to take your medicines exactly as your doctor tells you to. Be sure to tell your doctor if you take any other prescription medicine, over-the-counter medicine, vitamins, supplements, or herbal remedies. Have a pill plan  You may have several pills to take every day. Having a plan for how you will remember to take them may help ease your fears and stress. To make a pill plan, write a list of your medicines. Then write down the details for each one. Include when you started using each medicine, when you take it, how much you take each time (number of pills and milligrams in each pill), and any side effects. Before you leave the hospital, be sure to ask questions if you don't understand or need help with your pill plan. And be sure you know who to call when you have questions at home. Blood thinners  One of the best things you can do to prevent another stroke is to take a medicine called a blood thinner. These medicines don't really thin your blood. They work by helping to prevent blood clots. Blood clots can cause a stroke if they block a blood vessel in the brain. So when you prevent blood clots, you help prevent a stroke.   Antiplatelets are a type of blood thinner. They help keep platelets from sticking together and forming blood clots. (A platelet is a type of blood cell.)  Examples of antiplatelets include:  · Aspirin (Danna, Bufferin, Ecotrin). · Aspirin combined with dipyridamole (Aggrenox). · Clopidogrel (Plavix). Another type of blood thinner, called an anticoagulant, may be used if you also have atrial fibrillation. This is a heart rhythm problem. It raises your risk of having a stroke. Be sure to learn how to take your medicine safely. Blood thinners can cause serious bleeding problems. Statins  Statins lower the amount of cholesterol in your blood. If you have too much cholesterol, it starts to build up in blood vessels. And that's how most heart and blood flow problems, including strokes, start. Statins also reduce inflammation around the cholesterol buildup. This may lower the risk that the buildup will break apart and cause a blood clot that can lead to a stroke. Examples of statins include:  · Atorvastatin (Lipitor). · Lovastatin (Mevacor). · Pravastatin (Pravachol). · Simvastatin (Zocor). Blood pressure medicines  If you have high blood pressure, you may take medicines to lower it. High blood pressure damages blood vessels. Damaged vessels clog up more easily. And that can cause a stroke. If you were taking blood pressure pills before, your doctor may have you keep taking them. Or your doctor may have you take a different type. Blood pressure medicines may include:  · ACE (angiotensin-converting enzyme) inhibitors. · Angiotensin II receptor blockers (ARBs). · Beta-blockers. · Diuretics (water pills). · Calcium channel blockers. Follow-up care is a key part of your treatment and safety. Be sure to make and go to all appointments, and call your doctor if you are having problems. It's also a good idea to know your test results and keep a list of the medicines you take. Where can you learn more?   Go to http://chanel-connor.info/  Enter Y915 in the search box to learn more about \"Learning About Medicines That Lower Your Risk of Another Stroke. \"  Current as of: July 14, 2019Content Version: 12.4  © 6094-4968 Healthwise, [employer]. Care instructions adapted under license by [employer] (which disclaims liability or warranty for this information). If you have questions about a medical condition or this instruction, always ask your healthcare professional. [address] any warranty or liability for your use of this information. Patient Education        Hypokalemia: Care Instructions  Your Care Instructions    Hypokalemia (say \"ct-[id number]-LETICIA-otoniel-[id number]\") is a low level of potassium. The heart, muscles, kidneys, and nervous system all need potassium to work well. This problem has many different causes. Kidney problems, diet, and medicines like diuretics and laxatives can cause it. So can vomiting or diarrhea. In some cases, cancer is the cause. Your doctor may do tests to find the cause of your low potassium levels. You may need medicines to bring your potassium levels back to normal. You may also need regular blood tests to check your potassium. If you have very low potassium, you may need intravenous (IV) medicines. You also may need tests to check the electrical activity of your heart. Heart problems caused by low potassium levels can be very serious. Follow-up care is a key part of your treatment and safety. Be sure to make and go to all appointments, and call your doctor if you are having problems. It's also a good idea to know your test results and keep a list of the medicines you take. How can you care for yourself at home? · If your doctor recommends it, eat foods that have a lot of potassium. These include fresh fruits, juices, and vegetables. They also include nuts, beans, and milk. · Be safe with medicines.  If your doctor prescribes medicines or potassium supplements, take them exactly as directed. Call your doctor if you have any problems with your medicines. · Get your potassium levels tested as often as your doctor tells you. When should you call for help?    · You feel like your heart is missing beats. Heart problems caused by low potassium can cause death.     · You passed out (lost consciousness).     · You have a seizure.    Call your doctor now or seek immediate medical care if:    · You feel weak or unusually tired.     · You have severe arm or leg cramps.     · You have tingling or numbness.     · You feel sick to your stomach, or you vomit.     · You have belly cramps.     · You feel bloated or constipated.     · You have to urinate a lot.     · You feel very thirsty most of the time.     · You are dizzy or lightheaded, or you feel like you may faint.     · You feel depressed, or you lose touch with reality.    Watch closely for changes in your health, and be sure to contact your doctor if:    · You do not get better as expected. Where can you learn more? Go to http://www.gray.com/  Enter G358 in the search box to learn more about \"Hypokalemia: Care Instructions. \"  Current as of: July 28, 2019Content Version: 12.4  © 2333-5266 Healthwise, Incorporated. Care instructions adapted under license by [employer] (which disclaims liability or warranty for this information). If you have questions about a medical condition or this instruction, always ask your healthcare professional. [place] [address] any warranty or liability for your use of this information. Patient Education        Chronic Kidney Disease: Care Instructions  Your Care Instructions    Chronic kidney disease happens when your kidneys don't work as well as they should. Your kidneys have a few important jobs. They remove waste from your blood. This waste leaves your body in your urine.  They also balance your body's fluids and chemicals. When your kidneys don't work well, extra waste and fluid can build up. This can poison the body and sometimes cause death. The most common causes of this disease are diabetes and high blood pressure. In some cases, the disease develops in 2 to 3 months. But it usually develops over many years. If you take medicine and make healthy changes to your lifestyle, you may be able to prevent the disease from getting worse. But if your kidney damage gets worse, you may need dialysis or a kidney transplant. Dialysis uses a machine to filter waste from the blood. A transplant is surgery to give you a healthy kidney from another person. Follow-up care is a key part of your treatment and safety. Be sure to make and go to all appointments, and call your doctor if you are having problems. It's also a good idea to know your test results and keep a list of the medicines you take. How can you care for yourself at home?   Treatments and appointments    · Be safe with medicines. Take your medicines exactly as prescribed. Call your doctor if you have any problems with your medicine. You also may take medicine to control your blood pressure or to treat diabetes. Many people who have diabetes take blood pressure medicine.     · If you have diabetes, do your best to keep your blood sugar in your target range. You may do this by eating healthy food and exercising. You may also take medicines.     · Go to your dialysis appointments if you have this treatment.     · Do not take ibuprofen (Advil, Motrin), naproxen (Aleve), or similar medicines, unless your doctor tells you to. These may make the disease worse.     · Do not take any vitamins, over-the-counter medicines, or herbal products without talking to your doctor first.     · Do not smoke or use other tobacco products. Smoking can reduce blood flow to the kidneys. If you need help quitting, talk to your doctor about stop-smoking programs and medicines.  These can increase your chances of quitting for good.     · Do not drink alcohol or use illegal drugs.     · Talk to your doctor about an exercise plan. Exercise helps lower your blood pressure. It also makes you feel better.     · If you have an advance directive, let your doctor know. It may include a living will and a durable power of  for health care. If you don't have one, you may want to prepare one. It lets your doctor and loved ones know your health care wishes if you become unable to speak for yourself. Diet    · Talk to a registered dietitian. He or she can help you make a meal plan that is right for you. Most people with kidney disease need to limit salt (sodium), fluids, and protein. Some also have to limit potassium and phosphorus.     · You may have to give up many foods you like. But try to focus on the fact that this will help you stay healthy for as long as possible.     · If you have a hard time eating enough, talk to your doctor or dietitian about ways to add calories to your diet.     · Your diet may change as your disease changes. See your doctor for regular testing. And work with a dietitian to change your diet as needed. When should you call for help? Call 911 anytime you think you may need emergency care.  For example, call if:    · You passed out (lost consciousness).    Call your doctor now or seek immediate medical care if:    · You have less urine than normal or no urine.     · You have trouble urinating or can urinate only very small amounts.     · You are confused or have trouble thinking clearly.     · You feel weaker or more tired than usual.     · You are very thirsty, lightheaded, or dizzy.     · You have nausea and vomiting.     · You have new swelling of your arms or feet, or your swelling is worse.     · You have blood in your urine.     · You have new or worse trouble breathing.    Watch closely for changes in your health, and be sure to contact your doctor if:    · You have any problems with your medicine or other treatment. Where can you learn more? Go to http://chanel-connor.info/  Enter N276 in the search box to learn more about \"Chronic Kidney Disease: Care Instructions. \"  Current as of: August 11, 2019Content Version: 12.4  © 3038-7356 [employer]. Care instructions adapted under license by [employer] (which disclaims liability or warranty for this information). If you have questions about a medical condition or this instruction, always ask your healthcare professional. [address] any warranty or liability for your use of this information. Patient Education        Learning About Stroke Rehabilitation  What is stroke rehabilitation? Stroke rehabilitation (rehab) is training and therapy to help you relearn to do everyday things you have not been able to do since your stroke. The focus will depend on how the stroke has affected your ability to do the things you want and need to do. Rehab begins in the hospital. It starts as soon as you are able. You will have a team of doctors, nurses, and therapists to help you relearn daily activities. This can include eating, bathing, and dressing. You also may need help to learn how to walk or talk again. If the stroke damaged your memory, you will learn ways to improve it. You will get better faster if you begin rehab soon after the stroke. But even with rehab, you may not be able to do all the things you could before the stroke. You may recover the most in the first few weeks or months after your stroke. But you can keep getting better for years. It just may happen more slowly. And it may take a long time and a lot of hard work. Don't give up hope. After the hospital, you may go to a rehab facility or a nursing home for a while. Or you may go home. Wherever you go, keep working on your rehab and do a little every day.   It's going to be important for you to get the support you need. Let your loved ones help you. Involve them in your treatment. Talk to others who have had a stroke, and find out how they handled ups and downs. How can stroke rehab specialists help? Your stroke rehab team will include doctors and nurses who specialize in stroke rehab, as well as other professionals. Each team member will help you in specific ways. The team may include the following professionals. Rehab doctor   A rehab doctor is a specialist in charge of your rehab program. He or she may also work on special problems, such as muscle cramps and spasms. Rehab nurses   Rehab nurses can help you relearn basic activities of daily life. For example, they can help you learn how to: Take care of your health, including a schedule for medicine. Get from your bed to a wheelchair. Bathe. Control your bowels or bladder. Physical therapist   A stroke often takes away a person's ability to move in certain ways. A physical therapist helps you get back as much movement, balance, and coordination as possible. Physical therapy usually includes exercises. The exercises can help you get back your ability to walk and move as much as possible. It's important to practice these exercises over and over again. Your therapist may also help you learn to use a wheelchair or walker. And he or she may teach you how to use stairs safely. Occupational therapist   An occupational therapist helps you practice daily tasks like eating, bathing, dressing, and writing. For example, he or she may help you learn how to:  Prepare meals and clean your house. Drive your car. Use tools and devices that can help if you no longer have full use of both hands. For example, velcro can replace buttons on clothing. Get grab bars for your bathroom. Make your home safe if you have strength, balance, or vision problems.   Speech therapist   A speech therapist can help you relearn how to talk or find new ways to express yourself. Swallowing is sometimes a problem after a stroke. This therapist can help you improve your ability to swallow. A speech therapist can also help you work on reading and writing skills. Psychologist or counselor   Emotions like fear, anxiety, anger, sadness, frustration, and grief are common after a stroke. A psychologist or counselor can help you deal with your emotions. They can also help you get treatment if you have depression. Vocational counselor   Stroke can leave you with disabilities that make it hard to do your job. A vocational counselor can help you return to your job or find a new one. He or she can help you:  Identify your current skills and prepare a new resume. Search for a job. Understand the laws that protect disabled workers. Recreational therapist   A recreational therapist helps you return to doing things you enjoy. This may include the arts, hobbies, sports, or leisure activities. Follow-up care is a key part of your treatment and safety. Be sure to make and go to all appointments, and call your doctor if you are having problems. It's also a good idea to know your test results and keep a list of the medicines you take. Where can you learn more? Go to http://chanel-connor.info/  Enter S077 in the search box to learn more about \"Learning About Stroke Rehabilitation. \"  Current as of: July 14, 2019Content Version: 12.4  © 6372-7621 Healthwise, Incorporated. Care instructions adapted under license by [employer] (which disclaims liability or warranty for this information). If you have questions about a medical condition or this instruction, always ask your healthcare professional. [place] [address] any warranty or liability for your use of this information. Patient Education        Learning About How to Prevent a Stroke  What is a stroke? A stroke occurs when a blood vessel in the brain bursts or is blocked by a blood clot. Without blood and the oxygen it carries, part of the brain starts to die. The part of the body controlled by the damaged area of the brain can't work properly. But there are many things you can do to help lower your stroke risk. What increases your risk for stroke? A risk factor is anything that makes you more likely to have a particular health problem. Risk factors for stroke that you can treat or change include:  · Health problems like high blood pressure, atrial fibrillation, diabetes, and high cholesterol. · Smoking. · Heavy use of alcohol. · Being overweight. · Not getting enough physical activity. Risk factors you can't change include:  · Age. The risk of stroke goes up as you get older. · Race.  Americans, Native Americans, and [place] Natives have a higher risk than those of other races. · Being female. Women have a higher risk of stroke than men. · Family history of stroke. Your doctor can help you know your risk. Then you and your can doctor talk about whether you need to lower it. What can you do to prevent a stroke? · Treat any health problems you have that raise your risk. · Adopt a heart-healthy lifestyle:  ? Don't smoke. If you need help quitting, talk to your doctor about stop-smoking programs and medicines. These can increase your chances of quitting for good. ? Limit alcohol to 2 drinks a day for men and 1 drink a day for women. ? Stay at a healthy weight. Lose weight if you need to.  ? If your doctor recommends it, get more exercise. Walking is a good choice. Bit by bit, increase the amount you walk every day. Try for at least 30 minutes on most days of the week. ? Eat heart-healthy foods. These include fruits, vegetables, high-fiber foods, and fish. Choose foods that are low in sodium, saturated fat, and trans fat. · Decide with your doctor whether you will also take medicines to help lower your risk.  For example, you and your doctor may decide you will take a medicine that prevents blood clots. What are the symptoms of a stroke? The brain damage from a stroke starts within minutes. That's why it's so important to know the symptoms of stroke and to act fast. Quick treatment can help limit damage to the brain so that you have fewer problems after the stroke. FAST is a simple way to remember the main symptoms of stroke. Recognizing these symptoms helps you know when to call for medical help. FAST stands for:  · F ace drooping. · A rm weakness. · S peech difficulty. · T celestine to call  911. Follow-up care is a key part of your treatment and safety. Be sure to make and go to all appointments, and call your doctor if you are having problems. It's also a good idea to know your test results and keep a list of the medicines you take. Where can you learn more? Go to http://www.gray.com/  Enter G757 in the search box to learn more about \"Learning About How to Prevent a Stroke. \"  Current as of: July 14, 2019Content Version: 12.4  © 5720-5974 [employer]. Care instructions adapted under license by [employer] (which disclaims liability or warranty for this information). If you have questions about a medical condition or this instruction, always ask your healthcare professional. [address] any warranty or liability for your use of this information. Patient Education        Learning About Emotional Changes After a Stroke  Introduction  After a stroke, many people feel different without knowing why. For example, some people find it hard to control their emotions. They may cry or laugh for no reason. Or they may feel down or even hopeless. Some people may find they're acting differently. They may act too quickly or on impulse. Or they may be more anxious and hesitant at times. If these changes happen to you, they can be upsetting. And they can be confusing to you and your loved ones.  But these changes may get better with time as your brain heals. Let your loved ones know what's happening. Their support and understanding can help you deal with these feelings. And with time and support from the people around you, you can learn ways to adjust to life after a stroke. How can a stroke affect your emotions? After a stroke, some people feel like they have lost control of their emotions. These feelings can come from one or both of these two causes:  · A stroke can affect parts of the brain that control how you feel. · A stroke can leave you with upsetting body changes that take away some of your independence. For example, some people may feel:  · Sad or angry about the loss of the lifestyle they had before. · Isolated by speech and language problems. · Frustrated by the slow pace of recovery. · Worried about the future. These feelings are normal and expected. These strong feelings are more likely to happen if you need to stay in bed for long periods of time. And it is more likely to be a problem at night. It may help to have a radio playing softly in the bedroom or a dim light beside the bed. How can you deal with your emotions after a stroke? To deal with your emotions:  · Be easy on yourself. Let go of mistakes. · Give yourself credit for the progress you have made. · Make time for things that you enjoy. · Join a stroke support group. Your rehab team or local hospital can help you find one. Is depression common? It is common to feel sad about changes caused by the stroke. Sometimes the injury to the brain from the stroke can cause depression. If you think you might be depressed, tell your doctor right away. The sooner you know if you are depressed, the sooner you can get treatment. Treatment can help you feel better. Your doctor will want to know if in the past 2 weeks:  · You have lost interest or pleasure in doing things. · You have been feeling sad, hopeless, or empty.   Your doctor may also ask about sleep troubles or changes in eating. How can friends and family help? Your loved ones can help you by following these tips:  · A person who has had a stroke may tend to have strong emotional reactions. Remember that these are a result of the stroke. Try not to become too upset by them. · Don't avoid a loved one who's had a stroke. Contact with and support from family members is very important to recovery. · Watch for signs of depression in people who have had a stroke. Urge them to talk to their doctor if they think they might be depressed. Follow-up care is a key part of your treatment and safety. Be sure to make and go to all appointments, and call your doctor if you are having problems. It's also a good idea to know your test results and keep a list of the medicines you take. Where can you learn more? Go to http://www.gray.com/  Enter E171 in the search box to learn more about \"Learning About Emotional Changes After a Stroke. \"  Current as of: July 14, 2019Content Version: 12.4  © 7817-6890 Healthwise, Incorporated. Care instructions adapted under license by [employer] (which disclaims liability or warranty for this information). If you have questions about a medical condition or this instruction, always ask your healthcare professional. [address] any warranty or liability for your use of this information. Patient Education        Learning About [facility],[address] for Stroke  What is long-term care for stroke? Long-term care for stroke is care for people who are leaving the hospital after a stroke but who still need some medical care or other help while they work on getting better. Choosing the right type of long-term care is a very personal decision. It's important to look carefully at your options. You want to be sure that the level of care is right and that you will feel comfortable.   Your doctor or other members of your medical team can help you find which type of long-term care would be best for you. What are the types of long-term care for stroke patients? Each type of care center offers a different level of care. These centers may have shared or private rooms. An assisted living center or residential care center:  · Has a range of services. These may include meals, cleaning, and laundry. And they may offer help with personal needs like bathing, grooming, and dressing. · Often includes oversight by a nurse. You may be able to get help with basic care, such as getting medicines. A skilled nursing center:  · Offers nursing care up to 24 hours a day. · Provides meals and laundry. · Provides help with dressing, bathing, using the toilet, and other daily tasks. · Offers rehab therapy. A long-term acute care hospital:  · Is for stroke patients who have special medical problems. This may include things like chronic pain or not being able to breathe on your own. Follow-up care is a key part of your treatment and safety. Be sure to make and go to all appointments, and call your doctor if you are having problems. It's also a good idea to know your test results and keep a list of the medicines you take. Where can you learn more? Go to http://www.gray.com/  Enter P303 in the search box to learn more about \"Learning About Long-Term Care for Stroke. \"  Current as of: July 14, 2019Content Version: 12.4  © 9640-6726 Healthwise, Incorporated. Care instructions adapted under license by [employer] (which disclaims liability or warranty for this information). If you have questions about a medical condition or this instruction, always ask your healthcare professional. [place] [address] any warranty or liability for your use of this information. Patient Education        Learning About Risk Factors for Stroke  What puts you at risk for having a stroke?   Your chances of having a stroke depend on your risk factors. Some risks can be lowered by medicines and lifestyle changes. Others can't. This list includes some of the risk factors for having a stroke. You and your doctor or nurse can use it to discuss your risk and how to lower it. By making a plan to lower your risk, you can give yourself some control and peace of mind. Risk factors you can control with medicines and lifestyle changes  High blood pressure. It pushes blood through the arteries with too much force. Over time, this damages the walls of the arteries. Atherosclerosis   This problem is also called hardening of the arteries. It happens when fatty deposits build up inside arteries. Diabetes. This means that there's a problem in your body that causes sugar to stay in your blood. You end up with high blood sugar. Over time, high blood sugar can lead to hardening of the arteries. High cholesterol. This can lead to the buildup of plaque in artery walls. Atrial fibrillation. This is also known as an irregular heartbeat. It increases the risk of blood clots that could cause a stroke. Risk factors you can control with lifestyle changes  Smoking. Smoking, or even inhaling secondhand smoke, increases your risk of heart attack and stroke. Being overweight. Being overweight makes it more likely you will develop high blood pressure, heart problems, and diabetes. These conditions make a stroke more likely. Drinking too much alcohol. This means more than 2 drinks a day for men and 1 drink a day for women. Not getting enough physical activity. If you aren't active, you have a higher risk of health conditions that make a stroke more likely. Risk factors you can't control  Age. The risk of stroke goes up as you get older. Being female. Women have a higher risk of stroke than men. Race.  Americans, Native Americans, and [place] Natives have a higher risk than those of other races. Family history of stroke.   Your chances of having a stroke are higher if other people in your family have had one. Previous stroke or TIA. After you've had a stroke, you're at risk for another one. Where can you learn more? Go to http://chanel-connor.info/  Enter R220 in the search box to learn more about \"Learning About Risk Factors for Stroke. \"  Current as of: July 14, 2019Content Version: 12.4  © 3320-8688 [employer]. Care instructions adapted under license by [employer] (which disclaims liability or warranty for this information). If you have questions about a medical condition or this instruction, always ask your healthcare professional. [place] [address] any warranty or liability for your use of this information. Patient Education        Weakness: Care Instructions  Your Care Instructions    Weakness is a lack of physical or muscle strength. You may feel that you need to make extra effort to move your arms, legs, or other muscles. Generalized weakness means that you feel weak in most areas of your body. Another type of weakness may affect just one muscle or group of muscles. You may feel weak and tired after you have done too much activity, such as taking an extra-long hike. This is not a serious problem. It often goes away on its own. Feeling weak can also be caused by medical conditions like thyroid problems, depression, or a virus. Sometimes the cause can be serious. Your doctor may want to do more tests to try to find the cause of the weakness. The doctor has checked you carefully, but problems can develop later. If you notice any problems or new symptoms, get medical treatment right away. Follow-up care is a key part of your treatment and safety. Be sure to make and go to all appointments, and call your doctor if you are having problems. It's also a good idea to know your test results and keep a list of the medicines you take. How can you care for yourself at home?   · Rest when you feel tired. · Be safe with medicines. If your doctor prescribed medicine, take it exactly as prescribed. Call your doctor if you think you are having a problem with your medicine. You will get more details on the specific medicines your doctor prescribes. · Do not skip meals. Eating a balanced diet may increase your energy level. · Get some physical activity every day, but do not get too tired. When should you call for help? Call your doctor now or seek immediate medical care if:    · You have new or worse weakness.     · You are dizzy or lightheaded, or you feel like you may faint.    Watch closely for changes in your health, and be sure to contact your doctor if:    · You do not get better as expected. Where can you learn more? Go to http://chanel-connor.info/  Enter V492 in the search box to learn more about \"Weakness: Care Instructions. \"  Current as of: June 26, 2019Content Version: 12.4  © 1558-9042 [employer]. Care instructions adapted under license by [employer] (which disclaims liability or warranty for this information). If you have questions about a medical condition or this instruction, always ask your healthcare professional. [address] any warranty or liability for your use of this information. Patient Education        Learning About FAST: Stroke Warning Signs  What is FAST? FAST is a simple way to remember the main symptoms of stroke. Recognizing these symptoms helps you know when to call for medical help. FAST stands for:  · F ace drooping. · A rm weakness. · S peech difficulty. · T celestine to call  911. What happens when you have a stroke? A stroke occurs when a blood vessel to the brain bursts or is blocked by a blood clot. Within minutes, the nerve cells in that part of the brain die. As a result, the part of the body controlled by those cells cannot work properly.   The effects of a stroke may range from mild to severe. They may get better, or they may last the rest of your life. A stroke can affect vision, speech, behavior, thought processes, and your ability to move. It can cause symptoms that may include:  · Sudden numbness, tingling, weakness, or loss of movement in your face, arm, or leg, especially on only one side of your body. · Sudden vision changes. · Sudden trouble speaking. · Sudden confusion or trouble understanding simple statements. · Sudden problems with walking or balance. · A sudden, severe headache that is different from past headaches. Why is it important to get help FAST? Quick treatment may save your life. And it may reduce the damage in your brain so that you have fewer problems after the stroke. When you know the FAST symptoms, you will know when it's important to call for medical help. Where can you learn more? Go to http://chanel-connor.info/  Enter D850 in the search box to learn more about \"Learning About FAST: Stroke Warning Signs. \"  Current as of: July 14, 2019Content Version: 12.4  © 9080-8028 [employer]. Care instructions adapted under license by [employer] (which disclaims liability or warranty for this information). If you have questions about a medical condition or this instruction, always ask your healthcare professional. [address] any warranty or liability for your use of this information. Patient Education        Learning About Transient Ischemic Attack (TIA)  What is a TIA? A transient ischemic attack (TIA) means that the blood flow to a part of the brain is blocked for a short time. A TIA feels like a stroke but usually lasts only 10 to 20 minutes. Unlike a stroke, a TIA does not cause lasting brain damage. A TIA is usually caused by a blood clot that blocks blood flow in the brain.  A blood clot can form in another part of the body (often the heart) and travel through the bloodstream to the brain. When blood flow to part of the brain is blocked, the brain cells in that area are affected within seconds. This causes symptoms in parts of the body controlled by those brain cells. When the blood clot dissolves, blood flow returns, and the symptoms go away. Blood clots can be the result of hardening of the arteries (atherosclerosis) or a heart attack. Sometimes a TIA is caused by a sharp drop in blood pressure that reduces blood flow to the brain. This is called a \"low-flow\" TIA. It is not as common as a TIA caused by a blood clot. What happens after a TIA? TIA is a serious warning sign of a possible stroke in the future. If you have other medical conditions such as coronary artery disease or atherosclerosis, you may also have an increased risk for a heart attack. Talk to your doctor about your risk. Understanding your risk will help you and your doctor plan your treatment options. You can do a lot to lower your chance of having another TIA or a stroke. Medicines can help, and you may also need to make lifestyle changes. What are the symptoms? Symptoms of a TIA are the same as symptoms of a stroke. But symptoms of a TIA don't last very long. Most of the time, they go away in 10 to 20 minutes. They may include:  · Sudden numbness, tingling, weakness, or loss of movement in your face, arm, or leg, especially on only one side of your body. · Sudden vision changes. · Sudden trouble speaking. · Sudden confusion or trouble understanding simple statements. · Sudden problems with walking or balance. If you have any of these symptoms, call 911 or other emergency services right away. Ask your family, friends, and coworkers to learn the signs of a TIA. They may notice these signs before you do. Make sure they know to call  911 if these signs appear. How is a TIA treated? If you've had a TIA, you may need more testing and treatment after you get checked by your doctor.  If you have a high risk of stroke, you may have to stay in the hospital for treatment. Your treatment for a TIA may include taking medicines to prevent blood clots or a stroke, or having surgery to reopen narrow arteries. How can you prevent another TIA? · Work with your doctor to treat any health problems you have. High blood pressure, high cholesterol, atrial fibrillation, and diabetes all raise your chances of having a stroke. · Be safe with medicines. Take your medicine exactly as prescribed. Call your doctor if you think you are having a problem with your medicine. · Have a healthy lifestyle. ? Do not smoke or allow others to smoke around you. If you need help quitting, talk to your doctor about stop-smoking programs and medicines. These can increase your chances of quitting for good. Smoking makes a stroke more likely. ? Limit alcohol to 2 drinks a day for men and 1 drink a day for women. ? Lose weight if you need to. A healthy weight will help you keep your heart and body healthy. ? Be active. Ask your doctor what type and level of activity are safe for you.  ? Eat heart-healthy foods, like fruits, vegetables, and high-fiber foods. Follow-up care is a key part of your treatment and safety. Be sure to make and go to all appointments, and call your doctor if you are having problems. It's also a good idea to know your test results and keep a list of the medicines you take. Where can you learn more? Go to http://chanel-connor.info/  Enter Z925 in the search box to learn more about \"Learning About Transient Ischemic Attack (TIA). \"  Current as of: July 14, 2019Content Version: 12.4  © 2445-7405 [employer]. Care instructions adapted under license by [employer] (which disclaims liability or warranty for this information).  If you have questions about a medical condition or this instruction, always ask your healthcare professional. [place] [employer] disclaims any warranty or liability for your use of this information. Patient Education        Learning About How to Prevent Another Stroke  What can you do to prevent another stroke? After a stroke, people feel lots of different emotions. Some people are worried that they could have another stroke. Or they may feel overwhelmed by how much there is to learn and do. Some people feel sad or depressed. No matter what emotions you are feeling, you can give yourself some control and peace of mind by making a plan to lower your risk of having another stroke. Take your medicines  You'll need to take medicines to help prevent another stroke. Be sure to take your medicines exactly as prescribed. And don't stop taking them unless your doctor tells you to. If you stop taking your medicines, you can increase your risk of having another stroke. Some of the medicines your doctor may prescribe include:  · Aspirin or some other blood thinner to prevent blood clots. · Statins and other medicines to lower cholesterol. · Blood pressure medicines to lower blood pressure. Manage other health problems  You can help lower your chance of having another stroke by managing certain other health problems. Problems that increase your risk of having another stroke include:  · High blood pressure. · High cholesterol. · Atrial fibrillation. · Diabetes. If you have any of these health problems, you can manage them with healthy lifestyle changes along with medicine. Adopt a healthy lifestyle  · Do not smoke or allow others to smoke around you. If you need help quitting, talk to your doctor about stop-smoking programs and medicines. These can increase your chances of quitting for good. Smoking makes a stroke more likely. · Limit alcohol to 2 drinks a day for men and 1 drink a day for women. · Lose weight if you need to. Controlling your weight will help you keep your heart and body healthy. · Be active.   Ask your doctor what type and level of activity is safe for you. · Eat heart-healthy foods, like fruits, vegetables, and high-fiber foods. It's also important to:  · Get a flu shot every year. · Ask for help if you think you are depressed. Do stroke rehab  Taking part in a stroke rehabilitation (rehab) program will help you to regain skills you lost or make the most of your abilities after a stroke. It also helps you take steps to prevent another stroke. Your rehab team will give you education and support to help you build new, healthy habits. You'll learn how to manage any other health problems that you might have. Yelena Kirk also learn how to exercise safely, eat a healthy diet, and quit smoking if you smoke. Yelena Kirk work with your team to decide what lifestyle choices are best for you. If your doctor hasn't already suggested it, ask him or her if stroke rehab is right for you. Know stroke symptoms  Make sure you know the symptoms of stroke. FAST is a simple way to remember. Recognizing these symptoms helps you know when to call for medical help. FAST stands for:  · F ace drooping. · A rm weakness. · S peech difficulty. · T celestine to call  911. Follow-up care is a key part of your treatment and safety. Be sure to make and go to all appointments, and call your doctor if you are having problems. It's also a good idea to know your test results and keep a list of the medicines you take. Where can you learn more? Go to http://chanel-connor.info/  Enter T771 in the search box to learn more about \"Learning About How to Prevent Another Stroke. \"  Current as of: July 14, 2019Content Version: 12.4  © 2436-6810 [employer]. Care instructions adapted under license by [employer] (which disclaims liability or warranty for this information).  If you have questions about a medical condition or this instruction, always ask your healthcare professional. [address] any warranty or liability for your use of this information.

## 2020-04-01 NOTE — ROUTINE PROCESS
Bedside shift change report given to Gilbert Parmar RN (oncoming nurse) by Jane Lanier RN (offgoing nurse). Report included the following information SBAR, Kardex, Intake/Output, MAR and Recent Results.

## 2020-04-01 NOTE — PROGRESS NOTES
Re:  Yfn Izquierdoyanni up visit     4/1/2020 3:20 PM 
 
 
SSN: [id number] Subjective: Vicente Bush is seen in follow up. She's found in in a chair resting. Medications:   
Current Facility-Administered Medications Medication Dose Route Frequency Provider Last Rate Last Dose  potassium chloride SR (KLOR-CON 10) tablet 10 mEq  10 mEq Oral DAILY Enrique Howard MD      
 isosorbide dinitrate (ISORDIL) tablet 20 mg  20 mg Oral TID Enrique Howard MD   20 mg at 04/01/20 6602  hydrALAZINE (APRESOLINE) 20 mg/mL injection 20 mg  20 mg IntraVENous Q6H PRN Enrique Howard MD   20 mg at 03/31/20 0018  labetaloL (NORMODYNE;TRANDATE) 20 mg/4 mL (5 mg/mL) injection 5 mg  5 mg IntraVENous Q10MIN PRN Enrique Howard MD      
 amoxicillin-clavulanate (AUGMENTIN) 875-125 mg per tablet 1 Tab  1 Tab Oral Q12H Enrique Howard MD   1 Tab at 04/01/20 7890  aspirin delayed-release tablet 81 mg  81 mg Oral DAILY Melvin LOW [place]   81 mg at 04/01/20 0920  
 gabapentin (NEURONTIN) capsule 100 mg  100 mg Oral TID ROSEANNE Silva   100 mg at 04/01/20 0920  
 atorvastatin (LIPITOR) tablet 80 mg  80 mg Oral QHS Melvin LOW, [place]   80 mg at 03/31/20 2153  acetaminophen (TYLENOL) tablet 650 mg  650 mg Oral Q4H PRN Melvin LOW [place]   650 mg at 04/01/20 0920  
 insulin lispro (HUMALOG) injection   SubCUTAneous AC&HS Yesy Silva   Stopped at 03/30/20 0016  
 glucose chewable tablet 16 g  16 g Oral PRN ROSEANNE Silva      
 glucagon [facility]) injection 1 mg  1 mg IntraMUSCular PRN Yesy Silva      
 dextrose (D50W) injection syrg 12.5-25 g  25-50 mL IntraVENous PRN Yesy Silva      
 insulin glargine (LANTUS) injection 5 Units  5 Units SubCUTAneous QHS Melvin OLW [place]   5 Units at 03/31/20 2153  albuterol-ipratropium (DUO-NEB) 2.5 MG-0.5 MG/3 ML  3 mL Nebulization Q4H PRN Melvin LOW [place]      
  arformoteroL (BROVANA) neb solution 15 mcg  15 mcg Nebulization BID RT [facility]   15 mcg at 04/01/20 8390  
 budesonide (PULMICORT) 1,000 mcg/2 mL nebulizer susp  1,000 mcg Nebulization BID RT [place], [place]   1,000 mcg at 04/01/20 9938 Vital signs:   
Visit Vitals /80 (BP 1 Location: Right arm, BP Patient Position: Sitting) Pulse (!) 102 Temp 98.3 °F (36.8 °C) Resp 18 Ht 5' 9\" (1.753 m) Wt 68.7 kg (151 lb 8 oz) SpO2 99% Breastfeeding No  
BMI 22.37 kg/m² Review of Systems: As above otherwise 11 point review of systems negative including;  
Constitutional no fever or chills Skin denies rash or itching HEENT  Denies tinnitus, hearing lose Eyes denies diplopia vision lose Respiratory denies sortness of breath Cardiovascular denies chest pain, dyspnea on exertion Gastrointestinal denies nausea, vomiting, diarrhea, constipation Genitourinary denies incontinence Musculoskeletal denies joint pain or swelling Endocrine denies weight change Hematology denies easy bruising or bleeding Neurological as above in HPI Patient Active Problem List  
Diagnosis Code  Essential hypertension I10  
 Diabetes mellitus ([facility] [address] E11.9  Hyperlipidemia E78.5  Cervical myelopathy (HCC) G95.9  Eczema L30.9  Asthma J45.909  Chronic combined systolic and diastolic congestive heart failure (HCC) I50.42  Bladder prolapse, female, acquired N81.10  
 IBS (irritable bowel syndrome) K58.9  Osteoporosis M81.0  Migraine G43.909  Anxiety and depression F41.9, F32.9  Pleural effusion J90  Type 2 diabetes mellitus with nephropathy (HCC) E11.21  
 Hypokalemia E87.6  Acute pulmonary edema (HCC) J81.0  Hypertensive emergency I16.1  Headache R51  Acute on chronic systolic (congestive) heart failure (HCC) I50.23  Type 2 diabetes mellitus with diabetic neuropathy (HCC) E11.40  Acute non-recurrent maxillary sinusitis J01.00  
  Dizziness R42  Chronic obstructive pulmonary disease ([facility] [address] J44.9  CKD (chronic kidney disease) stage 3, GFR 30-59 ml/min ([facility]) N18.3  Fall W19. Claritza Nicolas  Allergic rhinitis J30.9  Chronic sinusitis J32.9  TIA (transient ischemic attack) G45.9  CVA (cerebral vascular accident) ([facility] [address] I63.9  
 Encephalopathy G93.40  Infarction of right thalamus ([facility]) I63.9  Dysphagia following cerebral infarction I69.391  Sinusitis with nasal polyps J32.9, J33.9  Cardiomyopathy due to hypertension, with heart failure ([facility] [address] I11.0, I43  Slurred speech R47.81  Left-sided weakness R53.1  Uncontrolled hypertension I10  
 Ischemic stroke ([facility]) I63.9  Pansinusitis J32.4  CKD (chronic kidney disease) N18.9 Objective: The patient is awake, alert, and oriented x 3. Fund of knowledge is adequate. Speech is slurred but fluent and memory is intact. Cranial Nerves: II  Visual fields are full to confrontation. III, IV, VI  Extraocular movements are intact. There is no nystagmus. V  Facial sensation is intact to pinprick. VII  Face is symmetrical.  VIII - Hearing is present. IX, X, XII  Palate is symmetrical.   XI - Shoulder shrugging and head turning intact Motor: The patient has a left hemiparalysis, about 4/5 in the arm and the leg. Tone is normal. Reflexes are 2+ and symmetrical. Plantars are down going. Gait is not tested at this time. CBC:  
Lab Results Component Value Date/Time WBC 7.2 03/31/2020 01:32 AM  
 RBC 3.52 (L) 03/31/2020 01:32 AM  
 HGB 10.1 (L) 03/31/2020 01:32 AM  
 HCT 31.2 (L) 03/31/2020 01:32 AM  
 PLATELET 993 29/75/0094 01:32 AM  
 
BMP:  
Lab Results Component Value Date/Time  Glucose 99 03/31/2020 01:32 AM  
 Sodium 144 03/31/2020 01:32 AM  
 Potassium 4.8 03/31/2020 01:32 AM  
 Chloride 114 (H) 03/31/2020 01:32 AM  
 CO2 27 03/31/2020 01:32 AM  
 BUN 23 (H) 03/31/2020 01:32 AM  
 Creatinine 1.37 (H) 03/31/2020 01:32 AM  
 Calcium 8.3 (L) 03/31/2020 01:32 AM  
 
CMP:  
Lab Results Component Value Date/Time Glucose 99 03/31/2020 01:32 AM  
 Sodium 144 03/31/2020 01:32 AM  
 Potassium 4.8 03/31/2020 01:32 AM  
 Chloride 114 (H) 03/31/2020 01:32 AM  
 CO2 27 03/31/2020 01:32 AM  
 BUN 23 (H) 03/31/2020 01:32 AM  
 Creatinine 1.37 (H) 03/31/2020 01:32 AM  
 Calcium 8.3 (L) 03/31/2020 01:32 AM  
 Anion gap 3 03/31/2020 01:32 AM  
 BUN/Creatinine ratio 17 03/31/2020 01:32 AM  
 Alk. phosphatase 107 03/29/2020 05:39 PM  
 Protein, total 7.4 03/29/2020 05:39 PM  
 Albumin 2.5 (L) 03/29/2020 05:39 PM  
 Globulin 4.9 (H) 03/29/2020 05:39 PM  
 A-G Ratio 0.5 (L) 03/29/2020 05:39 PM  
 
Coagulation:  
Lab Results Component Value Date/Time Prothrombin time 12.6 03/29/2020 05:39 PM  
 INR 1.0 03/29/2020 05:39 PM  
 aPTT 29.4 03/29/2020 05:39 PM  
 
Cardiac markers:  
Lab Results Component Value Date/Time CK 38 03/29/2020 05:39 PM  
 CK-MB Index  03/29/2020 05:39 PM  
  CALCULATION NOT PERFORMED WHEN RESULT IS BELOW LINEAR LIMIT Assessment: New subcortical stroke on the right side in this patient who has risk factors including hypertension, diabetes. Plan: Needs better control blood pressure which can be initiated at this time. For transfer to SNF. Will sign off. Sincerely, 
 
 
 
Dickson Mckeon. Juan Daniel Brown M.D. PLEASE NOTE:  
This document has been produced using voice recognition software. Unrecognized errors in transcription may be present.

## 2020-04-01 NOTE — PROGRESS NOTES
Problem: Self Care Deficits Care Plan (Adult) Goal: *Acute Goals and Plan of Care (Insert Text) Description: Occupational Therapy Goals Initiated 3/31/2020 within 7 day(s). 1.  Patient will perform grooming standing for 4-7 minutes with modified independence and F+ balance. 2.  Patient will perform upper body dressing with modified independence using compensatory techniques. 3.  Patient will perform lower body dressing with supervision/set-up seated and standing. 4.  Patient will perform toilet transfers with supervision/set-up. 5.  Patient will perform all aspects of toileting with supervision/set-up. 6.  Patient will participate in upper extremity therapeutic exercise/activities with supervision/set-up for 5-8 minutes to increase ROM/strength for selfcare. 7.  Patient will utilize energy conservation techniques during functional activities with verbal cues. Prior Level of Function: Patient was independent with self-care and used a cane and RW for functional mobility PTA. Patient reports also having a Shower chair and BSC. Outcome: Progressing Towards Goal 
 OCCUPATIONAL THERAPY TREATMENT Patient: Jessika Urias (55 y.o. female) Date: 4/1/2020 Diagnosis: Ischemic stroke ([facility] [address] [I63.9] Slurred speech [R47.81] Left-sided weakness [R53.1] Hypokalemia [E87.6] Uncontrolled hypertension [I10] <principal problem not specified> Precautions: Fall, Skin Chart, occupational therapy assessment, plan of care, and goals were reviewed. ASSESSMENT: 
Pt OOB seated in chair upon entry. Pt is pleasant and cooperative. LUE remains w/paresis. Shoulder shrug intact, ataxia w/movement against gravity. Pt educated on SROM TherEx and encouraged to perform throughout the day. Pt educated on compensatory strategy w/ADL grooming tasks using LUE as gross stabilizer w/container mgt and bilateral activities. No c/o pain.  
STROKE EDUCATION: 
 Pt educated on importance of healthy lifestyle, ie good diet, exercise and medication mgt for stroke prevention Progression toward goals: 
[x]          Improving appropriately and progressing toward goals 
[]          Improving slowly and progressing toward goals 
[]          Not making progress toward goals and plan of care will be adjusted PLAN: 
Patient continues to benefit from skilled intervention to address the above impairments. Continue treatment per established plan of care. Discharge Recommendations:  Inpatient Rehab Further Equipment Recommendations for Discharge:  N/A, pt has DME SUBJECTIVE:  
Patient stated I didn't take my medicine. I don't know why.  OBJECTIVE DATA SUMMARY:  
Cognitive/Behavioral Status: 
Neurologic State: Alert Orientation Level: Oriented X4 Cognition: Follows commands Safety/Judgement: Fall prevention Functional Mobility and Transfers for ADLs: 
ADL Intervention: 
Grooming Position Performed: Seated in chair Washing Face: Set-up(w/RUE) Washing Hands: Stand-by assistance Brushing Teeth: Stand-by assistance; Compensatory technique training;Training to use affected extremity as a gross stabilizer Neuro Re-Education: 
Tapping LUE in prep for UE TherEx and ADL grooming tasks UE Therapeutic Exercises:  
AAROM LUE shoulder flexion AAROM LUE elbow flexin/extension PROM > AAROM LUE wrist flexion/extension, ulnar deviation L/R 
SROM LUE elbow flexion/extension SROM LUE arm glides on tray table, horizontal ab/adduction Pain: 
Pain level pre-treatment: 0/10 Pain level post-treatment: 0/10 Activity Tolerance:   
Good Please refer to the flowsheet for vital signs taken during this treatment. After treatment:  
[x]  Patient left in no apparent distress sitting up in chair 
[]  Patient left in no apparent distress in bed 
[x]  Call bell left within reach 
[]  Nursing notified 
[]  Caregiver present 
[]  Bed alarm activated COMMUNICATION/EDUCATION:  
 [] Role of Occupational Therapy in the acute care setting 
[] Home safety education was provided and the patient/caregiver indicated understanding. [] Patient/family have participated as able in working towards goals and plan of care. [x] Patient/family agree to work toward stated goals and plan of care. [] Patient understands intent and goals of therapy, but is neutral about his/her participation. [] Patient is unable to participate in goal setting and plan of care. Thank you for this referral. 
DANIELA Higgins Time Calculation: 23 mins

## 2020-04-01 NOTE — PROGRESS NOTES
CM called and spoke to [facility] at [address] transferred CM to Ras Che, who said she will start the auth for the patient to come to SNF at [facility]. Tiney Goodpasture, RN Case Management [phone]

## 2020-04-01 NOTE — DISCHARGE SUMMARY
Discharge Summary Patient: Porfirio Alston MRN: [id number]  CSN: [id number] YOB: 1955  Age: 77 y.o. Sex: female DOA: 3/29/2020 LOS:  LOS: 3 days   Discharge Date:   
 
Admission Diagnoses: Ischemic stroke ([address] [I63.9] Slurred speech [R47.81] Left-sided weakness [R53.1] Hypokalemia [E87.6] Uncontrolled hypertension [I10] Discharge Diagnoses:   
Acute CVA History of hypertension Hypokalemia CKD stage III Chronic sinusitis History of COPD History of chronic systolic congestive heart failure EF 36 to 40% H/o nonischemic cardiomyopathy Severe PCM Discharge Condition: Stable Consults: Neurology PHYSICAL EXAM 
Visit Vitals /80 (BP 1 Location: Right arm, BP Patient Position: Sitting) Pulse (!) 102 Temp 98.3 °F (36.8 °C) Resp 18 Ht 5' 9\" (1.753 m) Wt 68.7 kg (151 lb 8 oz) SpO2 99% Breastfeeding No  
BMI 22.37 kg/m² General: Alert, cooperative, no acute distress HEENT: PERRLA, EOMI. Anicteric sclerae. Lungs:  CTA Bilaterally. No Wheezing/Rhonchi/Rales. Heart:  Regular rate and Rhythm. Abdomen: Soft, Non distended, Non tender. + Bowel sounds. Extremities: No edema/ cyanosis/ clubbing Psych:   Good insight. Not anxious or agitated. Neurologic:  AA oriented X 3. Moves all extremities. HPI: 
Ms. Bradley Soto is a 78 yo AA female with a past medical history of prior CVA with residual mild dysphagia, HTN, DM type 2, COPD/asthma on home O2 3LPM intermitttently who presented to [facility] ED from home via EMS for left sided weakness, left facial droop and slurred speech for 2 days. History obtained from pt. She states she did not call EMS two days ago because she was tired. Per speaking to the nurse, family stated earlier that the symptoms began at 10am this morning. Thus unclear to me when the symptoms started due to two different reports. Patient currently denies any pain. Per RN at bedside, the left sided facial droop is worse than presentation and her speech is unchanged, still slurred. Patient was evaluated by Misty Jacob who recommended admission for TIA/CVA work up. He states allow for permissive HTN and MRI brain + carotid dopplers.  
  
ROS: mild headache, \"cold all the time\", myalgias, mild chest pain \"pounding\" sensation, SOB, coughing \"all day and night\", bilateral LE pain Denies fever, chills, abdominal pain, nausea/vomiting, diarrhea, dysuria. Denies contact with covid confirmed pts, stays at home. Denies travel. Daughter no known contact with confirmed covid.  
  
ED course: - Vital signs: 99 F, HR 92, /122, RR 22, 99% RA  
- Labs remarkable for: K 3.3 , Cr 1.34  
- Imaging: CT head wo contrast- no acute abnormalities, hypodensity in left frontal lobe, pansinusitis  
chext xray pending.  
- EKG: pending.  
- Patient received:  Aspirin 325 mg.  
  
Past medical hx- see above Past surgical hx- no recent surgeries Allergies- codeine cause GI upset. Medications- med rec reviewed with pt Social hx- lives with daughter,  Mostly bedbound, needs assistance to get OOB, commode at bedside, denies ETOH/recretional drugs/tobacco 
Specialists: Dr. Juan Daniel Archer, Dr Kiran [facility] Hospital Course:  
Patient admitted to the hospital with left-sided weakness and left facial droop with slurred speech x2 days. Patient seen by neurology and CT head, MRI brain done, results attached below showed new subcortical stroke on the right side. Patient has a known history of CVA and will continue her aspirin and statins on discharge. She was seen by PT and OT and recommended inpatient rehab however family requested skilled nursing facility. She will resume her PTA medications on discharge and is advised to follow-up with her primary care physician as well as neurology in 1 to 2 weeks. Imaging: CT head IMPRESSION:  
  
No acute abnormalities. 
  
 Multiple old lacunar infarcts and advanced small vessel disease. 
  
A hypodensity in the left frontal lobe is new since prior study but is very well 
defined, and compatible with chronic insult. 
  
Pansinusitis. Increased density in the right maxillary sinus and right sphenoid 
sinus compatible with chronic fungal infection. MRI brain IMPRESSION: 
  
1. Acute lacunar infarct in the superior aspect of the right thalamus. 2. Multifocal chronic deep brain lacunar infarcts. Chronic infarct in the genu 
of the corpus callosum. 3. Extensive chronic small vessel ischemic changes in the cerebral white matter. Similar findings in the central jose. 4. A few scattered chronic microbleeds in both cerebral hemispheres. Suspect 
findings are related to chronic hypertension. 5. Extensive diffuse paranasal sinus disease. 
-Complete opacification of majority of the sinuses, some with markedly 
hypointense T2 signal. Can indicate noninvasive allergic fungal sinus disease. Densely inspissated mucus possible. -Air-fluid level in the left maxillary sinus, could be acute on chronic sinus 
disease. MRA Brain IMPRESSION: 
  
1. No critical stenosis seen in the anterior circulation. Patent bilateral 
distal internal carotid arteries with mild narrowing in the distal paraclinoid LICA. Mild stenosis at the left QUYEN origin. 
  
2. Patent vertebral arteries and basilar artery. Mild stenosis in the distal 
intradural right vertebral artery. 
  
3. Irregular narrowing seen in the bilateral posterior cerebral arteries. Some 
component of artifact is present on the source images. At least mild to moderate 
narrowing in the right PCA, more moderate stenosis in the left PCA. MRA Neck IMPRESSION: 
  
Exam is limited by artifacts with loss of flow signal on axial 3-D TOF sequence 
and the lack of IV contrast. 
  
Patent bilateral carotid arteries.  Less than 50% ICA origin narrowing 
bilaterally. 
  
 Both cervical vertebral arteries are patent, left vertebral artery is dominant. Procedures:  
None Discharge Medications:    
Current Discharge Medication List  
  
START taking these medications Details  
potassium chloride SR (KLOR-CON 10) 10 mEq tablet Take 1 Tab by mouth daily. Qty: 30 Tab, Refills: 0  
  
atorvastatin (LIPITOR) 80 mg tablet Take 1 Tab by mouth nightly. Qty: 30 Tab, Refills: 0 Augmentin 875mg po bid x 7 days CONTINUE these medications which have CHANGED Details  
spironolactone (ALDACTONE) 25 mg tablet Take 1 Tab by mouth daily. Qty: 30 Tab, Refills: 0 CONTINUE these medications which have NOT CHANGED Details Ventolin HFA 90 mcg/actuation inhaler INHALE 2 PUFFS BY MOUTH EVERY 4 HOURS AS NEEDED FOR WHEEZING OR SHORTNESS OF BREATH Qty: 18 g, Refills: 5 Associated Diagnoses: Chronic obstructive pulmonary disease, unspecified COPD type ([facility] [address] amLODIPine (NORVASC) 10 mg tablet Take 1 Tab by mouth daily. Qty: 30 Tab, Refills: 0 Associated Diagnoses: Essential hypertension  
  
isosorbide dinitrate (ISORDIL) 10 mg tablet Take 2 Tabs by mouth three (3) times daily. Qty: 1 Tab, Refills: 0 Associated Diagnoses: Chronic combined systolic and diastolic congestive heart failure (HCC)  
  
gabapentin (NEURONTIN) 100 mg capsule Take 100 mg by mouth three (3) times daily. 10/30/19 - 1 cap at breakfast, 2 caps at dinner and  bedtime  Indications: Neuropathic Pain  
  
fluticasone furoate-vilanterol (BREO ELLIPTA) 100-25 mcg/dose inhaler Take 1 Puff by inhalation daily. Qty: 1 Inhaler, Refills: 0 Oxygen 1 Device by Nasal route as needed (Oxygen supplement). 3L per min as needed   Indications: oxygen supplement  
  
sodium chloride (OCEAN) 0.65 % nasal squeeze bottle 2 Sprays as needed for Congestion. hydrALAZINE (APRESOLINE) 100 mg tablet Take 1 Tab by mouth three (3) times daily. Qty: 270 Tab, Refills: 3 Blood-Glucose Meter (TRUE METRIX GLUCOSE METER) misc Test glucose 3 times daily DX: E11.40 Qty: 1 Each, Refills: 0 Associated Diagnoses: Type 2 diabetes mellitus with diabetic neuropathy, with long-term current use of insulin ([address] Lancets misc Test glucose 3 times daily DX: E11.40 Qty: 100 Each, Refills: 12  
 Associated Diagnoses: Type 2 diabetes mellitus with diabetic neuropathy, with long-term current use of insulin ([address] glucose blood VI test strips (BLOOD GLUCOSE TEST) strip (True Metrix) Test glucose 3 times daily DX: E11.40 Qty: 100 Strip, Refills: 12  
 Associated Diagnoses: Type 2 diabetes mellitus with diabetic neuropathy, with long-term current use of insulin ([facility])  
  
insulin glargine (LANTUS,BASAGLAR) 100 unit/mL (3 mL) inpn 7 Units by SubCUTAneous route in the morning. 15 units in the evening. Qty: 5 Pen, Refills: 12  
 Associated Diagnoses: Type 2 diabetes mellitus with diabetic neuropathy, with long-term current use of insulin ([address] furosemide (LASIX) 20 mg tablet Take 1 Tab by mouth two (2) times a day. Qty: 60 Tab, Refills: 8  
  
acetaminophen (TYLENOL EXTRA STRENGTH) 500 mg tablet Take 1,000 mg by mouth every six (6) hours as needed for Pain. CARBOXYMETHYLCELLULOS/GLYCERIN (REFRESH OPTIVE OP) Administer 1 Drop to both eyes six (6) times daily. Indications: eye health  
  
aspirin delayed-release 81 mg tablet Take 81 mg by mouth daily. Dtr. Selvin Bolivar pt no longer takes. STOP taking these medications  
  
 pravastatin (PRAVACHOL) 20 mg tablet Comments:  
Reason for Stopping:   
   
  
  
Current Facility-Administered Medications:  
  potassium chloride SR (KLOR-CON 10) tablet 10 mEq, 10 mEq, Oral, DAILY, Sree Garcia MD 
  isosorbide dinitrate (ISORDIL) tablet 20 mg, 20 mg, Oral, TID, Sree Garcia MD, 20 mg at 04/01/20 6296   hydrALAZINE (APRESOLINE) 20 mg/mL injection 20 mg, 20 mg, IntraVENous, Q6H PRN, Saintclair Jun, MD, 20 mg at 03/31/20 1960   labetaloL (NORMODYNE;TRANDATE) 20 mg/4 mL (5 mg/mL) injection 5 mg, 5 mg, IntraVENous, Q10MIN PRN, Saintclair Jun, MD 
  amoxicillin-clavulanate (AUGMENTIN) 875-125 mg per tablet 1 Tab, 1 Tab, Oral, Q12H, Saintclair Jun, MD, 1 Tab at 04/01/20 9797   aspirin delayed-release tablet 81 mg, 81 mg, Oral, DAILY, Yesy Spears, 81 mg at 04/01/20 0920 
  gabapentin (NEURONTIN) capsule 100 mg, 100 mg, Oral, TID, Alfred Murcia PA, 100 mg at 04/01/20 0920 
  atorvastatin (LIPITOR) tablet 80 mg, 80 mg, Oral, QHS, Yesy Spears, 80 mg at 03/31/20 2153   acetaminophen (TYLENOL) tablet 650 mg, 650 mg, Oral, Q4H PRN, Alfred Murcia PA, 650 mg at 04/01/20 0920 
  insulin lispro (HUMALOG) injection, , SubCUTAneous, AC&HS, Alfred Murcia [place], Stopped at 03/30/20 0016 
  glucose chewable tablet 16 g, 16 g, Oral, PRN, Alfred Murcia PA 
  glucagon (GLUCAGEN) injection 1 mg, 1 mg, IntraMUSCular, PRN, Alfred Murcia PA 
  dextrose (D50W) injection syrg 12.5-25 g, 25-50 mL, IntraVENous, PRN, Alfred Murcia PA 
  insulin glargine (LANTUS) injection 5 Units, 5 Units, SubCUTAneous, QHS, Yesy Spears, 5 Units at 03/31/20 2153   albuterol-ipratropium (DUO-NEB) 2.5 MG-0.5 MG/3 ML, 3 mL, Nebulization, Q4H PRN, Alfred Murcia PA 
  arformoteroL (BROVANA) neb solution 15 mcg, 15 mcg, Nebulization, BID RT, Alfred Murcia [place], 15 mcg at 04/01/20 0937 
  budesonide (PULMICORT) 1,000 mcg/2 mL nebulizer susp, 1,000 mcg, Nebulization, BID RT, Alfred Murcia [place], 1,000 mcg at 04/01/20 4317 · It is important that you take the medication exactly as they are prescribed. · Keep your medication in the bottles provided by the pharmacist and keep a list of the medication names, dosages, and times to be taken in your wallet. · Do not take other medications without consulting your doctor. Minutes spent on discharge: 45 minutes spent coordinating this discharge (review instructions/follow-up, prescriptions, preparing report for sign off) Alexander Mcgill MD 
4/1/2020 1:57 PM

## 2020-04-01 NOTE — PROGRESS NOTES
Initiated insurance auth for [facility]. Reference # [id number] LTSS/UAI completed and submitted to [facility] for review. The following information was submitted to [facility] for initial authorization: 
Face Sheet/Demographics (Include: Usual living situation) History and Physical 
Last 24 hours of MD and RN notes (Include: Current Level of Functioning; cognitive status) PT/OT/ST Evaluations and/or notes within the last 48 hours MAR 
MD orders (Include: Attending or ordering MDs name and phone #; skilled nursing needs; Wound care/etc) Projected Date of Transfer to SNF Requested SNF 
SNF Point of Contact and Phone # 
CM Point of Contact and Phone # NPI # for SNF HARJINDER Rowan, [facility]- [phone]

## 2020-04-01 NOTE — PROGRESS NOTES
Problem: Dysphagia (Adult) Goal: *Acute Goals and Plan of Care (Insert Text) Description: Patient will: 1. Tolerate PO trials with 0 s/s overt distress in 4/5 trials 2. Utilize compensatory swallow strategies/maneuvers (decrease bite/sip, size/rate, alt. liq/sol) with min cues in 4/5 trials 3. Perform oral-motor/laryngeal exercises to increase oropharyngeal swallow function with min cues 4. Complete an objective swallow study (i.e., MBSS) to assess swallow integrity, r/o aspiration, and determine of safest LRD, min A-met 3/30/20 Recommend:  
Regular diet with honey thick liquids Meds with honey thick liquids Aspiration precautions HOB >45 degrees during all intake and for at least 30 min after intake Small bites/sips, slow rate of intake, alternating bites/sips Oral care three times daily Outcome: Progressing Towards Goal 
 
SPEECH LANGUAGE PATHOLOGY DYSPHAGIA TREATMENT Patient: Gissell Aranda (26 y.o. female) Date: 4/1/2020 Diagnosis: Ischemic stroke ([facility] [address] [I63.9] Slurred speech [R47.81] Left-sided weakness [R53.1] Hypokalemia [E87.6] Uncontrolled hypertension [I10] Precautions: Aspiration, Fall, Skin PLOF: Regular diet with thin liquids ASSESSMENT: 
Pt seen for follow up dysphagia tx feeding self regular, HTL breakfast meal.  Pt continuing to tolerate items from tray with no overt s/sx aspiration. Able to complete x10 reps of effortful swallow and x10 reps of sustained vowel and encouraged to complete on own. Pt able to verbalize understanding. Will continue to follow for further dysphagia management. Progression toward goals: 
[]         Improving appropriately and progressing toward goals [x]         Improving slowly and progressing toward goals 
[]         Not making progress toward goals and plan of care will be adjusted PLAN: 
Recommendations and Planned Interventions: 
Patient continues to benefit from skilled intervention to address the above impairments. Continue treatment per established plan of care. Discharge Recommendations:  Inpatient Rehab SUBJECTIVE:  
Patient stated This doesn't taste too bad. OBJECTIVE:  
Cognitive and Communication Status: 
Neurologic State: Alert Orientation Level: Oriented X4 Cognition: Follows commands Perception: Appears intact Perseveration: No perseveration noted Safety/Judgement: Fall prevention Dysphagia Treatment: 
Oral Assessment: 
Oral Assessment Labial: Left droop Dentition: Natural, Limited Oral Hygiene: Good Lingual: Incoordinated, Decreased rate Velum: No impairment Mandible: No impairment P.O. Trials: 
 Patient Position: 45 at [facility] Vocal quality prior to P.O.: Low volume Consistency Presented: Honey thick liquid, Solid How Presented: Self-fed/presented, Cup/sip, Spoon, Successive swallows Bolus Acceptance: No impairment Bolus Formation/Control: Impaired Type of Impairment: Posterior, Poor Propulsion: Discoordination Oral Residue: Less than 10% of bolus, Lingual 
 Initiation of Swallow: Delayed (# of seconds) Laryngeal Elevation: Decreased Aspiration Signs/Symptoms: (Silent penetration with thin and NTL on MBS ) Pharyngeal Phase Characteristics: Easily fatigued Effective Modifications: Small sips and bites Cues for Modifications: Moderate Oral Phase Severity: Mild Pharyngeal Phase Severity:  Moderate Exercises: 
Laryngeal Exercises: 
Sustained \"ah\": Yes Sets : 1 Reps : 5 Effortful Swallow: Yes Sets : 1 Reps : 10 PAIN: 
Start of Tx: 0 End of Tx: 0 After treatment:  
[]              Patient left in no apparent distress sitting up in chair 
[x]              Patient left in no apparent distress in bed 
[x]              Call bell left within reach [x]              Nursing notified 
[]              Family present 
[]              Caregiver present 
[]              Bed alarm activated COMMUNICATION/EDUCATION:  
 [x] Aspiration precautions; swallow safety; compensatory techniques [x]        Patient/family able to participate in training and education  
[]  Patient unable to participate in training and education, education ongoing with staff  
[] Patient understands goals and intent of therapy; neutral about participation Audrey Hernadez M.S., CCC-SLP Speech-Language Pathologist

## 2020-04-01 NOTE — PROGRESS NOTES
Transition of Care Plan to SNF/Rehab 
 
SNF/Rehab Transition: 
Patient has been accepted to [facility]  and meets criteria for admission. Patient will transported by [facility] and expected to leave at 6:30pm . Communication to Patient/Family: 
Spoke with patient's daughter Jone Pedroza  (identified care giver) and she is  agreeable to the transition plan. Communication to SNF/Rehab: 
Bedside RN, Jesse Henson, has been notified to update the transition plan to the facility and call report (phone number [phone]). Discharge information has been updated on the AVS. Discharge instructions to be uploaded to [address] [place] [address]. Nursing Please include all hard scripts for controlled substances, med rec and dc summary, and AVS in packet. Reviewed and confirmed with facility, [facility], can manage the patient care needs for the following:  
 
Rosy Acuna with (X) only those applicable: 
 
Medication: 
[x]  Medications will be available at the facility []  IV Antibiotics []  Controlled Substance - hard copy to be sent with patient  
[]  Weekly Labs Documents: 
[x] Hard RX 
[] MAR 
[] Kardex [] AVS 
[]Transfer Summary []Discharge Equipment: 
[]  CPAP/BiPAP []  Wound Vacuum 
[]  Garrett or Urinary Device 
[]  PICC/Central Line 
[]  Nebulizer 
[]  Ventilator Treatment: 
[]Isolation (for MRSA, VRE, etc.) []Surgical Drain Management []Tracheostomy Care 
[]Dressing Changes []Dialysis with transportation and chair time. []PEG Care []Oxygen []Daily Weights for Heart Failure Dietary: 
[]Any diet limitations []Tube Feedings []Total Parenteral Management (TPN) Eligible for Medicaid Long Term Services and Supports Yes: 
[] Eligible for medical assistance or will become eligible within 180 days and UAI completed. [] Provider/Patient and/or support system has requested screening. [x] UAI copy provided to patient or responsible party. [] UAI unavailable at discharge will send once processed to SNF provider. [] UAI unavailable at discharged mailed to patient No:  
[] Private pay and is not financially eligible for Medicaid within the next 180 days. [] Reside out-of-state. [] A residents of a state owned/operated facility that is licensed 
by [address] [facility] or [facility] 
[] Enrollment in [facility] hospice services 
[]  [facility] 
[] Patient /Family declines to have screening completed or provide financial information for screening Financial Resources: 
Medicaid   
[] Initiated and application pending  
[] Full coverage Advanced Care Plan: 
[]Surrogate Decision Maker of Care 
[]POA []Communicated Code Status  Full  (DDNR\", \"Full\") Other

## 2020-04-01 NOTE — PROGRESS NOTES
Problem: Mobility Impaired (Adult and Pediatric) Goal: *Acute Goals and Plan of Care (Insert Text) Description: Physical Therapy Goals Initiated 3/30/2020 and to be accomplished within 7 day(s) 1. Patient will move from supine to sit and sit to supine , scoot up and down and roll side to side in bed with supervision/set-up. 2.  Patient will transfer from bed to chair and chair to bed with contact guard assist using the least restrictive device. 3.  Patient will perform sit to stand with  contact guard assist. 
4.  Patient will ambulate with  contact guard assist for 100 feet with the least restrictive device. 5.  Patient will ascend/descend 4 stairs with 1-2 handrail(s) with minimal assistance/contact guard assist.  
 
Prior Level of Function:  
Patient was modified independence for all mobility including gait using single point cane. Patient lives daughter in a single story home 4 steps to enter B handrail assist. Pt has a rolling walker, bedside commode, and a shower chair as needed. Outcome: Progressing Towards Goal 
 
PHYSICAL THERAPY TREATMENT Patient: Denise Deleon (89 y.o. female) Date: 4/1/2020 Diagnosis: Ischemic stroke ([facility] [address] [I63.9] Slurred speech [R47.81] Left-sided weakness [R53.1] Hypokalemia [E87.6] Uncontrolled hypertension [I10] <principal problem not specified> Precautions: Fall, Skin ASSESSMENT: 
Patient semi reclined in bed, finished eating breakfast and agreeable to skilled PT treatment. Patient requests to use the [facility]. She requires min A to perform SPT using RW to [facility]; pt with narrow ISIS experienced minor LOB needing mod A to recover and safely make it to the [facility]ospit[facility]. Pt with depends on and was soiled with loose stool. RYLEY Crowe and CNA joined session to assist with cleaning up pt and environment.  Patient performed 2 sit to stand transfers with min A, she fatigues after 2 minutes of static standing using RW. Pt ambulates with RW and min A, requires safety cues to maintain safe distance within RW. Continues to need cues to increase ISIS to reduce LOB. Patient left in chair at bedside, active bed alarm, and call bell within reach. Patient will continue to benefit from continued rehab to increase strength & balance in order to maximize independence with mobility. Progression toward goals:  
[x]      Improving appropriately and progressing toward goals 
[]      Improving slowly and progressing toward goals 
[]      Not making progress toward goals and plan of care will be adjusted PLAN: 
Patient continues to benefit from skilled intervention to address the above impairments. Continue treatment per established plan of care. Discharge Recommendations:  Inpatient Rehab Further Equipment Recommendations for Discharge:  N/A  
 
SUBJECTIVE:  
Patient stated I could use the commode.  OBJECTIVE DATA SUMMARY:  
Critical Behavior: 
Neurologic State: Alert Orientation Level: Oriented X4 Cognition: Decreased attention/concentration, Impulsive, Poor safety awareness Safety/Judgement: Fall prevention Functional Mobility Training: 
Bed Mobility: 
Supine to Sit: Contact guard assistance Scooting: Stand-by assistance Transfers: 
Sit to Stand: Minimum assistance Stand to Sit: Contact guard assistance;Minimum assistance Bed to Chair: Minimum assistance(bed<> BSC using RW) Balance: 
Sitting: Impaired; With support Sitting - Static: Good (unsupported) Sitting - Dynamic: Fair (occasional) Standing: Impaired; With support Standing - Static: Fair Standing - Dynamic : Fair Ambulation/Gait Training: 
Distance (ft): 10 Feet (ft) Assistive Device: Walker, rolling Ambulation - Level of Assistance: Minimal assistance Gait Abnormalities: Decreased step clearance Base of Support: Center of gravity altered;Narrowed Speed/Laila: Slow Pain: 
Pain level pre-treatment: 0/10 Pain level post-treatment: 0/10 Pain Intervention(s): Medication (see MAR); Rest, Ice, Repositioning Response to intervention: Nurse notified, See doc flow Activity Tolerance:  
Fair Please refer to the flowsheet for vital signs taken during this treatment. After treatment:  
[x] Patient left in no apparent distress sitting up in chair 
[] Patient left in no apparent distress in bed 
[x] Call bell left within reach [x] Nursing notified 
[] Caregiver present 
[] Bed alarm activated 
[] SCDs applied COMMUNICATION/EDUCATION:  
[]         Role of Physical Therapy in the acute care setting. []         Fall prevention education was provided and the patient/caregiver indicated understanding. [x]         Patient/family have participated as able in working toward goals and plan of care. []         Patient/family agree to work toward stated goals and plan of care. []         Patient understands intent and goals of therapy, but is neutral about his/her participation. []         Patient is unable to participate in stated goals/plan of care: ongoing with therapy staff. 
[]         Other: 
 
   
Marla Cornejo, PT Time Calculation: 28 mins

## 2020-04-03 NOTE — PROGRESS NOTES
Community Care Team Documentation for Patient in [facility]     Patient discharged from [facility]  to [address] [place] [address],[address] Floor, on 4/1/20. Hospital Discharge diagnosis per Dr. Riccardo Beard:    Acute CVA  History of hypertension  Hypokalemia  CKD stage III  Chronic sinusitis  History of COPD  History of chronic systolic congestive heart failure EF 36 to 40%  H/o nonischemic cardiomyopathy  Severe PCM    SNF Attending Provider:  Brayden Moore    Anticipated discharge date from SNF:  TBD    PCP : Cesilia Bright MD    [facility] Team rounds completed, updates provided by facility. Brief Summary of Care:    Patient initial PT/OT eval completed this week. Dispo:  Patient was living with daughter PTA. Goal is for her to return to daughter's if able. RRAT:  High Risk            32       Total Score        4 IP Visits Last 12 Months (1-3=4, 4=9, >4=11)    28 Charlson Comorbidity Score (Age + Comorbid Conditions)        Criteria that do not apply:    Has Seen PCP in Last 6 Months (Yes=3, No=0)    . Living with Significant Other. Assisted Living. LTAC. SNF. or   Rehab    Patient Length of Stay (>5 days = 3)    Pt.  Coverage (Medicare=5 , Medicaid, or Self-Pay=4)        Active Ambulatory Problems     Diagnosis Date Noted    Essential hypertension 01/27/2012    Diabetes mellitus ([address] 01/27/2012    Hyperlipidemia 01/27/2012    Cervical myelopathy ([address] 02/03/2014    Eczema 02/03/2014    Asthma 02/03/2014    Chronic combined systolic and diastolic congestive heart failure ([address] 05/03/2018    Bladder prolapse, female, acquired 02/03/2014    IBS (irritable bowel syndrome) 02/03/2014    Osteoporosis 02/03/2014    Migraine 02/03/2014    Anxiety and depression 02/03/2014    Pleural effusion 04/30/2014    Type 2 diabetes mellitus with nephropathy ([address] 12/14/2017    Hypokalemia 05/05/2018    Acute pulmonary edema ([address] 05/05/2018    Hypertensive emergency 05/05/2018    Headache 05/05/2018    Acute on chronic systolic (congestive) heart failure ([address] 05/05/2018    Type 2 diabetes mellitus with diabetic neuropathy (HCC) 06/21/2018    Acute non-recurrent maxillary sinusitis 09/25/2018    Dizziness 09/25/2018    Chronic obstructive pulmonary disease (HCC)     CKD (chronic kidney disease) stage 3, GFR 30-59 ml/min ([address] 09/25/2018    Fall 09/25/2018    Allergic rhinitis 10/23/2018    Chronic sinusitis 06/13/2017    TIA (transient ischemic attack) 08/18/2019    CVA (cerebral vascular accident) ([address] 08/18/2019    Encephalopathy 08/18/2019    Infarction of right thalamus ([address] 09/17/2017    Dysphagia following cerebral infarction 09/17/2017    Sinusitis with nasal polyps 08/18/2019    Cardiomyopathy due to hypertension, with heart failure ([address] 05/19/2017    Slurred speech 03/29/2020    Left-sided weakness 03/29/2020    Uncontrolled hypertension 03/29/2020    Ischemic stroke ([address] 03/29/2020    Pansinusitis 03/29/2020    CKD (chronic kidney disease) 03/29/2020     Resolved Ambulatory Problems     Diagnosis Date Noted    Chest pain 09/22/2017     Past Medical History:   Diagnosis Date    Anemia     Arthritis     Cataract     Chronic lung disease     Chronic systolic congestive heart failure ([address] 2/3/2014    Difficulty swallowing     History of echocardiogram 12/29/2009    Hypercholesterolemia     Hypertension     Hypertensive heart disease     Joint pain     Joint swelling     Normal nuclear stress test 09/08/2000    Recurrent boils     Rheumatism     Shortness of breath

## 2020-04-11 NOTE — PROGRESS NOTES
Community Care Team Documentation for Patient in [facility]     Patient discharged from [facility]  to [address] [facility],[address], on 4/1/20. Hospital Discharge diagnosis per Dr. Dede Lowery:    Acute CVA  History of hypertension  Hypokalemia  CKD stage III  Chronic sinusitis  History of COPD  History of chronic systolic congestive heart failure EF 36 to 40%  H/o nonischemic cardiomyopathy  Severe PCM    SNF Attending Provider:  Merrick Negron    Anticipated discharge date from SNF:  Tentative 5/1/20    PCP : Juan Don MD    [facility] Team rounds completed, updates provided by facility. Brief Summary of Care:    Patient receiving PT/OT/ST. Making minimal progress in therapy. Able to tolerate honey thick liquids. Amb 24 ft with max assist.        Dispo:  Patient was living with daughter PTA. Goal is for her to return to daughter's if able. RRAT:  High Risk            32       Total Score        4 IP Visits Last 12 Months (1-3=4, 4=9, >4=11)    28 Charlson Comorbidity Score (Age + Comorbid Conditions)        Criteria that do not apply:    Has Seen PCP in Last 6 Months (Yes=3, No=0)    . Living with Significant Other. Assisted Living. LTAC. SNF. or   Rehab    Patient Length of Stay (>5 days = 3)    Pt.  Coverage (Medicare=5 , Medicaid, or Self-Pay=4)        Active Ambulatory Problems     Diagnosis Date Noted    Essential hypertension 01/27/2012    Diabetes mellitus ([address] 01/27/2012    Hyperlipidemia 01/27/2012    Cervical myelopathy ([address] 02/03/2014    Eczema 02/03/2014    Asthma 02/03/2014    Chronic combined systolic and diastolic congestive heart failure ([address] 05/03/2018    Bladder prolapse, female, acquired 02/03/2014    IBS (irritable bowel syndrome) 02/03/2014    Osteoporosis 02/03/2014    Migraine 02/03/2014    Anxiety and depression 02/03/2014    Pleural effusion 04/30/2014    Type 2 diabetes mellitus with nephropathy ([facility] [address] 12/14/2017    Hypokalemia 05/05/2018    Acute pulmonary edema ([address] 05/05/2018    Hypertensive emergency 05/05/2018    Headache 05/05/2018    Acute on chronic systolic (congestive) heart failure ([address] 05/05/2018    Type 2 diabetes mellitus with diabetic neuropathy (HCC) 06/21/2018    Acute non-recurrent maxillary sinusitis 09/25/2018    Dizziness 09/25/2018    Chronic obstructive pulmonary disease (HCC)     CKD (chronic kidney disease) stage 3, GFR 30-59 ml/min ([address] 09/25/2018    Fall 09/25/2018    Allergic rhinitis 10/23/2018    Chronic sinusitis 06/13/2017    TIA (transient ischemic attack) 08/18/2019    CVA (cerebral vascular accident) ([address] 08/18/2019    Encephalopathy 08/18/2019    Infarction of right thalamus ([address] 09/17/2017    Dysphagia following cerebral infarction 09/17/2017    Sinusitis with nasal polyps 08/18/2019    Cardiomyopathy due to hypertension, with heart failure ([address] 05/19/2017    Slurred speech 03/29/2020    Left-sided weakness 03/29/2020    Uncontrolled hypertension 03/29/2020    Ischemic stroke ([address] 03/29/2020    Pansinusitis 03/29/2020    CKD (chronic kidney disease) 03/29/2020     Resolved Ambulatory Problems     Diagnosis Date Noted    Chest pain 09/22/2017     Past Medical History:   Diagnosis Date    Anemia     Arthritis     Cataract     Chronic lung disease     Chronic systolic congestive heart failure ([address] 2/3/2014    Difficulty swallowing     History of echocardiogram 12/29/2009    Hypercholesterolemia     Hypertension     Hypertensive heart disease     Joint pain     Joint swelling     Normal nuclear stress test 09/08/2000    Recurrent boils     Rheumatism     Shortness of breath

## 2020-04-17 NOTE — PROGRESS NOTES
Hospital Discharge Follow-Up      Date/Time:  2020 3:13 PM    Patient was admitted to [facility] [place] on 3/29/20 and discharged on 20 to [facility] for probable CVA. The physician discharge summary was available at the time of outreach. Patient was contacted within 2 business days of discharge from the nursing home. Top Challenges reviewed with the provider   None at present         Method of communication with provider :chart routing    Was this a readmission? no   Patient stated reason for the readmission: n/a    Nurse Navigator (NN) contacted the patient by telephone to perform post hospital discharge assessment. Verified name and  with patient as identifiers. Provided introduction to self, and explanation of the Nurse Navigator role. Reviewed discharge instructions and red flags with patient who verbalized understanding. Patient given an opportunity to ask questions and does not have any further questions or concerns at this time. The patient agrees to contact the PCP office for questions related to their healthcare. NN provided contact information for future reference. Disease Specific:   N/A    Summary of patient's top problems:  1. Recent CVA  2. Diabetes  3. Medication compliance    Home Health orders at discharge: PT, OT, [facility]: [place]  Date of initial visit: 20    Durable Medical Equipment ordered/company: none ordered  Durable Medical Equipment received: n/a    Barriers to care? medication management    Advance Care Planning:   Does patient have an Advance Directive:  not on file - document in chart is not an ACP document - submitted for correction .       Medication(s):   New Medications at Discharge: Potassium, Lipitor  Changed Medications at Discharge: none  Discontinued Medications at Discharge: Stop Pravachol (lipitor is replacing pravachol)    Medication reconciliation was not performed - daughter states she was told all meds would be called into [facility] pharmacy but pharmacy doesn't have them. She was able to give me names of most meds but not dosages. Notified  [address] - asking her to have Rx called in as the patient was instructed. Daughter is going to have HHN review all meds tomorrow and verify dosages. Current Outpatient Medications   Medication Sig    potassium chloride SR (KLOR-CON 10) 10 mEq tablet Take 1 Tab by mouth daily.  atorvastatin (LIPITOR) 80 mg tablet Take 1 Tab by mouth nightly.  spironolactone (ALDACTONE) 25 mg tablet Take 1 Tab by mouth daily.  gabapentin (NEURONTIN) 100 mg capsule Take 1 Cap by mouth three (3) times daily. Max Daily Amount: 300 mg. 10/30/19 - 1 cap at breakfast, 2 caps at dinner and  bedtime  Indications: neuropathic pain    Ventolin HFA 90 mcg/actuation inhaler INHALE 2 PUFFS BY MOUTH EVERY 4 HOURS AS NEEDED FOR WHEEZING OR SHORTNESS OF BREATH    amLODIPine (NORVASC) 10 mg tablet Take 1 Tab by mouth daily. (Patient taking differently: Take 5 mg by mouth daily.)    isosorbide dinitrate (ISORDIL) 10 mg tablet Take 2 Tabs by mouth three (3) times daily.  fluticasone furoate-vilanterol (BREO ELLIPTA) 100-25 mcg/dose inhaler Take 1 Puff by inhalation daily. (Patient taking differently: Take 1 Puff by inhalation daily. Indications: Bronchospasm Prevention with COPD)    Oxygen 1 Device by Nasal route as needed (Oxygen supplement). 3L per min as needed   Indications: oxygen supplement    sodium chloride (OCEAN) 0.65 % nasal squeeze bottle 2 Sprays as needed for Congestion.  hydrALAZINE (APRESOLINE) 100 mg tablet Take 1 Tab by mouth three (3) times daily.     Blood-Glucose Meter (TRUE METRIX GLUCOSE METER) misc Test glucose 3 times daily DX: E11.40 (Patient taking differently: Test glucose 3 times daily DX: E11.40  Indications: blood sugar readings)    Lancets misc Test glucose 3 times daily DX: E11.40 (Patient taking differently: Test glucose 3 times daily DX: E11.40  Indications: for blood draws w/ blood sugar readings)    glucose blood VI test strips (BLOOD GLUCOSE TEST) strip (True Metrix) Test glucose 3 times daily DX: E11.40    insulin glargine (LANTUS,BASAGLAR) 100 unit/mL (3 mL) inpn 7 Units by SubCUTAneous route in the morning. 15 units in the evening. (Patient taking differently: 15 Units daily.)    furosemide (LASIX) 20 mg tablet Take 1 Tab by mouth two (2) times a day.  acetaminophen (TYLENOL EXTRA STRENGTH) 500 mg tablet Take 1,000 mg by mouth every six (6) hours as needed for Pain.  CARBOXYMETHYLCELLULOS/GLYCERIN (REFRESH OPTIVE OP) Administer 1 Drop to both eyes six (6) times daily. Indications: eye health    aspirin delayed-release 81 mg tablet Take 81 mg by mouth daily. Dtr. Ricardo Mcleod pt no longer takes. No current facility-administered medications for this visit. There are no discontinued medications. [facility] follow up appointment(s): No future appointments. Non-[facility] follow up appointment(s):  [facility]:  n/a       Goals      Knowledge and adherence of prescribed medication (ie. action, side effects, missed dose, etc.). Need to complete medication reconciliation at next call.

## 2020-04-17 NOTE — PROGRESS NOTES
Community Care Team Documentation for Patient in [facility]     Patient discharged from [facility]  to [address] [place] [address],Third Floor, on 4/1/20. Hospital Discharge diagnosis per Dr. Lakshmi Parsons:    Acute CVA  History of hypertension  Hypokalemia  CKD stage III  Chronic sinusitis  History of COPD  History of chronic systolic congestive heart failure EF 36 to 40%  H/o nonischemic cardiomyopathy  Severe PCM    SNF Attending Provider:  Maria Ines Clark    Anticipated discharge date from SNF:  4/15/20    PCP : Julianne Mendoza MD    [facility] Team rounds completed, updates provided by facility. Brief Summary of Care:    Patient receiving PT/OT/ST. Patient was d/c home on 4/15 at pt's request.  Her mother passed away recently and pt wanted to go home. Dispo:  Patient was d/c home to daughters. She will use Ana for [facility]. RRAT:  High Risk            32       Total Score        4 IP Visits Last 12 Months (1-3=4, 4=9, >4=11)    28 Charlson Comorbidity Score (Age + Comorbid Conditions)        Criteria that do not apply:    Has Seen PCP in Last 6 Months (Yes=3, No=0)    . Living with Significant Other. Assisted Living. LTAC. SNF. or   Rehab    Patient Length of Stay (>5 days = 3)    Pt.  Coverage (Medicare=5 , Medicaid, or Self-Pay=4)        Active Ambulatory Problems     Diagnosis Date Noted    Essential hypertension 01/27/2012    Diabetes mellitus ([address] 01/27/2012    Hyperlipidemia 01/27/2012    Cervical myelopathy ([address] 02/03/2014    Eczema 02/03/2014    Asthma 02/03/2014    Chronic combined systolic and diastolic congestive heart failure ([address] 05/03/2018    Bladder prolapse, female, acquired 02/03/2014    IBS (irritable bowel syndrome) 02/03/2014    Osteoporosis 02/03/2014    Migraine 02/03/2014    Anxiety and depression 02/03/2014    Pleural effusion 04/30/2014    Type 2 diabetes mellitus with nephropathy ([address] 12/14/2017    Hypokalemia 05/05/2018    Acute pulmonary edema ([address] 05/05/2018    Hypertensive emergency 05/05/2018    Headache 05/05/2018    Acute on chronic systolic (congestive) heart failure ([address] 05/05/2018    Type 2 diabetes mellitus with diabetic neuropathy (HCC) 06/21/2018    Acute non-recurrent maxillary sinusitis 09/25/2018    Dizziness 09/25/2018    Chronic obstructive pulmonary disease (HCC)     CKD (chronic kidney disease) stage 3, GFR 30-59 ml/min ([address] 09/25/2018    Fall 09/25/2018    Allergic rhinitis 10/23/2018    Chronic sinusitis 06/13/2017    TIA (transient ischemic attack) 08/18/2019    CVA (cerebral vascular accident) ([address] 08/18/2019    Encephalopathy 08/18/2019    Infarction of right thalamus ([address] 09/17/2017    Dysphagia following cerebral infarction 09/17/2017    Sinusitis with nasal polyps 08/18/2019    Cardiomyopathy due to hypertension, with heart failure ([address] 05/19/2017    Slurred speech 03/29/2020    Left-sided weakness 03/29/2020    Uncontrolled hypertension 03/29/2020    Ischemic stroke ([address] 03/29/2020    Pansinusitis 03/29/2020    CKD (chronic kidney disease) 03/29/2020     Resolved Ambulatory Problems     Diagnosis Date Noted    Chest pain 09/22/2017     Past Medical History:   Diagnosis Date    Anemia     Arthritis     Cataract     Chronic lung disease     Chronic systolic congestive heart failure ([address] 2/3/2014    Difficulty swallowing     History of echocardiogram 12/29/2009    Hypercholesterolemia     Hypertension     Hypertensive heart disease     Joint pain     Joint swelling     Normal nuclear stress test 09/08/2000    Recurrent boils     Rheumatism     Shortness of breath

## 2020-04-21 NOTE — TELEPHONE ENCOUNTER
Tried calling the daughter but she didn't answer I look on the patient chart she was discharged to [address] and she will be discharged home with [facility].

## 2020-04-24 PROBLEM — J44.9 COPD (CHRONIC OBSTRUCTIVE PULMONARY DISEASE) (HCC): Status: ACTIVE | Noted: 2020-01-01

## 2020-04-24 NOTE — ED TRIAGE NOTES
EMS reports SOB since last hs; upon arrival evgeny wheezing; 90% o2 sat; 3 L NC; given 75% of Duoneb

## 2020-04-24 NOTE — ED PROVIDER NOTES
EMERGENCY DEPARTMENT HISTORY AND PHYSICAL EXAM 
 
10:14 AM 
 
 
Date: 4/24/2020 Patient Name: Gayatri Sabillon History of Presenting Illness Chief Complaint Patient presents with  Shortness of Breath  Wheezing History Provided By: Patient Location/Duration/Severity/Modifying factors 69-year-old -American female with past medical history of CHF diabetes hypertension stroke and COPD also shortness of breath. States shortness of breath began last night is worse when she lays flat exertion. She denies any fever chills nausea vomiting chest pain. States that she is current with her medications. Patient has not tried anything for the shortness of breath. PCP: Tee Cloud MD 
 
Current Outpatient Medications Medication Sig Dispense Refill  potassium chloride SR (KLOR-CON 10) 10 mEq tablet Take 1 Tab by mouth daily. 30 Tab 0  
 atorvastatin (LIPITOR) 80 mg tablet Take 1 Tab by mouth nightly. 30 Tab 0  
 spironolactone (ALDACTONE) 25 mg tablet Take 1 Tab by mouth daily. 30 Tab 0  
 gabapentin (NEURONTIN) 100 mg capsule Take 1 Cap by mouth three (3) times daily. Max Daily Amount: 300 mg. 10/30/19 - 1 cap at breakfast, 2 caps at dinner and  bedtime  Indications: neuropathic pain 9 Cap 0  Ventolin HFA 90 mcg/actuation inhaler INHALE 2 PUFFS BY MOUTH EVERY 4 HOURS AS NEEDED FOR WHEEZING OR SHORTNESS OF BREATH 18 g 5  
 amLODIPine (NORVASC) 10 mg tablet Take 1 Tab by mouth daily. (Patient taking differently: Take 5 mg by mouth daily.) 30 Tab 0  
 isosorbide dinitrate (ISORDIL) 10 mg tablet Take 2 Tabs by mouth three (3) times daily. 1 Tab 0  
 fluticasone furoate-vilanterol (BREO ELLIPTA) 100-25 mcg/dose inhaler Take 1 Puff by inhalation daily. (Patient taking differently: Take 1 Puff by inhalation daily. Indications: Bronchospasm Prevention with COPD) 1 Inhaler 0  
 Oxygen 1 Device by Nasal route as needed (Oxygen supplement).  3L per min as needed   Indications: oxygen supplement  sodium chloride (OCEAN) 0.65 % nasal squeeze bottle 2 Sprays as needed for Congestion.  hydrALAZINE (APRESOLINE) 100 mg tablet Take 1 Tab by mouth three (3) times daily. 270 Tab 3  Blood-Glucose Meter (TRUE METRIX GLUCOSE METER) misc Test glucose 3 times daily DX: E11.40 (Patient taking differently: Test glucose 3 times daily DX: E11.40  Indications: blood sugar readings) 1 Each 0  
 Lancets misc Test glucose 3 times daily DX: E11.40 (Patient taking differently: Test glucose 3 times daily DX: E11.40  Indications: for blood draws w/ blood sugar readings) 100 Each 12  
 glucose blood VI test strips (BLOOD GLUCOSE TEST) strip (True Metrix) Test glucose 3 times daily DX: E11.40 100 Strip 12  
 insulin glargine (LANTUS,BASAGLAR) 100 unit/mL (3 mL) inpn 7 Units by SubCUTAneous route in the morning. 15 units in the evening. (Patient taking differently: 15 Units daily.) 5 Pen 12  
 furosemide (LASIX) 20 mg tablet Take 1 Tab by mouth two (2) times a day. 60 Tab 8  acetaminophen (TYLENOL EXTRA STRENGTH) 500 mg tablet Take 1,000 mg by mouth every six (6) hours as needed for Pain.  CARBOXYMETHYLCELLULOS/GLYCERIN (REFRESH OPTIVE OP) Administer 1 Drop to both eyes six (6) times daily. Indications: eye health  aspirin delayed-release 81 mg tablet Take 81 mg by mouth daily. Dtr. Satinder Weiss pt no longer takes. Past History Past Medical History: 
Past Medical History:  
Diagnosis Date  Anemia  Arthritis  Cardiomyopathy due to hypertension, with heart failure ([address] 5/19/2017  Cataract  Chronic combined systolic and diastolic congestive heart failure ([address] 5/3/2018  Chronic lung disease  Chronic obstructive pulmonary disease ([address]  Chronic systolic congestive heart failure ([address] 2/3/2014  Diabetes mellitus ([address]  Difficulty swallowing Both food and liquid  Dysphagia following cerebral infarction 8/17/2017  History of echocardiogram 12/29/2009 EF 50%. Mild-mod conc LVH. Mod DDfx. LAE. Mild MR.    
 Hypercholesterolemia  Hypertension  Hypertensive heart disease  Infarction of right thalamus ([address] 9/17/2017 Lacunar infarct 2017  Joint pain  Joint swelling  Normal nuclear stress test 09/08/2000 No ischemia or prior infarction. Neg EKG on submax EST. Ex time 15:02.  Recurrent boils  Rheumatism  Shortness of breath  Sinusitis with nasal polyps 8/18/2019 Past Surgical History: 
Past Surgical History:  
Procedure Laterality Date  CARDIAC CATHETERIZATION  5/10/2018  CORONARY ARTERY ANGIOGRAM  5/10/2018  HX CHOLECYSTECTOMY  [address]  MODERATE SEDATION  5/10/2018 Family History: 
Family History Problem Relation Age of Onset  Hypertension Mother  Ovarian Cancer Mother  Stroke Mother  Heart Attack Father  Breast Cancer Maternal Aunt Social History: 
Social History Tobacco Use  Smoking status: Never Smoker  Smokeless tobacco: Never Used  Tobacco comment: second hand smoke exposure as a child Substance Use Topics  Alcohol use: No  
 Drug use: No  
 
 
Allergies: Allergies Allergen Reactions  Codeine Nausea Only  Sulfa (Sulfonamide Antibiotics) Rash  Watermelon Hives Reported by pt's daughter Review of Systems Review of Systems Constitutional: Negative for chills and fever. Respiratory: Positive for shortness of breath and wheezing. Negative for cough and chest tightness. Cardiovascular: Negative for chest pain, palpitations and leg swelling. Gastrointestinal: Negative for abdominal distention, abdominal pain, nausea and vomiting. Genitourinary: Negative for dysuria, flank pain, frequency and urgency. Musculoskeletal: Negative for arthralgias, back pain, neck pain and neck stiffness. Skin: Negative for color change and pallor. Neurological: Negative for dizziness, weakness, numbness and headaches. Psychiatric/Behavioral: Negative for self-injury, sleep disturbance and suicidal ideas. Physical Exam  
 
Visit Vitals BP (!) 192/91 (BP 1 Location: Left arm, BP Patient Position: At rest;Sitting) Pulse 96 Temp 97.7 °F (36.5 °C) Resp 24 Ht 5' 9\" (1.753 m) Wt 60.8 kg (134 lb) SpO2 100% BMI 19.79 kg/m² Physical Exam 
Vitals signs and nursing note reviewed. Constitutional:   
   General: She is in acute distress. HENT:  
   Head: Normocephalic and atraumatic. Eyes:  
   Pupils: Pupils are equal, round, and reactive to light. Cardiovascular:  
   Rate and Rhythm: Normal rate and regular rhythm. Pulmonary:  
   Effort: Tachypnea present. Breath sounds: Examination of the right-upper field reveals wheezing, rhonchi and rales. Examination of the left-upper field reveals wheezing, rhonchi and rales. Examination of the right-middle field reveals wheezing, rhonchi and rales. Examination of the left-middle field reveals wheezing, rhonchi and rales. Examination of the right-lower field reveals wheezing, rhonchi and rales. Examination of the left-lower field reveals wheezing, rhonchi and rales. Wheezing, rhonchi and rales present. Abdominal:  
   General: Bowel sounds are normal.  
   Palpations: Abdomen is soft. Musculoskeletal:  
   Right lower leg: No edema. Left lower leg: No edema. Skin: 
   General: Skin is warm and dry. Neurological:  
   Mental Status: She is alert and oriented to person, place, and time. Psychiatric:     
   Mood and Affect: Mood normal.     
   Behavior: Behavior normal.  
 
 
 
 
Diagnostic Study Results Labs - No results found for this or any previous visit (from the past 12 hour(s)). Radiologic Studies - No orders to display Medical Decision Making I am the first provider for this patient. I reviewed the vital signs, available nursing notes, past medical history, past surgical history, family history and social history. Vital Signs-Reviewed the patient's vital signs. EKG: Sinus rhythm at 95 bpm with premature atrial complexes. No signs of ischemia or infarct acutely. Intervals within normal limits including IN QRS and QT Records Reviewed: Nursing Notes and Old Medical Records (Time of Review: 10:14 AM) ED Course: Progress Notes, Reevaluation, and Consults: 
 
  
 
Provider Notes (Medical Decision Making): MDM Number of Diagnoses or Management Options Diagnosis management comments: Patient appears to be in COPD exacerbation. Per history the patient takes 3 L of oxygen at home and while in the ED observed to be satting high percent on 3 L. However EMS reports that upon arrival to the patient's residence she was satting 88% on 3 L and improved with albuterol treatment. Consider CHF exacerbation however patient does not appear to have crackles at the bases does not appear to be fluid overloaded. Patient was treated with 3 DuoNeb treatments given 1 g of magnesium and 125 Solu-Medrol in the emergency department she maintained oxygen saturation at rest at 100% on 3 L nasal cannula. However she continued to demonstrate significant wheezing throughout all lung fields. Patient was then given 10 mg of albuterol nebulized treatment and walked on 3 L nasal cannula however was unable to maintain oxygen saturation and appeared too weak to complete a distance of 10 feet. Per her daughter the patient should be able to ambulate 10 feet. Procedures Diagnosis Clinical Impression: No diagnosis found. Disposition: Admitted Follow-up Information None Patient's Medications Start Taking No medications on file Continue Taking ACETAMINOPHEN (TYLENOL EXTRA STRENGTH) 500 MG TABLET    Take 1,000 mg by mouth every six (6) hours as needed for Pain. AMLODIPINE (NORVASC) 10 MG TABLET    Take 1 Tab by mouth daily. ASPIRIN DELAYED-RELEASE 81 MG TABLET    Take 81 mg by mouth daily. Cjr. Raji Merino pt no longer takes. ATORVASTATIN (LIPITOR) 80 MG TABLET    Take 1 Tab by mouth nightly. BLOOD-GLUCOSE METER (TRUE METRIX GLUCOSE METER) MISC    Test glucose 3 times daily DX: E11.40 CARBOXYMETHYLCELLULOS/GLYCERIN (REFRESH OPTIVE OP)    Administer 1 Drop to both eyes six (6) times daily. Indications: eye health FLUTICASONE FUROATE-VILANTEROL (BREO ELLIPTA) 100-25 MCG/DOSE INHALER    Take 1 Puff by inhalation daily. FUROSEMIDE (LASIX) 20 MG TABLET    Take 1 Tab by mouth two (2) times a day. GABAPENTIN (NEURONTIN) 100 MG CAPSULE    Take 1 Cap by mouth three (3) times daily. Max Daily Amount: 300 mg. 10/30/19 - 1 cap at breakfast, 2 caps at dinner and  bedtime  Indications: neuropathic pain GLUCOSE BLOOD VI TEST STRIPS (BLOOD GLUCOSE TEST) STRIP    (True Metrix) Test glucose 3 times daily DX: E11.40 HYDRALAZINE (APRESOLINE) 100 MG TABLET    Take 1 Tab by mouth three (3) times daily. INSULIN GLARGINE (LANTUS,BASAGLAR) 100 UNIT/ML (3 ML) INPN    7 Units by SubCUTAneous route in the morning. 15 units in the evening. ISOSORBIDE DINITRATE (ISORDIL) 10 MG TABLET    Take 2 Tabs by mouth three (3) times daily. LANCETS MISC    Test glucose 3 times daily DX: E11.40 OXYGEN    1 Device by Nasal route as needed (Oxygen supplement). 3L per min as needed   Indications: oxygen supplement POTASSIUM CHLORIDE SR (KLOR-CON 10) 10 MEQ TABLET    Take 1 Tab by mouth daily. SODIUM CHLORIDE (OCEAN) 0.65 % NASAL SQUEEZE BOTTLE    2 Sprays as needed for Congestion. SPIRONOLACTONE (ALDACTONE) 25 MG TABLET    Take 1 Tab by mouth daily. VENTOLIN HFA 90 MCG/ACTUATION INHALER    INHALE 2 PUFFS BY MOUTH EVERY 4 HOURS AS NEEDED FOR WHEEZING OR SHORTNESS OF BREATH These Medications have changed No medications on file Stop Taking No medications on file Disclaimer: Sections of this note are dictated using utilizing voice recognition software. Minor typographical errors may be present. If questions arise, please do not hesitate to contact me or call our department. Chelo Brand, [address] [facility] Emergency Medicine PGY-1

## 2020-04-25 PROBLEM — J44.9 COPD (CHRONIC OBSTRUCTIVE PULMONARY DISEASE) (HCC): Chronic | Status: ACTIVE | Noted: 2020-01-01

## 2020-04-25 PROBLEM — I50.23 ACUTE ON CHRONIC SYSTOLIC (CONGESTIVE) HEART FAILURE (HCC): Chronic | Status: ACTIVE | Noted: 2018-05-05

## 2020-04-25 PROBLEM — R53.1 LEFT-SIDED WEAKNESS: Chronic | Status: ACTIVE | Noted: 2020-01-01

## 2020-04-25 NOTE — PROGRESS NOTES
TRANSFER - IN REPORT: 
 
Verbal report received from [facility]) on Neptali Peace  being received from ED(unit) for routine progression of care Report consisted of patients Situation, Background, Assessment and  
Recommendations(SBAR). Information from the following report(s) ED Summary and MAR was reviewed with the receiving nurse. Opportunity for questions and clarification was provided. Assessment completed upon patients arrival to unit and care assumed.  will be assessing pt prior to transfer.

## 2020-04-25 NOTE — ED NOTES
TRANSFER - OUT REPORT: 
 
Verbal report given to Brandt Zamarripa on Tashi Smith  being transferred to [address], Room 406 
 for routine progression of care Report consisted of patients Situation, Background, Assessment and  
Recommendations(SBAR). Information from the following report(s) SBAR was reviewed with the receiving nurse. Lines:  
Peripheral IV 04/24/20 Right Antecubital (Active) Site Assessment Clean, dry, & intact 4/24/2020 10:01 AM  
Phlebitis Assessment 0 4/24/2020 10:01 AM  
Infiltration Assessment 0 4/24/2020 10:01 AM  
Dressing Status Clean, dry, & intact 4/24/2020 10:01 AM  
Dressing Type 4 X 4;Tape;Transparent 4/24/2020 10:01 AM  
Hub Color/Line Status Green;Flushed;Patent 4/24/2020 10:01 AM  
Action Taken Blood drawn 4/24/2020 10:01 AM  
  
 
Opportunity for questions and clarification was provided. Patient transported with: 
 Hospital transport

## 2020-04-25 NOTE — PROGRESS NOTES
Spoke with Andree Degroot RT regarding scheduled breathing tx's. Will be completed per Andree Degroot once time permits. Pt resting in bed with eyes closed. O2 sats 99%, no signs of resp distress. Will continue to monitor.

## 2020-04-25 NOTE — H&P
History & Physical 
 
Patient: Michoacano Mcginnis MRN: [id number]  CSN: [id number] YOB: 1955  Age: 77 y.o. Sex: female DOA: 4/24/2020 HPI:  
 
Michoacano Mcginnis is a 77 y.o. female with a history of hypertension, diabetes mellitus on insulin with neuropathy and CKD 3, chronic systolic congestive heart failure with an EF of 36 to 40%, nonischemic cardiomyopathy, and recently discharged from [facility] on 4/1/2020 with left-sided hemiparesis and slurred speech mild dysphasia to subacute rehab at [facility]. On 4/24/2020 patient was noted to have increased shortness of breath with bilateral wheezing O2 desaturations 90% on her baseline 3 L nasal cannula. EMS was called and the patient was administered DuoNeb in route, and presented to [facility] emergency room In the ED the patient was hypertensive 192/91, heart rate regular 96, afebrile 97 7, respiratory rate was 24 saturating at 100% on 3 L nasal cannula. She did have episode of tachypnea with bilateral wheezing rhonchi and bilateral crackles. Patient was treated with 3 DuoNeb treatments a gram of magnesium and 125 mg of Solu-Medrol in the emergency department. While at rest she maintained oxygen saturations in 100% on her 3 L nasal cannula baseline. Attempts for ambulation of even 10 feet proved the patient to be acutely weak and desaturated to 88%. Patient was admitted to telemetry with acute on chronic systolic congestive heart failure likely secondary to uncontrolled hypertension and COPD. Past Medical History:  
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 Mild-mod conc LVH. Mod DDfx. LAE. Mild MR.    
 Hypercholesterolemia  Hypertension  Hypertensive heart disease  Infarction of right thalamus ([address] 9/17/2017 Lacunar infarct 2017  Joint pain  Joint swelling  Normal nuclear stress test 09/08/2000 No ischemia or prior infarction. Neg EKG on submax EST. Ex time 15:02.  Recurrent boils  Rheumatism  Shortness of breath  Sinusitis with nasal polyps 8/18/2019 Past Surgical History:  
Procedure Laterality Date  CARDIAC CATHETERIZATION  5/10/2018  CORONARY ARTERY ANGIOGRAM  5/10/2018  HX CHOLECYSTECTOMY  [address] [place]  MODERATE SEDATION  5/10/2018 Family History Problem Relation Age of Onset  Hypertension Mother  Ovarian Cancer Mother  Stroke Mother  Heart Attack Father  Breast Cancer Maternal Aunt Social History Socioeconomic History  Marital status:  Spouse name: Not on file  Number of children: Not on file  Years of education: Not on file  Highest education level: Not on file Tobacco Use  Smoking status: Never Smoker  Smokeless tobacco: Never Used  Tobacco comment: second hand smoke exposure as a child Substance and Sexual Activity  Alcohol use: No  
 Drug use: No  
 Sexual activity: Never Social History Narrative Pt used to be a  for the school system. Prior to Admission medications Medication Sig Start Date End Date Taking? Authorizing Provider  
potassium chloride SR (KLOR-CON 10) 10 mEq tablet Take 1 Tab by mouth daily. 4/1/20  Yes Douglas Ortiz MD  
atorvastatin (LIPITOR) 80 mg tablet Take 1 Tab by mouth nightly.  4/1/20  Yes Douglas Ortiz MD  
 gabapentin (NEURONTIN) 100 mg capsule Take 1 Cap by mouth three (3) times daily. Max Daily Amount: 300 mg. 10/30/19 - 1 cap at breakfast, 2 caps at dinner and  bedtime  Indications: neuropathic pain 4/1/20  Yes Sebastián Arnold MD  
amLODIPine (NORVASC) 10 mg tablet Take 1 Tab by mouth daily. Patient taking differently: Take 5 mg by mouth daily. 9/18/19  Yes Andreia Schultz NP  
isosorbide dinitrate (ISORDIL) 10 mg tablet Take 2 Tabs by mouth three (3) times daily. 9/18/19  Yes Andreia Schultz NP  
fluticasone furoate-vilanterol (BREO ELLIPTA) 100-25 mcg/dose inhaler Take 1 Puff by inhalation daily. Patient taking differently: Take 1 Puff by inhalation daily. Indications: Bronchospasm Prevention with COPD 4/3/19  Yes Luana Moser MD  
Oxygen 1 Device by Nasal route as needed (Oxygen supplement). 3L per min as needed   Indications: oxygen supplement   Yes Other, MD Santos  
Lancets misc Test glucose 3 times daily DX: E11.40 Patient taking differently: Test glucose 3 times daily DX: E11.40  Indications: for blood draws w/ blood sugar readings 7/2/18  Yes Lizzette Eaton, DO  
glucose blood VI test strips (BLOOD GLUCOSE TEST) strip (True Metrix) Test glucose 3 times daily DX: E11.40 7/2/18  Yes Lizzette Eaton, DO  
insulin glargine (LANTUS,BASAGLAR) 100 unit/mL (3 mL) inpn 7 Units by SubCUTAneous route in the morning. 15 units in the evening. Patient taking differently: 15 Units daily. 6/21/18  Yes Tae Joseph MD  
furosemide (LASIX) 20 mg tablet Take 1 Tab by mouth two (2) times a day. 6/4/18  Yes Jeni SOLORIO NP  
spironolactone (ALDACTONE) 25 mg tablet Take 1 Tab by mouth daily. 4/1/20   Destin Collins MD  
Ventolin HFA 90 mcg/actuation inhaler INHALE 2 PUFFS BY MOUTH EVERY 4 HOURS AS NEEDED FOR WHEEZING OR SHORTNESS OF BREATH 3/29/20   Tae Joseph MD  
sodium chloride (OCEAN) 0.65 % nasal squeeze bottle 2 Sprays as needed for Congestion.     Other, MD Santos  
 hydrALAZINE (APRESOLINE) 100 mg tablet Take 1 Tab by mouth three (3) times daily. 7/5/18   Neptali Cabezas MD  
Blood-Glucose Meter (TRUE METRIX GLUCOSE METER) misc Test glucose 3 times daily DX: E11.40 Patient taking differently: Test glucose 3 times daily DX: E11.40  Indications: blood sugar readings 7/2/18   Uma, Ervin-Min B, DO  
acetaminophen (TYLENOL EXTRA STRENGTH) 500 mg tablet Take 1,000 mg by mouth every six (6) hours as needed for Pain. Provider, Historical  
CARBOXYMETHYLCELLULOS/GLYCERIN (REFRESH OPTIVE OP) Administer 1 Drop to both eyes six (6) times daily. Indications: eye health    Rajat Hi MD  
aspirin delayed-release 81 mg tablet Take 81 mg by mouth daily. Dtr. Jona Stauffer pt no longer takes. Other, MD Santos  
 
 
Allergies Allergen Reactions  Codeine Nausea Only  Sulfa (Sulfonamide Antibiotics) Rash  Watermelon Hives Reported by pt's daughter Review of systems Podiatry review of systems was performed with pertinent positives as per HPI Physical Exam:  
  
Visit Vitals BP (!) 167/94 (BP 1 Location: Right arm, BP Patient Position: At rest) Pulse 96 Temp 98.4 °F (36.9 °C) Resp 18 Ht 5' 9\" (1.753 m) Wt 58.1 kg (128 lb) SpO2 98% BMI 18.90 kg/m² Physical Exam: 
GENERAL: fatigued, cooperative, mild distress HEENT head is atraumatic, conjunctiva clear, anicteric, no oral lesions, neck is supple Chest heart rate regular 96, lungs decreased at bases with lateral crackles, mild expiratory wheeze anteriorly Abdomen soft, bowel sounds positive, no tenderness elicited Extremities moderate left-sided weakness, moving extremities in bed, calves are soft, feet are warm positive pulses Lab/Data Review: 
Labs: Results:  
   
Chemistry Recent Labs  
  04/24/20 
9653 GLU 93   
K 4.0  
 CO2 28 BUN 40* CREA 1.46* CA 9.1 AGAP 5  
BUCR 27* CBC w/Diff Recent Labs  
  04/24/20 
0075 WBC 4.1*  
RBC 3.90* HGB 11.3*  
 HCT 34.6*  
 GRANS 84* LYMPH 12* EOS 2 Coagulation Recent Labs  
  04/24/20 
8216 APTT 30.5 Iron/Ferritin No results for input(s): IRON in the last 72 hours. No lab exists for component: TIBCCALC BNP No results for input(s): BNPP in the last 72 hours. Cardiac Enzymes Recent Labs  
  04/24/20 
5920  CKND1 5.2* Liver Enzymes No results for input(s): TP, ALB, TBIL, AP, SGOT, GPT in the last 72 hours. No lab exists for component: DBIL Thyroid Studies Lab Results Component Value Date/Time T4, Total 9.1 12/23/2009 11:33 AM  
 TSH 2.31 04/06/2020 04:36 AM  
    
 
All Micro Results None Imaging Reviewed: XR Results (most recent): 
Results from [facility] [place] [facility] Encounter encounter on 04/24/20 XR CHEST SNGL V  
 Narrative EXAM: PORTABLE CHEST 1014 hours CLINICAL HISTORY/INDICATION: SOB times several hours associated with bilateral 
wheezing and decreased oxygen saturation COMPARISON: Chest x-ray March 29, 2020, August 18, 2019. TECHNIQUE: Single AP view FINDINGS:  
 
The patient is minimally rotated. There is very mild blunting of the left lateral costophrenic angle. Right 
costophrenic angle is sharp. Pulmonary vascularity is normal. The lungs are 
clear with the exception of a calcified granuloma at the right apex. Stable top 
normal cardiac size to mild cardiomegaly. [place] Impression IMPRESSION: 
 
Very mild blunting of the left lateral costophrenic angle unchanged from most 
recent exam of March 29, 2020. Differential diagnosis of residual scarring at 
the costophrenic angle vs. mild residual effusion. Assessment:  
Principal Problem: 
  Acute on chronic systolic (congestive) heart failure ([address] (5/5/2018) Active Problems: 
  Essential hypertension (1/27/2012) COPD (chronic obstructive pulmonary disease) ([address] (4/24/2020) Plan:  
Admit to telemetry unit Continue baseline oxygen 3 L nasal cannula DuoNebs 4 times daily Control blood pressure POC glucose with coverage Daily weight Aspiration precautions Full code Mu Roche DO 
4/25/2020, 7:42 AM

## 2020-04-25 NOTE — PROGRESS NOTES
unable to conduct spiritual assessment. Chaplain Deleon Clayton Spiritual Care Department [address] [facility] Spiritual Care Department [phone]

## 2020-04-25 NOTE — PROGRESS NOTES
Awaiting orders to be completed. Phoned Dr. Sandra Guerra regarding BP trends. Dr. Sandra Guerra to place order. Will monitor for any changes.

## 2020-04-25 NOTE — PROGRESS NOTES
NUTRITION Nursing Referral: [facility] 
  
RECOMMENDATIONS / PLAN:  
 
- Remove 6 small meals and add honey thick liquid restriction to diet per most recent SLP recommendation 4/1/20. 
- Add supplements: Magic Cup TID.  
- Continue RD inpatient monitoring and evaluation. NUTRITION INTERVENTIONS & DIAGNOSIS:  
 
- Meals/snacks: modify composition - Medical food supplement therapy: initiate Nutrition Diagnosis: Unintended weight loss related to dysphagia, decreased appetite and weakness as evidenced by 37 lb, 22% weight loss x 8 months. ASSESSMENT:  
 
Patient lethargic, ate 60% of lunch and reports good appetite but unable to recall recent swallow evaluations and requiring thickened liquids. Nutritional intake adequate to meet patients estimated nutritional needs:  Unable to determine at this time Diet: DIET DIABETIC CONSISTENT CARB Mechanical Soft; Six Small Meals Food Allergies: watermelon Current Appetite: Good, per pt statement Appetite/meal intake prior to admission: Fair, decreased x past several months PTA Feeding Limitations:  [x] Swallowing difficulty: hx of dysphagia, SLP recommended regular diet with honey thick liquids 4/1/20, MBS 3/30/20    [x] Chewing difficulty: edentulous    [] Other: 
Current Meal Intake: No data found. BM: 425, loose Skin Integrity: WDL Edema:   [x] No     [] Yes Pertinent Medications: Reviewed Recent Labs  
  04/24/20 
9307   
K 4.0  
 CO2 28  
GLU 93 BUN 40* CREA 1.46* CA 9.1 No intake or output data in the 24 hours ending 04/25/20 1322 Anthropometrics: 
Ht Readings from Last 1 Encounters:  
04/24/20 5' 9\" (1.753 m) Last 3 Recorded Weights in this Encounter 04/24/20 0945 04/25/20 0034 Weight: 60.8 kg (134 lb) 58.1 kg (128 lb) Body mass index is 18.9 kg/m². Underweight Weight History: gradual weight loss over the years down from 185 lb since 2008 per daughter's report; 37 lb, 22% weight loss x 8 months per chart hx review Weight Metrics 4/25/2020 3/31/2020 1/9/2020 10/31/2019 9/18/2019 8/20/2019 4/3/2019 Weight 128 lb 151 lb 8 oz 130 lb 3.2 oz 144 lb 146 lb 6.4 oz 165 lb 168 lb BMI 18.9 kg/m2 22.37 kg/m2 21.67 kg/m2 23.96 kg/m2 24.36 kg/m2 27.46 kg/m2 24.81 kg/m2 Admitting Diagnosis: COPD (chronic obstructive pulmonary disease) [address] [J44.9] Pertinent PMHx: hypertension, diabetes mellitus on insulin with neuropathy and CKD 3, chronic systolic congestive heart failure with an EF of 36 to 40%, nonischemic cardiomyopathy Education Needs:        [x] None identified  [] Identified - Not appropriate at this time  []  Identified and addressed - refer to education log Learning Limitations:   [x] None identified  [] Identified Cultural, Christianity & ethnic food preferences:  [x] None identified    [] Identified and addressed ESTIMATED NUTRITION NEEDS:  
 
Calories: 7971-1924 kcal (MSJx1.2-1.5) based on  [x] Actual BW 58 kg     [] IBW Protein: 46-70 gm (0.8-1.2 gm/kg) based on  [x] Actual BW      [] IBW Fluid: 1 mL/kcal 
  
MONITORING & EVALUATION:  
 
Nutrition Goal(s):  
- PO nutrition intake will meet >75% of patient estimated nutritional needs within the next 7 days. Outcome: New/Initial goal  
 
Monitoring:   [x] Food and nutrient intake   [x] Food and nutrient administration  [x] Comparative standards   [x] Nutrition-focused physical findings   [x] Anthropometric Measurements   [x] Treatment/therapy   [x] Biochemical data, medical tests, and procedures Previous Recommendations (for follow-up assessments only):  Not Applicable Discharge Planning: No nutritional discharge needs at this time. Participated in care planning, discharge planning, & interdisciplinary rounds as appropriate. Avinash Luong RD, [facility] Pager: [phone]

## 2020-04-25 NOTE — PROGRESS NOTES
Bedside shift change report given to Mohan Oliver RN (oncoming nurse) by Cristi Tamez (offgoing nurse). Report included the following information SBAR and Kardex.

## 2020-04-26 NOTE — PROGRESS NOTES
[facility] Hospitalist Group Progress Note Patient: Neptali Peace Age: 77 y.o. : 1955 MR#: [id number] SSN: [id number] Date: 2020 Subjective/24-hour events: SOB improved relative to presentation. No acute chest pain, denies LE swelling. Assessment: COPD with acute exacerbation Chronic hypoxic respiratory failure on home oxygen Mild acute on chronic systolic CHF, currently compensated Cardiomyopathy, EF 45% Hypokalemia, mild HTN 
CKD 3 Chronic anemia Plan: 
Initiate IV steroid, continue nebs/antibiotic as ordered. Consider cardiology eval but symptoms appear to primarily be of pulmonary etiology at this point. Will monitor. Monitor potassium, replace as necessary. Mobilize as able/tolerated. Leti. Case discussed with:  [x]Patient  []Family  [x]Nursing  []Case Management DVT Prophylaxis:  []Lovenox  []Hep SQ  []SCDs  []Coumadin   []On Heparin gtt Objective:  
VS:  
Visit Vitals /81 Pulse 82 Temp 97.9 °F (36.6 °C) Resp 16 Ht 5' 9\" (1.753 m) Wt 58.1 kg (128 lb) SpO2 100% BMI 18.90 kg/m² Tmax/24hrs: Temp (24hrs), Av.9 °F (36.6 °C), Min:97.4 °F (36.3 °C), Max:98.1 °F (36.7 °C) Intake/Output Summary (Last 24 hours) at 2020 5857 Last data filed at 2020 0600 Gross per 24 hour Intake 600 ml Output 1600 ml Net -1000 ml General:  In NAD. Cardiovascular:  RRR. Pulmonary:  Breath sounds decreased bilaterally.  + EEW but no accessory muscle use. GI:  Abdomen soft, NTTP. Extremities:  Warm, no ischemia. Additional:  Awake and alert, motor nonfocal. 
 
Labs:   
Recent Results (from the past 24 hour(s)) GLUCOSE, POC Collection Time: 20 11:40 AM  
Result Value Ref Range Glucose (POC) 86 70 - 110 mg/dL METABOLIC PANEL, BASIC Collection Time: 20 12:12 PM  
Result Value Ref Range Sodium 142 136 - 145 mmol/L  Potassium 3.4 (L) 3.5 - 5.5 mmol/L  
 Chloride 109 100 - 111 mmol/L  
 CO2 27 21 - 32 mmol/L Anion gap 6 3.0 - 18 mmol/L Glucose 79 74 - 99 mg/dL BUN 51 (H) 7.0 - 18 MG/DL Creatinine 1.62 (H) 0.6 - 1.3 MG/DL  
 BUN/Creatinine ratio 31 (H) 12 - 20 GFR est AA 38 (L) >60 ml/min/1.73m2 GFR est non-AA 32 (L) >60 ml/min/1.73m2 Calcium 8.4 (L) 8.5 - 10.1 MG/DL MAGNESIUM Collection Time: 04/25/20 12:12 PM  
Result Value Ref Range Magnesium 2.5 1.6 - 2.6 mg/dL PHOSPHORUS Collection Time: 04/25/20 12:12 PM  
Result Value Ref Range Phosphorus 4.2 2.5 - 4.9 MG/DL  
CBC WITH AUTOMATED DIFF Collection Time: 04/25/20 12:12 PM  
Result Value Ref Range WBC 7.5 4.6 - 13.2 K/uL  
 RBC 3.51 (L) 4.20 - 5.30 M/uL HGB 9.8 (L) 12.0 - 16.0 g/dL HCT 30.7 (L) 35.0 - 45.0 % MCV 87.5 74.0 - 97.0 FL  
 MCH 27.9 24.0 - 34.0 PG  
 MCHC 31.9 31.0 - 37.0 g/dL  
 RDW 15.2 (H) 11.6 - 14.5 % PLATELET 236 591 - 588 K/uL MPV 12.4 (H) 9.2 - 11.8 FL  
 NEUTROPHILS 82 (H) 40 - 73 % LYMPHOCYTES 9 (L) 21 - 52 % MONOCYTES 8 3 - 10 % EOSINOPHILS 1 0 - 5 % BASOPHILS 0 0 - 2 %  
 ABS. NEUTROPHILS 6.2 1.8 - 8.0 K/UL  
 ABS. LYMPHOCYTES 0.7 (L) 0.9 - 3.6 K/UL  
 ABS. MONOCYTES 0.6 0.05 - 1.2 K/UL  
 ABS. EOSINOPHILS 0.1 0.0 - 0.4 K/UL  
 ABS. BASOPHILS 0.0 0.0 - 0.1 K/UL  
 DF AUTOMATED    
GLUCOSE, POC Collection Time: 04/25/20  4:16 PM  
Result Value Ref Range Glucose (POC) 88 70 - 110 mg/dL GLUCOSE, POC Collection Time: 04/25/20  9:27 PM  
Result Value Ref Range Glucose (POC) 127 (H) 70 - 110 mg/dL CBC WITH AUTOMATED DIFF Collection Time: 04/26/20  5:09 AM  
Result Value Ref Range WBC 6.1 4.6 - 13.2 K/uL  
 RBC 3.35 (L) 4.20 - 5.30 M/uL HGB 9.5 (L) 12.0 - 16.0 g/dL HCT 29.5 (L) 35.0 - 45.0 % MCV 88.1 74.0 - 97.0 FL  
 MCH 28.4 24.0 - 34.0 PG  
 MCHC 32.2 31.0 - 37.0 g/dL  
 RDW 15.4 (H) 11.6 - 14.5 % PLATELET 792 764 - 500 K/uL MPV 12.6 (H) 9.2 - 11.8 FL  
 NEUTROPHILS 61 40 - 73 % LYMPHOCYTES 16 (L) 21 - 52 % MONOCYTES 8 3 - 10 % EOSINOPHILS 15 (H) 0 - 5 % BASOPHILS 0 0 - 2 %  
 ABS. NEUTROPHILS 3.7 1.8 - 8.0 K/UL  
 ABS. LYMPHOCYTES 1.0 0.9 - 3.6 K/UL  
 ABS. MONOCYTES 0.5 0.05 - 1.2 K/UL  
 ABS. EOSINOPHILS 0.9 (H) 0.0 - 0.4 K/UL  
 ABS. BASOPHILS 0.0 0.0 - 0.1 K/UL  
 DF AUTOMATED    
GLUCOSE, POC Collection Time: 04/26/20  7:42 AM  
Result Value Ref Range Glucose (POC) 67 (L) 70 - 110 mg/dL GLUCOSE, POC Collection Time: 04/26/20  8:36 AM  
Result Value Ref Range Glucose (POC) 86 70 - 110 mg/dL Signed By: Margarito Garcia MD   
 April 26, 2020

## 2020-04-26 NOTE — PROGRESS NOTES
Physical Therapy Screening: 
Services are indicated and therapy order is required. An InBasket screening referral was triggered for physical therapy based on results obtained during the nursing admission assessment. The patients chart was reviewed and the patient is appropriate for a skilled therapy evaluation. Please order a consult for physical therapy if you are in agreement and would like an evaluation to be completed. Thank you.  
 
Rita Horta, PT

## 2020-04-27 NOTE — ROUTINE PROCESS
Bedside and Verbal shift change report given to Martita Valencia RN (oncoming nurse) by Cherylle Krabbe, RN (offgoing nurse). Report included the following information SBAR, Kardex, ED Summary, Procedure Summary, Intake/Output, MAR and Recent Results.

## 2020-04-27 NOTE — PROGRESS NOTES
[facility] Hospitalist Group Progress Note Patient: Agatha Verduzco Age: 77 y.o. : 1955 MR#: [id number] SSN: [id number] Date: 2020 Subjective/24-hour events: SOB continues to improve - no acute respiratory issues overnight. Breathing treatment currently in progress. Assessment: COPD with acute exacerbation Chronic hypoxic respiratory failure on home oxygen Mild acute on chronic systolic CHF, currently compensated Cardiomyopathy, EF 40% Hypokalemia, mild HTN 
CKD 3 Chronic anemia Plan: 
Continue solumedrol - may taper later today/tomorrow. PO lasix, monitor I/O. Monitor electrolytes and renal indices. Replace lytes as necessary. Lantus held yesterday due to marginal blood sugars. Accuchecks acceptable currently, continue SSI. PT/OT evals - order placed today. Await recommendations to assist with d/c planning. Supportive care o/w. Follow. Case discussed with:  [x]Patient  []Family  [x]Nursing  []Case Management DVT Prophylaxis:  []Lovenox  []Hep SQ  []SCDs  []Coumadin   []On Heparin gtt Objective:  
VS:  
Visit Vitals /85 (BP 1 Location: Right arm, BP Patient Position: At rest) Pulse 92 Temp 98.8 °F (37.1 °C) Resp 16 Ht 5' 9\" (1.753 m) Wt 58.1 kg (128 lb) SpO2 95% BMI 18.90 kg/m² Tmax/24hrs: Temp (24hrs), Av.7 °F (36.5 °C), Min:97.2 °F (36.2 °C), Max:98.8 °F (37.1 °C) Intake/Output Summary (Last 24 hours) at 2020 8924 Last data filed at 2020 9888 Gross per 24 hour Intake 240 ml Output 950 ml Net -710 ml General:  In NAD. Cardiovascular:  RRR. Pulmonary:  Breath sounds decreased bilaterally.  + EEW but no accessory muscle use. GI:  Abdomen soft, NTTP. Extremities:  Warm, no ischemia. Additional:  Awake and alert, motor nonfocal. 
 
Labs:   
Recent Results (from the past 24 hour(s)) GLUCOSE, POC Collection Time: 20 11:42 AM  
Result Value Ref Range Glucose (POC) 118 (H) 70 - 110 mg/dL GLUCOSE, POC Collection Time: 04/26/20  3:51 PM  
Result Value Ref Range Glucose (POC) 138 (H) 70 - 110 mg/dL GLUCOSE, POC Collection Time: 04/26/20  9:32 PM  
Result Value Ref Range Glucose (POC) 254 (H) 70 - 110 mg/dL CBC WITH AUTOMATED DIFF Collection Time: 04/27/20  2:18 AM  
Result Value Ref Range WBC 3.0 (L) 4.6 - 13.2 K/uL  
 RBC 3.26 (L) 4.20 - 5.30 M/uL HGB 9.3 (L) 12.0 - 16.0 g/dL HCT 28.8 (L) 35.0 - 45.0 % MCV 88.3 74.0 - 97.0 FL  
 MCH 28.5 24.0 - 34.0 PG  
 MCHC 32.3 31.0 - 37.0 g/dL  
 RDW 15.4 (H) 11.6 - 14.5 % PLATELET 373 (L) 932 - 420 K/uL MPV 12.8 (H) 9.2 - 11.8 FL  
 NEUTROPHILS 90 (H) 40 - 73 % LYMPHOCYTES 7 (L) 21 - 52 % MONOCYTES 3 3 - 10 % EOSINOPHILS 0 0 - 5 % BASOPHILS 0 0 - 2 %  
 ABS. NEUTROPHILS 2.8 1.8 - 8.0 K/UL  
 ABS. LYMPHOCYTES 0.2 (L) 0.9 - 3.6 K/UL  
 ABS. MONOCYTES 0.1 0.05 - 1.2 K/UL  
 ABS. EOSINOPHILS 0.0 0.0 - 0.4 K/UL  
 ABS. BASOPHILS 0.0 0.0 - 0.1 K/UL  
 DF AUTOMATED    
GLUCOSE, POC Collection Time: 04/27/20  6:54 AM  
Result Value Ref Range Glucose (POC) 156 (H) 70 - 110 mg/dL Signed By: Rachael Taylor MD   
 April 27, 2020

## 2020-04-27 NOTE — ROUTINE PROCESS
Bedside and Verbal shift change report given to [facility] RN (oncoming nurse) by Sadia Siddiqui RN (offgoing nurse). Report given with SBAR, Kardex, Intake/Output, MAR, Accordion and Recent Results.

## 2020-04-27 NOTE — PROGRESS NOTES
Reason for Readmission:    COPD (chronic obstructive pulmonary disease) ([facility] [J44.9] RRAT Score and Risk Level:      32 Level of Readmission:    3   (1-3) (1 - First Readmission, 2- Second Readmission within 30 days or multiple admissions over previous 90 days, 3- Greater than two readmissions within 30 days or multiple admissions over previous 90 days) Care Conference scheduled:   (consider for all patient's with readmission level of 2, should definitely be scheduled for all patients with readmission level of 3) Resources/supports as identified by patient/family:   
    
Top Challenges facing patient (as identified by patient/family and CM): Finances/Medication cost?     No  
Transportation      Pt's daughter Alisha Palma Support system or lack thereof? Family support Living arrangements? Lives with her daughter Alisha Palma Self-care/ADLs/Cognition? Needs assistance with ADLs/IADLs as needed Current Advanced Directive/Advance Care Plan:   
        
Plan for utilizing home health:   yes. Likelihood of additional readmission:    
          
Transition of Care Plan:    Based on readmission, the patient's previous Plan of Care 
 has been evaluated and/or modified. The current Transition of Care Plan is:        
 
 Initial assessment completed with patient and her daughter Alisha Palma. Cognitive status of patient: alert and oriented. Face sheet information confirmed:  yes. The patient designates her daughter Mateo Cadena [phone] to participate in her discharge plan and to receive any needed information. This patient lives in a home with her daughter Alisha Palma. Patient is not able to navigate steps as needed. Prior to hospitalization, patient was considered to be independent with ADLs/IADLS : yes . If not independent,  patient needs assist with : ADLs/IADLs prn with her daughter Alisha Palma assisting her Patient has a current ACP document on file: no 
 The daughter will be available to transport patient home upon discharge. The patient already has Reji Bonds chair, [facility], medical equipment available in the home. Patient is currently active with home health. If active, agency name is [facility]. Patient has not stayed in a skilled nursing facility or rehab. Was  stay within last 60 days : no. This patient is on dialysis :no 
List of available Home Health agencies were provided and reviewed with the patient prior to discharge. Freedom of choice signed: yes, for [facility]. Currently, the discharge plan is Home with [address] [place] [address] Seth Clark. Per pt's daughter Gregorio Carroll, pt is active with [facility] and she wants pt back home to resume home health. The patient states that she can obtain her medications from the pharmacy, and take her medications as directed. Patient's current insurance is [email or web address]. Care Management Interventions PCP Verified by CM: Yes(per pt's daughter, pt saw her pcp in January) Palliative Care Criteria Met (RRAT>21 & CHF Dx)?: No 
Mode of Transport at Discharge: Other (see comment)(pt's daughter Gregorio Carroll) Transition of Care Consult (CM Consult): Discharge Planning Physical Therapy Consult: Yes Occupational Therapy Consult: Yes Speech Therapy Consult: No 
Current Support Network: Relative's Home The Patient and/or Patient Representative was Provided with a Choice of Provider and Agrees with the Discharge Plan?: Yes Name of the Patient Representative Who was Provided with a Choice of Provider and Agrees with the Discharge Plan: Geovanny Seasy Freedom of Choice List was Provided with Basic Dialogue that Supports the Patient's Individualized Plan of Care/Goals, Treatment Preferences and Shares the Quality Data Associated with the Providers?: Yes Discharge Location Discharge Placement: Home with home health MITESH De La Cruz RN Care Management Pager: [phone]

## 2020-04-27 NOTE — PROGRESS NOTES
Bedside shift change report given to Babetta Apgar G.RN (oncoming nurse) by Candido Yin (offgoing nurse). Report included the following information SBAR and Kardex.

## 2020-04-27 NOTE — PROGRESS NOTES
Interdisciplinary Round Note Patient Information: Patient Information: Marlon Lopez 406/01 Reason for Admission: [facility]chronic obstructive [facility] disease) [facility] [J44.9] Attending Provider:   Noelle Pacheco MD 
 Past Medical History:  
Past Medical History:  
Diagnosis Date  Anemia  Arthritis  Cardiomyopathy due to hypertension, with heart failure ([facility] [address] [facility] 5/19/2017  Cataract  Chronic combined systolic and diastolic congestive heart failure ([facility] [address] [facility] 5/3/2018  Chronic lung disease  Chronic obstructive pulmonary disease ([facility] [address] [facility]  Chronic systolic congestive heart failure ([address] 2/3/2014  Diabetes mellitus ([facility] [address]  Difficulty swallowing Both food and liquid  Dysphagia following cerebral infarction 8/17/2017  Gastrointestinal disorder  History of echocardiogram 12/29/2009 EF 50%. Mild-mod conc LVH. Mod DDfx. LAE. Mild MR.    
 Hypercholesterolemia  Hypertension  Hypertensive heart disease  Infarction of right thalamus ([facility] [address] 9/17/2017 Lacunar infarct 2017  Joint pain  Joint swelling  Normal nuclear stress test 09/08/2000 No ischemia or prior infarction. Neg EKG on submax EST. Ex time 15:02.  Recurrent boils  Rheumatism  Shortness of breath  Sinusitis with nasal polyps 8/18/2019 Hospital day: 3 Estimated discharge date: 4/28/20 RRAT Score: High Risk   
      
 37 Total Score 4 IP Visits Last 12 Months (1-3=4, 4=9, >4=11) 5 Pt. Coverage (Medicare=5 , Medicaid, or Self-Pay=4) 28 Charlson Comorbidity Score (Age + Comorbid Conditions) Criteria that do not apply:  
 Has Seen PCP in Last 6 Months (Yes=3, No=0) . Living with Significant Other. Assisted Living. LTAC. SNF. or  
Rehab Patient Length of Stay (>5 days = 3) Goals for Today: Monitor respiratory status. Pt/ot-* VITAL SIGNS Vitals: 04/26/20 2031 04/27/20 0008 04/27/20 0407 04/27/20 8033 BP: 124/61 122/65 137/79 173/85 Pulse: 86 91 86 92 Resp: 18 16 17 16 Temp: 97.7 °F (36.5 °C) 97.5 °F (36.4 °C) 97.7 °F (36.5 °C) 98.8 °F (37.1 °C) SpO2: 97% 98% 99% 95% Weight:      
Height:      
 
 
 
 Lines, Drains, & Airways Peripheral IV 04/24/20 Right Antecubital-Site Assessment: Clean, dry, & intact VTE Prophylaxis Intake and Output:  
04/25 1901 - 04/27 0700 In: 240 [P.O.:240] Out: 1350 [JOPPU:5149] No intake/output data recorded. Current Diet: DIET DIABETIC CONSISTENT CARB Mechanical Soft; 3 Honey/3 Moderately Thick DIET NUTRITIONAL SUPPLEMENTS Breakfast, Lunch, Dinner; [address] Abdominal  
Last Bowel Movement Date: 04/26/20 Stool Appearance: Loose Abdominal Assessment: Soft, Intact Appetite: Good Bowel Sounds: Active Recent Glucose Results:  
Lab Results Component Value Date/Time  (H) 04/27/2020 10:22 AM  
 GLUCPOC 156 (H) 04/27/2020 06:54 AM  
 GLUCPOC 254 (H) 04/26/2020 09:32 PM  
 GLUCPOC 138 (H) 04/26/2020 03:51 PM  
 
 
  
IV Antibiotics? No  
      
Activity Level: Activity Level: Bed Rest 
Needs assistance with ADLs: yes PT Consult Status: YES Current Immunizations: There is no immunization history for the selected administration types on file for this patient. Recommendations:  
Discharge Disposition: Home with Home Health Medication Reconciliation Completed: YES Cardiac/Pulmonary Rehab Ordered:  NO 
 
Needs for Discharge: home health Recommendations from IDR team:  
  
 
MITESH Pena RN Care Management Pager: [phone]

## 2020-04-28 NOTE — PROGRESS NOTES
Problem: Falls - Risk of 
Goal: *Absence of Falls Description: Document Mini Led Fall Risk and appropriate interventions in the flowsheet. Outcome: Progressing Towards Goal 
Note: Fall Risk Interventions: 
Mobility Interventions: Bed/chair exit alarm, Communicate number of staff needed for ambulation/transfer, OT consult for ADLs, PT Consult for mobility concerns, PT Consult for assist device competence Mentation Interventions: Bed/chair exit alarm, Door open when patient unattended, Reorient patient Medication Interventions: Bed/chair exit alarm Elimination Interventions: Bed/chair exit alarm, Call light in reach, Toilet paper/wipes in reach, Toileting schedule/hourly rounds

## 2020-04-28 NOTE — ROUTINE PROCESS
Bedside and Verbal shift change report given to [address] (oncoming nurse) by Crista Singer RN (offgoing nurse). Report given with SBAR, Kardex, Intake/Output, MAR, Accordion and Recent Results.

## 2020-04-28 NOTE — PROGRESS NOTES
Interdisciplinary Round Note Patient Information: Patient Information: Brandy Langford 406/01 Reason for Admission: [facility] [J44.9] Attending Provider:   Indio Cassidy MD 
 Past Medical History:  
Past Medical History:  
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 Mild-mod conc LVH. Mod DDfx. LAE. Mild MR.    
 Hypercholesterolemia  Hypertension  Hypertensive heart disease  Infarction of right thalamus ([facility] [address] [facility] 9/17/2017 Lacunar infarct 2017  Joint pain  Joint swelling  Normal nuclear stress test 09/08/2000 No ischemia or prior infarction. Neg EKG on submax EST. Ex time 15:02.  Recurrent boils  Rheumatism  Shortness of breath  Sinusitis with nasal polyps 8/18/2019 Hospital day: 4 Estimated discharge date: 4/29/20 RRAT Score: High Risk   
      
 37 Total Score 4 IP Visits Last 12 Months (1-3=4, 4=9, >4=11) 5 Pt. Coverage (Medicare=5 , Medicaid, or Self-Pay=4) 28 Charlson Comorbidity Score (Age + Comorbid Conditions) Criteria that do not apply:  
 Has Seen PCP in Last 6 Months (Yes=3, No=0) . Living with Significant Other. Assisted Living. LTAC. SNF. or  
Rehab Patient Length of Stay (>5 days = 3) Goals for Today: breathing treatment VITAL SIGNS Vitals: 04/28/20 0001 04/28/20 0419 04/28/20 0813 04/28/20 1201 BP: 144/67 148/70 124/75 144/73 Pulse: (!) 108 (!) 107 (!) 105 (!) 101 Resp: 16 17 24 24 Temp: 98.4 °F (36.9 °C) 98.5 °F (36.9 °C) 98.4 °F (36.9 °C) (!) 94.5 °F (34.7 °C) SpO2: 99% 99% 99% 100% Weight:      
Height:      
 
 
 
 Lines, Drains, & Airways Peripheral IV 04/24/20 Right Arm-Site Assessment: Clean, dry, & intact VTE Prophylaxis Intake and Output:  
04/26 1901 - 04/28 0700 In: 4337 [U.O.:2216] Out: 600 [Urine:600] No intake/output data recorded. Current Diet: DIET DIABETIC CONSISTENT CARB Mechanical Soft; 3 Honey/3 Moderately Thick DIET NUTRITIONAL SUPPLEMENTS Breakfast, Lunch, Dinner; [address] Abdominal  
Last Bowel Movement Date: 04/26/20 Stool Appearance: Loose Abdominal Assessment: Intact Appetite: Fair Bowel Sounds: Active Recent Glucose Results:  
Lab Results Component Value Date/Time GLUCPOC 207 (H) 04/28/2020 11:36 AM  
 GLUCPOC 162 (H) 04/28/2020 06:58 AM  
 GLUCPOC 194 (H) 04/27/2020 09:32 PM  
 
 
  
IV Antibiotics? No  
      
Activity Level: Activity Level: Bed Rest 
Needs assistance with ADLs: yes PT Consult Status: YES Current Immunizations: There is no immunization history for the selected administration types on file for this patient. Recommendations:  
Discharge Disposition: Home with Home Health Medication Reconciliation Completed: YES Cardiac/Pulmonary Rehab Ordered:  NO 
 
Needs for Discharge: home health Recommendations from IDR team:  
  
 
MITESH Faith RN Care Management Pager: [phone]

## 2020-04-28 NOTE — PROGRESS NOTES
[facility] Hospitalist Group Progress Note Patient: King Fabien Age: 77 y.o. : 1955 MR#: [id number] SSN: [id number] Date: 2020 Subjective/24-hour events:  
 
Sitting in chair at bedside, breathing treatment in progress. Continues to report improvement in respiratory status. Has been ambulating in room without any worsening shortness of breath/breathing difficulty. Assessment: COPD with acute exacerbation Chronic hypoxic respiratory failure on home oxygen Mild acute on chronic systolic CHF, currently compensated Cardiomyopathy, EF 40% Hypokalemia, mild HTN 
CKD 3 Chronic anemia Plan: 
DC Solu-Medrol and initiate oral prednisone with plan for taper. Continue p.o. Lasix, follow lytes/renal indices. Lantus remains on hold, continue SSI as ordered. PT/OT, mobilize as tolerated. Current discharge recommendation is home with home health care services - patient already active with a home health agency per discussion with care management. We will plan to resume at discharge. Discharge home tomorrow if stable. Supportive care in interim. Case discussed with:  [x]Patient  []Family  [x]Nursing  [x]Case Management DVT Prophylaxis:  []Lovenox  []Hep SQ  []SCDs  []Coumadin   []On Heparin gtt Objective:  
VS:  
Visit Vitals /75 Pulse (!) 105 Temp 98.4 °F (36.9 °C) Resp 24 Ht 5' 9\" (1.753 m) Wt 61.5 kg (135 lb 8 oz) SpO2 99% BMI 20.01 kg/m² Tmax/24hrs: Temp (24hrs), Av °F (36.7 °C), Min:97.3 °F (36.3 °C), Max:98.5 °F (36.9 °C) Intake/Output Summary (Last 24 hours) at 2020 1018 Last data filed at 2020 Gross per 24 hour Intake 1050 ml Output 300 ml Net 750 ml General:  In NAD. Cardiovascular: Regular, mildly tacky. Pulmonary:  Breath sounds decreased bilaterally but improved from admission. .  Occasional wheezes bilaterally. GI:  Abdomen soft, NTTP. Extremities:  Warm, no ischemia. Additional:  Awake and alert, motor nonfocal. 
 
Labs:   
Recent Results (from the past 24 hour(s)) METABOLIC PANEL, BASIC Collection Time: 04/27/20 10:22 AM  
Result Value Ref Range Sodium 142 136 - 145 mmol/L Potassium 4.1 3.5 - 5.5 mmol/L Chloride 108 100 - 111 mmol/L  
 CO2 26 21 - 32 mmol/L Anion gap 8 3.0 - 18 mmol/L Glucose 146 (H) 74 - 99 mg/dL BUN 61 (H) 7.0 - 18 MG/DL Creatinine 1.96 (H) 0.6 - 1.3 MG/DL  
 BUN/Creatinine ratio 31 (H) 12 - 20 GFR est AA 31 (L) >60 ml/min/1.73m2 GFR est non-AA 25 (L) >60 ml/min/1.73m2 Calcium 8.2 (L) 8.5 - 10.1 MG/DL  
GLUCOSE, POC Collection Time: 04/27/20 11:25 AM  
Result Value Ref Range Glucose (POC) 293 (H) 70 - 110 mg/dL GLUCOSE, POC Collection Time: 04/27/20  3:32 PM  
Result Value Ref Range Glucose (POC) 237 (H) 70 - 110 mg/dL GLUCOSE, POC Collection Time: 04/27/20  9:32 PM  
Result Value Ref Range Glucose (POC) 194 (H) 70 - 110 mg/dL GLUCOSE, POC Collection Time: 04/28/20  6:58 AM  
Result Value Ref Range Glucose (POC) 162 (H) 70 - 110 mg/dL Signed By: Susannah Aguayo MD   
 April 28, 2020

## 2020-04-28 NOTE — PROGRESS NOTES
Problem: Mobility Impaired (Adult and Pediatric) Goal: *Acute Goals and Plan of Care (Insert Text) Description: . Physical Therapy Goals Initiated 4/28/2020 and to be accomplished within 7 day(s) 1. Patient will move from supine to sit and sit to supine  in bed with supervision/set-up. 2.  Patient will transfer from bed to chair and chair to bed with supervision/set-up using the least restrictive device. 3.  Patient will perform sit to stand with minimal assistance/contact guard assist. 
4.  Patient will ambulate with supervision/CGA for 20 feet with the least restrictive device. PLOF: Pt reports she is independent with self care and ambulates with RW. She lives with her daughter in an apartment. Outcome: Progressing Towards Goal 
  
PHYSICAL THERAPY EVALUATION Patient: Eileen Valerio (77 y.o. female) Date: 4/28/2020 Primary Diagnosis: COPD (chronic obstructive pulmonary disease) ([facility] [J44.9] Precautions:   Skin, Fall ASSESSMENT : 
Pt semi reclined in bed agreeable to skilled PT evaluation. Pt seen in conjunction with OT to maximize pt safety. Pt expresses that she spend most of her time in bed at home. Pt mobilizes to the EOB with CGA to prepare for OOB transfer. She has fair sitting dynamic balance while sitting EOB doffing sock, experienced lateral LOB, but was to recover by extended her arm. She perform sit to stand from elevated bed with CGA. Pt ambulates from bed to chair using RW with min A due to instability and weakness; she has short B step length, slow brianna, and flexed posture. Cued provided for pt to fully extended knees when taking a step to reduce risk of buckling. Patient fatigues following gait and required mod A to safely lower onto chair due to uncontrolled descent. She required increased assistance for sit to stand from chair, mod A.  Based on the objective data described below, the patient presents with decreased strength, impaired balance, decrease activity tolerance, and decreased safety with functional mobility. Pt left in recliner at end of session with chair alarm, call bell, and needs addressed. Patient will benefit from skilled intervention to address the above impairments. Patient's rehabilitation potential is considered to be Good Factors which may influence rehabilitation potential include:  
[]         None noted 
[]         Mental ability/status [x]         Medical condition 
[x]         Home/family situation and support systems 
[x]         Safety awareness 
[]         Pain tolerance/management 
[]         Other: PLAN : 
Recommendations and Planned Interventions:  
[x]           Bed Mobility Training             [x]    Neuromuscular Re-Education 
[x]           Transfer Training                   []    Orthotic/Prosthetic Training 
[x]           Gait Training                          []    Modalities [x]           Therapeutic Exercises           []    Edema Management/Control 
[x]           Therapeutic Activities            [x]    Family Training/Education 
[x]           Patient Education 
[]           Other (comment): Frequency/Duration: Patient will be followed by physical therapy 1-2 times per day/4-7 days per week to address goals. Discharge Recommendations: Home Health with 24h assistance Further Equipment Recommendations for Discharge: rolling walker SUBJECTIVE:  
Patient stated I am tired.  OBJECTIVE DATA SUMMARY:  
 
Past Medical History:  
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 Mild-mod conc LVH. Mod DDfx. LAE. Mild MR.    
 Hypercholesterolemia  Hypertension  Hypertensive heart disease  Infarction of right thalamus ([address] 9/17/2017 Lacunar infarct 2017  Joint pain  Joint swelling  Normal nuclear stress test 09/08/2000 No ischemia or prior infarction. Neg EKG on submax EST. Ex time 15:02.  Recurrent boils  Rheumatism  Shortness of breath  Sinusitis with nasal polyps 8/18/2019 Past Surgical History:  
Procedure Laterality Date  CARDIAC CATHETERIZATION  5/10/2018  CORONARY ARTERY ANGIOGRAM  5/10/2018  [facility] [address]  MODERATE SEDATION  5/10/2018 Barriers to Learning/Limitations: None Compensate with: N/A Home Situation: 
Home Situation Home Environment: Private residence One/Two Story Residence: One story Living Alone: No 
Support Systems: Family member(s), Child(masha) Patient Expects to be Discharged to[de-identified] Private residence Current DME Used/Available at Home: Glucometer, Oxygen, portable, Raised toilet seat, Felicia Fracisco Critical Behavior: 
Neurologic State: Alert Orientation Level: Oriented to person;Oriented to place Cognition: Follows commands Strength:   
Strength: Generally decreased, functional 
   
 
Tone & Sensation:  
Tone: Normal 
Sensation: Intact Functional Mobility: 
Bed Mobility: 
Supine to Sit: Contact guard assistance Transfers: 
Sit to Stand: Moderate assistance(from chair and CGA from elevated bed) Stand to Sit: Moderate assistance Balance:  
Sitting: Impaired; Without support Sitting - Static: Good (unsupported) Sitting - Dynamic: Fair (occasional) Standing: Impaired; With support Standing - Static: Fair Standing - Dynamic : Fair Ambulation/Gait Training: 
Distance (ft): 10 Feet (ft) Assistive Device: Walker, rolling Ambulation - Level of Assistance: Minimal assistance Base of Support: Narrowed Speed/Laila: Pace decreased (<100 feet/min) Step Length: Right shortened;Left shortened Interventions: Verbal cues;Manual cues Pain: 
Pain level pre-treatment: 0/10 Pain level post-treatment: 0/10 Activity Tolerance:  
Fair Please refer to the flowsheet for vital signs taken during this treatment. After treatment:  
[x]         Patient left in no apparent distress sitting up in chair 
[]         Patient left in no apparent distress in bed 
[x]         Call bell left within reach 
[]         Nursing notified 
[]         Caregiver present 
[]         Bed alarm activated 
[]         SCDs applied COMMUNICATION/EDUCATION:  
[x]         Role of Physical Therapy in the acute care setting. [x]         Fall prevention education was provided and the patient/caregiver indicated understanding. [x]         Patient/family have participated as able in goal setting and plan of care. []         Patient/family agree to work toward stated goals and plan of care. []         Patient understands intent and goals of therapy, but is neutral about his/her participation. []         Patient is unable to participate in goal setting/plan of care: ongoing with therapy staff. 
[]         Other: Thank you for this referral. 
Mason Chanel, PT Time Calculation: 23 mins Eval Complexity: History: MEDIUM  Complexity : 1-2 comorbidities / personal factors will impact the outcome/ POC Exam:MEDIUM Complexity : 3 Standardized tests and measures addressing body structure, function, activity limitation and / or participation in recreation  Presentation: MEDIUM Complexity : Evolving with changing characteristics  Clinical Decision Making:Medium Complexity    Overall Complexity:MEDIUM

## 2020-04-28 NOTE — PROGRESS NOTES
Problem: Falls - Risk of 
Goal: *Absence of Falls Description: Document Anh Blood Fall Risk and appropriate interventions in the flowsheet. Outcome: Progressing Towards Goal 
Note: Fall Risk Interventions: 
Mobility Interventions: Bed/chair exit alarm, OT consult for ADLs, Patient to call before getting OOB, PT Consult for mobility concerns, Utilize walker, cane, or other assistive device Mentation Interventions: Bed/chair exit alarm, Update white board Medication Interventions: Bed/chair exit alarm, Patient to call before getting OOB Elimination Interventions: Call light in reach, Bed/chair exit alarm Problem: Patient Education: Go to Patient Education Activity Goal: Patient/Family Education Outcome: Progressing Towards Goal 
  
Problem: Chronic Obstructive Pulmonary Disease (COPD) Goal: *Oxygen saturation during activity within specified parameters Outcome: Progressing Towards Goal 
Goal: *Able to remain out of bed as prescribed Outcome: Progressing Towards Goal 
Goal: *Absence of hypoxia Outcome: Progressing Towards Goal 
Goal: *Optimize nutritional status Outcome: Progressing Towards Goal 
  
Problem: Patient Education: Go to Patient Education Activity Goal: Patient/Family Education Outcome: Progressing Towards Goal 
  
Problem: Patient Education: Go to Patient Education Activity Goal: Patient/Family Education Outcome: Progressing Towards Goal 
  
Problem: Heart Failure: Day 1 Goal: Off Pathway (Use only if patient is Off Pathway) Outcome: Progressing Towards Goal 
Goal: Activity/Safety Outcome: Progressing Towards Goal 
Goal: Consults, if ordered Outcome: Progressing Towards Goal 
Goal: Diagnostic Test/Procedures Outcome: Progressing Towards Goal 
Goal: Nutrition/Diet Outcome: Progressing Towards Goal 
Goal: Discharge Planning Outcome: Progressing Towards Goal 
Goal: Medications Outcome: Progressing Towards Goal 
Goal: Respiratory Outcome: Progressing Towards Goal 
 Goal: Treatments/Interventions/Procedures Outcome: Progressing Towards Goal 
Goal: Psychosocial 
Outcome: Progressing Towards Goal 
Goal: *Oxygen saturation within defined limits Outcome: Progressing Towards Goal 
Goal: *Hemodynamically stable Outcome: Progressing Towards Goal 
Goal: *Optimal pain control at patient's stated goal 
Outcome: Progressing Towards Goal 
Goal: *Anxiety reduced or absent Outcome: Progressing Towards Goal 
  
Problem: Heart Failure: Day 2 Goal: Off Pathway (Use only if patient is Off Pathway) Outcome: Progressing Towards Goal 
Goal: Activity/Safety Outcome: Progressing Towards Goal 
Goal: Consults, if ordered Outcome: Progressing Towards Goal 
Goal: Diagnostic Test/Procedures Outcome: Progressing Towards Goal 
Goal: Nutrition/Diet Outcome: Progressing Towards Goal 
Goal: Discharge Planning Outcome: Progressing Towards Goal 
Goal: Medications Outcome: Progressing Towards Goal 
Goal: Respiratory Outcome: Progressing Towards Goal 
Goal: Treatments/Interventions/Procedures Outcome: Progressing Towards Goal 
Goal: Psychosocial 
Outcome: Progressing Towards Goal 
Goal: *Oxygen saturation within defined limits Outcome: Progressing Towards Goal 
Goal: *Hemodynamically stable Outcome: Progressing Towards Goal 
Goal: *Optimal pain control at patient's stated goal 
Outcome: Progressing Towards Goal 
Goal: *Anxiety reduced or absent Outcome: Progressing Towards Goal 
Goal: *Demonstrates progressive activity Outcome: Progressing Towards Goal 
  
Problem: Heart Failure: Day 3 Goal: Off Pathway (Use only if patient is Off Pathway) Outcome: Progressing Towards Goal 
Goal: Activity/Safety Outcome: Progressing Towards Goal 
Goal: Diagnostic Test/Procedures Outcome: Progressing Towards Goal 
Goal: Nutrition/Diet Outcome: Progressing Towards Goal 
Goal: Discharge Planning Outcome: Progressing Towards Goal 
Goal: Medications Outcome: Progressing Towards Goal 
Goal: Respiratory Outcome: Progressing Towards Goal 
Goal: Treatments/Interventions/Procedures Outcome: Progressing Towards Goal 
Goal: Psychosocial 
Outcome: Progressing Towards Goal 
Goal: *Oxygen saturation within defined limits Outcome: Progressing Towards Goal 
Goal: *Hemodynamically stable Outcome: Progressing Towards Goal 
Goal: *Optimal pain control at patient's stated goal 
Outcome: Progressing Towards Goal 
Goal: *Anxiety reduced or absent Outcome: Progressing Towards Goal 
Goal: *Demonstrates progressive activity Outcome: Progressing Towards Goal 
  
Problem: Heart Failure: Day 4 Goal: Off Pathway (Use only if patient is Off Pathway) Outcome: Progressing Towards Goal 
Goal: Activity/Safety Outcome: Progressing Towards Goal 
Goal: Diagnostic Test/Procedures Outcome: Progressing Towards Goal 
Goal: Nutrition/Diet Outcome: Progressing Towards Goal 
Goal: Discharge Planning Outcome: Progressing Towards Goal 
Goal: Medications Outcome: Progressing Towards Goal 
Goal: Respiratory Outcome: Progressing Towards Goal 
Goal: Treatments/Interventions/Procedures Outcome: Progressing Towards Goal 
Goal: Psychosocial 
Outcome: Progressing Towards Goal 
Goal: *Oxygen saturation within defined limits Outcome: Progressing Towards Goal 
Goal: *Hemodynamically stable Outcome: Progressing Towards Goal 
Goal: *Optimal pain control at patient's stated goal 
Outcome: Progressing Towards Goal 
Goal: *Anxiety reduced or absent Outcome: Progressing Towards Goal 
Goal: *Demonstrates progressive activity Outcome: Progressing Towards Goal 
  
Problem: Heart Failure: Day 5 Goal: Off Pathway (Use only if patient is Off Pathway) Outcome: Progressing Towards Goal 
Goal: Activity/Safety Outcome: Progressing Towards Goal 
Goal: Diagnostic Test/Procedures Outcome: Progressing Towards Goal 
Goal: Nutrition/Diet Outcome: Progressing Towards Goal 
Goal: Discharge Planning Outcome: Progressing Towards Goal 
Goal: Medications Outcome: Progressing Towards Goal 
Goal: Respiratory Outcome: Progressing Towards Goal 
Goal: Treatments/Interventions/Procedures Outcome: Progressing Towards Goal 
Goal: Psychosocial 
Outcome: Progressing Towards Goal 
  
Problem: Heart Failure: Discharge Outcomes Goal: *Demonstrates ability to perform prescribed activity without shortness of breath or discomfort Outcome: Progressing Towards Goal 
Goal: *Left ventricular function assessment completed prior to or during stay, or planned for post-discharge Outcome: Progressing Towards Goal 
Goal: *ACEI prescribed if LVEF less than 40% and no contraindications or ARB prescribed Outcome: Progressing Towards Goal 
Goal: *Verbalizes understanding and describes prescribed diet Outcome: Progressing Towards Goal 
Goal: *Verbalizes understanding/describes prescribed medications Outcome: Progressing Towards Goal 
Goal: *Describes available resources and support systems Description: (eg: Home Health, Palliative Care, Advanced Medical Directive) Outcome: Progressing Towards Goal 
Goal: *Describes smoking cessation resources Outcome: Progressing Towards Goal 
Goal: *Understands and describes signs and symptoms to report to providers(Stroke Metric) Outcome: Progressing Towards Goal 
Goal: *Describes/verbalizes understanding of follow-up/return appt Description: (eg: to physicians, diabetes treatment coordinator, and other resources Outcome: Progressing Towards Goal 
Goal: *Describes importance of continuing daily weights and changes to report to physician Outcome: Progressing Towards Goal

## 2020-04-28 NOTE — ROUTINE PROCESS
Bedside shift change report given to  [address] (oncoming nurse) by Lawrence Whyte (offgoing nurse). Report included the following information SBAR, Kardex, Intake/Output, MAR and Recent Results. no flank pain L/no flank pain R/no hematuria/no dysuria

## 2020-04-28 NOTE — ROUTINE PROCESS
Bedside and Verbal shift change report given to [address] [place] [address] (oncoming nurse) by Missael Soriano RN (offgoing nurse). Report given with SBAR, Kardex, Intake/Output, MAR, Accordion and Recent Results.

## 2020-04-28 NOTE — PROGRESS NOTES
Garrett placed on 4/25 for acute urinary retention. Garrett removed on 4/27 at 2pm. No urinary output. Bladder scan at 8pm = 52 ml. Still no urinary output. Repeat bladder scan at 4am = 304 ml. Pt states she does not have to pee. Dr Carlo Hollingsworth notified. Instructed to repeat bladder scan in 4 hours.

## 2020-04-28 NOTE — PROGRESS NOTES
Problem: Self Care Deficits Care Plan (Adult) Goal: *Acute Goals and Plan of Care (Insert Text) Description: Occupational Therapy Goals Initiated 4/28/2020 within 7 day(s). 1.  Patient will perform upper body dressing with supervision/set-up. 2.  Patient will perform lower body dressing with supervision/set-up. 3.  Patient will perform toileting with supervision/set-up. 4.  Patient will perform toilet transfers with supervision/set-up. 5.  Patient will participate in upper extremity therapeutic exercise/activities with supervision/set-up for 8 minutes. Prior Level of Function: Pt reports she was (I) with basic self-care/ADLs PTA. Pt wears depends. Pt has a RW, shower chair, and BSC. Outcome: Progressing Towards Goal 
 OCCUPATIONAL THERAPY EVALUATION Patient: Luz Maria Cantu (2 y.o. female) Date: 4/28/2020 Primary Diagnosis: COPD (chronic obstructive pulmonary disease) ([address] [J44.9] Precautions:   Skin, Fall ASSESSMENT : 
Pt cleared to participate in OT evaluation by RN. Upon entering room, pt received semi-reclined in bed, alert, and agreeable to OT eval.  Based on the objective data described below, the patient presents with decreased strength, decreased activity tolerance, decreased standing balance, and decreased ability to safely perform functional transfers and mobility, affecting the patients safety and ability to perform basic ADLs. Pt on 2L O2 via NC during session. Pt performed supine-sit with CGA to participate in further self-care. Pt able to stand with CGA from elevated bed using RW. Pt denied to use the bathroom at this time, but agreeable to transferring to recliner with min assist for support, simulating toilet transfers. Pt required moderate assist from recliner to place chair alarm. Educated pt on the role of Ot, evaluation process, and goals for therapy with pt demo good understanding.  The patient requires skilled OT services to assess safety and increase independence with basic self-care/ADLs, enhancing the patients quality of life by improving their ability to return to OF. At the end of the session, pt left resting comfortably in recliner, call bell in reach, with all needs met. Patient will benefit from skilled intervention to address the above impairments. Patient's rehabilitation potential is considered to be Good Factors which may influence rehabilitation potential include:  
[]             None noted [x]             Mental ability/status [x]             Medical condition []             Home/family situation and support systems []             Safety awareness []             Pain tolerance/management 
[]             Other: PLAN : 
Recommendations and Planned Interventions:  
[x]               Self Care Training                  [x]      Therapeutic Activities [x]               Functional Mobility Training   [x]      Cognitive Retraining 
[x]               Therapeutic Exercises           [x]      Endurance Activities [x]               Balance Training                    []      Neuromuscular Re-Education []               Visual/Perceptual Training     [x]      Home Safety Training 
[x]               Patient Education                   [x]      Family Training/Education []               Other (comment): Frequency/Duration: Patient will be followed by occupational therapy 1-2 times per day/4-7 days per week to address goals. Discharge Recommendations: Home Health with 24/7 Supervision/Assist 
Further Equipment Recommendations for Discharge: N/A; Pt has all recommended equipment to safely return home. SUBJECTIVE:  
Patient stated where's my coffee?  OBJECTIVE DATA SUMMARY:  
 
Past Medical History:  
Diagnosis Date Anemia Arthritis Cardiomyopathy due to hypertension, with heart failure ([address] 5/19/2017 Cataract  Chronic combined systolic and diastolic congestive heart failure ([address] 5/3/2018 Chronic lung disease Chronic obstructive pulmonary disease (HCC) Chronic systolic congestive heart failure ([facility] 2/3/2014 Diabetes mellitus ([facility] Difficulty swallowing Both food and liquid Dysphagia following cerebral infarction 8/17/2017 Gastrointestinal disorder History of echocardiogram 12/29/2009 EF 50%. Mild-mod conc LVH. Mod DDfx. LAE. Mild MR. Hypercholesterolemia Hypertension Hypertensive heart disease Infarction of right thalamus ([facility] 9/17/2017 Lacunar infarct 2017 Joint pain Joint swelling Normal nuclear stress test 09/08/2000 No ischemia or prior infarction. Neg EKG on submax EST. Ex time 15:02. Recurrent boils Rheumatism Shortness of breath Sinusitis with nasal polyps 8/18/2019 Past Surgical History:  
Procedure Laterality Date CARDIAC CATHETERIZATION  5/10/2018 CORONARY ARTERY ANGIOGRAM  5/10/2018 HX CHOLECYSTECTOMY  [address] [facility] MODERATE SEDATION  5/10/2018 Barriers to Learning/Limitations: None Compensate with: visual, verbal, tactile, kinesthetic cues/model Home Situation:  
Home Situation Home Environment: Private residence One/Two Story Residence: One story Living Alone: No 
Support Systems: Family member(s), Child(masha) Patient Expects to be Discharged to[de-identified] Private residence Current DME Used/Available at Home: Glucometer, Oxygen, portable, Raised toilet seat, Walker 
[x]  Right hand dominant   []  Left hand dominant Cognitive/Behavioral Status: 
Neurologic State: Alert Orientation Level: Oriented to person;Oriented to place Cognition: Follows commands Safety/Judgement: Fall prevention Skin: Visible skin appeared intact. Edema: None noted Coordination: BUE Coordination: Generally decreased, functional 
Fine Motor Skills-Upper: Left Intact; Right Intact Gross Motor Skills-Upper: Left Intact; Right Intact Balance: Sitting: Impaired; Without support Sitting - Static: Good (unsupported) Sitting - Dynamic: Fair (occasional) Standing: Impaired; With support Standing - Static: Fair Standing - Dynamic : Fair Strength: BUE Strength: Generally decreased, functional 
 
Tone & Sensation: BUE Tone: Normal 
Sensation: Intact Range of Motion: BUE 
AROM: Generally decreased, functional 
 
Functional Mobility and Transfers for ADLs: 
Bed Mobility: 
Supine to Sit: Contact guard assistance Transfers: 
Sit to Stand: Moderate assistance(from chair and CGA from elevated bed) Stand to Sit: Moderate assistance ADL Assessment:  
Feeding: Setup Oral Facial Hygiene/Grooming: Stand-by assistance Bathing: Minimum assistance Upper Body Dressing: Minimum assistance Lower Body Dressing: Minimum assistance Toileting: Minimum assistance ADL Intervention: 
Feeding Feeding Assistance: Set-up Utensil Management: Set-up Food to Mouth: Set-up Lower Body Dressing Assistance Dressing Assistance: Stand-by assistance Socks: Stand-by assistance Cognitive Retraining Safety/Judgement: Fall prevention Pain: 
Pain level pre-treatment: 0/10 Pain level post-treatment: 0/10 Pain Intervention(s): Medication (see MAR); Rest, Ice, Repositioning Response to intervention: Nurse notified, See doc flow Activity Tolerance:  
Fair Please refer to the flowsheet for vital signs taken during this treatment. After treatment:  
[x] Patient left in no apparent distress sitting up in chair 
[] Patient left in no apparent distress in bed 
[x] Call bell left within reach [x] Nursing notified 
[] Caregiver present [x] Chair alarm activated COMMUNICATION/EDUCATION:  
[x] Role of Occupational Therapy in the acute care setting 
[] Home safety education was provided and the patient/caregiver indicated understanding. [x] Patient/family have participated as able in goal setting and plan of care. [x] Patient/family agree to work toward stated goals and plan of care. [] Patient understands intent and goals of therapy, but is neutral about his/her participation. [] Patient is unable to participate in goal setting and plan of care. Thank you for this referral. 
Viri Loja MS, OTR/L Time Calculation: 23 mins Eval Complexity: History: MEDIUM Complexity : Expanded review of history including physical, cognitive and psychosocial  history ; Examination: MEDIUM Complexity : 3-5 performance deficits relating to physical, cognitive , or psychosocial skils that result in activity limitations and / or participation restrictions; Decision Making:MEDIUM Complexity : Patient may present with comorbidities that affect occupational performnce. Miniml to moderate modification of tasks or assistance (eg, physical or verbal ) with assesment(s) is necessary to enable patient to complete evaluation

## 2020-04-29 NOTE — PROGRESS NOTES
conducted an initial consultation and Spiritual Assessment for Caitlin Luz, who is a 77 y.o.,female. Patients Primary Language is: [place]. According to the patients EMR Christianity Affiliation is: [facility] [employer] [facility].  
 
The reason the Patient came to the hospital is:  
Patient Active Problem List  
 Diagnosis Date Noted  COPD (chronic obstructive pulmonary disease) ([address] 04/24/2020  Slurred speech 03/29/2020  Left-sided weakness 03/29/2020  Uncontrolled hypertension 03/29/2020  Ischemic stroke ([address] 03/29/2020  Pansinusitis 03/29/2020  CKD (chronic kidney disease) 03/29/2020  CVA (cerebral vascular accident) ([address] 08/18/2019  Encephalopathy 08/18/2019  Sinusitis with nasal polyps 08/18/2019  Allergic rhinitis 10/23/2018  Acute non-recurrent maxillary sinusitis 09/25/2018  Dizziness 09/25/2018  CKD (chronic kidney disease) stage 3, GFR 30-59 ml/min ([facility]) 09/25/2018  Fall 09/25/2018  Chronic obstructive pulmonary disease ([address]  Type 2 diabetes mellitus with diabetic neuropathy ([address] 06/21/2018  Hypokalemia 05/05/2018  Acute pulmonary edema ([address] 05/05/2018  Hypertensive emergency 05/05/2018  Headache 05/05/2018  Acute on chronic systolic (congestive) heart failure ([address] 05/05/2018  Chronic combined systolic and diastolic congestive heart failure ([address] 05/03/2018  Type 2 diabetes mellitus with nephropathy ([address] 12/14/2017  Infarction of right thalamus ([address] 09/17/2017  Dysphagia following cerebral infarction 09/17/2017  Chronic sinusitis 06/13/2017  Cardiomyopathy due to hypertension, with heart failure ([address] 05/19/2017  Pleural effusion 04/30/2014  Cervical myelopathy ([address] 02/03/2014  Eczema 02/03/2014  Asthma 02/03/2014  Bladder prolapse, female, acquired 02/03/2014  IBS (irritable bowel syndrome) 02/03/2014  Osteoporosis 02/03/2014  Migraine 02/03/2014  Anxiety and depression 02/03/2014  Essential hypertension 01/27/2012  Diabetes mellitus ([facility] 01/27/2012  Hyperlipidemia 01/27/2012 The  provided the following Interventions: 
Initiated a relationship of care and support. Explored issues of tamika, belief, spirituality and Druze/ritual needs while hospitalized. Listened empathically. Provided information about Spiritual Care Services. Offered prayer and assurance of continued prayers on patient's behalf. Chart reviewed. The following outcomes where achieved: 
Patient shared limited information about both their medical narrative and spiritual journey/beliefs. Patient processed feeling about current hospitalization. Patient expressed gratitude for 's visit. Assessment: 
Patient does not have any Druze/cultural needs that will affect patients preferences in health care. There are no spiritual or Druze issues which require intervention at this time. Plan: 
Chaplains will continue to follow and will provide pastoral care on an as needed/requested basis.  recommends bedside caregivers page  on duty if patient shows signs of acute spiritual or emotional distress. [address] [facility]  
[phone]

## 2020-04-29 NOTE — ROUTINE PROCESS
Bedside shift change report given to Marilynn RN (oncoming nurse) by Kerry Farnsworth RN (offgoing nurse). Report included the following information SBAR, Kardex, Intake/Output, MAR and Recent Results.

## 2020-04-29 NOTE — DISCHARGE INSTRUCTIONS
Patient armband removed and shredded    Congested Heart Failure Booklet given to patient. DISCHARGE SUMMARY from Nurse    PATIENT INSTRUCTIONS:    What to do at Home:  Recommended activity: Activity as tolerated,     If you experience any of the following symptoms shortness of breath,increased weight gain of 3 or more pounds,swelling of lower extremities, please follow up with your primary care physician or nearest emergency room. *  Please give a list of your current medications to your Primary Care Provider. *  Please update this list whenever your medications are discontinued, doses are      changed, or new medications (including over-the-counter products) are added. *  Please carry medication information at all times in case of emergency situations. These are general instructions for a healthy lifestyle:    No smoking/ No tobacco products/ Avoid exposure to second hand smoke  Surgeon General's Warning:  Quitting smoking now greatly reduces serious risk to your health. Obesity, smoking, and sedentary lifestyle greatly increases your risk for illness    A healthy diet, regular physical exercise & weight monitoring are important for maintaining a healthy lifestyle    You may be retaining fluid if you have a history of heart failure or if you experience any of the following symptoms:  Weight gain of 3 pounds or more overnight or 5 pounds in a week, increased swelling in our hands or feet or shortness of breath while lying flat in bed. Please call your doctor as soon as you notice any of these symptoms; do not wait until your next office visit. The discharge information has been reviewed with the patient. The patient verbalized understanding.   Discharge medications reviewed with the patient and appropriate educational materials and side effects teaching were provided.   ___________________________________________________________________________________________________________________________________

## 2020-04-29 NOTE — PROGRESS NOTES
Problem: Mobility Impaired (Adult and Pediatric) Goal: *Acute Goals and Plan of Care (Insert Text) Description: . Physical Therapy Goals Initiated 4/28/2020 and to be accomplished within 7 day(s) 1. Patient will move from supine to sit and sit to supine  in bed with supervision/set-up. 2.  Patient will transfer from bed to chair and chair to bed with supervision/set-up using the least restrictive device. 3.  Patient will perform sit to stand with minimal assistance/contact guard assist. 
4.  Patient will ambulate with supervision/CGA for 20 feet with the least restrictive device. PLOF: Pt reports she is independent with self care and ambulates with RW. She lives with her daughter in an apartment. Outcome: Progressing Towards Goal 
 PHYSICAL THERAPY TREATMENT Patient: Eduar Toure (9 y.o. female) Date: 4/29/2020 Diagnosis: COPD (chronic obstructive pulmonary disease) ([facility] [J44.9] Acute on chronic systolic (congestive) heart failure ([facility] Precautions: Skin, Fall ASSESSMENT: 
Pt found supine HOB elevated willing to participate w/ therapy. Tx performed w/ OT to maximize safety of pt and staff as well as to facilitate balance and stability while performing mobility and self care tasks. Pt continues to make some functional gains, however is limited w/ activity tolerance and safety req min A w/ all mobility tasks. Prior to mobility, pt educated on role of PT in acute care setting as well as importance of cont mobility during stay to facilitate strength and mobility gains. Pt mobilized to EOB this visit req cueing for hand sequencing. Pt displays weakness in UEs and trunk, req assistance to obtain an upright position. Once achieved, balance remained intact in sitting statically, however once performing self care tasks, dynamic balance was challenged req close CGA/min A to correct.  Pt req also req intermittent rest breaks during task as pt voiced feeling of SOB, maintained SPO2 > or equal to 94% t/o tx. Pt progressed to sit to stands to RW from bed height to mimic home environment. On first attempt, pt was unable to achieve a full, upright position in RW w/o assistance, req to return back to a sitting position w/ poor control. Pt successful on second attempt w/ more assistance. Pt req min A w/ support of RW for balance and stability as pt amb from bed to chair 15' this visit. Gait was narrowed t/o gait training, decreasing stability. Cueing provided to further improve quality, fair carryover. Pt safely amb to chair and sat w/ poor control on active chair alarm. Pt provided all needs in reach and set up for lunch. Nursing notified of progress and position. Pt will req 24/7 capable assistance when returning home, especially as pt has to negotiate 2 steps to enter/exit home. Progression toward goals: good [x]      Improving appropriately and progressing toward goals 
[]      Improving slowly and progressing toward goals 
[]      Not making progress toward goals and plan of care will be adjusted PLAN: 
Patient continues to benefit from skilled intervention to address the above impairments. Continue treatment per established plan of care. Discharge Recommendations:  Home Health w/ 24/7 assistance Further Equipment Recommendations for Discharge:  Has  and [facility] SUBJECTIVE:  
Patient stated I live with my daughter.  OBJECTIVE DATA SUMMARY:  
Critical Behavior: 
Neurologic State: Alert Orientation Level: Oriented to person Cognition: Follows commands Safety/Judgement: Fall prevention Functional Mobility Training: 
Bed Mobility Supine to Sit: Contact guard assistance Transfers: 
Sit to Stand: Minimum assistance(X2 attempts ) Stand to Sit: Minimum assistance Bed to Chair: Minimum assistance Balance: 
Sitting: Impaired; With support Sitting - Static: Good (unsupported) Sitting - Dynamic: Fair (occasional) Standing: Impaired; With support Standing - Static: Good Standing - Dynamic : Fair Ambulation/Gait Training: 
Distance (ft): 15 Feet (ft) Assistive Device: Walker, rolling Ambulation - Level of Assistance: Minimal assistance Gait Abnormalities: Decreased step clearance;Trunk sway increased Base of Support: Center of gravity altered;Narrowed Speed/Laila: Slow Pain: 
Pain level pre-treatment: 0/10 Pain level post-treatment: 0/10 Activity Tolerance:  
Fair Please refer to the flowsheet for vital signs taken during this treatment. After treatment:  
[x] Patient left in no apparent distress sitting up in chair 
[] Patient left in no apparent distress in bed 
[x] Call bell left within reach [x] Nursing notified 
[] Caregiver present [x] Chair alarm activated 
[] SCDs applied COMMUNICATION/EDUCATION:  
[x]         Role of Physical Therapy in the acute care setting. [x]         Fall prevention education was provided and the patient/caregiver indicated understanding. [x]         Patient/family have participated as able in working toward goals and plan of care. [x]         Patient/family agree to work toward stated goals and plan of care. []         Patient understands intent and goals of therapy, but is neutral about his/her participation. []         Patient is unable to participate in stated goals/plan of care: ongoing with therapy staff. 
[]         Other: 
 
   
Xochitl Ragland PTA Time Calculation: 23 mins

## 2020-04-29 NOTE — PROGRESS NOTES
Problem: Self Care Deficits Care Plan (Adult) Goal: *Acute Goals and Plan of Care (Insert Text) Description: Occupational Therapy Goals Initiated 4/28/2020 within 7 day(s). 1.  Patient will perform upper body dressing with supervision/set-up. 2.  Patient will perform lower body dressing with supervision/set-up. 3.  Patient will perform toileting with supervision/set-up. 4.  Patient will perform toilet transfers with supervision/set-up. 5.  Patient will participate in upper extremity therapeutic exercise/activities with supervision/set-up for 8 minutes. Prior Level of Function: Pt reports she was (I) with basic self-care/ADLs PTA. Pt wears depends. Pt has a RW, shower chair, and BSC. Outcome: Progressing Towards Goal 
 OCCUPATIONAL THERAPY TREATMENT Patient: King Fabien (52 y.o. female) Date: 4/29/2020 Diagnosis: COPD (chronic obstructive pulmonary disease) ([facility] [address] [J44.9] Acute on chronic systolic (congestive) heart failure ([facility] [address] Precautions: Skin, Fall Chart, occupational therapy assessment, plan of care, and goals were reviewed. ASSESSMENT: 
Pt cleared to participate in OT treatment session by Rn. Upon entering room, pt supine with HOB elevated, alert, and agreeable to session. Pt seen in conjunction with LPTA to maximize safety of patient and staff members. Pt motivated to return home. Pt performed supine>sit with CGA in prep for further self-care. Pt denied dizziness. Pt on 3L O2 via NC. Sitting EOB, pt able to doff sock but requires cues to pace self when donning. Pt stood with min assist and was able to transfer to recliner with min/CGA using RW, simulating toilet transfers. Pt seen looking down during transfer, requiring cues for pt to look up and  upright position. Pt educated on using daughter to assist during toilet transfers for safety; pt acknowledged understanding.  Pt's SpO2 remained at 94% throughout session with HR at 107-108 bpm. Pt progressing appropriately. Recommending HH to ensure safe transition home. At the end of the session, pt resting comfortably in recliner for meal time, call-bell in reach, with all needs met. Progression toward goals: 
[x]          Improving appropriately and progressing toward goals 
[]          Improving slowly and progressing toward goals 
[]          Not making progress toward goals and plan of care will be adjusted PLAN: 
Patient continues to benefit from skilled intervention to address the above impairments. Continue treatment per established plan of care. Discharge Recommendations: Home health with 24/7 Supervision/Assist 
Further Equipment Recommendations for Discharge:  N/A; Pt has all recommended equipment to safely return home. SUBJECTIVE:  
Patient stated my daughter sometimes helps me.  OBJECTIVE DATA SUMMARY:  
Cognitive/Behavioral Status: 
Neurologic State: Alert Orientation Level: Oriented to person Cognition: Follows commands Safety/Judgement: Fall prevention Functional Mobility and Transfers for ADLs: 
 Bed Mobility: 
Supine to Sit: Contact guard assistance Transfers: 
Sit to Stand: Minimum assistance Stand to Sit: Minimum assistance Bed to Chair: Minimum assistance Balance: 
Sitting: Impaired; With support Sitting - Static: Good (unsupported) Sitting - Dynamic: Fair (occasional) Standing: Impaired; With support Standing - Static: Good Standing - Dynamic : Fair ADL Intervention: 
Feeding Feeding Assistance: Set-up Utensil Management: Set-up Food to Mouth: Set-up Drink to Mouth: Set-up Lower Body Dressing Assistance Dressing Assistance: Supervision Socks: Supervision Leg Crossed Method Used: Yes Position Performed: Seated edge of bed Cognitive Retraining Safety/Judgement: Fall prevention Pain: 
Pain level pre-treatment: 0/10 Pain level post-treatment: 0/10 Pain Intervention(s): Medication (see MAR); Rest, Ice, Repositioning Response to intervention: Nurse notified, See doc flow Activity Tolerance:   
Fair+ Please refer to the flowsheet for vital signs taken during this treatment. After treatment:  
[x]  Patient left in no apparent distress sitting up in chair 
[]  Patient left in no apparent distress in bed 
[x]  Call bell left within reach [x]  Nursing notified 
[]  Caregiver present [x]  Chair alarm activated COMMUNICATION/EDUCATION:  
[x] Role of Occupational Therapy in the acute care setting 
[x] Home safety education was provided and the patient/caregiver indicated understanding. [x] Patient/family have participated as able in working towards goals and plan of care. [x] Patient/family agree to work toward stated goals and plan of care. [] Patient understands intent and goals of therapy, but is neutral about his/her participation. [] Patient is unable to participate in goal setting and plan of care. Thank you for this referral. 
Stef Cuellar MS, OTR/L Time Calculation: 23 mins

## 2020-04-29 NOTE — PROGRESS NOTES
PT/OT recommending Home health. Patient is already active with [facility] health. CM to continue to follow for dishcarge needs. Tiffany Ocampo RN BSN Care Manager [phone]

## 2020-04-29 NOTE — INTERDISCIPLINARY ROUNDS
Interdisciplinary Round Note Patient Information: Patient Information: Sander Bal 406/01 Reason for Admission: COPD (chronic obstructive pulmonary disease) ([facility] [address] [J44.9] Attending Provider:   Sebastián Arnold MD 
 Past Medical History:  
Past Medical History:  
Diagnosis Date  Anemia  Arthritis  Cardiomyopathy due to hypertension, with heart failure ([address] 5/19/2017  Cataract  Chronic combined systolic and diastolic congestive heart failure ([address] 5/3/2018  Chronic lung disease  Chronic obstructive pulmonary disease ([facility] [address]  Chronic systolic congestive heart failure ([address] 2/3/2014  Diabetes mellitus ([facility] [address]  Difficulty swallowing Both food and liquid  Dysphagia following cerebral infarction 8/17/2017  Gastrointestinal disorder  History of echocardiogram 12/29/2009 EF 50%. Mild-mod conc LVH. Mod DDfx. LAE. Mild MR.    
 Hypercholesterolemia  Hypertension  Hypertensive heart disease  Infarction of right thalamus ([facility] [address] 9/17/2017 Lacunar infarct 2017  Joint pain  Joint swelling  Normal nuclear stress test 09/08/2000 No ischemia or prior infarction. Neg EKG on submax EST. Ex time 15:02.  Recurrent boils  Rheumatism  Shortness of breath  Sinusitis with nasal polyps 8/18/2019 Hospital day: 5 Estimated discharge date: 4/29/20 RRAT Score: High Risk 36 Total Score 3 Patient Length of Stay (>5 days = 3) 4 IP Visits Last 12 Months (1-3=4, 4=9, >4=11) 5 Pt. Coverage (Medicare=5 , Medicaid, or Self-Pay=4) 28 Charlson Comorbidity Score (Age + Comorbid Conditions) Criteria that do not apply:  
 Has Seen PCP in Last 6 Months (Yes=3, No=0) . Living with Significant Other. Assisted Living. LTAC. SNF. or  
Rehab Goals for Today: d/c 
 
 
 
  
VITAL SIGNS Vitals:  
 04/29/20 4615 04/29/20 9904 04/29/20 0821 04/29/20 3657 BP: 134/75 156/78 137/81 145/80 Pulse: (!) 106 (!) 102 (!) 102 Resp: 19 19 20 Temp: 98.2 °F (36.8 °C) 98 °F (36.7 °C) 98.5 °F (36.9 °C) SpO2: 98% 100% 98% Weight:  63.5 kg (140 lb) Height:      
 
 
 
 Lines, Drains, & Airways Peripheral IV 04/24/20 Right Arm-Site Assessment: Clean, dry, & intact VTE Prophylaxis Intake and Output:  
04/27 1901 - 04/29 0700 In: 480 [P.O.:480] Out: -  
04/29 0701 - 04/29 1900 In: 320 [P.O.:320] Out: -  Current Diet: DIET DIABETIC CONSISTENT CARB Mechanical Soft; 3 Honey/3 Moderately Thick DIET NUTRITIONAL SUPPLEMENTS Breakfast, Lunch, Dinner; [address] Abdominal  
Last Bowel Movement Date: 04/28/20 Stool Appearance: Loose Abdominal Assessment: Intact Appetite: Good Bowel Sounds: Active Recent Glucose Results:  
Lab Results Component Value Date/Time  (H) 04/29/2020 02:02 AM  
 GLUCPOC 91 04/29/2020 06:27 AM  
 GLUCPOC 259 (H) 04/28/2020 09:15 PM  
 GLUCPOC 218 (H) 04/28/2020 04:00 PM  
 
 
  
IV Antibiotics? no 
      
Activity Level: Activity Level: Up with Assistance Needs assistance with ADLs: no 
PT Consult Status: NO Current Immunizations: There is no immunization history for the selected administration types on file for this patient. Recommendations:  
Discharge Disposition: Home with Home Health Medication Reconciliation Completed: NO 
 
Cardiac/Pulmonary Rehab Ordered:  NO 
 
Needs for Discharge: home health orders Recommendations from IDR team: home health

## 2020-04-29 NOTE — PROGRESS NOTES
NUTRITION Nursing Referral: [facility] 
  
RECOMMENDATIONS / PLAN:  
 
- Modify supplements: decrease Magic Cup to BID 
- Continue all other nutrition interventions - Continue RD inpatient monitoring and evaluation. NUTRITION INTERVENTIONS & DIAGNOSIS:  
 
- Meals/snacks: modify composition - Medical food supplement therapy: modify Nutrition Diagnosis: Unintended weight loss related to dysphagia, decreased appetite and weakness as evidenced by 37 lb, 22% weight loss x 8 months. ASSESSMENT:  
 
4/29: Pt reported appetite and meal intake are improving, eating most/all of her meals during recent days. Consuming Magic Cup supplements; agreeable to decreasing to BID since meal intake improving/good 4/25: Patient lethargic, ate 60% of lunch and reports good appetite but unable to recall recent swallow evaluations and requiring thickened liquids. Nutritional intake adequate to meet patients estimated nutritional needs:  Yes Diet: DIET DIABETIC CONSISTENT CARB Mechanical Soft; 3 Honey/3 Moderately Thick DIET NUTRITIONAL SUPPLEMENTS Breakfast, Lunch, Dinner; [address] Food Allergies: watermelon Current Appetite: Good Appetite/meal intake prior to admission: Fair, decreased x past several months PTA Feeding Limitations:  [x] Swallowing difficulty: hx of dysphagia, SLP recommended regular diet with honey thick liquids 4/1/20, MBS 3/30/20    [x] Chewing difficulty: edentulous    [] Other: 
Current Meal Intake:  
Patient Vitals for the past 100 hrs: 
 % Diet Eaten 04/29/20 1001 100 % 04/27/20 1921 100 % 04/27/20 1258 75 % 04/27/20 0800 100 % 04/26/20 1317 95 % 04/25/20 1830 75 % 04/25/20 1326 60 % BM: 428 Skin Integrity: WDL Edema:   [x] No     [] Yes Pertinent Medications: Reviewed Recent Labs  
  04/29/20 
0202 04/27/20 
1022  142  
K 4.4 4.1  108 CO2 25 26 * 146* * 61* CREA 2.78* 1.96* CA 8.0* 8.2*  
 
 
 Intake/Output Summary (Last 24 hours) at 4/29/2020 1551 Last data filed at 4/29/2020 1001 Gross per 24 hour Intake 320 ml Output  Net 320 ml Anthropometrics: 
Ht Readings from Last 1 Encounters:  
04/24/20 5' 9\" (1.753 m) Last 3 Recorded Weights in this Encounter 04/27/20 1758 04/28/20 2155 04/29/20 0355 Weight: 61.5 kg (135 lb 8 oz) 62.3 kg (137 lb 4.8 oz) 63.5 kg (140 lb) Body mass index is 20.67 kg/m². Weight History: gradual weight loss over the years down from 185 lb since 2008 per daughter's report; 37 lb, 22% weight loss x 8 months per chart hx review Weight Metrics 4/29/2020 3/31/2020 1/9/2020 10/31/2019 9/18/2019 8/20/2019 4/3/2019 Weight 140 lb 151 lb 8 oz 130 lb 3.2 oz 144 lb 146 lb 6.4 oz 165 lb 168 lb BMI 20.67 kg/m2 22.37 kg/m2 21.67 kg/m2 23.96 kg/m2 24.36 kg/m2 27.46 kg/m2 24.81 kg/m2 Admitting Diagnosis: COPD (chronic obstructive pulmonary disease) ([facility] [J44.9] Pertinent PMHx: hypertension, diabetes mellitus on insulin with neuropathy and CKD 3, chronic systolic congestive heart failure with an EF of 36 to 40%, nonischemic cardiomyopathy Education Needs:        [x] None identified  [] Identified - Not appropriate at this time  []  Identified and addressed - refer to education log Learning Limitations:   [x] None identified  [] Identified Cultural, Sikh & ethnic food preferences:  [x] None identified    [] Identified and addressed ESTIMATED NUTRITION NEEDS:  
 
Calories: 5341-4919 kcal (MSJx1.2-1.5) based on  [x] Actual BW 58 kg     [] IBW Protein: 46-70 gm (0.8-1.2 gm/kg) based on  [x] Actual BW      [] IBW Fluid: 1 mL/kcal 
  
MONITORING & EVALUATION:  
 
Nutrition Goal(s):  
- PO nutrition intake will meet >75% of patient estimated nutritional needs within the next 7 days. Outcome: Met/Continue Monitoring:   [x] Food and nutrient intake   [x] Food and nutrient administration  [x] Comparative standards   [x] Nutrition-focused physical findings   [x] Anthropometric Measurements   [x] Treatment/therapy   [x] Biochemical data, medical tests, and procedures Previous Recommendations (for follow-up assessments only): Implemented Discharge Planning: No nutritional discharge needs at this time. Participated in care planning, discharge planning, & interdisciplinary rounds as appropriate. Gt Saravia RD Pager: [phone]

## 2020-04-29 NOTE — PROGRESS NOTES
Discharge order noted for today. Pt has been accepted to [facility] agency. Met with patient and daughter and are agreeable to the transition plan today. [facility] Patient's discharge summary and home health  orders have been forwarded to [address] [place] [address] [facility] Updated bedside RN,  to the transition plan. Discharge information has been documented on the AVS. Barb Miner RN BSN Care Manager [phone]

## 2020-04-30 NOTE — PROGRESS NOTES
[facility] Care Transitions Care Transitions Nurse ( CTN) attempted to contact patient via telephone call. A female answered and stated patient is resting. CTN unable to follow up with patient at this time.

## 2020-05-05 NOTE — PROGRESS NOTES
[facility] Care Transitions Care Transitions Nurse ( CTN) called to attempt to speak with patient via phone call. Patient's daughter, Linnette Le, answered the phone and stated that patient was not available at the current time. Patient's medical/personal information was not shared with family member during telephone call. Patient's daughter, Linnette Le reports: - Home health has been initiated. Patient is receiving nursing, PT, and OT visits. Patient is waiting for speech therapy to begin. [address] [place] [address] is providing skilled home care visits. -  patient's vital signs are strong and patient has a strong appetite. - She, patient's daughter, is her caregiver and is with her most of the time.

## 2020-05-07 NOTE — PROGRESS NOTES
Agatha Luba presents today for Chief Complaint Patient presents with  
[facility] Follow Up Recent discharge from [facility] 4/30/20 r/t CHF Agatha Verduzco preferred language for health care discussion is english/other. Is someone accompanying this pt? No 
 
Is the patient using any DME equipment during OV? No 
 
Depression Screening: 
3 most recent PHQ Screens 5/7/2020 Little interest or pleasure in doing things Not at all Feeling down, depressed, irritable, or hopeless Not at all Total Score PHQ 2 0 Learning Assessment: 
Learning Assessment 1/23/2019 PRIMARY LEARNER Patient HIGHEST LEVEL OF EDUCATION - PRIMARY LEARNER  GRADUATED HIGH SCHOOL OR GED  
BARRIERS PRIMARY LEARNER NONE  
CO-LEARNER CAREGIVER No  
PRIMARY LANGUAGE ENGLISH  
LEARNER PREFERENCE PRIMARY LISTENING  
  -  
ANSWERED BY self RELATIONSHIP SELF Abuse Screening: 
Abuse Screening Questionnaire 5/7/2020 Do you ever feel afraid of your partner? Aric Mis Are you in a relationship with someone who physically or mentally threatens you? Aric Mis Is it safe for you to go home? Pau Hung Fall Risk Fall Risk Assessment, last 12 mths 5/7/2020 Able to walk? Yes Fall in past 12 months? Yes Fall with injury? No  
Number of falls in past 12 months 1 Fall Risk Score 1 Coordination of Care: 1. Have you been to the ER, urgent care clinic since your last visit? Hospitalized since your last visit? Recent stay at [facility] 4/24-4/30/20 r/t CHF 2. Have you seen or consulted any other health care providers outside of the [address] [place] [address] since your last visit? Include any pap smears or colon screening. No 
 
 
Advance Directive: 1. Do you have an advance directive in place? Patient Reply:No 
 
2. If not, would you like material regarding how to put one in place?  Patient Reply: No

## 2020-05-09 PROBLEM — Z99.81 ON HOME OXYGEN THERAPY: Status: ACTIVE | Noted: 2020-01-01

## 2020-05-09 PROBLEM — D64.9 CHRONIC ANEMIA: Status: ACTIVE | Noted: 2020-01-01

## 2020-05-09 NOTE — PROGRESS NOTES
Juma Burns is a 77 y.o. female who was seen by synchronous (real-time) audio-video technology on 5/7/2020. Consent: Juma Burns, who was seen by synchronous (real-time) audio-video technology, and/or her healthcare decision maker, is aware that this patient-initiated, Telehealth encounter on 5/7/2020 is a billable service, with coverage as determined by her insurance carrier. She is aware that she may receive a bill and has provided verbal consent to proceed: Yes. Assessment & Plan:  
Diagnoses and all orders for this visit: 
 
1. Acute on chronic systolic (congestive) heart failure ([address] Follow up with cardiology as planned 2. On home oxygen therapy As needed 3. COPD mixed type ([address] Follow up with pulmonary as planned 4. Type 2 diabetes mellitus with nephropathy ([facility] 
 continue to monitor blood glucose. Hold Lantus at this time as blood glucose has been WNL. 5. Essential hypertension Continue to monitor at home. Discussed importance of adherence to medications 6. Left-sided weakness Home care as planned. Ambulates with assistance/ walker 7. Chronic kidney disease, stage 3 (HCC) 8. Chronic anemia The complexity of medical decision making for this visit is moderate [address] Subjective: Juma Burns is a 77 y.o. female who was seen for Hospital Follow Up (Recent discharge from [facility] 4/30/20 r/t CHF) Pt presents with daughter for virtual follow up visit. Was recently hospitalized at [facility] 4/24-4/30 for CHF exacerbation likely related to HTN and COPD. Also has history of DM type 2, CKD stage 3 and chronic anemia. Went to ER with shortness of breath, wheezing, desaturating on O2 3 L NC. Also had recent hospitalization at [facility] for left sided weakness/ CVA and was discharged on 04/01 to [facility] Since being at home, daughter reports that pt is doing well.   Only uses oxygen as needed. Not wearing nasal cannula during virtual visit. Ambulates to bathroom with little assistance. Eating well. Daughter has not been giving mother any insulin at pt's fasting blood sugars have been in 90s- 100s without insulin. They have no concerns today. They report that pt has been taking medications as prescribed and that they have all needed medications. Home health through [facility]. Prior to Admission medications Medication Sig Start Date End Date Taking? Authorizing Provider  
predniSONE (DELTASONE) 10 mg tablet Take 4 tabs PO daily x 2 days, then 3 tabs PO daily x 3 days, then 2 tabs PO daily x 2 days then 1 tab PO x 1 day 4/29/20  Yes Chuy Watson MD  
potassium chloride SR (KLOR-CON 10) 10 mEq tablet Take 1 Tab by mouth daily. 4/1/20  Yes Dieudonne Duran MD  
atorvastatin (LIPITOR) 80 mg tablet Take 1 Tab by mouth nightly. 4/1/20  Yes Dieudonne Duran MD  
spironolactone (ALDACTONE) 25 mg tablet Take 1 Tab by mouth daily. 4/1/20  Yes Dieudonne Duran MD  
gabapentin (NEURONTIN) 100 mg capsule Take 1 Cap by mouth three (3) times daily. Max Daily Amount: 300 mg. 10/30/19 - 1 cap at breakfast, 2 caps at dinner and  bedtime  Indications: neuropathic pain 4/1/20  Yes Chuy Watson MD  
Ventolin HFA 90 mcg/actuation inhaler INHALE 2 PUFFS BY MOUTH EVERY 4 HOURS AS NEEDED FOR WHEEZING OR SHORTNESS OF BREATH 3/29/20  Yes Humberto Grace MD  
amLODIPine (NORVASC) 10 mg tablet Take 1 Tab by mouth daily. Patient taking differently: Take 5 mg by mouth daily. 9/18/19  Yes Elpidio Darling NP  
isosorbide dinitrate (ISORDIL) 10 mg tablet Take 2 Tabs by mouth three (3) times daily. 9/18/19  Yes Elpidio Darling NP  
fluticasone furoate-vilanterol (BREO ELLIPTA) 100-25 mcg/dose inhaler Take 1 Puff by inhalation daily. Patient taking differently: Take 1 Puff by inhalation daily.  Indications: Bronchospasm Prevention with COPD 4/3/19  Yes Kiki Severe, MD  
 Oxygen 1 Device by Nasal route as needed (Oxygen supplement). 3L per min as needed   Indications: oxygen supplement   Yes Jacobo, MD Santos  
sodium chloride (OCEAN) 0.65 % nasal squeeze bottle 2 Sprays as needed for Congestion. Yes Jacobo, MD Santos  
Blood-Glucose Meter (TRUE METRIX GLUCOSE METER) misc Test glucose 3 times daily DX: E11.40 Patient taking differently: Test glucose 3 times daily DX: E11.40  Indications: blood sugar readings 7/2/18  Yes Lizzette Eaton DO  
glucose blood VI test strips (BLOOD GLUCOSE TEST) strip (True Metrix) Test glucose 3 times daily DX: E11.40 7/2/18  Yes Lizzette Eaton DO  
furosemide (LASIX) 20 mg tablet Take 1 Tab by mouth two (2) times a day. 6/4/18  Yes Blanca Pardon P, NP  
acetaminophen (TYLENOL EXTRA STRENGTH) 500 mg tablet Take 1,000 mg by mouth every six (6) hours as needed for Pain. Yes Provider, Historical  
CARBOXYMETHYLCELLULOS/GLYCERIN (REFRESH OPTIVE OP) Administer 1 Drop to both eyes six (6) times daily. Indications: eye health   Yes Aracelis Rossi MD  
aspirin delayed-release 81 mg tablet Take 81 mg by mouth daily. Dtr. Sohail Zepeda pt no longer takes. Yes Jacobo, MD Santos  
insulin lispro (HUMALOG KWIKPEN INSULIN SC) by SubCUTAneous route. Provider, Historical  
hydrALAZINE (APRESOLINE) 100 mg tablet Take 1 Tab by mouth three (3) times daily. 7/5/18   All Cabezas MD  
Lancets misc Test glucose 3 times daily DX: E11.40 Patient taking differently: Test glucose 3 times daily DX: E11.40  Indications: for blood draws w/ blood sugar readings 7/2/18   Lizzette Eaton, DO  
insulin glargine (LANTUS,BASAGLAR) 100 unit/mL (3 mL) inpn 7 Units by SubCUTAneous route in the morning. 15 units in the evening. 6/21/18   Beny Currie MD  
 
Allergies Allergen Reactions  Codeine Nausea Only  Sulfa (Sulfonamide Antibiotics) Rash  Watermelon Hives Reported by pt's daughter Patient Active Problem List  
Diagnosis Code  Essential hypertension I10  
 Diabetes mellitus ([facility] E11.9  Hyperlipidemia E78.5  Cervical myelopathy (HCC) G95.9  Eczema L30.9  Asthma J45.909  Chronic combined systolic and diastolic congestive heart failure ([facility]) I50.42  Bladder prolapse, female, acquired N81.10  
 IBS (irritable bowel syndrome) K58.9  Osteoporosis M81.0  Migraine G43.909  Anxiety and depression F41.9, F32.9  Pleural effusion J90  Type 2 diabetes mellitus with nephropathy ([facility]) E11.21  
 Hypokalemia E87.6  Acute pulmonary edema ([facility]) J81.0  Hypertensive emergency I16.1  Headache R51  Acute on chronic systolic (congestive) heart failure ([facility]) I50.23  Type 2 diabetes mellitus with diabetic neuropathy ([facility]) E11.40  Acute non-recurrent maxillary sinusitis J01.00  
 Dizziness R42  Chronic obstructive pulmonary disease ([facility] J44.9  Chronic kidney disease, stage 3 ([facility]) N18.3  Fall W19. Nuha Ropes  Allergic rhinitis J30.9  Chronic sinusitis J32.9  CVA (cerebral vascular accident) ([facility] I63.9  
 Encephalopathy G93.40  Infarction of right thalamus ([facility]) I63.9  Dysphagia following cerebral infarction I69.391  Sinusitis with nasal polyps J32.9, J33.9  Cardiomyopathy due to hypertension, with heart failure ([facility] I11.0, I43  Slurred speech R47.81  Left-sided weakness R53.1  Uncontrolled hypertension I10  
 Ischemic stroke ([facility]) I63.9  Pansinusitis J32.4  CKD (chronic kidney disease) N18.9  
 COPD mixed type ([facility]) J44.9  Chronic anemia D64.9  
 On home oxygen therapy Z99.81 Patient Active Problem List  
 Diagnosis Date Noted  Chronic anemia 05/09/2020  On home oxygen therapy 05/09/2020  COPD mixed type ([address] 04/24/2020  Slurred speech 03/29/2020  Left-sided weakness 03/29/2020  Uncontrolled hypertension 03/29/2020  Ischemic stroke ([facility] 03/29/2020  Pansinusitis 03/29/2020  CKD (chronic kidney disease) 03/29/2020  CVA (cerebral vascular accident) ([facility] 08/18/2019  Encephalopathy 08/18/2019  Sinusitis with nasal polyps 08/18/2019  Allergic rhinitis 10/23/2018  Acute non-recurrent maxillary sinusitis 09/25/2018  Dizziness 09/25/2018  Chronic kidney disease, stage 3 ([address] 09/25/2018  Fall 09/25/2018  Chronic obstructive pulmonary disease ([address]  Type 2 diabetes mellitus with diabetic neuropathy ([address] 06/21/2018  Hypokalemia 05/05/2018  Acute pulmonary edema ([address] 05/05/2018  Hypertensive emergency 05/05/2018  Headache 05/05/2018  Acute on chronic systolic (congestive) heart failure ([address] 05/05/2018  Chronic combined systolic and diastolic congestive heart failure ([address] 05/03/2018  Type 2 diabetes mellitus with nephropathy ([address] 12/14/2017  Infarction of right thalamus ([facility] 09/17/2017  Dysphagia following cerebral infarction 09/17/2017  Chronic sinusitis 06/13/2017  Cardiomyopathy due to hypertension, with heart failure ([address] 05/19/2017  Pleural effusion 04/30/2014  Cervical myelopathy ([facility] 02/03/2014  Eczema 02/03/2014  Asthma 02/03/2014  Bladder prolapse, female, acquired 02/03/2014  IBS (irritable bowel syndrome) 02/03/2014  Osteoporosis 02/03/2014  Migraine 02/03/2014  Anxiety and depression 02/03/2014  Essential hypertension 01/27/2012  Diabetes mellitus ([address] 01/27/2012  Hyperlipidemia 01/27/2012 Current Outpatient Medications Medication Sig Dispense Refill  predniSONE (DELTASONE) 10 mg tablet Take 4 tabs PO daily x 2 days, then 3 tabs PO daily x 3 days, then 2 tabs PO daily x 2 days then 1 tab PO x 1 day 22 Tab 0  
 potassium chloride SR (KLOR-CON 10) 10 mEq tablet Take 1 Tab by mouth daily. 30 Tab 0  
 atorvastatin (LIPITOR) 80 mg tablet Take 1 Tab by mouth nightly. 30 Tab 0  
 spironolactone (ALDACTONE) 25 mg tablet Take 1 Tab by mouth daily.  30 Tab 0  
  gabapentin (NEURONTIN) 100 mg capsule Take 1 Cap by mouth three (3) times daily. Max Daily Amount: 300 mg. 10/30/19 - 1 cap at breakfast, 2 caps at dinner and  bedtime  Indications: neuropathic pain 9 Cap 0  Ventolin HFA 90 mcg/actuation inhaler INHALE 2 PUFFS BY MOUTH EVERY 4 HOURS AS NEEDED FOR WHEEZING OR SHORTNESS OF BREATH 18 g 5  
 amLODIPine (NORVASC) 10 mg tablet Take 1 Tab by mouth daily. (Patient taking differently: Take 5 mg by mouth daily.) 30 Tab 0  
 isosorbide dinitrate (ISORDIL) 10 mg tablet Take 2 Tabs by mouth three (3) times daily. 1 Tab 0  
 fluticasone furoate-vilanterol (BREO ELLIPTA) 100-25 mcg/dose inhaler Take 1 Puff by inhalation daily. (Patient taking differently: Take 1 Puff by inhalation daily. Indications: Bronchospasm Prevention with COPD) 1 Inhaler 0  
 Oxygen 1 Device by Nasal route as needed (Oxygen supplement). 3L per min as needed   Indications: oxygen supplement  sodium chloride (OCEAN) 0.65 % nasal squeeze bottle 2 Sprays as needed for Congestion.  Blood-Glucose Meter (TRUE METRIX GLUCOSE METER) misc Test glucose 3 times daily DX: E11.40 (Patient taking differently: Test glucose 3 times daily DX: E11.40  Indications: blood sugar readings) 1 Each 0  
 glucose blood VI test strips (BLOOD GLUCOSE TEST) strip (True Metrix) Test glucose 3 times daily DX: E11.40 100 Strip 12  
 furosemide (LASIX) 20 mg tablet Take 1 Tab by mouth two (2) times a day. 60 Tab 8  acetaminophen (TYLENOL EXTRA STRENGTH) 500 mg tablet Take 1,000 mg by mouth every six (6) hours as needed for Pain.  CARBOXYMETHYLCELLULOS/GLYCERIN (REFRESH OPTIVE OP) Administer 1 Drop to both eyes six (6) times daily. Indications: eye health  aspirin delayed-release 81 mg tablet Take 81 mg by mouth daily. Dtr. Ricardo Mcleod pt no longer takes.  insulin lispro (HUMALOG KWIKPEN INSULIN SC) by SubCUTAneous route.     
 hydrALAZINE (APRESOLINE) 100 mg tablet Take 1 Tab by mouth three (3) times daily. 270 Tab 3  Lancets misc Test glucose 3 times daily DX: E11.40 (Patient taking differently: Test glucose 3 times daily DX: E11.40  Indications: for blood draws w/ blood sugar readings) 100 Each 12  
 insulin glargine (LANTUS,BASAGLAR) 100 unit/mL (3 mL) inpn 7 Units by SubCUTAneous route in the morning. 15 units in the evening. 5 Pen 12 Allergies Allergen Reactions  Codeine Nausea Only  Sulfa (Sulfonamide Antibiotics) Rash  Watermelon Hives Reported by pt's daughter Past Medical History:  
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 Mild-mod conc LVH. Mod DDfx. LAE. Mild MR.    
 Hypercholesterolemia  Hypertension  Hypertensive heart disease  Infarction of right thalamus ([facility] 9/17/2017 Lacunar infarct 2017  Joint pain  Joint swelling  Normal nuclear stress test 09/08/2000 No ischemia or prior infarction. Neg EKG on submax EST. Ex time 15:02.  Recurrent boils  Rheumatism  Shortness of breath  Sinusitis with nasal polyps 8/18/2019 Past Surgical History:  
Procedure Laterality Date  CARDIAC CATHETERIZATION  5/10/2018  CORONARY ARTERY ANGIOGRAM  5/10/2018  HX CHOLECYSTECTOMY  [facility]  MODERATE SEDATION  5/10/2018 Family History Problem Relation Age of Onset  Hypertension Mother  Ovarian Cancer Mother  Stroke Mother  Heart Attack Father  Breast Cancer Maternal Aunt Social History Tobacco Use  Smoking status: Never Smoker  Smokeless tobacco: Never Used  Tobacco comment: second hand smoke exposure as a child Substance Use Topics  Alcohol use: No  
 
 
Review of Systems Constitutional: Negative. Respiratory: Negative. Cardiovascular: Negative. Neurological: Negative. Objective: There were no vitals taken for this visit. General: alert, cooperative, no distress Mental  status: normal mood, behavior, speech, dress, motor activity, and thought processes, able to follow commands HENT: NCAT Neck: no visualized mass Resp: no respiratory distress Neuro: no gross deficits Skin: no discoloration or lesions of concern on visible areas Psychiatric: normal affect, consistent with stated mood, no evidence of hallucinations Additional exam findings: We discussed the expected course, resolution and complications of the diagnosis(es) in detail. Medication risks, benefits, costs, interactions, and alternatives were discussed as indicated. I advised her to contact the office if her condition worsens, changes or fails to improve as anticipated. She expressed understanding with the diagnosis(es) and plan. Brandy Langford is a 77 y.o. female who was evaluated by a video visit encounter for concerns as above. Patient identification was verified prior to start of the visit. A caregiver was present when appropriate. Due to this being a TeleHealth encounter (During [facility]- public health emergency), evaluation of the following organ systems was limited: Vitals/Constitutional/EENT/Resp/CV/GI//MS/Neuro/Skin/Heme-Lymph-Imm.  
Pursuant to the emergency declaration under the [facility] [address] and the [employer] Act, this Virtual Visit was conducted, with patient's (and/or legal guardian's) consent, to reduce the patient's risk of exposure to COVID-19 and provide necessary medical care. Services were provided through a video synchronous discussion virtually to substitute for in-person clinic visit. Patient and provider were located at their individual homes.  
 
 
Radha Romo NP

## 2020-05-14 NOTE — PROGRESS NOTES
Received a call from Ms. Mustafa ([phone]. [phone]) from [facility] requesting a copy of 04/01/2020 LTSS be faxed to [phone]. Faxed 04/01/2020 LTSS to Ms. Mustafa.

## 2020-05-19 NOTE — DISCHARGE SUMMARY
Discharge Summary Patient: Marlon Lopez MRN: [id number]  CSN: [id number] YOB: 1955  Age: 77 y.o. Sex: female DOA: 4/24/2020 LOS:  LOS: 5 days   Discharge Date: 4/29/2020 Admission Diagnoses: COPD (chronic obstructive pulmonary disease) ([facility] [J44.9] Discharge Diagnoses: COPD with acute exacerbation Chronic hypoxic respiratory failure on home oxygen Mild acute on chronic systolic CHF, currently compensated Cardiomyopathy, EF 40% Hypokalemia, mild HTN 
CKD 3 Chronic anemia Discharge Condition: Stable PHYSICAL EXAM 
Visit Vitals /70 (BP 1 Location: Right arm, BP Patient Position: Sitting) Pulse 93 Temp 98.3 °F (36.8 °C) Resp 15 Ht 5' 9\" (1.753 m) Wt 63.5 kg (140 lb) SpO2 100% BMI 20.67 kg/m² General: In NAD. EENT: NCAT. Sclerae anicteric, EOMI. Tommy Fito Lungs:  Breath sounds decreased bilaterally, no significant wheezing. Effort nonlabored. Heart:  RRR. Abdomen: Soft, NT/ND. Extremities: Warm, no edema or ischemia. Psych:   Mood normal. 
Neurologic:  Awake and alert, motor nonfocal. 
 
 
Hospital Course:  
See admission H&P for full details of HPI. Patient was admitted to the hospital after presenting to the emergency department with increased shortness of breath, wheezing and hypoxia. There was no evidence for pneumonia on chest x-ray and patient was treated for acute COPD exacerbation and was given IV steroids and bronchodilators in the emergency department. She felt better fairly acutely but was significantly hypoxic with minimal activity. Usual treatment has been continued patient has continued to have slow improvement. She has been transitioned to oral steroids and has been able to ambulate without previous shortness of breath and hypoxia that was present on admission. Patient is medically stable for discharge home with outpatient follow-up as advised. Consults:  
None Significant Diagnostic Studies: CXR: 
 IMPRESSION: 
  
Very mild blunting of the left lateral costophrenic angle unchanged from most 
recent exam of March 29, 2020. Differential diagnosis of residual scarring at 
the costophrenic angle vs. mild residual effusion. Discharge Medications:    
Discharge Medication List as of 4/29/2020  5:51 PM  
  
START taking these medications Details  
predniSONE (DELTASONE) 10 mg tablet Take 4 tabs PO daily x 2 days, then 3 tabs PO daily x 3 days, then 2 tabs PO daily x 2 days then 1 tab PO x 1 day, Normal, Disp-22 Tab, R-0  
  
  
CONTINUE these medications which have NOT CHANGED Details  
potassium chloride SR (KLOR-CON 10) 10 mEq tablet Take 1 Tab by mouth daily. , Print, Disp-30 Tab, R-0  
  
atorvastatin (LIPITOR) 80 mg tablet Take 1 Tab by mouth nightly. , Print, Disp-30 Tab, R-0  
  
spironolactone (ALDACTONE) 25 mg tablet Take 1 Tab by mouth daily. , Print, Disp-30 Tab, R-0  
  
gabapentin (NEURONTIN) 100 mg capsule Take 1 Cap by mouth three (3) times daily. Max Daily Amount: 300 mg. 10/30/19 - 1 cap at breakfast, 2 caps at dinner and  bedtime  Indications: neuropathic pain, Print, Disp-9 Cap, R-0 Ventolin HFA 90 mcg/actuation inhaler INHALE 2 PUFFS BY MOUTH EVERY 4 HOURS AS NEEDED FOR WHEEZING OR SHORTNESS OF BREATH, Normal, Disp-18 g, R-5, CAROL  
  
amLODIPine (NORVASC) 10 mg tablet Take 1 Tab by mouth daily. , Normal, Disp-30 Tab, R-0  
  
isosorbide dinitrate (ISORDIL) 10 mg tablet Take 2 Tabs by mouth three (3) times daily. , Normal, Disp-1 Tab, R-0  
  
fluticasone furoate-vilanterol (BREO ELLIPTA) 100-25 mcg/dose inhaler Take 1 Puff by inhalation daily. , Sample, Disp-1 Inhaler, R-0 Oxygen 1 Device by Nasal route as needed (Oxygen supplement). 3L per min as needed   Indications: oxygen supplement, Historical Med  
  
sodium chloride (OCEAN) 0.65 % nasal squeeze bottle 2 Sprays as needed for Congestion. , Historical Med  
  
 hydrALAZINE (APRESOLINE) 100 mg tablet Take 1 Tab by mouth three (3) times daily. , Normal, Disp-270 Tab, R-3 Blood-Glucose Meter (TRUE METRIX GLUCOSE METER) misc Test glucose 3 times daily DX: E11.40, Normal, Disp-1 Each, R-0 Lancets misc Test glucose 3 times daily DX: E11.40, Normal, Disp-100 Each, R-12  
  
glucose blood VI test strips (BLOOD GLUCOSE TEST) strip (True Metrix) Test glucose 3 times daily DX: E11.40, Normal, Disp-100 Strip, R-12  
  
insulin glargine (LANTUS,BASAGLAR) 100 unit/mL (3 mL) inpn 7 Units by SubCUTAneous route in the morning. 15 units in the evening., Normal, Disp-5 Pen, R-12  
  
furosemide (LASIX) 20 mg tablet Take 1 Tab by mouth two (2) times a day., Normal, Disp-60 Tab, R-8  
  
acetaminophen (TYLENOL EXTRA STRENGTH) 500 mg tablet Take 1,000 mg by mouth every six (6) hours as needed for Pain., Historical Med CARBOXYMETHYLCELLULOS/GLYCERIN (REFRESH OPTIVE OP) Administer 1 Drop to both eyes six (6) times daily. Indications: eye health, Historical Med  
  
aspirin delayed-release 81 mg tablet Take 81 mg by mouth daily. Dtr. Yifan Garcia pt no longer takes. , Historical Med Activity: As tolerated Diet: Cardiac Diet Disposition: Home with home health care services Follow-up: with PCP, Quang Chua MD in 1 week. Minutes spent on discharge: >30 minutes spent coordinating this discharge. [address].  Florencia Angeles MD

## 2020-05-26 NOTE — TELEPHONE ENCOUNTER
Received call from PT  Martita Lowe.) to report elevated /90, stated that pt was not having any symptoms and that patient states that she just took her BP medication 20 mins ago. Is patient ok to proceed with PT today? Per, NP Gennette Landau patient may proceed as tolerated.

## 2020-06-11 NOTE — TELEPHONE ENCOUNTER
Seen by NP [place] Clayton on 5/7 for vitual DORA post [facility] D/C 4/29/20 for CHF. No appt scheduled to return    Requested Prescriptions     Pending Prescriptions Disp Refills    isosorbide dinitrate (ISORDIL) 10 mg tablet 180 Tab 0     Sig: Take 2 Tabs by mouth three (3) times daily for 30 days.      Refused Prescriptions Disp Refills    isosorbide dinitrate (ISORDIL) 10 mg tablet [Pharmacy Med Name: Isosorbide Dinitrate 10 MG Oral Tablet] 180 Tab 0     Sig: TAKE 2 TABLETS BY MOUTH THREE TIMES DAILY     Refused By: Tammy De Leon     Reason for Refusal: Patient no longer under provider care

## 2020-07-15 PROBLEM — J96.01 ACUTE RESPIRATORY FAILURE WITH HYPOXIA (HCC): Status: ACTIVE | Noted: 2020-01-01

## 2020-07-15 PROBLEM — J44.0 ACUTE BRONCHITIS WITH CHRONIC OBSTRUCTIVE PULMONARY DISEASE (COPD) (HCC): Status: ACTIVE | Noted: 2020-01-01

## 2020-07-15 PROBLEM — J20.9 ACUTE BRONCHITIS WITH CHRONIC OBSTRUCTIVE PULMONARY DISEASE (COPD) (HCC): Status: ACTIVE | Noted: 2020-01-01

## 2020-07-15 NOTE — ED PROVIDER NOTES
EMERGENCY DEPARTMENT HISTORY AND PHYSICAL EXAM  This was created with voice recognition software and transcription errors may be present. 5:00 PM  Date: 7/15/2020  Patient Name: Edwin Mccarthy    History of Presenting Illness     Chief Complaint:    History Provided By:     HPI: Edwin Mccarthy is a 77 y.o. female past medical history of cardiomyopathy heart failure chronic lung disease COPD diabetes stroke who presents with shortness of breath is been going on for about 2 days history is somewhat limited as the patient is in respiratory distress and on CPAP at this time. No reported history of fevers or chills. She does have both CHF and COPD but this is reportedly wheezing she was satting 87% on her baseline 3 L. No known sick contacts at home. 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 Mild-mod conc LVH. Mod DDfx. LAE. Mild MR.      Hypercholesterolemia     Hypertension     Hypertensive heart disease     Infarction of right thalamus ([address] 9/17/2017    Lacunar infarct 2017    Joint pain     Joint swelling     Normal nuclear stress test 09/08/2000    No ischemia or prior infarction. Neg EKG on submax EST. Ex time 15:02.     Recurrent boils     Rheumatism     Shortness of breath     Sinusitis with nasal polyps 8/18/2019       Past Surgical History:  Past Surgical History:   Procedure Laterality Date    CARDIAC CATHETERIZATION  5/10/2018         CORONARY ARTERY ANGIOGRAM  5/10/2018         HX CHOLECYSTECTOMY  2001    HX TUBAL LIGATION  1975    MODERATE SEDATION  5/10/2018            Family History:  Family History   Problem Relation Age of Onset    Hypertension Mother     Ovarian Cancer Mother     Stroke Mother     Heart Attack Father     Breast Cancer Maternal Aunt        Social History:  Social History     Tobacco Use    Smoking status: Never Smoker    Smokeless tobacco: Never Used    Tobacco comment: second hand smoke exposure as a child   Substance Use Topics    Alcohol use: No    Drug use: No       Allergies: Allergies   Allergen Reactions    Codeine Nausea Only    Sulfa (Sulfonamide Antibiotics) Rash    Watermelon Hives     Reported by pt's daughter       Review of Systems     Review of Systems   Unable to perform ROS: Acuity of condition     10 point review of systems otherwise negative unless noted in HPI. Physical Exam       Physical Exam  Constitutional:       Appearance: She is well-developed. HENT:      Head: Normocephalic and atraumatic. Eyes:      Pupils: Pupils are equal, round, and reactive to light. Neck:      Musculoskeletal: Normal range of motion and neck supple. Cardiovascular:      Rate and Rhythm: Normal rate and regular rhythm. Heart sounds: Normal heart sounds. No murmur. No friction rub. Pulmonary:      Effort: Pulmonary effort is normal. No respiratory distress. Breath sounds: Wheezing present. Comments: Bilateral expiratory wheeze  Abdominal:      General: There is no distension. Palpations: Abdomen is soft. Tenderness: There is no abdominal tenderness. There is no guarding or rebound. Musculoskeletal: Normal range of motion. Skin:     General: Skin is warm and dry. Neurological:      Mental Status: She is alert and oriented to person, place, and time. Psychiatric:         Behavior: Behavior normal.         Thought Content: Thought content normal.         Diagnostic Study Results     Vital Signs  EKG: EKG shows sinus at 110 with a normal axis normal intervals there is no ST segment elevation or depression and no hypertrophy. Labs: INR is normal chemistry noted there is elevation of the BNP to be essentially normal  Imaging: Chest x-ray looks fairly unremarkable, left-sided pleural effusion    Medical Decision Making     ED Course: Progress Notes, Reevaluation, and Consults:      Provider Notes (Medical Decision Making): Patient presents with shortness of breath with oxygen saturation of 86% on her baseline 3 L. She is been given duo nebs Solu-Medrol and albuterol in route she is on CPAP and improving her saturation is improved significantly to 100%. Patient improved. Will likely need admission for observation antibiotics and covered rule out. This is a COPD exacerbation we will treat appropriately. Diagnosis     Clinical Impression: No diagnosis found. Disposition:    Patient's Medications   Start Taking    No medications on file   Continue Taking    ACETAMINOPHEN (TYLENOL EXTRA STRENGTH) 500 MG TABLET    Take 1,000 mg by mouth every six (6) hours as needed for Pain. AMLODIPINE (NORVASC) 10 MG TABLET    Take 1 Tab by mouth daily. ASPIRIN DELAYED-RELEASE 81 MG TABLET    Take 81 mg by mouth daily. Dtr. Yonyn Nam pt no longer takes. ATORVASTATIN (LIPITOR) 80 MG TABLET    Take 1 Tab by mouth nightly. BLOOD-GLUCOSE METER (TRUE METRIX GLUCOSE METER) MISC    Test glucose 3 times daily DX: E11.40    CARBOXYMETHYLCELLULOS/GLYCERIN (REFRESH OPTIVE OP)    Administer 1 Drop to both eyes six (6) times daily. Indications: eye health    FLUTICASONE FUROATE-VILANTEROL (BREO ELLIPTA) 100-25 MCG/DOSE INHALER    Take 1 Puff by inhalation daily. FUROSEMIDE (LASIX) 20 MG TABLET    Take 1 Tab by mouth two (2) times a day.     GABAPENTIN (NEURONTIN) 100 MG CAPSULE    Take 1 Cap by mouth three (3) times daily. Max Daily Amount: 300 mg. 10/30/19 - 1 cap at breakfast, 2 caps at dinner and  bedtime  Indications: neuropathic pain    GLUCOSE BLOOD VI TEST STRIPS (BLOOD GLUCOSE TEST) STRIP    (True Metrix) Test glucose 3 times daily DX: E11.40    HYDRALAZINE (APRESOLINE) 100 MG TABLET    Take 1 Tab by mouth three (3) times daily. INSULIN GLARGINE (LANTUS,BASAGLAR) 100 UNIT/ML (3 ML) INPN    7 Units by SubCUTAneous route in the morning. 15 units in the evening. INSULIN LISPRO (HUMALOG KWIKPEN INSULIN SC)    by SubCUTAneous route. LANCETS MISC    Test glucose 3 times daily DX: E11.40    OXYGEN    1 Device by Nasal route as needed (Oxygen supplement). 3L per min as needed   Indications: oxygen supplement    POTASSIUM CHLORIDE SR (KLOR-CON 10) 10 MEQ TABLET    Take 1 Tab by mouth daily. PREDNISONE (DELTASONE) 10 MG TABLET    Take 4 tabs PO daily x 2 days, then 3 tabs PO daily x 3 days, then 2 tabs PO daily x 2 days then 1 tab PO x 1 day    SODIUM CHLORIDE (OCEAN) 0.65 % NASAL SQUEEZE BOTTLE    2 Sprays as needed for Congestion. SPIRONOLACTONE (ALDACTONE) 25 MG TABLET    Take 1 Tab by mouth daily.     VENTOLIN HFA 90 MCG/ACTUATION INHALER    INHALE 2 PUFFS BY MOUTH EVERY 4 HOURS AS NEEDED FOR WHEEZING OR SHORTNESS OF BREATH   These Medications have changed    No medications on file   Stop Taking    No medications on file

## 2020-07-15 NOTE — ED TRIAGE NOTES
PT arrived via ems from home with complaints of respiratory distress. Upon arrival, pt was 86% on home 3L. Pt received duo neb, soulmedrol and albuterol in medic.  Pt placed on CPAP

## 2020-07-15 NOTE — H&P
HISTORY & PHYSICAL      Patient: Tramaine Madsen MRN: [id number]  [facility]: [id number]    YOB: 1955  Age: 77 y.o. Sex: female    DOA: 7/15/2020 LOS:  LOS: 0 days        DOA: 7/15/2020        Assessment/Plan     Active Problems:    Acute bronchitis with chronic obstructive pulmonary disease (COPD) ([facility] (7/15/2020)      Acute respiratory failure with hypoxia ([facility] (7/15/2020)        Plan:  1. COPD exac - IV Abx , IV steroids, Bronchodilators prn   2. R/o COVID 19 PNA - nasopharyngeal swab sent   3. H/o chr systolic CHF - continue PO lasix & aldactone   4. H/o Chr Resp failure - is on 3 lt NC at home   5. H/o T2 DM - continue lantus & insulin SS   6. H/o HTN - resume home meds, monitor BP   DVT Px - Heparin   FC               HPI:     Tramaine Madsen is a 77 y.o. female who presents to the emergency room secondary to shortness of breath. Patient mentions she is chronically short of breath however it has gotten worse in the last few days. She lives with her daughter who is not sick and has had no exposure to patients with COVID-19 infection. Patient mentions she has not had a fever. Patient has a history of COPD, hypertension, type 2 diabetes, chronic systolic congestive heart failure, chronic respiratory failureis on 3 L nasal cannula at home. ER evaluation patient noted to be short of breath on arrival, requiring more oxygen than her baseline. She is felt to have COPD exacerbation and is currently being admitted to the hospital for further evaluation. She will be ruled out for COVID-19 infection.         Past Medical History:   Diagnosis Date    Anemia     Arthritis     Cardiomyopathy due to hypertension, with heart failure ([facility] 5/19/2017    Cataract     Chronic combined systolic and diastolic congestive heart failure ([facility] 5/3/2018    Chronic lung disease     Chronic obstructive pulmonary disease (HCC)     Chronic systolic congestive heart failure ([facility] 2/3/2014    Diabetes mellitus ([address]     Difficulty swallowing     Both food and liquid    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 Mild-mod conc LVH. Mod DDfx. LAE. Mild MR.      Hypercholesterolemia     Hypertension     Hypertensive heart disease     Infarction of right thalamus ([address] 9/17/2017    Lacunar infarct 2017    Joint pain     Joint swelling     Normal nuclear stress test 09/08/2000    No ischemia or prior infarction. Neg EKG on submax EST. Ex time 15:02.  Recurrent boils     Rheumatism     Shortness of breath     Sinusitis with nasal polyps 8/18/2019       Past Surgical History:   Procedure Laterality Date    CARDIAC CATHETERIZATION  5/10/2018         CORONARY ARTERY ANGIOGRAM  5/10/2018         HX CHOLECYSTECTOMY  2001    HX TUBAL LIGATION  1975    MODERATE SEDATION  5/10/2018            Family History   Problem Relation Age of Onset    Hypertension Mother     Ovarian Cancer Mother     Stroke Mother     Heart Attack Father     Breast Cancer Maternal Aunt        Social History     Socioeconomic History    Marital status:      Spouse name: Not on file    Number of children: Not on file    Years of education: Not on file    Highest education level: Not on file   Tobacco Use    Smoking status: Never Smoker    Smokeless tobacco: Never Used    Tobacco comment: second hand smoke exposure as a child   Substance and Sexual Activity    Alcohol use: No    Drug use: No    Sexual activity: Never   Social History Narrative    Pt used to be a  for the school system. Prior to Admission medications    Medication Sig Start Date End Date Taking? Authorizing Provider   insulin lispro (HUMALOG KWIKPEN INSULIN SC) by SubCUTAneous route.     Provider, Historical   predniSONE (DELTASONE) 10 mg tablet Take 4 tabs PO daily x 2 days, then 3 tabs PO daily x 3 days, then 2 tabs PO daily x 2 days then 1 tab PO x 1 day 4/29/20   Howard Gr MD   potassium chloride SR (KLOR-CON 10) 10 mEq tablet Take 1 Tab by mouth daily. 4/1/20   Lakesha Jha MD   atorvastatin (LIPITOR) 80 mg tablet Take 1 Tab by mouth nightly. 4/1/20   Lakesha Jha MD   spironolactone (ALDACTONE) 25 mg tablet Take 1 Tab by mouth daily. 4/1/20   Lakesha Jha MD   gabapentin (NEURONTIN) 100 mg capsule Take 1 Cap by mouth three (3) times daily. Max Daily Amount: 300 mg. 10/30/19 - 1 cap at breakfast, 2 caps at dinner and  bedtime  Indications: neuropathic pain 4/1/20   Howard Gr MD   Ventolin HFA 90 mcg/actuation inhaler INHALE 2 PUFFS BY MOUTH EVERY 4 HOURS AS NEEDED FOR WHEEZING OR SHORTNESS OF BREATH 3/29/20   Imelda Barr MD   amLODIPine (NORVASC) 10 mg tablet Take 1 Tab by mouth daily. Patient taking differently: Take 5 mg by mouth daily. 9/18/19   Renu Andres, JESSICA   fluticasone furoate-vilanterol (BREO ELLIPTA) 100-25 mcg/dose inhaler Take 1 Puff by inhalation daily. Patient taking differently: Take 1 Puff by inhalation daily. Indications: Bronchospasm Prevention with COPD 4/3/19   Marilu Frederick MD   Oxygen 1 Device by Nasal route as needed (Oxygen supplement). 3L per min as needed   Indications: oxygen supplement    Other, MD Santos   sodium chloride (OCEAN) 0.65 % nasal squeeze bottle 2 Sprays as needed for Congestion. Other, MD Santos   hydrALAZINE (APRESOLINE) 100 mg tablet Take 1 Tab by mouth three (3) times daily.  7/5/18   Karin Cabezas MD   Blood-Glucose Meter (TRUE METRIX GLUCOSE METER) misc Test glucose 3 times daily DX: E11.40  Patient taking differently: Test glucose 3 times daily DX: E11.40  Indications: blood sugar readings 7/2/18   Lizzette Eaton, DO   Lancets misc Test glucose 3 times daily DX: E11.40  Patient taking differently: Test glucose 3 times daily DX: E11.40  Indications: for blood draws w/ blood sugar readings 7/2/18   Lizzette Eaton, DO   glucose blood VI test strips (BLOOD GLUCOSE TEST) strip (True Metrix) Test glucose 3 times daily DX: E11.40 7/2/18   Lizzette Eaton DO   insulin glargine (LANTUS,BASAGLAR) 100 unit/mL (3 mL) inpn 7 Units by SubCUTAneous route in the morning. 15 units in the evening. 6/21/18   Sol Arrington MD   furosemide (LASIX) 20 mg tablet Take 1 Tab by mouth two (2) times a day. 6/4/18   Nandini Enciso, NP   acetaminophen (TYLENOL EXTRA STRENGTH) 500 mg tablet Take 1,000 mg by mouth every six (6) hours as needed for Pain. Provider, Alexandrea   CARBOXYMETHYLCELLULOS/GLYCERIN (REFRESH OPTIVE OP) Administer 1 Drop to both eyes six (6) times daily. Indications: eye health    Marcellusmyra Alcocer MD   aspirin delayed-release 81 mg tablet Take 81 mg by mouth daily. Dtr. Verdevon Nam pt no longer takes. Other, MD Santos       Allergies   Allergen Reactions    Codeine Nausea Only    Sulfa (Sulfonamide Antibiotics) Rash    Watermelon Hives     Reported by pt's daughter       Review of Systems  Review of systems not obtained due to patient factors. Physical Exam:      Visit Vitals  /79   Pulse (!) 102   Temp 97.5 °F (36.4 °C)   Resp 18   SpO2 98%       Physical Exam:    Gen: In general, this is a thinly buil;t female in no acute distress  HEENT: Sclerae nonicteric. Oral mucous membranes moist. Dentition poor  Neck: Supple with midline trachea. CV: RRR without murmur or rub appreciated. Resp:Respirations are unlabored without use of accessory muscles. Decreased BS in bases   Abd: Soft, nontender, nondistended. Extrem: Extremities are warm, without cyanosis or clubbing. No pitting pretibial edema. Skin: Warm, no visible rashes. Neuro: Patient is alert, oriented, and cooperative. No obvious focal defects. Moves all 4 extremities.     Labs Reviewed:    Recent Results (from the past 24 hour(s))   EKG, 12 LEAD, INITIAL    Collection Time: 07/15/20  5:12 PM   Result Value Ref Range    Ventricular Rate 110 BPM Atrial Rate 110 BPM    P-R Interval 172 ms    QRS Duration 84 ms    Q-T Interval 334 ms    QTC Calculation (Bezet) 452 ms    Calculated P Axis -5 degrees    Calculated R Axis 107 degrees    Calculated T Axis -61 degrees    Diagnosis       Sinus tachycardia with premature atrial complexes  Anterolateral infarct (cited on or before 24-APR-2020)  T wave abnormality, consider inferior ischemia  Abnormal ECG  When compared with ECG of 24-APR-2020 10:03,  QRS duration has decreased  Questionable change in initial forces of Lateral leads  ST elevation now present in Inferior leads  T wave inversion now evident in Inferior leads  T wave inversion less evident in Lateral leads     CBC WITH AUTOMATED DIFF    Collection Time: 07/15/20  5:13 PM   Result Value Ref Range    WBC 8.6 4.6 - 13.2 K/uL    RBC 4.13 (L) 4.20 - 5.30 M/uL    HGB 11.7 (L) 12.0 - 16.0 g/dL    HCT 36.5 35.0 - 45.0 %    MCV 88.4 74.0 - 97.0 FL    MCH 28.3 24.0 - 34.0 PG    MCHC 32.1 31.0 - 37.0 g/dL    RDW 15.0 (H) 11.6 - 14.5 %    PLATELET 562 474 - 272 K/uL    MPV 11.3 9.2 - 11.8 FL    NEUTROPHILS 40 40 - 73 %    LYMPHOCYTES 21 21 - 52 %    MONOCYTES 6 3 - 10 %    EOSINOPHILS 33 (H) 0 - 5 %    BASOPHILS 0 0 - 2 %    ABS. NEUTROPHILS 3.5 1.8 - 8.0 K/UL    ABS. LYMPHOCYTES 1.8 0.9 - 3.6 K/UL    ABS. MONOCYTES 0.5 0.05 - 1.2 K/UL    ABS. EOSINOPHILS 2.8 (H) 0.0 - 0.4 K/UL    ABS.  BASOPHILS 0.0 0.0 - 0.1 K/UL    DF AUTOMATED      PLATELET COMMENTS ADEQUATE PLATELETS      RBC COMMENTS ANISOCYTOSIS  1+       METABOLIC PANEL, COMPREHENSIVE    Collection Time: 07/15/20  5:13 PM   Result Value Ref Range    Sodium 143 136 - 145 mmol/L    Potassium 4.0 3.5 - 5.5 mmol/L    Chloride 110 100 - 111 mmol/L    CO2 28 21 - 32 mmol/L    Anion gap 5 3.0 - 18 mmol/L    Glucose 114 (H) 74 - 99 mg/dL    BUN 39 (H) 7.0 - 18 MG/DL    Creatinine 1.96 (H) 0.6 - 1.3 MG/DL    BUN/Creatinine ratio 20 12 - 20      GFR est AA 31 (L) >60 ml/min/1.73m2    GFR est non-AA 25 (L) >60 ml/min/1.73m2    Calcium 9.3 8.5 - 10.1 MG/DL    Bilirubin, total 0.7 0.2 - 1.0 MG/DL    ALT (SGPT) 10 (L) 13 - 56 U/L    AST (SGOT) 8 (L) 10 - 38 U/L    Alk. phosphatase 124 (H) 45 - 117 U/L    Protein, total 8.3 (H) 6.4 - 8.2 g/dL    Albumin 3.3 (L) 3.4 - 5.0 g/dL    Globulin 5.0 (H) 2.0 - 4.0 g/dL    A-G Ratio 0.7 (L) 0.8 - 1.7     NT-PRO BNP    Collection Time: 07/15/20  5:13 PM   Result Value Ref Range    NT pro-BNP 2,593 (H) 0 - 900 PG/ML   PROTHROMBIN TIME + INR    Collection Time: 07/15/20  5:13 PM   Result Value Ref Range    Prothrombin time 13.4 11.5 - 15.2 sec    INR 1.0 0.8 - 1.2     CARDIAC PANEL,(CK, CKMB & TROPONIN)    Collection Time: 07/15/20  5:13 PM   Result Value Ref Range    CK - MB 3.2 <3.6 ng/ml    CK-MB Index 3.2 0.0 - 4.0 %     26 - 192 U/L    Troponin-I, QT <0.02 0.0 - 0.045 NG/ML   POC LACTIC ACID    Collection Time: 07/15/20  6:51 PM   Result Value Ref Range    Lactic Acid (POC) 0.74 0.40 - 2.00 mmol/L       Imaging Reviewed:    CXR - report pending           Yossi Napoles MD  7/15/2020, 7:38 PM        Disclaimer: Sections of this note are dictated using utilizing voice recognition software. Minor typographical errors may be present. If questions arise, please do not hesitate to contact me or call our department.

## 2020-07-16 NOTE — ED NOTES
Repositioned patient for comfort. Patient resting comfortably on stretcher. Patient denies chest pain, shortness of breath, nausea, vomiting, and diarrhea. Patient A&O x4. Side rails up x2. Call bell within reach.

## 2020-07-16 NOTE — ROUTINE PROCESS
TRANSFER - OUT REPORT:    Verbal report given to Elisabeth Greenberg RN on Tori Patel  being transferred to [address] [place] [address] for routine progression of care       Report consisted of patients Situation, Background, Assessment and   Recommendations(SBAR). Information from the following report(s) SBAR was reviewed with the receiving nurse. Lines:   Peripheral IV 07/15/20 Left Hand (Active)   Site Assessment Clean, dry, & intact 07/15/20 1653   Phlebitis Assessment 0 07/15/20 1653   Infiltration Assessment 0 07/15/20 1653       Peripheral IV 07/15/20 Right Forearm (Active)   Site Assessment Clean, dry, & intact 07/15/20 1700   Phlebitis Assessment 0 07/15/20 1700   Infiltration Assessment 0 07/15/20 1700   Dressing Status Clean, dry, & intact 07/15/20 1700   Dressing Type Transparent 07/15/20 1700        Opportunity for questions and clarification was provided.       Patient transported with:   Registered Nurse

## 2020-07-16 NOTE — ED NOTES
Received bedside shift report from [facility]. Patient alert and oriented x 4. No obvious signs of distress noted. Patient denies SOB, nausea, vomiting, diarrhea, ABD pain. Patient resting comfortably on the stretcher.

## 2020-07-16 NOTE — ROUTINE PROCESS
Assumed patient care. Report received from [facility]. Patient in bed, awake, alert and oriented x3. Denies pain or discomforts at this time. Call light placed within reach. Bed in low position. 7:41 AM - Bedside shift change report given to Wander (oncoming nurse) by Luca Dacosta RN (offgoing nurse). Report given with SBAR, Kardex, Intake/Output, MAR and Recent Results.

## 2020-07-16 NOTE — PROGRESS NOTES
unable to conduct spiritual assessment; COVID Testing in progress.     [address] [facility]  [phone]

## 2020-07-16 NOTE — PROGRESS NOTES
[facility] Hospitalist Group  Progress Note    Patient: Adrian Rousseau Age: 77 y.o. : 1955 MR#: [id number] SSN: [id number]  Date: 2020     Subjective:     Patient states she is having difficulty breathing and it has been going on \"a long time\". She states her daughter is her primary caregiver. Her daughter is a nurse and works in home health. Patient denies fever, chills, n/v/d. Assessment/Plan:   Patient's orders were under signed and held, they have been released. Waiting for nurse to administer BP medication, will monitor BP. Active Problems:  Acute bronchitis with chronic obstructive pulmonary disease (COPD)   Acute respiratory failure with hypoxia (HCC)   Covid rule out  HTN  CHF  DM   HLD  CKD     Plan:  1. COPD exac - IV Abx , IV steroids, Bronchodilators prn  2. R/o COVID 19 PNA - nasopharyngeal swab sent, started on Covid treatment regimen  3. H/o chr systolic CHF - continue PO lasix & aldactone, Echo 3/30/20 EF 40%  4. H/o Chr Resp failure - is on 3 lt NC at home prn  5. H/o T2 DM - continue lantus & insulin SS   6. H/o HTN - resume home meds, monitor BP     Code status: Full Code     I updated patient's daughter on her condition.      Goals of care:   Disposition:  []PT/OT ordered   [x] Case management referral    Case discussed with:  [x]Patient  [x]Family  []Nursing  []Case Management  DVT Prophylaxis:  []Lovenox  [x]Hep SQ  []SCDs  []Coumadin   []On Heparin gtt    Objective:   VS:   Visit Vitals  BP (!) 179/99   Pulse (!) 104   Temp 97.5 °F (36.4 °C)   Resp (!) 7   SpO2 96%      Tmax/24hrs: Temp (24hrs), Av.3 °F (36.8 °C), Min:97.5 °F (36.4 °C), Max:99 °F (37.2 °C)  No intake or output data in the 24 hours ending 20 1009    General:  Awake, alert, moderate distress  Cardiovascular:  RRR, no murmurs, clicks, gallops or rubs  Pulmonary: wheezing bilaterally, accessory muscle use  GI: soft, NT, normal BS  Extremities:  No edema or cyanosis  Neuro: AAOx2     Current Facility-Administered Medications   Medication Dose Route Frequency    amLODIPine (NORVASC) tablet 10 mg  10 mg Oral DAILY    aspirin delayed-release tablet 81 mg  81 mg Oral DAILY    atorvastatin (LIPITOR) tablet 80 mg  80 mg Oral QHS    furosemide (LASIX) tablet 20 mg  20 mg Oral BID    gabapentin (NEURONTIN) capsule 100 mg  100 mg Oral TID    hydrALAZINE (APRESOLINE) tablet 100 mg  100 mg Oral TID    insulin glargine (LANTUS) injection 7 Units  7 Units SubCUTAneous DAILY    spironolactone (ALDACTONE) tablet 25 mg  25 mg Oral DAILY    insulin glargine (LANTUS) injection 15 Units  15 Units SubCUTAneous QHS    ascorbic acid (vitamin C) (VITAMIN C) tablet 1,000 mg  1,000 mg Oral DAILY    azithromycin (ZITHROMAX) 500 mg in  mL  500 mg IntraVENous Q24H    cholecalciferol (VITAMIN D3) capsule 5,000 Units  5,000 Units Oral DAILY    thiamine HCL (B-1) tablet 100 mg  100 mg Oral DAILY    zinc sulfate (ZINCATE) 220 (50) mg capsule 1 Cap  1 Cap Oral DAILY    Lactobacillus Acidoph & Bulgar (FLORANEX) tablet 2 Tab  2 Tab Oral BID    albuterol (PROVENTIL HFA, VENTOLIN HFA, PROAIR HFA) inhaler 2 Puff  2 Puff Inhalation Q4H RT    doxycycline (VIBRAMYCIN) 100 mg in 0.9% sodium chloride (MBP/ADV) 100 mL MBP  100 mg IntraVENous Q12H    acetaminophen (TYLENOL) tablet 650 mg  650 mg Oral Q6H PRN    ondansetron (ZOFRAN) injection 4 mg  4 mg IntraVENous Q6H PRN    heparin (porcine) injection 5,000 Units  5,000 Units SubCUTAneous TID    methylPREDNISolone (PF) (Solu-MEDROL) injection 60 mg  60 mg IntraVENous Q6H     Current Outpatient Medications   Medication Sig    insulin lispro (HUMALOG KWIKPEN INSULIN SC) by SubCUTAneous route.  predniSONE (DELTASONE) 10 mg tablet Take 4 tabs PO daily x 2 days, then 3 tabs PO daily x 3 days, then 2 tabs PO daily x 2 days then 1 tab PO x 1 day    potassium chloride SR (KLOR-CON 10) 10 mEq tablet Take 1 Tab by mouth daily.     atorvastatin (LIPITOR) 80 mg tablet Take 1 Tab by mouth nightly.  spironolactone (ALDACTONE) 25 mg tablet Take 1 Tab by mouth daily.  gabapentin (NEURONTIN) 100 mg capsule Take 1 Cap by mouth three (3) times daily. Max Daily Amount: 300 mg. 10/30/19 - 1 cap at breakfast, 2 caps at dinner and  bedtime  Indications: neuropathic pain    Ventolin HFA 90 mcg/actuation inhaler INHALE 2 PUFFS BY MOUTH EVERY 4 HOURS AS NEEDED FOR WHEEZING OR SHORTNESS OF BREATH    amLODIPine (NORVASC) 10 mg tablet Take 1 Tab by mouth daily. (Patient taking differently: Take 5 mg by mouth daily.)    fluticasone furoate-vilanterol (BREO ELLIPTA) 100-25 mcg/dose inhaler Take 1 Puff by inhalation daily. (Patient taking differently: Take 1 Puff by inhalation daily. Indications: Bronchospasm Prevention with COPD)    Oxygen 1 Device by Nasal route as needed (Oxygen supplement). 3L per min as needed   Indications: oxygen supplement    sodium chloride (OCEAN) 0.65 % nasal squeeze bottle 2 Sprays as needed for Congestion.  hydrALAZINE (APRESOLINE) 100 mg tablet Take 1 Tab by mouth three (3) times daily.  Blood-Glucose Meter (TRUE METRIX GLUCOSE METER) misc Test glucose 3 times daily DX: E11.40 (Patient taking differently: Test glucose 3 times daily DX: E11.40  Indications: blood sugar readings)    Lancets misc Test glucose 3 times daily DX: E11.40 (Patient taking differently: Test glucose 3 times daily DX: E11.40  Indications: for blood draws w/ blood sugar readings)    glucose blood VI test strips (BLOOD GLUCOSE TEST) strip (True Metrix) Test glucose 3 times daily DX: E11.40    insulin glargine (LANTUS,BASAGLAR) 100 unit/mL (3 mL) inpn 7 Units by SubCUTAneous route in the morning. 15 units in the evening.  furosemide (LASIX) 20 mg tablet Take 1 Tab by mouth two (2) times a day.  acetaminophen (TYLENOL EXTRA STRENGTH) 500 mg tablet Take 1,000 mg by mouth every six (6) hours as needed for Pain.     CARBOXYMETHYLCELLULOS/GLYCERIN (REFRESH OPTIVE OP) Administer 1 Drop to both eyes six (6) times daily. Indications: eye health    aspirin delayed-release 81 mg tablet Take 81 mg by mouth daily. Dtr. Aziza Melodie pt no longer takes. Labs:    Recent Results (from the past 24 hour(s))   CULTURE, BLOOD    Collection Time: 07/15/20  5:00 PM    Specimen: Blood   Result Value Ref Range    Special Requests: NO SPECIAL REQUESTS      Culture result: NO GROWTH AFTER 12 HOURS     EKG, 12 LEAD, INITIAL    Collection Time: 07/15/20  5:12 PM   Result Value Ref Range    Ventricular Rate 110 BPM    Atrial Rate 110 BPM    P-R Interval 172 ms    QRS Duration 84 ms    Q-T Interval 334 ms    QTC Calculation (Bezet) 452 ms    Calculated P Axis -5 degrees    Calculated R Axis 107 degrees    Calculated T Axis -61 degrees    Diagnosis       Sinus tachycardia with premature atrial complexes  Anterolateral infarct (cited on or before 24-APR-2020)  T wave abnormality, consider inferior ischemia  Abnormal ECG  When compared with ECG of 24-APR-2020 10:03,  QRS duration has decreased  Questionable change in initial forces of Lateral leads  ST elevation now present in Inferior leads  T wave inversion now evident in Inferior leads  T wave inversion less evident in Lateral leads     CBC WITH AUTOMATED DIFF    Collection Time: 07/15/20  5:13 PM   Result Value Ref Range    WBC 8.6 4.6 - 13.2 K/uL    RBC 4.13 (L) 4.20 - 5.30 M/uL    HGB 11.7 (L) 12.0 - 16.0 g/dL    HCT 36.5 35.0 - 45.0 %    MCV 88.4 74.0 - 97.0 FL    MCH 28.3 24.0 - 34.0 PG    MCHC 32.1 31.0 - 37.0 g/dL    RDW 15.0 (H) 11.6 - 14.5 %    PLATELET 083 714 - 906 K/uL    MPV 11.3 9.2 - 11.8 FL    NEUTROPHILS 40 40 - 73 %    LYMPHOCYTES 21 21 - 52 %    MONOCYTES 6 3 - 10 %    EOSINOPHILS 33 (H) 0 - 5 %    BASOPHILS 0 0 - 2 %    ABS. NEUTROPHILS 3.5 1.8 - 8.0 K/UL    ABS. LYMPHOCYTES 1.8 0.9 - 3.6 K/UL    ABS. MONOCYTES 0.5 0.05 - 1.2 K/UL    ABS. EOSINOPHILS 2.8 (H) 0.0 - 0.4 K/UL    ABS. BASOPHILS 0.0 0.0 - 0.1 K/UL    DF AUTOMATED      PLATELET COMMENTS ADEQUATE PLATELETS      RBC COMMENTS ANISOCYTOSIS  1+       METABOLIC PANEL, COMPREHENSIVE    Collection Time: 07/15/20  5:13 PM   Result Value Ref Range    Sodium 143 136 - 145 mmol/L    Potassium 4.0 3.5 - 5.5 mmol/L    Chloride 110 100 - 111 mmol/L    CO2 28 21 - 32 mmol/L    Anion gap 5 3.0 - 18 mmol/L    Glucose 114 (H) 74 - 99 mg/dL    BUN 39 (H) 7.0 - 18 MG/DL    Creatinine 1.96 (H) 0.6 - 1.3 MG/DL    BUN/Creatinine ratio 20 12 - 20      GFR est AA 31 (L) >60 ml/min/1.73m2    GFR est non-AA 25 (L) >60 ml/min/1.73m2    Calcium 9.3 8.5 - 10.1 MG/DL    Bilirubin, total 0.7 0.2 - 1.0 MG/DL    ALT (SGPT) 10 (L) 13 - 56 U/L    AST (SGOT) 8 (L) 10 - 38 U/L    Alk.  phosphatase 124 (H) 45 - 117 U/L    Protein, total 8.3 (H) 6.4 - 8.2 g/dL    Albumin 3.3 (L) 3.4 - 5.0 g/dL    Globulin 5.0 (H) 2.0 - 4.0 g/dL    A-G Ratio 0.7 (L) 0.8 - 1.7     NT-PRO BNP    Collection Time: 07/15/20  5:13 PM   Result Value Ref Range    NT pro-BNP 2,593 (H) 0 - 900 PG/ML   PROTHROMBIN TIME + INR    Collection Time: 07/15/20  5:13 PM   Result Value Ref Range    Prothrombin time 13.4 11.5 - 15.2 sec    INR 1.0 0.8 - 1.2     CARDIAC PANEL,(CK, CKMB & TROPONIN)    Collection Time: 07/15/20  5:13 PM   Result Value Ref Range    CK - MB 3.2 <3.6 ng/ml    CK-MB Index 3.2 0.0 - 4.0 %     26 - 192 U/L    Troponin-I, QT <0.02 0.0 - 0.045 NG/ML   POC LACTIC ACID    Collection Time: 07/15/20  6:51 PM   Result Value Ref Range    Lactic Acid (POC) 0.74 0.40 - 2.00 mmol/L   CULTURE, BLOOD    Collection Time: 07/15/20  6:54 PM    Specimen: Blood   Result Value Ref Range    Special Requests: NO SPECIAL REQUESTS      Culture result: NO GROWTH AFTER 12 HOURS     METABOLIC PANEL, COMPREHENSIVE    Collection Time: 07/16/20  6:25 AM   Result Value Ref Range    Sodium 143 136 - 145 mmol/L    Potassium 4.6 3.5 - 5.5 mmol/L    Chloride 109 100 - 111 mmol/L    CO2 25 21 - 32 mmol/L    Anion gap 9 3.0 - 18 mmol/L    Glucose 142 (H) 74 - 99 mg/dL    BUN 53 (H) 7.0 - 18 MG/DL    Creatinine 2.00 (H) 0.6 - 1.3 MG/DL    BUN/Creatinine ratio 27 (H) 12 - 20      GFR est AA 30 (L) >60 ml/min/1.73m2    GFR est non-AA 25 (L) >60 ml/min/1.73m2    Calcium 9.0 8.5 - 10.1 MG/DL    Bilirubin, total 0.4 0.2 - 1.0 MG/DL    ALT (SGPT) 11 (L) 13 - 56 U/L    AST (SGOT) 6 (L) 10 - 38 U/L    Alk.  phosphatase 113 45 - 117 U/L    Protein, total 7.7 6.4 - 8.2 g/dL    Albumin 3.0 (L) 3.4 - 5.0 g/dL    Globulin 4.7 (H) 2.0 - 4.0 g/dL    A-G Ratio 0.6 (L) 0.8 - 1.7     LIPID PANEL    Collection Time: 07/16/20  6:25 AM   Result Value Ref Range    LIPID PROFILE          Cholesterol, total 172 <200 MG/DL    Triglyceride 37 <150 MG/DL    HDL Cholesterol 72 (H) 40 - 60 MG/DL    LDL, calculated 92.6 0 - 100 MG/DL    VLDL, calculated 7.4 MG/DL    CHOL/HDL Ratio 2.4 0 - 5.0     CBC W/O DIFF    Collection Time: 07/16/20  6:25 AM   Result Value Ref Range    WBC 3.4 (L) 4.6 - 13.2 K/uL    RBC 3.73 (L) 4.20 - 5.30 M/uL    HGB 10.6 (L) 12.0 - 16.0 g/dL    HCT 32.5 (L) 35.0 - 45.0 %    MCV 87.1 74.0 - 97.0 FL    MCH 28.4 24.0 - 34.0 PG    MCHC 32.6 31.0 - 37.0 g/dL    RDW 14.9 (H) 11.6 - 14.5 %    PLATELET 891 500 - 024 K/uL    MPV 11.7 9.2 - 11.8 FL   PTT    Collection Time: 07/16/20  6:25 AM   Result Value Ref Range    aPTT 29.5 23.0 - 36.4 SEC   FERRITIN    Collection Time: 07/16/20  6:25 AM   Result Value Ref Range    Ferritin 53 8 - 388 NG/ML   D DIMER    Collection Time: 07/16/20  6:25 AM   Result Value Ref Range    D DIMER 1.32 (H) <0.46 ug/ml(FEU)   LD    Collection Time: 07/16/20  6:25 AM   Result Value Ref Range     81 - 234 U/L       Signed By: ROSEANNE Jain     July 16, 2020 10:09 AM

## 2020-07-17 NOTE — PROGRESS NOTES
Comprehensive Nutrition Assessment    Type and Reason for Visit: Wound    Nutrition Recommendations/Plan:  - Add supplements: John Drink, BID  - Monitor readiness to obtain wt as able. - Monitor and encourage po intake. Nutrition Assessment:  Admitted with acute bronchitis with COPD on steroid & diuretic therapy. Consent obtained for remote assessment via telephone, COVID rule out pending. Eating lunch during discussion & unable to recall what she had for breakfast today. Malnutrition Assessment:  Malnutrition Status:  Insufficient data      Estimated Daily Nutrient Needs:  Energy (kcal):  3983-7329  Protein (g):  64-77       Fluid (ml/day):  9263-8526    Nutrition Related Findings:  Denies N/V or difficutly chewing or swallowing.  Last BM PTA      Wounds:    Deep tissue injury       Current Nutrition Therapies:   DIET DIABETIC CONSISTENT CARB Regular    Anthropometric Measures:  · Height:  5' 9\" (175.3 cm)(previously documented)  · Ideal Body Wt:  145:      · BMI Categories:  Underweight (BMI less than 22) age over 65(using previously documented ht and wt)       Nutrition Diagnosis:   · Increased nutrient needs related to increased demand for energy/nutrients, impaired respiratory function as evidenced by weight loss(DTi)    Nutrition Interventions:   Food and/or Nutrient Delivery: Continue current diet, Start oral nutrition supplement, Vitamin supplement  Nutrition Education and Counseling: Education not indicated  Coordination of Nutrition Care: Continued inpatient monitoring    Goals:  PO nutrition intake will meet >75% of patient estimated nutritional needs within the next 7 days       Nutrition Monitoring and Evaluation:   Food/Nutrient Intake Outcomes: Diet advancement/tolerance, Food and nutrient intake, Supplement intake, Vitamin/mineral intake  Physical Signs/Symptoms Outcomes: Biochemical data, Fluid status or edema, Skin    Discharge Planning:    No discharge needs at this time     Electronically signed by Pantera Yates RD on 7/17/2020 at 12:35 PM    Contact: [phone]

## 2020-07-17 NOTE — ROUTINE PROCESS
Report received from Dorina Blair. Assumed patient care. Patient in bed, awake, alert and oriented x3. Denies pain or discomforts at this time. Call light placed within reach. Bed in low position. 8:09 AM - Bedside shift change report given to Yoanna Yusuf (oncoming nurse) by Ashley Chaudhary RN (offgoing nurse). Report given with SBAR, Kardex, Intake/Output, MAR and Recent Results.

## 2020-07-17 NOTE — PROGRESS NOTES
Reason for Admission:   Acute bronchitis with chronic obstructive pulmonary disease (COPD) ([facility] [address] [J44.0, J20.9]  Acute respiratory failure with hypoxia (HCC) [J96.01]               RUR Score:     31%             Resources/supports as identified by patient/family:       Top Challenges facing patient (as identified by patient/family and CM):     N/A    Finances/Medication cost?     N/A  Transportation      Daughter  Support system or lack thereof? Lives with daughter JAYDEN SIMON [facility]  Living arrangements? Lives with abhishek JAYDEN ALFRED [facility]   Self-care/ADLs/Cognition? Needs some assistance and is currently active with Home Health services        Current Advanced Directive/Advance Care Plan:   no                          Plan for utilizing home health:    yes, Active with [address] [facility], [address] obtained                      Likelihood of readmission:   HIGH    Transition of Care Plan:                    [facility]. Cognitive status of patient: oriented to time, place, person and situation. Face sheet information confirmed:  yes. The patient design[facility] plan and to receive any needed information. This patient lives in a single family home with her daughter. Patient is not able to navigate steps as needed. Prior to hospitalization, patient was considered to be independent with ADLs/IADLS : no . If not independent,  patient needs assist with : cooking. Patient has a current ACP document on file: no  [facility] patient home upon discharge. The patient already has a walker, nebulizer, home oxygen at 3L provided by [employer], and her Home Health therapists have ordered her a hospital bed and wheelchair medical equipment available in the home. Patient is currently active with home health. If active, agency name is [facility]. Patient has stayed in a skilled nursing facility or rehab.   Was stay within last 60 days : no. This patient is on dialysis :no    List of available Home Health agencies were provided and reviewed with the patient prior to discharge. Freedom of choice signed: yes, for Ana. Currently, the discharge plan is Home with [address] [place] [address] [facility] Clark. Pt and daughter are not interested in SNF placement. The patient states that she can obtain her medications from the pharmacy, and take her medications as directed. Patient's current insurance is [employer]. Care Management Interventions  PCP Verified by CM: Yes  Mode of Transport at Discharge:  Other (see comment)(family)  Transition of Care Consult (CM Consult): Discharge Planning  Physical Therapy Consult: No  Occupational Therapy Consult: No  Confirm Follow Up Transport: Family  Discharge Location  Discharge Placement: Home with home health        [address]  [phone]

## 2020-07-17 NOTE — PROGRESS NOTES
Problem: Risk for Spread of Infection  Goal: Prevent transmission of infectious organism to others  Description: Prevent the transmission of infectious organisms to other patients, staff members, and visitors. Outcome: Progressing Towards Goal     Problem: Pressure Injury - Risk of  Goal: *Prevention of pressure injury  Description: Document Jhon Scale and appropriate interventions in the flowsheet.   Outcome: Progressing Towards Goal  Note: Pressure Injury Interventions:       Moisture Interventions: Absorbent underpads, Apply protective barrier, creams and emollients, Maintain skin hydration (lotion/cream), Minimize layers, Internal/External urinary devices    Activity Interventions: Pressure redistribution bed/mattress(bed type)    Mobility Interventions: Pressure redistribution bed/mattress (bed type)    Nutrition Interventions: Document food/fluid/supplement intake, Discuss nutritional consult with provider    Friction and Shear Interventions: Apply protective barrier, creams and emollients, Foam dressings/transparent film/skin sealants, HOB 30 degrees or less, Lift team/patient mobility team, Minimize layers, Sit at 90-degree angle, Transferring/repositioning devices

## 2020-07-17 NOTE — PROGRESS NOTES
CM called pt and pt's daughter with no answer. Will try again later to complete initial CM interview.     Alejandro Davis RN BSN  Care Manager  [phone]

## 2020-07-17 NOTE — PROGRESS NOTES
Patient is not available to be assessed at this time. No family at bedside.     [facility]   Board Certified [address] [facility]   [phone]

## 2020-07-17 NOTE — PROGRESS NOTES
[facility] Hospitalist Group  Progress Note    Patient: Kami Rosa Age: 77 y.o. : 1955 MR#: [id number] SSN: [id number]  Date/Time: 2020     Subjective: Patient alert awake, mildly confused. Feels much better. Denies any shortness of breath. No chest pain, no nausea vomiting. Assessment/Plan:   1. Acute COPD exacerbation: decrease IV steroids, BD and O2 supplement. 2. Acute bronchitis: Continue Zithromax  3. Rule out COVID-19: We will monitor markers. 4. Uncontrolled hypertension: BP better, continue amlodipine, hydralazine along with as needed hydralazine IV. 5. Diabetes mellitus type 2: We will continue Lantus and SSI. 6. Chronic systolic heart failure: EF 40%: Well compensated, continue Lasix and Aldactone. 7. Chronic respiratory failure on home oxygen  8. CKD stage IV: Creatinine slightly above normal baseline, will monitor  9. Noted pulmonary hypertension:  Discussed with the patient at the bedside in detail  Discussed with RN at the bedside. Discussed with daughter over the phone        Case discussed with:  [x]Patient  [x]Family  [x]Nursing  []Case Management  DVT Prophylaxis:  []Lovenox  [x]Hep SQ  []SCDs  []Coumadin   []Eliquis/Xarelto     Objective:   VS:   Visit Vitals  /66 (BP 1 Location: Left arm, BP Patient Position: At rest)   Pulse 76   Temp 97.8 °F (36.6 °C)   Resp 18   Ht 5' 9\" (1.753 m) Comment: previously documented   SpO2 97%   Breastfeeding No   BMI 20.67 kg/m²      Tmax/24hrs: Temp (24hrs), Av.6 °F (36.4 °C), Min:97 °F (36.1 °C), Max:98 °F (36.7 °C)  IOBRIEF    Intake/Output Summary (Last 24 hours) at 2020 1633  Last data filed at 2020 2144  Gross per 24 hour   Intake 240 ml   Output    Net 240 ml       General:  Alert, cooperative, no acute distress    Pulmonary:  Bilaterally minimal expiratory wheezing. No Wheezing. Cardiovascular: Regular rate and Rhythm.   GI:  Soft, Non distended, Non tender. + Bowel sounds. Extremities:  No edema. No calf tenderness. Neurologic: Alert and oriented X 2. Moves all ext.   Additional:    Medications:   Current Facility-Administered Medications   Medication Dose Route Frequency    amLODIPine (NORVASC) tablet 10 mg  10 mg Oral DAILY    aspirin delayed-release tablet 81 mg  81 mg Oral DAILY    atorvastatin (LIPITOR) tablet 80 mg  80 mg Oral QHS    furosemide (LASIX) tablet 20 mg  20 mg Oral BID    gabapentin (NEURONTIN) capsule 100 mg  100 mg Oral TID    hydrALAZINE (APRESOLINE) tablet 100 mg  100 mg Oral TID    insulin glargine (LANTUS) injection 7 Units  7 Units SubCUTAneous DAILY    spironolactone (ALDACTONE) tablet 25 mg  25 mg Oral DAILY    insulin glargine (LANTUS) injection 15 Units  15 Units SubCUTAneous QHS    ascorbic acid (vitamin C) (VITAMIN C) tablet 1,000 mg  1,000 mg Oral DAILY    azithromycin (ZITHROMAX) 500 mg in  mL  500 mg IntraVENous Q24H    cholecalciferol (VITAMIN D3) capsule 5,000 Units  5,000 Units Oral DAILY    thiamine HCL (B-1) tablet 100 mg  100 mg Oral DAILY    zinc sulfate (ZINCATE) 220 (50) mg capsule 1 Cap  1 Cap Oral DAILY    Lactobacillus Acidoph & Bulgar (FLORANEX) tablet 2 Tab  2 Tab Oral BID    albuterol (PROVENTIL HFA, VENTOLIN HFA, PROAIR HFA) inhaler 2 Puff  2 Puff Inhalation Q4H RT    hydrALAZINE (APRESOLINE) 20 mg/mL injection 10 mg  10 mg IntraVENous Q6H PRN    doxycycline (VIBRAMYCIN) 100 mg in 0.9% sodium chloride (MBP/ADV) 100 mL MBP  100 mg IntraVENous Q12H    acetaminophen (TYLENOL) tablet 650 mg  650 mg Oral Q6H PRN    ondansetron (ZOFRAN) injection 4 mg  4 mg IntraVENous Q6H PRN    heparin (porcine) injection 5,000 Units  5,000 Units SubCUTAneous TID    methylPREDNISolone (PF) (Solu-MEDROL) injection 60 mg  60 mg IntraVENous Q6H       Labs:    Recent Results (from the past 24 hour(s))   GLUCOSE, POC    Collection Time: 07/16/20  8:56 PM   Result Value Ref Range    Glucose (POC) 171 (H) 70 - 110 mg/dL FERRITIN    Collection Time: 07/17/20  3:49 AM   Result Value Ref Range    Ferritin 62 8 - 388 NG/ML   CRP, HIGH SENSITIVITY    Collection Time: 07/17/20  3:49 AM   Result Value Ref Range    CRP, High sensitivity 0.7 mg/L   D DIMER    Collection Time: 07/17/20  3:49 AM   Result Value Ref Range    D DIMER 1.44 (H) <0.46 ug/ml(FEU)   LD    Collection Time: 07/17/20  3:49 AM   Result Value Ref Range     81 - 234 U/L   PROCALCITONIN    Collection Time: 07/17/20  3:49 AM   Result Value Ref Range    Procalcitonin 0.21 ng/mL   CK    Collection Time: 07/17/20  3:49 AM   Result Value Ref Range    CK 73 26 - 192 U/L   CBC WITH AUTOMATED DIFF    Collection Time: 07/17/20  3:49 AM   Result Value Ref Range    WBC 4.7 4.6 - 13.2 K/uL    RBC 3.92 (L) 4.20 - 5.30 M/uL    HGB 11.0 (L) 12.0 - 16.0 g/dL    HCT 34.3 (L) 35.0 - 45.0 %    MCV 87.5 74.0 - 97.0 FL    MCH 28.1 24.0 - 34.0 PG    MCHC 32.1 31.0 - 37.0 g/dL    RDW 15.0 (H) 11.6 - 14.5 %    PLATELET 611 416 - 662 K/uL    MPV 11.4 9.2 - 11.8 FL    NEUTROPHILS 89 (H) 40 - 73 %    LYMPHOCYTES 10 (L) 21 - 52 %    MONOCYTES 1 (L) 3 - 10 %    EOSINOPHILS 0 0 - 5 %    BASOPHILS 0 0 - 2 %    ABS. NEUTROPHILS 4.2 1.8 - 8.0 K/UL    ABS. LYMPHOCYTES 0.5 (L) 0.9 - 3.6 K/UL    ABS. MONOCYTES 0.1 0.05 - 1.2 K/UL    ABS. EOSINOPHILS 0.0 0.0 - 0.4 K/UL    ABS.  BASOPHILS 0.0 0.0 - 0.1 K/UL    DF AUTOMATED     METABOLIC PANEL, BASIC    Collection Time: 07/17/20  3:49 AM   Result Value Ref Range    Sodium 139 136 - 145 mmol/L    Potassium 4.1 3.5 - 5.5 mmol/L    Chloride 108 100 - 111 mmol/L    CO2 23 21 - 32 mmol/L    Anion gap 8 3.0 - 18 mmol/L    Glucose 125 (H) 74 - 99 mg/dL    BUN 64 (H) 7.0 - 18 MG/DL    Creatinine 2.03 (H) 0.6 - 1.3 MG/DL    BUN/Creatinine ratio 32 (H) 12 - 20      GFR est AA 30 (L) >60 ml/min/1.73m2    GFR est non-AA 24 (L) >60 ml/min/1.73m2    Calcium 8.8 8.5 - 10.1 MG/DL   GLUCOSE, POC    Collection Time: 07/17/20  8:00 AM   Result Value Ref Range    Glucose (POC) 116 (H) 70 - 110 mg/dL   GLUCOSE, POC    Collection Time: 07/17/20 11:58 AM   Result Value Ref Range    Glucose (POC) 153 (H) 70 - 110 mg/dL   GLUCOSE, POC    Collection Time: 07/17/20  4:20 PM   Result Value Ref Range    Glucose (POC) 119 (H) 70 - 110 mg/dL       Signed By: Santa Powers MD     July 17, 2020      Disclaimer: Sections of this note are dictated using utilizing voice recognition software. Minor typographical errors may be present. If questions arise, please do not hesitate to contact me or call our department.

## 2020-07-18 NOTE — PROGRESS NOTES
[facility] Hospitalist Group  Progress Note    Patient: Guerrero Solares Age: 77 y.o. : 1955 MR#: [id number] SSN: [id number]  Date: 2020     Subjective:   Alert and oriented. Denies HA, CP, SOB, abdominal pain, N/V, fever or chills, body aches      Assessment/Plan:   1. Acute COPD exacerbation  2. Acute bronchitis  3. Suspect COVID-19 virus infection  4. Uncontrolled HTN  5. DMT2  6. Chronic systolic HF  7. Chronic respiratory failure on home oxygen  8. CKD4  9. Pulmonary HTN    Plan  1. Continue IV abx, albuterol, solumedrol, lactobacillus  2. COVID test pending  3. Continue current medication therapy  4. POC glucose, SSI, Lantus  5. Monitor metabolic panel and renal function  6. Continue oxygen.  Patient on home oxygen    Additional Notes:      Case discussed with:  [x]Patient  []Family  [x]Nursing  []Case Management  DVT Prophylaxis:  []Lovenox  [x]Hep SQ  []SCDs  []Coumadin   []On Heparin gtt    Objective:   VS:   Visit Vitals  /83 (BP 1 Location: Left arm, BP Patient Position: At rest)   Pulse 78   Temp 97 °F (36.1 °C)   Resp 18   Ht 5' 9\" (1.753 m) Comment: previously documented   Wt 61.2 kg (135 lb)   SpO2 97%   Breastfeeding No   BMI 19.94 kg/m²      Tmax/24hrs: Temp (24hrs), Av.5 °F (36.4 °C), Min:97 °F (36.1 °C), Max:98 °F (36.7 °C)      Intake/Output Summary (Last 24 hours) at 2020 1147  Last data filed at 2020 3592  Gross per 24 hour   Intake 760 ml   Output 200 ml   Net 560 ml       General:  Alert, NAD  Cardiovascular:  RRR  Pulmonary:  LSC throughout; respiratory effort WNL  GI:  +BS in all four quadrants, soft, non-tender  Extremities:  No edema; 2+ dorsalis pedis pulses bilaterally  Neuro: alert and oriented        Labs:    Recent Results (from the past 24 hour(s))   GLUCOSE, POC    Collection Time: 20 11:58 AM   Result Value Ref Range    Glucose (POC) 153 (H) 70 - 110 mg/dL   GLUCOSE, POC    Collection Time: 20  4:20 PM   Result Value Ref Range    Glucose (POC) 119 (H) 70 - 110 mg/dL   GLUCOSE, POC    Collection Time: 07/17/20  8:45 PM   Result Value Ref Range    Glucose (POC) 152 (H) 70 - 110 mg/dL   FERRITIN    Collection Time: 07/18/20  1:50 AM   Result Value Ref Range    Ferritin 50 8 - 388 NG/ML   D DIMER    Collection Time: 07/18/20  1:50 AM   Result Value Ref Range    D DIMER 1.35 (H) <0.46 ug/ml(FEU)   LD    Collection Time: 07/18/20  1:50 AM   Result Value Ref Range     81 - 234 U/L   PROCALCITONIN    Collection Time: 07/18/20  1:50 AM   Result Value Ref Range    Procalcitonin 0.13 ng/mL   CK    Collection Time: 07/18/20  1:50 AM   Result Value Ref Range    CK 46 26 - 192 U/L   CBC WITH AUTOMATED DIFF    Collection Time: 07/18/20  1:50 AM   Result Value Ref Range    WBC 5.3 4.6 - 13.2 K/uL    RBC 3.83 (L) 4.20 - 5.30 M/uL    HGB 10.9 (L) 12.0 - 16.0 g/dL    HCT 33.3 (L) 35.0 - 45.0 %    MCV 86.9 74.0 - 97.0 FL    MCH 28.5 24.0 - 34.0 PG    MCHC 32.7 31.0 - 37.0 g/dL    RDW 14.8 (H) 11.6 - 14.5 %    PLATELET 400 182 - 357 K/uL    MPV 11.5 9.2 - 11.8 FL    NEUTROPHILS 92 (H) 40 - 73 %    LYMPHOCYTES 5 (L) 21 - 52 %    MONOCYTES 3 3 - 10 %    EOSINOPHILS 0 0 - 5 %    BASOPHILS 0 0 - 2 %    ABS. NEUTROPHILS 4.9 1.8 - 8.0 K/UL    ABS. LYMPHOCYTES 0.3 (L) 0.9 - 3.6 K/UL    ABS. MONOCYTES 0.2 0.05 - 1.2 K/UL    ABS. EOSINOPHILS 0.0 0.0 - 0.4 K/UL    ABS.  BASOPHILS 0.0 0.0 - 0.1 K/UL    DF AUTOMATED     METABOLIC PANEL, BASIC    Collection Time: 07/18/20  1:50 AM   Result Value Ref Range    Sodium 140 136 - 145 mmol/L    Potassium 3.8 3.5 - 5.5 mmol/L    Chloride 108 100 - 111 mmol/L    CO2 26 21 - 32 mmol/L    Anion gap 6 3.0 - 18 mmol/L    Glucose 111 (H) 74 - 99 mg/dL    BUN 76 (H) 7.0 - 18 MG/DL    Creatinine 2.25 (H) 0.6 - 1.3 MG/DL    BUN/Creatinine ratio 34 (H) 12 - 20      GFR est AA 26 (L) >60 ml/min/1.73m2    GFR est non-AA 22 (L) >60 ml/min/1.73m2    Calcium 8.4 (L) 8.5 - 10.1 MG/DL   GLUCOSE, POC    Collection Time: 07/18/20  8:13 AM   Result Value Ref Range    Glucose (POC) 98 70 - 110 mg/dL       Signed By: Sandeep Morley NP     July 18, 2020

## 2020-07-19 NOTE — PROGRESS NOTES
Called [facility] to get oxygen refill delivered to the hospital.  Patient has tanks at home and daughter states that the tanks are empty. Received call from Marizol Beaver NP to get oxygen for discharge.   [facility] representative stated that oxygen will be delivered to [address] and will call the nurses station upon arrival.    Manoj Duran RN BSN  Care Manager  [phone]

## 2020-07-19 NOTE — PROGRESS NOTES
Discharge order noted for today. Pt has been accepted to [facility] agency. Met with patient  and are agreeable to the transition plan today. Transport has been arranged through daughter. Patient's discharge summary and home health  orders have been forwarded to [facility] home health  agency via [facility] Updated bedside RN,  to the transition plan.   Discharge information has been documented on the AVS.       Felipe Elias RN BSN  Care Manager  [phone]

## 2020-07-19 NOTE — DISCHARGE SUMMARY
[facility] Hospitalist Group  Discharge Summary       Patient: Esther Ludwig Age: 77 y.o. : 1955 MR#: [id number] SSN: [id number]  PCP on record: Liam Bar MD  Admit date: 7/15/2020  Discharge date: 2020    Disposition:    []Home   [x]Home with Home Health   []SNF/NH   []Rehab   []Home with family   []Alternate Facility:____________________    Discharge Diagnoses:                             Acute COPD exacerbation  Acute bronchitis  Suspect COVID-19 virus infection  Chronic respiratory failure on home oxygen    Discharge Medications:     Current Discharge Medication List      START taking these medications    Details   Lactobacillus Acidoph & Bulgar (FLORANEX) 1 million cell tab tablet Take 2 Tabs by mouth two (2) times a day for 2 days. Qty: 8 Tab, Refills: 0      albuterol (PROVENTIL VENTOLIN) 2.5 mg /3 mL (0.083 %) nebu 3 mL by Nebulization route every four (4) hours as needed for Wheezing for up to 10 days. Qty: 30 Nebule, Refills: 0         CONTINUE these medications which have CHANGED    Details   fluticasone furoate-vilanteroL (Breo Ellipta) 100-25 mcg/dose inhaler Take 1 Puff by inhalation daily. Indications: bronchospasm prevention with COPD  Qty: 1 Inhaler, Refills: 0      predniSONE (DELTASONE) 10 mg tablet Take 50 mg by mouth daily (with breakfast) for 3 days, THEN 40 mg daily (with breakfast) for 3 days, THEN 30 mg daily (with breakfast) for 3 days, THEN 20 mg daily (with breakfast) for 3 days. Qty: 42 Tab, Refills: 0         CONTINUE these medications which have NOT CHANGED    Details   insulin lispro (HUMALOG KWIKPEN INSULIN SC) by SubCUTAneous route. potassium chloride SR (KLOR-CON 10) 10 mEq tablet Take 1 Tab by mouth daily. Qty: 30 Tab, Refills: 0      atorvastatin (LIPITOR) 80 mg tablet Take 1 Tab by mouth nightly. Qty: 30 Tab, Refills: 0      spironolactone (ALDACTONE) 25 mg tablet Take 1 Tab by mouth daily.   Qty: 30 Tab, Refills: 0 gabapentin (NEURONTIN) 100 mg capsule Take 1 Cap by mouth three (3) times daily. Max Daily Amount: 300 mg. 10/30/19 - 1 cap at breakfast, 2 caps at dinner and  bedtime  Indications: neuropathic pain  Qty: 9 Cap, Refills: 0    Associated Diagnoses: Type 2 diabetes mellitus with nephropathy ([facility])      Ventolin HFA 90 mcg/actuation inhaler INHALE 2 PUFFS BY MOUTH EVERY 4 HOURS AS NEEDED FOR WHEEZING OR SHORTNESS OF BREATH  Qty: 18 g, Refills: 5    Associated Diagnoses: Chronic obstructive pulmonary disease, unspecified COPD type ([facility])      amLODIPine (NORVASC) 10 mg tablet Take 1 Tab by mouth daily. Qty: 30 Tab, Refills: 0    Associated Diagnoses: Essential hypertension      Oxygen 1 Device by Nasal route as needed (Oxygen supplement). 3L per min as needed   Indications: oxygen supplement      sodium chloride (OCEAN) 0.65 % nasal squeeze bottle 2 Sprays as needed for Congestion. hydrALAZINE (APRESOLINE) 100 mg tablet Take 1 Tab by mouth three (3) times daily. Qty: 270 Tab, Refills: 3      Blood-Glucose Meter (TRUE METRIX GLUCOSE METER) misc Test glucose 3 times daily DX: E11.40  Qty: 1 Each, Refills: 0    Associated Diagnoses: Type 2 diabetes mellitus with diabetic neuropathy, with long-term current use of insulin ([facility])      Lancets misc Test glucose 3 times daily DX: E11.40  Qty: 100 Each, Refills: 12    Associated Diagnoses: Type 2 diabetes mellitus with diabetic neuropathy, with long-term current use of insulin ([facility])      glucose blood VI test strips (BLOOD GLUCOSE TEST) strip (True Metrix) Test glucose 3 times daily DX: E11.40  Qty: 100 Strip, Refills: 12    Associated Diagnoses: Type 2 diabetes mellitus with diabetic neuropathy, with long-term current use of insulin ([facility])      insulin glargine (LANTUS,BASAGLAR) 100 unit/mL (3 mL) inpn 7 Units by SubCUTAneous route in the morning. 15 units in the evening.   Qty: 5 Pen, Refills: 12    Associated Diagnoses: Type 2 diabetes mellitus with diabetic neuropathy, with long-term current use of insulin ([facility])      furosemide (LASIX) 20 mg tablet Take 1 Tab by mouth two (2) times a day. Qty: 60 Tab, Refills: 8      acetaminophen (TYLENOL EXTRA STRENGTH) 500 mg tablet Take 1,000 mg by mouth every six (6) hours as needed for Pain. CARBOXYMETHYLCELLULOS/GLYCERIN (REFRESH OPTIVE OP) Administer 1 Drop to both eyes six (6) times daily. Indications: eye health      aspirin delayed-release 81 mg tablet Take 81 mg by mouth daily. Dtr. Selvin Maxim pt no longer takes. Significant Diagnostic Studies:   -    XR Results (most recent):  Results from Hospital Encounter encounter on 07/15/20   XR CHEST PORT    Narrative EXAM: Chest Radiograph    INDICATION:  sob    TECHNIQUE: AP view of the chest    COMPARISON: 4/24/2020, 3/29/2020 and 8/18/2019    FINDINGS: No pneumothorax identified. Subtle opacity in the left mid to lower  lung field is noted similar to prior imaging. This may be chronic. Chronic  blunting of the left costophrenic angle is noted. No effusion on the right  identified. The cardiomediastinal silhouette is unremarkable. The pulmonary  vasculature is unremarkable. The osseous structures are unremarkable. Impression Impression:  1. No acute infiltrate or effusion appreciated. 2.  Chronic changes in the left lower lung field with chronic blunting of the  left cardiophrenic angle        Hospital Course by Problem   Per EMR and H&P  7/15, 77 y.o. female who presents to the emergency room secondary to shortness of breath. Patient mentions she is chronically short of breath however it has gotten worse in the last few days. She lives with her daughter who is not sick and has had no exposure to patients with COVID-19 infection. Patient mentions she has not had a fever. Patient has a history of COPD, hypertension, type 2 diabetes, chronic systolic congestive heart failure, chronic respiratory failureis on 3 L nasal cannula at home.   ER evaluation patient noted to be short of breath on arrival, requiring more oxygen than her baseline. She is felt to have COPD exacerbation and is currently being admitted to the hospital for further evaluation. She will be ruled out for COVID-19 infection. IV abx therapy for bronchitis and COPD exacerbation. IV abx therapy completed prior to discharge. IV steroids with transition to PO prior to discharge. Taper PO steroid dose for acute bronchitis and COPD exacerbation. Scheduled duonebs and as needed. COVID test pending on discharge. No increase in oxygen levels for stable oxygen demand. No fevers or change in WBC count this admission. Tolerated PO intake. Discharge needs oxygen tank refilled, bronchodilator and albuterol refill. Spoke with patient and daughter about isolation and social distancing until COVID test results are in. Patient has her own room/bathroom and practices social distancing outside of the home. Upon discharge, instructions given to family for CDC guidelines for isolation and social distance recommendations. Today's examination of the patient revealed:     Subjective:    All 10 systems reviewed and negative   Objective:   VS:   Visit Vitals  /70 (BP 1 Location: Left arm, BP Patient Position: At rest)   Pulse 82   Temp 97.4 °F (36.3 °C)   Resp 18   Ht 5' 9\" (1.753 m) Comment: previously documented   Wt 61.5 kg (135 lb 8 oz)   SpO2 97%   Breastfeeding No   BMI 20.01 kg/m²      Tmax/24hrs: Temp (24hrs), Av.8 °F (36.6 °C), Min:97.4 °F (36.3 °C), Max:99.3 °F (37.4 °C)     Input/Output:     Intake/Output Summary (Last 24 hours) at 2020 1319  Last data filed at 2020 0800  Gross per 24 hour   Intake 245 ml   Output 1200 ml   Net -955 ml       General:  Alert, NAD  Cardiovascular:  RRR  Pulmonary:  LSC throughout  GI:  +BS in all four quadrants, soft, non-tender  Extremities:  No edema; 2+ dorsalis pedis pulses bilaterally  Neurology: Patient A&O    Labs:    Recent Results (from the past 24 hour(s)) GLUCOSE, POC    Collection Time: 07/18/20  5:53 PM   Result Value Ref Range    Glucose (POC) 121 (H) 70 - 110 mg/dL   GLUCOSE, POC    Collection Time: 07/18/20  8:34 PM   Result Value Ref Range    Glucose (POC) 161 (H) 70 - 110 mg/dL   FERRITIN    Collection Time: 07/19/20  2:41 AM   Result Value Ref Range    Ferritin 47 8 - 388 NG/ML   D DIMER    Collection Time: 07/19/20  2:41 AM   Result Value Ref Range    D DIMER 1.55 (H) <0.46 ug/ml(FEU)   LD    Collection Time: 07/19/20  2:41 AM   Result Value Ref Range     81 - 234 U/L   PROCALCITONIN    Collection Time: 07/19/20  2:41 AM   Result Value Ref Range    Procalcitonin 0.10 ng/mL   CK    Collection Time: 07/19/20  2:41 AM   Result Value Ref Range    CK 36 26 - 192 U/L   CBC WITH AUTOMATED DIFF    Collection Time: 07/19/20  2:41 AM   Result Value Ref Range    WBC 5.3 4.6 - 13.2 K/uL    RBC 3.92 (L) 4.20 - 5.30 M/uL    HGB 11.0 (L) 12.0 - 16.0 g/dL    HCT 34.0 (L) 35.0 - 45.0 %    MCV 86.7 74.0 - 97.0 FL    MCH 28.1 24.0 - 34.0 PG    MCHC 32.4 31.0 - 37.0 g/dL    RDW 14.7 (H) 11.6 - 14.5 %    PLATELET 034 799 - 863 K/uL    MPV 11.6 9.2 - 11.8 FL    NEUTROPHILS 91 (H) 40 - 73 %    LYMPHOCYTES 5 (L) 21 - 52 %    MONOCYTES 4 3 - 10 %    EOSINOPHILS 0 0 - 5 %    BASOPHILS 0 0 - 2 %    ABS. NEUTROPHILS 4.9 1.8 - 8.0 K/UL    ABS. LYMPHOCYTES 0.3 (L) 0.9 - 3.6 K/UL    ABS. MONOCYTES 0.2 0.05 - 1.2 K/UL    ABS. EOSINOPHILS 0.0 0.0 - 0.4 K/UL    ABS.  BASOPHILS 0.0 0.0 - 0.1 K/UL    DF AUTOMATED     METABOLIC PANEL, BASIC    Collection Time: 07/19/20  2:41 AM   Result Value Ref Range    Sodium 138 136 - 145 mmol/L    Potassium 3.9 3.5 - 5.5 mmol/L    Chloride 107 100 - 111 mmol/L    CO2 26 21 - 32 mmol/L    Anion gap 5 3.0 - 18 mmol/L    Glucose 104 (H) 74 - 99 mg/dL    BUN 80 (H) 7.0 - 18 MG/DL    Creatinine 2.25 (H) 0.6 - 1.3 MG/DL    BUN/Creatinine ratio 36 (H) 12 - 20      GFR est AA 26 (L) >60 ml/min/1.73m2    GFR est non-AA 22 (L) >60 ml/min/1.73m2    Calcium 8.2 (L) 8.5 - 10.1 MG/DL   GLUCOSE, POC    Collection Time: 07/19/20  8:26 AM   Result Value Ref Range    Glucose (POC) 76 70 - 110 mg/dL   GLUCOSE, POC    Collection Time: 07/19/20 10:55 AM   Result Value Ref Range    Glucose (POC) 97 70 - 110 mg/dL     Additional Data Reviewed:     Condition: stable  Follow-up Appointments:   1.  Your PCP: Jane Contreras MD, within 7-10 days    >30 minutes spent coordinating this discharge (review instructions/follow-up, prescriptions, preparing report for sign off)    Signed:  Grafton Gaucher, NP  7/19/2020  1:19 PM

## 2020-07-19 NOTE — PROGRESS NOTES
Plan to discharge when home oxygen refill tank is delivered. CM Collette aware of discharge planning needs. Spoke with patient and daughter Pasquale Tai. Patient lives with Huy Maddoxshi and states own room/bathroom.

## 2020-07-19 NOTE — DISCHARGE INSTRUCTIONS
DISCHARGE SUMMARY from Nurse    PATIENT INSTRUCTIONS:    After general anesthesia or intravenous sedation, for 24 hours or while taking prescription Narcotics:  · Limit your activities  · Do not drive and operate hazardous machinery  · Do not make important personal or business decisions  · Do  not drink alcoholic beverages  · If you have not urinated within 8 hours after discharge, please contact your surgeon on call. Report the following to your surgeon:  · Excessive pain, swelling, redness or odor of or around the surgical area  · Temperature over 100.5  · Nausea and vomiting lasting longer than 4 hours or if unable to take medications  · Any signs of decreased circulation or nerve impairment to extremity: change in color, persistent  numbness, tingling, coldness or increase pain  · Any questions    What to do at Home:  Recommended activity: Activity as tolerated,       *  Please give a list of your current medications to your Primary Care Provider. *  Please update this list whenever your medications are discontinued, doses are      changed, or new medications (including over-the-counter products) are added. *  Please carry medication information at all times in case of emergency situations. These are general instructions for a healthy lifestyle:    No smoking/ No tobacco products/ Avoid exposure to second hand smoke  Surgeon General's Warning:  Quitting smoking now greatly reduces serious risk to your health. Obesity, smoking, and sedentary lifestyle greatly increases your risk for illness    A healthy diet, regular physical exercise & weight monitoring are important for maintaining a healthy lifestyle    You may be retaining fluid if you have a history of heart failure or if you experience any of the following symptoms:  Weight gain of 3 pounds or more overnight or 5 pounds in a week, increased swelling in our hands or feet or shortness of breath while lying flat in bed.   Please call your doctor as soon as you notice any of these symptoms; do not wait until your next office visit. The discharge information has been reviewed with the patient. The patient verbalized understanding. Discharge medications reviewed with the patient and appropriate educational materials and side effects teaching were provided. ___________________________________________________________________________________________________________________________________      Advance Care Planning  People with COVID-19 may have no symptoms, mild symptoms, such as fever, cough, and shortness of breath or they may have more severe illness, developing severe and fatal pneumonia. As a result, Advance Care Planning with attention to naming a health care decision maker (someone you trust to make healthcare decisions for you if you could not speak for yourself) and sharing other health care preferences is important BEFORE a possible health crisis. Please contact your Primary Care Provider to discuss Advance Care Planning. Preventing the Spread of Coronavirus Disease 2019 in Homes and Residential Communities  For the most recent information go to [employer]    Prevention steps for People with confirmed or suspected COVID-19 (including persons under investigation) who do not need to be hospitalized  and   People with confirmed COVID-19 who were hospitalized and determined to be medically stable to go home    Your healthcare provider and public health staff will evaluate whether you can be cared for at home. If it is determined that you do not need to be hospitalized and can be isolated at home, you will be monitored by staff from your local or state health department. You should follow the prevention steps below until a healthcare provider or local or state health department says you can return to your normal activities.   Stay home except to get medical care  People who are mildly ill with COVID-19 are able to isolate at home during their illness. You should restrict activities outside your home, except for getting medical care. Do not go to work, school, or public areas. Avoid using public transportation, ride-sharing, or taxis. Separate yourself from other people and animals in your home  People: As much as possible, you should stay in a specific room and away from other people in your home. Also, you should use a separate bathroom, if available. Animals: You should restrict contact with pets and other animals while you are sick with COVID-19, just like you would around other people. Although there have not been reports of pets or other animals becoming sick with COVID-19, it is still recommended that people sick with COVID-19 limit contact with animals until more information is known about the virus. When possible, have another member of your household care for your animals while you are sick. If you are sick with COVID-19, avoid contact with your pet, including petting, snuggling, being kissed or licked, and sharing food. If you must care for your pet or be around animals while you are sick, wash your hands before and after you interact with pets and wear a facemask. Call ahead before visiting your doctor  If you have a medical appointment, call the healthcare provider and tell them that you have or may have COVID-19. This will help the healthcare providers office take steps to keep other people from getting infected or exposed. Wear a facemask  You should wear a facemask when you are around other people (e.g., sharing a room or vehicle) or pets and before you enter a healthcare providers office. If you are not able to wear a facemask (for example, because it causes trouble breathing), then people who live with you should not stay in the same room with you, or they should wear a facemask if they enter your room.   Cover your coughs and sneezes  Cover your mouth and nose with a tissue when you cough or sneeze. Throw used tissues in a lined trash can. Immediately wash your hands with soap and water for at least 20 seconds or, if soap and water are not available, clean your hands with an alcohol-based hand  that contains at least 60% alcohol. Clean your hands often  Wash your hands often with soap and water for at least 20 seconds, especially after blowing your nose, coughing, or sneezing; going to the bathroom; and before eating or preparing food. If soap and water are not readily available, use an alcohol-based hand  with at least 60% alcohol, covering all surfaces of your hands and rubbing them together until they feel dry. Soap and water are the best option if hands are visibly dirty. Avoid touching your eyes, nose, and mouth with unwashed hands. Avoid sharing personal household items  You should not share dishes, drinking glasses, cups, eating utensils, towels, or bedding with other people or pets in your home. After using these items, they should be washed thoroughly with soap and water. Clean all high-touch surfaces everyday  High touch surfaces include counters, tabletops, doorknobs, bathroom fixtures, toilets, phones, keyboards, tablets, and bedside tables. Also, clean any surfaces that may have blood, stool, or body fluids on them. Use a household cleaning spray or wipe, according to the label instructions. Labels contain instructions for safe and effective use of the cleaning product including precautions you should take when applying the product, such as wearing gloves and making sure you have good ventilation during use of the product. Monitor your symptoms  Seek prompt medical attention if your illness is worsening (e.g., difficulty breathing). Before seeking care, call your healthcare provider and tell them that you have, or are being evaluated for, COVID-19. Put on a facemask before you enter the facility.  These steps will help the healthcare providers office to keep other people in the office or waiting room from getting infected or exposed. Ask your healthcare provider to call the local or state health department. Persons who are placed under active monitoring or facilitated self-monitoring should follow instructions provided by their local health department or occupational health professionals, as appropriate. When working with your local health department check their available hours. If you have a medical emergency and need to call 911, notify the dispatch personnel that you have, or are being evaluated for COVID-19. If possible, put on a facemask before emergency medical services arrive. Discontinuing home isolation  Patients with confirmed COVID-19 should remain under home isolation precautions until the risk of secondary transmission to others is thought to be low. The decision to discontinue home isolation precautions should be made on a case-by-case basis, in consultation with healthcare providers and state and local health departments. Provided both written and verbal CDC education on steps to help prevent the spread of COVID-19. Patient escorted off premises by staff wearing appropriate PPE and patient wearing  procedural mask.

## 2020-07-19 NOTE — PROGRESS NOTES
In order to decrease risk of disease transmission and decrease use of PPE, pt seen independently by APC only. Evaluation and management decisions made after discussion with APC. Pt is a 77 y o female with history of COPD admitted on 7/15 after presenting w/ cc of sob.     No evidence of respiratory distress. COVID-19 testing pending. Inflammatory markers not elevated, low pro calcitonin. CXR w/o acute findings. Suspicion for significant SARS-COV-2 infection  is low. Transitioned to PO steroids, and remains stable. Also on scheduled duonebs, abx and 02 support.     Plan: Dc today after home 02 replenished. To be dc'd on course of steroid regimen ans well as abx.

## 2020-07-19 NOTE — ROUTINE PROCESS
Bedside and Verbal shift change report given to VIDYA Tang (oncoming nurse) by Josias Sorensen   (offgoing nurse). Report included the following information SBAR, Kardex, Intake/Output, MAR and Cardiac Rhythm Sinus Rhythm.

## 2020-07-20 NOTE — PROGRESS NOTES
Patient was admitted to [facility] on 7/15/20 and discharged on 7/19/20 for acute COPD exacerbation. Patient was contacted within 1 business days of discharge. Care Transition Nurse (CTN) contacted the patient by telephone to perform post hospital discharge assessment. Call answered by abhishek Spence. Daughter vocied patient was resting. Writer provided name and contact info and requested a return call.

## 2020-07-21 NOTE — PROGRESS NOTES
Contacted patient for Transitions of Care Coordination  follow up. Second attempt. No answer. Left message introducing self, role and reason for call. Requested return call. Contact information provided. Will attempt to contact at a later time.

## 2020-07-22 NOTE — PROGRESS NOTES
Patient was admitted to [facility] on 7/15/20 and discharged on 20 for acute bronchitis with COPD. Patient was contacted within 3 business days of discharge. Top Discharge Challenges to be reviewed by the provider   Additional needs identified to be addressed with provider no  none  Discussed COVID-19 related testing which was available at this time. Test results were negative. Patient informed of results, if available? yes   Method of communication with provider : chart routing       Advance Care Planning:   Does patient have an Advance Directive:  not on file     Inpatient Readmission Risk score: 77%  Was this a readmission? no   Patient stated reason for the admission: N/A  Patients top risk factors for readmission: None at this time  Interventions to address risk factors: N/A    Care Transition Nurse (CTN) contacted the family by telephone to perform post hospital discharge assessment. Verified name and  with family as identifiers. Provided introduction to self, and explanation of the CTN role. Educated family on the importance of daily weights. Instructed family to weigh at the same time of day; preferably in the morning after urinating but before eating, wearing the same amount of clothing. Family  instructed to write down the date, time and weight each time he/she weighs. Instructed to report a weight gain of 3 lbs or greater in 24 hours/1 day or 5 lbs or more in one week. Also instructed family  to monitor for SOB while lying flat, swelling to legs/feet, fatigue with doing normal daily activities or swelling/fullness to abdomen. CTN reviewed discharge instructions, medical action plan and red flags with family who verbalized understanding. Family given an opportunity to ask questions and does not have any further questions or concerns at this time. The family agrees to contact the PCP office for questions related to their healthcare.      Medication reconciliation was performed with family, who verbalizes understanding of administration of home medications. Advised obtaining a 90-day supply of all daily and as-needed medications. Referral to Pharm D needed: no     Home Health/Outpatient orders at discharge: home health care  [address]: [place]  Date of initial visit: TBD    Durable Medical Equipment ordered at discharge: none  [address]: none  Durable Medical Equipment received: none    Covid Risk Education    Patient has following risk factors of: heart failure, COPD, diabetes and age. Education provided regarding infection prevention, and signs and symptoms of COVID-19 and when to seek medical attention with family who verbalized understanding. Discussed exposure protocols and quarantine From CDC: Are you at higher risk for severe illness?  and given an opportunity for questions and concerns. The family agrees to contact the COVID-19 hotline [phone] or PCP office for questions related to COVID-19. For more information on steps you can take to protect yourself, see CDC's How to Protect Yourself     Patient/family/caregiver given information for [employer] and agrees to enroll no       Discussed follow-up appointments. If no appointment was previously scheduled, appointment scheduling offered: no  [facility] follow up appointment(s): No future appointments. Non-[facility] follow up appointment(s): yes  Plan for follow-up call in 10-14 days based on severity of symptoms and risk factors. CTN provided contact information for future needs. Goals Addressed                 This Visit's Progress     Attends follow-up appointments as directed. Ensure provider appt is scheduled within 7 days post-discharge; 7/22: Management notified of need for D[facility]A appt.     Confirm patient attended post-discharge provider appt   Complete post-visit call to confirm attendance and update care needs             Prevent complications post hospitalization.         Plan of Care:    Review/educate common or potential \"red flags\" of condition worsening; 7/22: Reviewed/educated on s/s of COPD and CHF exacerbation  Evaluate adherence to medications and priority barriers to resolve; 7/22: No barriers at this time      Instruct on adherence to medications as ordered and assess for therapeutic response and side-effects           Assess for health risk behaviors and educate patient/caregivers on reducing risk; 7/22: Educated family on proper technique for daily weights  Observe for trends in symptoms and measures, provide direction to patient, and notify provider as needed

## 2020-07-31 NOTE — PROGRESS NOTES
Transitions of Care Coordination  Follow-Up Date/Time:  7/31/2020 12:44 PM 
  
CTN (Care Transitions Nurse) contacted daughter for Transitions of Care Coordination  follow up. Spoke to Sudha Julian, daughter. Introduced self/role and reason for call. Daughter reported: BS below 100; none less than 70 Weak Denied SOB, cough, wheezing, edema, dizziness CP X 1; denied radiating pain to arms, back or jaw No N/V Weight has been between 135-140 Re-educated daughter on the importance of monitoring and reporting weight gain of > 3 lbs overnight or more than 5 lbs over a 7 day period. Also reinforced teaching on proper technique: in the morning after urinating but before eating wearing the same amount of clothing. Daughter voiced understanding and voiced patient is not retaining any fluid. Explained  to daughter that weight gain may not be easily seen and may be in the abdomen as well. Daughter voiced understanding. Asked daughter about home health. Daughter reported the patient is weak and therefore she has not been seen by home health. Writer explained the importance of home health working with patient to regain strength and encouraged daughter to contact [facility] to resume care. Daughter voiced they are still waiting on hospital bed and WC. Daughter verbalized the order was to be sent to PCP from [facility]. Writer will follow up. Specialist appointments since last outreach? no 
If so, specialist and date: N/A Medications:  
New medications since last outreach: no 
Does patient need refills on any medications: no 
Medication changes since last outreach (dose adjustments or discontinued meds): no 
 
Daughter verbalized patient needed a refill on albuterol. Review of EMR indicates albuterol prescribed in March with 5 refills therefore patient should have one refill remaining. Advised daughter to contact pharmacy to request a refill. Daughter verbalized understanding.  Patient does have a rolling walker at home. Home Health company: [facility] Date of discharge: TBD Barriers to care?  lack of knowledge about disease; reinforced education Discussed exposure protocols and quarantine from [address] you at higher risk for severe illness 2019 and given an opportunity for questions and concerns. From [facility]: Are you at higher risk for severe illness?  Wash your hands often.  Avoid close contact (6 feet, which is about two arm lengths) with people who are sick.  Put distance between yourself and other people if COVID-19 is spreading in your community.  Clean and disinfect frequently touched surfaces.  Avoid all cruise travel and non-essential air travel.  Call your healthcare professional if you have concerns about COVID-19 and your underlying condition or if you are sick. Family reminded that there are physicians on call 24 hours a day / 7 days a week (M-F 5pm to 8am and from Friday 5pm until Monday 8a for the weekend) should the patient have questions or concerns. No future appointments. Other upcoming appointments: N/A Contacted [facility] to follow up on order for hospital bed and WC. Left message for Ila Montana. Provided introduction, 2 patient identifiers, reason for call and contact info. Writer requested a return call. Goals  Attends follow-up appointments as directed. Ensure provider appt is scheduled within 7 days post-discharge; 7/22: Management notified of need for [facility] appt. Confirm patient attended post-discharge provider appt;7/31: Patient was a NO SHOW to PCP appt. Did not attend d/t weakness Complete post-visit call to confirm attendance and update care needs; Done  Prevent complications post hospitalization. Plan of Care:   
Review/educate common or potential \"red flags\" of condition worsening; 7/22: Reviewed/educated on s/s of COPD and CHF exacerbation; 7/31: Reinforced education on daily weights. Evaluate adherence to medications and priority barriers to resolve; 7/22: No barriers at this time Instruct on adherence to medications as ordered and assess for therapeutic response and side-effects Assess for health risk behaviors and educate patient/caregivers on reducing risk; 7/22: Educated family on proper technique for daily weights Observe for trends in symptoms and measures, provide direction to patient, and notify provider as needed

## 2020-07-31 NOTE — Clinical Note
Please fax office note from 5/7/2020 to [address] at [phone]. Patient awaiting hospital bed and WC.

## 2020-07-31 NOTE — Clinical Note
Daughter voiced [facility] was to request an order for [facility]. Has order been received?

## 2020-07-31 NOTE — PROGRESS NOTES
Received return call from dante Shen mgr with [facility]. Ashley Mckeon voiced order for [facility] and hospital bed was faxed to [facility] and provided their contact number ([phone]). Contacted [facility]. Spoke to Mario Garces. Provided 2 patient identifiers. Verified order was received however Mario Garces voiced they need a note for OV within the last 6 months and patient has not been seen since Jan 2019. Per EMR, patient was seen for virtual appt on 5/7/20 by Jacqueline Leiva NP. Fax number [phone]. Provided  update to daughter and advised her to contact office to reschedule missed appt on 7/29/20. Daughter voiced understanding. Chart forwarded to [facility] nurse pool for follow up.

## 2020-08-10 NOTE — PROGRESS NOTES
Transitions of Care Coordination  Follow-Up Date/Time:  8/10/2020 3:58 PM 
  
CTN (Care Transitions Nurse) contacted family for Transitions of Care Coordination  follow up. Spoke to family. Introduced self/role and reason for call. Family reported:  
134 lbs today Has not been weighing everyday; stated some days I'm just tired from working Denied swelling to legs or stomach, dizziness, CP or SOB Has not received hospital bed or WC Daughter voiced sometimes patient wants to give up but she encourages patient. Asked if patient has AMD. Patient does not. Will notify PCP. Specialist appointments since last outreach? no 
If so, specialist and date: N/A Medications:  
New medications since last outreach: no 
Does patient need refills on any medications: no 
Medication changes since last outreach (dose adjustments or discontinued meds): no 
 
Home Health company: N/A Barriers to care? N/A Discussed exposure protocols and quarantine from [address] you at higher risk for severe illness 2019 and given an opportunity for questions and concerns. From CDC: Are you at higher risk for severe illness?  Wash your hands often.  Avoid close contact (6 feet, which is about two arm lengths) with people who are sick.  Put distance between yourself and other people if COVID-19 is spreading in your community.  Clean and disinfect frequently touched surfaces.  Avoid all cruise travel and non-essential air travel.  Call your healthcare professional if you have concerns about COVID-19 and your underlying condition or if you are sick. Family reminded that there are physicians on call 24 hours a day / 7 days a week (M-F 5pm to 8am and from Friday 5pm until Monday 8a for the weekend) should the patient have questions or concerns. Future Appointments Date Time Provider Brandi Lees 9/1/2020 10:30 AM Tami Avila NP [facility] None Other upcoming appointments: N/A 
 
 Contacted [facility] [employer] [facility]. Spoke with Home. Provided introduction and 2 patient identifiers. Informed Home patient was seen by NP on 5/7/20. Home verbalized she was told NP no longer works at practice. Home verbalized since patient does have [employer] order can be signed by PCP. Home voiced she will contact office to try to get order signed. Per Home order for [facility] and hospital bed was received at the end of July. Provided Home writer's contact info for future reference and follow up. Goals  Attends follow-up appointments as directed. Ensure provider appt is scheduled within 7 days post-discharge; 7/22: Management notified of need for DORA appt. 8/10: Family scheduled PCP follow up for 9/1. Confirm patient attended post-discharge provider appt;7/31: Patient was a NO SHOW to PCP appt. Did not attend d/t weakness Complete post-visit call to confirm attendance and update care needs; Done  Prevent complications post hospitalization. Plan of Care:   
Review/educate common or potential \"red flags\" of condition worsening; 7/22: Reviewed/educated on s/s of COPD and CHF exacerbation; 7/31: Reinforced education on daily weights. 8/10: Not weighing daily but trying Evaluate adherence to medications and priority barriers to resolve; 7/22: No barriers at this time Instruct on adherence to medications as ordered and assess for therapeutic response and side-effects Assess for health risk behaviors and educate patient/caregivers on reducing risk; 7/22: Educated family on proper technique for daily weights Observe for trends in symptoms and measures, provide direction to patient, and notify provider as needed

## 2020-08-11 NOTE — PROGRESS NOTES
Returned call to [facility] with [facility] [place]. Was informed Home is on another call and then will be going to lunch at 12PM. Advised to call after 12:45 PM. Will attempt to reach at a later time. [facility] with [facility] [place]. Was informed by Yuly that [place] is in a meeting. Provided Angela with contact writer's info.

## 2020-08-12 NOTE — TELEPHONE ENCOUNTER
Writer unsure if patient is to take Isordil. Medication not on current medication list. Family voiced it was prescribed by JESSICA Stevens on 4/18/20. Patient does not have any pills left. Will pend refill request. Pharmacy [facility]. Please reorder if appropriate.

## 2020-08-12 NOTE — PROGRESS NOTES
Contacted [facility]. Spoke with [facility]. Home stoddardd order for DME has been faxed 5 times since 7/24/20. Home verbalized they have received the notes needed and only need the order to be signed and dated. NP who last saw patient is no longer with practice. Spoke with Esthela at PCP office. Provided introduction, 2 patient identifiers and reason for call. Esthela verbalized practice manager and PCP are aware of order and has paperwork. Estehla voiced PCP will not sign DME order until patient has attended upcoming appt in Sept with Winsome Melendrez NP. Writer informed Esthela patient was seen by Zafar Mejia NP (virtually) on 5/7/20. Per Marcelino Peguero, PCP does not feel comfortable using this note. [employer] company. Transitions of Care Coordination  Follow-Up Date/Time:  8/12/2020 11:04 AM 
  
CTN (Care Transitions Nurse) contacted family for Transitions of Care Coordination  follow up. Spoke to family. Introduced self/role and reason for call. Family reported:  
Doing good Weights: 
8/12: 133 lbs 8/11: 132 lbs 8/10: 132 lbs 
8/9: 131 lbs 8/8: 131 lbs 
8/7: 131 lbs Pertinent negatives: Increased SOB 
CP Dizziness Swelling to feet/leg/ankles or abdomen Writer commended family on having patient weigh daily. Informed family about DME delay and explained the importance of keeping upcoming virtual appt. Family aware of appt date and time. Specialist appointments since last outreach? no 
If so, specialist and date: N/A Medications:  
New medications since last outreach: no 
Does patient need refills on any medications: yes, Isordil 10 mg  
Medication changes since last outreach (dose adjustments or discontinued meds): no 
 
Writer unsure if patient is to take Isordil. Medication not on current medication list. Family voiced it was prescribed by JESSICA Haas on 4/18/20. Will pend refill request. Pharmacy [facility] company: N/A 
 
 Barriers to care? PCP relationship Discussed exposure protocols and quarantine from [address] you at higher risk for severe illness 2019 and given an opportunity for questions and concerns. From CDC: Are you at higher risk for severe illness?  Wash your hands often.  Avoid close contact (6 feet, which is about two arm lengths) with people who are sick.  Put distance between yourself and other people if COVID-19 is spreading in your community.  Clean and disinfect frequently touched surfaces.  Avoid all cruise travel and non-essential air travel.  Call your healthcare professional if you have concerns about COVID-19 and your underlying condition or if you are sick. Family reminded that there are physicians on call 24 hours a day / 7 days a week (M-F 5pm to 8am and from Friday 5pm until Monday 8a for the weekend) should the patient have questions or concerns. Future Appointments Date Time Provider Brandi Lees 9/1/2020 10:30 AM Dieudonne Ba NP [facility] None Other upcoming appointments: N/A Goals  Attends follow-up appointments as directed. Ensure provider appt is scheduled within 7 days post-discharge; 7/22: Management notified of need for DORA appt. 8/10: Family scheduled PCP follow up for 9/1. Confirm patient attended post-discharge provider appt;7/31: Patient was a NO SHOW to PCP appt. Did not attend d/t weakness Complete post-visit call to confirm attendance and update care needs; Done  Prevent complications post hospitalization. Plan of Care:   
Review/educate common or potential \"red flags\" of condition worsening; 7/22: Reviewed/educated on s/s of COPD and CHF exacerbation; 7/31: Reinforced education on daily weights. 8/12: Denied s/s of CHF exacerbation Evaluate adherence to medications and priority barriers to resolve; 7/22: No barriers at this time Instruct on adherence to medications as ordered and assess for therapeutic response and side-effects Assess for health risk behaviors and educate patient/caregivers on reducing risk; 7/22: Educated family on proper technique for daily weights. 8/10: Not weighing daily but trying. 8/12: Patient consistently weighing daily X 5 days Observe for trends in symptoms and measures, provide direction to patient, and notify provider as needed

## 2020-09-01 NOTE — PROGRESS NOTES
Mir Cummings presents today for Chief Complaint Patient presents with  
[facility] Follow Up  
  patient was admitted to  on 7/15/20 for COPD excerbation and discharged on 7/19/20 Is someone accompanying this pt? Yes, daughter, Disha Toth Is the patient using any DME equipment during OV? Yes, oxygen Depression Screening: 
3 most recent PHQ Screens 5/7/2020 Little interest or pleasure in doing things Not at all Feeling down, depressed, irritable, or hopeless Not at all Total Score PHQ 2 0 Learning Assessment: 
Learning Assessment 1/23/2019 PRIMARY LEARNER Patient HIGHEST LEVEL OF EDUCATION - PRIMARY LEARNER  GRADUATED HIGH SCHOOL OR GED  
BARRIERS PRIMARY LEARNER NONE  
CO-LEARNER CAREGIVER No  
PRIMARY LANGUAGE ENGLISH  
LEARNER PREFERENCE PRIMARY LISTENING  
  -  
ANSWERED BY self RELATIONSHIP SELF Abuse Screening: 
Abuse Screening Questionnaire 5/7/2020 Do you ever feel afraid of your partner? Karlee Frank Are you in a relationship with someone who physically or mentally threatens you? Karlee Frank Is it safe for you to go home? Dana Golden Fall Screening Fall Risk Assessment, last 12 mths 5/7/2020 Able to walk? Yes Fall in past 12 months? Yes Fall with injury? No  
Number of falls in past 12 months 1 Fall Risk Score 1 Generalized Anxiety No flowsheet data found. Health Maintenance Due Topic Date Due  
 Hepatitis C Screening  1955  Foot Exam Q1  01/21/1963  MICROALBUMIN Q1  01/21/1963  Eye Exam Retinal or Dilated  01/21/1963  DTaP/Tdap/Td series (1 - Tdap) 01/21/1974  Shingrix Vaccine Age 50> (1 of 2) 01/21/2003  FOBT Q1Y Age 54-65  01/21/2003  GLAUCOMA SCREENING Q2Y  01/21/2018  Bone Densitometry (Dexa) Screening  01/21/2018  Pneumococcal 65+ years (1 of 1 - PPSV23) 01/21/2018  Medicare Yearly Exam  03/14/2018  Breast Cancer Screen Mammogram  10/05/2018  Flu Vaccine (1) 09/01/2020 [facility] Health Maintenance reviewed and discussed and ordered per Provider. Coordination of Care 1. Have you been to the ER, urgent care clinic since your last visit? Hospitalized since your last visit? Yes, [facility] 
 
2. Have you seen or consulted any other health care providers outside of the [address] [place] [address] since your last visit? Include any pap smears or colon screening. no 
 
 
Advance Directive:  Discussed 5/7/20

## 2020-09-01 NOTE — PROGRESS NOTES
Ladon Prader is a 77 y.o. female who was seen by synchronous (real-time) audio-video technology on 9/1/2020 for Hospital Follow Up (patient was admitted to  on 7/15/20 for COPD excerbation and discharged on 7/19/20) Daughter reports she wants to follow up on mother's COPD. Patient has oxygen at 3L NC as needed. She does have a cough. She has had this cough for about 4 days. Daughter denies patient has had any fevers. Nebulizer used as needed which helps with the cough. She does have a pulmonologist but has not seen him in two months. She does not smoke and she has never smoked per her daughter Daughter is her caregiver. Daughter reports she has already been tested COVID. Daughter requesting patient see her PCP. Assessment & Plan:  
Diagnoses and all orders for this visit: 
 
1. COPD mixed type ([address] Follow up with pulmonology. COVID-19 test offered with new development of the cough and daughter declines as she reports patient has already been tested. I have educated daughter that COVID could have been acquired since this test but daughter is currently not interested. Daughter to contact the office if cough worsens, sputum production, fevers, increased shortness of breath, or any change in her medical condition. Follow up with PCP. Appointment scheduled with Dr. Jayy Gerardo for next available on 10/19/20. Subjective:  
 
 
Prior to Admission medications Medication Sig Start Date End Date Taking? Authorizing Provider  
isosorbide dinitrate (ISORDIL) 10 mg tablet Take 20 mg by mouth three (3) times daily. Yes Provider, Historical  
amLODIPine (Norvasc) 5 mg tablet Take 5 mg by mouth daily. Yes Provider, Historical  
ipratropium (ATROVENT) 0.02 % soln 0.5 mg by Nebulization route every four (4) hours as needed for Wheezing.    Yes Provider, Historical  
fluticasone furoate-vilanteroL (Breo Ellipta) 100-25 mcg/dose inhaler Take 1 Puff by inhalation daily. Indications: bronchospasm prevention with COPD 7/19/20  Yes Tony Lyn, JESSICA  
potassium chloride SR (KLOR-CON 10) 10 mEq tablet Take 1 Tab by mouth daily. 4/1/20  Yes Miguel Roberts MD  
atorvastatin (LIPITOR) 80 mg tablet Take 1 Tab by mouth nightly. 4/1/20  Yes Miguel Roberts MD  
spironolactone (ALDACTONE) 25 mg tablet Take 1 Tab by mouth daily. 4/1/20  Yes Miguel Roberts MD  
gabapentin (NEURONTIN) 100 mg capsule Take 1 Cap by mouth three (3) times daily. Max Daily Amount: 300 mg. 10/30/19 - 1 cap at breakfast, 2 caps at dinner and  bedtime  Indications: neuropathic pain 4/1/20  Yes Sophie Cano MD  
Ventolin HFA 90 mcg/actuation inhaler INHALE 2 PUFFS BY MOUTH EVERY 4 HOURS AS NEEDED FOR WHEEZING OR SHORTNESS OF BREATH 3/29/20  Yes Teri Mccormick MD  
Oxygen 1 Device by Nasal route as needed (Oxygen supplement). 3L per min as needed   Indications: oxygen supplement   Yes Other, MD Santos  
sodium chloride (OCEAN) 0.65 % nasal squeeze bottle 2 Sprays as needed for Congestion. Yes Jacobo, MD Santos  
hydrALAZINE (APRESOLINE) 100 mg tablet Take 1 Tab by mouth three (3) times daily. 7/5/18  Yes Monse Crews MD  
Blood-Glucose Meter (TRUE METRIX GLUCOSE METER) misc Test glucose 3 times daily DX: E11.40 Patient taking differently: Test glucose 3 times daily DX: E11.40  Indications: blood sugar readings 7/2/18  Yes Lizzette Eaton, DO Lancets misc Test glucose 3 times daily DX: E11.40 Patient taking differently: Test glucose 3 times daily DX: E11.40  Indications: for blood draws w/ blood sugar readings 7/2/18  Yes Lizzette Eaton, DO  
glucose blood VI test strips (BLOOD GLUCOSE TEST) strip (True Metrix) Test glucose 3 times daily DX: E11.40 7/2/18  Yes Lizzette Eaton DO  
furosemide (LASIX) 20 mg tablet Take 1 Tab by mouth two (2) times a day.  6/4/18  Yes Lisa Fair, NP  
 acetaminophen (TYLENOL EXTRA STRENGTH) 500 mg tablet Take 1,000 mg by mouth every six (6) hours as needed for Pain. Yes Provider, Historical  
CARBOXYMETHYLCELLULOS/GLYCERIN (REFRESH OPTIVE OP) Administer 1 Drop to both eyes six (6) times daily. Indications: eye health   Yes Brandy Simmons MD  
aspirin delayed-release 81 mg tablet Take 81 mg by mouth daily. Dtr. Aziza Heady pt no longer takes. Yes Other, MD Santos  
insulin lispro (HUMALOG KWIKPEN INSULIN SC) by SubCUTAneous route. Provider, Historical  
insulin glargine (LANTUS,BASAGLAR) 100 unit/mL (3 mL) inpn 7 Units by SubCUTAneous route in the morning. 15 units in the evening. 6/21/18   Jemal Lopez MD  
 
Patient Active Problem List  
Diagnosis Code  Essential hypertension I10  
 Diabetes mellitus ([facility] E11.9  Hyperlipidemia E78.5  Cervical myelopathy (HCC) G95.9  Eczema L30.9  Asthma J45.909  Chronic combined systolic and diastolic congestive heart failure ([facility]) I50.42  Bladder prolapse, female, acquired N81.10  
 IBS (irritable bowel syndrome) K58.9  Osteoporosis M81.0  Migraine G43.909  Anxiety and depression F41.9, F32.9  Pleural effusion J90  Type 2 diabetes mellitus with nephropathy ([facility]) E11.21  
 Hypokalemia E87.6  Acute pulmonary edema ([facility]) J81.0  Hypertensive emergency I16.1  Headache R51  Acute on chronic systolic (congestive) heart failure (HCC) I50.23  Type 2 diabetes mellitus with diabetic neuropathy ([facility]) E11.40  Acute non-recurrent maxillary sinusitis J01.00  
 Dizziness R42  Chronic obstructive pulmonary disease ([facility] [address] J44.9  Chronic kidney disease, stage 3 ([facility]) N18.3  Fall W19. Alondra   Allergic rhinitis J30.9  Chronic sinusitis J32.9  CVA (cerebral vascular accident) ([facility] [address] I63.9  
 Encephalopathy G93.40  Infarction of right thalamus ([facility]) I63.9  Dysphagia following cerebral infarction I69.391  Sinusitis with nasal polyps J32.9, J33.9  Cardiomyopathy due to hypertension, with heart failure ([address] I11.0, I43  Slurred speech R47.81  Left-sided weakness R53.1  Uncontrolled hypertension I10  
 Ischemic stroke ([facility]) I63.9  Pansinusitis J32.4  CKD (chronic kidney disease) N18.9  
 COPD mixed type ([facility]) J44.9  Chronic anemia D64.9  
 On home oxygen therapy Z99.81  
 Acute bronchitis with chronic obstructive pulmonary disease (COPD) ([facility]) J44.0, J20.9  Acute respiratory failure with hypoxia ([facility]) J96.01 Patient Active Problem List  
 Diagnosis Date Noted  Acute bronchitis with chronic obstructive pulmonary disease (COPD) ([address] 07/15/2020  Acute respiratory failure with hypoxia ([address] 07/15/2020  Chronic anemia 05/09/2020  On home oxygen therapy 05/09/2020  COPD mixed type ([address] 04/24/2020  Slurred speech 03/29/2020  Left-sided weakness 03/29/2020  Uncontrolled hypertension 03/29/2020  Ischemic stroke ([address] 03/29/2020  Pansinusitis 03/29/2020  CKD (chronic kidney disease) 03/29/2020  CVA (cerebral vascular accident) ([address] 08/18/2019  Encephalopathy 08/18/2019  Sinusitis with nasal polyps 08/18/2019  Allergic rhinitis 10/23/2018  Acute non-recurrent maxillary sinusitis 09/25/2018  Dizziness 09/25/2018  Chronic kidney disease, stage 3 ([address] 09/25/2018  Fall 09/25/2018  Chronic obstructive pulmonary disease ([address]  Type 2 diabetes mellitus with diabetic neuropathy ([address] 06/21/2018  Hypokalemia 05/05/2018  Acute pulmonary edema ([address] 05/05/2018  Hypertensive emergency 05/05/2018  Headache 05/05/2018  Acute on chronic systolic (congestive) heart failure ([address] 05/05/2018  Chronic combined systolic and diastolic congestive heart failure ([address] 05/03/2018  Type 2 diabetes mellitus with nephropathy ([address] 12/14/2017  Infarction of right thalamus ([address] 09/17/2017  Dysphagia following cerebral infarction 09/17/2017  Chronic sinusitis 06/13/2017  Cardiomyopathy due to hypertension, with heart failure ([facility] 05/19/2017  Pleural effusion 04/30/2014  Cervical myelopathy ([facility] 02/03/2014  Eczema 02/03/2014  Asthma 02/03/2014  Bladder prolapse, female, acquired 02/03/2014  IBS (irritable bowel syndrome) 02/03/2014  Osteoporosis 02/03/2014  Migraine 02/03/2014  Anxiety and depression 02/03/2014  Essential hypertension 01/27/2012  Diabetes mellitus ([facility] 01/27/2012  Hyperlipidemia 01/27/2012 Current Outpatient Medications Medication Sig Dispense Refill  isosorbide dinitrate (ISORDIL) 10 mg tablet Take 20 mg by mouth three (3) times daily.  amLODIPine (Norvasc) 5 mg tablet Take 5 mg by mouth daily.  ipratropium (ATROVENT) 0.02 % soln 0.5 mg by Nebulization route every four (4) hours as needed for Wheezing.  fluticasone furoate-vilanteroL (Breo Ellipta) 100-25 mcg/dose inhaler Take 1 Puff by inhalation daily. Indications: bronchospasm prevention with COPD 1 Inhaler 0  
 potassium chloride SR (KLOR-CON 10) 10 mEq tablet Take 1 Tab by mouth daily. 30 Tab 0  
 atorvastatin (LIPITOR) 80 mg tablet Take 1 Tab by mouth nightly. 30 Tab 0  
 spironolactone (ALDACTONE) 25 mg tablet Take 1 Tab by mouth daily. 30 Tab 0  
 gabapentin (NEURONTIN) 100 mg capsule Take 1 Cap by mouth three (3) times daily. Max Daily Amount: 300 mg. 10/30/19 - 1 cap at breakfast, 2 caps at dinner and  bedtime  Indications: neuropathic pain 9 Cap 0  Ventolin HFA 90 mcg/actuation inhaler INHALE 2 PUFFS BY MOUTH EVERY 4 HOURS AS NEEDED FOR WHEEZING OR SHORTNESS OF BREATH 18 g 5  
 Oxygen 1 Device by Nasal route as needed (Oxygen supplement). 3L per min as needed   Indications: oxygen supplement  sodium chloride (OCEAN) 0.65 % nasal squeeze bottle 2 Sprays as needed for Congestion.  hydrALAZINE (APRESOLINE) 100 mg tablet Take 1 Tab by mouth three (3) times daily.  270 Tab 3  
  Blood-Glucose Meter (TRUE METRIX GLUCOSE METER) misc Test glucose 3 times daily DX: E11.40 (Patient taking differently: Test glucose 3 times daily DX: E11.40  Indications: blood sugar readings) 1 Each 0  
 Lancets misc Test glucose 3 times daily DX: E11.40 (Patient taking differently: Test glucose 3 times daily DX: E11.40  Indications: for blood draws w/ blood sugar readings) 100 Each 12  
 glucose blood VI test strips (BLOOD GLUCOSE TEST) strip (True Metrix) Test glucose 3 times daily DX: E11.40 100 Strip 12  
 furosemide (LASIX) 20 mg tablet Take 1 Tab by mouth two (2) times a day. 60 Tab 8  acetaminophen (TYLENOL EXTRA STRENGTH) 500 mg tablet Take 1,000 mg by mouth every six (6) hours as needed for Pain.  CARBOXYMETHYLCELLULOS/GLYCERIN (REFRESH OPTIVE OP) Administer 1 Drop to both eyes six (6) times daily. Indications: eye health  aspirin delayed-release 81 mg tablet Take 81 mg by mouth daily. Dtr. Annamary Buerger pt no longer takes.  insulin lispro (HUMALOG KWIKPEN INSULIN SC) by SubCUTAneous route.  insulin glargine (LANTUS,BASAGLAR) 100 unit/mL (3 mL) inpn 7 Units by SubCUTAneous route in the morning. 15 units in the evening. 5 Pen 12 Allergies Allergen Reactions  Codeine Nausea Only  Sulfa (Sulfonamide Antibiotics) Rash  Watermelon Hives Reported by pt's daughter Past Medical History:  
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 Mild-mod conc LVH. Mod DDfx. LAE. Mild MR.    
 Hypercholesterolemia  Hypertension  Hypertensive heart disease  Infarction of right thalamus ([address] 9/17/2017 Lacunar infarct 2017  Joint pain  Joint swelling  Normal nuclear stress test 09/08/2000 No ischemia or prior infarction. Neg EKG on submax EST. Ex time 15:02.  Recurrent boils  Rheumatism  Shortness of breath  Sinusitis with nasal polyps 8/18/2019 Past Surgical History:  
Procedure Laterality Date  CARDIAC CATHETERIZATION  5/10/2018  CORONARY ARTERY ANGIOGRAM  5/10/2018  HX CHOLECYSTECTOMY  [address] [place]  MODERATE SEDATION  5/10/2018 Family History Problem Relation Age of Onset  Hypertension Mother  Ovarian Cancer Mother  Stroke Mother  Heart Attack Father  Breast Cancer Maternal Aunt Social History Tobacco Use  Smoking status: Never Smoker  Smokeless tobacco: Never Used  Tobacco comment: second hand smoke exposure as a child Substance Use Topics  Alcohol use: No  
 
 
Review of Systems Constitutional: Negative for fever. HENT: Negative for congestion. Respiratory: Positive for cough and shortness of breath. Cardiovascular: Negative for chest pain. Objective: No flowsheet data found. [INSTRUCTIONS:  \"[x]\" Indicates a positive item  \"[]\" Indicates a negative item  -- DELETE ALL ITEMS NOT EXAMINED] Constitutional: [] Appears well-developed and well-nourished [x] No apparent distress   
  [] Abnormal - Mental status: [x] Alert and awake  [] Oriented to person/place/time [] Able to follow commands   
[] Abnormal - Eyes:   EOM    []  Normal    [] Abnormal -  
Sclera  []  Normal    [] Abnormal - 
        Discharge [x]  None visible   [] Abnormal - HENT: [] Normocephalic, atraumatic  [] Abnormal -  
[] Mouth/Throat: Mucous membranes are moist 
 
 External Ears [x] Normal  [] Abnormal - Neck: [x] No visualized mass [] Abnormal - Pulmonary/Chest: [x] Respiratory effort normal   [x] No visualized signs of difficulty breathing or respiratory distress 
      [] Abnormal - Musculoskeletal:   [] Normal gait with no signs of ataxia [] Normal range of motion of neck [] Abnormal -  
 
Neurological:        [] No Facial Asymmetry (Cranial nerve 7 motor function) (limited exam due to video visit) [x] No gaze palsy  
     [] Abnormal -   
     
Skin:        [x] No significant exanthematous lesions or discoloration noted on facial skin   
     [] Abnormal - Psychiatric:       [] Normal Affect [] Abnormal -  
     [x] No Hallucinations Very limites assessment as patient is laying in bed and only answering minimal questions. No oxygen in use currently. Daughter answered most of questions and provided the majority of history. We discussed the expected course, resolution and complications of the diagnosis(es) in detail. Medication risks, benefits, costs, interactions, and alternatives were discussed as indicated. I advised her and daughter to contact the office if her condition worsens, changes or fails to improve as anticipated. She / Daughter expressed understanding with the diagnosis(es) and plan. Javier Garcia, who was evaluated through a patient-initiated, synchronous (real-time) audio-video encounter, and/or her healthcare decision maker, is aware that it is a billable service, with coverage as determined by her insurance carrier. She provided verbal consent to proceed: Yes, and patient identification was verified. It was conducted pursuant to the emergency declaration under the [address] [place] [address], [address] [place] [address] and the [employer] and [employer] Act.  A caregiver was present when appropriate. Ability to conduct physical exam was limited. I was at home. The patient was at home.  
 
 
Kofi Epstein, JESSICA

## 2020-09-25 NOTE — DISCHARGE INSTRUCTIONS
Patient Education        Asthma Attack: Care Instructions  Your Care Instructions     During an asthma attack, the airways swell and narrow. This makes it hard to breathe. Severe asthma attacks can be life-threatening, but you can help prevent them by keeping your asthma under control and treating symptoms before they get bad. Symptoms include being short of breath, having chest tightness, coughing, and wheezing. Noting and treating these symptoms can also help you avoid future trips to the emergency room. The doctor has checked you carefully, but problems can develop later. If you notice any problems or new symptoms, get medical treatment right away. Follow-up care is a key part of your treatment and safety. Be sure to make and go to all appointments, and call your doctor if you are having problems. It's also a good idea to know your test results and keep a list of the medicines you take. How can you care for yourself at home? · Follow your asthma action plan to prevent and treat attacks. If you don't have an asthma action plan, work with your doctor to create one. · Take your asthma medicines exactly as prescribed. Talk to your doctor right away if you have any questions about how to take them. ? Use your quick-relief medicine when you have symptoms of an attack. Quick-relief medicine is usually an albuterol inhaler. Some people need to use quick-relief medicine before they exercise. ? Take your controller medicine every day, not just when you have symptoms. Controller medicine is usually an inhaled corticosteroid. The goal is to prevent problems before they occur. Don't use your controller medicine to treat an attack that has already started. It doesn't work fast enough to help. ? If your doctor prescribed corticosteroid pills to use during an attack, take them exactly as prescribed. It may take hours for the pills to work, but they may make the episode shorter and help you breathe better. ?  Keep your quick-relief medicine with you at all times. · Talk to your doctor before using other medicines. Some medicines, such as aspirin, can cause asthma attacks in some people. · If you have a peak flow meter, use it to check how well you are breathing. This can help you predict when an asthma attack is going to occur. Then you can take medicine to prevent the asthma attack or make it less severe. · Do not smoke or allow others to smoke around you. Avoid smoky places. Smoking makes asthma worse. If you need help quitting, talk to your doctor about stop-smoking programs and medicines. These can increase your chances of quitting for good. · Learn what triggers an asthma attack for you, and avoid the triggers when you can. Common triggers include colds, smoke, air pollution, dust, pollen, mold, pets, cockroaches, stress, and cold air. · Avoid colds and the flu. Get a pneumococcal vaccine shot. If you have had one before, ask your doctor if you need a second dose. Get a flu vaccine every fall. If you must be around people with colds or the flu, wash your hands often. When should you call for help? [id number] anytime you think you may need emergency care. For example, call if:  · You have severe trouble breathing. Call your doctor now or seek immediate medical care if:  · Your symptoms do not get better after you have followed your asthma action plan. · You have new or worse trouble breathing. · Your coughing and wheezing get worse. · You cough up dark brown or bloody mucus (sputum). · You have a new or higher fever. Watch closely for changes in your health, and be sure to contact your doctor if:  · You need to use quick-relief medicine on more than 2 days a week (unless it is just for exercise). · You cough more deeply or more often, especially if you notice more mucus or a change in the color of your mucus. · You are not getting better as expected. Where can you learn more?   Go to http://www.gray.[employer]/  Enter [id number] in the search box to learn more about \"Asthma Attack: Care Instructions. \"  Current as of: February 24, 2020               Content Version: 12.5  © 2006-2020 [employer]. Care instructions adapted under license by [employer] (which disclaims liability or warranty for this information). If you have questions about a medical condition or this instruction, always ask your healthcare professional. [place] [address] any warranty or liability for your use of this information. Patient Education        Chronic Obstructive Pulmonary Disease (COPD) Flare-Ups: Care Instructions  Your Care Instructions     Chronic obstructive pulmonary disease (COPD) is a lung disease that makes it hard to breathe. It is caused by damage to the lungs over many years, usually from smoking. COPD is often a mix of two diseases:  · Chronic bronchitis: The airways that carry air to the lungs (bronchial tubes) get inflamed and make a lot of mucus. This can narrow or block the airways. · Emphysema: In a healthy person, the tiny air sacs in the lungs are like balloons. As you breathe in and out, they get bigger and smaller to move air through your lungs. But with emphysema, these air sacs are damaged and lose their stretch. Less air gets in and out of the lungs. Many people with COPD have attacks called flare-ups or exacerbations. This is when your usual symptoms quickly get worse and stay worse. The doctor has checked you carefully. But problems can develop later. If you notice any problems or new symptoms, get medical treatment right away. Follow-up care is a key part of your treatment and safety. Be sure to make and go to all appointments, and call your doctor if you are having problems. It's also a good idea to know your test results and keep a list of the medicines you take. How can you care for yourself at home?   · Be safe with medicines. Take your medicines exactly as prescribed. Call your doctor if you think you are having a problem with your medicine. You may be taking medicines such as:  ? Bronchodilators. These help open your airways and make breathing easier. ? Corticosteroids. These reduce airway inflammation. They may be given as pills, in a vein, or in an inhaled form. You may go home with pills in addition to an inhaler that you already use. · A spacer may help you get more inhaled medicine to your lungs. Ask your doctor or pharmacist if a spacer is right for you. If it is, ask how to use it properly. · If your doctor prescribed antibiotics, take them as directed. Do not stop taking them just because you feel better. You need to take the full course of antibiotics. · If your doctor prescribed oxygen, use the flow rate your doctor has recommended. Do not change it without talking to your doctor first.  · Do not smoke. Smoking makes COPD worse. If you need help quitting, talk to your doctor about stop-smoking programs and medicines. These can increase your chances of quitting for good. When should you call for help? [id number] anytime you think you may need emergency care. For example, call if:  · You have severe trouble breathing. Call your doctor now or seek immediate medical care if:  · You have new or worse trouble breathing. · Your coughing or wheezing gets worse. · You cough up dark brown or bloody mucus (sputum). · You have a new or higher fever. Watch closely for changes in your health, and be sure to contact your doctor if:  · You notice more mucus or a change in the color of your mucus. · You need to use your antibiotic or steroid pills. · You do not get better as expected. Where can you learn more? Go to http://www.gray.com/  Enter D989 in the search box to learn more about \"Chronic Obstructive Pulmonary Disease (COPD) Flare-Ups: Care Instructions. \"  Current as of: February 24, 2020               Content Version: 12.5  © 6508-7994 [employer]. Care instructions adapted under license by [employer] (which disclaims liability or warranty for this information). If you have questions about a medical condition or this instruction, always ask your healthcare professional. [address] any warranty or liability for your use of this information.

## 2020-09-25 NOTE — ED TRIAGE NOTES
Pt arrived from home via EMS. Pt states she feels SOB. Pt O2 saturation 100%. Pt arrived from home on normal 2 L of o2. Pt poor historian. Pt A&Ox2. Pt oriented to self and place.

## 2020-09-25 NOTE — ED PROVIDER NOTES
EMERGENCY DEPARTMENT HISTORY AND PHYSICAL EXAM 
 
 
Date: 9/25/2020 Patient Name: Mitali Lucas History of Presenting Illness Chief Complaint Patient presents with  Shortness of Breath History (Context): Mitali Lucas is a 77 y.o. woman a complicated set of active comorbid conditions as noted below in the past medical history, who presents with acute onset, severe, constant dyspnea. This started 2 days ago. Dyspnea is associated with wheezing. Dyspnea is exacerbated by exertion. Improved with inhaler. On review of systems, the patient denies chest pain, back pain, leg swelling, leg pain, recent travel, fever, chills, trauma, back pain, abdominal pain, nausea, vomiting, diaphoresis. PCP: Michael Bee MD 
 
Current Outpatient Medications Medication Sig Dispense Refill  fluticasone furoate-vilanteroL (Breo Ellipta) 100-25 mcg/dose inhaler Take 1 Puff by inhalation daily. Indications: bronchospasm prevention with COPD 1 Inhaler 0  
 potassium chloride SR (KLOR-CON 10) 10 mEq tablet Take 1 Tab by mouth daily. 30 Tab 0  
 atorvastatin (LIPITOR) 80 mg tablet Take 1 Tab by mouth nightly. 30 Tab 0  
 spironolactone (ALDACTONE) 25 mg tablet Take 1 Tab by mouth daily. 30 Tab 0  
 gabapentin (NEURONTIN) 100 mg capsule Take 1 Cap by mouth three (3) times daily. Max Daily Amount: 300 mg. 10/30/19 - 1 cap at breakfast, 2 caps at dinner and  bedtime  Indications: neuropathic pain 9 Cap 0  
 hydrALAZINE (APRESOLINE) 100 mg tablet Take 1 Tab by mouth three (3) times daily. 270 Tab 3  furosemide (LASIX) 20 mg tablet Take 1 Tab by mouth two (2) times a day. 60 Tab 8  aspirin delayed-release 81 mg tablet Take 1 Tab by mouth daily. Dtr. Natalie Bernice pt no longer takes. 30 Tab 0  
 albuterol (PROVENTIL VENTOLIN) 2.5 mg /3 mL (0.083 %) nebu 3 mL by Nebulization route every four (4) hours as needed for Wheezing.  30 Nebule 0  
  isosorbide dinitrate (ISORDIL) 10 mg tablet Take 20 mg by mouth three (3) times daily.  amLODIPine (Norvasc) 5 mg tablet Take 5 mg by mouth daily.  ipratropium (ATROVENT) 0.02 % soln 0.5 mg by Nebulization route every four (4) hours as needed for Wheezing.  insulin lispro (HUMALOG KWIKPEN INSULIN SC) by SubCUTAneous route.  Oxygen 1 Device by Nasal route as needed (Oxygen supplement). 3L per min as needed   Indications: oxygen supplement  sodium chloride (OCEAN) 0.65 % nasal squeeze bottle 2 Sprays as needed for Congestion.  Blood-Glucose Meter (TRUE METRIX GLUCOSE METER) misc Test glucose 3 times daily DX: E11.40 (Patient taking differently: Test glucose 3 times daily DX: E11.40  Indications: blood sugar readings) 1 Each 0  
 Lancets misc Test glucose 3 times daily DX: E11.40 (Patient taking differently: Test glucose 3 times daily DX: E11.40  Indications: for blood draws w/ blood sugar readings) 100 Each 12  
 glucose blood VI test strips (BLOOD GLUCOSE TEST) strip (True Metrix) Test glucose 3 times daily DX: E11.40 100 Strip 12  
 insulin glargine (LANTUS,BASAGLAR) 100 unit/mL (3 mL) inpn 7 Units by SubCUTAneous route in the morning. 15 units in the evening. 5 Pen 12  
 acetaminophen (TYLENOL EXTRA STRENGTH) 500 mg tablet Take 1,000 mg by mouth every six (6) hours as needed for Pain.  CARBOXYMETHYLCELLULOS/GLYCERIN (REFRESH OPTIVE OP) Administer 1 Drop to both eyes six (6) times daily. Indications: eye health Past History Past Medical History: 
Past Medical History:  
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 Mild-mod conc LVH. Mod DDfx. LAE. Mild MR.    
 Hypercholesterolemia  Hypertension  Hypertensive heart disease  Infarction of right thalamus ([address] 9/17/2017 Lacunar infarct 2017  Joint pain  Joint swelling  Normal nuclear stress test 09/08/2000 No ischemia or prior infarction. Neg EKG on submax EST. Ex time 15:02.  Recurrent boils  Rheumatism  Shortness of breath  Sinusitis with nasal polyps 8/18/2019 Past Surgical History: 
Past Surgical History:  
Procedure Laterality Date  CARDIAC CATHETERIZATION  5/10/2018  CORONARY ARTERY ANGIOGRAM  5/10/2018  HX CHOLECYSTECTOMY  2001 [address]  MODERATE SEDATION  5/10/2018 Family History: 
Family History Problem Relation Age of Onset  Hypertension Mother  Ovarian Cancer Mother  Stroke Mother  Heart Attack Father  Breast Cancer Maternal Aunt Social History: 
Social History Tobacco Use  Smoking status: Never Smoker  Smokeless tobacco: Never Used  Tobacco comment: second hand smoke exposure as a child Substance Use Topics  Alcohol use: No  
 Drug use: No  
 
 
Allergies: Allergies Allergen Reactions  Codeine Nausea Only  Sulfa (Sulfonamide Antibiotics) Rash  Watermelon Hives Reported by pt's daughter PMH, PSH, family history, social history, allergies reviewed with the patient with significant items noted above. Review of Systems As per HPI, otherwise reviewed and negative. Physical Exam  
 
Vitals:  
 09/25/20 0445 09/25/20 0500 09/25/20 0515 09/25/20 0530 BP: (!) 168/105 (!) 175/109 (!) 171/99 (!) 185/113 Pulse: 96 92 91 93 Resp: 23 16 21 Temp:      
SpO2:  95% Gen: In mild respiratory distress HEENT: Normocephalic, sclera anicteric Cardiovascular: Normal rate, regular rhythm, no murmurs, rubs, gallops. Pulses intact and equal distally. Pulmonary: Mild respiratory distress. Tachypneic. No stridor. ABD: Soft, nontender, nondistended. Neuro: Alert. Normal speech. Normal mentation. Psych: Normal thought content and thought processes. : No CVA tenderness EXT: No edema. Moves all extremities well. No cyanosis or clubbing. Skin: Warm and well-perfused. Diagnostic Study Results Labs - No results found for this or any previous visit (from the past 12 hour(s)). Radiologic Studies -  
XR CHEST PORT Final Result IMPRESSION:  
  
2.6 cm thin-walled cyst at the right perihilar region not demonstrated  
previously. Recommend CT of the chest for more comprehensive evaluation. Chronic scarring at the left inferior lateral pleural space. CT Results  (Last 48 hours) None CXR Results  (Last 48 hours) None Medical Decision Making I am the first provider for this patient. I reviewed the vital signs, available nursing notes, past medical history, past surgical history, family history and social history. Vital Signs-Reviewed the patient's vital signs. EKG: Interpreted by myself. Records Reviewed: Personally, on initial evaluation MDM:  
Patient presents with dyspnea, which is associated with dyspnea. Exam significant for normal exam.  
DDX considered: Pneumothorax, pneumonia, COPD exacerbation, CHF, ACS, anxiety DDX thought to be less likely but also considered due to high risk condition: Anxiety, aortic dissection, PE, Boerhaave's, pericarditis, mediastinitis Patient condition on initial evaluation: Moderately ill Plan:  
Oxygen therapy per protocol Close Observation Cardiac monitoring As per orders noted below: 
Orders Placed This Encounter  XR CHEST PORT  CBC WITH AUTOMATED DIFF  
 BASIC METABOLIC PANEL  
  TROPONIN I  
 EKG, 12 LEAD, INITIAL  nitroglycerin (NITROBID) 2 % ointment 1 Inch  
 albuterol-ipratropium (DUO-NEB) 2.5 MG-0.5 MG/3 ML  
 methylPREDNISolone (PF) (Solu-MEDROL) injection 125 mg  
 fluticasone furoate-vilanteroL (Breo Ellipta) 100-25 mcg/dose inhaler  potassium chloride SR (KLOR-CON 10) 10 mEq tablet  atorvastatin (LIPITOR) 80 mg tablet  spironolactone (ALDACTONE) 25 mg tablet  gabapentin (NEURONTIN) 100 mg capsule  hydrALAZINE (APRESOLINE) 100 mg tablet  furosemide (LASIX) 20 mg tablet  aspirin delayed-release 81 mg tablet  albuterol (PROVENTIL VENTOLIN) 2.5 mg /3 mL (0.083 %) nebu ED Course:  
Patient improved throughout ED course in terms of wheezing. ED Course as of Oct 03 1742 Fri Sep 25, 2020  
5823 Reassessed the patient. Patient is feeling well. Patient ready for discharge home. [DT] ED Course User Index 
[DT] Kaleb Richter MD  
 
 
 
Patient condition at time of disposition: Stable DISCHARGE NOTE:  
Pt has been reexamined. Patient has no new complaints, changes, or physical findings. Care plan outlined and precautions discussed. Results were reviewed with the patient. All medications were reviewed with the patient; will d/c home with albuterol. All of pt's questions and concerns were addressed. Alarm symptoms and return precautions associated with chief complaint and evaluation were reviewed with the patient in detail. The patient demonstrated adequate understanding. Patient was instructed and agrees to follow up with PCP, as well as to return to the ED upon further deterioration. Patient is ready to go home. The patient is happy with this plan Follow-up Information Follow up With Specialties Details Why Contact Info [facility] EMERGENCY DEPT Emergency Medicine  As needed, If symptoms worsen [address] [id number] [phone]  Tracy Patel MD Family Medicine  As needed, If symptoms worsen [id number] [facility] [address] [address] [address] [phone] Discharge Medication List as of 9/25/2020  7:28 AM  
  
START taking these medications Details  
albuterol (PROVENTIL VENTOLIN) 2.5 mg /3 mL (0.083 %) nebu 3 mL by Nebulization route every four (4) hours as needed for Wheezing., Normal, Disp-30 Nebule,R-0  
  
  
CONTINUE these medications which have CHANGED Details  
fluticasone furoate-vilanteroL (Breo Ellipta) 100-25 mcg/dose inhaler Take 1 Puff by inhalation daily. Indications: bronchospasm prevention with COPD, Normal, Disp-1 Inhaler,R-0  
  
potassium chloride SR (KLOR-CON 10) 10 mEq tablet Take 1 Tab by mouth daily. , Normal, Disp-30 Tab,R-0  
  
atorvastatin (LIPITOR) 80 mg tablet Take 1 Tab by mouth nightly., Normal, Disp-30 Tab,R-0  
  
spironolactone (ALDACTONE) 25 mg tablet Take 1 Tab by mouth daily. , Normal, Disp-30 Tab,R-0  
  
gabapentin (NEURONTIN) 100 mg capsule Take 1 Cap by mouth three (3) times daily. Max Daily Amount: 300 mg. 10/30/19 - 1 cap at breakfast, 2 caps at dinner and  bedtime  Indications: neuropathic pain, Normal, Disp-9 Cap,R-0  
  
hydrALAZINE (APRESOLINE) 100 mg tablet Take 1 Tab by mouth three (3) times daily. , Normal, Disp-270 Tab,R-3  
  
furosemide (LASIX) 20 mg tablet Take 1 Tab by mouth two (2) times a day., Normal, Disp-60 Tab,R-8  
  
aspirin delayed-release 81 mg tablet Take 1 Tab by mouth daily. Dtr. Bernardo Necessary pt no longer takes. , Print, Disp-30 Tab,R-0 CONTINUE these medications which have NOT CHANGED Details  
isosorbide dinitrate (ISORDIL) 10 mg tablet Take 20 mg by mouth three (3) times daily. , Historical Med  
  
amLODIPine (Norvasc) 5 mg tablet Take 5 mg by mouth daily. , Historical Med  
  
ipratropium (ATROVENT) 0.02 % soln 0.5 mg by Nebulization route every four (4) hours as needed for Wheezing., Historical Med  
  
insulin lispro (HUMALOG KWIKPEN INSULIN SC) by SubCUTAneous route., Historical Med  
  
 Oxygen 1 Device by Nasal route as needed (Oxygen supplement). 3L per min as needed   Indications: oxygen supplement, Historical Med  
  
sodium chloride (OCEAN) 0.65 % nasal squeeze bottle 2 Sprays as needed for Congestion. , Historical Med Blood-Glucose Meter (TRUE METRIX GLUCOSE METER) misc Test glucose 3 times daily DX: E11.40, Normal, Disp-1 Each, R-0 Lancets misc Test glucose 3 times daily DX: E11.40, Normal, Disp-100 Each, R-12  
  
glucose blood VI test strips (BLOOD GLUCOSE TEST) strip (True Metrix) Test glucose 3 times daily DX: E11.40, Normal, Disp-100 Strip, R-12  
  
insulin glargine (LANTUS,BASAGLAR) 100 unit/mL (3 mL) inpn 7 Units by SubCUTAneous route in the morning. 15 units in the evening., Normal, Disp-5 Pen, R-12  
  
acetaminophen (TYLENOL EXTRA STRENGTH) 500 mg tablet Take 1,000 mg by mouth every six (6) hours as needed for Pain., Historical Med CARBOXYMETHYLCELLULOS/GLYCERIN (REFRESH OPTIVE OP) Administer 1 Drop to both eyes six (6) times daily. Indications: eye health, Historical Med  
  
  
STOP taking these medications Ventolin HFA 90 mcg/actuation inhaler Comments:  
Reason for Stopping:   
   
  
 
 
Procedures: 
Procedures Critical Care Time:  
 
 
Diagnosis Clinical Impression: 1. Shortness of breath 2. Chronic hypoxemic respiratory failure ([address] 3. Medication refill 4. Type 2 diabetes mellitus with nephropathy ([address] 5. Chronic obstructive pulmonary disease, unspecified COPD type ([address] Signed, Sukhi Powell MD 
Emergency Physician 
[facility] As a voice dictation software was utilized to dictate this note, minor word transpositions can occur. I apologize for confusing wording and typographic errors. Please feel free to contact me for clarification.

## 2020-10-19 NOTE — PROGRESS NOTES
Colby Schuler is a 77 y.o. female, evaluated via audio-only technology on 10/19/2020 for Follow-up; ED Follow-up (sob); Hypertension; and Diabetes [place] Assessment & Plan:  
 
Diagnoses and all orders for this visit: 
 
1. COPD mixed type ([address] Patient's daughter will schedule a follow-up appointment with pulmonology as soon as possible 2. Chronic combined systolic and diastolic congestive heart failure ([address] Patient's daughter will schedule a follow-up appointment with cardiology as soon as possible 3. Type 2 diabetes mellitus with nephropathy (HCC) 
-     HEMOGLOBIN A1C WITH EAG; Future -     METABOLIC PANEL, COMPREHENSIVE; Future -     LIPID PANEL; Future 4. Essential hypertension -     METABOLIC PANEL, COMPREHENSIVE; Future -     LIPID PANEL; Future 
-     amLODIPine (Norvasc) 5 mg tablet; Take 1 Tab by mouth daily. -     hydrALAZINE (APRESOLINE) 100 mg tablet; Take 1 Tab by mouth three (3) times daily. Stable. Continue present treatment plan 5. Stage 3 chronic kidney disease, unspecified whether stage 3a or 3b CKD -     METABOLIC PANEL, COMPREHENSIVE; Future 6. Hyperlipidemia, unspecified hyperlipidemia type 
-     atorvastatin (LIPITOR) 80 mg tablet; Take 1 Tab by mouth nightly. 7. Pruritus Per patient's daughter, no signs of skin breakdown. I advised that since the itching is not generalized, I do not think there is any systemic issue. For now we will work on increasing hydration of the skin in the affected area. Recommend mixing over-the-counter hydrocortisone cream with a good moisturizer and applying this to patient's back after each bath. Patient's daughter agrees and will try this for now. The complexity of medical decision making for this visit is moderate Follow-up and Dispositions · Return in about 6 weeks (around 11/30/2020) for high blood pressure, high cholesterol, diabetes, chronic kidney disease, copd, lab review. sched f/u with pulm and cards. 12 
Subjective:  
Unable to connect via doxy. Pt seen in the ER on 9/25/2020 for dyspnea. She did improve in the ER with treatment. She has not seen her lung doctor lately. Last appt with Dr. Ramy Rajan was in [place] [id number]. No f/u appt has occurred. Home bp readings are normotensive. Home bs readings are usually . Pt is no longer on insulin. Pt needs med refills today. Pt's dtr reports that pt is having itching recently. The itching tends to be just on her back. Prior to Admission medications Medication Sig Start Date End Date Taking? Authorizing Provider Ventolin HFA 90 mcg/actuation inhaler INHALE 2 PUFFS BY MOUTH EVERY 4 HOURS AS NEEDED FOR WHEEZING OR SHORTNESS OF BREATH 9/29/20  Yes Provider, Historical  
atorvastatin (LIPITOR) 80 mg tablet Take 1 Tab by mouth nightly. 10/19/20  Yes Brittany Quiroga MD  
amLODIPine (Norvasc) 5 mg tablet Take 1 Tab by mouth daily. 10/19/20  Yes Brittany Quiroga MD  
hydrALAZINE (APRESOLINE) 100 mg tablet Take 1 Tab by mouth three (3) times daily. 10/19/20  Yes Brittany Quiroga MD  
fluticasone furoate-vilanteroL (Breo Ellipta) 100-25 mcg/dose inhaler Take 1 Puff by inhalation daily. Indications: bronchospasm prevention with COPD 9/25/20  Yes Karen Torres MD  
potassium chloride SR (KLOR-CON 10) 10 mEq tablet Take 1 Tab by mouth daily. 9/25/20  Yes Karen Torres MD  
spironolactone (ALDACTONE) 25 mg tablet Take 1 Tab by mouth daily. 9/25/20  Yes Karen Torres MD  
aspirin delayed-release 81 mg tablet Take 1 Tab by mouth daily. Dtr. Corey Lord pt no longer takes. 9/25/20  Yes Karen Torres MD  
albuterol (PROVENTIL VENTOLIN) 2.5 mg /3 mL (0.083 %) nebu 3 mL by Nebulization route every four (4) hours as needed for Wheezing. 9/25/20  Yes Karen Torres MD  
isosorbide dinitrate (ISORDIL) 10 mg tablet Take 20 mg by mouth three (3) times daily.    Yes Provider, Historical  
 ipratropium (ATROVENT) 0.02 % soln 0.5 mg by Nebulization route every four (4) hours as needed for Wheezing. Yes Provider, Historical  
Oxygen 1 Device by Nasal route as needed (Oxygen supplement). 3L per min as needed   Indications: oxygen supplement   Yes Santos Centeno MD  
sodium chloride (OCEAN) 0.65 % nasal squeeze bottle 2 Sprays as needed for Congestion. Yes Santos Centeno MD  
Blood-Glucose Meter (TRUE METRIX GLUCOSE METER) misc Test glucose 3 times daily DX: E11.40 Patient taking differently: Test glucose 3 times daily DX: E11.40  Indications: blood sugar readings 7/2/18  Yes Lizzette Eaton, DO Lancets misc Test glucose 3 times daily DX: E11.40 Patient taking differently: Test glucose 3 times daily DX: E11.40  Indications: for blood draws w/ blood sugar readings 7/2/18  Yes Lizzette Eaton, DO  
glucose blood VI test strips (BLOOD GLUCOSE TEST) strip (True Metrix) Test glucose 3 times daily DX: E11.40 7/2/18  Yes Lizzette Eaton DO  
acetaminophen (TYLENOL EXTRA STRENGTH) 500 mg tablet Take 1,000 mg by mouth every six (6) hours as needed for Pain. Yes Provider, Historical  
CARBOXYMETHYLCELLULOS/GLYCERIN (REFRESH OPTIVE OP) Administer 1 Drop to both eyes six (6) times daily. Indications: eye health   Yes Violet Jacobson MD  
gabapentin (NEURONTIN) 100 mg capsule Take 1 Cap by mouth three (3) times daily. Max Daily Amount: 300 mg. 10/30/19 - 1 cap at breakfast, 2 caps at dinner and  bedtime  Indications: neuropathic pain 9/25/20   Jorje Riggs MD  
 
Patient Active Problem List  
Diagnosis Code  Essential hypertension I10  
 Diabetes mellitus ([facility] E11.9  Hyperlipidemia E78.5  Cervical myelopathy (HCC) G95.9  Eczema L30.9  Asthma J45.909  Chronic combined systolic and diastolic congestive heart failure (HCC) I50.42  Bladder prolapse, female, acquired N81.10  
 IBS (irritable bowel syndrome) K58.9  Osteoporosis M81.0  Migraine G43.909  Anxiety and depression F41.9, F32.9  Pleural effusion J90  Type 2 diabetes mellitus with nephropathy ([facility]) E11.21  
 Hypokalemia E87.6  Acute pulmonary edema ([facility]) J81.0  Hypertensive emergency I16.1  Headache R51.9  Acute on chronic systolic (congestive) heart failure ([facility]) I50.23  Type 2 diabetes mellitus with diabetic neuropathy ([facility]) E11.40  Acute non-recurrent maxillary sinusitis J01.00  
 Dizziness R42  Chronic obstructive pulmonary disease ([facility] J44.9  Chronic kidney disease, stage 3 ([facility]) N18.30  Fall W19. Seldon Listen  Allergic rhinitis J30.9  Chronic sinusitis J32.9  CVA (cerebral vascular accident) ([facility] I63.9  
 Encephalopathy G93.40  Infarction of right thalamus ([facility]) I63.9  Dysphagia following cerebral infarction I69.391  Sinusitis with nasal polyps J32.9, J33.9  Cardiomyopathy due to hypertension, with heart failure ([facility] I11.0, I43  Slurred speech R47.81  Left-sided weakness R53.1  Uncontrolled hypertension I10  
 Ischemic stroke ([facility]) I63.9  Pansinusitis J32.4  CKD (chronic kidney disease) N18.9  
 COPD mixed type ([facility]) J44.9  Chronic anemia D64.9  
 On home oxygen therapy Z99.81  
 Acute bronchitis with chronic obstructive pulmonary disease (COPD) ([facility]) J44.0, J20.9  Acute respiratory failure with hypoxia ([facility]) J96.01  
 
Current Outpatient Medications Medication Sig Dispense Refill  Ventolin HFA 90 mcg/actuation inhaler INHALE 2 PUFFS BY MOUTH EVERY 4 HOURS AS NEEDED FOR WHEEZING OR SHORTNESS OF BREATH    
 atorvastatin (LIPITOR) 80 mg tablet Take 1 Tab by mouth nightly. 30 Tab 5  
 amLODIPine (Norvasc) 5 mg tablet Take 1 Tab by mouth daily. 30 Tab 5  hydrALAZINE (APRESOLINE) 100 mg tablet Take 1 Tab by mouth three (3) times daily. 90 Tab 5  
 fluticasone furoate-vilanteroL (Breo Ellipta) 100-25 mcg/dose inhaler Take 1 Puff by inhalation daily.  Indications: bronchospasm prevention with COPD 1 Inhaler 0  
  potassium chloride SR (KLOR-CON 10) 10 mEq tablet Take 1 Tab by mouth daily. 30 Tab 0  
 spironolactone (ALDACTONE) 25 mg tablet Take 1 Tab by mouth daily. 30 Tab 0  
 aspirin delayed-release 81 mg tablet Take 1 Tab by mouth daily. Dtr. Bernardo Necessary pt no longer takes. 30 Tab 0  
 albuterol (PROVENTIL VENTOLIN) 2.5 mg /3 mL (0.083 %) nebu 3 mL by Nebulization route every four (4) hours as needed for Wheezing. 30 Nebule 0  
 isosorbide dinitrate (ISORDIL) 10 mg tablet Take 20 mg by mouth three (3) times daily.  ipratropium (ATROVENT) 0.02 % soln 0.5 mg by Nebulization route every four (4) hours as needed for Wheezing.  Oxygen 1 Device by Nasal route as needed (Oxygen supplement). 3L per min as needed   Indications: oxygen supplement  sodium chloride (OCEAN) 0.65 % nasal squeeze bottle 2 Sprays as needed for Congestion.  Blood-Glucose Meter (TRUE METRIX GLUCOSE METER) misc Test glucose 3 times daily DX: E11.40 (Patient taking differently: Test glucose 3 times daily DX: E11.40  Indications: blood sugar readings) 1 Each 0  
 Lancets misc Test glucose 3 times daily DX: E11.40 (Patient taking differently: Test glucose 3 times daily DX: E11.40  Indications: for blood draws w/ blood sugar readings) 100 Each 12  
 glucose blood VI test strips (BLOOD GLUCOSE TEST) strip (True Metrix) Test glucose 3 times daily DX: E11.40 100 Strip 12  
 acetaminophen (TYLENOL EXTRA STRENGTH) 500 mg tablet Take 1,000 mg by mouth every six (6) hours as needed for Pain.  CARBOXYMETHYLCELLULOS/GLYCERIN (REFRESH OPTIVE OP) Administer 1 Drop to both eyes six (6) times daily. Indications: eye health  gabapentin (NEURONTIN) 100 mg capsule Take 1 Cap by mouth three (3) times daily. Max Daily Amount: 300 mg. 10/30/19 - 1 cap at breakfast, 2 caps at dinner and  bedtime  Indications: neuropathic pain 9 Cap 0 Allergies Allergen Reactions  Codeine Nausea Only  Sulfa (Sulfonamide Antibiotics) Rash  Watermelon Hives Reported by pt's daughter Past Medical History:  
Diagnosis Date  Anemia  Arthritis  Cardiomyopathy due to hypertension, with heart failure ([facility] 5/19/2017  Cataract  Chronic combined systolic and diastolic congestive heart failure ([facility] 5/3/2018  Chronic lung disease  Chronic obstructive pulmonary disease ([facility]  Chronic systolic congestive heart failure ([facility] 2/3/2014  Diabetes mellitus ([facility]  Difficulty swallowing Both food and liquid  Dysphagia following cerebral infarction 8/17/2017  Gastrointestinal disorder  History of echocardiogram 12/29/2009 EF 50%. Mild-mod conc LVH. Mod DDfx. LAE. Mild MR.    
 Hypercholesterolemia  Hypertension  Hypertensive heart disease  Infarction of right thalamus ([facility] 9/17/2017 Lacunar infarct 2017  Joint pain  Joint swelling  Normal nuclear stress test 09/08/2000 No ischemia or prior infarction. Neg EKG on submax EST. Ex time 15:02.  Recurrent boils  Rheumatism  Shortness of breath  Sinusitis with nasal polyps 8/18/2019 Past Surgical History:  
Procedure Laterality Date  CARDIAC CATHETERIZATION  5/10/2018  CORONARY ARTERY ANGIOGRAM  5/10/2018  HX CHOLECYSTECTOMY  [address] [facility]  MODERATE SEDATION  5/10/2018 Family History Problem Relation Age of Onset  Hypertension Mother  Ovarian Cancer Mother  Stroke Mother  Heart Attack Father  Breast Cancer Maternal Aunt Social History Tobacco Use  Smoking status: Never Smoker  Smokeless tobacco: Never Used  Tobacco comment: second hand smoke exposure as a child Substance Use Topics  Alcohol use: No  
 
 
Review of Systems Constitutional: Negative. HENT: Negative. Respiratory: Positive for shortness of breath and wheezing. Cardiovascular: Negative. Skin: Positive for itching. Negative for rash. All other systems reviewed and are negative. Patient-Reported Vitals 10/19/2020 Patient-Reported Systolic  159 Patient-Reported Diastolic 74 Todd Kinga, who was evaluated through a patient-initiated, synchronous (real-time) audio only encounter, and/or her healthcare decision maker, is aware that it is a billable service, with coverage as determined by her insurance carrier. She provided verbal consent to proceed: n/a- consent obtained within past 12 months. She has not had a related appointment within my department in the past 7 days or scheduled within the next 24 hours. Total Time: minutes: 21-30 minutes Porsha Sawyer MD

## 2020-10-19 NOTE — PROGRESS NOTES
Adi Davila presents today for Chief Complaint Patient presents with  Follow-up  ED Follow-up  
  sob  Hypertension  Diabetes Virtual/telephone visit Depression Screening: 
3 most recent PHQ Screens 10/19/2020 Little interest or pleasure in doing things Several days Feeling down, depressed, irritable, or hopeless Several days Total Score PHQ 2 2 Learning Assessment: 
Learning Assessment 9/1/2020 PRIMARY LEARNER Patient HIGHEST LEVEL OF EDUCATION - PRIMARY LEARNER  GRADUATED HIGH SCHOOL OR GED  
BARRIERS PRIMARY LEARNER [id number] [facility] CAREGIVER Yes CO-LEARNER NAME Ashley Gutierresrossy CO-LEARNER HIGHEST LEVEL OF EDUCATION 4 YEARS OF COLLEGE  
BARRIERS CO-LEARNER NONE PRIMARY LANGUAGE ENGLISH  
PRIMARY LANGUAGE CO-LEARNER ENGLISH  NEED No  
LEARNER PREFERENCE PRIMARY DEMONSTRATION  
  -  
LEARNER PREFERENCE CO-LEARNER DEMONSTRATION  
ANSWERED BY Ashley Cook RELATIONSHIP OTHER Fall Risk Fall Risk Assessment, last 12 mths 10/19/2020 Able to walk? Yes Fall in past 12 months? No  
Fall with injury? -  
Number of falls in past 12 months - Fall Risk Score -  
 
 
ADL ADL Assessment 10/19/2020 Feeding yourself No Help Needed Getting from bed to chair Help Needed Getting dressed Help Needed Bathing or showering Help Needed Walk across the room (includes cane/walker) Help Needed Using the telphone No Help Needed Taking your medications Help Needed Preparing meals Help Needed Managing money (expenses/bills) Help Needed Moderately strenuous housework (laundry) Help Needed Shopping for personal items (toiletries/medicines) Help Needed Shopping for groceries Help Needed Driving Help Needed Climbing a flight of stairs Help Needed Getting to places beyond walking distances Help Needed Health Maintenance reviewed and discussed and ordered per Provider. Health Maintenance Due Topic Date Due  
  Hepatitis C Screening  1955  Foot Exam Q1  01/21/1963  MICROALBUMIN Q1  01/21/1963  Eye Exam Retinal or Dilated  01/21/1963  DTaP/Tdap/Td series (1 - Tdap) 01/21/1974  Shingrix Vaccine Age 50> (1 of 2) 01/21/2003  FOBT Q1Y Age 54-65  01/21/2003  GLAUCOMA SCREENING Q2Y  01/21/2018  Bone Densitometry (Dexa) Screening  01/21/2018  Pneumococcal 65+ years (1 of 1 - PPSV23) 01/21/2018  Medicare Yearly Exam  03/14/2018  Breast Cancer Screen Mammogram  10/05/2018  Flu Vaccine (1) 09/01/2020 [place]aks Coordination of Care: 1. Have you been to the ER, urgent care clinic since your last visit? Hospitalized since your last visit? Yes, 9/25/2020 - SOB 2. Have you seen or consulted any other health care providers outside of the [address] [place] [address] since your last visit? Include any pap smears or colon screening. no 
 
Patient gave permission to talk with her daughter Carlos Eduardo Elizabeth to complete chart. Patient's mother passed away a month ago and patient is having a hard time. She was also having a hard time talking with her SOB. Patient is no longer taking her DM medication according to her daughter one of her providers took her off of them. Her daughter stated that her blood sugar has been under a 100 lately. Patient's pain today was 0/10. She didn't get her mammogram that was ordered in January 2020. She did get her eye exam done last year and hasn't followed up this year. She doesn't see anyone to get her feet checked for neuropathy.

## 2020-12-04 NOTE — PROGRESS NOTES
Kylee Ann presents today for   Chief Complaint   Patient presents with    Hypertension    Cholesterol Problem     Follow up    Chronic Kidney Disease     Follow up    COPD     Follow up     Labs     Pt and daughter reminded to have labs completed. Virtual/telephone visit    Depression Screening:  3 most recent PHQ Screens 10/19/2020   Little interest or pleasure in doing things Several days   Feeling down, depressed, irritable, or hopeless Several days   Total Score PHQ 2 2       Learning Assessment:  Learning Assessment 9/1/2020   PRIMARY LEARNER Patient   HIGHEST LEVEL OF EDUCATION - PRIMARY LEARNER  GRADUATED HIGH SCHOOL OR GED   BARRIERS PRIMARY LEARNER COGNITIVE   CO-LEARNER CAREGIVER Yes   CO-LEARNER NAME [id number] HIGHEST LEVEL OF EDUCATION 4 YEARS OF COLLEGE   BARRIERS CO-LEARNER NONE   PRIMARY LANGUAGE ENGLISH   PRIMARY LANGUAGE CO-LEARNER ENGLISH    NEED No   LEARNER PREFERENCE PRIMARY DEMONSTRATION     -   LEARNER PREFERENCE CO-LEARNER DEMONSTRATION   ANSWERED BY Yolanda HUNT OTHER       Fall Risk  Fall Risk Assessment, last 12 mths 10/19/2020   Able to walk? Yes   Fall in past 12 months? No   Fall with injury? -   Number of falls in past 12 months -   Fall Risk Score -       Travel Screening:   Travel Screening     Question   Response    In the last month, have you been in contact with someone who was confirmed or suspected to have Coronavirus / COVID-19? Yes (Household contact with caregiver that tested positive 11/6/2020. Pt was not tested and showed no symptoms.)    Have you had a COVID-19 viral test in the last 14 days? No    Do you have any of the following new or worsening symptoms? None of these    Have you traveled internationally in the last month? No      Travel History   Travel since 11/04/20     No documented travel since 11/04/20          Health Maintenance reviewed and discussed and ordered per Provider.     Health Maintenance Due   Topic Date Due    Hepatitis C Screening  1955    Foot Exam Q1  01/21/1963    MICROALBUMIN Q1  01/21/1963    Eye Exam Retinal or Dilated  01/21/1963    DTaP/Tdap/Td series (1 - Tdap) 01/21/1974    Shingrix Vaccine Age 50> (1 of 2) 01/21/2003    Colorectal Cancer Screening Combo  01/21/2003    GLAUCOMA SCREENING Q2Y  01/21/2018    Bone Densitometry (Dexa) Screening  01/21/2018    Pneumococcal 65+ years (1 of 1 - PPSV23) 01/21/2018    Medicare Yearly Exam  03/14/2018    Breast Cancer Screen Mammogram  10/05/2018   . Coordination of Care:  1. Have you been to the ER, urgent care clinic since your last visit? Hospitalized since your last visit? No    2. Have you seen or consulted any other health care providers outside of the [address] [place] [address] since your last visit? Include any pap smears or colon screening.  No

## 2020-12-04 NOTE — PROGRESS NOTES
Parris Mcarthur is a 77 y.o. female, evaluated via audio-only technology on 12/4/2020 for Hypertension (Follow up ); Cholesterol Problem (Follow up); Chronic Kidney Disease (Follow up); COPD (Follow up ); Labs (Pt and daughter reminded to have labs completed.); and Other (Allanugher expresses concern over mothers inability to move and wants a nurse.)  . Assessment & Plan:     Diagnoses and all orders for this visit:    1. Pressure injury of skin of buttock, unspecified injury stage, unspecified laterality  -     REFERRAL TO HOME HEALTH    2. Physical debility  -     REFERRAL TO HOME HEALTH  dtr does not feel that they can get pt out for an xray without a medical transport. 3. COPD mixed type (HCC)  -     albuterol (PROVENTIL VENTOLIN) 2.5 mg /3 mL (0.083 %) nebu; 3 mL by Nebulization route every four (4) hours as needed for Wheezing. Reminded pt's dtr to sched pulm f/u    4. Chronic combined systolic and diastolic congestive heart failure ([facility] [address]  Reminded pt's dtr to sched cards f/u    5. Type 2 diabetes mellitus with nephropathy ([facility] [address]  Await labs    6. Essential hypertension  Await labs    7. Stage 3 chronic kidney disease, unspecified whether stage 3a or 3b CKD  Await labs    8. Hyperlipidemia, unspecified hyperlipidemia type  Await labs    9. Pruritus  Resolved. The complexity of medical decision making for this visit is high   Follow-up and Dispositions    · Return in about 6 weeks (around 1/15/2021) for bedsores, joint pain, immobility, lab review. 12  Subjective:   Unable to connect via doxy. Pt's dtr reports that pt is not moving. She has developed a bedsore on her bottom; it is in the center. Pt's children are trying to make her comfortable but feel that they need some help. Pt was ambulatory until about 1 1/2 months ago. She has R hip pain that has stopped her from moving.   They did not think to take her to the ER for eval.  Pt's dtr does not feel they can put her in the car to take her for an xray. They have been unable to get pt out for labs. Home bp readings have been normotensive. Home bs are ranging . Pt's appetite has decreased per dtr. She will eat 3 meals per day but eats smaller portions. They did not make appts with pulm and cards. The itching has improved since last appt. Prior to Admission medications    Medication Sig Start Date End Date Taking? Authorizing Provider   albuterol (PROVENTIL VENTOLIN) 2.5 mg /3 mL (0.083 %) nebu 3 mL by Nebulization route every four (4) hours as needed for Wheezing. 12/4/20  Yes Mamie Grace MD   Ventolin HFA 90 mcg/actuation inhaler INHALE 2 PUFFS BY MOUTH EVERY 4 HOURS AS NEEDED FOR WHEEZING OR SHORTNESS OF BREATH 9/29/20  Yes Provider, Alexandrea   atorvastatin (LIPITOR) 80 mg tablet Take 1 Tab by mouth nightly. 10/19/20  Yes Yefri David MD   amLODIPine (Norvasc) 5 mg tablet Take 1 Tab by mouth daily. 10/19/20  Yes Yefri David MD   hydrALAZINE (APRESOLINE) 100 mg tablet Take 1 Tab by mouth three (3) times daily. 10/19/20  Yes Yefri David MD   fluticasone furoate-vilanteroL (Breo Ellipta) 100-25 mcg/dose inhaler Take 1 Puff by inhalation daily. Indications: bronchospasm prevention with COPD 9/25/20  Yes Brianne Hardin MD   potassium chloride SR (KLOR-CON 10) 10 mEq tablet Take 1 Tab by mouth daily. 9/25/20  Yes Brianne Hardin MD   spironolactone (ALDACTONE) 25 mg tablet Take 1 Tab by mouth daily. 9/25/20  Yes Brianne Hardin MD   gabapentin (NEURONTIN) 100 mg capsule Take 1 Cap by mouth three (3) times daily. Max Daily Amount: 300 mg. 10/30/19 - 1 cap at breakfast, 2 caps at dinner and  bedtime  Indications: neuropathic pain 9/25/20  Yes Brianne Hardin MD   aspirin delayed-release 81 mg tablet Take 1 Tab by mouth daily. Dtr. Lenda Hines pt no longer takes. 9/25/20  Yes Brianne Hardin MD   isosorbide dinitrate (ISORDIL) 10 mg tablet Take 20 mg by mouth three (3) times daily.    Yes Provider, Historical   ipratropium (ATROVENT) 0.02 % soln 0.5 mg by Nebulization route every four (4) hours as needed for Wheezing. Yes Provider, Historical   Oxygen 1 Device by Nasal route as needed (Oxygen supplement). 3L per min as needed   Indications: oxygen supplement   Yes Jacobo, MD Santos   sodium chloride (OCEAN) 0.65 % nasal squeeze bottle 2 Sprays as needed for Congestion. Yes Other, MD Sanots   Blood-Glucose Meter (TRUE METRIX GLUCOSE METER) misc Test glucose 3 times daily DX: E11.40  Patient taking differently: Test glucose 3 times daily DX: E11.40  Indications: blood sugar readings 7/2/18  Yes Lizzette Eaton, DO   Lancets misc Test glucose 3 times daily DX: E11.40  Patient taking differently: Test glucose 3 times daily DX: E11.40  Indications: for blood draws w/ blood sugar readings 7/2/18  Yes Lizzette Eaton DO   glucose blood VI test strips (BLOOD GLUCOSE TEST) strip (True Metrix) Test glucose 3 times daily DX: E11.40 7/2/18  Yes Lizzette Eaton, DO   acetaminophen (TYLENOL EXTRA STRENGTH) 500 mg tablet Take 1,000 mg by mouth every six (6) hours as needed for Pain. Yes Provider, Historical   CARBOXYMETHYLCELLULOS/GLYCERIN (REFRESH OPTIVE OP) Administer 1 Drop to both eyes six (6) times daily.  Indications: eye health   Yes Lizeth Morgan MD     Patient Active Problem List   Diagnosis Code    Essential hypertension I10    Diabetes mellitus ([facility] [address] E11.9    Hyperlipidemia E78.5    Cervical myelopathy ([facility] [address] G95.9    Eczema L30.9    Asthma J45.909    Chronic combined systolic and diastolic congestive heart failure (HCC) I50.42    Bladder prolapse, female, acquired N81.10    IBS (irritable bowel syndrome) K58.9    Osteoporosis M81.0    Migraine G43.909    Anxiety and depression F41.9, F32.9    Pleural effusion J90    Type 2 diabetes mellitus with nephropathy (HCC) E11.21    Hypokalemia E87.6    Acute pulmonary edema (HCC) J81.0    Hypertensive emergency I16.1    Headache R51.9    Acute on chronic systolic (congestive) heart failure ([facility]) I50.23    Type 2 diabetes mellitus with diabetic neuropathy ([facility]) E11.40    Acute non-recurrent maxillary sinusitis J01.00    Dizziness R42    Chronic obstructive pulmonary disease ([facility]) J44.9    Chronic kidney disease, stage 3 ([facility]) N18.30    Fall W19. 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    Sinusitis with nasal polyps J32.9, J33.9    Cardiomyopathy due to hypertension, with heart failure ([facility]) I11.0, I43    Slurred speech R47.81    Left-sided weakness R53.1    Uncontrolled hypertension I10    Ischemic stroke ([facility]) I63.9    Pansinusitis J32.4    CKD (chronic kidney disease) N18.9    COPD mixed type ([facility]) J44.9    Chronic anemia D64.9    On home oxygen therapy Z99.81    Acute bronchitis with chronic obstructive pulmonary disease (COPD) ([facility]) J44.0, J20.9    Acute respiratory failure with hypoxia ([facility]) J96.01     Current Outpatient Medications   Medication Sig Dispense Refill    albuterol (PROVENTIL VENTOLIN) 2.5 mg /3 mL (0.083 %) nebu 3 mL by Nebulization route every four (4) hours as needed for Wheezing. 30 Nebule 3    Ventolin HFA 90 mcg/actuation inhaler INHALE 2 PUFFS BY MOUTH EVERY 4 HOURS AS NEEDED FOR WHEEZING OR SHORTNESS OF BREATH      atorvastatin (LIPITOR) 80 mg tablet Take 1 Tab by mouth nightly. 30 Tab 5    amLODIPine (Norvasc) 5 mg tablet Take 1 Tab by mouth daily. 30 Tab 5    hydrALAZINE (APRESOLINE) 100 mg tablet Take 1 Tab by mouth three (3) times daily. 90 Tab 5    fluticasone furoate-vilanteroL (Breo Ellipta) 100-25 mcg/dose inhaler Take 1 Puff by inhalation daily. Indications: bronchospasm prevention with COPD 1 Inhaler 0    potassium chloride SR (KLOR-CON 10) 10 mEq tablet Take 1 Tab by mouth daily. 30 Tab 0    spironolactone (ALDACTONE) 25 mg tablet Take 1 Tab by mouth daily. 30 Tab 0    gabapentin (NEURONTIN) 100 mg capsule Take 1 Cap by mouth three (3) times daily. Max Daily Amount: 300 mg. 10/30/19 - 1 cap at breakfast, 2 caps at dinner and  bedtime  Indications: neuropathic pain 9 Cap 0    aspirin delayed-release 81 mg tablet Take 1 Tab by mouth daily. Dtr. Micheal Dame pt no longer takes. 30 Tab 0    isosorbide dinitrate (ISORDIL) 10 mg tablet Take 20 mg by mouth three (3) times daily.  ipratropium (ATROVENT) 0.02 % soln 0.5 mg by Nebulization route every four (4) hours as needed for Wheezing.  Oxygen 1 Device by Nasal route as needed (Oxygen supplement). 3L per min as needed   Indications: oxygen supplement      sodium chloride (OCEAN) 0.65 % nasal squeeze bottle 2 Sprays as needed for Congestion.  Blood-Glucose Meter (TRUE METRIX GLUCOSE METER) misc Test glucose 3 times daily DX: E11.40 (Patient taking differently: Test glucose 3 times daily DX: E11.40  Indications: blood sugar readings) 1 Each 0    Lancets misc Test glucose 3 times daily DX: E11.40 (Patient taking differently: Test glucose 3 times daily DX: E11.40  Indications: for blood draws w/ blood sugar readings) 100 Each 12    glucose blood VI test strips (BLOOD GLUCOSE TEST) strip (True Metrix) Test glucose 3 times daily DX: E11.40 100 Strip 12    acetaminophen (TYLENOL EXTRA STRENGTH) 500 mg tablet Take 1,000 mg by mouth every six (6) hours as needed for Pain.  CARBOXYMETHYLCELLULOS/GLYCERIN (REFRESH OPTIVE OP) Administer 1 Drop to both eyes six (6) times daily.  Indications: eye health       Allergies   Allergen Reactions    Codeine Nausea Only    Sulfa (Sulfonamide Antibiotics) Rash    Watermelon Hives     Reported by pt's daughter     Past Medical History:   Diagnosis Date    Anemia     Arthritis     Cardiomyopathy due to hypertension, with heart failure ([facility] [address] 5/19/2017    Cataract     Chronic combined systolic and diastolic congestive heart failure ([facility] [address] 5/3/2018    Chronic lung disease     Chronic obstructive pulmonary disease (HCC)     Chronic systolic congestive heart failure ([facility] [address] 2/3/2014    Diabetes mellitus ([facility] [address]     Difficulty swallowing     Both food and liquid    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 Mild-mod conc LVH. Mod DDfx. LAE. Mild MR.      Hypercholesterolemia     Hypertension     Hypertensive heart disease     Infarction of right thalamus ([facility] [address] 9/17/2017    Lacunar infarct 2017    Joint pain     Joint swelling     Normal nuclear stress test 09/08/2000    No ischemia or prior infarction. Neg EKG on submax EST. Ex time 15:02.  Recurrent boils     Rheumatism     Shortness of breath     Sinusitis with nasal polyps 8/18/2019     Past Surgical History:   Procedure Laterality Date    CARDIAC CATHETERIZATION  5/10/2018         CORONARY ARTERY ANGIOGRAM  5/10/2018         HX CHOLECYSTECTOMY  2001    HX TUBAL LIGATION  1975    MODERATE SEDATION  5/10/2018          Family History   Problem Relation Age of Onset    Hypertension Mother     Ovarian Cancer Mother     Stroke Mother     Heart Attack Father     Breast Cancer Maternal Aunt      Social History     Tobacco Use    Smoking status: Never Smoker    Smokeless tobacco: Never Used    Tobacco comment: second hand smoke exposure as a child   Substance Use Topics    Alcohol use: No       Review of Systems   Constitutional: Negative. HENT: Negative. Respiratory: Negative. Cardiovascular: Negative. Musculoskeletal: Positive for joint pain. Negative for falls. Neurological: Positive for weakness. All other systems reviewed and are negative.       Patient-Reported Vitals 10/19/2020   Patient-Reported Systolic  387   Patient-Reported Diastolic 74        Kylee Ann, who was evaluated through a patient-initiated, synchronous (real-time) audio only encounter, and/or her healthcare decision maker, is aware that it is a billable service, with coverage as determined by her insurance carrier. She provided verbal consent to proceed: n/a- consent obtained within past 12 months. She has not had a related appointment within my department in the past 7 days or scheduled within the next 24 hours.       Total Time: minutes: 11-20 minutes    Armando Elizabeth MD

## 2020-12-31 NOTE — ED PROVIDER NOTES
EMERGENCY DEPARTMENT HISTORY AND PHYSICAL EXAM 
 
3:14 PM 
Date: 12/31/2020 Patient Name: Luz Maria Cantu History of Presenting Illness No chief complaint on file. History Provided By:family, medical transporter HPI: Luz Maria Cantu is a 77 y.o. female with history of COPD on home oxygen, hypertension, CHF, cardiomyopathy, CKD, diabetes, CVA presents in cardiac arrest.  Patient was not eating and drinking for the last 3 days and she was getting transferred by medical transport to [facility] per family request.  Patient seemed to deteriorate en route and by the time she arrived to our facility and was in the process of being transported inside lost pulses. PCP: Porfirio Nunn MD 
 
Past History Past Medical History: 
Past Medical History:  
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 Mild-mod conc LVH. Mod DDfx. LAE. Mild MR.    
 Hypercholesterolemia  Hypertension  Hypertensive heart disease  Infarction of right thalamus ([address] 9/17/2017 Lacunar infarct 2017  Joint pain  Joint swelling  Normal nuclear stress test 09/08/2000 No ischemia or prior infarction. Neg EKG on submax EST. Ex time 15:02.  Recurrent boils  Rheumatism  Shortness of breath  Sinusitis with nasal polyps 8/18/2019 Past Surgical History: 
Past Surgical History:  
Procedure Laterality Date  CARDIAC CATHETERIZATION  5/10/2018  CORONARY ARTERY ANGIOGRAM  5/10/2018  HX CHOLECYSTECTOMY  2001 [facility]  MODERATE SEDATION  5/10/2018 Family History: 
Family History Problem Relation Age of Onset  Hypertension Mother  Ovarian Cancer Mother  Stroke Mother  Heart Attack Father  Breast Cancer Maternal Aunt Social History: 
Social History Tobacco Use  Smoking status: Never Smoker  Smokeless tobacco: Never Used  Tobacco comment: second hand smoke exposure as a child Substance Use Topics  Alcohol use: No  
 Drug use: No  
 
 
Allergies: Allergies Allergen Reactions  Codeine Nausea Only  Sulfa (Sulfonamide Antibiotics) Rash  Watermelon Hives Reported by pt's daughter Review of Systems Review of Systems Unable to perform ROS: Acuity of condition Physical Exam  
 
Patient Vitals for the past 12 hrs: 
 Pulse Resp SpO2  
12/31/20 1503 (!) 0 (!) 0   
12/31/20 1500 (!) 39    
12/31/20 1459 (!) 50    
12/31/20 1458 (!) 59    
12/31/20 1446 (!) 102    
12/31/20 1444 (!) 44    
12/31/20 1444 96 13 92 % 12/31/20 1421 (!) 43    
12/31/20 1417 (!) 44 19 (!) 12 % 12/31/20 1415 93 25 (!) 5 % Physical Exam 
 
CONSTITUTIONAL:Cachectic. Patient is NOT intubated. HEAD: Normocephalic, atraumatic. EYES: Pupils are fixed. Eyelids, conjunctiva, iris, and sclera are normal. 
EARS: External ears are normal. 
NOSE: The nose is normal in appearance. MOUTH/DENTAL: The lips are dusky in coloration, gums, and teeth appear normal.  There are no exudates or erosions on the buccal mucosa. The uvula is mid-line. The posterior pharynx is free from erythema and exudates. NECK: The trachea is mid-line. The neck is supple and non-tender to palpation. There is no cervical lymphadenopathy. There is no JVD. CHEST: No evidence of trauma or deformity. Non-tender to palpation. No crepitus or paradoxical movements. Chest excursion is non-existent. CARDIOVASCULAR: The heart is in complete stand-still. LUNGS:. No spontaneous respirations. GI/ABDOMEN: Soft PELVIS: No evidence of trauma or deformity. MUSCULOSKELETAL: There are no deformities noted in all four extremities. Pulseless. INTEGUMENTARY: The skin is dusky and cool. NEUROLOGICAL: Unresponsive. PSYCHOLOGICAL: Unresponsive. Diagnostic Study Results Labs - No results found for this or any previous visit (from the past 12 hour(s)). Radiologic Studies - No results found. Medical Decision Making ED Course: Progress Notes, Reevaluation, and Consults: 
 
3:14 PM Initial assessment performed. The patients presenting problems have been discussed, and they/their family are in agreement with the care plan formulated and outlined with them. I have encouraged them to ask questions as they arise throughout their visit. Provider Notes (Medical Decision Making): 
Patient arrived in cardiac arrest, CPR initiated in hallway. Patient moved to room 6, which was only room available at the time Patient in asystole CPR ongoing. Patient intubated as described below Patient had CPR for about 55 minutes Briefly on pulseless V. tach. Was shocked once and received amiodarone. Patient in junctional rhythm or asystole for the rest of the time Received multiple rounds of epinephrine, calcium, sodium bicarb, magnesium, dextrose Had short periods where she regained pulses but reverted to cardiac arrest 
Patient pronounced death at 3:03. Lack of cardiac activity was confirmed with bedside ultrasound. Had two phone calls with patient's daughter Emiliano Shea, one to discuss with her process of resuscitation and another to inform here about patient's death. Encouraged her to come in person to the hospital.  Discussed also with  who was going to call her as well and offer support. Intubation Date/Time: 12/31/2020 3:19 PM 
Performed by: Jeanice Goodell, MD 
Authorized by: Jeanice Goodell, MD  
 
Consent:  
  Consent obtained:  Emergent situation Pre-procedure details:  
  Patient status:  Unresponsive Pretreatment medications:  None Procedure details:  
  Preoxygenation:  Bag valve mask CPR in progress: yes Intubation method:  Oral 
  Laryngoscope blade: Mac 3 Tube size (mm):  7.0 Number of attempts:  1 Ventilation between attempts: no   
  Tube visualized through cords: yes Placement assessment: ETT to lip:  24 Tube secured with:  ETT borges Breath sounds:  Equal 
  Placement verification: chest rise and direct visualization Post-procedure details:  
  Patient tolerance of procedure: Tolerated well, no immediate complications Critical care: CRITICAL CARE: 
 
I have spent 70 minutes of critical care time involved in lab review, consultations with specialist, family decision-making, and documentation. This time does not include separately billable procedures. During this entire length of time I was immediately available to the patient. Critical Care: The reason for providing this level of medical care for this critically ill patient was due a critical illness that impaired one or more vital organ systems such that there was a high probability of imminent or life threatening deterioration in the patients condition. This care involved high complexity decision making to assess, manipulate, and support vital system functions, to treat this degreee vital organ system failure and to prevent further life threatening deterioration of the patients condition. Vital Signs-Reviewed the patient's vital signs. Reviewed pt's pulse ox reading. Records Reviewed:  old medical records (Time of Review: 3:14 PM) 
-I am the first provider for this patient. 
-I reviewed the vital signs, available nursing notes, past medical history, past surgical history, family history and social history. Current Facility-Administered Medications Medication Dose Route Frequency Provider Last Rate Last Admin  dextrose (D50W) 50% injection syrg Current Outpatient Medications Medication Sig Dispense Refill  albuterol (PROVENTIL VENTOLIN) 2.5 mg /3 mL (0.083 %) nebu 3 mL by Nebulization route every four (4) hours as needed for Wheezing. 30 Nebule 3  Ventolin HFA 90 mcg/actuation inhaler INHALE 2 PUFFS BY MOUTH EVERY 4 HOURS AS NEEDED FOR WHEEZING OR SHORTNESS OF BREATH    
 atorvastatin (LIPITOR) 80 mg tablet Take 1 Tab by mouth nightly. 30 Tab 5  
 amLODIPine (Norvasc) 5 mg tablet Take 1 Tab by mouth daily. 30 Tab 5  hydrALAZINE (APRESOLINE) 100 mg tablet Take 1 Tab by mouth three (3) times daily. 90 Tab 5  
 fluticasone furoate-vilanteroL (Breo Ellipta) 100-25 mcg/dose inhaler Take 1 Puff by inhalation daily. Indications: bronchospasm prevention with COPD 1 Inhaler 0  
 potassium chloride SR (KLOR-CON 10) 10 mEq tablet Take 1 Tab by mouth daily. 30 Tab 0  
 spironolactone (ALDACTONE) 25 mg tablet Take 1 Tab by mouth daily. 30 Tab 0  
 gabapentin (NEURONTIN) 100 mg capsule Take 1 Cap by mouth three (3) times daily. Max Daily Amount: 300 mg. 10/30/19 - 1 cap at breakfast, 2 caps at dinner and  bedtime  Indications: neuropathic pain 9 Cap 0  
 aspirin delayed-release 81 mg tablet Take 1 Tab by mouth daily. Dtr. Powers Handler pt no longer takes. 30 Tab 0  
 isosorbide dinitrate (ISORDIL) 10 mg tablet Take 20 mg by mouth three (3) times daily.  ipratropium (ATROVENT) 0.02 % soln 0.5 mg by Nebulization route every four (4) hours as needed for Wheezing.  Oxygen 1 Device by Nasal route as needed (Oxygen supplement). 3L per min as needed   Indications: oxygen supplement  sodium chloride (OCEAN) 0.65 % nasal squeeze bottle 2 Sprays as needed for Congestion.  Blood-Glucose Meter (TRUE METRIX GLUCOSE METER) misc Test glucose 3 times daily DX: E11.40 (Patient taking differently: Test glucose 3 times daily DX: E11.40  Indications: blood sugar readings) 1 Each 0  
 Lancets misc Test glucose 3 times daily DX: E11.40 (Patient taking differently: Test glucose 3 times daily DX: E11.40  Indications: for blood draws w/ blood sugar readings) 100 Each 12  
 glucose blood VI test strips (BLOOD GLUCOSE TEST) strip (True Metrix) Test glucose 3 times daily DX: E11.40 100 Strip 12  
 acetaminophen (TYLENOL EXTRA STRENGTH) 500 mg tablet Take 1,000 mg by mouth every six (6) hours as needed for Pain.  CARBOXYMETHYLCELLULOS/GLYCERIN (REFRESH OPTIVE OP) Administer 1 Drop to both eyes six (6) times daily. Indications: eye health Clinical Impression Clinical Impression: No diagnosis found. Disposition:  This note was dictated utilizing voice recognition software which may lead to typographical errors. I apologize in advance if the situation occurs. If questions arise please do not hesitate to contact me or call our department.  
 
Artemio Chase MD 
3:14 PM

## 2020-12-31 NOTE — PROGRESS NOTES
sought to offer pastoral/spiritual support to family  following Death of  Kenzie Veronica, who was a 77 y.o.,female. The  provided the following Interventions: 
Left voicemail offeriing spiritual support to patient's daughter, Sam Cogan, following her  Mother's death. Offered silent prayers on behalf of the family member Chart reviewed. Plan: The daughter has been encouraged to call the [facility] and request a  or call the spiritual care dept if desired for pastoral care or initial grief support. According to [facility] has also been informed of her need to to contact the nursing supervisor to provide the name of the family's chosen  home. Chaplains will continue to follow and will provide pastoral care on an as needed/requested basis and grief support for the family. Chaplain Belkys Iniguez Spiritual Care  
[phone]

## 2020-12-31 NOTE — PROGRESS NOTES
Responded to [employer]. Time of Death 15:03. Dr. Sabi Beck contacted her daughter, Lindsey Soliman ([phone])  and advised her of her mother's passing. He asked her if she wanted to talk to a , she was too distraught to talk at the time. Dr. Nai Alejo asked if someone from the 's office could call her back in approximately 30 minutes. DALY Hanna

## 2020-12-31 NOTE — ED NOTES
PT arrived via [facility] transport, per medic, pt was on way to [facility] for failure to thrive. Medics lost pulse on way, started CPR.  
Pt has pulse upon arrival

## 2021-06-18 NOTE — PROGRESS NOTES
Visit Vitals    BP (!) 174/95 (BP 1 Location: Left arm, BP Patient Position: At rest)    Pulse 67    Temp 98.2 °F (36.8 °C)    Resp 22    Ht 5' 9\" (1.753 m)    Wt 89.6 kg (197 lb 8 oz)    SpO2 98%    BMI 29.17 kg/m2     Patient in NAD, denies any CP.  Monitor BP 48 y/o female with pmhx of GERD and HLD presents to ED c/o chest pain x 3 days. Intermittent, radiates to neck, and left upper back. Sometimes associated with SOB and nausea. No diaphoresis, dizziness, or syncope. No palpitations. Pt states her symptoms have resolved. Family hx of two brothers having MIs in their 50s. No ocps or estrogen use, recent travel, surgeries, hospitalizations, le edema, calf pain, hx of dvt/pe. COVID vaccinated. No cardiologist, stress test normal over a year ago. No fevers, chills, abd pain, vomiting. Nonsmoker. pt well appearing, nad, hemodynamically stable. ekg with no signs of ischemia, troponin negative x 1. sent to [facility] for r/o acs- stress test, echo and tele monitoring

## 2021-07-26 NOTE — PROGRESS NOTES
[facility] - Progress Note  Admit Date: 9/22/2017    Assessment:     Hospital Problems  Date Reviewed: 2/3/2014          Codes Class Noted POA    Chest pain ICD-10-CM: R07.9  ICD-9-CM: 786.50  9/22/2017 Unknown                -Hypertensive cardiovascular disease, concentric left ventricular hypertrophy  -Mild depression left ventricular ejection fraction.  Global left ventricular hypokinesis with ejection fraction 40-45%. -Mixed Congestive heart failure.  -Abnormal EKG consistent with possible prior anteroseptal myocardial infarction.  No wall motion abnormalities to support this diagnosis. -Hypertension, uncontrolled  -Bilateral pleural effusions.  -DM, A1c 8.1 9/25/17  -HLP      Plan: Will proceed with nuclear stress test this AM.  BP improving with recent adjustments. Continued on coreg, hydral, nitrates, diovan, hctz, aldactone. Output not documented, discussed with nursing staffm continued on IV lasix, will check follow up renal function. Continued on ASA and statin. Subjective:     Mild SOB. No CP.      Objective:      Patient Vitals for the past 8 hrs:   Temp Pulse Resp BP SpO2   09/26/17 0356 99.4 °F (37.4 °C) 77 18 142/73 98 %         Patient Vitals for the past 96 hrs:   Weight   09/26/17 0356 91.7 kg (202 lb 1.6 oz)   09/25/17 0511 89.1 kg (196 lb 8 oz)   09/24/17 0334 91.1 kg (200 lb 12.8 oz)   09/23/17 0145 89.6 kg (197 lb 8 oz)   09/22/17 1402 83 kg (183 lb)                    Intake/Output Summary (Last 24 hours) at 09/26/17 0846  Last data filed at 09/25/17 1750   Gross per 24 hour   Intake             1080 ml   Output                0 ml   Net             1080 ml       Physical Exam:  General:  alert, cooperative, no distress, appears stated age  Neck:  no JVD  Lungs:  clear to auscultation bilaterally  Heart:  regular rate and rhythm  Abdomen:  abdomen is soft without significant tenderness, masses, organomegaly or guarding  Extremities:  extremities normal, atraumatic, no cyanosis or edema    Data Review:     Labs: Results:       Chemistry Recent Labs      09/25/17   0020   GLU  146*   NA  139   K  3.2*   CL  102   CO2  34*   BUN  27*   CREA  1.26   CA  8.5   AGAP  3   BUCR  21*      CBC w/Diff Recent Labs      09/25/17   0020   WBC  3.8*   RBC  3.80*   HGB  10.3*   HCT  33.0*   PLT  133*   GRANS  57   LYMPH  21   EOS  14*      Cardiac Enzymes No results found for: CPK, CK, CKMMB, CKMB, RCK3, CKMBT, CKNDX, CKND1, DAYNA, TROPT, TROIQ, EUN, TROPT, TNIPOC, BNP, BNPP   Coagulation Recent Labs      09/25/17   0020   PTP  12.9   INR  1.0       Lipid Panel Lab Results   Component Value Date/Time    Cholesterol, total 238 03/28/2011 09:00 AM    HDL Cholesterol 56 03/28/2011 09:00 AM    LDL, calculated 162.4 03/28/2011 09:00 AM    VLDL, calculated 19.6 03/28/2011 09:00 AM    Triglyceride 98 03/28/2011 09:00 AM    CHOL/HDL Ratio 4.3 03/28/2011 09:00 AM      BNP Lab Results   Component Value Date/Time    B-type Natriuretic Peptide 223.7 04/12/2014 12:00 AM      Liver Enzymes No results for input(s): TP, ALB, TBIL, AP, SGOT, GPT in the last 72 hours.     No lab exists for component: DBIL   Digoxin    Thyroid Studies Lab Results   Component Value Date/Time    T4, Total 9.1 12/23/2009 11:33 AM    TSH 0.85 03/28/2011 09:00 AM          Signed By: ROSEANNE Slater     September 26, 2017 [Alert] : alert [Oriented x 3] : ~L oriented x 3 [Well Nourished] : well nourished [Conjunctiva Non-injected] : conjunctiva non-injected [No Visual Lymphadenopathy] : no visual  lymphadenopathy [No Clubbing] : no clubbing [No Edema] : no edema [No Bromhidrosis] : no bromhidrosis [No Chromhidrosis] : no chromhidrosis [FreeTextEntry3] : pih inframammary\par superior gluteal cleft with blanching moderately defined erythema

## 2021-10-02 NOTE — PROGRESS NOTES
NUTRITION    Nursing Referral: [facility]     RECOMMENDATIONS / PLAN:     - No nutrition intervention indicated at this time. Will re-screen as appropriate. NUTRITION INTERVENTIONS & DIAGNOSIS:     Nutrition Diagnosis: No nutrition diagnosis at this time. ASSESSMENT:     Pt hungry and ready to eat, ate 100% of lunch. Average po intake adequate to meet patients estimated nutritional needs:   [x] Yes     [] No   [] Unable to determine at this time    Diet: DIET DIABETIC CONSISTENT CARB Regular      Food Allergies: NKFA  Current Appetite:   [x] Good     [] Fair     [] Poor     [] Other:  Appetite/meal intake prior to admission:   [] Good     [] Fair     [x] Poor x 6 days     [] Other:  Feeding Limitations:  [] Swallowing difficulty    [] Chewing difficulty    [] Other:  Current Meal Intake: Patient Vitals for the past 100 hrs:   % Diet Eaten   05/07/18 1325 240 %   05/07/18 1311 0 %   05/07/18 1157 0 %   05/07/18 0911 0 %   05/06/18 1833 40 %   05/06/18 1300 40 %   05/06/18 0900 40 %     BM:  5/7  Skin Integrity: WDL  Edema: none   Pertinent Medications: Reviewed    Recent Labs      05/07/18   0524  05/06/18   1010  05/06/18   0127   NA  141  143  143   K  3.8  4.0  2.9*   CL  108  109*  109*   CO2  29  29  29   GLU  85  168*  126*   BUN  27*  25*  27*   CREA  1.55*  1.22  1.08   CA  7.3*  7.6*  7.7*   MG   --   2.0  2.1   PHOS   --   2.9  2.6   ALB   --    --   2.4*   SGOT   --    --   36   ALT   --    --   78*       Intake/Output Summary (Last 24 hours) at 05/07/18 1529  Last data filed at 05/07/18 1325   Gross per 24 hour   Intake              400 ml   Output              180 ml   Net              220 ml       Anthropometrics:  Ht Readings from Last 1 Encounters:   05/05/18 5' 9\" (1.753 m)     Last 3 Recorded Weights in this Encounter    05/05/18 0933 05/06/18 0400 05/07/18 0420   Weight: 78.9 kg (174 lb) 84.4 kg (186 lb 1.1 oz) 92.4 kg (203 lb 9.6 oz)     Body mass index is 30.07 kg/(m^2).       Obese, Class I     Weight History: patient reports usual weight of 174 lb     Weight Metrics 5/7/2018 2/8/2018 1/26/2018 12/21/2017 11/17/2017 11/3/2017 9/27/2017   Weight 203 lb 9.6 oz 179 lb 182 lb 206 lb 213 lb 218 lb 209 lb 9.6 oz   BMI 30.07 kg/m2 26.43 kg/m2 26.86 kg/m2 30.42 kg/m2 31.45 kg/m2 32.18 kg/m2 30.95 kg/m2        Admitting Diagnosis: Hypertensive emergency  Acute on chronic systolic (congestive) heart failure (HCC)  Headache  Acute pulmonary edema (HCC)  Hypokalemia  Pertinent PMHx: COPD, DM, HTN, HLD    Education Needs:        [x] None identified  [] Identified - Not appropriate at this time  []  Identified and addressed - refer to education log  Learning Limitations:   [x] None identified  [] Identified    Cultural, Tenriism & ethnic food preferences:  [x] None identified    [] Identified and addressed     ESTIMATED NUTRITION NEEDS:     Calories: 2440-3522 kcal (MSJx1.2-1.3) based on  [x] Actual BW 92 kg     [] IBW   Protein: 74-92 gm (0.8-1 gm/kg) based on  [x] Actual BW      [] IBW   Fluid: 1 mL/kcal     MONITORING & EVALUATION:     Nutrition Goal(s):   1. Po intake of meals will meet >75% of patient estimated nutritional needs within the next 7 days.   Outcome:  [] Met/Ongoing    []  Not Met    [x] New/Initial Goal     Monitoring:   [x] Food and beverage intake   [x] Diet order   [x] Nutrition-focused physical findings   [x] Treatment/therapy   [] Weight   [] Enteral nutrition intake        Previous Recommendations (for follow-up assessments only):     []   Implemented       []   Not Implemented (RD to address)      [] No Longer Appropriate     [] No Recommendation Made     Discharge Planning: cardiac, diabetic diet   [x] Participated in care planning, discharge planning, & interdisciplinary rounds as appropriate      Anayeli Mondragon, 64 [address], [address] [place] [address]    Pager: [phone] I will SWITCH the dose or number of times a day I take the medications listed below when I get home from the hospital:  None

## 2023-07-18 NOTE — ED NOTES
I have reviewed discharge instructions with the patient. The patient verbalized understanding. Impression: Age-related nuclear cataract, bilateral: H25.13. Plan: Cataracts account for patient visual complaints and decrease in ADL's.  expect surgery to improve functional vision. Glasses do not adequately correct vision. Discussed diagnosis in detail with patient. Discussed treatment options with patient. Patient elects to have surgery. Surgical risks and benefits were discussed, explained and understood by patient. OS then OD std iol aim plano OD  plano OS ok dextenza.   order ascan/optical biometry for iol calculation interested in pan optix iol

## 2023-08-08 NOTE — PROGRESS NOTES
HISTORY OF PRESENT ILLNESS Emilia Watt is a 75 y.o. female referred for shortness of breath. Pt carries a diagnosis of COPD for years, with complaints of shortness of breath and wheezing. She presents with worsening shortness of breath over the past 6 months, sometimes associated with wheezing. She also complains of a chronic cough occasionally productive of mucoid phlegm. Pt was started on Dulera and Albuterol 3 years ago with moderate response, however Nevin Vaz is about to run out due to formulary issues. She was also started on O2 which she uses HS and prn. She has had difficulty carrying O2 tanks due to difficulty ambulating even with a walker. Lately though, pt has been using a mask for O2 due to nasal blockage from polyps. She has occasional 2 pillow orthopnea, chronic pedal edema and PND. PMH is significant for systolic heart failure, latest Echo is below. Review of Systems Constitutional: Positive for malaise/fatigue. Negative for chills, diaphoresis, fever and weight loss. HENT: Positive for congestion. Negative for ear discharge, ear pain, hearing loss, nosebleeds, sinus pain, sore throat and tinnitus. Eyes: Negative for blurred vision, double vision, photophobia, pain, discharge and redness. Respiratory: Positive for cough, sputum production, shortness of breath and wheezing. Negative for hemoptysis and stridor. Cardiovascular: Positive for PND. Negative for chest pain, palpitations and claudication. Orthopnea: occasional. Leg swelling: chronic. Gastrointestinal: Negative for abdominal pain, blood in stool, constipation, diarrhea, heartburn, melena, nausea and vomiting. Genitourinary: Negative for dysuria, flank pain, frequency, hematuria and urgency. Musculoskeletal: Negative for back pain, falls, joint pain, myalgias and neck pain. Skin: Negative for itching and rash. Neurological: Positive for sensory change.  Negative for dizziness, tingling, tremors, speech change, focal weakness, seizures, loss of consciousness, weakness and headaches. Endo/Heme/Allergies: Negative for environmental allergies and polydipsia. Does not bruise/bleed easily. Psychiatric/Behavioral: Negative for depression, hallucinations, memory loss, substance abuse and suicidal ideas. The patient is not nervous/anxious and does not have insomnia. Past Medical History:  
Diagnosis Date  Anemia  Arthritis  Cataract  Chronic lung disease  Chronic obstructive pulmonary disease ([facility] [address]  Diabetes mellitus ([facility] [address]  Difficulty swallowing Both food and liquid  History of echocardiogram 12/29/2009 EF 50%. Mild-mod conc LVH. Mod DDfx. LAE. Mild MR.    
 Hypercholesterolemia  Hypertension  Hypertensive heart disease  Joint pain  Joint swelling  Normal nuclear stress test 09/08/2000 No ischemia or prior infarction. Neg EKG on submax EST. Ex time 15:02.  Recurrent boils  Rheumatism  Shortness of breath Past Surgical History:  
Procedure Laterality Date  CARDIAC CATHETERIZATION  5/10/2018  CORONARY ARTERY ANGIOGRAM  5/10/2018  HX CHOLECYSTECTOMY  [id number] [facility]  MODERATE SEDATION  5/10/2018 Current Outpatient Medications on File Prior to Visit Medication Sig Dispense Refill  amLODIPine (NORVASC) 5 mg tablet Take 1 Tab by mouth two (2) times a day. 180 Tab 3  
 isosorbide dinitrate (ISORDIL) 10 mg tablet Take 2 Tabs by mouth three (3) times daily. 1 Tab 0  
 spironolactone (ALDACTONE) 25 mg tablet Take 1 Tab by mouth two (2) times a day. 180 Tab 3  
 Oxygen 3L per min as needed  sodium chloride (OCEAN) 0.65 % nasal squeeze bottle 2 Sprays as needed for Congestion.  albuterol (VENTOLIN HFA) 90 mcg/actuation inhaler Take 2 Puffs by inhalation every four (4) hours as needed for Wheezing or Shortness of Breath.  1 Inhaler 5  
  hydrALAZINE (APRESOLINE) 100 mg tablet Take 1 Tab by mouth three (3) times daily. 270 Tab 3  Blood-Glucose Meter (TRUE METRIX GLUCOSE METER) misc Test glucose 3 times daily DX: E11.40 1 Each 0  
 Lancets misc Test glucose 3 times daily DX: E11.40 100 Each 12  
 glucose blood VI test strips (BLOOD GLUCOSE TEST) strip (True Metrix) Test glucose 3 times daily DX: E11.40 100 Strip 12  
 insulin glargine (LANTUS,BASAGLAR) 100 unit/mL (3 mL) inpn 7 Units by SubCUTAneous route in the morning. 15 units in the evening. (Patient taking differently: 12 Units by SubCUTAneous route in the morning. 7 units in the evening.) 5 Pen 12  
 furosemide (LASIX) 20 mg tablet Take 1 Tab by mouth two (2) times a day. 60 Tab 8  acetaminophen (TYLENOL EXTRA STRENGTH) 500 mg tablet Take 1,000 mg by mouth every six (6) hours as needed for Pain.  CARBOXYMETHYLCELLULOS/GLYCERIN (REFRESH OPTIVE OP) Administer 1 Drop to both eyes six (6) times daily.  aspirin delayed-release 81 mg tablet Take 81 mg by mouth daily.  pravastatin (PRAVACHOL) 20 mg tablet Take 20 mg by mouth nightly.  meloxicam (MOBIC) 7.5 mg tablet Take 7.5 mg by mouth two (2) times a day.  ondansetron hcl (ZOFRAN) 4 mg tablet Take 1 Tab by mouth every eight (8) hours as needed for Nausea. 10 Tab 0 No current facility-administered medications on file prior to visit. Family History Problem Relation Age of Onset  Hypertension Mother  Ovarian Cancer Mother  Stroke Mother  Heart Attack Father  Breast Cancer Maternal Aunt Social History Socioeconomic History  Marital status:  Spouse name: Not on file  Number of children: Not on file  Years of education: Not on file  Highest education level: Not on file Occupational History  Not on file Social Needs  Financial resource strain: Not on file  Food insecurity:  
  Worry: Not on file Inability: Not on file  Transportation needs:  
  Medical: Not on file Non-medical: Not on file Tobacco Use  Smoking status: Never Smoker  Smokeless tobacco: Never Used  Tobacco comment: second hand smoke exposure as a child Substance and Sexual Activity  Alcohol use: No  
 Drug use: No  
 Sexual activity: Never Lifestyle  Physical activity:  
  Days per week: Not on file Minutes per session: Not on file  Stress: Not on file Relationships  Social connections:  
  Talks on phone: Not on file Gets together: Not on file Attends Congregation service: Not on file Active member of club or organization: Not on file Attends meetings of clubs or organizations: Not on file Relationship status: Not on file  Intimate partner violence:  
  Fear of current or ex partner: Not on file Emotionally abused: Not on file Physically abused: Not on file Forced sexual activity: Not on file Other Topics Concern  Not on file Social History Narrative Pt used to be a  for the school system. Blood pressure 140/70, pulse 85, temperature 97.2 °F (36.2 °C), temperature source Oral, resp. rate 21, height 5' 9\" (1.753 m), weight 76.2 kg (168 lb), SpO2 97 %. Ambulatory oxymetry per nurse note. Physical Exam  
Constitutional: She is oriented to person, place, and time. She appears well-developed and well-nourished. No distress. Appears frail, uses a walker HENT:  
Head: Normocephalic and atraumatic. Nose: Nose normal.  
Mouth/Throat: Oropharynx is clear and moist. No oropharyngeal exudate. Eyes: Pupils are equal, round, and reactive to light. Conjunctivae and EOM are normal. Right eye exhibits no discharge. Left eye exhibits no discharge. No scleral icterus. Neck: No JVD present. No tracheal deviation present. No thyromegaly present.   
Cardiovascular: Normal rate, regular rhythm, normal heart sounds and intact distal pulses. Exam reveals no gallop and no friction rub. No murmur heard. Frequent PAC's Pulmonary/Chest: Effort normal. No stridor. No respiratory distress. Wheezes: faint bilateral mid lung field  She has no rales. She exhibits no tenderness. Decreased breath sounds both bases Abdominal: Soft. She exhibits no mass. There is no tenderness. There is no rebound. Musculoskeletal: She exhibits no tenderness or deformity. Edema: 2+ pitting. Lymphadenopathy:  
  She has no cervical adenopathy. Neurological: She is alert and oriented to person, place, and time. Coordination normal.  
Skin: Skin is warm and dry. No rash noted. She is not diaphoretic. No erythema. No pallor. Psychiatric: She has a normal mood and affect. Her behavior is normal. Judgment and thought content normal.  
 
PFT: reduced FEV1 with normal ratio, mild restriction TLC 76%, DLCO reduced to 62% 07/17/18 ECHO ADULT FOLLOW-UP OR LIMITED 07/17/2018 7/17/2018 Narrative · Left ventricular low normal systolic function. Estimated left  
ventricular ejection fraction is 51 - 55%. Left ventricular severe  
concentric hypertrophy observed. Normal left ventricular wall motion, no  
regional wall motion abnormality noted. Signed by: Estuardo Davis DO Chest PA L personal review: persistent effusion L with apparent decrease in volume ASSESSMENT and PLAN Encounter Diagnoses Name Primary?  Mild persistent asthma, unspecified whether complicated Yes  Systolic heart failure, unspecified HF chronicity ([facility] [address]  Chronic bilateral pleural effusions  Chronic kidney disease, unspecified CKD stage  Hypoxemia requiring supplemental oxygen Suspect SOB is more related to Asthma rather than COPD, with superimposed cardiac issues causing fluid overload and pleural effusions. Spirometry is not consistent with COPD but cannot rule out Asthma.  Reduced FEV1 with a normal ratio is more consistent with restrictive physiology. This is consistent with mild restriction and reduced DLCO on today's study. As pt with response to LABA/ICS, will start Breo 100. Sample given, device instruction as well. Rx sent. Would also suspect that nasal polyps are contributing to pt's dyspnea, as well as interfering with proper O2 delivery via nasal cannula. Pt to follow up with ENT for possible resection. Pt to continue supplemental O2. DME for POC evaluation. Reviewed CXR with pt and daughter, will monitor effusion but at this time see no strong indication for thoracentesis. Management of HF per cardiology. Follow up in 6 months. no

## 2024-01-31 NOTE — PROGRESS NOTES
Emergency Department Encounter    Patient: Cindy Soto  MRN: [id number]  : 1969  Date of Evaluation: 2024  ED Provider:  Kelley Barragan DO    Triage Chief Complaint:   Shortness of Breath (Tested positive for Covid 24.  Symptoms started on 24.  Tested at [facility].   evening started Paxlovid.  States tonight she feels like she can't take a deep breath, + lightheadedness and anxiety.  )    [place]:  Cindy Soto is a 54 y.o. female with history of hypertension morbid obesity dyslipidemia coronary artery disease with stenting diabetes type 2 insomnia shortness of breath palpitations acid reflux history of cellulitis that presents to the emergency department via EMS for shortness of breath lightheaded feeling anxious feels like she cannot get a deep breath.  Patient states diagnosed with COVID-19 virus infection this past  2 to 3 days ago.  Patient states she started and short of breath yesterday.  She states chest feels like she unable to catch her breath of her cannot take a deep breath.  Patient states no history of any asthma COPD emphysema no home oxygen.  Patient states there is send started on that Paxlovid medication for COVID she just has not felt right.  He states no fevers and chills has been sleep for 24 hours.  She states she is feeling anxious no earache sore throat runny nose.  Patient here for evaluation.    ROS - see HPI, below listed is current ROS at time of my eval:  General:  No fevers, no chills, no weakness  Eyes:  No recent vison changes, no discharge  ENT:  No sore throat, no nasal congestion, no hearing changes  Cardiovascular:  No chest pain, no palpitations  Respiratory: Positive for shortness of breath, no cough, no wheezing  Gastrointestinal:  No pain, no nausea, no vomiting, no diarrhea  Musculoskeletal:  No muscle pain, no joint pain  Skin:  No rash, no pruritis, no easy bruising  Neurologic:  No speech problems, no headache, no  PATIENT NAME: Chevy Sutton         57 y.o.      1955              DATE:12/21/2017    REASON FOR VISIT: Congestive heart failure    HISTORY OF PRESENT ILLNESS: Improving slowly. 8 pound weight loss since last visit. Breathing somewhat improved although she does remain short of breath on low levels of exertion. Denies chest pain. PAST MEDICAL HISTORY:   Past Medical History:  No date: Arthritis  No date: Asthma  No date: Cataract  No date: Diabetes mellitus ([facility] [address]  12/29/2009: History of echocardiogram      Comment: EF 50%. Mild-mod conc LVH. Mod DDfx. LAE. Mild MR. No date: Hypercholesterolemia  No date: Hypertension  No date: Hypertensive heart disease  09/08/2000: Normal nuclear stress test      Comment: No ischemia or prior infarction. Neg EKG on                submax EST. Ex time 15:02. PAST SURGICAL HISTORY:   Past Surgical History:  2001: HX CHOLECYSTECTOMY  1975: HX TUBAL LIGATION      SOCIAL HISTORY:  Social History    Marital status:             Spouse name:                       Years of education:                 Number of children:               Social History Main Topics    Smoking status: Never Smoker                                                                Smokeless status: Never Used                        Alcohol use: No              Drug use: No                ALLERGIES:    -- Codeine -- Nausea Only   -- Sulfa (Sulfonamide Antibiotics) -- Rash     CURRENT MEDICATIONS:   Current Outpatient Prescriptions:  furosemide (LASIX) 80 mg tablet, Take 1 Tab by mouth daily. hydrALAZINE (APRESOLINE) 100 mg tablet, Take 1 Tab by mouth three (3) times daily. carvedilol (COREG) 25 mg tablet, Take 1 Tab by mouth two (2) times daily (with meals). MULTIVIT/IRON/FA/K/HERB NO.244 (ALIVE WOMEN'S ENERGY PO), Take 1 Tab by mouth daily. insulin lispro (HUMALOG) 100 unit/mL kwikpen, 15 Units by SubCUTAneous route daily.   CARBOXYMETHYLCELLULOS/GLYCERIN (REFRESH OPTIVE OP), Administer 1 Drop to both eyes six (6) times daily. cefdinir (OMNICEF) 300 mg capsule, Take 300 mg by mouth two (2) times a day. albuterol (VENTOLIN HFA) 90 mcg/actuation inhaler, Take 2 Puffs by inhalation every four (4) hours as needed for Wheezing or Shortness of Breath.  gabapentin (NEURONTIN) 100 mg capsule, 1 cap po twice daily with breakfast and dinner, and 3 tabs po qHS  fluticasone (FLONASE) 50 mcg/actuation nasal spray, 2 Sprays by Both Nostrils route daily. isosorbide dinitrate (ISORDIL) 10 mg tablet, Take 1 Tab by mouth three (3) times daily. sodium chloride (OCEAN) 0.65 % nasal spray, 2 Sprays by Both Nostrils route every two (2) hours as needed. valsartan (DIOVAN) 160 mg tablet, Take 1 Tab by mouth daily. aspirin delayed-release 81 mg tablet, Take 81 mg by mouth daily. pravastatin (PRAVACHOL) 20 mg tablet, Take 20 mg by mouth nightly. spironolactone (ALDACTONE) 25 mg tablet, Take 12.5 mg by mouth daily. mometasone-formoterol (DULERA) 200-5 mcg/actuation HFA inhaler, Take 2 Puffs by inhalation two (2) times a day. insulin glargine (LANTUS) 100 unit/mL injection, 45 Units by SubCUTAneous route daily. 15 units in the evening. No current facility-administered medications for this visit. REVIEW of SYSTEMS:Cardiovascular ROS: See history of present illness     PHYSICAL EXAMINATION:   BP (!) 150/100  Pulse 70  Ht 5' 9\" (1.753 m)  Wt 93.4 kg (206 lb)  SpO2 98%  BMI 30.42 kg/m2  BP Readings from Last 3 Encounters:  12/21/17 : (!) 150/100  11/17/17 : (!) 190/102  11/03/17 : (!) 170/110    Pulse Readings from Last 3 Encounters:  12/21/17 : 70  11/17/17 : 67  11/03/17 : 82    Wt Readings from Last 3 Encounters:  12/21/17 : 93.4 kg (206 lb)  11/17/17 : 96.6 kg (213 lb)  11/03/17 : 98.9 kg (218 lb)    Neck: No jugular venous distention. Chest: Clear to auscultation. Heart: Regular rhythm. No gallop.   Extremities: No edema      IMPRESSION:   Congestive heart failure, slowly improving  Hypertension, improved but still poorly controlled    PLAN: Basic metabolic panel. Increase hydralazine to 100 mg 4 times daily  Return to office in 4 weeks    The diagnoses and plan were discussed with patient and caregiver. All questions answered. Plan of care agreed to by all concerned. Светлана Sheets.  MD Josr       ,